# Patient Record
Sex: FEMALE | Race: WHITE | URBAN - METROPOLITAN AREA
[De-identification: names, ages, dates, MRNs, and addresses within clinical notes are randomized per-mention and may not be internally consistent; named-entity substitution may affect disease eponyms.]

---

## 2015-12-15 RX ORDER — CLOPIDOGREL BISULFATE 75 MG/1
1 TABLET, FILM COATED ORAL
Qty: 0 | Refills: 0 | COMMUNITY
Start: 2015-12-15

## 2017-02-07 ENCOUNTER — OUTPATIENT (OUTPATIENT)
Dept: OUTPATIENT SERVICES | Facility: HOSPITAL | Age: 62
LOS: 1 days | End: 2017-02-07
Payer: COMMERCIAL

## 2017-02-07 DIAGNOSIS — Z98.89 OTHER SPECIFIED POSTPROCEDURAL STATES: Chronic | ICD-10-CM

## 2017-02-07 DIAGNOSIS — Z92.89 PERSONAL HISTORY OF OTHER MEDICAL TREATMENT: Chronic | ICD-10-CM

## 2017-02-07 DIAGNOSIS — Z98.890 OTHER SPECIFIED POSTPROCEDURAL STATES: Chronic | ICD-10-CM

## 2017-02-07 DIAGNOSIS — M54.16 RADICULOPATHY, LUMBAR REGION: ICD-10-CM

## 2017-02-07 PROCEDURE — 62323 NJX INTERLAMINAR LMBR/SAC: CPT

## 2017-02-07 PROCEDURE — 77003 FLUOROGUIDE FOR SPINE INJECT: CPT

## 2017-03-21 ENCOUNTER — OUTPATIENT (OUTPATIENT)
Dept: OUTPATIENT SERVICES | Facility: HOSPITAL | Age: 62
LOS: 1 days | End: 2017-03-21
Payer: COMMERCIAL

## 2017-03-21 DIAGNOSIS — Z98.890 OTHER SPECIFIED POSTPROCEDURAL STATES: Chronic | ICD-10-CM

## 2017-03-21 DIAGNOSIS — Z98.89 OTHER SPECIFIED POSTPROCEDURAL STATES: Chronic | ICD-10-CM

## 2017-03-21 DIAGNOSIS — Z92.89 PERSONAL HISTORY OF OTHER MEDICAL TREATMENT: Chronic | ICD-10-CM

## 2017-03-21 DIAGNOSIS — M54.16 RADICULOPATHY, LUMBAR REGION: ICD-10-CM

## 2017-03-21 PROCEDURE — 62323 NJX INTERLAMINAR LMBR/SAC: CPT

## 2017-03-21 PROCEDURE — 77003 FLUOROGUIDE FOR SPINE INJECT: CPT

## 2017-05-01 ENCOUNTER — MEDICATION RENEWAL (OUTPATIENT)
Age: 62
End: 2017-05-01

## 2017-07-06 ENCOUNTER — OUTPATIENT (OUTPATIENT)
Dept: OUTPATIENT SERVICES | Facility: HOSPITAL | Age: 62
LOS: 1 days | End: 2017-07-06
Payer: COMMERCIAL

## 2017-07-06 DIAGNOSIS — Z92.89 PERSONAL HISTORY OF OTHER MEDICAL TREATMENT: Chronic | ICD-10-CM

## 2017-07-06 DIAGNOSIS — M54.16 RADICULOPATHY, LUMBAR REGION: ICD-10-CM

## 2017-07-06 DIAGNOSIS — Z98.890 OTHER SPECIFIED POSTPROCEDURAL STATES: Chronic | ICD-10-CM

## 2017-07-06 DIAGNOSIS — Z98.89 OTHER SPECIFIED POSTPROCEDURAL STATES: Chronic | ICD-10-CM

## 2017-07-06 PROCEDURE — 77003 FLUOROGUIDE FOR SPINE INJECT: CPT

## 2017-07-06 PROCEDURE — 62323 NJX INTERLAMINAR LMBR/SAC: CPT

## 2017-07-28 ENCOUNTER — RX RENEWAL (OUTPATIENT)
Age: 62
End: 2017-07-28

## 2017-08-10 ENCOUNTER — APPOINTMENT (OUTPATIENT)
Dept: CARDIOLOGY | Facility: CLINIC | Age: 62
End: 2017-08-10
Payer: COMMERCIAL

## 2017-08-10 ENCOUNTER — NON-APPOINTMENT (OUTPATIENT)
Age: 62
End: 2017-08-10

## 2017-08-10 VITALS
HEART RATE: 82 BPM | DIASTOLIC BLOOD PRESSURE: 79 MMHG | BODY MASS INDEX: 31.98 KG/M2 | HEIGHT: 66 IN | WEIGHT: 199 LBS | SYSTOLIC BLOOD PRESSURE: 146 MMHG | OXYGEN SATURATION: 100 %

## 2017-08-10 VITALS — SYSTOLIC BLOOD PRESSURE: 128 MMHG | DIASTOLIC BLOOD PRESSURE: 80 MMHG

## 2017-08-10 PROCEDURE — 93000 ELECTROCARDIOGRAM COMPLETE: CPT

## 2017-08-10 PROCEDURE — 99215 OFFICE O/P EST HI 40 MIN: CPT

## 2017-08-10 RX ORDER — METFORMIN ER 500 MG 500 MG/1
500 TABLET ORAL
Qty: 90 | Refills: 0 | Status: DISCONTINUED | COMMUNITY
Start: 2017-03-06

## 2017-08-10 RX ORDER — OMEPRAZOLE 20 MG/1
20 CAPSULE, DELAYED RELEASE ORAL
Qty: 90 | Refills: 0 | Status: DISCONTINUED | COMMUNITY
Start: 2017-01-10

## 2017-08-10 RX ORDER — GABAPENTIN 600 MG/1
600 TABLET, COATED ORAL
Qty: 84 | Refills: 0 | Status: DISCONTINUED | COMMUNITY
Start: 2017-04-17

## 2017-08-10 RX ORDER — MOMETASONE 50 UG/1
50 SPRAY, METERED NASAL
Qty: 17 | Refills: 0 | Status: DISCONTINUED | COMMUNITY
Start: 2017-05-18

## 2017-08-10 RX ORDER — TRAMADOL HYDROCHLORIDE AND ACETAMINOPHEN 37.5; 325 MG/1; MG/1
37.5-325 TABLET, FILM COATED ORAL
Qty: 30 | Refills: 0 | Status: DISCONTINUED | COMMUNITY
Start: 2017-02-15

## 2017-08-10 RX ORDER — AMOXICILLIN AND CLAVULANATE POTASSIUM 875; 125 MG/1; MG/1
875-125 TABLET, COATED ORAL
Qty: 20 | Refills: 0 | Status: DISCONTINUED | COMMUNITY
Start: 2017-05-18

## 2017-08-10 RX ORDER — ESTRADIOL 10 UG/1
10 TABLET VAGINAL
Qty: 36 | Refills: 0 | Status: ACTIVE | COMMUNITY
Start: 2017-04-11

## 2017-09-14 ENCOUNTER — RX RENEWAL (OUTPATIENT)
Age: 62
End: 2017-09-14

## 2017-09-28 ENCOUNTER — EMERGENCY (EMERGENCY)
Facility: HOSPITAL | Age: 62
LOS: 1 days | Discharge: HOME | End: 2017-09-28
Attending: EMERGENCY MEDICINE | Admitting: EMERGENCY MEDICINE
Payer: COMMERCIAL

## 2017-09-28 VITALS
HEIGHT: 65 IN | RESPIRATION RATE: 18 BRPM | WEIGHT: 195.11 LBS | SYSTOLIC BLOOD PRESSURE: 160 MMHG | TEMPERATURE: 98 F | HEART RATE: 100 BPM | DIASTOLIC BLOOD PRESSURE: 81 MMHG

## 2017-09-28 VITALS
RESPIRATION RATE: 19 BRPM | OXYGEN SATURATION: 96 % | TEMPERATURE: 98 F | HEART RATE: 84 BPM | SYSTOLIC BLOOD PRESSURE: 157 MMHG | DIASTOLIC BLOOD PRESSURE: 81 MMHG

## 2017-09-28 DIAGNOSIS — Z98.89 OTHER SPECIFIED POSTPROCEDURAL STATES: Chronic | ICD-10-CM

## 2017-09-28 DIAGNOSIS — Z92.89 PERSONAL HISTORY OF OTHER MEDICAL TREATMENT: Chronic | ICD-10-CM

## 2017-09-28 DIAGNOSIS — Z98.890 OTHER SPECIFIED POSTPROCEDURAL STATES: Chronic | ICD-10-CM

## 2017-09-28 PROCEDURE — 99284 EMERGENCY DEPT VISIT MOD MDM: CPT | Mod: 25

## 2017-09-28 PROCEDURE — 30901 CONTROL OF NOSEBLEED: CPT

## 2017-09-28 PROCEDURE — 30901 CONTROL OF NOSEBLEED: CPT | Mod: LT

## 2017-09-28 NOTE — ED PROVIDER NOTE - PSH
History of cardiac catheterization  01/2016  History of carpal tunnel surgery of left wrist    History of carpal tunnel surgery of right wrist    History of knee surgery    History of shoulder surgery

## 2017-09-28 NOTE — ED PROVIDER NOTE - OBJECTIVE STATEMENT
61 y/o F pt with history of CAD (2 stents), diabetes mellitus, HTN, hypercholesterolemia, presents to the ED c/o epistaxis for approximately 1.5 hours. Pt has experienced this before, last time 2 days ago. Pt takes Plavix and aspirin. Denies dizziness, pain, headache. No further complaints at this time.

## 2017-09-28 NOTE — ED ADULT NURSE NOTE - OBJECTIVE STATEMENT
Patient walked into ER c/o nose bleed from left nostril since 10am.  Visible bleeding from nostril noted.
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
## 2018-01-03 ENCOUNTER — NON-APPOINTMENT (OUTPATIENT)
Age: 63
End: 2018-01-03

## 2018-01-03 ENCOUNTER — APPOINTMENT (OUTPATIENT)
Dept: CARDIOLOGY | Facility: CLINIC | Age: 63
End: 2018-01-03
Payer: COMMERCIAL

## 2018-01-03 VITALS
DIASTOLIC BLOOD PRESSURE: 72 MMHG | HEIGHT: 66 IN | HEART RATE: 90 BPM | WEIGHT: 187 LBS | BODY MASS INDEX: 30.05 KG/M2 | OXYGEN SATURATION: 99 % | SYSTOLIC BLOOD PRESSURE: 124 MMHG

## 2018-01-03 DIAGNOSIS — E11.9 TYPE 2 DIABETES MELLITUS W/OUT COMPLICATIONS: ICD-10-CM

## 2018-01-03 PROCEDURE — 93000 ELECTROCARDIOGRAM COMPLETE: CPT

## 2018-01-03 PROCEDURE — 99215 OFFICE O/P EST HI 40 MIN: CPT

## 2018-01-24 ENCOUNTER — APPOINTMENT (OUTPATIENT)
Dept: CARDIOLOGY | Facility: CLINIC | Age: 63
End: 2018-01-24
Payer: COMMERCIAL

## 2018-01-24 PROCEDURE — A9500: CPT

## 2018-01-24 PROCEDURE — 78452 HT MUSCLE IMAGE SPECT MULT: CPT

## 2018-01-24 PROCEDURE — 93015 CV STRESS TEST SUPVJ I&R: CPT

## 2018-03-13 ENCOUNTER — CLINICAL ADVICE (OUTPATIENT)
Age: 63
End: 2018-03-13

## 2018-05-08 ENCOUNTER — APPOINTMENT (OUTPATIENT)
Dept: MRI IMAGING | Facility: CLINIC | Age: 63
End: 2018-05-08
Payer: COMMERCIAL

## 2018-05-08 ENCOUNTER — OUTPATIENT (OUTPATIENT)
Dept: OUTPATIENT SERVICES | Facility: HOSPITAL | Age: 63
LOS: 1 days | End: 2018-05-08
Payer: COMMERCIAL

## 2018-05-08 DIAGNOSIS — Z98.89 OTHER SPECIFIED POSTPROCEDURAL STATES: Chronic | ICD-10-CM

## 2018-05-08 DIAGNOSIS — Z92.89 PERSONAL HISTORY OF OTHER MEDICAL TREATMENT: Chronic | ICD-10-CM

## 2018-05-08 DIAGNOSIS — Z00.8 ENCOUNTER FOR OTHER GENERAL EXAMINATION: ICD-10-CM

## 2018-05-08 DIAGNOSIS — Z98.890 OTHER SPECIFIED POSTPROCEDURAL STATES: Chronic | ICD-10-CM

## 2018-05-08 PROCEDURE — 72148 MRI LUMBAR SPINE W/O DYE: CPT | Mod: 26

## 2018-05-08 PROCEDURE — 72148 MRI LUMBAR SPINE W/O DYE: CPT

## 2018-05-13 ENCOUNTER — TRANSCRIPTION ENCOUNTER (OUTPATIENT)
Age: 63
End: 2018-05-13

## 2018-05-14 ENCOUNTER — OUTPATIENT (OUTPATIENT)
Dept: OUTPATIENT SERVICES | Facility: HOSPITAL | Age: 63
LOS: 1 days | End: 2018-05-14
Payer: COMMERCIAL

## 2018-05-14 ENCOUNTER — RESULT REVIEW (OUTPATIENT)
Age: 63
End: 2018-05-14

## 2018-05-14 DIAGNOSIS — Z92.89 PERSONAL HISTORY OF OTHER MEDICAL TREATMENT: Chronic | ICD-10-CM

## 2018-05-14 DIAGNOSIS — Z98.89 OTHER SPECIFIED POSTPROCEDURAL STATES: Chronic | ICD-10-CM

## 2018-05-14 DIAGNOSIS — D48.1 NEOPLASM OF UNCERTAIN BEHAVIOR OF CONNECTIVE AND OTHER SOFT TISSUE: ICD-10-CM

## 2018-05-14 DIAGNOSIS — Z98.890 OTHER SPECIFIED POSTPROCEDURAL STATES: Chronic | ICD-10-CM

## 2018-05-14 PROCEDURE — 88305 TISSUE EXAM BY PATHOLOGIST: CPT | Mod: 26

## 2018-05-14 PROCEDURE — 88305 TISSUE EXAM BY PATHOLOGIST: CPT

## 2018-05-14 PROCEDURE — 21012 EXC FACE LES SBQ 2 CM/>: CPT

## 2018-05-15 LAB — SURGICAL PATHOLOGY FINAL REPORT - CH: SIGNIFICANT CHANGE UP

## 2018-06-14 ENCOUNTER — OUTPATIENT (OUTPATIENT)
Dept: OUTPATIENT SERVICES | Facility: HOSPITAL | Age: 63
LOS: 1 days | End: 2018-06-14
Payer: COMMERCIAL

## 2018-06-14 DIAGNOSIS — Z92.89 PERSONAL HISTORY OF OTHER MEDICAL TREATMENT: Chronic | ICD-10-CM

## 2018-06-14 DIAGNOSIS — Z98.89 OTHER SPECIFIED POSTPROCEDURAL STATES: Chronic | ICD-10-CM

## 2018-06-14 DIAGNOSIS — Z98.890 OTHER SPECIFIED POSTPROCEDURAL STATES: Chronic | ICD-10-CM

## 2018-06-14 DIAGNOSIS — M54.16 RADICULOPATHY, LUMBAR REGION: ICD-10-CM

## 2018-06-14 PROCEDURE — 62323 NJX INTERLAMINAR LMBR/SAC: CPT

## 2018-06-14 PROCEDURE — 77003 FLUOROGUIDE FOR SPINE INJECT: CPT

## 2018-06-29 ENCOUNTER — RX RENEWAL (OUTPATIENT)
Age: 63
End: 2018-06-29

## 2018-07-06 ENCOUNTER — MESSAGE (OUTPATIENT)
Age: 63
End: 2018-07-06

## 2018-09-10 ENCOUNTER — APPOINTMENT (OUTPATIENT)
Dept: ORTHOPEDIC SURGERY | Facility: CLINIC | Age: 63
End: 2018-09-10
Payer: COMMERCIAL

## 2018-09-10 VITALS
DIASTOLIC BLOOD PRESSURE: 83 MMHG | HEART RATE: 163 BPM | WEIGHT: 200 LBS | HEIGHT: 66 IN | SYSTOLIC BLOOD PRESSURE: 152 MMHG | BODY MASS INDEX: 32.14 KG/M2

## 2018-09-10 PROCEDURE — 72082 X-RAY EXAM ENTIRE SPI 2/3 VW: CPT

## 2018-09-10 PROCEDURE — 99203 OFFICE O/P NEW LOW 30 MIN: CPT

## 2018-10-10 ENCOUNTER — APPOINTMENT (OUTPATIENT)
Dept: ORTHOPEDIC SURGERY | Facility: CLINIC | Age: 63
End: 2018-10-10
Payer: COMMERCIAL

## 2018-10-10 VITALS
SYSTOLIC BLOOD PRESSURE: 121 MMHG | HEIGHT: 66 IN | HEART RATE: 96 BPM | BODY MASS INDEX: 32.14 KG/M2 | DIASTOLIC BLOOD PRESSURE: 74 MMHG | WEIGHT: 200 LBS

## 2018-10-10 DIAGNOSIS — M41.9 SCOLIOSIS, UNSPECIFIED: ICD-10-CM

## 2018-10-10 PROCEDURE — 99214 OFFICE O/P EST MOD 30 MIN: CPT

## 2018-10-24 ENCOUNTER — CHART COPY (OUTPATIENT)
Age: 63
End: 2018-10-24

## 2018-11-05 ENCOUNTER — CHART COPY (OUTPATIENT)
Age: 63
End: 2018-11-05

## 2018-11-09 ENCOUNTER — OUTPATIENT (OUTPATIENT)
Dept: OUTPATIENT SERVICES | Facility: HOSPITAL | Age: 63
LOS: 1 days | End: 2018-11-09
Payer: COMMERCIAL

## 2018-11-09 VITALS
WEIGHT: 201.94 LBS | OXYGEN SATURATION: 99 % | HEIGHT: 63 IN | SYSTOLIC BLOOD PRESSURE: 144 MMHG | DIASTOLIC BLOOD PRESSURE: 84 MMHG | HEART RATE: 90 BPM | RESPIRATION RATE: 16 BRPM | TEMPERATURE: 97 F

## 2018-11-09 DIAGNOSIS — Z98.89 OTHER SPECIFIED POSTPROCEDURAL STATES: Chronic | ICD-10-CM

## 2018-11-09 DIAGNOSIS — M48.07 SPINAL STENOSIS, LUMBOSACRAL REGION: ICD-10-CM

## 2018-11-09 DIAGNOSIS — Z92.89 PERSONAL HISTORY OF OTHER MEDICAL TREATMENT: Chronic | ICD-10-CM

## 2018-11-09 DIAGNOSIS — Z95.5 PRESENCE OF CORONARY ANGIOPLASTY IMPLANT AND GRAFT: ICD-10-CM

## 2018-11-09 DIAGNOSIS — I10 ESSENTIAL (PRIMARY) HYPERTENSION: ICD-10-CM

## 2018-11-09 DIAGNOSIS — M40.209 UNSPECIFIED KYPHOSIS, SITE UNSPECIFIED: ICD-10-CM

## 2018-11-09 DIAGNOSIS — G47.33 OBSTRUCTIVE SLEEP APNEA (ADULT) (PEDIATRIC): ICD-10-CM

## 2018-11-09 DIAGNOSIS — Z98.890 OTHER SPECIFIED POSTPROCEDURAL STATES: Chronic | ICD-10-CM

## 2018-11-09 DIAGNOSIS — E11.9 TYPE 2 DIABETES MELLITUS WITHOUT COMPLICATIONS: ICD-10-CM

## 2018-11-09 LAB
BLD GP AB SCN SERPL QL: NEGATIVE — SIGNIFICANT CHANGE UP
BUN SERPL-MCNC: 23 MG/DL — SIGNIFICANT CHANGE UP (ref 7–23)
CALCIUM SERPL-MCNC: 9.3 MG/DL — SIGNIFICANT CHANGE UP (ref 8.4–10.5)
CHLORIDE SERPL-SCNC: 102 MMOL/L — SIGNIFICANT CHANGE UP (ref 98–107)
CO2 SERPL-SCNC: 25 MMOL/L — SIGNIFICANT CHANGE UP (ref 22–31)
CREAT SERPL-MCNC: 1.12 MG/DL — SIGNIFICANT CHANGE UP (ref 0.5–1.3)
GLUCOSE SERPL-MCNC: 147 MG/DL — HIGH (ref 70–99)
HBA1C BLD-MCNC: 7.5 % — HIGH (ref 4–5.6)
HCT VFR BLD CALC: 37.7 % — SIGNIFICANT CHANGE UP (ref 34.5–45)
HGB BLD-MCNC: 11.9 G/DL — SIGNIFICANT CHANGE UP (ref 11.5–15.5)
MCHC RBC-ENTMCNC: 28.7 PG — SIGNIFICANT CHANGE UP (ref 27–34)
MCHC RBC-ENTMCNC: 31.6 % — LOW (ref 32–36)
MCV RBC AUTO: 90.8 FL — SIGNIFICANT CHANGE UP (ref 80–100)
NRBC # FLD: 0 — SIGNIFICANT CHANGE UP
PLATELET # BLD AUTO: 328 K/UL — SIGNIFICANT CHANGE UP (ref 150–400)
PMV BLD: 9.9 FL — SIGNIFICANT CHANGE UP (ref 7–13)
POTASSIUM SERPL-MCNC: 4.3 MMOL/L — SIGNIFICANT CHANGE UP (ref 3.5–5.3)
POTASSIUM SERPL-SCNC: 4.3 MMOL/L — SIGNIFICANT CHANGE UP (ref 3.5–5.3)
RBC # BLD: 4.15 M/UL — SIGNIFICANT CHANGE UP (ref 3.8–5.2)
RBC # FLD: 13.8 % — SIGNIFICANT CHANGE UP (ref 10.3–14.5)
RH IG SCN BLD-IMP: POSITIVE — SIGNIFICANT CHANGE UP
SODIUM SERPL-SCNC: 141 MMOL/L — SIGNIFICANT CHANGE UP (ref 135–145)
WBC # BLD: 7.7 K/UL — SIGNIFICANT CHANGE UP (ref 3.8–10.5)
WBC # FLD AUTO: 7.7 K/UL — SIGNIFICANT CHANGE UP (ref 3.8–10.5)

## 2018-11-09 PROCEDURE — 93010 ELECTROCARDIOGRAM REPORT: CPT

## 2018-11-09 RX ORDER — ALPRAZOLAM 0.25 MG
0 TABLET ORAL
Qty: 0 | Refills: 0 | COMMUNITY

## 2018-11-09 RX ORDER — OMEGA-3 ACID ETHYL ESTERS 1 G
1 CAPSULE ORAL
Qty: 0 | Refills: 0 | COMMUNITY

## 2018-11-09 RX ORDER — UBIDECARENONE 100 MG
2 CAPSULE ORAL
Qty: 0 | Refills: 0 | COMMUNITY

## 2018-11-09 NOTE — H&P PST ADULT - ASSESSMENT
unspecified kyphosis , site unspecified   spondylolisthesis ,lumbar region   spinal stenosis , lumbosacral region

## 2018-11-09 NOTE — H&P PST ADULT - HISTORY OF PRESENT ILLNESS
This is a 63 y.o. female with lumbar back pain , evaluated . Pt had x-ray of spine , MRI of spine , CT of spine . Pt has unspecified kyphosis, site unspecified . Spondylolisthesis ,lumbar region, spinal stenosis ,lumbosacral region . Pt complaining of lower back pain , radiates to left leg past knee - intermittently . Pt now for surgery .

## 2018-11-09 NOTE — H&P PST ADULT - TEACHING/LEARNING LEARNING PREFERENCES
individual instruction/pictorial/skill demonstration/video/written material/group instruction/verbal instruction/audio/computer/internet

## 2018-11-09 NOTE — H&P PST ADULT - MUSCULOSKELETAL
details… detailed exam no joint warmth/no joint swelling/normal strength/no joint erythema/no calf tenderness

## 2018-11-09 NOTE — H&P PST ADULT - PROBLEM SELECTOR PLAN 4
type 2 , pt to hold metformin 24 hours prior to OR , last dose 11/26. STAT FS upon admit , monitor FS during hospital stay .
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
## 2018-11-09 NOTE — H&P PST ADULT - PROBLEM SELECTOR PLAN 3
times 2 - 2015 . Pt is on plavix and aspirin , cardiac clearance requested . Discussed with Dr Mata who agrees with plan .

## 2018-11-09 NOTE — H&P PST ADULT - FAMILY HISTORY
Family history of breast cancer in female     Family history of pancreatic cancer     Grandparent  Still living? No  Family history of cancer of GI tract, Age at diagnosis: Age Unknown

## 2018-11-09 NOTE — H&P PST ADULT - PSH
History of cardiac catheterization  12/2015 with stent times  - [facility]  History of carpal tunnel surgery of left wrist    History of carpal tunnel surgery of right wrist    History of knee surgery  left times 2 ;  2001 , 2002   right knee : 2004  History of shoulder surgery

## 2018-11-09 NOTE — H&P PST ADULT - RS GEN PE MLT RESP DETAILS PC
no rales/no rhonchi/respirations non-labored/no wheezes/breath sounds equal/clear to auscultation bilaterally/good air movement

## 2018-11-11 LAB — SPECIMEN SOURCE: SIGNIFICANT CHANGE UP

## 2018-11-12 LAB — BACTERIA NPH CULT: SIGNIFICANT CHANGE UP

## 2018-11-14 ENCOUNTER — APPOINTMENT (OUTPATIENT)
Dept: CARDIOLOGY | Facility: CLINIC | Age: 63
End: 2018-11-14
Payer: COMMERCIAL

## 2018-11-14 ENCOUNTER — NON-APPOINTMENT (OUTPATIENT)
Age: 63
End: 2018-11-14

## 2018-11-14 VITALS
HEIGHT: 66 IN | DIASTOLIC BLOOD PRESSURE: 84 MMHG | WEIGHT: 203 LBS | BODY MASS INDEX: 32.62 KG/M2 | HEART RATE: 95 BPM | SYSTOLIC BLOOD PRESSURE: 166 MMHG | OXYGEN SATURATION: 99 %

## 2018-11-14 VITALS — DIASTOLIC BLOOD PRESSURE: 80 MMHG | SYSTOLIC BLOOD PRESSURE: 140 MMHG

## 2018-11-14 DIAGNOSIS — I10 ESSENTIAL (PRIMARY) HYPERTENSION: ICD-10-CM

## 2018-11-14 DIAGNOSIS — I25.10 ATHEROSCLEROTIC HEART DISEASE OF NATIVE CORONARY ARTERY W/OUT ANGINA PECTORIS: ICD-10-CM

## 2018-11-14 DIAGNOSIS — E78.5 HYPERLIPIDEMIA, UNSPECIFIED: ICD-10-CM

## 2018-11-14 PROCEDURE — 93000 ELECTROCARDIOGRAM COMPLETE: CPT

## 2018-11-14 PROCEDURE — 99215 OFFICE O/P EST HI 40 MIN: CPT

## 2018-11-14 RX ORDER — PSYLLIUM HUSK 0.4 G
CAPSULE ORAL
Refills: 0 | Status: ACTIVE | COMMUNITY

## 2018-11-14 RX ORDER — TIZANIDINE 2 MG/1
2 TABLET ORAL
Qty: 180 | Refills: 0 | Status: DISCONTINUED | COMMUNITY
Start: 2017-04-19 | End: 2018-11-14

## 2018-11-14 RX ORDER — UBIDECARENONE 200 MG
CAPSULE ORAL
Refills: 0 | Status: ACTIVE | COMMUNITY

## 2018-11-14 RX ORDER — ROSUVASTATIN CALCIUM 40 MG/1
40 TABLET, FILM COATED ORAL
Qty: 90 | Refills: 3 | Status: ACTIVE | COMMUNITY
Start: 2018-06-29 | End: 1900-01-01

## 2018-11-14 RX ORDER — HYDROCHLOROTHIAZIDE 12.5 MG/1
12.5 CAPSULE ORAL
Qty: 45 | Refills: 0 | Status: DISCONTINUED | COMMUNITY
Start: 2017-05-09 | End: 2018-11-14

## 2018-11-14 RX ORDER — GABAPENTIN 600 MG/1
600 TABLET, COATED ORAL 4 TIMES DAILY
Refills: 0 | Status: ACTIVE | COMMUNITY

## 2018-11-15 PROBLEM — N20.0 CALCULUS OF KIDNEY: Chronic | Status: ACTIVE | Noted: 2018-11-09

## 2018-11-15 PROBLEM — M41.9 SCOLIOSIS, UNSPECIFIED: Chronic | Status: ACTIVE | Noted: 2018-11-09

## 2018-11-16 ENCOUNTER — APPOINTMENT (OUTPATIENT)
Dept: CARDIOLOGY | Facility: CLINIC | Age: 63
End: 2018-11-16
Payer: COMMERCIAL

## 2018-11-16 PROCEDURE — 93306 TTE W/DOPPLER COMPLETE: CPT

## 2018-11-19 ENCOUNTER — APPOINTMENT (OUTPATIENT)
Dept: ORTHOPEDIC SURGERY | Facility: CLINIC | Age: 63
End: 2018-11-19
Payer: COMMERCIAL

## 2018-11-19 DIAGNOSIS — M43.16 SPONDYLOLISTHESIS, LUMBAR REGION: ICD-10-CM

## 2018-11-19 DIAGNOSIS — M48.07 SPINAL STENOSIS, LUMBOSACRAL REGION: ICD-10-CM

## 2018-11-19 DIAGNOSIS — M40.209 UNSPECIFIED KYPHOSIS, SITE UNSPECIFIED: ICD-10-CM

## 2018-11-19 PROCEDURE — 99214 OFFICE O/P EST MOD 30 MIN: CPT

## 2018-11-26 ENCOUNTER — TRANSCRIPTION ENCOUNTER (OUTPATIENT)
Age: 63
End: 2018-11-26

## 2018-11-26 NOTE — ASU PATIENT PROFILE, ADULT - PSH
History of cardiac catheterization  12/2015 with stent times  - [facility]  History of carpal tunnel surgery of left wrist    History of carpal tunnel surgery of right wrist    History of knee surgery  left times 2 ;  2001 , 2002   right knee : 2004  History of shoulder surgery

## 2018-11-26 NOTE — ASU PATIENT PROFILE, ADULT - TEACHING/LEARNING LEARNING PREFERENCES
video/written material/skill demonstration/verbal instruction/pictorial/audio/individual instruction/computer/internet/group instruction
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
## 2018-11-27 ENCOUNTER — INPATIENT (INPATIENT)
Facility: HOSPITAL | Age: 63
LOS: 14 days | Discharge: NURSING, REHAB OR LONG-TERM CARE | End: 2018-12-12
Attending: STUDENT IN AN ORGANIZED HEALTH CARE EDUCATION/TRAINING PROGRAM | Admitting: STUDENT IN AN ORGANIZED HEALTH CARE EDUCATION/TRAINING PROGRAM
Payer: COMMERCIAL

## 2018-11-27 ENCOUNTER — RESULT REVIEW (OUTPATIENT)
Age: 63
End: 2018-11-27

## 2018-11-27 ENCOUNTER — APPOINTMENT (OUTPATIENT)
Dept: ORTHOPEDIC SURGERY | Facility: HOSPITAL | Age: 63
End: 2018-11-27
Payer: COMMERCIAL

## 2018-11-27 VITALS
SYSTOLIC BLOOD PRESSURE: 132 MMHG | HEIGHT: 63 IN | DIASTOLIC BLOOD PRESSURE: 80 MMHG | HEART RATE: 77 BPM | RESPIRATION RATE: 16 BRPM | WEIGHT: 201.94 LBS | OXYGEN SATURATION: 97 % | TEMPERATURE: 98 F

## 2018-11-27 DIAGNOSIS — Z92.89 PERSONAL HISTORY OF OTHER MEDICAL TREATMENT: Chronic | ICD-10-CM

## 2018-11-27 DIAGNOSIS — M48.07 SPINAL STENOSIS, LUMBOSACRAL REGION: ICD-10-CM

## 2018-11-27 DIAGNOSIS — Z98.89 OTHER SPECIFIED POSTPROCEDURAL STATES: Chronic | ICD-10-CM

## 2018-11-27 DIAGNOSIS — Z98.890 OTHER SPECIFIED POSTPROCEDURAL STATES: Chronic | ICD-10-CM

## 2018-11-27 LAB
APTT BLD: 27.3 SEC — LOW (ref 27.5–36.3)
BASE EXCESS BLDA CALC-SCNC: -0.6 MMOL/L — SIGNIFICANT CHANGE UP
BASE EXCESS BLDA CALC-SCNC: -1.8 MMOL/L — SIGNIFICANT CHANGE UP
BASE EXCESS BLDA CALC-SCNC: -2.3 MMOL/L — SIGNIFICANT CHANGE UP
BASE EXCESS BLDA CALC-SCNC: -3 MMOL/L — SIGNIFICANT CHANGE UP
BUN SERPL-MCNC: 23 MG/DL — SIGNIFICANT CHANGE UP (ref 7–23)
CA-I BLDA-SCNC: 1.07 MMOL/L — LOW (ref 1.15–1.29)
CA-I BLDA-SCNC: 1.08 MMOL/L — LOW (ref 1.15–1.29)
CA-I BLDA-SCNC: 1.11 MMOL/L — LOW (ref 1.15–1.29)
CA-I BLDA-SCNC: 1.14 MMOL/L — LOW (ref 1.15–1.29)
CALCIUM SERPL-MCNC: 8.1 MG/DL — LOW (ref 8.4–10.5)
CHLORIDE SERPL-SCNC: 105 MMOL/L — SIGNIFICANT CHANGE UP (ref 98–107)
CO2 SERPL-SCNC: 22 MMOL/L — SIGNIFICANT CHANGE UP (ref 22–31)
CREAT SERPL-MCNC: 1.1 MG/DL — SIGNIFICANT CHANGE UP (ref 0.5–1.3)
GLUCOSE BLDA-MCNC: 140 MG/DL — HIGH (ref 70–99)
GLUCOSE BLDA-MCNC: 150 MG/DL — HIGH (ref 70–99)
GLUCOSE BLDA-MCNC: 157 MG/DL — HIGH (ref 70–99)
GLUCOSE BLDA-MCNC: 157 MG/DL — HIGH (ref 70–99)
GLUCOSE BLDC GLUCOMTR-MCNC: 159 MG/DL — HIGH (ref 70–99)
GLUCOSE BLDC GLUCOMTR-MCNC: 173 MG/DL — HIGH (ref 70–99)
GLUCOSE SERPL-MCNC: 190 MG/DL — HIGH (ref 70–99)
HCO3 BLDA-SCNC: 22 MMOL/L — SIGNIFICANT CHANGE UP (ref 22–26)
HCO3 BLDA-SCNC: 23 MMOL/L — SIGNIFICANT CHANGE UP (ref 22–26)
HCO3 BLDA-SCNC: 23 MMOL/L — SIGNIFICANT CHANGE UP (ref 22–26)
HCO3 BLDA-SCNC: 24 MMOL/L — SIGNIFICANT CHANGE UP (ref 22–26)
HCT VFR BLD CALC: 30.5 % — LOW (ref 34.5–45)
HCT VFR BLDA CALC: 28.1 % — LOW (ref 34.5–46.5)
HCT VFR BLDA CALC: 29.7 % — LOW (ref 34.5–46.5)
HCT VFR BLDA CALC: 31.2 % — LOW (ref 34.5–46.5)
HCT VFR BLDA CALC: 33 % — LOW (ref 34.5–46.5)
HGB BLD-MCNC: 10 G/DL — LOW (ref 11.5–15.5)
HGB BLDA-MCNC: 10.1 G/DL — LOW (ref 11.5–15.5)
HGB BLDA-MCNC: 10.7 G/DL — LOW (ref 11.5–15.5)
HGB BLDA-MCNC: 9.1 G/DL — LOW (ref 11.5–15.5)
HGB BLDA-MCNC: 9.6 G/DL — LOW (ref 11.5–15.5)
INR BLD: 1.07 — SIGNIFICANT CHANGE UP (ref 0.88–1.17)
MAGNESIUM SERPL-MCNC: 1.8 MG/DL — SIGNIFICANT CHANGE UP (ref 1.6–2.6)
MCHC RBC-ENTMCNC: 29.6 PG — SIGNIFICANT CHANGE UP (ref 27–34)
MCHC RBC-ENTMCNC: 32.8 % — SIGNIFICANT CHANGE UP (ref 32–36)
MCV RBC AUTO: 90.2 FL — SIGNIFICANT CHANGE UP (ref 80–100)
NRBC # FLD: 0 — SIGNIFICANT CHANGE UP
PCO2 BLDA: 31 MMHG — LOW (ref 32–48)
PCO2 BLDA: 35 MMHG — SIGNIFICANT CHANGE UP (ref 32–48)
PH BLDA: 7.41 PH — SIGNIFICANT CHANGE UP (ref 7.35–7.45)
PH BLDA: 7.44 PH — SIGNIFICANT CHANGE UP (ref 7.35–7.45)
PH BLDA: 7.45 PH — SIGNIFICANT CHANGE UP (ref 7.35–7.45)
PH BLDA: 7.48 PH — HIGH (ref 7.35–7.45)
PHOSPHATE SERPL-MCNC: 3.9 MG/DL — SIGNIFICANT CHANGE UP (ref 2.5–4.5)
PLATELET # BLD AUTO: 229 K/UL — SIGNIFICANT CHANGE UP (ref 150–400)
PMV BLD: 9.3 FL — SIGNIFICANT CHANGE UP (ref 7–13)
PO2 BLDA: 268 MMHG — HIGH (ref 83–108)
PO2 BLDA: 280 MMHG — HIGH (ref 83–108)
PO2 BLDA: 294 MMHG — HIGH (ref 83–108)
PO2 BLDA: 371 MMHG — HIGH (ref 83–108)
POTASSIUM BLDA-SCNC: 3.8 MMOL/L — SIGNIFICANT CHANGE UP (ref 3.4–4.5)
POTASSIUM BLDA-SCNC: 4.7 MMOL/L — HIGH (ref 3.4–4.5)
POTASSIUM SERPL-MCNC: 4.5 MMOL/L — SIGNIFICANT CHANGE UP (ref 3.5–5.3)
POTASSIUM SERPL-SCNC: 4.5 MMOL/L — SIGNIFICANT CHANGE UP (ref 3.5–5.3)
PROTHROM AB SERPL-ACNC: 12.2 SEC — SIGNIFICANT CHANGE UP (ref 9.8–13.1)
RBC # BLD: 3.38 M/UL — LOW (ref 3.8–5.2)
RBC # FLD: 13.9 % — SIGNIFICANT CHANGE UP (ref 10.3–14.5)
RH IG SCN BLD-IMP: POSITIVE — SIGNIFICANT CHANGE UP
SAO2 % BLDA: 100 % — HIGH (ref 95–99)
SAO2 % BLDA: 99.6 % — HIGH (ref 95–99)
SAO2 % BLDA: 99.7 % — HIGH (ref 95–99)
SAO2 % BLDA: 99.8 % — HIGH (ref 95–99)
SODIUM BLDA-SCNC: 136 MMOL/L — SIGNIFICANT CHANGE UP (ref 136–146)
SODIUM BLDA-SCNC: 137 MMOL/L — SIGNIFICANT CHANGE UP (ref 136–146)
SODIUM SERPL-SCNC: 138 MMOL/L — SIGNIFICANT CHANGE UP (ref 135–145)
WBC # BLD: 12.31 K/UL — HIGH (ref 3.8–10.5)
WBC # FLD AUTO: 12.31 K/UL — HIGH (ref 3.8–10.5)

## 2018-11-27 PROCEDURE — 63267 EXCISE INTRSPINL LESION LMBR: CPT | Mod: 82

## 2018-11-27 PROCEDURE — 22802 ARTHRD PST DFRM 7-12 VRT SGM: CPT

## 2018-11-27 PROCEDURE — 22212 INCIS 1 VERTEBRAL SEG THORAC: CPT | Mod: 59

## 2018-11-27 PROCEDURE — 22216 INCIS ADDL SPINE SEGMENT: CPT

## 2018-11-27 PROCEDURE — 88304 TISSUE EXAM BY PATHOLOGIST: CPT | Mod: 26

## 2018-11-27 PROCEDURE — 88311 DECALCIFY TISSUE: CPT | Mod: 26

## 2018-11-27 PROCEDURE — 63047 LAM FACETEC & FORAMOT LUMBAR: CPT | Mod: 82,59

## 2018-11-27 PROCEDURE — 22214 INCIS 1 VERTEBRAL SEG LUMBAR: CPT

## 2018-11-27 PROCEDURE — 22843 INSERT SPINE FIXATION DEVICE: CPT

## 2018-11-27 PROCEDURE — 63047 LAM FACETEC & FORAMOT LUMBAR: CPT | Mod: 59

## 2018-11-27 PROCEDURE — 22848 INSERT PELV FIXATION DEVICE: CPT

## 2018-11-27 PROCEDURE — 63267 EXCISE INTRSPINL LESION LMBR: CPT

## 2018-11-27 RX ORDER — SODIUM CHLORIDE 9 MG/ML
1000 INJECTION, SOLUTION INTRAVENOUS
Qty: 0 | Refills: 0 | Status: DISCONTINUED | OUTPATIENT
Start: 2018-11-27 | End: 2018-11-28

## 2018-11-27 RX ORDER — LABETALOL HCL 100 MG
10 TABLET ORAL ONCE
Qty: 0 | Refills: 0 | Status: COMPLETED | OUTPATIENT
Start: 2018-11-27 | End: 2018-11-27

## 2018-11-27 RX ORDER — DEXTROSE 50 % IN WATER 50 %
25 SYRINGE (ML) INTRAVENOUS ONCE
Qty: 0 | Refills: 0 | Status: DISCONTINUED | OUTPATIENT
Start: 2018-11-27 | End: 2018-12-09

## 2018-11-27 RX ORDER — INSULIN LISPRO 100/ML
VIAL (ML) SUBCUTANEOUS
Qty: 0 | Refills: 0 | Status: DISCONTINUED | OUTPATIENT
Start: 2018-11-27 | End: 2018-12-09

## 2018-11-27 RX ORDER — SODIUM CHLORIDE 9 MG/ML
1000 INJECTION, SOLUTION INTRAVENOUS
Qty: 0 | Refills: 0 | Status: DISCONTINUED | OUTPATIENT
Start: 2018-11-27 | End: 2018-11-27

## 2018-11-27 RX ORDER — SODIUM CHLORIDE 9 MG/ML
1000 INJECTION, SOLUTION INTRAVENOUS
Qty: 0 | Refills: 0 | Status: DISCONTINUED | OUTPATIENT
Start: 2018-11-27 | End: 2018-12-09

## 2018-11-27 RX ORDER — GLUCAGON INJECTION, SOLUTION 0.5 MG/.1ML
1 INJECTION, SOLUTION SUBCUTANEOUS ONCE
Qty: 0 | Refills: 0 | Status: DISCONTINUED | OUTPATIENT
Start: 2018-11-27 | End: 2018-12-09

## 2018-11-27 RX ORDER — ACETAMINOPHEN 500 MG
1000 TABLET ORAL ONCE
Qty: 0 | Refills: 0 | Status: COMPLETED | OUTPATIENT
Start: 2018-11-27 | End: 2018-11-27

## 2018-11-27 RX ORDER — CEFAZOLIN SODIUM 1 G
1000 VIAL (EA) INJECTION EVERY 8 HOURS
Qty: 0 | Refills: 0 | Status: DISCONTINUED | OUTPATIENT
Start: 2018-11-27 | End: 2018-12-02

## 2018-11-27 RX ORDER — INSULIN LISPRO 100/ML
VIAL (ML) SUBCUTANEOUS AT BEDTIME
Qty: 0 | Refills: 0 | Status: DISCONTINUED | OUTPATIENT
Start: 2018-11-27 | End: 2018-12-09

## 2018-11-27 RX ORDER — ALPRAZOLAM 0.25 MG
0.25 TABLET ORAL EVERY 6 HOURS
Qty: 0 | Refills: 0 | Status: DISCONTINUED | OUTPATIENT
Start: 2018-11-27 | End: 2018-12-04

## 2018-11-27 RX ORDER — HYDROMORPHONE HYDROCHLORIDE 2 MG/ML
0.5 INJECTION INTRAMUSCULAR; INTRAVENOUS; SUBCUTANEOUS EVERY 4 HOURS
Qty: 0 | Refills: 0 | Status: DISCONTINUED | OUTPATIENT
Start: 2018-11-27 | End: 2018-11-28

## 2018-11-27 RX ORDER — LOSARTAN POTASSIUM 100 MG/1
50 TABLET, FILM COATED ORAL DAILY
Qty: 0 | Refills: 0 | Status: DISCONTINUED | OUTPATIENT
Start: 2018-11-27 | End: 2018-12-05

## 2018-11-27 RX ORDER — MAGNESIUM SULFATE 500 MG/ML
2 VIAL (ML) INJECTION ONCE
Qty: 0 | Refills: 0 | Status: COMPLETED | OUTPATIENT
Start: 2018-11-27 | End: 2018-11-27

## 2018-11-27 RX ORDER — INFLUENZA VIRUS VACCINE 15; 15; 15; 15 UG/.5ML; UG/.5ML; UG/.5ML; UG/.5ML
0.5 SUSPENSION INTRAMUSCULAR ONCE
Qty: 0 | Refills: 0 | Status: COMPLETED | OUTPATIENT
Start: 2018-11-27 | End: 2018-11-27

## 2018-11-27 RX ORDER — ATORVASTATIN CALCIUM 80 MG/1
80 TABLET, FILM COATED ORAL AT BEDTIME
Qty: 0 | Refills: 0 | Status: DISCONTINUED | OUTPATIENT
Start: 2018-11-27 | End: 2018-12-03

## 2018-11-27 RX ORDER — DEXTROSE 50 % IN WATER 50 %
15 SYRINGE (ML) INTRAVENOUS ONCE
Qty: 0 | Refills: 0 | Status: DISCONTINUED | OUTPATIENT
Start: 2018-11-27 | End: 2018-12-09

## 2018-11-27 RX ORDER — HYDRALAZINE HCL 50 MG
10 TABLET ORAL ONCE
Qty: 0 | Refills: 0 | Status: COMPLETED | OUTPATIENT
Start: 2018-11-27 | End: 2018-11-27

## 2018-11-27 RX ORDER — CARVEDILOL PHOSPHATE 80 MG/1
25 CAPSULE, EXTENDED RELEASE ORAL EVERY 12 HOURS
Qty: 0 | Refills: 0 | Status: DISCONTINUED | OUTPATIENT
Start: 2018-11-27 | End: 2018-12-07

## 2018-11-27 RX ORDER — DEXTROSE 50 % IN WATER 50 %
12.5 SYRINGE (ML) INTRAVENOUS ONCE
Qty: 0 | Refills: 0 | Status: DISCONTINUED | OUTPATIENT
Start: 2018-11-27 | End: 2018-12-09

## 2018-11-27 RX ADMIN — Medication 1000 MILLIGRAM(S): at 19:20

## 2018-11-27 RX ADMIN — Medication 10 MILLIGRAM(S): at 18:30

## 2018-11-27 RX ADMIN — Medication 10 MILLIGRAM(S): at 18:22

## 2018-11-27 RX ADMIN — ATORVASTATIN CALCIUM 80 MILLIGRAM(S): 80 TABLET, FILM COATED ORAL at 22:26

## 2018-11-27 RX ADMIN — Medication 400 MILLIGRAM(S): at 18:50

## 2018-11-27 RX ADMIN — Medication 100 MILLIGRAM(S): at 22:27

## 2018-11-27 RX ADMIN — Medication 50 GRAM(S): at 21:28

## 2018-11-27 RX ADMIN — HYDROMORPHONE HYDROCHLORIDE 0.5 MILLIGRAM(S): 2 INJECTION INTRAMUSCULAR; INTRAVENOUS; SUBCUTANEOUS at 21:43

## 2018-11-27 RX ADMIN — HYDROMORPHONE HYDROCHLORIDE 0.5 MILLIGRAM(S): 2 INJECTION INTRAMUSCULAR; INTRAVENOUS; SUBCUTANEOUS at 21:28

## 2018-11-27 RX ADMIN — Medication 10 MILLIGRAM(S): at 19:34

## 2018-11-27 RX ADMIN — Medication 10 MILLIGRAM(S): at 18:37

## 2018-11-27 NOTE — ASU PREOP CHECKLIST - SELECT TESTS ORDERED
Type and Screen/nasal smear/EKG/Type and Cross EKG/Type and Cross/nasal smear/Type and Screen/POCT Blood Glucose

## 2018-11-27 NOTE — CONSULT NOTE ADULT - ASSESSMENT
ASSESSMENT:  63y female with PMH significant for DM, HTN, HLD, CAD (s/p drug-eluding stents x2 in 2015), arthritis, spinal stenosis, scoliosis, GERD, carpal tunnel syndrome, depression, hx kidney stones presented for scheduled surgery on 11/27. Patient underwent T10 to pelvis decompression and fusion complicated hypotension, patient given ephedrine and started on levo intraoperatively. EBL ***. Patient transfused 2u PRBCs/1u FFP/1u platelets and given 4L colloids. SICU consulted for Q1H neurochecks.    PLAN:    Neurologic:   - off sedation***  - pain control with***  - resume home cymbalta  - hold home PRN Xanax    Respiratory:   - intubated***  - satting ***    Cardiovascular: HTN, HLD, CAD (s/p drug-eluding stents x2 in 2015)  - BPs on admission ***  - wean pressors as tolerated  - holding home ASA, Plavix, Coreg, Crestor    Gastrointestinal/Nutrition: GERD  - Diet: NPO  - On PPI at home, will start inpatient    Renal/Genitourinary:   - Monitor BUN/Cr  - IVF: ***    Hematologic:   - f/u post-op H/h  - status post 2u PRBCs/1u FFP/1u platelets  - start SQH in AM 11/28    Infectious Disease:   - CTM Tmax and WBC  - Abx: ancef    Tubes/Lines/Drains: ***    Endocrine: DM  - holding home metformin  - ISS    Disposition: SICU    --------------------------------------------------------------------------------------    Critical Care Diagnoses: requiring Q1H neurochecks ASSESSMENT:  63y female with PMH significant for DM, HTN, HLD, CAD (s/p drug-eluding stents x2 in 2015), arthritis, spinal stenosis, scoliosis, GERD, carpal tunnel syndrome, depression, hx kidney stones presented for scheduled surgery on 11/27. Patient underwent T10 to pelvis decompression and fusion complicated hypotension, patient given ephedrine and started on levo intraoperatively. EBL ***. Patient transfused 2u PRBCs/1u FFP/1u platelets and given 4L colloids. SICU consulted for Q1H neurochecks.    PLAN:    Neurologic:   - off sedation***  - pain control with***  - resume home cymbalta  - hold home PRN Xanax    Respiratory:   - intubated***  - satting ***    Cardiovascular: HTN, HLD, CAD (s/p drug-eluding stents x2 in 2015)  - BPs on admission ***  - wean pressors as tolerated  - holding home ASA, Plavix, Coreg, Crestor    Gastrointestinal/Nutrition: GERD  - Diet: NPO  - On PPI at home, will start inpatient    Renal/Genitourinary:   - Monitor BUN/Cr  - IVF: ***  - Rodriguez in place  - Strict Is&Os    Hematologic:   - trend H/h  - status post 2u PRBCs/1u FFP/1u platelets  - start SQH in AM 11/28 if no concern for active bleeding    Infectious Disease:   - CTM Tmax and WBC  - Abx: ancef    Tubes/Lines/Drains: ***    Endocrine: DM  - holding home metformin  - ISS    Disposition: SICU    --------------------------------------------------------------------------------------    Critical Care Diagnoses: requiring Q1H neurochecks ASSESSMENT:  63y female with PMH significant for DM, HTN, HLD, CAD (s/p drug-eluding stents x2 in 2015), arthritis, spinal stenosis, scoliosis, GERD, carpal tunnel syndrome, depression, hx kidney stones presented for scheduled surgery on 11/27. Patient underwent T10 to pelvis decompression and fusion complicated hypotension, patient given ephedrine and started on levo intraoperatively. EBL 1L. Patient transfused 2u PRBCs/1u FFP/1u platelets and given 4L colloids. SICU consulted for Q1H neurochecks.    PLAN:    Neurologic:   - off sedation***  - pain control with***  - resume home cymbalta  - hold home PRN Xanax    Respiratory:   - intubated***  - satting ***    Cardiovascular: HTN, HLD, CAD (s/p drug-eluding stents x2 in 2015)  - BPs on admission ***  - wean pressors as tolerated  - holding home ASA, Plavix, Coreg, Crestor    Gastrointestinal/Nutrition: GERD  - Diet: NPO  - On PPI at home, will start inpatient    Renal/Genitourinary:   - Monitor BUN/Cr  - IVF: ***  - Rodriguez in place  - Strict Is&Os    Hematologic:   - trend H/h  - status post 2u PRBCs/1u FFP/1u platelets  - start SQH in AM 11/28 if no concern for active bleeding    Infectious Disease:   - CTM Tmax and WBC  - Abx: ancef    Tubes/Lines/Drains: ***    Endocrine: DM  - holding home metformin  - ISS    Disposition: SICU    --------------------------------------------------------------------------------------  Critical Care Diagnoses: requiring Q1H neurochecks ASSESSMENT:  63y female with PMH significant for DM, HTN, HLD, CAD (s/p drug-eluding stents x2 in 2015), arthritis, spinal stenosis, scoliosis, GERD, carpal tunnel syndrome, depression, hx kidney stones presented for scheduled surgery on 11/27. Patient underwent T10 to pelvis decompression and fusion complicated hypotension, patient given ephedrine and started on levo intraoperatively. EBL 1L. Patient transfused 2u PRBCs/1u FFP/1u platelets and given 4L colloids. SICU consulted for Q1H neurochecks.    PLAN:  Neurologic:   - Q1 neuro checks x 24 hrs - roughly 7pm 11/28  - remains tired off sedation, sleepy  - pain control with dilaudid prn; c/w Xanax prn only for severe anxiety/agitation    Respiratory:   - extubated to NC @2L  - Monitor respiratory status    Cardiovascular: HTN, HLD, CAD (s/p drug-eluding stents x2 in 2015)  - Well perfused, mildly hypervolemic  - Hypertensive upon admission to SBP 180s - likely caused by significant intraoperative fluid administration  - Continue monitoring vitals  - holding home ASA, Plavix, Coreg, Crestor until mental status improves    Gastrointestinal/Nutrition: GERD  - Diet: NPO, reassess as mental status improves  - On PPI at home, will start inpatient    Renal/Genitourinary:   - Monitor BUN/Cr  - Yanes in place  - Strict Is&Os    Hematologic:   - trend H/h  - status post 2u PRBCs/1u FFP/1u platelets  - start SQH in AM 11/28 if no concern for active bleeding    Infectious Disease:   - CTM Tmax and WBC  - Abx: ancef for prophylaxis    Tubes/Lines/Drains: yanes, drains x 2, PIVs    Endocrine: DM  - holding home metformin  - ISS    Disposition: SICU    --------------------------------------------------------------------------------------  Critical Care Diagnoses: requiring Q1H neurochecks

## 2018-11-27 NOTE — PROGRESS NOTE ADULT - SUBJECTIVE AND OBJECTIVE BOX
Orthopaedic Surgery POC    Subjective:   Patient seen and examined  No acute events postop  Denies numbness/tingling  Denies fever, chills, nausea vomiting and chest pain    Objective:  T(C): 36.9 (11-27-18 @ 20:00), Max: 36.9 (11-27-18 @ 20:00)  HR: 102 (11-27-18 @ 20:00) (77 - 104)  BP: 132/80 (11-27-18 @ 07:02) (132/80 - 132/80)  RR: 22 (11-27-18 @ 20:00) (16 - 30)  SpO2: 94% (11-27-18 @ 20:00) (94% - 99%)  Wt(kg): --    11-27 @ 07:01  -  11-27 @ 21:36  --------------------------------------------------------  IN: 175 mL / OUT: 155 mL / NET: 20 mL        PE    NAD  Back:   dressing C/D/I, mild appropriate cary-incisional ttp  HMV/TALHA in place w/ serosanguinous output  Neuro:  RLE IP 5/5 HS 5/5 Q 5/5 GS 5/5 TA 5/5 EHL 5/5   SILT L2-S1  LLE IP 5/5 HS 5/5 Q 5/5 GS 5/5 TA 5/5 EHL 5/5  SILT L2-S1  WWP BLE                          10.0   12.31 )-----------( 229      ( 27 Nov 2018 18:45 )             30.5     11-27    138  |  105  |  23  ----------------------------<  190<H>  4.5   |  22  |  1.10    Ca    8.1<L>      27 Nov 2018 18:45  Phos  3.9     11-27  Mg     1.8     11-27      PT/INR - ( 27 Nov 2018 18:45 )   PT: 12.2 SEC;   INR: 1.07          PTT - ( 27 Nov 2018 18:45 )  PTT:27.3 SEC      63y Female s/p T10 to pelvis PSF and L2-5 decompression  - Pain control  - FU AM labs  - WBAT  - PT/OT/OOB  - I/S  - Monitor HMV/TALHA output  - Drains/dressing per PRS  - SCDs, SQH in AM  - Care per SICU

## 2018-11-27 NOTE — CONSULT NOTE ADULT - ASSESSMENT
A/P:  63 y.o with severe spinal stenosis s/p posterior spine decompression/fusion with muscle flap reconstruction.  - Diet  - Pain control  - IV abx  - Drain monitoring  - Ambulation as per Ortho   - DVT PPx: SCD, chemoprophylaxis as per spine service  - Will Follow    Thank You  Rhett Mendieta MD  Plastic Surgery  [phone]
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
## 2018-11-28 LAB
BASOPHILS # BLD AUTO: 0.03 K/UL — SIGNIFICANT CHANGE UP (ref 0–0.2)
BASOPHILS NFR BLD AUTO: 0.3 % — SIGNIFICANT CHANGE UP (ref 0–2)
BUN SERPL-MCNC: 21 MG/DL — SIGNIFICANT CHANGE UP (ref 7–23)
CALCIUM SERPL-MCNC: 7.8 MG/DL — LOW (ref 8.4–10.5)
CHLORIDE SERPL-SCNC: 106 MMOL/L — SIGNIFICANT CHANGE UP (ref 98–107)
CO2 SERPL-SCNC: 22 MMOL/L — SIGNIFICANT CHANGE UP (ref 22–31)
CREAT SERPL-MCNC: 1.12 MG/DL — SIGNIFICANT CHANGE UP (ref 0.5–1.3)
EOSINOPHIL # BLD AUTO: 0.14 K/UL — SIGNIFICANT CHANGE UP (ref 0–0.5)
EOSINOPHIL NFR BLD AUTO: 1.6 % — SIGNIFICANT CHANGE UP (ref 0–6)
GLUCOSE BLDC GLUCOMTR-MCNC: 136 MG/DL — HIGH (ref 70–99)
GLUCOSE BLDC GLUCOMTR-MCNC: 154 MG/DL — HIGH (ref 70–99)
GLUCOSE BLDC GLUCOMTR-MCNC: 158 MG/DL — HIGH (ref 70–99)
GLUCOSE BLDC GLUCOMTR-MCNC: 180 MG/DL — HIGH (ref 70–99)
GLUCOSE SERPL-MCNC: 143 MG/DL — HIGH (ref 70–99)
HCT VFR BLD CALC: 28.6 % — LOW (ref 34.5–45)
HGB BLD-MCNC: 9.3 G/DL — LOW (ref 11.5–15.5)
IMM GRANULOCYTES # BLD AUTO: 0.05 # — SIGNIFICANT CHANGE UP
IMM GRANULOCYTES NFR BLD AUTO: 0.6 % — SIGNIFICANT CHANGE UP (ref 0–1.5)
LYMPHOCYTES # BLD AUTO: 1.04 K/UL — SIGNIFICANT CHANGE UP (ref 1–3.3)
LYMPHOCYTES # BLD AUTO: 11.8 % — LOW (ref 13–44)
MAGNESIUM SERPL-MCNC: 2.5 MG/DL — SIGNIFICANT CHANGE UP (ref 1.6–2.6)
MCHC RBC-ENTMCNC: 29 PG — SIGNIFICANT CHANGE UP (ref 27–34)
MCHC RBC-ENTMCNC: 32.5 % — SIGNIFICANT CHANGE UP (ref 32–36)
MCV RBC AUTO: 89.1 FL — SIGNIFICANT CHANGE UP (ref 80–100)
MONOCYTES # BLD AUTO: 0.57 K/UL — SIGNIFICANT CHANGE UP (ref 0–0.9)
MONOCYTES NFR BLD AUTO: 6.4 % — SIGNIFICANT CHANGE UP (ref 2–14)
NEUTROPHILS # BLD AUTO: 7.01 K/UL — SIGNIFICANT CHANGE UP (ref 1.8–7.4)
NEUTROPHILS NFR BLD AUTO: 79.3 % — HIGH (ref 43–77)
NRBC # FLD: 0 — SIGNIFICANT CHANGE UP
PHOSPHATE SERPL-MCNC: 4.4 MG/DL — SIGNIFICANT CHANGE UP (ref 2.5–4.5)
PLATELET # BLD AUTO: 224 K/UL — SIGNIFICANT CHANGE UP (ref 150–400)
PMV BLD: 9.5 FL — SIGNIFICANT CHANGE UP (ref 7–13)
POTASSIUM SERPL-MCNC: 4.4 MMOL/L — SIGNIFICANT CHANGE UP (ref 3.5–5.3)
POTASSIUM SERPL-SCNC: 4.4 MMOL/L — SIGNIFICANT CHANGE UP (ref 3.5–5.3)
RBC # BLD: 3.21 M/UL — LOW (ref 3.8–5.2)
RBC # FLD: 14.2 % — SIGNIFICANT CHANGE UP (ref 10.3–14.5)
SODIUM SERPL-SCNC: 139 MMOL/L — SIGNIFICANT CHANGE UP (ref 135–145)
WBC # BLD: 8.84 K/UL — SIGNIFICANT CHANGE UP (ref 3.8–10.5)
WBC # FLD AUTO: 8.84 K/UL — SIGNIFICANT CHANGE UP (ref 3.8–10.5)

## 2018-11-28 RX ORDER — ONDANSETRON 8 MG/1
4 TABLET, FILM COATED ORAL EVERY 6 HOURS
Qty: 0 | Refills: 0 | Status: DISCONTINUED | OUTPATIENT
Start: 2018-11-28 | End: 2018-11-30

## 2018-11-28 RX ORDER — ACETAMINOPHEN 500 MG
1000 TABLET ORAL ONCE
Qty: 0 | Refills: 0 | Status: COMPLETED | OUTPATIENT
Start: 2018-11-28 | End: 2018-11-28

## 2018-11-28 RX ORDER — HYDROMORPHONE HYDROCHLORIDE 2 MG/ML
30 INJECTION INTRAMUSCULAR; INTRAVENOUS; SUBCUTANEOUS
Qty: 0 | Refills: 0 | Status: DISCONTINUED | OUTPATIENT
Start: 2018-11-28 | End: 2018-11-30

## 2018-11-28 RX ORDER — GABAPENTIN 400 MG/1
600 CAPSULE ORAL
Qty: 0 | Refills: 0 | Status: DISCONTINUED | OUTPATIENT
Start: 2018-11-28 | End: 2018-12-12

## 2018-11-28 RX ORDER — HYDROMORPHONE HYDROCHLORIDE 2 MG/ML
0.5 INJECTION INTRAMUSCULAR; INTRAVENOUS; SUBCUTANEOUS
Qty: 0 | Refills: 0 | Status: DISCONTINUED | OUTPATIENT
Start: 2018-11-28 | End: 2018-11-30

## 2018-11-28 RX ORDER — HEPARIN SODIUM 5000 [USP'U]/ML
5000 INJECTION INTRAVENOUS; SUBCUTANEOUS EVERY 8 HOURS
Qty: 0 | Refills: 0 | Status: DISCONTINUED | OUTPATIENT
Start: 2018-11-28 | End: 2018-12-03

## 2018-11-28 RX ORDER — HYDROMORPHONE HYDROCHLORIDE 2 MG/ML
0.5 INJECTION INTRAMUSCULAR; INTRAVENOUS; SUBCUTANEOUS ONCE
Qty: 0 | Refills: 0 | Status: DISCONTINUED | OUTPATIENT
Start: 2018-11-28 | End: 2018-11-28

## 2018-11-28 RX ORDER — CHLORHEXIDINE GLUCONATE 213 G/1000ML
1 SOLUTION TOPICAL
Qty: 0 | Refills: 0 | Status: DISCONTINUED | OUTPATIENT
Start: 2018-11-28 | End: 2018-11-30

## 2018-11-28 RX ORDER — NALOXONE HYDROCHLORIDE 4 MG/.1ML
0.1 SPRAY NASAL
Qty: 0 | Refills: 0 | Status: DISCONTINUED | OUTPATIENT
Start: 2018-11-28 | End: 2018-11-30

## 2018-11-28 RX ORDER — SODIUM CHLORIDE 9 MG/ML
1000 INJECTION, SOLUTION INTRAVENOUS
Qty: 0 | Refills: 0 | Status: DISCONTINUED | OUTPATIENT
Start: 2018-11-28 | End: 2018-12-01

## 2018-11-28 RX ORDER — PANTOPRAZOLE SODIUM 20 MG/1
40 TABLET, DELAYED RELEASE ORAL
Qty: 0 | Refills: 0 | Status: DISCONTINUED | OUTPATIENT
Start: 2018-11-28 | End: 2018-12-12

## 2018-11-28 RX ORDER — SODIUM CHLORIDE 9 MG/ML
1000 INJECTION, SOLUTION INTRAVENOUS ONCE
Qty: 0 | Refills: 0 | Status: COMPLETED | OUTPATIENT
Start: 2018-11-28 | End: 2018-11-28

## 2018-11-28 RX ORDER — DULOXETINE HYDROCHLORIDE 30 MG/1
90 CAPSULE, DELAYED RELEASE ORAL DAILY
Qty: 0 | Refills: 0 | Status: DISCONTINUED | OUTPATIENT
Start: 2018-11-28 | End: 2018-12-12

## 2018-11-28 RX ADMIN — Medication 100 MILLIGRAM(S): at 05:23

## 2018-11-28 RX ADMIN — HYDROMORPHONE HYDROCHLORIDE 0.5 MILLIGRAM(S): 2 INJECTION INTRAMUSCULAR; INTRAVENOUS; SUBCUTANEOUS at 06:49

## 2018-11-28 RX ADMIN — Medication 100 MILLIGRAM(S): at 23:23

## 2018-11-28 RX ADMIN — ATORVASTATIN CALCIUM 80 MILLIGRAM(S): 80 TABLET, FILM COATED ORAL at 23:24

## 2018-11-28 RX ADMIN — HYDROMORPHONE HYDROCHLORIDE 0.5 MILLIGRAM(S): 2 INJECTION INTRAMUSCULAR; INTRAVENOUS; SUBCUTANEOUS at 05:34

## 2018-11-28 RX ADMIN — HYDROMORPHONE HYDROCHLORIDE 0.5 MILLIGRAM(S): 2 INJECTION INTRAMUSCULAR; INTRAVENOUS; SUBCUTANEOUS at 01:55

## 2018-11-28 RX ADMIN — CARVEDILOL PHOSPHATE 25 MILLIGRAM(S): 80 CAPSULE, EXTENDED RELEASE ORAL at 05:22

## 2018-11-28 RX ADMIN — GABAPENTIN 600 MILLIGRAM(S): 400 CAPSULE ORAL at 15:00

## 2018-11-28 RX ADMIN — HYDROMORPHONE HYDROCHLORIDE 0.5 MILLIGRAM(S): 2 INJECTION INTRAMUSCULAR; INTRAVENOUS; SUBCUTANEOUS at 06:34

## 2018-11-28 RX ADMIN — Medication 1: at 11:16

## 2018-11-28 RX ADMIN — Medication 100 MILLIGRAM(S): at 14:57

## 2018-11-28 RX ADMIN — Medication 400 MILLIGRAM(S): at 19:46

## 2018-11-28 RX ADMIN — Medication 1: at 16:13

## 2018-11-28 RX ADMIN — HYDROMORPHONE HYDROCHLORIDE 30 MILLILITER(S): 2 INJECTION INTRAMUSCULAR; INTRAVENOUS; SUBCUTANEOUS at 18:58

## 2018-11-28 RX ADMIN — LOSARTAN POTASSIUM 50 MILLIGRAM(S): 100 TABLET, FILM COATED ORAL at 05:22

## 2018-11-28 RX ADMIN — HYDROMORPHONE HYDROCHLORIDE 30 MILLILITER(S): 2 INJECTION INTRAMUSCULAR; INTRAVENOUS; SUBCUTANEOUS at 09:55

## 2018-11-28 RX ADMIN — HYDROMORPHONE HYDROCHLORIDE 0.5 MILLIGRAM(S): 2 INJECTION INTRAMUSCULAR; INTRAVENOUS; SUBCUTANEOUS at 18:57

## 2018-11-28 RX ADMIN — SODIUM CHLORIDE 75 MILLILITER(S): 9 INJECTION, SOLUTION INTRAVENOUS at 10:09

## 2018-11-28 RX ADMIN — Medication 1000 MILLIGRAM(S): at 08:53

## 2018-11-28 RX ADMIN — PANTOPRAZOLE SODIUM 40 MILLIGRAM(S): 20 TABLET, DELAYED RELEASE ORAL at 14:59

## 2018-11-28 RX ADMIN — HEPARIN SODIUM 5000 UNIT(S): 5000 INJECTION INTRAVENOUS; SUBCUTANEOUS at 23:24

## 2018-11-28 RX ADMIN — Medication 400 MILLIGRAM(S): at 08:07

## 2018-11-28 RX ADMIN — HYDROMORPHONE HYDROCHLORIDE 0.5 MILLIGRAM(S): 2 INJECTION INTRAMUSCULAR; INTRAVENOUS; SUBCUTANEOUS at 01:40

## 2018-11-28 RX ADMIN — HYDROMORPHONE HYDROCHLORIDE 0.5 MILLIGRAM(S): 2 INJECTION INTRAMUSCULAR; INTRAVENOUS; SUBCUTANEOUS at 05:19

## 2018-11-28 RX ADMIN — SODIUM CHLORIDE 1000 MILLILITER(S): 9 INJECTION, SOLUTION INTRAVENOUS at 19:31

## 2018-11-28 RX ADMIN — HEPARIN SODIUM 5000 UNIT(S): 5000 INJECTION INTRAVENOUS; SUBCUTANEOUS at 14:58

## 2018-11-28 RX ADMIN — Medication 1000 MILLIGRAM(S): at 01:30

## 2018-11-28 RX ADMIN — Medication 400 MILLIGRAM(S): at 01:00

## 2018-11-28 RX ADMIN — HYDROMORPHONE HYDROCHLORIDE 30 MILLILITER(S): 2 INJECTION INTRAMUSCULAR; INTRAVENOUS; SUBCUTANEOUS at 10:18

## 2018-11-28 RX ADMIN — Medication 1000 MILLIGRAM(S): at 20:16

## 2018-11-28 RX ADMIN — GABAPENTIN 600 MILLIGRAM(S): 400 CAPSULE ORAL at 23:24

## 2018-11-28 RX ADMIN — CARVEDILOL PHOSPHATE 25 MILLIGRAM(S): 80 CAPSULE, EXTENDED RELEASE ORAL at 17:38

## 2018-11-28 RX ADMIN — SODIUM CHLORIDE 75 MILLILITER(S): 9 INJECTION, SOLUTION INTRAVENOUS at 08:07

## 2018-11-28 RX ADMIN — DULOXETINE HYDROCHLORIDE 90 MILLIGRAM(S): 30 CAPSULE, DELAYED RELEASE ORAL at 17:07

## 2018-11-28 RX ADMIN — HYDROMORPHONE HYDROCHLORIDE 0.5 MILLIGRAM(S): 2 INJECTION INTRAMUSCULAR; INTRAVENOUS; SUBCUTANEOUS at 10:15

## 2018-11-28 NOTE — PROGRESS NOTE ADULT - SUBJECTIVE AND OBJECTIVE BOX
DEMI ARCHIBALD   [id number]    Patient stable, tolerating diet, pain controlled on regimen.      T(C): 36.2 (11-28-18 @ 16:00), Max: 37.2 (11-28-18 @ 04:00)  HR: 101 (11-28-18 @ 17:38) (88 - 109)  BP: --  RR: 18 (11-28-18 @ 17:38) (13 - 28)  SpO2: 98% (11-28-18 @ 17:38) (94% - 100%)  Wt(kg): --  NAD  Back: Dressing clean/dry/adherent.  Soft.  No collection.  Drains in situ.  BLE: No calf tenderness.      11-27 @ 07:01  -  11-28 @ 07:00  --------------------------------------------------------  IN: 1100 mL / OUT: 1170 mL / NET: -70 mL    11-28 @ 07:01  -  11-28 @ 18:51  --------------------------------------------------------  IN: 1350 mL / OUT: 375 mL / NET: 975 mL      Hemovac: 275cc  TALHA: 100cc  ALPRAZolam 0.25 milliGRAM(s) Oral every 6 hours PRN  atorvastatin 80 milliGRAM(s) Oral at bedtime  carvedilol 25 milliGRAM(s) Oral every 12 hours  ceFAZolin   IVPB 1000 milliGRAM(s) IV Intermittent every 8 hours  chlorhexidine 4% Liquid 1 Application(s) Topical <User Schedule>  dextrose 40% Gel 15 Gram(s) Oral once PRN  dextrose 5% + sodium chloride 0.45%. 1000 milliLiter(s) IV Continuous <Continuous>  dextrose 5%. 1000 milliLiter(s) IV Continuous <Continuous>  dextrose 50% Injectable 12.5 Gram(s) IV Push once  dextrose 50% Injectable 25 Gram(s) IV Push once  dextrose 50% Injectable 25 Gram(s) IV Push once  DULoxetine 90 milliGRAM(s) Oral daily  gabapentin 600 milliGRAM(s) Oral four times a day  glucagon  Injectable 1 milliGRAM(s) IntraMuscular once PRN  heparin  Injectable 5000 Unit(s) SubCutaneous every 8 hours  HYDROmorphone PCA (1 mG/mL) 30 milliLiter(s) PCA Continuous PCA Continuous  HYDROmorphone PCA (1 mG/mL) Rescue Clinician Bolus 0.5 milliGRAM(s) IV Push every 15 minutes PRN  influenza   Vaccine 0.5 milliLiter(s) IntraMuscular once  insulin lispro (HumaLOG) corrective regimen sliding scale   SubCutaneous three times a day before meals  insulin lispro (HumaLOG) corrective regimen sliding scale   SubCutaneous at bedtime  losartan 50 milliGRAM(s) Oral daily  naloxone Injectable 0.1 milliGRAM(s) IV Push every 3 minutes PRN  ondansetron Injectable 4 milliGRAM(s) IV Push every 6 hours PRN  pantoprazole    Tablet 40 milliGRAM(s) Oral before breakfast                            9.3    8.84  )-----------( 224      ( 28 Nov 2018 02:30 )             28.6     11-28    139  |  106  |  21  ----------------------------<  143<H>  4.4   |  22  |  1.12    Ca    7.8<L>      28 Nov 2018 02:30  Phos  4.4     11-28  Mg     2.5     11-28

## 2018-11-28 NOTE — PROGRESS NOTE ADULT - SUBJECTIVE AND OBJECTIVE BOX
HISTORY  63y female with PMH significant for DM, HTN, HLD, CAD (s/p drug-eluding stents x2 in 2015), arthritis, spinal stenosis, scoliosis, GERD, carpal tunnel syndrome, depression, hx kidney stones presented for scheduled surgery on 11/27. Patient underwent T10 to pelvis decompression and fusion.  SICU consulted for Q1H neurochecks x 24 hours. Patient transported to SICU tired appearing, on NC, however denying any pain, sob, or other complaints. Patient received significant fluid resuscitation in OR, and was briefly started on phenylephrine which was subsequently weaned. Patient was extubated at the end of case without difficulty onto NC. Arrives with two drains. Patient SBP 180s upon arrival, was in 130s when left OR.      24 HOUR EVENTS:  Patient was given labetalol 20 IV twice, then hydralazine 10 IV once for SBP > 180. Since then VS have normalized     SUBJECTIVE/ROS:  [ ] A ten-point review of systems was otherwise negative except as noted.  [ ] Due to altered mental status/intubation, subjective information were not able to be obtained from the patient. History was obtained, to the extent possible, from review of the chart and collateral sources of information.      NEURO  Meds: acetaminophen  IVPB .. 1000 milliGRAM(s) IV Intermittent once  ALPRAZolam 0.25 milliGRAM(s) Oral every 6 hours PRN severe agitation or anxiety  HYDROmorphone  Injectable 0.5 milliGRAM(s) IV Push every 4 hours PRN Severe Pain (7 - 10)      RESPIRATORY  RR: 17 (11-27-18 @ 23:00) (13 - 30)  SpO2: 97% (11-27-18 @ 23:00) (94% - 99%)  Mechanical Ventilation:   ABG - ( 27 Nov 2018 16:07 )  pH: 7.44  /  pCO2: 31    /  pO2: 280   / HCO3: 22    / Base Excess: -3.0  /  SaO2: 99.6    Lactate: x          CARDIOVASCULAR  HR: 108 (11-27-18 @ 23:00) (77 - 109)  BP: 132/80 (11-27-18 @ 07:02) (132/80 - 132/80)  BP(mean): --  ABP: 151/70 (11-27-18 @ 23:00) (134/67 - 199/92)  ABP(mean): 97 (11-27-18 @ 23:00) (87 - 132)          GI/NUTRITION      GENITOURINARY  I&O's Detail    11-27 @ 07:01  -  11-28 @ 01:15  --------------------------------------------------------  IN:    IV PiggyBack: 150 mL    lactated ringers.: 300 mL  Total IN: 450 mL    OUT:    Accordian: 50 mL    Bulb: 30 mL    Indwelling Catheter - Urethral: 300 mL  Total OUT: 380 mL    Total NET: 70 mL        Weight (kg): 91.6 (11-27 @ 07:02)  11-27    138  |  105  |  23  ----------------------------<  190<H>  4.5   |  22  |  1.10    Ca    8.1<L>      27 Nov 2018 18:45  Phos  3.9     11-27  Mg     1.8     11-27      [ ] Rodriguez catheter, indication: N/A  Meds: dextrose 5%. 1000 milliLiter(s) IV Continuous <Continuous>  lactated ringers. 1000 milliLiter(s) IV Continuous <Continuous>        HEMATOLOGIC  Meds:   [x] VTE Prophylaxis                        10.0   12.31 )-----------( 229      ( 27 Nov 2018 18:45 )             30.5     PT/INR - ( 27 Nov 2018 18:45 )   PT: 12.2 SEC;   INR: 1.07          PTT - ( 27 Nov 2018 18:45 )  PTT:27.3 SEC  Transfusion     [ ] PRBC   [ ] Platelets   [ ] FFP   [ ] Cryoprecipitate      INFECTIOUS DISEASES  WBC Count: 12.31 K/uL (11-27 @ 18:45)    RECENT CULTURES:    Meds: ceFAZolin   IVPB 1000 milliGRAM(s) IV Intermittent every 8 hours  influenza   Vaccine 0.5 milliLiter(s) IntraMuscular once        ENDOCRINE  CAPILLARY BLOOD GLUCOSE      POCT Blood Glucose.: 173 mg/dL (27 Nov 2018 22:23)  POCT Blood Glucose.: 159 mg/dL (27 Nov 2018 07:19)    Meds: atorvastatin 80 milliGRAM(s) Oral at bedtime  dextrose 40% Gel 15 Gram(s) Oral once PRN  dextrose 50% Injectable 12.5 Gram(s) IV Push once  dextrose 50% Injectable 25 Gram(s) IV Push once  dextrose 50% Injectable 25 Gram(s) IV Push once  glucagon  Injectable 1 milliGRAM(s) IntraMuscular once PRN  insulin lispro (HumaLOG) corrective regimen sliding scale   SubCutaneous three times a day before meals  insulin lispro (HumaLOG) corrective regimen sliding scale   SubCutaneous at bedtime        ACCESS DEVICES:  [ ] Peripheral IV  [ ] Central Venous Line	[ ] R	[ ] L	[ ] IJ	[ ] Fem	[ ] SC	Placed:   [ ] Arterial Line		[ ] R	[ ] L	[ ] Fem	[ ] Rad	[ ] Ax	Placed:   [ ] PICC:					[ ] Mediport  [ ] Urinary Catheter, Date Placed:   [x] Necessity of urinary, arterial, and venous catheters discussed    OTHER MEDICATIONS:      CODE STATUS:      IMAGING: HISTORY  63y female with PMH significant for DM, HTN, HLD, CAD (s/p drug-eluding stents x2 in 2015), arthritis, spinal stenosis, scoliosis, GERD, carpal tunnel syndrome, depression, hx kidney stones presented for scheduled surgery on 11/27. Patient underwent T10 to pelvis decompression and fusion.  SICU consulted for Q1H neurochecks x 24 hours. Patient transported to SICU tired appearing, on NC, however denying any pain, sob, or other complaints. Patient received significant fluid resuscitation in OR, and was briefly started on phenylephrine which was subsequently weaned. Patient was extubated at the end of case without difficulty onto NC. Arrives with two drains. Patient SBP 180s upon arrival, was in 130s when left OR.      24 HOUR EVENTS:  Patient was given labetalol 20 IV twice, then hydralazine 10 IV once for SBP > 180. Since then VS have normalized. Remains on q1 neuro checks for 24 hrs since surgery.    SUBJECTIVE/ROS:  [x] A ten-point review of systems was otherwise negative except as noted.  [ ] Due to altered mental status/intubation, subjective information were not able to be obtained from the patient. History was obtained, to the extent possible, from review of the chart and collateral sources of information.      NEURO  Meds: acetaminophen  IVPB .. 1000 milliGRAM(s) IV Intermittent once  ALPRAZolam 0.25 milliGRAM(s) Oral every 6 hours PRN severe agitation or anxiety  HYDROmorphone  Injectable 0.5 milliGRAM(s) IV Push every 4 hours PRN Severe Pain (7 - 10)      RESPIRATORY  RR: 17 (11-27-18 @ 23:00) (13 - 30)  SpO2: 97% (11-27-18 @ 23:00) (94% - 99%)  Mechanical Ventilation:   ABG - ( 27 Nov 2018 16:07 )  pH: 7.44  /  pCO2: 31    /  pO2: 280   / HCO3: 22    / Base Excess: -3.0  /  SaO2: 99.6    Lactate: x          CARDIOVASCULAR  HR: 108 (11-27-18 @ 23:00) (77 - 109)  BP: 132/80 (11-27-18 @ 07:02) (132/80 - 132/80)  BP(mean): --  ABP: 151/70 (11-27-18 @ 23:00) (134/67 - 199/92)  ABP(mean): 97 (11-27-18 @ 23:00) (87 - 132)          GI/NUTRITION      GENITOURINARY  I&O's Detail    11-27 @ 07:01  -  11-28 @ 01:15  --------------------------------------------------------  IN:    IV PiggyBack: 150 mL    lactated ringers.: 300 mL  Total IN: 450 mL    OUT:    Accordian: 50 mL    Bulb: 30 mL    Indwelling Catheter - Urethral: 300 mL  Total OUT: 380 mL    Total NET: 70 mL        Weight (kg): 91.6 (11-27 @ 07:02)  11-27    138  |  105  |  23  ----------------------------<  190<H>  4.5   |  22  |  1.10    Ca    8.1<L>      27 Nov 2018 18:45  Phos  3.9     11-27  Mg     1.8     11-27      [ ] Rodriguez catheter, indication: N/A  Meds: dextrose 5%. 1000 milliLiter(s) IV Continuous <Continuous>  lactated ringers. 1000 milliLiter(s) IV Continuous <Continuous>        HEMATOLOGIC  Meds:   [x] VTE Prophylaxis                        10.0   12.31 )-----------( 229      ( 27 Nov 2018 18:45 )             30.5     PT/INR - ( 27 Nov 2018 18:45 )   PT: 12.2 SEC;   INR: 1.07          PTT - ( 27 Nov 2018 18:45 )  PTT:27.3 SEC  Transfusion     [ ] PRBC   [ ] Platelets   [ ] FFP   [ ] Cryoprecipitate      INFECTIOUS DISEASES  WBC Count: 12.31 K/uL (11-27 @ 18:45)    RECENT CULTURES:    Meds: ceFAZolin   IVPB 1000 milliGRAM(s) IV Intermittent every 8 hours  influenza   Vaccine 0.5 milliLiter(s) IntraMuscular once        ENDOCRINE  CAPILLARY BLOOD GLUCOSE      POCT Blood Glucose.: 173 mg/dL (27 Nov 2018 22:23)  POCT Blood Glucose.: 159 mg/dL (27 Nov 2018 07:19)    Meds: atorvastatin 80 milliGRAM(s) Oral at bedtime  dextrose 40% Gel 15 Gram(s) Oral once PRN  dextrose 50% Injectable 12.5 Gram(s) IV Push once  dextrose 50% Injectable 25 Gram(s) IV Push once  dextrose 50% Injectable 25 Gram(s) IV Push once  glucagon  Injectable 1 milliGRAM(s) IntraMuscular once PRN  insulin lispro (HumaLOG) corrective regimen sliding scale   SubCutaneous three times a day before meals  insulin lispro (HumaLOG) corrective regimen sliding scale   SubCutaneous at bedtime        ACCESS DEVICES:  [ ] Peripheral IV  [ ] Central Venous Line	[ ] R	[ ] L	[ ] IJ	[ ] Fem	[ ] SC	Placed:   [ ] Arterial Line		[ ] R	[ ] L	[ ] Fem	[ ] Rad	[ ] Ax	Placed:   [ ] PICC:					[ ] Mediport  [ ] Urinary Catheter, Date Placed:   [x] Necessity of urinary, arterial, and venous catheters discussed    OTHER MEDICATIONS:      CODE STATUS:      IMAGING: HISTORY  63y female with PMH significant for DM, HTN, HLD, CAD (s/p drug-eluding stents x2 in 2015), arthritis, spinal stenosis, scoliosis, GERD, carpal tunnel syndrome, depression, hx kidney stones presented for scheduled surgery on 11/27. Patient underwent T10 to pelvis decompression and fusion.  SICU consulted for Q1H neurochecks x 24 hours. Patient transported to SICU tired appearing, on NC, however denying any pain, sob, or other complaints. Patient received significant fluid resuscitation in OR, and was briefly started on phenylephrine which was subsequently weaned. Patient was extubated at the end of case without difficulty onto NC. Arrives with two drains. Patient SBP 180s upon arrival, was in 130s when left OR.      24 HOUR EVENTS:  Patient was given labetalol 20 IV twice, then hydralazine 10 IV once for SBP > 180. Since then VS have normalized. Remains on q1 neuro checks for 24 hrs since surgery.    SUBJECTIVE/ROS:  [x] A ten-point review of systems was otherwise negative except as noted.  [ ] Due to altered mental status/intubation, subjective information were not able to be obtained from the patient. History was obtained, to the extent possible, from review of the chart and collateral sources of information.      NEURO  Meds: acetaminophen  IVPB .. 1000 milliGRAM(s) IV Intermittent once  ALPRAZolam 0.25 milliGRAM(s) Oral every 6 hours PRN severe agitation or anxiety  HYDROmorphone  Injectable 0.5 milliGRAM(s) IV Push every 4 hours PRN Severe Pain (7 - 10)      RESPIRATORY  RR: 17 (11-27-18 @ 23:00) (13 - 30)  SpO2: 97% (11-27-18 @ 23:00) (94% - 99%)  Mechanical Ventilation:   ABG - ( 27 Nov 2018 16:07 )  pH: 7.44  /  pCO2: 31    /  pO2: 280   / HCO3: 22    / Base Excess: -3.0  /  SaO2: 99.6    Lactate: x          CARDIOVASCULAR  ICU Vital Signs Last 24 Hrs  T(C): 37.2 (28 Nov 2018 04:00), Max: 37.2 (28 Nov 2018 04:00)  T(F): 98.9 (28 Nov 2018 04:00), Max: 98.9 (28 Nov 2018 04:00)  HR: 105 (28 Nov 2018 05:00) (77 - 109)  BP: 132/80 (27 Nov 2018 07:02) (132/80 - 132/80)  BP(mean): --  ABP: 143/71 (28 Nov 2018 05:00) (132/65 - 199/92)  ABP(mean): 95 (28 Nov 2018 05:00) (87 - 132)  RR: 15 (28 Nov 2018 05:00) (13 - 30)  SpO2: 100% (28 Nov 2018 05:00) (94% - 100%)            GI/NUTRITION      GENITOURINARY  I&O's Details    27 Nov 2018 07:01  -  28 Nov 2018 05:31  --------------------------------------------------------  IN:    IV PiggyBack: 150 mL    lactated ringers.: 675 mL  Total IN: 825 mL    OUT:    Accordian: 50 mL    Bulb: 30 mL    Indwelling Catheter - Urethral: 550 mL  Total OUT: 630 mL    Total NET: 195 mL        11-27    138  |  105  |  23  ----------------------------<  190<H>  4.5   |  22  |  1.10    Ca    8.1<L>      27 Nov 2018 18:45  Phos  3.9     11-27  Mg     1.8     11-27      [ ] Rodriguez catheter, indication: N/A  Meds: dextrose 5%. 1000 milliLiter(s) IV Continuous <Continuous>  lactated ringers. 1000 milliLiter(s) IV Continuous <Continuous>        HEMATOLOGIC  Meds:   [x] VTE Prophylaxis                        10.0   12.31 )-----------( 229      ( 27 Nov 2018 18:45 )             30.5     PT/INR - ( 27 Nov 2018 18:45 )   PT: 12.2 SEC;   INR: 1.07          PTT - ( 27 Nov 2018 18:45 )  PTT:27.3 SEC  Transfusion     [ ] PRBC   [ ] Platelets   [ ] FFP   [ ] Cryoprecipitate      INFECTIOUS DISEASES  WBC Count: 12.31 K/uL (11-27 @ 18:45)    RECENT CULTURES:    Meds: ceFAZolin   IVPB 1000 milliGRAM(s) IV Intermittent every 8 hours  influenza   Vaccine 0.5 milliLiter(s) IntraMuscular once        ENDOCRINE  CAPILLARY BLOOD GLUCOSE      POCT Blood Glucose.: 173 mg/dL (27 Nov 2018 22:23)  POCT Blood Glucose.: 159 mg/dL (27 Nov 2018 07:19)    Meds: atorvastatin 80 milliGRAM(s) Oral at bedtime  dextrose 40% Gel 15 Gram(s) Oral once PRN  dextrose 50% Injectable 12.5 Gram(s) IV Push once  dextrose 50% Injectable 25 Gram(s) IV Push once  dextrose 50% Injectable 25 Gram(s) IV Push once  glucagon  Injectable 1 milliGRAM(s) IntraMuscular once PRN  insulin lispro (HumaLOG) corrective regimen sliding scale   SubCutaneous three times a day before meals  insulin lispro (HumaLOG) corrective regimen sliding scale   SubCutaneous at bedtime        ACCESS DEVICES:  [ ] Peripheral IV  [ ] Central Venous Line	[ ] R	[ ] L	[ ] IJ	[ ] Fem	[ ] SC	Placed:   [ ] Arterial Line		[ ] R	[ ] L	[ ] Fem	[ ] Rad	[ ] Ax	Placed:   [ ] PICC:					[ ] Mediport  [ ] Urinary Catheter, Date Placed:   [x] Necessity of urinary, arterial, and venous catheters discussed    OTHER MEDICATIONS:      CODE STATUS:      IMAGING: HISTORY  63y female with PMH significant for DM, HTN, HLD, CAD (s/p drug-eluding stents x2 in 2015), arthritis, spinal stenosis, scoliosis, GERD, carpal tunnel syndrome, depression, hx kidney stones presented for scheduled surgery on 11/27. Patient underwent T10 to pelvis decompression and fusion.  SICU consulted for Q1H neurochecks x 24 hours. Patient transported to SICU tired appearing, on NC, however denying any pain, sob, or other complaints. Patient received significant fluid resuscitation in OR, and was briefly started on phenylephrine which was subsequently weaned. Patient was extubated at the end of case without difficulty onto NC. Arrives with two drains. Patient SBP 180s upon arrival, was in 130s when left OR.      24 HOUR EVENTS:  Patient was given labetalol 30mg total IVP and Hydralazine 30 total IVP for elevated pressures with resolution following. Poor pain control overnight.    SUBJECTIVE/ROS:  [x] A ten-point review of systems was otherwise negative except as noted.  [ ] Due to altered mental status/intubation, subjective information were not able to be obtained from the patient. History was obtained, to the extent possible, from review of the chart and collateral sources of information.      NEURO  Meds: acetaminophen  IVPB .. 1000 milliGRAM(s) IV Intermittent once  ALPRAZolam 0.25 milliGRAM(s) Oral every 6 hours PRN severe agitation or anxiety  HYDROmorphone  Injectable 0.5 milliGRAM(s) IV Push every 4 hours PRN Severe Pain (7 - 10)      RESPIRATORY  RR: 17 (11-27-18 @ 23:00) (13 - 30)  SpO2: 97% (11-27-18 @ 23:00) (94% - 99%)  Mechanical Ventilation:   ABG - ( 27 Nov 2018 16:07 )  pH: 7.44  /  pCO2: 31    /  pO2: 280   / HCO3: 22    / Base Excess: -3.0  /  SaO2: 99.6    Lactate: x          CARDIOVASCULAR  ICU Vital Signs Last 24 Hrs  T(C): 37.2 (28 Nov 2018 04:00), Max: 37.2 (28 Nov 2018 04:00)  T(F): 98.9 (28 Nov 2018 04:00), Max: 98.9 (28 Nov 2018 04:00)  HR: 105 (28 Nov 2018 05:00) (77 - 109)  BP: 132/80 (27 Nov 2018 07:02) (132/80 - 132/80)  BP(mean): --  ABP: 143/71 (28 Nov 2018 05:00) (132/65 - 199/92)  ABP(mean): 95 (28 Nov 2018 05:00) (87 - 132)  RR: 15 (28 Nov 2018 05:00) (13 - 30)  SpO2: 100% (28 Nov 2018 05:00) (94% - 100%)            GI/NUTRITION      GENITOURINARY  I&O's Details    27 Nov 2018 07:01  -  28 Nov 2018 05:31  --------------------------------------------------------  IN:    IV PiggyBack: 150 mL    lactated ringers.: 675 mL  Total IN: 825 mL    OUT:    Accordian: 50 mL    Bulb: 30 mL    Indwelling Catheter - Urethral: 550 mL  Total OUT: 630 mL    Total NET: 195 mL        11-27    138  |  105  |  23  ----------------------------<  190<H>  4.5   |  22  |  1.10    Ca    8.1<L>      27 Nov 2018 18:45  Phos  3.9     11-27  Mg     1.8     11-27      [ ] Rodriguez catheter, indication: N/A  Meds: dextrose 5%. 1000 milliLiter(s) IV Continuous <Continuous>  lactated ringers. 1000 milliLiter(s) IV Continuous <Continuous>        HEMATOLOGIC  Meds:   [x] VTE Prophylaxis                        10.0   12.31 )-----------( 229      ( 27 Nov 2018 18:45 )             30.5     PT/INR - ( 27 Nov 2018 18:45 )   PT: 12.2 SEC;   INR: 1.07          PTT - ( 27 Nov 2018 18:45 )  PTT:27.3 SEC  Transfusion     [ ] PRBC   [ ] Platelets   [ ] FFP   [ ] Cryoprecipitate      INFECTIOUS DISEASES  WBC Count: 12.31 K/uL (11-27 @ 18:45)    RECENT CULTURES:    Meds: ceFAZolin   IVPB 1000 milliGRAM(s) IV Intermittent every 8 hours  influenza   Vaccine 0.5 milliLiter(s) IntraMuscular once        ENDOCRINE  CAPILLARY BLOOD GLUCOSE      POCT Blood Glucose.: 173 mg/dL (27 Nov 2018 22:23)  POCT Blood Glucose.: 159 mg/dL (27 Nov 2018 07:19)    Meds: atorvastatin 80 milliGRAM(s) Oral at bedtime  dextrose 40% Gel 15 Gram(s) Oral once PRN  dextrose 50% Injectable 12.5 Gram(s) IV Push once  dextrose 50% Injectable 25 Gram(s) IV Push once  dextrose 50% Injectable 25 Gram(s) IV Push once  glucagon  Injectable 1 milliGRAM(s) IntraMuscular once PRN  insulin lispro (HumaLOG) corrective regimen sliding scale   SubCutaneous three times a day before meals  insulin lispro (HumaLOG) corrective regimen sliding scale   SubCutaneous at bedtime        ACCESS DEVICES:  [ ] Peripheral IV  [ ] Central Venous Line	[ ] R	[ ] L	[ ] IJ	[ ] Fem	[ ] SC	Placed:   [ ] Arterial Line		[ ] R	[ ] L	[ ] Fem	[ ] Rad	[ ] Ax	Placed:   [ ] PICC:					[ ] Mediport  [ ] Urinary Catheter, Date Placed:   [x] Necessity of urinary, arterial, and venous catheters discussed    OTHER MEDICATIONS:      CODE STATUS:      IMAGING:

## 2018-11-28 NOTE — PROGRESS NOTE ADULT - ASSESSMENT
A/P: S/P posterior spine decompression/ fusion with muscle flap reconstruction.  - Diet  - Pain control  - Drain Monitoring  - IV Abx  - DVT PPx: SCD, chemoprophylaxis as per spine service  - Will Follow    Thank You  Rhett Mendieta MD  Plastic Surgery  [phone]

## 2018-11-28 NOTE — PROGRESS NOTE ADULT - ASSESSMENT
A/P :  63y Female s/p T10-Pelvis PSF with decompression  -    SICU monitoring  -    Pain control, PCA  -    DC Rodriguez after PT  -    DVT ppx: SCDs, Restart Xarelto after POD #7       -    Physical Therapy  -    Weight bearing status: WBAT  -    Dispo:      To be determined [ ]      Modesto Griffin  PGY5 A/P :  63y Female s/p T10-Pelvis PSF with decompression  -    SICU monitoring  -    Plastics closure, monitor drain output  -    Pain control, PCA  -    DC Rodriguez after PT  -    DVT ppx: SCDs, Start SQ 11/28 due to history of cardiac stents  -    Physical Therapy  -    Weight bearing status: WBAT  -    Dispo:      To be determined [ ]      Modesto Griffin  PGY5

## 2018-11-28 NOTE — PROGRESS NOTE ADULT - ASSESSMENT
63y female with PMH significant for DM, HTN, HLD, CAD (s/p drug-eluding stents x2 in 2015), arthritis, spinal stenosis, scoliosis, GERD, carpal tunnel syndrome, depression, hx kidney stones presented for scheduled surgery on 11/27. Patient underwent T10 to pelvis decompression and fusion complicated hypotension, patient given ephedrine and started on levo intraoperatively. EBL 1L. Patient transfused 2u PRBCs/1u FFP/1u platelets and given 4L colloids. SICU consulted for Q1H neurochecks.    PLAN:  Neurologic:   - Q1 neuro checks x 24 hrs - roughly 7pm 11/28  - remains tired off sedation, sleepy  - pain control with dilaudid prn; c/w Xanax prn only for severe anxiety/agitation    Respiratory:   - extubated to NC @2L  - Monitor respiratory status    Cardiovascular: HTN, HLD, CAD (s/p drug-eluding stents x2 in 2015)  - Well perfused, mildly hypervolemic  - Hypertensive upon admission to SBP 180s - likely caused by significant intraoperative fluid administration  - Continue monitoring vitals  - c/w ASA, Plavix, Coreg, Crestor until mental status improves    Gastrointestinal/Nutrition: GERD  - Diet: NPO, reassess as mental status improves  - On PPI at home, will start inpatient    Renal/Genitourinary:   - Monitor BUN/Cr  - Yanes in place  - Strict Is&Os    Hematologic:   - trend H/H  - s/p 2u PRBCs/1u FFP/1u platelets  - start SQH in AM 11/28 if no concern for active bleeding    Infectious Disease:   - CTM Tmax and WBC  - Abx: ancef for prophylaxis    Tubes/Lines/Drains: yanes, drains x 2, PIVs    Endocrine: DM  - holding home metformin  - ISS    Disposition: SICU    --------------------------------------------------------------------------------------  Critical Care Diagnoses: requiring Q1H neurochecks 63y female with PMH significant for DM, HTN, HLD, CAD (s/p drug-eluding stents x2 in 2015), arthritis, spinal stenosis, scoliosis, GERD, carpal tunnel syndrome, depression, hx kidney stones presented for scheduled surgery on 11/27. Patient underwent T10 to pelvis decompression and fusion complicated hypotension, patient given ephedrine and started on levo intraoperatively. EBL 1L. Patient transfused 2u PRBCs/1u FFP/1u platelets and given 4L colloids. SICU consulted for Q1H neurochecks.    PLAN:  Neurologic:   - Q1 neuro checks x 24 hrs - roughly 7pm 11/28  - remains tired off sedation, sleepy  - inadequate pain control with dilaudid prn, start PCA  - c/w Xanax prn only for severe anxiety/agitation    Respiratory:   - Satting well on 2 NC @2L    Cardiovascular: HTN, HLD, CAD (s/p drug-eluding stents x2 in 2015)  - Hypertensive upon admission to SBP 180s - likely caused by significant intraoperative fluid administration, resolved with IVP Labetalol and Hydralazine  - Continue monitoring vitals  - c/w home dose Coreg  - holding ASA, Plavix, Crestor  - switch to D5 1/2 NS IVF, SLIV once tolerating PO    Gastrointestinal/Nutrition: GERD  - advance to [facility]  - On PPI at home, will start inpatient    Renal/Genitourinary:   - Monitor BUN/Cr  - Yanes in place - consider d/c'ing yanes after pain is better controlled  - Strict Is&Os    Hematologic:   - trend H/H  - s/p 2u PRBCs/1u FFP/1u platelets  - start SQH    Infectious Disease:   - CTM Tmax and WBC  - Abx: ancef for prophylaxis    Tubes/Lines/Drains: yanes, drains x 2, PIVs, L radial a-line    Endocrine: DM  - holding home metformin  - ISS    d/c a-line    Disposition: SICU    --------------------------------------------------------------------------------------  Critical Care Diagnoses: requiring Q1H neurochecks 63y female with PMH significant for DM, HTN, HLD, CAD (s/p drug-eluding stents x2 in 2015), arthritis, spinal stenosis, scoliosis, GERD, carpal tunnel syndrome, depression, hx kidney stones presented for scheduled surgery on 11/27. Patient underwent T10 to pelvis decompression and fusion complicated hypotension, patient given ephedrine and started on levo intraoperatively. EBL 1L. Patient transfused 2u PRBCs/1u FFP/1u platelets and given 4L colloids. SICU consulted for Q1H neurochecks.    PLAN:  Neurologic:   - Q1 neuro checks x 24 hrs - roughly 7pm 11/28  - remains tired off sedation, sleepy  - inadequate pain control with dilaudid prn, start PCA  - c/w Xanax prn only for severe anxiety/agitation    Respiratory:   - Satting well on 2 NC @2L    Cardiovascular: HTN, HLD, CAD (s/p drug-eluding stents x2 in 2015)  - Hypertensive upon admission to SBP 180s - likely caused by significant intraoperative fluid administration, resolved with IVP Labetalol and Hydralazine  - Continue monitoring vitals  - c/w home dose Coreg  - restart home ASA, Plavix, Crestor  - switch to D5 1/2 NS IVF, SLIV once tolerating PO    Gastrointestinal/Nutrition: GERD  - advance to [facility]  - On PPI at home, will start inpatient    Renal/Genitourinary:   - Monitor BUN/Cr  - Yanes in place - consider d/c'ing yanes after pain is better controlled  - Strict Is&Os    Hematologic:   - trend H/H  - s/p 2u PRBCs/1u FFP/1u platelets  - start SQH    Infectious Disease:   - CTM Tmax and WBC  - Abx: ancef for prophylaxis    Tubes/Lines/Drains: yanes, drains x 2, PIVs, L radial a-line    Endocrine: DM  - holding home metformin  - ISS    d/c a-line    Disposition: SICU    --------------------------------------------------------------------------------------  Critical Care Diagnoses: requiring Q1H neurochecks
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
## 2018-11-28 NOTE — PROGRESS NOTE ADULT - SUBJECTIVE AND OBJECTIVE BOX
POST OPERATIVE DAY #:  [ ]     Patient notes pain in lower back. She notes that the pain is mainly localized to the back and not down the legs.  She feels subjective improvement in her LLE which was bothering her prior to surgery.    Exam:   Alert/Oriented, No Acute Distress        BACK:         Dressing: [ ] clean/dry/intact  [ ] Other:           Drains: x2 drains-shift output          Sensation: [ ] intact to light touch  [ ] decreased:          Motor exam: [  ]          [ ] Upper extremity                Bi          Tri        Delt                                                    R         5/5        5/5        5/5                                               L          5/5        5/5        5/5                  [ ] Lower extremeity            PF          DF         EHL       FHL                                                                                            R        5/5        5/5        5/5       5/5                                                        L         5/5        5/5        5/5       5/5                                                                  Calves Soft/Non-tender bilaterally           [ ] warm well perfused; capillary refill <3 seconds              LABS:                        9.3<L>  8.84  )-----------( 224      ( 28 Nov 2018 02:30 )             28.6<L>    11-28    139  |  106  |  21  ----------------------------<  143<H>  4.4   |  22  |  1.12        RADIOLOGY & ADDITIONAL STUDIES: POST OPERATIVE DAY #:  [ ]     Patient notes pain in lower back. She notes that the pain is mainly localized to the back and not down the legs.  She feels subjective improvement in her LLE which was bothering her prior to surgery.    Exam:   Alert/Oriented, No Acute Distress        BACK:         Dressing: [ ] clean/dry/intact  [ ] Other:           Drains: x2 drains-shift output: NR/50, NR/30         Sensation: [ ] intact to light touch  [ ] decreased:          Motor exam: [  ]          [ ] Upper extremity                Bi          Tri        Delt                                                    R         5/5        5/5        5/5                                               L          5/5        5/5        5/5                  [ ] Lower extremeity            PF          DF         EHL       FHL                                                                                            R        5/5        5/5        5/5       5/5                                                        L         5/5        5/5        5/5       5/5                                                                  Calves Soft/Non-tender bilaterally           [ ] warm well perfused; capillary refill <3 seconds              LABS:                        9.3<L>  8.84  )-----------( 224      ( 28 Nov 2018 02:30 )             28.6<L>    11-28    139  |  106  |  21  ----------------------------<  143<H>  4.4   |  22  |  1.12        RADIOLOGY & ADDITIONAL STUDIES:

## 2018-11-28 NOTE — PROGRESS NOTE ADULT - SUBJECTIVE AND OBJECTIVE BOX
Anesthesia Pain Management Service- Attending Addendum    SUBJECTIVE: Pt doing well with IV PCA without problems reported.    Therapy:	  [ X] IV PCA	   [ ] Epidural           [ ] s/p Spinal Opoid              [ ] Postpartum infusion	  [ ] Patient controlled regional anesthesia (PCRA)    [ ] prn Analgesics    Allergies    No Known Allergies    Intolerances      MEDICATIONS  (STANDING):  atorvastatin 80 milliGRAM(s) Oral at bedtime  carvedilol 25 milliGRAM(s) Oral every 12 hours  ceFAZolin   IVPB 1000 milliGRAM(s) IV Intermittent every 8 hours  chlorhexidine 4% Liquid 1 Application(s) Topical <User Schedule>  dextrose 5% + sodium chloride 0.45%. 1000 milliLiter(s) (75 mL/Hr) IV Continuous <Continuous>  dextrose 5%. 1000 milliLiter(s) (50 mL/Hr) IV Continuous <Continuous>  dextrose 50% Injectable 12.5 Gram(s) IV Push once  dextrose 50% Injectable 25 Gram(s) IV Push once  dextrose 50% Injectable 25 Gram(s) IV Push once  DULoxetine 90 milliGRAM(s) Oral daily  gabapentin 600 milliGRAM(s) Oral four times a day  heparin  Injectable 5000 Unit(s) SubCutaneous every 8 hours  HYDROmorphone PCA (1 mG/mL) 30 milliLiter(s) PCA Continuous PCA Continuous  influenza   Vaccine 0.5 milliLiter(s) IntraMuscular once  insulin lispro (HumaLOG) corrective regimen sliding scale   SubCutaneous three times a day before meals  insulin lispro (HumaLOG) corrective regimen sliding scale   SubCutaneous at bedtime  losartan 50 milliGRAM(s) Oral daily  pantoprazole    Tablet 40 milliGRAM(s) Oral before breakfast    MEDICATIONS  (PRN):  ALPRAZolam 0.25 milliGRAM(s) Oral every 6 hours PRN severe agitation or anxiety  dextrose 40% Gel 15 Gram(s) Oral once PRN Blood Glucose LESS THAN 70 milliGRAM(s)/deciliter  glucagon  Injectable 1 milliGRAM(s) IntraMuscular once PRN Glucose LESS THAN 70 milligrams/deciliter  HYDROmorphone PCA (1 mG/mL) Rescue Clinician Bolus 0.5 milliGRAM(s) IV Push every 15 minutes PRN for Pain Scale GREATER THAN 6  naloxone Injectable 0.1 milliGRAM(s) IV Push every 3 minutes PRN For ANY of the following changes in patient status:  A. RR LESS THAN 10 breaths per minute, B. Oxygen saturation LESS THAN 90%, C. Sedation score of 6  ondansetron Injectable 4 milliGRAM(s) IV Push every 6 hours PRN Nausea      OBJECTIVE:   [X] No new signs     [ ] Other:    Side Effects:  [X ] None			[ ] Other:    Assessment of Catheter Site:		[ ] Intact		[ ] Other:    ASSESSMENT/PLAN  [ X] Continue current therapy    [ ] Therapy changed to:    [ ] IV PCA       [ ] Epidural     [ ] prn Analgesics     Comments:

## 2018-11-29 DIAGNOSIS — Z29.9 ENCOUNTER FOR PROPHYLACTIC MEASURES, UNSPECIFIED: ICD-10-CM

## 2018-11-29 DIAGNOSIS — E11.43 TYPE 2 DIABETES MELLITUS WITH DIABETIC AUTONOMIC (POLY)NEUROPATHY: ICD-10-CM

## 2018-11-29 DIAGNOSIS — F32.9 MAJOR DEPRESSIVE DISORDER, SINGLE EPISODE, UNSPECIFIED: ICD-10-CM

## 2018-11-29 DIAGNOSIS — K21.9 GASTRO-ESOPHAGEAL REFLUX DISEASE WITHOUT ESOPHAGITIS: ICD-10-CM

## 2018-11-29 DIAGNOSIS — E78.49 OTHER HYPERLIPIDEMIA: ICD-10-CM

## 2018-11-29 DIAGNOSIS — M40.205 UNSPECIFIED KYPHOSIS, THORACOLUMBAR REGION: ICD-10-CM

## 2018-11-29 DIAGNOSIS — I25.10 ATHEROSCLEROTIC HEART DISEASE OF NATIVE CORONARY ARTERY WITHOUT ANGINA PECTORIS: ICD-10-CM

## 2018-11-29 LAB
BUN SERPL-MCNC: 13 MG/DL — SIGNIFICANT CHANGE UP (ref 7–23)
CALCIUM SERPL-MCNC: 7.6 MG/DL — LOW (ref 8.4–10.5)
CHLORIDE SERPL-SCNC: 104 MMOL/L — SIGNIFICANT CHANGE UP (ref 98–107)
CO2 SERPL-SCNC: 24 MMOL/L — SIGNIFICANT CHANGE UP (ref 22–31)
CREAT SERPL-MCNC: 0.93 MG/DL — SIGNIFICANT CHANGE UP (ref 0.5–1.3)
GLUCOSE BLDC GLUCOMTR-MCNC: 134 MG/DL — HIGH (ref 70–99)
GLUCOSE BLDC GLUCOMTR-MCNC: 142 MG/DL — HIGH (ref 70–99)
GLUCOSE BLDC GLUCOMTR-MCNC: 160 MG/DL — HIGH (ref 70–99)
GLUCOSE BLDC GLUCOMTR-MCNC: 165 MG/DL — HIGH (ref 70–99)
GLUCOSE SERPL-MCNC: 177 MG/DL — HIGH (ref 70–99)
HCT VFR BLD CALC: 26.5 % — LOW (ref 34.5–45)
HGB BLD-MCNC: 8.6 G/DL — LOW (ref 11.5–15.5)
MAGNESIUM SERPL-MCNC: 2 MG/DL — SIGNIFICANT CHANGE UP (ref 1.6–2.6)
MCHC RBC-ENTMCNC: 29.5 PG — SIGNIFICANT CHANGE UP (ref 27–34)
MCHC RBC-ENTMCNC: 32.5 % — SIGNIFICANT CHANGE UP (ref 32–36)
MCV RBC AUTO: 90.8 FL — SIGNIFICANT CHANGE UP (ref 80–100)
NRBC # FLD: 0 — SIGNIFICANT CHANGE UP
PHOSPHATE SERPL-MCNC: 3.2 MG/DL — SIGNIFICANT CHANGE UP (ref 2.5–4.5)
PLATELET # BLD AUTO: 185 K/UL — SIGNIFICANT CHANGE UP (ref 150–400)
PMV BLD: 9.5 FL — SIGNIFICANT CHANGE UP (ref 7–13)
POTASSIUM SERPL-MCNC: 4.3 MMOL/L — SIGNIFICANT CHANGE UP (ref 3.5–5.3)
POTASSIUM SERPL-SCNC: 4.3 MMOL/L — SIGNIFICANT CHANGE UP (ref 3.5–5.3)
RBC # BLD: 2.92 M/UL — LOW (ref 3.8–5.2)
RBC # FLD: 14.5 % — SIGNIFICANT CHANGE UP (ref 10.3–14.5)
SODIUM SERPL-SCNC: 136 MMOL/L — SIGNIFICANT CHANGE UP (ref 135–145)
WBC # BLD: 8.02 K/UL — SIGNIFICANT CHANGE UP (ref 3.8–10.5)
WBC # FLD AUTO: 8.02 K/UL — SIGNIFICANT CHANGE UP (ref 3.8–10.5)

## 2018-11-29 PROCEDURE — 99223 1ST HOSP IP/OBS HIGH 75: CPT

## 2018-11-29 RX ORDER — SODIUM CHLORIDE 9 MG/ML
1000 INJECTION, SOLUTION INTRAVENOUS ONCE
Qty: 0 | Refills: 0 | Status: COMPLETED | OUTPATIENT
Start: 2018-11-29 | End: 2018-11-29

## 2018-11-29 RX ORDER — TIZANIDINE 4 MG/1
2 TABLET ORAL EVERY 6 HOURS
Qty: 0 | Refills: 0 | Status: DISCONTINUED | OUTPATIENT
Start: 2018-11-29 | End: 2018-12-12

## 2018-11-29 RX ORDER — ASPIRIN/CALCIUM CARB/MAGNESIUM 324 MG
81 TABLET ORAL DAILY
Qty: 0 | Refills: 0 | Status: DISCONTINUED | OUTPATIENT
Start: 2018-11-29 | End: 2018-12-03

## 2018-11-29 RX ORDER — ACETAMINOPHEN 500 MG
1000 TABLET ORAL ONCE
Qty: 0 | Refills: 0 | Status: COMPLETED | OUTPATIENT
Start: 2018-11-29 | End: 2018-11-30

## 2018-11-29 RX ORDER — ACETAMINOPHEN 500 MG
1000 TABLET ORAL ONCE
Qty: 0 | Refills: 0 | Status: COMPLETED | OUTPATIENT
Start: 2018-11-29 | End: 2018-11-29

## 2018-11-29 RX ADMIN — Medication 400 MILLIGRAM(S): at 02:06

## 2018-11-29 RX ADMIN — HYDROMORPHONE HYDROCHLORIDE 30 MILLILITER(S): 2 INJECTION INTRAMUSCULAR; INTRAVENOUS; SUBCUTANEOUS at 07:28

## 2018-11-29 RX ADMIN — ATORVASTATIN CALCIUM 80 MILLIGRAM(S): 80 TABLET, FILM COATED ORAL at 21:53

## 2018-11-29 RX ADMIN — GABAPENTIN 600 MILLIGRAM(S): 400 CAPSULE ORAL at 06:16

## 2018-11-29 RX ADMIN — SODIUM CHLORIDE 75 MILLILITER(S): 9 INJECTION, SOLUTION INTRAVENOUS at 16:41

## 2018-11-29 RX ADMIN — TIZANIDINE 2 MILLIGRAM(S): 4 TABLET ORAL at 14:44

## 2018-11-29 RX ADMIN — Medication 100 MILLIGRAM(S): at 06:16

## 2018-11-29 RX ADMIN — SODIUM CHLORIDE 2000 MILLILITER(S): 9 INJECTION, SOLUTION INTRAVENOUS at 01:01

## 2018-11-29 RX ADMIN — CARVEDILOL PHOSPHATE 25 MILLIGRAM(S): 80 CAPSULE, EXTENDED RELEASE ORAL at 06:16

## 2018-11-29 RX ADMIN — HYDROMORPHONE HYDROCHLORIDE 30 MILLILITER(S): 2 INJECTION INTRAMUSCULAR; INTRAVENOUS; SUBCUTANEOUS at 10:18

## 2018-11-29 RX ADMIN — HYDROMORPHONE HYDROCHLORIDE 30 MILLILITER(S): 2 INJECTION INTRAMUSCULAR; INTRAVENOUS; SUBCUTANEOUS at 20:05

## 2018-11-29 RX ADMIN — CARVEDILOL PHOSPHATE 25 MILLIGRAM(S): 80 CAPSULE, EXTENDED RELEASE ORAL at 17:12

## 2018-11-29 RX ADMIN — Medication 1: at 06:18

## 2018-11-29 RX ADMIN — LOSARTAN POTASSIUM 50 MILLIGRAM(S): 100 TABLET, FILM COATED ORAL at 06:16

## 2018-11-29 RX ADMIN — HEPARIN SODIUM 5000 UNIT(S): 5000 INJECTION INTRAVENOUS; SUBCUTANEOUS at 21:53

## 2018-11-29 RX ADMIN — CHLORHEXIDINE GLUCONATE 1 APPLICATION(S): 213 SOLUTION TOPICAL at 10:17

## 2018-11-29 RX ADMIN — HYDROMORPHONE HYDROCHLORIDE 30 MILLILITER(S): 2 INJECTION INTRAMUSCULAR; INTRAVENOUS; SUBCUTANEOUS at 15:01

## 2018-11-29 RX ADMIN — Medication 1000 MILLIGRAM(S): at 02:36

## 2018-11-29 RX ADMIN — DULOXETINE HYDROCHLORIDE 90 MILLIGRAM(S): 30 CAPSULE, DELAYED RELEASE ORAL at 14:46

## 2018-11-29 RX ADMIN — HYDROMORPHONE HYDROCHLORIDE 0.5 MILLIGRAM(S): 2 INJECTION INTRAMUSCULAR; INTRAVENOUS; SUBCUTANEOUS at 17:10

## 2018-11-29 RX ADMIN — Medication 100 MILLIGRAM(S): at 14:46

## 2018-11-29 RX ADMIN — SODIUM CHLORIDE 75 MILLILITER(S): 9 INJECTION, SOLUTION INTRAVENOUS at 15:40

## 2018-11-29 RX ADMIN — HEPARIN SODIUM 5000 UNIT(S): 5000 INJECTION INTRAVENOUS; SUBCUTANEOUS at 14:47

## 2018-11-29 RX ADMIN — PANTOPRAZOLE SODIUM 40 MILLIGRAM(S): 20 TABLET, DELAYED RELEASE ORAL at 06:16

## 2018-11-29 RX ADMIN — HYDROMORPHONE HYDROCHLORIDE 30 MILLILITER(S): 2 INJECTION INTRAMUSCULAR; INTRAVENOUS; SUBCUTANEOUS at 22:13

## 2018-11-29 RX ADMIN — Medication 100 MILLIGRAM(S): at 21:53

## 2018-11-29 RX ADMIN — GABAPENTIN 600 MILLIGRAM(S): 400 CAPSULE ORAL at 14:44

## 2018-11-29 RX ADMIN — HEPARIN SODIUM 5000 UNIT(S): 5000 INJECTION INTRAVENOUS; SUBCUTANEOUS at 06:16

## 2018-11-29 RX ADMIN — CHLORHEXIDINE GLUCONATE 1 APPLICATION(S): 213 SOLUTION TOPICAL at 02:00

## 2018-11-29 RX ADMIN — GABAPENTIN 600 MILLIGRAM(S): 400 CAPSULE ORAL at 17:13
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
## 2018-11-29 NOTE — PHYSICAL THERAPY INITIAL EVALUATION ADULT - CRITERIA FOR SKILLED THERAPEUTIC INTERVENTIONS
predicted duration of therapy intervention/rehab potential/anticipated equipment needs at discharge/therapy frequency/anticipated discharge recommendation/impairments found

## 2018-11-29 NOTE — PROGRESS NOTE ADULT - SUBJECTIVE AND OBJECTIVE BOX
Anesthesia Pain Management Service    SUBJECTIVE: Pt doing well with IV PCA without problems reported.    Therapy:	  [ X] IV PCA	   [ ] Epidural           [ ] s/p Spinal Opoid              [ ] Postpartum infusion	  [ ] Patient controlled regional anesthesia (PCRA)    [ ] prn Analgesics    Allergies    No Known Allergies    Intolerances      MEDICATIONS  (STANDING):  atorvastatin 80 milliGRAM(s) Oral at bedtime  carvedilol 25 milliGRAM(s) Oral every 12 hours  ceFAZolin   IVPB 1000 milliGRAM(s) IV Intermittent every 8 hours  chlorhexidine 4% Liquid 1 Application(s) Topical <User Schedule>  dextrose 5% + sodium chloride 0.45%. 1000 milliLiter(s) (75 mL/Hr) IV Continuous <Continuous>  dextrose 5%. 1000 milliLiter(s) (50 mL/Hr) IV Continuous <Continuous>  dextrose 50% Injectable 12.5 Gram(s) IV Push once  dextrose 50% Injectable 25 Gram(s) IV Push once  dextrose 50% Injectable 25 Gram(s) IV Push once  DULoxetine 90 milliGRAM(s) Oral daily  gabapentin 600 milliGRAM(s) Oral four times a day  heparin  Injectable 5000 Unit(s) SubCutaneous every 8 hours  HYDROmorphone PCA (1 mG/mL) 30 milliLiter(s) PCA Continuous PCA Continuous  influenza   Vaccine 0.5 milliLiter(s) IntraMuscular once  insulin lispro (HumaLOG) corrective regimen sliding scale   SubCutaneous three times a day before meals  insulin lispro (HumaLOG) corrective regimen sliding scale   SubCutaneous at bedtime  losartan 50 milliGRAM(s) Oral daily  pantoprazole    Tablet 40 milliGRAM(s) Oral before breakfast    MEDICATIONS  (PRN):  ALPRAZolam 0.25 milliGRAM(s) Oral every 6 hours PRN severe agitation or anxiety  dextrose 40% Gel 15 Gram(s) Oral once PRN Blood Glucose LESS THAN 70 milliGRAM(s)/deciliter  glucagon  Injectable 1 milliGRAM(s) IntraMuscular once PRN Glucose LESS THAN 70 milligrams/deciliter  HYDROmorphone PCA (1 mG/mL) Rescue Clinician Bolus 0.5 milliGRAM(s) IV Push every 15 minutes PRN for Pain Scale GREATER THAN 6  naloxone Injectable 0.1 milliGRAM(s) IV Push every 3 minutes PRN For ANY of the following changes in patient status:  A. RR LESS THAN 10 breaths per minute, B. Oxygen saturation LESS THAN 90%, C. Sedation score of 6  ondansetron Injectable 4 milliGRAM(s) IV Push every 6 hours PRN Nausea  tiZANidine 2 milliGRAM(s) Oral every 6 hours PRN Muscle Spasm      OBJECTIVE:   [X] No new signs     [ ] Other:    Side Effects:  [X ] None			[ ] Other:    Assessment of Catheter Site:		[ ] Intact		[ ] Other:    ASSESSMENT/PLAN  [ X] Continue current therapy    [ ] Therapy changed to:    [ ] IV PCA       [ ] Epidural     [ ] prn Analgesics     Comments:    Progress Note written now but Patient was seen earlier.

## 2018-11-29 NOTE — PHYSICAL THERAPY INITIAL EVALUATION ADULT - PERTINENT HX OF CURRENT PROBLEM, REHAB EVAL
Pt. presents with hx of spinal stenosis who is now status post T10-Pelvis PSF with decompression 11/27/2018

## 2018-11-29 NOTE — PHYSICAL THERAPY INITIAL EVALUATION ADULT - PLANNED THERAPY INTERVENTIONS, PT EVAL
transfer training/gait training/postural re-education/balance training/bed mobility training/ROM/strengthening

## 2018-11-29 NOTE — PROGRESS NOTE ADULT - SUBJECTIVE AND OBJECTIVE BOX
POST OPERATIVE DAY #:  [ ]     Pt pain is controlled. Continues to note improvement in RLE.  Has not been out of bed. Will try to work with PT today      Exam:   Alert/Oriented, No Acute Distress        BACK:         Dressing: [ ] clean/dry/intact  [ ] Other:           Drains: x2 drains-shift output: HV/280, TALHA/45         Sensation: [ ] intact to light touch  [ ] decreased:          Motor exam: [  ]          [ ] Upper extremity                Bi          Tri        Delt                                                    R         5/5        5/5        5/5                                               L          5/5        5/5        5/5                  [ ] Lower extremeity            PF          DF         EHL       FHL                                                                                            R        5/5        5/5        5/5       5/5                                                        L         5/5        5/5        5/5       5/5                                                                  Calves Soft/Non-tender bilaterally           [ ] warm well perfused; capillary refill <3 seconds              LABS:                        9.3<L>  8.84  )-----------( 224      ( 28 Nov 2018 02:30 )             28.6<L>    11-28    139  |  106  |  21  ----------------------------<  143<H>  4.4   |  22  |  1.12        RADIOLOGY & ADDITIONAL STUDIES:

## 2018-11-29 NOTE — PHYSICAL THERAPY INITIAL EVALUATION ADULT - GENERAL OBSERVATIONS, REHAB EVAL
Consult received, chart reviewed. Patient received supine in bed in the SICU, reports of back pain but willing to participate. Patient agreed to EVALUATION from Physical Therapist.

## 2018-11-29 NOTE — CHART NOTE - NSCHARTNOTEFT_GEN_A_CORE
as per dr de la cruz, patient may resume home dosage of aspirin 81 mg daily with SQH while admitted. Plavix may be resumed when discharge post op day # 7

## 2018-11-29 NOTE — OCCUPATIONAL THERAPY INITIAL EVALUATION ADULT - PLANNED THERAPY INTERVENTIONS, OT EVAL
ADL retraining/balance training/bed mobility training/transfer training/neuromuscular re-education/ROM/strengthening

## 2018-11-29 NOTE — PROGRESS NOTE ADULT - SUBJECTIVE AND OBJECTIVE BOX
Anesthesia Pain Management Service    SUBJECTIVE: Patient is doing well with IV PCA and no significant problems reported.    Pain Scale Score	At rest: _7__ 	With Activity: ___ 	[X ] Refer to charted pain scores    THERAPY:    [ ] IV PCA Morphine		[ ] 5 mg/mL	[ ] 1 mg/mL  [X ] IV PCA Hydromorphone	[ ] 5 mg/mL	[X ] 1 mg/mL  [ ] IV PCA Fentanyl		[ ] 50 micrograms/mL    Demand dose __0.2_ lockout __6_ (minutes) Continuous Rate _0__ Total: _4.9__  mg used (in past 24 hours)      MEDICATIONS  (STANDING):  atorvastatin 80 milliGRAM(s) Oral at bedtime  carvedilol 25 milliGRAM(s) Oral every 12 hours  ceFAZolin   IVPB 1000 milliGRAM(s) IV Intermittent every 8 hours  chlorhexidine 4% Liquid 1 Application(s) Topical <User Schedule>  dextrose 5% + sodium chloride 0.45%. 1000 milliLiter(s) (75 mL/Hr) IV Continuous <Continuous>  dextrose 5%. 1000 milliLiter(s) (50 mL/Hr) IV Continuous <Continuous>  dextrose 50% Injectable 12.5 Gram(s) IV Push once  dextrose 50% Injectable 25 Gram(s) IV Push once  dextrose 50% Injectable 25 Gram(s) IV Push once  DULoxetine 90 milliGRAM(s) Oral daily  gabapentin 600 milliGRAM(s) Oral four times a day  heparin  Injectable 5000 Unit(s) SubCutaneous every 8 hours  HYDROmorphone PCA (1 mG/mL) 30 milliLiter(s) PCA Continuous PCA Continuous  influenza   Vaccine 0.5 milliLiter(s) IntraMuscular once  insulin lispro (HumaLOG) corrective regimen sliding scale   SubCutaneous three times a day before meals  insulin lispro (HumaLOG) corrective regimen sliding scale   SubCutaneous at bedtime  losartan 50 milliGRAM(s) Oral daily  pantoprazole    Tablet 40 milliGRAM(s) Oral before breakfast    MEDICATIONS  (PRN):  ALPRAZolam 0.25 milliGRAM(s) Oral every 6 hours PRN severe agitation or anxiety  dextrose 40% Gel 15 Gram(s) Oral once PRN Blood Glucose LESS THAN 70 milliGRAM(s)/deciliter  glucagon  Injectable 1 milliGRAM(s) IntraMuscular once PRN Glucose LESS THAN 70 milligrams/deciliter  HYDROmorphone PCA (1 mG/mL) Rescue Clinician Bolus 0.5 milliGRAM(s) IV Push every 15 minutes PRN for Pain Scale GREATER THAN 6  naloxone Injectable 0.1 milliGRAM(s) IV Push every 3 minutes PRN For ANY of the following changes in patient status:  A. RR LESS THAN 10 breaths per minute, B. Oxygen saturation LESS THAN 90%, C. Sedation score of 6  ondansetron Injectable 4 milliGRAM(s) IV Push every 6 hours PRN Nausea      OBJECTIVE: laying in bed     Sedation Score:	[ X] Alert	[ ] Drowsy 	[ ] Arousable	[ ] Asleep	[ ] Unresponsive    Side Effects:	[X ] None	[ ] Nausea	[ ] Vomiting	[ ] Pruritus  		[ ] Other:    Vital Signs Last 24 Hrs  T(C): 36.9 (29 Nov 2018 08:00), Max: 37.1 (28 Nov 2018 20:00)  T(F): 98.5 (29 Nov 2018 08:00), Max: 98.8 (28 Nov 2018 20:00)  HR: 92 (29 Nov 2018 09:00) (88 - 110)  BP: 112/63 (29 Nov 2018 09:00) (112/63 - 135/65)  BP(mean): 73 (29 Nov 2018 09:00) (73 - 81)  RR: 18 (29 Nov 2018 09:00) (12 - 20)  SpO2: 98% (29 Nov 2018 09:00) (95% - 100%)    ASSESSMENT/ PLAN    Therapy to  be:	[ X] Continue   [ ] Discontinued   [ ] Change to prn Analgesics    Documentation and Verification of current medications:   [X] Done	[ ] Not done, not elligible    Comments: Adding Tizanidine 2mg Q6hr PRN, continue current therapy

## 2018-11-29 NOTE — PROGRESS NOTE ADULT - ASSESSMENT
ASSESSMENT:  63y female with PMH significant for DM, HTN, HLD, CAD (s/p AVE x 2 in 2015), arthritis, spinal stenosis, scoliosis, GERD, carpal tunnel syndrome, depression, hx kidney stones presented for scheduled surgery  T10 to pelvis decompression and fusion 11/27.  SICU consulted for Q1H neurochecks x 24 hours. Patient transported to SICU tired appearing, on NC, however denying any pain, sob, or other complaints. Patient received significant fluid resuscitation in OR, and was briefly started on phenylephrine which was subsequently weaned. Patient was extubated at the end of case without difficulty onto NC. Arrives with two drains. Patient SBP 180s upon arrival, was in 130s when left OR. 11/28: Patient was given labetalol 30mg total IVP and Hydralazine 30 total IVP for elevated pressures with resolution following. Poor pain control overnight.     PLAN:     Neurologic:   - Q4H neurochecks  - Mental status has improved  - pain control with dilaudid PCA, IV tylenol PRN  - c/w gabapentin, cymbalta  - c/w Xanax prn only for severe anxiety/agitation    Respiratory:   - satting well on room air    Cardiovascular: HTN, HLD, CAD (s/p drug-eluding stents x2 in 2015)  - Continue monitoring vitals  - c/w home meds, continue to hold ASA/Plavix   - IVF: D5 1/2 NS @75cc/hr    Gastrointestinal/Nutrition: GERD  - Diet: reg diet (no eating much, still on maintenance fluid)  - On PPI at home, will start inpatient    Renal/Genitourinary:   - Monitor BUN/Cr  - Michael in place - will d/c after OOB with PT  - Strict Is&Os    Hematologic:   - s/p 2u PRBCs/1u FFP/1u platelets  - H/h stable  - c/w SQH    Infectious Disease:   - Abx: ancef for prophylaxis    Tubes/Lines/Drains: yanes, TALHA X 1, hemovac X 1, PIVs, PCA    Endocrine: DM  - holding home metformin  - ISS    Disposition: Awaiting transfer to floor

## 2018-11-29 NOTE — CONSULT NOTE ADULT - ASSESSMENT
Patient is a 63yoF with h/o kyphosis, spondylolisthesis, spinal stenosis. CAD s/p AVE x 2 (2015), HTN, HLD, DM with diabetic peripheral neuropathy who is s/p T10 to pelvis decompression and fusion on 11/27/18.

## 2018-11-29 NOTE — CONSULT NOTE ADULT - PROBLEM SELECTOR RECOMMENDATION 9
Kyphosis, spondylolisthesis and spinal stenosis.  s/p T10 to pelvis decompression and fusion on 11/27/18.  Care per ortho and plastics  Dilaudid PCA per pain management service

## 2018-11-29 NOTE — PROGRESS NOTE ADULT - ASSESSMENT
A/P :  63y Female s/p T10-Pelvis PSF with decompression  -    SICU monitoring  -    Plastics closure, monitor drain output  -    Pain control, PCA  -    DC Rodriguez after PT  -    DVT ppx: SCDs, Start SQ 11/29 due to history of cardiac stents  -    Physical Therapy  -    Weight bearing status: WBAT  -    Dispo:      To be determined      Modesto Griffin  PGY5
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
## 2018-11-29 NOTE — CONSULT NOTE ADULT - PROBLEM SELECTOR RECOMMENDATION 3
continue to hold metformin  Monitor FSs and continue ISS.  A1C 7.5%  Diabetic peripheral neuropathy - c/w gabapentin and glycemic control.

## 2018-11-29 NOTE — OCCUPATIONAL THERAPY INITIAL EVALUATION ADULT - PERTINENT HX OF CURRENT PROBLEM, REHAB EVAL
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

## 2018-11-30 LAB
BUN SERPL-MCNC: 11 MG/DL — SIGNIFICANT CHANGE UP (ref 7–23)
CALCIUM SERPL-MCNC: 8.2 MG/DL — LOW (ref 8.4–10.5)
CHLORIDE SERPL-SCNC: 101 MMOL/L — SIGNIFICANT CHANGE UP (ref 98–107)
CO2 SERPL-SCNC: 25 MMOL/L — SIGNIFICANT CHANGE UP (ref 22–31)
CREAT SERPL-MCNC: 0.99 MG/DL — SIGNIFICANT CHANGE UP (ref 0.5–1.3)
GLUCOSE BLDC GLUCOMTR-MCNC: 133 MG/DL — HIGH (ref 70–99)
GLUCOSE BLDC GLUCOMTR-MCNC: 146 MG/DL — HIGH (ref 70–99)
GLUCOSE BLDC GLUCOMTR-MCNC: 148 MG/DL — HIGH (ref 70–99)
GLUCOSE BLDC GLUCOMTR-MCNC: 163 MG/DL — HIGH (ref 70–99)
GLUCOSE SERPL-MCNC: 160 MG/DL — HIGH (ref 70–99)
HCT VFR BLD CALC: 26.6 % — LOW (ref 34.5–45)
HGB BLD-MCNC: 8.4 G/DL — LOW (ref 11.5–15.5)
MCHC RBC-ENTMCNC: 29.5 PG — SIGNIFICANT CHANGE UP (ref 27–34)
MCHC RBC-ENTMCNC: 31.6 % — LOW (ref 32–36)
MCV RBC AUTO: 93.3 FL — SIGNIFICANT CHANGE UP (ref 80–100)
NRBC # FLD: 0 — SIGNIFICANT CHANGE UP
PLATELET # BLD AUTO: 193 K/UL — SIGNIFICANT CHANGE UP (ref 150–400)
PMV BLD: 9.6 FL — SIGNIFICANT CHANGE UP (ref 7–13)
POTASSIUM SERPL-MCNC: 4.5 MMOL/L — SIGNIFICANT CHANGE UP (ref 3.5–5.3)
POTASSIUM SERPL-SCNC: 4.5 MMOL/L — SIGNIFICANT CHANGE UP (ref 3.5–5.3)
RBC # BLD: 2.85 M/UL — LOW (ref 3.8–5.2)
RBC # FLD: 14.1 % — SIGNIFICANT CHANGE UP (ref 10.3–14.5)
SODIUM SERPL-SCNC: 134 MMOL/L — LOW (ref 135–145)
WBC # BLD: 7.35 K/UL — SIGNIFICANT CHANGE UP (ref 3.8–10.5)
WBC # FLD AUTO: 7.35 K/UL — SIGNIFICANT CHANGE UP (ref 3.8–10.5)

## 2018-11-30 PROCEDURE — 99233 SBSQ HOSP IP/OBS HIGH 50: CPT

## 2018-11-30 RX ORDER — POLYETHYLENE GLYCOL 3350 17 G/17G
17 POWDER, FOR SOLUTION ORAL AT BEDTIME
Qty: 0 | Refills: 0 | Status: DISCONTINUED | OUTPATIENT
Start: 2018-11-30 | End: 2018-12-12

## 2018-11-30 RX ORDER — OXYCODONE HYDROCHLORIDE 5 MG/1
10 TABLET ORAL
Qty: 0 | Refills: 0 | Status: DISCONTINUED | OUTPATIENT
Start: 2018-11-30 | End: 2018-12-03

## 2018-11-30 RX ORDER — ACETAMINOPHEN 500 MG
1000 TABLET ORAL ONCE
Qty: 0 | Refills: 0 | Status: COMPLETED | OUTPATIENT
Start: 2018-11-30 | End: 2018-11-30

## 2018-11-30 RX ORDER — ONDANSETRON 8 MG/1
4 TABLET, FILM COATED ORAL EVERY 6 HOURS
Qty: 0 | Refills: 0 | Status: DISCONTINUED | OUTPATIENT
Start: 2018-11-30 | End: 2018-12-12

## 2018-11-30 RX ORDER — DIPHENHYDRAMINE HCL 50 MG
25 CAPSULE ORAL EVERY 4 HOURS
Qty: 0 | Refills: 0 | Status: DISCONTINUED | OUTPATIENT
Start: 2018-11-30 | End: 2018-12-12

## 2018-11-30 RX ORDER — SODIUM CHLORIDE 9 MG/ML
1000 INJECTION, SOLUTION INTRAVENOUS ONCE
Qty: 0 | Refills: 0 | Status: COMPLETED | OUTPATIENT
Start: 2018-11-30 | End: 2018-11-30

## 2018-11-30 RX ORDER — OXYCODONE HYDROCHLORIDE 5 MG/1
5 TABLET ORAL
Qty: 0 | Refills: 0 | Status: DISCONTINUED | OUTPATIENT
Start: 2018-11-30 | End: 2018-12-03

## 2018-11-30 RX ORDER — DOCUSATE SODIUM 100 MG
100 CAPSULE ORAL THREE TIMES A DAY
Qty: 0 | Refills: 0 | Status: DISCONTINUED | OUTPATIENT
Start: 2018-11-30 | End: 2018-12-12

## 2018-11-30 RX ORDER — ACETAMINOPHEN 500 MG
650 TABLET ORAL EVERY 6 HOURS
Qty: 0 | Refills: 0 | Status: DISCONTINUED | OUTPATIENT
Start: 2018-11-30 | End: 2018-12-03

## 2018-11-30 RX ORDER — SENNA PLUS 8.6 MG/1
2 TABLET ORAL AT BEDTIME
Qty: 0 | Refills: 0 | Status: DISCONTINUED | OUTPATIENT
Start: 2018-11-30 | End: 2018-12-12

## 2018-11-30 RX ADMIN — Medication 100 MILLIGRAM(S): at 14:03

## 2018-11-30 RX ADMIN — GABAPENTIN 600 MILLIGRAM(S): 400 CAPSULE ORAL at 05:49

## 2018-11-30 RX ADMIN — Medication 400 MILLIGRAM(S): at 03:59

## 2018-11-30 RX ADMIN — OXYCODONE HYDROCHLORIDE 10 MILLIGRAM(S): 5 TABLET ORAL at 23:36

## 2018-11-30 RX ADMIN — Medication 100 MILLIGRAM(S): at 22:32

## 2018-11-30 RX ADMIN — HEPARIN SODIUM 5000 UNIT(S): 5000 INJECTION INTRAVENOUS; SUBCUTANEOUS at 22:31

## 2018-11-30 RX ADMIN — Medication 100 MILLIGRAM(S): at 05:49

## 2018-11-30 RX ADMIN — Medication 0.25 MILLIGRAM(S): at 23:51

## 2018-11-30 RX ADMIN — OXYCODONE HYDROCHLORIDE 10 MILLIGRAM(S): 5 TABLET ORAL at 16:30

## 2018-11-30 RX ADMIN — HYDROMORPHONE HYDROCHLORIDE 30 MILLILITER(S): 2 INJECTION INTRAMUSCULAR; INTRAVENOUS; SUBCUTANEOUS at 08:26

## 2018-11-30 RX ADMIN — OXYCODONE HYDROCHLORIDE 10 MILLIGRAM(S): 5 TABLET ORAL at 22:32

## 2018-11-30 RX ADMIN — Medication 25 MILLIGRAM(S): at 16:33

## 2018-11-30 RX ADMIN — Medication 81 MILLIGRAM(S): at 12:11

## 2018-11-30 RX ADMIN — GABAPENTIN 600 MILLIGRAM(S): 400 CAPSULE ORAL at 12:11

## 2018-11-30 RX ADMIN — DULOXETINE HYDROCHLORIDE 90 MILLIGRAM(S): 30 CAPSULE, DELAYED RELEASE ORAL at 12:11

## 2018-11-30 RX ADMIN — Medication 1: at 07:57

## 2018-11-30 RX ADMIN — ATORVASTATIN CALCIUM 80 MILLIGRAM(S): 80 TABLET, FILM COATED ORAL at 22:31

## 2018-11-30 RX ADMIN — Medication 1000 MILLIGRAM(S): at 04:15

## 2018-11-30 RX ADMIN — Medication 650 MILLIGRAM(S): at 23:30

## 2018-11-30 RX ADMIN — Medication 650 MILLIGRAM(S): at 17:16

## 2018-11-30 RX ADMIN — Medication 100 MILLIGRAM(S): at 22:31

## 2018-11-30 RX ADMIN — PANTOPRAZOLE SODIUM 40 MILLIGRAM(S): 20 TABLET, DELAYED RELEASE ORAL at 05:49

## 2018-11-30 RX ADMIN — TIZANIDINE 2 MILLIGRAM(S): 4 TABLET ORAL at 22:57

## 2018-11-30 RX ADMIN — SENNA PLUS 2 TABLET(S): 8.6 TABLET ORAL at 22:36

## 2018-11-30 RX ADMIN — HEPARIN SODIUM 5000 UNIT(S): 5000 INJECTION INTRAVENOUS; SUBCUTANEOUS at 05:49

## 2018-11-30 RX ADMIN — CARVEDILOL PHOSPHATE 25 MILLIGRAM(S): 80 CAPSULE, EXTENDED RELEASE ORAL at 22:31

## 2018-11-30 RX ADMIN — GABAPENTIN 600 MILLIGRAM(S): 400 CAPSULE ORAL at 00:09

## 2018-11-30 RX ADMIN — Medication 400 MILLIGRAM(S): at 10:53

## 2018-11-30 RX ADMIN — GABAPENTIN 600 MILLIGRAM(S): 400 CAPSULE ORAL at 17:15

## 2018-11-30 RX ADMIN — HEPARIN SODIUM 5000 UNIT(S): 5000 INJECTION INTRAVENOUS; SUBCUTANEOUS at 14:03

## 2018-11-30 RX ADMIN — SODIUM CHLORIDE 1000 MILLILITER(S): 9 INJECTION, SOLUTION INTRAVENOUS at 12:15

## 2018-11-30 RX ADMIN — OXYCODONE HYDROCHLORIDE 10 MILLIGRAM(S): 5 TABLET ORAL at 15:49

## 2018-11-30 RX ADMIN — GABAPENTIN 600 MILLIGRAM(S): 400 CAPSULE ORAL at 23:29

## 2018-11-30 NOTE — PROVIDER CONTACT NOTE (OTHER) - ASSESSMENT
pt alert and oriented, able to state time, situation, full name and birthday, pt states she knows she is confused

## 2018-11-30 NOTE — PROGRESS NOTE ADULT - ASSESSMENT
A/P :  63y Female s/p T10-Pelvis PSF with decompression  -    Plastics closure, monitor drain output  -    Pain control, wean off PCA  -    DC Rodriguez after PT  -    DVT ppx: SCDs, Start [facility] 11/29 due to history of cardiac stents  -    Physical Therapy  -    Weight bearing status: WBAT  -    Dispo:      To be determined      Modesto Griffin  PGY5

## 2018-11-30 NOTE — PROGRESS NOTE ADULT - PROBLEM SELECTOR PLAN 3
continue to hold metformin  Monitor FSs and continue ISS.  A1C 7.5%  Diabetic peripheral neuropathy - c/w gabapentin and glycemic control

## 2018-11-30 NOTE — PROGRESS NOTE ADULT - SUBJECTIVE AND OBJECTIVE BOX
Anesthesia Pain Management Service    SUBJECTIVE: Patient is doing well with IV PCA and no significant problems reported.    Pain Scale Score	At rest: __3_ 	With Activity: ___ 	[X ] Refer to charted pain scores    THERAPY:    [ ] IV PCA Morphine		[ ] 5 mg/mL	[ ] 1 mg/mL  [X ] IV PCA Hydromorphone	[ ] 5 mg/mL	[X ] 1 mg/mL  [ ] IV PCA Fentanyl		[ ] 50 micrograms/mL    Demand dose __0.2_ lockout __6_ (minutes) Continuous Rate _0__ Total: __16_   mg used (in past 24 hrs)      MEDICATIONS  (STANDING):  acetaminophen   Tablet .. 650 milliGRAM(s) Oral every 6 hours  acetaminophen  IVPB .. 1000 milliGRAM(s) IV Intermittent once  aspirin enteric coated 81 milliGRAM(s) Oral daily  atorvastatin 80 milliGRAM(s) Oral at bedtime  carvedilol 25 milliGRAM(s) Oral every 12 hours  ceFAZolin   IVPB 1000 milliGRAM(s) IV Intermittent every 8 hours  dextrose 5% + sodium chloride 0.45%. 1000 milliLiter(s) (75 mL/Hr) IV Continuous <Continuous>  dextrose 5%. 1000 milliLiter(s) (50 mL/Hr) IV Continuous <Continuous>  dextrose 50% Injectable 12.5 Gram(s) IV Push once  dextrose 50% Injectable 25 Gram(s) IV Push once  dextrose 50% Injectable 25 Gram(s) IV Push once  docusate sodium 100 milliGRAM(s) Oral three times a day  DULoxetine 90 milliGRAM(s) Oral daily  gabapentin 600 milliGRAM(s) Oral four times a day  heparin  Injectable 5000 Unit(s) SubCutaneous every 8 hours  influenza   Vaccine 0.5 milliLiter(s) IntraMuscular once  insulin lispro (HumaLOG) corrective regimen sliding scale   SubCutaneous three times a day before meals  insulin lispro (HumaLOG) corrective regimen sliding scale   SubCutaneous at bedtime  losartan 50 milliGRAM(s) Oral daily  pantoprazole    Tablet 40 milliGRAM(s) Oral before breakfast  polyethylene glycol 3350 17 Gram(s) Oral at bedtime  senna 2 Tablet(s) Oral at bedtime    MEDICATIONS  (PRN):  ALPRAZolam 0.25 milliGRAM(s) Oral every 6 hours PRN severe agitation or anxiety  dextrose 40% Gel 15 Gram(s) Oral once PRN Blood Glucose LESS THAN 70 milliGRAM(s)/deciliter  glucagon  Injectable 1 milliGRAM(s) IntraMuscular once PRN Glucose LESS THAN 70 milligrams/deciliter  ondansetron Injectable 4 milliGRAM(s) IV Push every 6 hours PRN Nausea and/or Vomiting  oxyCODONE    IR 5 milliGRAM(s) Oral every 3 hours PRN Moderate Pain (4 - 6)  oxyCODONE    IR 10 milliGRAM(s) Oral every 3 hours PRN Severe Pain (7 - 10)  tiZANidine 2 milliGRAM(s) Oral every 6 hours PRN Muscle Spasm      OBJECTIVE: laying in bed     Sedation Score:	[ X] Alert	[ ] Drowsy 	[ ] Arousable	[ ] Asleep	[ ] Unresponsive    Side Effects:	[X ] None	[ ] Nausea	[ ] Vomiting	[ ] Pruritus  		[ ] Other:    Vital Signs Last 24 Hrs  T(C): 36.5 (30 Nov 2018 10:20), Max: 37.2 (30 Nov 2018 00:45)  T(F): 97.7 (30 Nov 2018 10:20), Max: 98.9 (30 Nov 2018 00:45)  HR: 83 (30 Nov 2018 10:20) (81 - 106)  BP: 90/51 (30 Nov 2018 10:20) (90/51 - 147/57)  BP(mean): --  RR: 18 (30 Nov 2018 10:20) (17 - 18)  SpO2: 97% (30 Nov 2018 10:20) (96% - 100%)    ASSESSMENT/ PLAN    Therapy to  be:	[ ] Continue   [ X] Discontinued   [X ] Change to prn Analgesics    Documentation and Verification of current medications:   [X] Done	[ ] Not done, not elligible    Comments: PRN Oral/IV opioids and/or Adjuvant medication to be ordered at this point.

## 2018-11-30 NOTE — PROGRESS NOTE ADULT - PROBLEM SELECTOR PLAN 1
Kyphosis, spondylolisthesis and spinal stenosis. s/p T10 to pelvis decompression and fusion on 11/27/18. Popst-op care per Ortho and Plastics. PCA d/c'd. Recommend non-opioid analgesia in light of recent hallucinations.

## 2018-11-30 NOTE — PROGRESS NOTE ADULT - SUBJECTIVE AND OBJECTIVE BOX
POST OPERATIVE DAY #:  [ ]     Patient transferred to the floor today.  No bowel movement but denies stomach pain.  Continue to monitor drain output    Exam:   Alert/Oriented, No Acute Distress        BACK:         Dressing: [ ] clean/dry/intact  [ ] Other:           Drains: x2 drains-shift output: 7.5/15, 53/118         Sensation: [ ] intact to light touch  [ ] decreased:          Motor exam: [  ]          [ ] Upper extremity                Bi          Tri        Delt                                                    R         5/5        5/5        5/5                                               L          5/5        5/5        5/5                  [ ] Lower extremeity            PF          DF         EHL       FHL                                                                                            R        5/5        5/5        5/5       5/5                                                        L         5/5        5/5        5/5       5/5                                                                  Calves Soft/Non-tender bilaterally           [ ] warm well perfused; capillary refill <3 seconds              LABS:     Vital Signs Last 24 Hrs  T(C): 36.4 (30 Nov 2018 05:31), Max: 37.2 (30 Nov 2018 00:45)  T(F): 97.6 (30 Nov 2018 05:31), Max: 98.9 (30 Nov 2018 00:45)  HR: 81 (30 Nov 2018 05:31) (81 - 106)  BP: 102/51 (30 Nov 2018 05:31) (102/51 - 147/57)  BP(mean): 72 (29 Nov 2018 10:00) (72 - 74)  RR: 18 (30 Nov 2018 05:31) (14 - 18)  SpO2: 96% (30 Nov 2018 05:31) (91% - 100%)                          8.6    8.02  )-----------( 185      ( 29 Nov 2018 02:50 )             26.5

## 2018-11-30 NOTE — PROGRESS NOTE ADULT - SUBJECTIVE AND OBJECTIVE BOX
Patient is a 63y old  Female who presents with a chief complaint of T10-pelvis decompression and fusion (29 Nov 2018 15:22)        SUBJECTIVE / OVERNIGHT EVENTS: Pt with minimal pain/PCA use. Denies dyspnea. Does report she has had episodic hallucinations over the past 24 horus (seeing people/shapes on the ceilings); this has never happened before and is distressing.      MEDICATIONS  (STANDING):  acetaminophen   Tablet .. 650 milliGRAM(s) Oral every 6 hours  aspirin enteric coated 81 milliGRAM(s) Oral daily  atorvastatin 80 milliGRAM(s) Oral at bedtime  carvedilol 25 milliGRAM(s) Oral every 12 hours  ceFAZolin   IVPB 1000 milliGRAM(s) IV Intermittent every 8 hours  dextrose 5% + sodium chloride 0.45%. 1000 milliLiter(s) (75 mL/Hr) IV Continuous <Continuous>  dextrose 5%. 1000 milliLiter(s) (50 mL/Hr) IV Continuous <Continuous>  dextrose 50% Injectable 12.5 Gram(s) IV Push once  dextrose 50% Injectable 25 Gram(s) IV Push once  dextrose 50% Injectable 25 Gram(s) IV Push once  docusate sodium 100 milliGRAM(s) Oral three times a day  DULoxetine 90 milliGRAM(s) Oral daily  gabapentin 600 milliGRAM(s) Oral four times a day  heparin  Injectable 5000 Unit(s) SubCutaneous every 8 hours  influenza   Vaccine 0.5 milliLiter(s) IntraMuscular once  insulin lispro (HumaLOG) corrective regimen sliding scale   SubCutaneous three times a day before meals  insulin lispro (HumaLOG) corrective regimen sliding scale   SubCutaneous at bedtime  losartan 50 milliGRAM(s) Oral daily  pantoprazole    Tablet 40 milliGRAM(s) Oral before breakfast  polyethylene glycol 3350 17 Gram(s) Oral at bedtime  senna 2 Tablet(s) Oral at bedtime    MEDICATIONS  (PRN):  ALPRAZolam 0.25 milliGRAM(s) Oral every 6 hours PRN severe agitation or anxiety  dextrose 40% Gel 15 Gram(s) Oral once PRN Blood Glucose LESS THAN 70 milliGRAM(s)/deciliter  glucagon  Injectable 1 milliGRAM(s) IntraMuscular once PRN Glucose LESS THAN 70 milligrams/deciliter  ondansetron Injectable 4 milliGRAM(s) IV Push every 6 hours PRN Nausea and/or Vomiting  oxyCODONE    IR 5 milliGRAM(s) Oral every 3 hours PRN Moderate Pain (4 - 6)  oxyCODONE    IR 10 milliGRAM(s) Oral every 3 hours PRN Severe Pain (7 - 10)  tiZANidine 2 milliGRAM(s) Oral every 6 hours PRN Muscle Spasm      Vital Signs Last 24 Hrs  T(C): 36.5 (30 Nov 2018 10:20), Max: 37.2 (30 Nov 2018 00:45)  T(F): 97.7 (30 Nov 2018 10:20), Max: 98.9 (30 Nov 2018 00:45)  HR: 83 (30 Nov 2018 10:20) (81 - 106)  BP: 106/52 (30 Nov 2018 10:30) (90/51 - 147/57)  BP(mean): --  RR: 18 (30 Nov 2018 10:20) (17 - 18)  SpO2: 97% (30 Nov 2018 10:20) (96% - 99%)  CAPILLARY BLOOD GLUCOSE      POCT Blood Glucose.: 146 mg/dL (30 Nov 2018 11:38)  POCT Blood Glucose.: 163 mg/dL (30 Nov 2018 07:55)  POCT Blood Glucose.: 160 mg/dL (29 Nov 2018 22:05)  POCT Blood Glucose.: 134 mg/dL (29 Nov 2018 17:00)  POCT Blood Glucose.: 142 mg/dL (29 Nov 2018 12:15)    I&O's Summary    29 Nov 2018 07:01  -  30 Nov 2018 07:00  --------------------------------------------------------  IN: 225 mL / OUT: 1045.5 mL / NET: -820.5 mL    30 Nov 2018 07:01  -  30 Nov 2018 11:47  --------------------------------------------------------  IN: 0 mL / OUT: 10 mL / NET: -10 mL          PHYSICAL EXAM  GENERAL: NAD, well-developed  HEAD:  Atraumatic, Normocephalic  EYES: EOMI, PERRLA, conjunctiva and sclera clear  NECK: Supple, No JVD  CHEST/LUNG: Clear to auscultation bilaterally; No wheeze  HEART: Regular rate and rhythm; No murmurs, rubs, or gallops  ABDOMEN: Soft, Nontender, Nondistended; Bowel sounds present  EXTREMITIES:  2+ Peripheral Pulses, No clubbing, cyanosis, or edema      LABS:                        8.4    7.35  )-----------( 193      ( 30 Nov 2018 05:36 )             26.6     11-30    134<L>  |  101  |  11  ----------------------------<  160<H>  4.5   |  25  |  0.99    Ca    8.2<L>      30 Nov 2018 05:36  Phos  3.2     11-29  Mg     2.0     11-29

## 2018-11-30 NOTE — PROGRESS NOTE ADULT - SUBJECTIVE AND OBJECTIVE BOX
Anesthesia Pain Management Service- Attending Addendum    SUBJECTIVE: Patient's pain control adequate    Therapy:	  [ X] IV PCA	   [ ] Epidural           [ ] s/p Spinal Opoid              [ ] Postpartum infusion	  [ ] Patient controlled regional anesthesia (PCRA)    [ ] prn Analgesics    Allergies    No Known Allergies    Intolerances      MEDICATIONS  (STANDING):  acetaminophen   Tablet .. 650 milliGRAM(s) Oral every 6 hours  aspirin enteric coated 81 milliGRAM(s) Oral daily  atorvastatin 80 milliGRAM(s) Oral at bedtime  carvedilol 25 milliGRAM(s) Oral every 12 hours  ceFAZolin   IVPB 1000 milliGRAM(s) IV Intermittent every 8 hours  dextrose 5% + sodium chloride 0.45%. 1000 milliLiter(s) (75 mL/Hr) IV Continuous <Continuous>  dextrose 5%. 1000 milliLiter(s) (50 mL/Hr) IV Continuous <Continuous>  dextrose 50% Injectable 12.5 Gram(s) IV Push once  dextrose 50% Injectable 25 Gram(s) IV Push once  dextrose 50% Injectable 25 Gram(s) IV Push once  docusate sodium 100 milliGRAM(s) Oral three times a day  DULoxetine 90 milliGRAM(s) Oral daily  gabapentin 600 milliGRAM(s) Oral four times a day  heparin  Injectable 5000 Unit(s) SubCutaneous every 8 hours  influenza   Vaccine 0.5 milliLiter(s) IntraMuscular once  insulin lispro (HumaLOG) corrective regimen sliding scale   SubCutaneous three times a day before meals  insulin lispro (HumaLOG) corrective regimen sliding scale   SubCutaneous at bedtime  losartan 50 milliGRAM(s) Oral daily  pantoprazole    Tablet 40 milliGRAM(s) Oral before breakfast  polyethylene glycol 3350 17 Gram(s) Oral at bedtime  senna 2 Tablet(s) Oral at bedtime    MEDICATIONS  (PRN):  ALPRAZolam 0.25 milliGRAM(s) Oral every 6 hours PRN severe agitation or anxiety  dextrose 40% Gel 15 Gram(s) Oral once PRN Blood Glucose LESS THAN 70 milliGRAM(s)/deciliter  diphenhydrAMINE 25 milliGRAM(s) Oral every 4 hours PRN Rash and/or Itching  glucagon  Injectable 1 milliGRAM(s) IntraMuscular once PRN Glucose LESS THAN 70 milligrams/deciliter  ondansetron Injectable 4 milliGRAM(s) IV Push every 6 hours PRN Nausea and/or Vomiting  oxyCODONE    IR 5 milliGRAM(s) Oral every 3 hours PRN Moderate Pain (4 - 6)  oxyCODONE    IR 10 milliGRAM(s) Oral every 3 hours PRN Severe Pain (7 - 10)  tiZANidine 2 milliGRAM(s) Oral every 6 hours PRN Muscle Spasm      OBJECTIVE:   [X] No new signs     [ ] Other:    Side Effects:  [X ] None			[ ] Other:      ASSESSMENT/PLAN  -Discontinue current therapy    [ ] Therapy changed to:    [ ] IV PCA       [ ] Epidural     [ X] prn Analgesics     Comments: Pain management per primary team, APS to sign off

## 2018-12-01 DIAGNOSIS — R52 PAIN, UNSPECIFIED: ICD-10-CM

## 2018-12-01 DIAGNOSIS — D64.9 ANEMIA, UNSPECIFIED: ICD-10-CM

## 2018-12-01 DIAGNOSIS — K59.00 CONSTIPATION, UNSPECIFIED: ICD-10-CM

## 2018-12-01 LAB
BLD GP AB SCN SERPL QL: NEGATIVE — SIGNIFICANT CHANGE UP
BUN SERPL-MCNC: 13 MG/DL — SIGNIFICANT CHANGE UP (ref 7–23)
CALCIUM SERPL-MCNC: 8.3 MG/DL — LOW (ref 8.4–10.5)
CHLORIDE SERPL-SCNC: 102 MMOL/L — SIGNIFICANT CHANGE UP (ref 98–107)
CO2 SERPL-SCNC: 26 MMOL/L — SIGNIFICANT CHANGE UP (ref 22–31)
CREAT SERPL-MCNC: 0.93 MG/DL — SIGNIFICANT CHANGE UP (ref 0.5–1.3)
GLUCOSE BLDC GLUCOMTR-MCNC: 128 MG/DL — HIGH (ref 70–99)
GLUCOSE BLDC GLUCOMTR-MCNC: 139 MG/DL — HIGH (ref 70–99)
GLUCOSE BLDC GLUCOMTR-MCNC: 161 MG/DL — HIGH (ref 70–99)
GLUCOSE BLDC GLUCOMTR-MCNC: 191 MG/DL — HIGH (ref 70–99)
GLUCOSE SERPL-MCNC: 202 MG/DL — HIGH (ref 70–99)
HCT VFR BLD CALC: 25.4 % — LOW (ref 34.5–45)
HGB BLD-MCNC: 8.1 G/DL — LOW (ref 11.5–15.5)
MCHC RBC-ENTMCNC: 28.5 PG — SIGNIFICANT CHANGE UP (ref 27–34)
MCHC RBC-ENTMCNC: 31.9 % — LOW (ref 32–36)
MCV RBC AUTO: 89.4 FL — SIGNIFICANT CHANGE UP (ref 80–100)
NRBC # FLD: 0 — SIGNIFICANT CHANGE UP
PLATELET # BLD AUTO: 215 K/UL — SIGNIFICANT CHANGE UP (ref 150–400)
PMV BLD: 9.6 FL — SIGNIFICANT CHANGE UP (ref 7–13)
POTASSIUM SERPL-MCNC: 4 MMOL/L — SIGNIFICANT CHANGE UP (ref 3.5–5.3)
POTASSIUM SERPL-SCNC: 4 MMOL/L — SIGNIFICANT CHANGE UP (ref 3.5–5.3)
RBC # BLD: 2.84 M/UL — LOW (ref 3.8–5.2)
RBC # FLD: 14 % — SIGNIFICANT CHANGE UP (ref 10.3–14.5)
RH IG SCN BLD-IMP: POSITIVE — SIGNIFICANT CHANGE UP
SODIUM SERPL-SCNC: 137 MMOL/L — SIGNIFICANT CHANGE UP (ref 135–145)
WBC # BLD: 6.02 K/UL — SIGNIFICANT CHANGE UP (ref 3.8–10.5)
WBC # FLD AUTO: 6.02 K/UL — SIGNIFICANT CHANGE UP (ref 3.8–10.5)

## 2018-12-01 PROCEDURE — 99233 SBSQ HOSP IP/OBS HIGH 50: CPT

## 2018-12-01 RX ORDER — LACTULOSE 10 G/15ML
10 SOLUTION ORAL DAILY
Qty: 0 | Refills: 0 | Status: DISCONTINUED | OUTPATIENT
Start: 2018-12-01 | End: 2018-12-04

## 2018-12-01 RX ADMIN — PANTOPRAZOLE SODIUM 40 MILLIGRAM(S): 20 TABLET, DELAYED RELEASE ORAL at 05:49

## 2018-12-01 RX ADMIN — OXYCODONE HYDROCHLORIDE 10 MILLIGRAM(S): 5 TABLET ORAL at 19:30

## 2018-12-01 RX ADMIN — Medication 100 MILLIGRAM(S): at 23:01

## 2018-12-01 RX ADMIN — Medication 1: at 12:07

## 2018-12-01 RX ADMIN — Medication 1: at 07:53

## 2018-12-01 RX ADMIN — Medication 650 MILLIGRAM(S): at 18:11

## 2018-12-01 RX ADMIN — CARVEDILOL PHOSPHATE 25 MILLIGRAM(S): 80 CAPSULE, EXTENDED RELEASE ORAL at 09:57

## 2018-12-01 RX ADMIN — OXYCODONE HYDROCHLORIDE 10 MILLIGRAM(S): 5 TABLET ORAL at 16:35

## 2018-12-01 RX ADMIN — GABAPENTIN 600 MILLIGRAM(S): 400 CAPSULE ORAL at 12:08

## 2018-12-01 RX ADMIN — OXYCODONE HYDROCHLORIDE 10 MILLIGRAM(S): 5 TABLET ORAL at 11:00

## 2018-12-01 RX ADMIN — HEPARIN SODIUM 5000 UNIT(S): 5000 INJECTION INTRAVENOUS; SUBCUTANEOUS at 15:35

## 2018-12-01 RX ADMIN — Medication 0.25 MILLIGRAM(S): at 23:00

## 2018-12-01 RX ADMIN — LOSARTAN POTASSIUM 50 MILLIGRAM(S): 100 TABLET, FILM COATED ORAL at 05:47

## 2018-12-01 RX ADMIN — OXYCODONE HYDROCHLORIDE 10 MILLIGRAM(S): 5 TABLET ORAL at 18:36

## 2018-12-01 RX ADMIN — TIZANIDINE 2 MILLIGRAM(S): 4 TABLET ORAL at 20:02

## 2018-12-01 RX ADMIN — Medication 100 MILLIGRAM(S): at 05:46

## 2018-12-01 RX ADMIN — Medication 81 MILLIGRAM(S): at 12:06

## 2018-12-01 RX ADMIN — Medication 100 MILLIGRAM(S): at 05:47

## 2018-12-01 RX ADMIN — GABAPENTIN 600 MILLIGRAM(S): 400 CAPSULE ORAL at 18:11

## 2018-12-01 RX ADMIN — ATORVASTATIN CALCIUM 80 MILLIGRAM(S): 80 TABLET, FILM COATED ORAL at 23:00

## 2018-12-01 RX ADMIN — HEPARIN SODIUM 5000 UNIT(S): 5000 INJECTION INTRAVENOUS; SUBCUTANEOUS at 23:00

## 2018-12-01 RX ADMIN — OXYCODONE HYDROCHLORIDE 10 MILLIGRAM(S): 5 TABLET ORAL at 15:35

## 2018-12-01 RX ADMIN — Medication 650 MILLIGRAM(S): at 05:47

## 2018-12-01 RX ADMIN — OXYCODONE HYDROCHLORIDE 10 MILLIGRAM(S): 5 TABLET ORAL at 07:14

## 2018-12-01 RX ADMIN — DULOXETINE HYDROCHLORIDE 90 MILLIGRAM(S): 30 CAPSULE, DELAYED RELEASE ORAL at 12:06

## 2018-12-01 RX ADMIN — Medication 100 MILLIGRAM(S): at 23:00

## 2018-12-01 RX ADMIN — Medication 100 MILLIGRAM(S): at 15:35

## 2018-12-01 RX ADMIN — OXYCODONE HYDROCHLORIDE 10 MILLIGRAM(S): 5 TABLET ORAL at 23:00

## 2018-12-01 RX ADMIN — OXYCODONE HYDROCHLORIDE 10 MILLIGRAM(S): 5 TABLET ORAL at 08:10

## 2018-12-01 RX ADMIN — HEPARIN SODIUM 5000 UNIT(S): 5000 INJECTION INTRAVENOUS; SUBCUTANEOUS at 05:47

## 2018-12-01 RX ADMIN — GABAPENTIN 600 MILLIGRAM(S): 400 CAPSULE ORAL at 05:48

## 2018-12-01 RX ADMIN — OXYCODONE HYDROCHLORIDE 10 MILLIGRAM(S): 5 TABLET ORAL at 10:15

## 2018-12-01 RX ADMIN — Medication 650 MILLIGRAM(S): at 12:06

## 2018-12-01 RX ADMIN — CARVEDILOL PHOSPHATE 25 MILLIGRAM(S): 80 CAPSULE, EXTENDED RELEASE ORAL at 23:00

## 2018-12-01 RX ADMIN — Medication 100 MILLIGRAM(S): at 15:36

## 2018-12-01 NOTE — PROGRESS NOTE ADULT - ASSESSMENT
A/P: S/P posterior spine fusion with muscle flap reconstruction.  - Diet  - Pain control  - Drain Monitoring  - DVT PPx: SCD, chemoprophylaxis as per spine service  - Will Follow    Thank You  Rhett Mendieta MD  Plastic Surgery  [phone]

## 2018-12-01 NOTE — PROGRESS NOTE ADULT - SUBJECTIVE AND OBJECTIVE BOX
No acute events overnight. Patient was given a bolus for hypotension.  Pain well controlled.    Exam:   Alert/Oriented, No Acute Distress  Back  +HV ***  +TALHA ***         [ ] Upper extremity                Bi          Tri        Delt                                                    R         5/5        5/5        5/5                                               L          5/5        5/5        5/5                  [ ] Lower extremeity            PF          DF         EHL       FHL                                                                                            R        5/5        5/5        5/5       5/5                                                        L         5/5        5/5        5/5       5/5                                                                  Calves Soft/Non-tender bilaterally    warm well perfused; capillary refill <3 seconds              LABS:   Vitals 24hrs  Vital Signs Last 24 Hrs  T(C): 36.6 (30 Nov 2018 22:27), Max: 37.2 (30 Nov 2018 00:45)  T(F): 97.8 (30 Nov 2018 22:27), Max: 98.9 (30 Nov 2018 00:45)  HR: 89 (30 Nov 2018 22:27) (75 - 100)  BP: 137/59 (30 Nov 2018 22:27) (90/51 - 137/59)  BP(mean): --  RR: 18 (30 Nov 2018 22:27) (18 - 18)  SpO2: 94% (30 Nov 2018 22:27) (94% - 99%)      11-29-18 @ 07:01  -  11-30-18 @ 07:00  --------------------------------------------------------  IN: 225 mL / OUT: 1045.5 mL / NET: -820.5 mL    11-30-18 @ 07:01  -  12-01-18 @ 00:23  --------------------------------------------------------  IN: 0 mL / OUT: 615 mL / NET: -615 mL      Lab Results 24hrs:                        8.4    7.35  )-----------( 193      ( 30 Nov 2018 05:36 )             26.6     11-30    134<L>  |  101  |  11  ----------------------------<  160<H>  4.5   |  25  |  0.99    Ca    8.2<L>      30 Nov 2018 05:36  Phos  3.2     11-29  Mg     2.0     11-29 No acute events overnight. Patient was given a bolus for hypotension, BP now stable.  Patient complains of pain with hip flexion and when she attempts to bear weight.  Patient has not been able to ambulate out of bed due to pain.    Exam:   Alert/Oriented, No Acute Distress  Back  +HV 30/160 serosanguinous  +TALHA 15/70 serosanguinous         [ ] Upper extremity                Bi          Tri        Delt                                                    R         5/5        5/5        5/5                                               L          5/5        5/5        5/5                  [ ] Lower extremeity            PF          DF         EHL       FHL                                                                                            R        5/5        5/5        5/5       5/5                                                        L         5/5        5/5        5/5       5/5                                                                  Calves Soft/Non-tender bilaterally    warm well perfused; capillary refill <3 seconds              LABS:   Vitals 24hrs  Vital Signs Last 24 Hrs  T(C): 36.6 (30 Nov 2018 22:27), Max: 37.2 (30 Nov 2018 00:45)  T(F): 97.8 (30 Nov 2018 22:27), Max: 98.9 (30 Nov 2018 00:45)  HR: 89 (30 Nov 2018 22:27) (75 - 100)  BP: 137/59 (30 Nov 2018 22:27) (90/51 - 137/59)  BP(mean): --  RR: 18 (30 Nov 2018 22:27) (18 - 18)  SpO2: 94% (30 Nov 2018 22:27) (94% - 99%)      11-29-18 @ 07:01  -  11-30-18 @ 07:00  --------------------------------------------------------  IN: 225 mL / OUT: 1045.5 mL / NET: -820.5 mL    11-30-18 @ 07:01  -  12-01-18 @ 00:23  --------------------------------------------------------  IN: 0 mL / OUT: 615 mL / NET: -615 mL      Lab Results 24hrs:                        8.4    7.35  )-----------( 193      ( 30 Nov 2018 05:36 )             26.6     11-30    134<L>  |  101  |  11  ----------------------------<  160<H>  4.5   |  25  |  0.99    Ca    8.2<L>      30 Nov 2018 05:36  Phos  3.2     11-29  Mg     2.0     11-29 No acute events overnight.  Patient complains of pain with hip flexion and when she attempts to bear weight.  Patient has not been able to ambulate out of bed due to pain.    Exam:   Alert/Oriented, No Acute Distress  Back  +HV 30/160 serosanguinous  +TALHA 15/70 serosanguinous         [ ] Upper extremity                Bi          Tri        Delt                                                    R         5/5        5/5        5/5                                               L          5/5        5/5        5/5                  [ ] Lower extremeity            PF          DF         EHL       FHL                                                                                            R        5/5        5/5        5/5       5/5                                                        L         5/5        5/5        5/5       5/5                                                                  Calves Soft/Non-tender bilaterally    warm well perfused; capillary refill <3 seconds              LABS:   Vitals 24hrs  Vital Signs Last 24 Hrs  T(C): 36.6 (30 Nov 2018 22:27), Max: 37.2 (30 Nov 2018 00:45)  T(F): 97.8 (30 Nov 2018 22:27), Max: 98.9 (30 Nov 2018 00:45)  HR: 89 (30 Nov 2018 22:27) (75 - 100)  BP: 137/59 (30 Nov 2018 22:27) (90/51 - 137/59)  BP(mean): --  RR: 18 (30 Nov 2018 22:27) (18 - 18)  SpO2: 94% (30 Nov 2018 22:27) (94% - 99%)      11-29-18 @ 07:01  -  11-30-18 @ 07:00  --------------------------------------------------------  IN: 225 mL / OUT: 1045.5 mL / NET: -820.5 mL    11-30-18 @ 07:01  -  12-01-18 @ 00:23  --------------------------------------------------------  IN: 0 mL / OUT: 615 mL / NET: -615 mL      Lab Results 24hrs:                        8.4    7.35  )-----------( 193      ( 30 Nov 2018 05:36 )             26.6     11-30    134<L>  |  101  |  11  ----------------------------<  160<H>  4.5   |  25  |  0.99    Ca    8.2<L>      30 Nov 2018 05:36  Phos  3.2     11-29  Mg     2.0     11-29

## 2018-12-01 NOTE — PROGRESS NOTE ADULT - ASSESSMENT
63yoF with h/o kyphosis, spondylolisthesis, spinal stenosis. CAD s/p AVE x 2 (2015), HTN, HLD, DM with diabetic peripheral neuropathy who is s/p T10 to pelvis decompression and fusion on 11/27/18.

## 2018-12-01 NOTE — PROGRESS NOTE ADULT - PROBLEM SELECTOR PLAN 3
Kyphosis, spondylolisthesis and spinal stenosis. s/p T10 to pelvis decompression and fusion on 11/27/18. Popst-op care per Ortho and Plastics. PCA d/c'd. Recommend non-opioid analgesia in light of recent hallucinations. PCA d/c'd. Recommend non-opioid analgesia in light of recent hallucinations.  - c/w oxycodone PRN, titrate as needed  - also on gabapentin

## 2018-12-01 NOTE — PROGRESS NOTE ADULT - PROBLEM SELECTOR PLAN 5
continue to hold metformin  Monitor FSs and continue ISS.  A1C 7.5%  Diabetic peripheral neuropathy - c/w gabapentin and glycemic control HbA1c 7.5, metformin held;  FSBGs, ssI PRN  Diabetic peripheral neuropathy - c/w gabapentin and glycemic control

## 2018-12-01 NOTE — PROGRESS NOTE ADULT - SUBJECTIVE AND OBJECTIVE BOX
DEMI ARCHIBALD   [id number]    Patient stable, tolerating diet, pain controlled on regimen.      T(C): 36.3 (12-01-18 @ 12:52), Max: 36.7 (11-30-18 @ 17:10)  HR: 79 (12-01-18 @ 12:52) (75 - 89)  BP: 147/61 (12-01-18 @ 12:52) (111/50 - 147/61)  RR: 16 (12-01-18 @ 12:52) (16 - 18)  SpO2: 97% (12-01-18 @ 12:52) (94% - 99%)  Wt(kg): --  NAD  Back: Dressing clean/dry/adherent.  Soft.  No collection.  Drains in situ.  BLE: No calf tenderness.      11-30 @ 07:01  -  12-01 @ 07:00  --------------------------------------------------------  IN: 0 mL / OUT: 630 mL / NET: -630 mL    12-01 @ 07:01  -  12-01 @ 14:52  --------------------------------------------------------  IN: 0 mL / OUT: 35 mL / NET: -35 mL      Hemovac: 160cc  TALHA: 70cc  acetaminophen   Tablet .. 650 milliGRAM(s) Oral every 6 hours  ALPRAZolam 0.25 milliGRAM(s) Oral every 6 hours PRN  aspirin enteric coated 81 milliGRAM(s) Oral daily  atorvastatin 80 milliGRAM(s) Oral at bedtime  carvedilol 25 milliGRAM(s) Oral every 12 hours  ceFAZolin   IVPB 1000 milliGRAM(s) IV Intermittent every 8 hours  dextrose 40% Gel 15 Gram(s) Oral once PRN  dextrose 5% + sodium chloride 0.45%. 1000 milliLiter(s) IV Continuous <Continuous>  dextrose 5%. 1000 milliLiter(s) IV Continuous <Continuous>  dextrose 50% Injectable 12.5 Gram(s) IV Push once  dextrose 50% Injectable 25 Gram(s) IV Push once  dextrose 50% Injectable 25 Gram(s) IV Push once  diphenhydrAMINE 25 milliGRAM(s) Oral every 4 hours PRN  docusate sodium 100 milliGRAM(s) Oral three times a day  DULoxetine 90 milliGRAM(s) Oral daily  gabapentin 600 milliGRAM(s) Oral four times a day  glucagon  Injectable 1 milliGRAM(s) IntraMuscular once PRN  heparin  Injectable 5000 Unit(s) SubCutaneous every 8 hours  influenza   Vaccine 0.5 milliLiter(s) IntraMuscular once  insulin lispro (HumaLOG) corrective regimen sliding scale   SubCutaneous three times a day before meals  insulin lispro (HumaLOG) corrective regimen sliding scale   SubCutaneous at bedtime  losartan 50 milliGRAM(s) Oral daily  ondansetron Injectable 4 milliGRAM(s) IV Push every 6 hours PRN  oxyCODONE    IR 5 milliGRAM(s) Oral every 3 hours PRN  oxyCODONE    IR 10 milliGRAM(s) Oral every 3 hours PRN  pantoprazole    Tablet 40 milliGRAM(s) Oral before breakfast  polyethylene glycol 3350 17 Gram(s) Oral at bedtime  senna 2 Tablet(s) Oral at bedtime  tiZANidine 2 milliGRAM(s) Oral every 6 hours PRN                            8.1    6.02  )-----------( 215      ( 01 Dec 2018 10:05 )             25.4     12-01    137  |  102  |  13  ----------------------------<  202<H>  4.0   |  26  |  0.93    Ca    8.3<L>      01 Dec 2018 10:05

## 2018-12-01 NOTE — PROGRESS NOTE ADULT - PROBLEM SELECTOR PLAN 2
likely d/t blood loss s/p surgery, s/p 2u PRBCs, 1u FFP, 1u plts and 4L IVFs in OR  - d/w Dr. Concepcion plan to transfuse one unit today, f/u H&H/clinically

## 2018-12-01 NOTE — PROGRESS NOTE ADULT - SUBJECTIVE AND OBJECTIVE BOX
Patient is a 63y old  Female who presents with a chief complaint of Scoliosis (01 Dec 2018 14:52)    SUBJECTIVE / OVERNIGHT EVENTS:  Pt frustrated that not improving as quickly as she had hoped, daughter at bedside very supportive.  C/o significant pain back, neck, down L leg. C/o mild dizziness when tried to get up with PT.  No BM 4 days.    MEDICATIONS  (STANDING):  acetaminophen   Tablet .. 650 milliGRAM(s) Oral every 6 hours  aspirin enteric coated 81 milliGRAM(s) Oral daily  atorvastatin 80 milliGRAM(s) Oral at bedtime  carvedilol 25 milliGRAM(s) Oral every 12 hours  ceFAZolin   IVPB 1000 milliGRAM(s) IV Intermittent every 8 hours  dextrose 5% + sodium chloride 0.45%. 1000 milliLiter(s) (75 mL/Hr) IV Continuous <Continuous>  dextrose 5%. 1000 milliLiter(s) (50 mL/Hr) IV Continuous <Continuous>  dextrose 50% Injectable 12.5 Gram(s) IV Push once  dextrose 50% Injectable 25 Gram(s) IV Push once  dextrose 50% Injectable 25 Gram(s) IV Push once  docusate sodium 100 milliGRAM(s) Oral three times a day  DULoxetine 90 milliGRAM(s) Oral daily  gabapentin 600 milliGRAM(s) Oral four times a day  heparin  Injectable 5000 Unit(s) SubCutaneous every 8 hours  influenza   Vaccine 0.5 milliLiter(s) IntraMuscular once  insulin lispro (HumaLOG) corrective regimen sliding scale   SubCutaneous three times a day before meals  insulin lispro (HumaLOG) corrective regimen sliding scale   SubCutaneous at bedtime  losartan 50 milliGRAM(s) Oral daily  pantoprazole    Tablet 40 milliGRAM(s) Oral before breakfast  polyethylene glycol 3350 17 Gram(s) Oral at bedtime  senna 2 Tablet(s) Oral at bedtime    MEDICATIONS  (PRN):  ALPRAZolam 0.25 milliGRAM(s) Oral every 6 hours PRN severe agitation or anxiety  dextrose 40% Gel 15 Gram(s) Oral once PRN Blood Glucose LESS THAN 70 milliGRAM(s)/deciliter  diphenhydrAMINE 25 milliGRAM(s) Oral every 4 hours PRN Rash and/or Itching  glucagon  Injectable 1 milliGRAM(s) IntraMuscular once PRN Glucose LESS THAN 70 milligrams/deciliter  ondansetron Injectable 4 milliGRAM(s) IV Push every 6 hours PRN Nausea and/or Vomiting  oxyCODONE    IR 5 milliGRAM(s) Oral every 3 hours PRN Moderate Pain (4 - 6)  oxyCODONE    IR 10 milliGRAM(s) Oral every 3 hours PRN Severe Pain (7 - 10)  tiZANidine 2 milliGRAM(s) Oral every 6 hours PRN Muscle Spasm    CAPILLARY BLOOD GLUCOSE  POCT Blood Glucose.: 191 mg/dL (01 Dec 2018 11:35)  POCT Blood Glucose.: 161 mg/dL (01 Dec 2018 07:38)  POCT Blood Glucose.: 133 mg/dL (30 Nov 2018 22:07)  POCT Blood Glucose.: 148 mg/dL (30 Nov 2018 16:48)    I&O's Summary    30 Nov 2018 07:01  -  01 Dec 2018 07:00  --------------------------------------------------------  IN: 0 mL / OUT: 630 mL / NET: -630 mL    01 Dec 2018 07:01  -  01 Dec 2018 15:29  --------------------------------------------------------  IN: 0 mL / OUT: 35 mL / NET: -35 mL    Vital Signs Last 24 Hrs  T(C): 36.3 (01 Dec 2018 12:52), Max: 36.7 (30 Nov 2018 17:10)  T(F): 97.4 (01 Dec 2018 12:52), Max: 98 (30 Nov 2018 17:10)  HR: 79 (01 Dec 2018 12:52) (75 - 89)  BP: 147/61 (01 Dec 2018 12:52) (111/50 - 147/61)  RR: 16 (01 Dec 2018 12:52) (16 - 18)  SpO2: 97% (01 Dec 2018 12:52) (94% - 99%)    PHYSICAL EXAM:  GENERAL: NAD, well-developed  HEAD:  Atraumatic, Normocephalic  EYES: EOMI, PERRLA, conjunctiva and sclera clear  NECK: Supple, No JVD  CHEST/LUNG: Clear to auscultation bilaterally; No wheeze  HEART: Regular rate and rhythm; No murmurs, rubs, or gallops  ABDOMEN: Soft, Nontender, Nondistended; Bowel sounds present  EXTREMITIES:  venodynes, thigh edema bilaterally  PSYCH: AAOx3, anxious  NEUROLOGY: non-focal  SKIN: No rashes or lesions    LABS:                        8.1    6.02  )-----------( 215      ( 01 Dec 2018 10:05 )             25.4     137  |  102  |  13  ----------------------------<  202<H>  4.0   |  26  |  0.93    Ca    8.3<L>      01 Dec 2018 10:05    RADIOLOGY & ADDITIONAL TESTS:    Imaging Personally Reviewed:    Consultant(s) Notes Reviewed:      Care Discussed with Consultants/Other Providers: ortho

## 2018-12-01 NOTE — PROGRESS NOTE ADULT - PROBLEM SELECTOR PLAN 10
encourage incentive spirometry, VTE ppx with HSQ Kyphosis, spondylolisthesis and spinal stenosis. s/p T10 to pelvis decompression and fusion on 11/27/18. Popst-op care per Ortho and Plastics.

## 2018-12-01 NOTE — PROGRESS NOTE ADULT - ASSESSMENT
A/P :  63y Female s/p T10-Pelvis PSF with decompression POD 4    -    Resume plavix POD7  -    Plastics closure, monitor drain output  -    Pain control - PCA was discontinued  -    DC Rodriguez after PT  -    DVT ppx: SCDs  -    PT  -    Weight bearing status: WBAT  -    Dispo: To be determined

## 2018-12-02 LAB
BUN SERPL-MCNC: 11 MG/DL — SIGNIFICANT CHANGE UP (ref 7–23)
CALCIUM SERPL-MCNC: 8.6 MG/DL — SIGNIFICANT CHANGE UP (ref 8.4–10.5)
CHLORIDE SERPL-SCNC: 103 MMOL/L — SIGNIFICANT CHANGE UP (ref 98–107)
CO2 SERPL-SCNC: 24 MMOL/L — SIGNIFICANT CHANGE UP (ref 22–31)
CREAT SERPL-MCNC: 0.89 MG/DL — SIGNIFICANT CHANGE UP (ref 0.5–1.3)
GLUCOSE BLDC GLUCOMTR-MCNC: 123 MG/DL — HIGH (ref 70–99)
GLUCOSE BLDC GLUCOMTR-MCNC: 129 MG/DL — HIGH (ref 70–99)
GLUCOSE BLDC GLUCOMTR-MCNC: 152 MG/DL — HIGH (ref 70–99)
GLUCOSE BLDC GLUCOMTR-MCNC: 162 MG/DL — HIGH (ref 70–99)
GLUCOSE SERPL-MCNC: 160 MG/DL — HIGH (ref 70–99)
HCT VFR BLD CALC: 30.2 % — LOW (ref 34.5–45)
HGB BLD-MCNC: 9.8 G/DL — LOW (ref 11.5–15.5)
MCHC RBC-ENTMCNC: 28.8 PG — SIGNIFICANT CHANGE UP (ref 27–34)
MCHC RBC-ENTMCNC: 32.5 % — SIGNIFICANT CHANGE UP (ref 32–36)
MCV RBC AUTO: 88.8 FL — SIGNIFICANT CHANGE UP (ref 80–100)
NRBC # FLD: 0 — SIGNIFICANT CHANGE UP
PLATELET # BLD AUTO: 240 K/UL — SIGNIFICANT CHANGE UP (ref 150–400)
PMV BLD: 9 FL — SIGNIFICANT CHANGE UP (ref 7–13)
POTASSIUM SERPL-MCNC: 3.7 MMOL/L — SIGNIFICANT CHANGE UP (ref 3.5–5.3)
POTASSIUM SERPL-SCNC: 3.7 MMOL/L — SIGNIFICANT CHANGE UP (ref 3.5–5.3)
RBC # BLD: 3.4 M/UL — LOW (ref 3.8–5.2)
RBC # FLD: 15.1 % — HIGH (ref 10.3–14.5)
SODIUM SERPL-SCNC: 140 MMOL/L — SIGNIFICANT CHANGE UP (ref 135–145)
WBC # BLD: 6.12 K/UL — SIGNIFICANT CHANGE UP (ref 3.8–10.5)
WBC # FLD AUTO: 6.12 K/UL — SIGNIFICANT CHANGE UP (ref 3.8–10.5)

## 2018-12-02 PROCEDURE — 99233 SBSQ HOSP IP/OBS HIGH 50: CPT

## 2018-12-02 RX ORDER — ZOLPIDEM TARTRATE 10 MG/1
5 TABLET ORAL AT BEDTIME
Qty: 0 | Refills: 0 | Status: DISCONTINUED | OUTPATIENT
Start: 2018-12-02 | End: 2018-12-04

## 2018-12-02 RX ADMIN — Medication 100 MILLIGRAM(S): at 21:22

## 2018-12-02 RX ADMIN — OXYCODONE HYDROCHLORIDE 10 MILLIGRAM(S): 5 TABLET ORAL at 02:07

## 2018-12-02 RX ADMIN — OXYCODONE HYDROCHLORIDE 10 MILLIGRAM(S): 5 TABLET ORAL at 08:00

## 2018-12-02 RX ADMIN — Medication 650 MILLIGRAM(S): at 12:14

## 2018-12-02 RX ADMIN — Medication 1: at 12:14

## 2018-12-02 RX ADMIN — OXYCODONE HYDROCHLORIDE 10 MILLIGRAM(S): 5 TABLET ORAL at 07:02

## 2018-12-02 RX ADMIN — TIZANIDINE 2 MILLIGRAM(S): 4 TABLET ORAL at 19:46

## 2018-12-02 RX ADMIN — GABAPENTIN 600 MILLIGRAM(S): 400 CAPSULE ORAL at 21:23

## 2018-12-02 RX ADMIN — Medication 650 MILLIGRAM(S): at 00:23

## 2018-12-02 RX ADMIN — OXYCODONE HYDROCHLORIDE 10 MILLIGRAM(S): 5 TABLET ORAL at 00:00

## 2018-12-02 RX ADMIN — CARVEDILOL PHOSPHATE 25 MILLIGRAM(S): 80 CAPSULE, EXTENDED RELEASE ORAL at 21:22

## 2018-12-02 RX ADMIN — Medication 100 MILLIGRAM(S): at 14:15

## 2018-12-02 RX ADMIN — LOSARTAN POTASSIUM 50 MILLIGRAM(S): 100 TABLET, FILM COATED ORAL at 05:44

## 2018-12-02 RX ADMIN — HEPARIN SODIUM 5000 UNIT(S): 5000 INJECTION INTRAVENOUS; SUBCUTANEOUS at 21:22

## 2018-12-02 RX ADMIN — GABAPENTIN 600 MILLIGRAM(S): 400 CAPSULE ORAL at 00:23

## 2018-12-02 RX ADMIN — OXYCODONE HYDROCHLORIDE 10 MILLIGRAM(S): 5 TABLET ORAL at 13:00

## 2018-12-02 RX ADMIN — TIZANIDINE 2 MILLIGRAM(S): 4 TABLET ORAL at 02:07

## 2018-12-02 RX ADMIN — SENNA PLUS 2 TABLET(S): 8.6 TABLET ORAL at 21:22

## 2018-12-02 RX ADMIN — LACTULOSE 10 GRAM(S): 10 SOLUTION ORAL at 19:46

## 2018-12-02 RX ADMIN — Medication 100 MILLIGRAM(S): at 07:02

## 2018-12-02 RX ADMIN — GABAPENTIN 600 MILLIGRAM(S): 400 CAPSULE ORAL at 05:44

## 2018-12-02 RX ADMIN — HEPARIN SODIUM 5000 UNIT(S): 5000 INJECTION INTRAVENOUS; SUBCUTANEOUS at 05:44

## 2018-12-02 RX ADMIN — Medication 100 MILLIGRAM(S): at 05:44

## 2018-12-02 RX ADMIN — TIZANIDINE 2 MILLIGRAM(S): 4 TABLET ORAL at 09:42

## 2018-12-02 RX ADMIN — Medication 650 MILLIGRAM(S): at 05:44

## 2018-12-02 RX ADMIN — Medication 81 MILLIGRAM(S): at 12:13

## 2018-12-02 RX ADMIN — OXYCODONE HYDROCHLORIDE 10 MILLIGRAM(S): 5 TABLET ORAL at 03:00

## 2018-12-02 RX ADMIN — HEPARIN SODIUM 5000 UNIT(S): 5000 INJECTION INTRAVENOUS; SUBCUTANEOUS at 14:15

## 2018-12-02 RX ADMIN — CARVEDILOL PHOSPHATE 25 MILLIGRAM(S): 80 CAPSULE, EXTENDED RELEASE ORAL at 09:44

## 2018-12-02 RX ADMIN — GABAPENTIN 600 MILLIGRAM(S): 400 CAPSULE ORAL at 12:13

## 2018-12-02 RX ADMIN — DULOXETINE HYDROCHLORIDE 90 MILLIGRAM(S): 30 CAPSULE, DELAYED RELEASE ORAL at 12:14

## 2018-12-02 RX ADMIN — OXYCODONE HYDROCHLORIDE 10 MILLIGRAM(S): 5 TABLET ORAL at 12:14

## 2018-12-02 RX ADMIN — PANTOPRAZOLE SODIUM 40 MILLIGRAM(S): 20 TABLET, DELAYED RELEASE ORAL at 07:02

## 2018-12-02 RX ADMIN — ATORVASTATIN CALCIUM 80 MILLIGRAM(S): 80 TABLET, FILM COATED ORAL at 21:22

## 2018-12-02 RX ADMIN — Medication 0.25 MILLIGRAM(S): at 21:22

## 2018-12-02 RX ADMIN — OXYCODONE HYDROCHLORIDE 10 MILLIGRAM(S): 5 TABLET ORAL at 21:22

## 2018-12-02 RX ADMIN — OXYCODONE HYDROCHLORIDE 10 MILLIGRAM(S): 5 TABLET ORAL at 17:25

## 2018-12-02 RX ADMIN — GABAPENTIN 600 MILLIGRAM(S): 400 CAPSULE ORAL at 17:25

## 2018-12-02 RX ADMIN — OXYCODONE HYDROCHLORIDE 10 MILLIGRAM(S): 5 TABLET ORAL at 22:22

## 2018-12-02 RX ADMIN — Medication 650 MILLIGRAM(S): at 18:57

## 2018-12-02 NOTE — PROGRESS NOTE ADULT - PROBLEM SELECTOR PLAN 2
likely d/t blood loss s/p surgery, s/p 2u PRBCs, 1u FFP, 1u plts and 4L IVFs in OR  - s/p 1u PRBCs 12/1, responded well, f/u H&H

## 2018-12-02 NOTE — PROGRESS NOTE ADULT - PROBLEM SELECTOR PLAN 3
PCA d/c'd. Recommend non-opioid analgesia in light of recent hallucinations.  - c/w oxycodone PRN, titrate as needed  - also on gabapentin

## 2018-12-02 NOTE — PROGRESS NOTE ADULT - PROBLEM SELECTOR PLAN 1
on Colace/senna/miralax  prunes/prune juice; lactulose, suppos PRN  +nausea - monitor, antiemetic PRN

## 2018-12-02 NOTE — PROGRESS NOTE ADULT - SUBJECTIVE AND OBJECTIVE BOX
Patient is a 63y old  Female who presents with a chief complaint of back surgery (01 Dec 2018 15:29)    SUBJECTIVE / OVERNIGHT EVENTS:  S/p 1u PRBCs over night.  Pt seen earlier, c/o some nausea, but no vomiting.  No abdominal pain, +flatus but no BM since here.  No chest pain, SOB.    MEDICATIONS  (STANDING):  acetaminophen   Tablet .. 650 milliGRAM(s) Oral every 6 hours  aspirin enteric coated 81 milliGRAM(s) Oral daily  atorvastatin 80 milliGRAM(s) Oral at bedtime  carvedilol 25 milliGRAM(s) Oral every 12 hours  dextrose 5%. 1000 milliLiter(s) (50 mL/Hr) IV Continuous <Continuous>  dextrose 50% Injectable 12.5 Gram(s) IV Push once  dextrose 50% Injectable 25 Gram(s) IV Push once  dextrose 50% Injectable 25 Gram(s) IV Push once  docusate sodium 100 milliGRAM(s) Oral three times a day  DULoxetine 90 milliGRAM(s) Oral daily  gabapentin 600 milliGRAM(s) Oral four times a day  heparin  Injectable 5000 Unit(s) SubCutaneous every 8 hours  influenza   Vaccine 0.5 milliLiter(s) IntraMuscular once  insulin lispro (HumaLOG) corrective regimen sliding scale   SubCutaneous three times a day before meals  insulin lispro (HumaLOG) corrective regimen sliding scale   SubCutaneous at bedtime  losartan 50 milliGRAM(s) Oral daily  pantoprazole    Tablet 40 milliGRAM(s) Oral before breakfast  polyethylene glycol 3350 17 Gram(s) Oral at bedtime  senna 2 Tablet(s) Oral at bedtime    MEDICATIONS  (PRN):  ALPRAZolam 0.25 milliGRAM(s) Oral every 6 hours PRN severe agitation or anxiety  bisacodyl 5 milliGRAM(s) Oral every 12 hours PRN Constipation  dextrose 40% Gel 15 Gram(s) Oral once PRN Blood Glucose LESS THAN 70 milliGRAM(s)/deciliter  diphenhydrAMINE 25 milliGRAM(s) Oral every 4 hours PRN Rash and/or Itching  glucagon  Injectable 1 milliGRAM(s) IntraMuscular once PRN Glucose LESS THAN 70 milligrams/deciliter  lactulose Syrup 10 Gram(s) Oral daily PRN Constipation  ondansetron Injectable 4 milliGRAM(s) IV Push every 6 hours PRN Nausea and/or Vomiting  oxyCODONE    IR 5 milliGRAM(s) Oral every 3 hours PRN Moderate Pain (4 - 6)  oxyCODONE    IR 10 milliGRAM(s) Oral every 3 hours PRN Severe Pain (7 - 10)  tiZANidine 2 milliGRAM(s) Oral every 6 hours PRN Muscle Spasm    CAPILLARY BLOOD GLUCOSE  POCT Blood Glucose.: 152 mg/dL (02 Dec 2018 12:00)  POCT Blood Glucose.: 162 mg/dL (02 Dec 2018 07:52)  POCT Blood Glucose.: 139 mg/dL (01 Dec 2018 22:18)  POCT Blood Glucose.: 128 mg/dL (01 Dec 2018 16:55)    I&O's Summary    01 Dec 2018 07:01  -  02 Dec 2018 07:00  --------------------------------------------------------  IN: 0 mL / OUT: 115 mL / NET: -115 mL    02 Dec 2018 07:01  -  02 Dec 2018 14:28  --------------------------------------------------------  IN: 0 mL / OUT: 296 mL / NET: -296 mL    Vital Signs Last 24 Hrs  T(C): 36.8 (02 Dec 2018 14:20), Max: 36.8 (02 Dec 2018 14:20)  T(F): 98.3 (02 Dec 2018 14:20), Max: 98.3 (02 Dec 2018 14:20)  HR: 72 (02 Dec 2018 14:20) (69 - 91)  BP: 126/58 (02 Dec 2018 14:20) (126/58 - 155/76)  RR: 18 (02 Dec 2018 14:20) (16 - 18)  SpO2: 96% (02 Dec 2018 14:20) (95% - 99%)    PHYSICAL EXAM:  GENERAL: NAD, well-developed  HEAD:  Atraumatic, Normocephalic  EYES: EOMI, PERRLA, conjunctiva and sclera clear  NECK: Supple, No JVD  CHEST/LUNG: Clear to auscultation bilaterally; No wheeze  HEART: Regular rate and rhythm; No murmurs, rubs, or gallops  ABDOMEN: Soft, Nontender, Nondistended; Bowel sounds present  EXTREMITIES:  venodynes, thigh edema bilaterally  PSYCH: AAOx3, calm  NEUROLOGY: non-focal  SKIN: No rashes or lesions    LABS:              9.8    6.12  )-----------( 240      ( 02 Dec 2018 06:41 )             30.2     140  |  103  |  11  ----------------------------<  160<H>  3.7   |  24  |  0.89    Ca    8.6      02 Dec 2018 06:41    RADIOLOGY & ADDITIONAL TESTS:    Imaging Personally Reviewed:    Consultant(s) Notes Reviewed:      Care Discussed with Consultants/Other Providers: ortho

## 2018-12-02 NOTE — PROGRESS NOTE ADULT - PROBLEM SELECTOR PLAN 10
Kyphosis, spondylolisthesis and spinal stenosis. s/p T10 to pelvis decompression and fusion on 11/27/18. Popst-op care per Ortho and Plastics.

## 2018-12-02 NOTE — PROGRESS NOTE ADULT - PROBLEM SELECTOR PLAN 5
HbA1c 7.5, metformin held;  FSBGs, ssI PRN  Diabetic peripheral neuropathy - c/w gabapentin and glycemic control

## 2018-12-02 NOTE — PROGRESS NOTE ADULT - SUBJECTIVE AND OBJECTIVE BOX
No acute events overnight. Pain well controlled with pain medications.    Vital Signs Last 24 Hrs  T(C): 36.5 (01 Dec 2018 22:51), Max: 36.6 (01 Dec 2018 09:33)  T(F): 97.7 (01 Dec 2018 22:51), Max: 97.8 (01 Dec 2018 09:33)  HR: 82 (01 Dec 2018 22:51) (75 - 88)  BP: 145/73 (01 Dec 2018 22:51) (111/50 - 154/71)  BP(mean): --  RR: 16 (01 Dec 2018 22:51) (16 - 17)  SpO2: 99% (01 Dec 2018 22:51) (95% - 99%)    Exam:  Gen: NAD  Motor: 5/5 EHL/FHL/TA/Gastrocnemius  Sensory: SILT DP/SP/S/S/T nerve distributions  Dressing: Clean, Dry, Intact    A/P: 63 year old female s/p T10-Pelvis PSF  - Pain Control  - Regular Diet  - PT/OT, OOB  - Monitor HV  - Resume Plavix POD#7  - Discharge Planning

## 2018-12-03 ENCOUNTER — TRANSCRIPTION ENCOUNTER (OUTPATIENT)
Age: 63
End: 2018-12-03

## 2018-12-03 LAB
ALBUMIN SERPL ELPH-MCNC: 3.4 G/DL — SIGNIFICANT CHANGE UP (ref 3.3–5)
ALP SERPL-CCNC: 100 U/L — SIGNIFICANT CHANGE UP (ref 40–120)
ALT FLD-CCNC: 19 U/L — SIGNIFICANT CHANGE UP (ref 4–33)
AST SERPL-CCNC: 57 U/L — HIGH (ref 4–32)
BILIRUB SERPL-MCNC: 0.4 MG/DL — SIGNIFICANT CHANGE UP (ref 0.2–1.2)
BUN SERPL-MCNC: 11 MG/DL — SIGNIFICANT CHANGE UP (ref 7–23)
BUN SERPL-MCNC: 13 MG/DL — SIGNIFICANT CHANGE UP (ref 7–23)
CALCIUM SERPL-MCNC: 8.4 MG/DL — SIGNIFICANT CHANGE UP (ref 8.4–10.5)
CALCIUM SERPL-MCNC: 9 MG/DL — SIGNIFICANT CHANGE UP (ref 8.4–10.5)
CHLORIDE SERPL-SCNC: 101 MMOL/L — SIGNIFICANT CHANGE UP (ref 98–107)
CHLORIDE SERPL-SCNC: 97 MMOL/L — LOW (ref 98–107)
CO2 SERPL-SCNC: 22 MMOL/L — SIGNIFICANT CHANGE UP (ref 22–31)
CO2 SERPL-SCNC: 24 MMOL/L — SIGNIFICANT CHANGE UP (ref 22–31)
CREAT SERPL-MCNC: 0.84 MG/DL — SIGNIFICANT CHANGE UP (ref 0.5–1.3)
CREAT SERPL-MCNC: 0.89 MG/DL — SIGNIFICANT CHANGE UP (ref 0.5–1.3)
GLUCOSE BLDC GLUCOMTR-MCNC: 124 MG/DL — HIGH (ref 70–99)
GLUCOSE BLDC GLUCOMTR-MCNC: 142 MG/DL — HIGH (ref 70–99)
GLUCOSE BLDC GLUCOMTR-MCNC: 144 MG/DL — HIGH (ref 70–99)
GLUCOSE BLDC GLUCOMTR-MCNC: 185 MG/DL — HIGH (ref 70–99)
GLUCOSE SERPL-MCNC: 142 MG/DL — HIGH (ref 70–99)
GLUCOSE SERPL-MCNC: 180 MG/DL — HIGH (ref 70–99)
HCT VFR BLD CALC: 29.4 % — LOW (ref 34.5–45)
HCT VFR BLD CALC: 35 % — SIGNIFICANT CHANGE UP (ref 34.5–45)
HGB BLD-MCNC: 11.3 G/DL — LOW (ref 11.5–15.5)
HGB BLD-MCNC: 9.5 G/DL — LOW (ref 11.5–15.5)
MAGNESIUM SERPL-MCNC: 2 MG/DL — SIGNIFICANT CHANGE UP (ref 1.6–2.6)
MCHC RBC-ENTMCNC: 28.3 PG — SIGNIFICANT CHANGE UP (ref 27–34)
MCHC RBC-ENTMCNC: 28.4 PG — SIGNIFICANT CHANGE UP (ref 27–34)
MCHC RBC-ENTMCNC: 32.3 % — SIGNIFICANT CHANGE UP (ref 32–36)
MCHC RBC-ENTMCNC: 32.3 % — SIGNIFICANT CHANGE UP (ref 32–36)
MCV RBC AUTO: 87.5 FL — SIGNIFICANT CHANGE UP (ref 80–100)
MCV RBC AUTO: 87.8 FL — SIGNIFICANT CHANGE UP (ref 80–100)
NRBC # FLD: 0 — SIGNIFICANT CHANGE UP
NRBC # FLD: 0.02 — SIGNIFICANT CHANGE UP
PHOSPHATE SERPL-MCNC: 4.6 MG/DL — HIGH (ref 2.5–4.5)
PLATELET # BLD AUTO: 259 K/UL — SIGNIFICANT CHANGE UP (ref 150–400)
PLATELET # BLD AUTO: 287 K/UL — SIGNIFICANT CHANGE UP (ref 150–400)
PMV BLD: 8.5 FL — SIGNIFICANT CHANGE UP (ref 7–13)
PMV BLD: 9.1 FL — SIGNIFICANT CHANGE UP (ref 7–13)
POTASSIUM SERPL-MCNC: 3.8 MMOL/L — SIGNIFICANT CHANGE UP (ref 3.5–5.3)
POTASSIUM SERPL-MCNC: 3.9 MMOL/L — SIGNIFICANT CHANGE UP (ref 3.5–5.3)
POTASSIUM SERPL-SCNC: 3.8 MMOL/L — SIGNIFICANT CHANGE UP (ref 3.5–5.3)
POTASSIUM SERPL-SCNC: 3.9 MMOL/L — SIGNIFICANT CHANGE UP (ref 3.5–5.3)
PROT SERPL-MCNC: 6.9 G/DL — SIGNIFICANT CHANGE UP (ref 6–8.3)
RBC # BLD: 3.35 M/UL — LOW (ref 3.8–5.2)
RBC # BLD: 4 M/UL — SIGNIFICANT CHANGE UP (ref 3.8–5.2)
RBC # FLD: 14.6 % — HIGH (ref 10.3–14.5)
RBC # FLD: 15.2 % — HIGH (ref 10.3–14.5)
SODIUM SERPL-SCNC: 137 MMOL/L — SIGNIFICANT CHANGE UP (ref 135–145)
SODIUM SERPL-SCNC: 138 MMOL/L — SIGNIFICANT CHANGE UP (ref 135–145)
SURGICAL PATHOLOGY STUDY: SIGNIFICANT CHANGE UP
WBC # BLD: 7.04 K/UL — SIGNIFICANT CHANGE UP (ref 3.8–10.5)
WBC # BLD: 7.54 K/UL — SIGNIFICANT CHANGE UP (ref 3.8–10.5)
WBC # FLD AUTO: 7.04 K/UL — SIGNIFICANT CHANGE UP (ref 3.8–10.5)
WBC # FLD AUTO: 7.54 K/UL — SIGNIFICANT CHANGE UP (ref 3.8–10.5)

## 2018-12-03 PROCEDURE — 70498 CT ANGIOGRAPHY NECK: CPT | Mod: 26

## 2018-12-03 PROCEDURE — 74018 RADEX ABDOMEN 1 VIEW: CPT | Mod: 26

## 2018-12-03 PROCEDURE — 12345: CPT | Mod: NC

## 2018-12-03 PROCEDURE — 99233 SBSQ HOSP IP/OBS HIGH 50: CPT

## 2018-12-03 PROCEDURE — 70496 CT ANGIOGRAPHY HEAD: CPT | Mod: 26

## 2018-12-03 RX ORDER — HYDROMORPHONE HYDROCHLORIDE 2 MG/ML
1 INJECTION INTRAMUSCULAR; INTRAVENOUS; SUBCUTANEOUS EVERY 4 HOURS
Qty: 0 | Refills: 0 | Status: DISCONTINUED | OUTPATIENT
Start: 2018-12-03 | End: 2018-12-03

## 2018-12-03 RX ORDER — ACETAMINOPHEN 500 MG
650 TABLET ORAL EVERY 6 HOURS
Qty: 0 | Refills: 0 | Status: DISCONTINUED | OUTPATIENT
Start: 2018-12-03 | End: 2018-12-04

## 2018-12-03 RX ORDER — HYDRALAZINE HCL 50 MG
25 TABLET ORAL ONCE
Qty: 0 | Refills: 0 | Status: COMPLETED | OUTPATIENT
Start: 2018-12-03 | End: 2018-12-03

## 2018-12-03 RX ORDER — DEXAMETHASONE 0.5 MG/5ML
10 ELIXIR ORAL EVERY 6 HOURS
Qty: 0 | Refills: 0 | Status: COMPLETED | OUTPATIENT
Start: 2018-12-03 | End: 2018-12-09

## 2018-12-03 RX ADMIN — OXYCODONE HYDROCHLORIDE 10 MILLIGRAM(S): 5 TABLET ORAL at 05:45

## 2018-12-03 RX ADMIN — OXYCODONE HYDROCHLORIDE 10 MILLIGRAM(S): 5 TABLET ORAL at 00:41

## 2018-12-03 RX ADMIN — Medication 1 ENEMA: at 18:06

## 2018-12-03 RX ADMIN — POLYETHYLENE GLYCOL 3350 17 GRAM(S): 17 POWDER, FOR SOLUTION ORAL at 21:14

## 2018-12-03 RX ADMIN — Medication 5 MILLIGRAM(S): at 12:00

## 2018-12-03 RX ADMIN — Medication 0.25 MILLIGRAM(S): at 04:45

## 2018-12-03 RX ADMIN — Medication 102 MILLIGRAM(S): at 18:06

## 2018-12-03 RX ADMIN — DULOXETINE HYDROCHLORIDE 90 MILLIGRAM(S): 30 CAPSULE, DELAYED RELEASE ORAL at 12:00

## 2018-12-03 RX ADMIN — Medication 81 MILLIGRAM(S): at 12:00

## 2018-12-03 RX ADMIN — OXYCODONE HYDROCHLORIDE 10 MILLIGRAM(S): 5 TABLET ORAL at 01:07

## 2018-12-03 RX ADMIN — Medication 100 MILLIGRAM(S): at 04:46

## 2018-12-03 RX ADMIN — HEPARIN SODIUM 5000 UNIT(S): 5000 INJECTION INTRAVENOUS; SUBCUTANEOUS at 21:14

## 2018-12-03 RX ADMIN — Medication 102 MILLIGRAM(S): at 13:35

## 2018-12-03 RX ADMIN — Medication 650 MILLIGRAM(S): at 12:00

## 2018-12-03 RX ADMIN — OXYCODONE HYDROCHLORIDE 10 MILLIGRAM(S): 5 TABLET ORAL at 15:37

## 2018-12-03 RX ADMIN — OXYCODONE HYDROCHLORIDE 10 MILLIGRAM(S): 5 TABLET ORAL at 08:58

## 2018-12-03 RX ADMIN — OXYCODONE HYDROCHLORIDE 10 MILLIGRAM(S): 5 TABLET ORAL at 09:38

## 2018-12-03 RX ADMIN — GABAPENTIN 600 MILLIGRAM(S): 400 CAPSULE ORAL at 18:06

## 2018-12-03 RX ADMIN — CARVEDILOL PHOSPHATE 25 MILLIGRAM(S): 80 CAPSULE, EXTENDED RELEASE ORAL at 23:58

## 2018-12-03 RX ADMIN — LOSARTAN POTASSIUM 50 MILLIGRAM(S): 100 TABLET, FILM COATED ORAL at 04:46

## 2018-12-03 RX ADMIN — Medication 25 MILLIGRAM(S): at 19:13

## 2018-12-03 RX ADMIN — HEPARIN SODIUM 5000 UNIT(S): 5000 INJECTION INTRAVENOUS; SUBCUTANEOUS at 13:35

## 2018-12-03 RX ADMIN — CARVEDILOL PHOSPHATE 25 MILLIGRAM(S): 80 CAPSULE, EXTENDED RELEASE ORAL at 09:00

## 2018-12-03 RX ADMIN — TIZANIDINE 2 MILLIGRAM(S): 4 TABLET ORAL at 11:41

## 2018-12-03 RX ADMIN — HEPARIN SODIUM 5000 UNIT(S): 5000 INJECTION INTRAVENOUS; SUBCUTANEOUS at 04:45

## 2018-12-03 RX ADMIN — GABAPENTIN 600 MILLIGRAM(S): 400 CAPSULE ORAL at 04:46

## 2018-12-03 RX ADMIN — PANTOPRAZOLE SODIUM 40 MILLIGRAM(S): 20 TABLET, DELAYED RELEASE ORAL at 04:46

## 2018-12-03 RX ADMIN — Medication 100 MILLIGRAM(S): at 21:14

## 2018-12-03 RX ADMIN — OXYCODONE HYDROCHLORIDE 10 MILLIGRAM(S): 5 TABLET ORAL at 20:00

## 2018-12-03 RX ADMIN — GABAPENTIN 600 MILLIGRAM(S): 400 CAPSULE ORAL at 12:00

## 2018-12-03 RX ADMIN — Medication 650 MILLIGRAM(S): at 04:46

## 2018-12-03 RX ADMIN — TIZANIDINE 2 MILLIGRAM(S): 4 TABLET ORAL at 03:30

## 2018-12-03 RX ADMIN — Medication 650 MILLIGRAM(S): at 18:06

## 2018-12-03 RX ADMIN — GABAPENTIN 600 MILLIGRAM(S): 400 CAPSULE ORAL at 23:59

## 2018-12-03 RX ADMIN — OXYCODONE HYDROCHLORIDE 10 MILLIGRAM(S): 5 TABLET ORAL at 16:20

## 2018-12-03 RX ADMIN — Medication 100 MILLIGRAM(S): at 13:35

## 2018-12-03 RX ADMIN — Medication 650 MILLIGRAM(S): at 23:59

## 2018-12-03 RX ADMIN — OXYCODONE HYDROCHLORIDE 10 MILLIGRAM(S): 5 TABLET ORAL at 04:45

## 2018-12-03 RX ADMIN — OXYCODONE HYDROCHLORIDE 10 MILLIGRAM(S): 5 TABLET ORAL at 19:13

## 2018-12-03 RX ADMIN — SENNA PLUS 2 TABLET(S): 8.6 TABLET ORAL at 21:14

## 2018-12-03 NOTE — CONSULT NOTE ADULT - ASSESSMENT
Pt is a 62 yo F with a PMH of Spinal Stenosis, HTN, HLD, DM, CAD (s/p stents x2) and Diabetic Neuropathy admitted for T10 laminectomy and fusion (done on 11/27). Neurology consulted for new onset aphasia, LKN unclear. On exam pt has a global aphasia. Likely left hemispheric dysfunction 2/2 cerebral infarction of yet undetermined cause. NIHSS: 4. MRS: 0.    Recommendations:  - Tele monitoring  - Neuro Checks Q4hr  - CT Head w/o contrast  - CTA Head and Neck  - MRI Head w/o  - TTE w/ bubble study (can be done as inpatient or outpatient)  - c/w home Aspirin 81mg PO QD and Lipitor 80mg PO HS  - Lipid Panel  - HbA1c: 7.5  - Permissive HTN for 24 hours  - DVT ppx Pt is a 64 yo F with a PMH of Spinal Stenosis, HTN, HLD, DM, CAD (s/p stents x2) and Diabetic Neuropathy admitted for T10 laminectomy and fusion (done on 11/27). Neurology consulted for new onset aphasia, LKN unclear. On exam pt has a global aphasia. Likely left hemispheric dysfunction 2/2 cerebral infarction of yet undetermined cause. NIHSS: 4. MRS: 0.    Recommendations:  - Tele monitoring  - Neuro Checks Q4hr  - CT Head w/o contrast  - CTA Head and Neck  - MRI Head w/o  - TTE w/ bubble study (can be done as inpatient or outpatient)  - c/w home Aspirin 81mg PO QD and Lipitor 80mg PO HS  - Lipid Panel  - HbA1c: 7.5  - Permissive HTN for 24 hours  - Speech therapy  - DVT ppx Pt is a 64 yo F with a PMH of Spinal Stenosis, HTN, HLD, DM, CAD (s/p stents x2) and Diabetic Neuropathy admitted for T10 laminectomy and fusion (done on 11/27). Neurology consulted for new onset aphasia, LKN unclear. On exam pt has a global aphasia. Likely left hemispheric dysfunction 2/2 cerebral infarction of yet undetermined cause. NIHSS: 4. MRS: 0.    Recommendations:  - Tele monitoring  - Neuro Checks Q4hr  - CT Head w/o contrast: Left parieto-temporal intraparenchymal hemorrhage  - CTA Head and Neck read pending  - If neuro exam deteriorates get STAT CT head  - MRI Head w/o  - TTE w/ bubble study (can be done as inpatient or outpatient)  - Hold ASA, Plavix, chem DVT ppx and Lipitor for now  - Lipid Panel  - HbA1c: 7.5  - Maintain SBP less than 160  - Speech therapy  - DVT ppx w/ SCDs Pt is a 62 yo F with a PMH of Spinal Stenosis, HTN, HLD, DM, CAD (s/p stents x2) and Diabetic Neuropathy admitted for T10 laminectomy and fusion (done on 11/27). Neurology consulted for new onset aphasia, LKN unclear. On exam pt has a global aphasia. Likely left hemispheric dysfunction 2/2 cerebral infarction of yet undetermined cause. NIHSS: 4. MRS: 0.    Recommendations:  - Tele monitoring  - Neuro Checks Q4hr  - CT Head w/o contrast: Left parieto-temporal intraparenchymal hemorrhage  - CTA Head and Neck read pending  - If neuro exam deteriorates get STAT CT head  - MRI Head w/o  - TTE w/ bubble study (can be done as inpatient or outpatient)  - Hold ASA, Plavix, chem DVT ppx and Lipitor for now  - Lipid Panel  - HbA1c: 7.5  - Maintain SBP less than 140  - Speech therapy  - DVT ppx w/ SCDs Pt is a 64 yo F with a PMH of Spinal Stenosis, HTN, HLD, DM, CAD (s/p stents x2) and Diabetic Neuropathy admitted for T10 laminectomy and fusion (done on 11/27). Neurology consulted for new onset aphasia, LKN unclear. On exam pt has a global aphasia. Likely left hemispheric dysfunction 2/2 cerebral infarction of yet undetermined cause. NIHSS: 4. MRS: 0.    Recommendations:  - Tele monitoring  - Neuro Checks Q4hr  - CT Head w/o contrast: Left parieto-temporal intraparenchymal hemorrhage  - CTA Head and Neck read pending  - If neuro exam deteriorates get STAT CT head  - MRI Head w/ contrast  - TTE w/ bubble study (can be done as inpatient or outpatient)  - Hold ASA, Plavix, chem DVT ppx and Lipitor for now  - Lipid Panel  - HbA1c: 7.5  - Maintain SBP less than 140  - Speech therapy  - DVT ppx w/ SCDs Pt is a 64 yo F with a PMH of Spinal Stenosis, HTN, HLD, DM, CAD (s/p stents x2) and Diabetic Neuropathy admitted for T10 laminectomy and fusion (done on 11/27). Neurology consulted for new onset aphasia, LKN unclear. On exam pt has a global aphasia. Likely left hemispheric dysfunction 2/2 cerebral infarction of yet undetermined cause. NIHSS: 4. MRS: 0.    Recommendations:  - Tele monitoring  - Neuro Checks Q1hr in the SICU  - CT Head w/o contrast: Left parieto-temporal intraparenchymal hemorrhage  - Repeat CT Head (6hrs): Stable  - CTA Head and Neck read pending  - If neuro exam deteriorates get STAT CT head  - MRI Head w/ contrast  - TTE w/ bubble study (can be done as inpatient or outpatient)  - Hold ASA, Plavix, chem DVT ppx and Lipitor for now  - Lipid Panel  - HbA1c: 7.5  - Maintain SBP less than 140  - Speech therapy  - DVT ppx w/ SCDs Pt is a 62 yo F with a PMH of Spinal Stenosis, HTN, HLD, DM, CAD (s/p stents x2) and Diabetic Neuropathy admitted for T10 laminectomy and fusion (done on 11/27). Neurology consulted for new onset aphasia, LKN unclear. On exam pt has a global aphasia. Likely left hemispheric dysfunction 2/2 cerebral infarction of yet undetermined cause. NIHSS: 4. ICH Score: 0. MRS: 0.    Recommendations:  - Tele monitoring  - Neuro Checks Q1hr in the SICU  - CT Head w/o contrast: Left parieto-temporal intraparenchymal hemorrhage  - Repeat CT Head (6hrs): Stable  - CTA Head and Neck read pending  - If neuro exam deteriorates get STAT CT head  - MRI Head w/ contrast  - TTE w/ bubble study (can be done as inpatient or outpatient)  - Hold ASA, Plavix, chem DVT ppx and Lipitor for now  - Lipid Panel  - HbA1c: 7.5  - Maintain SBP less than 140  - Speech therapy  - DVT ppx w/ SCDs

## 2018-12-03 NOTE — PROGRESS NOTE ADULT - ASSESSMENT
63F with h/o kyphosis, spondylolisthesis, spinal stenosis. CAD s/p AVE x 2 (2015), HTN, HLD, DM with diabetic peripheral neuropathy who is s/p T10 to pelvis decompression and fusion on 11/27/18.

## 2018-12-03 NOTE — DISCHARGE NOTE ADULT - CONDITIONS AT DISCHARGE
Pt. is afebrile and offers no complaints. In no acute distress. Lower back dressing: clean, dry and intact. Pt is ambulating with a walker, tolerating diet well, and voiding in adequate amounts.

## 2018-12-03 NOTE — DISCHARGE NOTE ADULT - CARE PROVIDER_API CALL
Sagar Concepcion (MD), Orthopaedic Surgery  [address]  [address]  [place] [address]  Phone: [phone]  Fax: [phone]    Libman, Richard B (MD), Neurology; Vascular Neurology  [address]  [address]  [place] [address]  Phone: [phone]  Fax: [phone]    Lay Costello (DO), PhysicalRehab Medicine  [address]  [address] [place] [address]  Phone: [phone]  Fax: [phone]    Rhett Mendieta), Plastic Surgery Surgery  [address] [place] [address]  Phone: [phone]  Fax: [phone]

## 2018-12-03 NOTE — DISCHARGE NOTE ADULT - NS AS DC FOLLOWUP STROKE INST
lami, diabetes/Smoking Cessation/Influenza vaccination (VIS Pub Date: August 7, 2015) Influenza vaccination (VIS Pub Date: August 7, 2015)/lami, diabetes, force flex, side effects pamphlet, Influenza vaccination (VIS Pub Date: August 7, 2015)/Stroke (includes: TIA/SAH/ICH/Ischemic Stroke)/lami, diabetes, force flex, side effects pamphlet,

## 2018-12-03 NOTE — DISCHARGE NOTE ADULT - MEDICATION SUMMARY - MEDICATIONS TO TAKE
I will START or STAY ON the medications listed below when I get home from the hospital:    acetaminophen 325 mg oral tablet  -- 2 tab(s) by mouth every 6 hours, As needed, Mild Pain (1 - 3)  -- Indication: For Pain control    oxyCODONE 5 mg oral tablet  -- 1 tab(s) by mouth every 4 hours, As needed, Moderate Pain (4 - 6)  -- Indication: For Pain control    oxyCODONE 10 mg oral tablet  -- 1 tab(s) by mouth every 4 hours, As needed, Severe Pain (7 - 10)  -- Indication: For Pain control    losartan 50 mg oral tablet  -- 1 tab(s) by mouth once a day  -- Do not take this drug if you are pregnant.  It is very important that you take or use this exactly as directed.  Do not skip doses or discontinue unless directed by your doctor.  Some non-prescription drugs may aggravate your condition.  Read all labels carefully.  If a warning appears, check with your doctor before taking.    -- Indication: For blood pressure    Neurontin 600 mg oral tablet  -- 1 tab(s) by mouth 4 times a day  -- Indication: For Neuropathic pain    Cymbalta  -- 90 milligram(s) by mouth once a day AM  -- Indication: For Depression     metFORMIN 500 mg oral tablet  -- 1 tab(s) by mouth 2 times a day  -- Indication: For Type 2 diabetes mellitus    Crestor 20 mg oral tablet  -- 1 tab(s) by mouth once a day (at bedtime)  -- Indication: For blood pressure    Xanax 0.25 mg oral tablet  -- as needed  -- Indication: For Anxiety    Coreg 25 mg oral tablet  -- 1 tab(s) by mouth 2 times a day  -- Indication: For blood pressure    senna oral tablet  -- 2 tab(s) by mouth once a day (at bedtime)  -- Indication: For Stool softener    docusate sodium 100 mg oral capsule  -- 1 cap(s) by mouth 3 times a day  -- Indication: For Stool softener    tiZANidine 2 mg oral tablet  -- 1 tab(s) by mouth every 6 hours, As needed, Muscle Spasm  -- Indication: For muscle relaxant    CoQ10  -- 400 milligram(s) by mouth once a day AM last dose 11/20/2018  -- Indication: For Alternative medicine    Fish Oil 1200 mg oral capsule  -- 1 cap(s) by mouth once a day AM last dose 11/20/18  -- Indication: For Alternative medicine    PriLOSEC OTC 20 mg oral delayed release tablet  -- 1 tab(s) by mouth once a day  -- Indication: For Stomach protectant    Yuvafem 10 mcg vaginal tablet  -- vaginal 3 times a week  -- Indication: For Hormone medication    Calcium 600+D oral tablet  -- 1 tab(s) by mouth once a day AM  -- Indication: For vitamin    D3 1000  -- 5000 unit(s) by mouth once a day AM  -- Indication: For vitamin

## 2018-12-03 NOTE — DISCHARGE NOTE ADULT - HOSPITAL COURSE
Ms. Good is a 62 y/o F PMH HTN, HLD, CAD with drug eluded stents x 2, DM, GERD, spinal stenosis, depression,  status post T10-Pelvis Posterior Spinal Decompression Fusion on 11/27.  Patient underwent an uneventful procedure and was admitted to the SICU post-operatively for vigilant monitoring and neurological assessment.  Patient was found to have high systolic blood pressure in the SICU and was treated with IV Labetolol and Hydralizine.  Patient was transferred to [facility] when hemodynamically stable and was initiated on PT/OT regimen.  On 12/3 the patient presented with an acute mental status change and Wernicke aphasia.  CT Angio of the Neck and Brain revealed left parietotemporal intracerebral hematoma of unknown etiology. Subsequent brain imaging revealed as stable and without recurrence of hemorrhage as compared to previous diagnostic imaging.     Patient was under the care of the neurology stroke team which recommends follow-up as an out-patient for continued care and further diagnostic studies such as conventional angiogram to evaluate for possible underlying micro AVM.    The patient remained neurologically intact throughout the admission and aphasia resolved.     The patient will also follow-up with Speech Therapy and PM&R as an out-patient with whom we consulted to oversee the care of the patient.    The patient has been medically cleared for discharge and will benefit from continued care and PT regimen to optimize overall functional status with gait training, conditioning and stabilization exercises.    The patient is to discontinue Plavix and she may take daily Aspirin 81mg - as per cardiologist Dr. Esteban.  The patient will follow-up with Dr. Esteban in 2 weeks' time.

## 2018-12-03 NOTE — DISCHARGE NOTE ADULT - FINAL NIHS SCORE
"Pt arrived via EMS with c/o altered mental status. Wife states Pt "was looking at scotch tape and stated that he wanted to open the front door." Wife states pts "speech just didn't make sense." Wife states pt "got really sick on Sunday with upset stomach." Wife states pt has a stomach virus. Pt appears to have L sided facial droop, when asking pt questions pt has dysphasia. Pt states "Did you measure pony?" when in CT. Pt hooked up to cardiac monitor, continuous BP cuff and pulse ox. Pt wearing O2 when received by EMS. RA O2 90%, pt placed on 2L NC. Pt has a hx of Type 2 DM, hypertension. EMS reports . Call bell within reach.   " 0

## 2018-12-03 NOTE — PROGRESS NOTE ADULT - PROBLEM SELECTOR PLAN 2
-likely secondary to post-op changes; pt hemodynamically stable; will monitor  -s/p 2u PRBCs, 1u FFP, 1u plts and 4L IVFs in OR  -s/p 1u PRBCs 12/1, responded well, f/u H&H

## 2018-12-03 NOTE — PROGRESS NOTE ADULT - PROBLEM SELECTOR PLAN 10
Kyphosis, spondylolisthesis and spinal stenosis. s/p T10 to pelvis decompression and fusion on 11/27/18. Post-op care per Ortho and Plastics.

## 2018-12-03 NOTE — PROGRESS NOTE ADULT - SUBJECTIVE AND OBJECTIVE BOX
No acute events overnight. Pain well controlled with pain medications, however has not been out of bed frequently  Patient has complaints of pain in both thigh anteriorly.      Vital Signs Last 24 Hrs  T(C): 36.5 (03 Dec 2018 04:36), Max: 36.9 (02 Dec 2018 16:44)  T(F): 97.7 (03 Dec 2018 04:36), Max: 98.4 (02 Dec 2018 16:44)  HR: 68 (03 Dec 2018 04:36) (62 - 72)  BP: 132/93 (03 Dec 2018 04:36) (126/58 - 178/71)  BP(mean): --  RR: 17 (03 Dec 2018 04:36) (16 - 18)  SpO2: 100% (03 Dec 2018 04:36) (96% - 100%)                          9.8    6.12  )-----------( 240      ( 02 Dec 2018 06:41 )             30.2         Exam:  Gen: NAD  Motor: 5/5 EHL/FHL/TA/Gastrocnemius  Sensory: SILT DP/SP/S/S/T nerve distributions  Dressing: Clean, Dry, Intact    A/P: 63 year old female s/p T10-Pelvis PSF  - Pain Control  - Regular Diet  - PT/OT, OOB  - Monitor HV per Plastics for removal  - Resume Plavix POD#7  - Discharge Planning       Modesto Griffin  PGY5

## 2018-12-03 NOTE — PROGRESS NOTE ADULT - PROBLEM SELECTOR PLAN 1
-c/w colace, senna, miralax, prunes/prune juice and lactulose prn  -when patient agrees to suppository will order a ducolax suppository  -patient feels like she will be having a bowel movement soon

## 2018-12-03 NOTE — CHART NOTE - NSCHARTNOTEFT_GEN_A_CORE
Called to bedside because patient with new symptoms of agitation and confusion, patient was noted to be A&Ox4 as of 6 pm  Patient seen and examined  Patient able to talk, however the words and sentences that she was saying did not make sense and she appeared frustrated when I was unable to understand, possible expressive aphasia  Patient had issues with hallucinations earlier in her hospitalization, but those resolved after dilaudid was discontinued  She received oxycodone a few hours ago for pain and was started on decadron today    On exam:  Talking but with expressive aphasia vs. delerium  A&O to self only, unable to describe where she is or why  Able to ambulate, had just ambulated to bathroom with PCA  CN 2-12 grossly intact  Negative pronator drift  Strength and sensation grossly intact in upper and lower extremities  WWP distally  Drains in place with SS output, aquacel dressing clean    A/P: 63F POD6 s/p T10-pelvis PSF with delerium vs. expressive aphasia of unknown etiology    -Work up for causes of delerium: FU CBC, CMP, VBG with lytes, UA  -CT head to rule out intracranial process  -Neurology evaluation    Patient seen and discussed with Hospitalist Dr. Yarbrough and discussed with Dr. Concepcion who both agree on the plan of care    Stevenson Torres MD Called to bedside because patient with new symptoms of agitation and confusion, patient was noted to be A&Ox4 as of 6 pm  Patient seen and examined  Patient able to talk, however the words and sentences that she was saying did not make sense and she appeared frustrated when I was unable to understand, possible expressive aphasia  Patient had issues with hallucinations earlier in her hospitalization, but those resolved after dilaudid was discontinued  She received oxycodone a few hours ago for pain and was started on decadron today    On exam:  Talking but with expressive aphasia vs. delerium  A&O to self only, unable to describe where she is or why  Able to ambulate, had just ambulated to bathroom with PCA  CN 2-12 grossly intact  Negative pronator drift  Strength and sensation grossly intact in upper and lower extremities  WWP distally  Drains in place with SS output, aquacel dressing clean    A/P: 63F POD6 s/p T10-pelvis PSF with delerium vs. expressive aphasia of unknown etiology    -Work up for causes of delerium: FU CBC, CMP, VBG with YO zaidi  -CT head to rule out intracranial process  -Neurology evaluation: discussed with neurology resident who will evaluate patient, although patient is not a candidate for any intervention at this time due to her recent surgery, will also perform CTA head and neck with the head CT    Patient seen and discussed with Hospitalist Dr. Yarbrough and discussed with Dr. Concepcion who both agree on the plan of care    Stevenson Torres MD

## 2018-12-03 NOTE — PROGRESS NOTE ADULT - PROBLEM SELECTOR PLAN 3
-recommend non-opioid analgesia in light of recent hallucinations.  -patient has oxycodone and dilaudid ordered prn but hasn't gotten any. Recommend d/c'ing them as this can worsen her hallucinations  -c/w gabapentin and tylenol prn  -would add tramadol prn

## 2018-12-03 NOTE — DISCHARGE NOTE ADULT - CARE PLAN
Goal:	pain control, ambulation and physical therapy for gait training and stabilization exercises to optimize functional capacity  Assessment and plan of treatment:	Ms. Good is a 64 y/o F PMH HTN, HLD, CAD with drug eluded stents x 2, DM, GERD, spinal stenosis, depression,  status post T10-Pelvis Posterior Spinal Decompression Fusion on 11/27.  Patient underwent an uncomplicated procedure and was admitted to the SICU post-operatively for vigilant monitoring and neurological assessment.  Patient was found to have high systolic blood pressure in the SICU and was treated with IV Labetolol and Hydralizine.  Patient was transferred to [facility] when hemodynamically stable and was initiated on PT/OT regimen.  On 12/3 the patient presented with an acute mental status change and Wernicke aphasia.  CT Angio of the Neck and Brain revealed left parietotemporal intracerebral hematoma of unknown etiology. Subsequent brain imaging revealed stable and without recurrence of hemorrhage as compared to previous diagnostic imaging.     Patient was under the care of the neurology stroke team which recommends follow-up as an out-patient for conventional angiogram and possible further studies to evaluate for possible underlying micro AVM.    The patient remained neurologically intact throughout the admission and aphasia resolved.     The patient will also follow-up with Speech Therapy and PM&R as an out-patient with whom we consulted to oversee the care of the patient.    The patient has been medically cleared for discharge and will benefit from continued care and PT regimen to optimize overall functional status with gait training, conditioning and stabilization exercises.    The patient is to discontinue Plavix and she may take daily Aspirin 81mg - as per cardiologist Dr. Esteban. Principal Discharge DX:	Spinal stenosis, unspecified spinal region  Goal:	pain control, ambulation and physical therapy for gait training and stabilization exercises to optimize functional capacity  Assessment and plan of treatment:	Ms. Good is a 64 y/o F PMH HTN, HLD, CAD with drug eluded stents x 2, DM, GERD, spinal stenosis, depression,  status post T10-Pelvis Posterior Spinal Decompression Fusion on 11/27.  Patient underwent an uncomplicated procedure and was admitted to the SICU post-operatively for vigilant monitoring and neurological assessment.  Patient was found to have high systolic blood pressure in the SICU and was treated with IV Labetolol and Hydralizine.  Patient was transferred to [facility] when hemodynamically stable and was initiated on PT/OT regimen.  On 12/3 the patient presented with an acute mental status change and Wernicke aphasia.  CT Angio of the Neck and Brain revealed left parietotemporal intracerebral hematoma of unknown etiology. Subsequent brain imaging revealed stable and without recurrence of hemorrhage as compared to previous diagnostic imaging.     Patient was under the care of the neurology stroke team which recommends follow-up as an out-patient for conventional angiogram and possible further studies to evaluate for possible underlying micro AVM.    The patient remained neurologically intact throughout the admission and aphasia resolved.     The patient will also follow-up with Speech Therapy and PM&R as an out-patient with whom we consulted to oversee the care of the patient.    The patient has been medically cleared for discharge and will benefit from continued care and PT regimen to optimize overall functional status with gait training, conditioning and stabilization exercises.    The patient is to discontinue Plavix and she may take daily Aspirin 81mg - as per cardiologist Dr. Esteban.

## 2018-12-03 NOTE — DISCHARGE NOTE ADULT - NS AS DC STROKE ED MATERIALS
Prescribed Medications/Need for Followup After Discharge/Stroke Education Booklet/Call 911 for Stroke/Stroke Warning Signs and Symptoms/Risk Factors for Stroke

## 2018-12-03 NOTE — DISCHARGE NOTE ADULT - CARE PROVIDERS DIRECT ADDRESSES
,[email or web address],DirectAddress_Unknown

## 2018-12-03 NOTE — DISCHARGE NOTE ADULT - MEDICATION SUMMARY - MEDICATIONS TO STOP TAKING
I will STOP taking the medications listed below when I get home from the hospital:    clopidogrel 75 mg oral tablet  -- 1 tab(s) by mouth once a day AM

## 2018-12-03 NOTE — PROGRESS NOTE ADULT - SUBJECTIVE AND OBJECTIVE BOX
CHIEF COMPLAINT: f/u spine surgery    SUBJECTIVE / OVERNIGHT EVENTS: Patient seen and examined. No acute events overnight. Pain well controlled and patient without any complaints. However per orthopedic, pt has not been out of bed frequently and is c/o pain in both thigh anteriorly. Patient also with + flatus but no bowel movements yet.    MEDICATIONS  (STANDING):  acetaminophen   Tablet .. 650 milliGRAM(s) Oral every 6 hours  aspirin enteric coated 81 milliGRAM(s) Oral daily  atorvastatin 80 milliGRAM(s) Oral at bedtime  carvedilol 25 milliGRAM(s) Oral every 12 hours  dextrose 5%. 1000 milliLiter(s) (50 mL/Hr) IV Continuous <Continuous>  dextrose 50% Injectable 12.5 Gram(s) IV Push once  dextrose 50% Injectable 25 Gram(s) IV Push once  dextrose 50% Injectable 25 Gram(s) IV Push once  docusate sodium 100 milliGRAM(s) Oral three times a day  DULoxetine 90 milliGRAM(s) Oral daily  gabapentin 600 milliGRAM(s) Oral four times a day  heparin  Injectable 5000 Unit(s) SubCutaneous every 8 hours  influenza   Vaccine 0.5 milliLiter(s) IntraMuscular once  insulin lispro (HumaLOG) corrective regimen sliding scale   SubCutaneous three times a day before meals  insulin lispro (HumaLOG) corrective regimen sliding scale   SubCutaneous at bedtime  losartan 50 milliGRAM(s) Oral daily  pantoprazole    Tablet 40 milliGRAM(s) Oral before breakfast  polyethylene glycol 3350 17 Gram(s) Oral at bedtime  senna 2 Tablet(s) Oral at bedtime    MEDICATIONS  (PRN):  ALPRAZolam 0.25 milliGRAM(s) Oral every 6 hours PRN severe agitation or anxiety  bisacodyl 5 milliGRAM(s) Oral every 12 hours PRN Constipation  dextrose 40% Gel 15 Gram(s) Oral once PRN Blood Glucose LESS THAN 70 milliGRAM(s)/deciliter  diphenhydrAMINE 25 milliGRAM(s) Oral every 4 hours PRN Rash and/or Itching  glucagon  Injectable 1 milliGRAM(s) IntraMuscular once PRN Glucose LESS THAN 70 milligrams/deciliter  HYDROmorphone  Injectable 1 milliGRAM(s) IV Push every 4 hours PRN breakthrough pain  lactulose Syrup 10 Gram(s) Oral daily PRN Constipation  ondansetron Injectable 4 milliGRAM(s) IV Push every 6 hours PRN Nausea and/or Vomiting  oxyCODONE    IR 5 milliGRAM(s) Oral every 3 hours PRN Moderate Pain (4 - 6)  oxyCODONE    IR 10 milliGRAM(s) Oral every 3 hours PRN Severe Pain (7 - 10)  tiZANidine 2 milliGRAM(s) Oral every 6 hours PRN Muscle Spasm  zolpidem 5 milliGRAM(s) Oral at bedtime PRN Insomnia      VITALS:  T(F): 98.3 (12-03-18 @ 08:48), Max: 98.4 (12-02-18 @ 16:44)  HR: 64 (12-03-18 @ 08:48) (62 - 72)  BP: 172/67 (12-03-18 @ 08:48) (126/58 - 178/71)  RR: 17 (12-03-18 @ 08:48) (16 - 18)  SpO2: 98% (12-03-18 @ 08:48)    PHYSICAL EXAM:  GENERAL: NAD, well-developed  HEAD:  Atraumatic, Normocephalic  EYES: EOMI, PERRLA, conjunctiva and sclera clear  NECK: Supple, No JVD  CHEST/LUNG: Clear to auscultation bilaterally; No wheeze  HEART: Regular rate and rhythm; No murmurs, rubs, or gallops  ABDOMEN: Soft, Nontender, Nondistended; Bowel sounds present  EXTREMITIES:  2+ Peripheral Pulses, No clubbing, cyanosis, or edema  PSYCH: AAOx3  NEUROLOGY: non-focal  SKIN: HV in place in back; dressing C/D/I    LABS:              9.5                  138  | 24   | 11           7.04  >-----------< 259     ------------------------< 142                   29.4                 3.8  | 101  | 0.84                                         Ca 8.4   Mg x     Ph x        H/H trend  12-03 @ 07:17   HgB  9.5   HCT  29.4  12-02 @ 06:41   HgB  9.8   HCT  30.2  12-01 @ 10:05   HgB  8.1   HCT  25.4  11-30 @ 05:36   HgB  8.4   HCT  26.6  11-29 @ 02:50   HgB  8.6   HCT  26.5    [ ] Consultant(s) Notes Reviewed:  [x] Care Discussed with Consultants/Other Providers: Orthopedic PA - discussed management of constipation and pain

## 2018-12-03 NOTE — DISCHARGE NOTE ADULT - PLAN OF CARE
pain control, ambulation and physical therapy for gait training and stabilization exercises to optimize functional capacity Ms. Good is a 64 y/o F PMH HTN, HLD, CAD with drug eluded stents x 2, DM, GERD, spinal stenosis, depression,  status post T10-Pelvis Posterior Spinal Decompression Fusion on 11/27.  Patient underwent an uncomplicated procedure and was admitted to the SICU post-operatively for vigilant monitoring and neurological assessment.  Patient was found to have high systolic blood pressure in the SICU and was treated with IV Labetolol and Hydralizine.  Patient was transferred to [facility] when hemodynamically stable and was initiated on PT/OT regimen.  On 12/3 the patient presented with an acute mental status change and Wernicke aphasia.  CT Angio of the Neck and Brain revealed left parietotemporal intracerebral hematoma of unknown etiology. Subsequent brain imaging revealed stable and without recurrence of hemorrhage as compared to previous diagnostic imaging.     Patient was under the care of the neurology stroke team which recommends follow-up as an out-patient for conventional angiogram and possible further studies to evaluate for possible underlying micro AVM.    The patient remained neurologically intact throughout the admission and aphasia resolved.     The patient will also follow-up with Speech Therapy and PM&R as an out-patient with whom we consulted to oversee the care of the patient.    The patient has been medically cleared for discharge and will benefit from continued care and PT regimen to optimize overall functional status with gait training, conditioning and stabilization exercises.    The patient is to discontinue Plavix and she may take daily Aspirin 81mg - as per cardiologist Dr. Esteban.

## 2018-12-03 NOTE — DISCHARGE NOTE ADULT - NS AS ACTIVITY OBS
Walking-Indoors allowed/Walking-Outdoors allowed/No Heavy lifting/straining/Do not make important decisions/Stairs allowed/Do not drive or operate machinery/Showering allowed

## 2018-12-03 NOTE — DISCHARGE NOTE ADULT - PATIENT PORTAL LINK FT
You can access the [facility] Patient Portal, offered by [facility], by registering with the following website: [email or web address]

## 2018-12-03 NOTE — CONSULT NOTE ADULT - ATTENDING COMMENTS
63-year-old right-handed white lady first evaluated at [facility] on 12/4/18 with aphasia. She was admitted on 11/27/18 and underwent a T10 laminectomy and fusion. Postoperatively, she had some kind of change in her mental status, which improved after Dilaudid was discontinued. On 12/3/18, she had a recurrent change in mental status, speaking "nonsense". ROS otherwise negative. Exam. Alert and attentive; speech fluent, prosodic, with multiple semantic paraphasias, neologisms and perseveration; followed very few one-step commands; repetition impaired; naming impaired, with verbal paraphasias; moves arms well versus gravity; both legs with slight effort versus gravity (recent spine surgery); remainder of neurologic exam was nonfocal. CT head (12/3/18) to my eye showed a left temporal lobar hemorrhage, about 2.6 cm in greatest diameter. Impression. She was admitted on 11/27/18 and underwent a T10 laminectomy and fusion. Postoperatively, she had some kind of change in mental status which improved when Dilaudid was discontinued. On 12/3/18 she developed acute Wernicke's aphasia due to a left temporal lobar hemorrhage of uncertain cause. Differential diagnosis includes hemorrhagic transformation of infarction, a vascular malformation or less likely tumor and venous sinus thrombosis. Suggest. MRI brain with contrast/MRA neck and head/MRV head with contrast if feasible and if she's stable, versus repeat CT head with contrast/CTA neck and head/CT venogram of head; keep blood pressure less than 160/90; further management will be guided by results of her imaging; there may be a role for a conventional cerebral angiogram; PT/OT/speech therapy; PM&R consultation.

## 2018-12-03 NOTE — DISCHARGE NOTE ADULT - ADDITIONAL INSTRUCTIONS
No repetitive bending, lifting or twisting.  No lifting, pushing, pulling objects greater than 10 lbs.
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
## 2018-12-03 NOTE — DISCHARGE NOTE ADULT - INSTRUCTIONS
Make a follow up appointment with Dr. Concepcion. Call MD if you develop a fever, or if there is redness, swelling, drainage or pain not relieved by pain medication. No heavy lifting, bending, or straining to move your bowels. Take over the counter stool softeners as needed to prevent constipation which may be caused by pain medication. Your A1C=7.5. Follow up with your primary physician/ endocrinologist regarding diabetes management Patient may resume regular diet

## 2018-12-04 DIAGNOSIS — S06.350A TRAUMATIC HEMORRHAGE OF LEFT CEREBRUM WITHOUT LOSS OF CONSCIOUSNESS, INITIAL ENCOUNTER: ICD-10-CM

## 2018-12-04 LAB
BUN SERPL-MCNC: 15 MG/DL — SIGNIFICANT CHANGE UP (ref 7–23)
CALCIUM SERPL-MCNC: 8.8 MG/DL — SIGNIFICANT CHANGE UP (ref 8.4–10.5)
CHLORIDE SERPL-SCNC: 96 MMOL/L — LOW (ref 98–107)
CO2 SERPL-SCNC: 22 MMOL/L — SIGNIFICANT CHANGE UP (ref 22–31)
CREAT SERPL-MCNC: 0.86 MG/DL — SIGNIFICANT CHANGE UP (ref 0.5–1.3)
GLUCOSE BLDC GLUCOMTR-MCNC: 151 MG/DL — HIGH (ref 70–99)
GLUCOSE BLDC GLUCOMTR-MCNC: 158 MG/DL — HIGH (ref 70–99)
GLUCOSE BLDC GLUCOMTR-MCNC: 165 MG/DL — HIGH (ref 70–99)
GLUCOSE BLDC GLUCOMTR-MCNC: 186 MG/DL — HIGH (ref 70–99)
GLUCOSE SERPL-MCNC: 178 MG/DL — HIGH (ref 70–99)
HCT VFR BLD CALC: 33.4 % — LOW (ref 34.5–45)
HGB BLD-MCNC: 10.9 G/DL — LOW (ref 11.5–15.5)
MAGNESIUM SERPL-MCNC: 2 MG/DL — SIGNIFICANT CHANGE UP (ref 1.6–2.6)
MCHC RBC-ENTMCNC: 28.1 PG — SIGNIFICANT CHANGE UP (ref 27–34)
MCHC RBC-ENTMCNC: 32.6 % — SIGNIFICANT CHANGE UP (ref 32–36)
MCV RBC AUTO: 86.1 FL — SIGNIFICANT CHANGE UP (ref 80–100)
NRBC # FLD: 0 — SIGNIFICANT CHANGE UP
PHOSPHATE SERPL-MCNC: 5 MG/DL — HIGH (ref 2.5–4.5)
PLATELET # BLD AUTO: 297 K/UL — SIGNIFICANT CHANGE UP (ref 150–400)
PMV BLD: 8.7 FL — SIGNIFICANT CHANGE UP (ref 7–13)
POTASSIUM SERPL-MCNC: 4.1 MMOL/L — SIGNIFICANT CHANGE UP (ref 3.5–5.3)
POTASSIUM SERPL-SCNC: 4.1 MMOL/L — SIGNIFICANT CHANGE UP (ref 3.5–5.3)
RBC # BLD: 3.88 M/UL — SIGNIFICANT CHANGE UP (ref 3.8–5.2)
RBC # FLD: 14.7 % — HIGH (ref 10.3–14.5)
SODIUM SERPL-SCNC: 136 MMOL/L — SIGNIFICANT CHANGE UP (ref 135–145)
WBC # BLD: 8.07 K/UL — SIGNIFICANT CHANGE UP (ref 3.8–10.5)
WBC # FLD AUTO: 8.07 K/UL — SIGNIFICANT CHANGE UP (ref 3.8–10.5)

## 2018-12-04 PROCEDURE — 99223 1ST HOSP IP/OBS HIGH 75: CPT

## 2018-12-04 PROCEDURE — 99252 IP/OBS CONSLTJ NEW/EST SF 35: CPT

## 2018-12-04 PROCEDURE — 70496 CT ANGIOGRAPHY HEAD: CPT | Mod: 26

## 2018-12-04 PROCEDURE — 70450 CT HEAD/BRAIN W/O DYE: CPT | Mod: 26

## 2018-12-04 RX ORDER — ACETAMINOPHEN 500 MG
650 TABLET ORAL EVERY 6 HOURS
Qty: 0 | Refills: 0 | Status: DISCONTINUED | OUTPATIENT
Start: 2018-12-04 | End: 2018-12-12

## 2018-12-04 RX ORDER — HYDRALAZINE HCL 50 MG
10 TABLET ORAL
Qty: 0 | Refills: 0 | Status: DISCONTINUED | OUTPATIENT
Start: 2018-12-04 | End: 2018-12-07

## 2018-12-04 RX ORDER — LABETALOL HCL 100 MG
10 TABLET ORAL
Qty: 0 | Refills: 0 | Status: DISCONTINUED | OUTPATIENT
Start: 2018-12-04 | End: 2018-12-04

## 2018-12-04 RX ORDER — LABETALOL HCL 100 MG
10 TABLET ORAL
Qty: 0 | Refills: 0 | Status: DISCONTINUED | OUTPATIENT
Start: 2018-12-04 | End: 2018-12-07

## 2018-12-04 RX ORDER — SODIUM CHLORIDE 9 MG/ML
1000 INJECTION INTRAMUSCULAR; INTRAVENOUS; SUBCUTANEOUS
Qty: 0 | Refills: 0 | Status: DISCONTINUED | OUTPATIENT
Start: 2018-12-04 | End: 2018-12-07

## 2018-12-04 RX ORDER — LABETALOL HCL 100 MG
10 TABLET ORAL
Qty: 0 | Refills: 0 | Status: COMPLETED | OUTPATIENT
Start: 2018-12-04 | End: 2018-12-04

## 2018-12-04 RX ADMIN — GABAPENTIN 600 MILLIGRAM(S): 400 CAPSULE ORAL at 23:41

## 2018-12-04 RX ADMIN — Medication 102 MILLIGRAM(S): at 06:40

## 2018-12-04 RX ADMIN — Medication 10 MILLIGRAM(S): at 23:46

## 2018-12-04 RX ADMIN — DULOXETINE HYDROCHLORIDE 90 MILLIGRAM(S): 30 CAPSULE, DELAYED RELEASE ORAL at 11:46

## 2018-12-04 RX ADMIN — Medication 650 MILLIGRAM(S): at 00:29

## 2018-12-04 RX ADMIN — Medication 100 MILLIGRAM(S): at 05:07

## 2018-12-04 RX ADMIN — TIZANIDINE 2 MILLIGRAM(S): 4 TABLET ORAL at 00:00

## 2018-12-04 RX ADMIN — GABAPENTIN 600 MILLIGRAM(S): 400 CAPSULE ORAL at 17:09

## 2018-12-04 RX ADMIN — GABAPENTIN 600 MILLIGRAM(S): 400 CAPSULE ORAL at 11:46

## 2018-12-04 RX ADMIN — PANTOPRAZOLE SODIUM 40 MILLIGRAM(S): 20 TABLET, DELAYED RELEASE ORAL at 06:32

## 2018-12-04 RX ADMIN — Medication 10 MILLIGRAM(S): at 22:07

## 2018-12-04 RX ADMIN — Medication 102 MILLIGRAM(S): at 15:00

## 2018-12-04 RX ADMIN — Medication 1: at 06:45

## 2018-12-04 RX ADMIN — Medication 650 MILLIGRAM(S): at 22:09

## 2018-12-04 RX ADMIN — Medication 102 MILLIGRAM(S): at 17:10

## 2018-12-04 RX ADMIN — Medication 1: at 11:37

## 2018-12-04 RX ADMIN — SODIUM CHLORIDE 50 MILLILITER(S): 9 INJECTION INTRAMUSCULAR; INTRAVENOUS; SUBCUTANEOUS at 21:47

## 2018-12-04 RX ADMIN — Medication 10 MILLIGRAM(S): at 20:07

## 2018-12-04 RX ADMIN — Medication 100 MILLIGRAM(S): at 15:19

## 2018-12-04 RX ADMIN — SENNA PLUS 2 TABLET(S): 8.6 TABLET ORAL at 21:39

## 2018-12-04 RX ADMIN — GABAPENTIN 600 MILLIGRAM(S): 400 CAPSULE ORAL at 05:07

## 2018-12-04 RX ADMIN — Medication 650 MILLIGRAM(S): at 21:39

## 2018-12-04 RX ADMIN — LOSARTAN POTASSIUM 50 MILLIGRAM(S): 100 TABLET, FILM COATED ORAL at 05:07

## 2018-12-04 RX ADMIN — Medication 10 MILLIGRAM(S): at 06:39

## 2018-12-04 RX ADMIN — CARVEDILOL PHOSPHATE 25 MILLIGRAM(S): 80 CAPSULE, EXTENDED RELEASE ORAL at 21:39

## 2018-12-04 RX ADMIN — Medication 100 MILLIGRAM(S): at 21:39

## 2018-12-04 RX ADMIN — Medication 1: at 17:20

## 2018-12-04 RX ADMIN — Medication 10 MILLIGRAM(S): at 08:00

## 2018-12-04 RX ADMIN — CARVEDILOL PHOSPHATE 25 MILLIGRAM(S): 80 CAPSULE, EXTENDED RELEASE ORAL at 10:14

## 2018-12-04 RX ADMIN — Medication 102 MILLIGRAM(S): at 00:55
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
## 2018-12-04 NOTE — PROGRESS NOTE ADULT - SUBJECTIVE AND OBJECTIVE BOX
DEMI ARCHIBALD   [id number]    Patient with neurologic deficit overnight, in ICU for stroke management.     T(C): 36.2 (12-04-18 @ 08:00), Max: 36.9 (12-03-18 @ 17:07)  HR: 67 (12-04-18 @ 11:00) (60 - 77)  BP: 167/61 (12-04-18 @ 11:00) (128/92 - 201/77)  RR: 19 (12-04-18 @ 11:00) (16 - 21)  SpO2: 95% (12-04-18 @ 11:00) (94% - 100%)  Wt(kg): --  NAD  Back: Dressing clean/dry/adherent.  Soft.  No collection.  Drains in situ.  BLE: No calf tenderness.      12-03 @ 07:01  -  12-04 @ 07:00  --------------------------------------------------------  IN: 150 mL / OUT: 407.5 mL / NET: -257.5 mL    12-04 @ 07:01  -  12-04 @ 12:29  --------------------------------------------------------  IN: 0 mL / OUT: 400 mL / NET: -400 mL      Hemovac:  TALHA:   acetaminophen   Tablet .. 650 milliGRAM(s) Oral every 6 hours PRN  bisacodyl 5 milliGRAM(s) Oral every 12 hours PRN  carvedilol 25 milliGRAM(s) Oral every 12 hours  dexamethasone  IVPB 10 milliGRAM(s) IV Intermittent every 6 hours  dextrose 40% Gel 15 Gram(s) Oral once PRN  dextrose 5%. 1000 milliLiter(s) IV Continuous <Continuous>  dextrose 50% Injectable 12.5 Gram(s) IV Push once  dextrose 50% Injectable 25 Gram(s) IV Push once  dextrose 50% Injectable 25 Gram(s) IV Push once  diphenhydrAMINE 25 milliGRAM(s) Oral every 4 hours PRN  docusate sodium 100 milliGRAM(s) Oral three times a day  DULoxetine 90 milliGRAM(s) Oral daily  gabapentin 600 milliGRAM(s) Oral four times a day  glucagon  Injectable 1 milliGRAM(s) IntraMuscular once PRN  influenza   Vaccine 0.5 milliLiter(s) IntraMuscular once  insulin lispro (HumaLOG) corrective regimen sliding scale   SubCutaneous three times a day before meals  insulin lispro (HumaLOG) corrective regimen sliding scale   SubCutaneous at bedtime  labetalol Injectable 10 milliGRAM(s) IV Push every 15 minutes PRN  lactulose Syrup 10 Gram(s) Oral daily PRN  losartan 50 milliGRAM(s) Oral daily  ondansetron Injectable 4 milliGRAM(s) IV Push every 6 hours PRN  pantoprazole    Tablet 40 milliGRAM(s) Oral before breakfast  polyethylene glycol 3350 17 Gram(s) Oral at bedtime  senna 2 Tablet(s) Oral at bedtime  tiZANidine 2 milliGRAM(s) Oral every 6 hours PRN                            10.9   8.07  )-----------( 297      ( 04 Dec 2018 04:50 )             33.4     12-04    136  |  96<L>  |  15  ----------------------------<  178<H>  4.1   |  22  |  0.86    Ca    8.8      04 Dec 2018 04:50  Phos  5.0     12-04  Mg     2.0     12-04    TPro  6.9  /  Alb  3.4  /  TBili  0.4  /  DBili  x   /  AST  57<H>  /  ALT  19  /  AlkPhos  100  12-03

## 2018-12-04 NOTE — PROGRESS NOTE ADULT - ASSESSMENT
63y Female s/p L2-5 laminectomy, T10-pelvis posterior fusion on 11/27 now with acute mental status change 12/3. CTH revealed left parietotemporal intracerebral hematoma.    PLAN:   Neurologic:   - c/w decadron, ambien PRN, cymbalta, xanax PRN  Neurosurgery recommendations  -Q1 neuro checks, vital signs  - Maintain SBP < 140  - Keep serum Na > 140  - Interval CT 6 hours from initial CT (ordered for AM)  - Brain MRI with and without contrast, MRA (Ordered for AM)  - Follow up with stroke neurology  - No need for surgical intervention    Respiratory:   - incentive spirometry    Cardiovascular:   - c/w coreg    Gastrointestinal/Nutrition:   - c/w dulcolax, colace, senna, miralax, protonix    Hematologic:   -f/u stat labs, DVT venodynes , stopped heparin subQ    Endocrine:   - c/w gabapentin    Disposition: Transfer care to SICU    --------------------------------------------------------------------------------------    Critical Care Diagnoses: 63y Female s/p L2-5 laminectomy, T10-pelvis posterior fusion on 11/27 now with acute mental status change 12/3. CTH revealed left parietotemporal intracerebral hematoma.    PLAN:   Neurologic:   - c/w decadron  - ambien PRN, cymbalta, xanax PRN  - Q1hr neuro checks - expressive aphasia but o/w exam WNL  - Maintain SBP < 140  - Keep serum Na > 140  - Interval CT 6 hours from initial CT - stable  - CT venogram, Brain MRI with and without contrast, MRA all ordered  - No surgical intervention per neurosurgery  - cont to appreciate neurosurg, stroke neuro, ortho & plastics rec's    Respiratory:   - incentive spirometry  - satting well on RA    Cardiovascular:   - c/w coreg, losartan  - Keep SBP < 140    Gastrointestinal/Nutrition:   - c/w dulcolax, colace, senna, miralax, protonix    Renal  - Monitor BUN/Cr, Electrolytes  - Goal Na > 140    Hematologic:   - trend H/H  - hold chemical DVT ppx in setting of ICH, SCDs for now    Endocrine: DM2  - monitor FG  - SSI    Disposition: Transfer care to SICU    --------------------------------------------------------------------------------------    Critical Care Diagnoses:

## 2018-12-04 NOTE — PROGRESS NOTE ADULT - SUBJECTIVE AND OBJECTIVE BOX
ICH overnight. Patient was transferred to SICU secondary to expressive aphasia noticed by the floor nurse at 8 PM.  Patient was seen by Neurology due to IPH and recommended MRI and follow up with serial CT scan  Currently has no neurologic deficits but unable to express or speak.      Vital Signs Last 24 Hrs  T(C): 36.2 (04 Dec 2018 04:30), Max: 36.9 (03 Dec 2018 17:07)  T(F): 97.2 (04 Dec 2018 04:30), Max: 98.4 (03 Dec 2018 17:07)  HR: 60 (04 Dec 2018 05:00) (60 - 77)  BP: 149/64 (04 Dec 2018 05:00) (128/92 - 201/77)  BP(mean): 87 (04 Dec 2018 05:00) (87 - 99)  RR: 16 (04 Dec 2018 05:00) (16 - 20)  SpO2: 96% (04 Dec 2018 05:00) (96% - 100%)                                     10.9   8.07  )-----------( 297      ( 04 Dec 2018 04:50 )             33.4           Exam:  Gen: NAD, unable to verbally express   Motor: 5/5 EHL/FHL/TA/Gastrocnemius  Sensory: SILT DP/SP/S/S/T nerve distributions  Dressing: Clean, Dry, Intact    A/P: 63 year old female s/p T10-Pelvis PSF complicated by Frontoparietal IPH  - Neurology monitoring  - Regular Diet  - PT/OT, OOB  - Monitor HV per Plastics for removal  - Continue ICU monitoring      Modesto Griffin  PGY5

## 2018-12-04 NOTE — CHART NOTE - NSCHARTNOTEFT_GEN_A_CORE
CT/CTA head shows 2.5x1.6cm ICH in L parietotemporal region.  Re-evaluated pt.  No gross neurologic changes, but pt becomes angry with examiner with questions or attempt at neuro examination shouting "I said good night."    Recommend ICU for more frequent neuro checks, and neurosurgery eval.  Agree with d/c of heparin ppx and ASA.  Continue carvedilol.  Frequent BP monitoring with goal SBP around 140.

## 2018-12-04 NOTE — PROGRESS NOTE ADULT - ASSESSMENT
A/P: S/P posterior spine fusion with muscle flap reconstruction.  - ICU care  - Pain control  - Drain Monitoring  - IV Abx  - DVT PPx: SCD, chemoprophylaxis as per spine service  - Will Follow    Thank You  Rehtt Mendieta MD  Plastic Surgery  [phone]

## 2018-12-04 NOTE — DIETITIAN INITIAL EVALUATION ADULT. - OTHER INFO
Pt seen for critical care nutrition LOS.  Pt admitted for spinal stenosis of lumbosacral region.  S/P surgery for laminectomy, pelvis posterior spinal fusion.  Pt now with ICH, confusion, agitation.  Nutrition hx obtained from daughter.  She states mom did not follow a therapeutic diet PTA, pt w/o food allergies,  difficulties chewing/swallowing or recent wt change.  She was eating well prior to current event on regular diet.

## 2018-12-04 NOTE — CONSULT NOTE ADULT - SUBJECTIVE AND OBJECTIVE BOX
65 YO female with Hx significant for HTN, DM, CAD s/p stents, s/p L2-5 laminectomy, T10-pelvis posterior fusion developed acute onset of agitation and word finding difficulty. Patient taken for head CT which showed a left temporoparietal IPH    WDWN female, c/o mild headache  Vital Signs Last 24 Hrs  T(C): 36.4 (03 Dec 2018 23:44), Max: 36.9 (03 Dec 2018 17:07)  T(F): 97.6 (03 Dec 2018 23:44), Max: 98.4 (03 Dec 2018 17:07)  HR: 73 (03 Dec 2018 23:44) (62 - 77)  BP: 179/72 (03 Dec 2018 23:44) (132/93 - 201/77)  BP(mean): --  RR: 17 (03 Dec 2018 23:44) (16 - 17)  SpO2: 100% (03 Dec 2018 23:44) (97% - 100%)    Awake, alert  Mixed aphasia with word finding difficulty and unable to follow complex commands  Oriented to self  PERRLA, EOMI  CN 2-12 grossly intact  LEMUS strength 5/5    < from: CT Angio Head w/ IV Cont (12.03.18 @ 23:25) >  INTERPRETATION:  2.5 x 1.6 cm acute intracerebral hematoma within the   left parietotemporal region with surrounding edema.    < end of copied text >

## 2018-12-04 NOTE — CONSULT NOTE ADULT - PROBLEM SELECTOR RECOMMENDATION 9
Transfer patient to monitored setting for neurologic checks and vital signs Q1H  Maintain SBP < 140  Keep serum Na > 140  Interval CT 6 hours from initial CT  Brain MRI with and without contrast, MRA  Follow up with stroke neurology  No need for surgical intervention, will follow with you

## 2018-12-05 LAB
BUN SERPL-MCNC: 20 MG/DL — SIGNIFICANT CHANGE UP (ref 7–23)
CALCIUM SERPL-MCNC: 8.5 MG/DL — SIGNIFICANT CHANGE UP (ref 8.4–10.5)
CHLORIDE SERPL-SCNC: 101 MMOL/L — SIGNIFICANT CHANGE UP (ref 98–107)
CO2 SERPL-SCNC: 20 MMOL/L — LOW (ref 22–31)
CREAT SERPL-MCNC: 0.88 MG/DL — SIGNIFICANT CHANGE UP (ref 0.5–1.3)
GLUCOSE BLDC GLUCOMTR-MCNC: 171 MG/DL — HIGH (ref 70–99)
GLUCOSE BLDC GLUCOMTR-MCNC: 186 MG/DL — HIGH (ref 70–99)
GLUCOSE BLDC GLUCOMTR-MCNC: 186 MG/DL — HIGH (ref 70–99)
GLUCOSE BLDC GLUCOMTR-MCNC: 196 MG/DL — HIGH (ref 70–99)
GLUCOSE BLDC GLUCOMTR-MCNC: 204 MG/DL — HIGH (ref 70–99)
GLUCOSE SERPL-MCNC: 169 MG/DL — HIGH (ref 70–99)
HCT VFR BLD CALC: 30.6 % — LOW (ref 34.5–45)
HGB BLD-MCNC: 10 G/DL — LOW (ref 11.5–15.5)
MAGNESIUM SERPL-MCNC: 2.1 MG/DL — SIGNIFICANT CHANGE UP (ref 1.6–2.6)
MCHC RBC-ENTMCNC: 27.9 PG — SIGNIFICANT CHANGE UP (ref 27–34)
MCHC RBC-ENTMCNC: 32.7 % — SIGNIFICANT CHANGE UP (ref 32–36)
MCV RBC AUTO: 85.5 FL — SIGNIFICANT CHANGE UP (ref 80–100)
NRBC # FLD: 0 — SIGNIFICANT CHANGE UP
PHOSPHATE SERPL-MCNC: 4 MG/DL — SIGNIFICANT CHANGE UP (ref 2.5–4.5)
PLATELET # BLD AUTO: 327 K/UL — SIGNIFICANT CHANGE UP (ref 150–400)
PMV BLD: 8.7 FL — SIGNIFICANT CHANGE UP (ref 7–13)
POTASSIUM SERPL-MCNC: 4 MMOL/L — SIGNIFICANT CHANGE UP (ref 3.5–5.3)
POTASSIUM SERPL-SCNC: 4 MMOL/L — SIGNIFICANT CHANGE UP (ref 3.5–5.3)
RBC # BLD: 3.58 M/UL — LOW (ref 3.8–5.2)
RBC # FLD: 14.9 % — HIGH (ref 10.3–14.5)
SODIUM SERPL-SCNC: 138 MMOL/L — SIGNIFICANT CHANGE UP (ref 135–145)
WBC # BLD: 11.62 K/UL — HIGH (ref 3.8–10.5)
WBC # FLD AUTO: 11.62 K/UL — HIGH (ref 3.8–10.5)

## 2018-12-05 PROCEDURE — 99233 SBSQ HOSP IP/OBS HIGH 50: CPT

## 2018-12-05 PROCEDURE — 70450 CT HEAD/BRAIN W/O DYE: CPT | Mod: 26

## 2018-12-05 RX ORDER — HYDRALAZINE HCL 50 MG
10 TABLET ORAL ONCE
Qty: 0 | Refills: 0 | Status: COMPLETED | OUTPATIENT
Start: 2018-12-05 | End: 2018-12-05

## 2018-12-05 RX ORDER — LOSARTAN POTASSIUM 100 MG/1
100 TABLET, FILM COATED ORAL DAILY
Qty: 0 | Refills: 0 | Status: DISCONTINUED | OUTPATIENT
Start: 2018-12-05 | End: 2018-12-12

## 2018-12-05 RX ADMIN — Medication 102 MILLIGRAM(S): at 01:44

## 2018-12-05 RX ADMIN — DULOXETINE HYDROCHLORIDE 90 MILLIGRAM(S): 30 CAPSULE, DELAYED RELEASE ORAL at 12:51

## 2018-12-05 RX ADMIN — Medication 1: at 09:24

## 2018-12-05 RX ADMIN — GABAPENTIN 600 MILLIGRAM(S): 400 CAPSULE ORAL at 18:59

## 2018-12-05 RX ADMIN — CARVEDILOL PHOSPHATE 25 MILLIGRAM(S): 80 CAPSULE, EXTENDED RELEASE ORAL at 09:34

## 2018-12-05 RX ADMIN — Medication 10 MILLIGRAM(S): at 20:17

## 2018-12-05 RX ADMIN — Medication 2: at 17:03

## 2018-12-05 RX ADMIN — CARVEDILOL PHOSPHATE 25 MILLIGRAM(S): 80 CAPSULE, EXTENDED RELEASE ORAL at 22:40

## 2018-12-05 RX ADMIN — GABAPENTIN 600 MILLIGRAM(S): 400 CAPSULE ORAL at 12:50

## 2018-12-05 RX ADMIN — LOSARTAN POTASSIUM 100 MILLIGRAM(S): 100 TABLET, FILM COATED ORAL at 05:37

## 2018-12-05 RX ADMIN — GABAPENTIN 600 MILLIGRAM(S): 400 CAPSULE ORAL at 05:39

## 2018-12-05 RX ADMIN — Medication 100 MILLIGRAM(S): at 05:37

## 2018-12-05 RX ADMIN — Medication 10 MILLIGRAM(S): at 04:14

## 2018-12-05 RX ADMIN — Medication 1: at 12:59

## 2018-12-05 RX ADMIN — PANTOPRAZOLE SODIUM 40 MILLIGRAM(S): 20 TABLET, DELAYED RELEASE ORAL at 06:18

## 2018-12-05 RX ADMIN — Medication 100 MILLIGRAM(S): at 14:28

## 2018-12-05 RX ADMIN — Medication 102 MILLIGRAM(S): at 14:27

## 2018-12-05 RX ADMIN — Medication 102 MILLIGRAM(S): at 06:15

## 2018-12-05 RX ADMIN — Medication 102 MILLIGRAM(S): at 20:17

## 2018-12-05 NOTE — PROGRESS NOTE ADULT - ASSESSMENT
Impression: She was admitted on 11/27/18 and underwent a T10 laminectomy and fusion. Postoperatively, she had some kind of change in mental status which improved when Dilaudid was discontinued. On 12/3/18 she developed acute Wernicke's aphasia due to a left temporal lobar hemorrhage of uncertain cause. Differential diagnosis includes hemorrhagic transformation of infarction, a vascular malformation or less likely tumor. CTV negative for venous thrombosis.    Plan: Impression: She was admitted on 11/27/18 and underwent a T10 laminectomy and fusion. Postoperatively, she had some kind of change in mental status which improved when Dilaudid was discontinued. On 12/3/18 she developed acute Wernicke's aphasia due to a left temporal lobar hemorrhage of uncertain cause. Differential diagnosis includes hemorrhagic transformation of infarction, a vascular malformation or less likely tumor. CTV negative for venous thrombosis. Though CTA head was negative for an AVM, this does not exclude the possibility of a micro AVM. May require conventional cerebral angiogram in the near future.    Plan:  -MRI brain with contrast  -keep blood pressure less than 160/90  -PT/OT/speech therapy  -PM&R consultation    -will need to follow up after discharge with stroke neurology as well as neurointerventionalist. may need conventional angiogram in 2 months to assess for micro-AVM as cause for bleed  -repeat CTH 12/5 in the evening. if CTH is stable, patient should be started on DVT ppx Impression: She was admitted on 11/27/18 and underwent a T10 laminectomy and fusion. Postoperatively, she had some kind of change in mental status which improved when Dilaudid was discontinued. On 12/3/18 she developed acute Wernicke's aphasia due to a left temporal lobar hemorrhage of uncertain cause. Differential diagnosis includes hemorrhagic transformation of infarction, a vascular malformation or less likely tumor. CTV negative for venous thrombosis. Though CTA head was negative for an AVM, this does not exclude the possibility of a micro AVM. May require conventional cerebral angiogram in the near future.    Plan:  -MRI brain with contrast  -keep blood pressure less than 160/90  -PT/OT/speech therapy  -PM&R consultation    -will need to follow up after discharge with stroke neurology as well as neurointerventionalist. may need conventional angiogram in 2 months to assess for micro-AVM as cause for bleed  -repeat CTH 12/5 in the evening. if CTH is stable, patient can started on chemical DVT ppx

## 2018-12-05 NOTE — PROGRESS NOTE ADULT - SUBJECTIVE AND OBJECTIVE BOX
Neurology Follow up note    Patient is a 63y old  Female who presents with a chief complaint of spine surgery (04 Dec 2018 12:29)      Subjective: Interval History - speech much improved from yesterday. Transferred out of the SICU.    Objective:   Vital Signs Last 24 Hrs  T(C): 36.3 (05 Dec 2018 11:48), Max: 36.7 (04 Dec 2018 20:00)  T(F): 97.4 (05 Dec 2018 11:48), Max: 98 (04 Dec 2018 20:00)  HR: 74 (05 Dec 2018 11:48) (64 - 95)  BP: 150/65 (05 Dec 2018 11:48) (115/99 - 175/154)  BP(mean): 83 (05 Dec 2018 06:00) (73 - 159)  RR: 19 (05 Dec 2018 11:48) (17 - 27)  SpO2: 99% (05 Dec 2018 11:48) (94% - 100%)    Neurological Exam:  Mental Status: Orientated to self, date and place. able to identify thumb, and pinky. follows commands to cross midline. repetition intact. still making multiple sematic errors, much improved.     Cranial Nerves: EOMI, no blink to threat b/l. no nystagmus. No facial asymmetry.  Hearing intact to finger rub bilaterally.      strength: LE some effort against gravity, limited by back pain. no drift UE b/l.  Pronator drift: none (  Dysmeria: None to finger-nose-finger or heel-shin-heel  No truncal ataxia.    Tremor: No resting, postural or action tremor.  No myoclonus.    Sensation: intact to light touch and temperature    Deep Tendon Reflexes: 1+ bilateral biceps, triceps, brachioradialis, knee and ankle  Toes flexor bilaterally    Gait: normal and stable.      Other:    12-05    138  |  101  |  20  ----------------------------<  169<H>  4.0   |  20<L>  |  0.88    Ca    8.5      05 Dec 2018 04:20  Phos  4.0     12-05  Mg     2.1     12-05    TPro  6.9  /  Alb  3.4  /  TBili  0.4  /  DBili  x   /  AST  57<H>  /  ALT  19  /  AlkPhos  100  12-03 12-05    138  |  101  |  20  ----------------------------<  169<H>  4.0   |  20<L>  |  0.88    Ca    8.5      05 Dec 2018 04:20  Phos  4.0     12-05  Mg     2.1     12-05    TPro  6.9  /  Alb  3.4  /  TBili  0.4  /  DBili  x   /  AST  57<H>  /  ALT  19  /  AlkPhos  100  12-03    LIVER FUNCTIONS - ( 03 Dec 2018 22:15 )  Alb: 3.4 g/dL / Pro: 6.9 g/dL / ALK PHOS: 100 u/L / ALT: 19 u/L / AST: 57 u/L / GGT: x                                 10.0   11.62 )-----------( 327      ( 05 Dec 2018 04:20 )             30.6     Radiology    EKG:  tele:  TTE:  EEG:      MEDICATIONS  (STANDING):  carvedilol 25 milliGRAM(s) Oral every 12 hours  dexamethasone  IVPB 10 milliGRAM(s) IV Intermittent every 6 hours  dextrose 5%. 1000 milliLiter(s) (50 mL/Hr) IV Continuous <Continuous>  dextrose 50% Injectable 12.5 Gram(s) IV Push once  dextrose 50% Injectable 25 Gram(s) IV Push once  dextrose 50% Injectable 25 Gram(s) IV Push once  docusate sodium 100 milliGRAM(s) Oral three times a day  DULoxetine 90 milliGRAM(s) Oral daily  gabapentin 600 milliGRAM(s) Oral four times a day  influenza   Vaccine 0.5 milliLiter(s) IntraMuscular once  insulin lispro (HumaLOG) corrective regimen sliding scale   SubCutaneous three times a day before meals  insulin lispro (HumaLOG) corrective regimen sliding scale   SubCutaneous at bedtime  losartan 100 milliGRAM(s) Oral daily  pantoprazole    Tablet 40 milliGRAM(s) Oral before breakfast  polyethylene glycol 3350 17 Gram(s) Oral at bedtime  senna 2 Tablet(s) Oral at bedtime  sodium chloride 0.9%. 1000 milliLiter(s) (50 mL/Hr) IV Continuous <Continuous>    MEDICATIONS  (PRN):  acetaminophen   Tablet .. 650 milliGRAM(s) Oral every 6 hours PRN Mild Pain (1 - 3)  dextrose 40% Gel 15 Gram(s) Oral once PRN Blood Glucose LESS THAN 70 milliGRAM(s)/deciliter  diphenhydrAMINE 25 milliGRAM(s) Oral every 4 hours PRN Rash and/or Itching  glucagon  Injectable 1 milliGRAM(s) IntraMuscular once PRN Glucose LESS THAN 70 milligrams/deciliter  hydrALAZINE Injectable 10 milliGRAM(s) IV Push every 1 hour PRN SBP > 140  labetalol Injectable 10 milliGRAM(s) IV Push every 1 hour PRN Systolic blood pressure > 140  ondansetron Injectable 4 milliGRAM(s) IV Push every 6 hours PRN Nausea and/or Vomiting  tiZANidine 2 milliGRAM(s) Oral every 6 hours PRN Muscle Spasm Neurology Follow up note    Patient is a 63y old  Female who presents with a chief complaint of spine surgery (04 Dec 2018 12:29)      Subjective: Interval History - speech much improved from yesterday. Transferred out of the SICU.    Objective:   Vital Signs Last 24 Hrs  T(C): 36.3 (05 Dec 2018 11:48), Max: 36.7 (04 Dec 2018 20:00)  T(F): 97.4 (05 Dec 2018 11:48), Max: 98 (04 Dec 2018 20:00)  HR: 74 (05 Dec 2018 11:48) (64 - 95)  BP: 150/65 (05 Dec 2018 11:48) (115/99 - 175/154)  BP(mean): 83 (05 Dec 2018 06:00) (73 - 159)  RR: 19 (05 Dec 2018 11:48) (17 - 27)  SpO2: 99% (05 Dec 2018 11:48) (94% - 100%)    Neurological Exam:  Mental Status: Orientated to self, date and place. able to identify thumb, and pinky. follows commands to cross midline. repetition intact. still making multiple sematic errors, much improved.     Cranial Nerves: EOMI, no blink to threat b/l. no nystagmus. No facial asymmetry.  strength: LE some effort against gravity, limited by back pain. no drift UE b/l.  Sensation: intact to light touch and temperature    Other:    12-05    138  |  101  |  20  ----------------------------<  169<H>  4.0   |  20<L>  |  0.88    Ca    8.5      05 Dec 2018 04:20  Phos  4.0     12-05  Mg     2.1     12-05    TPro  6.9  /  Alb  3.4  /  TBili  0.4  /  DBili  x   /  AST  57<H>  /  ALT  19  /  AlkPhos  100  12-03 12-05    138  |  101  |  20  ----------------------------<  169<H>  4.0   |  20<L>  |  0.88    Ca    8.5      05 Dec 2018 04:20  Phos  4.0     12-05  Mg     2.1     12-05    TPro  6.9  /  Alb  3.4  /  TBili  0.4  /  DBili  x   /  AST  57<H>  /  ALT  19  /  AlkPhos  100  12-03    LIVER FUNCTIONS - ( 03 Dec 2018 22:15 )  Alb: 3.4 g/dL / Pro: 6.9 g/dL / ALK PHOS: 100 u/L / ALT: 19 u/L / AST: 57 u/L / GGT: x                                 10.0   11.62 )-----------( 327      ( 05 Dec 2018 04:20 )             30.6     Radiology    EKG:  tele:  TTE:  EEG:      MEDICATIONS  (STANDING):  carvedilol 25 milliGRAM(s) Oral every 12 hours  dexamethasone  IVPB 10 milliGRAM(s) IV Intermittent every 6 hours  dextrose 5%. 1000 milliLiter(s) (50 mL/Hr) IV Continuous <Continuous>  dextrose 50% Injectable 12.5 Gram(s) IV Push once  dextrose 50% Injectable 25 Gram(s) IV Push once  dextrose 50% Injectable 25 Gram(s) IV Push once  docusate sodium 100 milliGRAM(s) Oral three times a day  DULoxetine 90 milliGRAM(s) Oral daily  gabapentin 600 milliGRAM(s) Oral four times a day  influenza   Vaccine 0.5 milliLiter(s) IntraMuscular once  insulin lispro (HumaLOG) corrective regimen sliding scale   SubCutaneous three times a day before meals  insulin lispro (HumaLOG) corrective regimen sliding scale   SubCutaneous at bedtime  losartan 100 milliGRAM(s) Oral daily  pantoprazole    Tablet 40 milliGRAM(s) Oral before breakfast  polyethylene glycol 3350 17 Gram(s) Oral at bedtime  senna 2 Tablet(s) Oral at bedtime  sodium chloride 0.9%. 1000 milliLiter(s) (50 mL/Hr) IV Continuous <Continuous>    MEDICATIONS  (PRN):  acetaminophen   Tablet .. 650 milliGRAM(s) Oral every 6 hours PRN Mild Pain (1 - 3)  dextrose 40% Gel 15 Gram(s) Oral once PRN Blood Glucose LESS THAN 70 milliGRAM(s)/deciliter  diphenhydrAMINE 25 milliGRAM(s) Oral every 4 hours PRN Rash and/or Itching  glucagon  Injectable 1 milliGRAM(s) IntraMuscular once PRN Glucose LESS THAN 70 milligrams/deciliter  hydrALAZINE Injectable 10 milliGRAM(s) IV Push every 1 hour PRN SBP > 140  labetalol Injectable 10 milliGRAM(s) IV Push every 1 hour PRN Systolic blood pressure > 140  ondansetron Injectable 4 milliGRAM(s) IV Push every 6 hours PRN Nausea and/or Vomiting  tiZANidine 2 milliGRAM(s) Oral every 6 hours PRN Muscle Spasm Neurology Follow up note    Patient is a 63y old  Female who presents with a chief complaint of spine surgery (04 Dec 2018 12:29)    Subjective: Interval History - speech much improved from yesterday. Transferred out of the SICU.    Objective:   Vital Signs Last 24 Hrs  T(C): 36.3 (05 Dec 2018 11:48), Max: 36.7 (04 Dec 2018 20:00)  T(F): 97.4 (05 Dec 2018 11:48), Max: 98 (04 Dec 2018 20:00)  HR: 74 (05 Dec 2018 11:48) (64 - 95)  BP: 150/65 (05 Dec 2018 11:48) (115/99 - 175/154)  BP(mean): 83 (05 Dec 2018 06:00) (73 - 159)  RR: 19 (05 Dec 2018 11:48) (17 - 27)  SpO2: 99% (05 Dec 2018 11:48) (94% - 100%)    Neurological Exam:  Mental Status: Orientated to self, date and place. able to identify thumb, and pinky. follows commands to cross midline. repetition intact. still making multiple sematic errors, much improved.     Cranial Nerves: EOMI, no blink to threat b/l. no nystagmus. No facial asymmetry.  strength: LE some effort against gravity, limited by back pain. no drift UE b/l.  Sensation: intact to light touch and temperature    Other:    12-05    138  |  101  |  20  ----------------------------<  169<H>  4.0   |  20<L>  |  0.88    Ca    8.5      05 Dec 2018 04:20  Phos  4.0     12-05  Mg     2.1     12-05    TPro  6.9  /  Alb  3.4  /  TBili  0.4  /  DBili  x   /  AST  57<H>  /  ALT  19  /  AlkPhos  100  12-03 12-05    138  |  101  |  20  ----------------------------<  169<H>  4.0   |  20<L>  |  0.88    Ca    8.5      05 Dec 2018 04:20  Phos  4.0     12-05  Mg     2.1     12-05    TPro  6.9  /  Alb  3.4  /  TBili  0.4  /  DBili  x   /  AST  57<H>  /  ALT  19  /  AlkPhos  100  12-03    LIVER FUNCTIONS - ( 03 Dec 2018 22:15 )  Alb: 3.4 g/dL / Pro: 6.9 g/dL / ALK PHOS: 100 u/L / ALT: 19 u/L / AST: 57 u/L / GGT: x                                 10.0   11.62 )-----------( 327      ( 05 Dec 2018 04:20 )             30.6     Radiology    EKG:  tele:  TTE:  EEG:      MEDICATIONS  (STANDING):  carvedilol 25 milliGRAM(s) Oral every 12 hours  dexamethasone  IVPB 10 milliGRAM(s) IV Intermittent every 6 hours  dextrose 5%. 1000 milliLiter(s) (50 mL/Hr) IV Continuous <Continuous>  dextrose 50% Injectable 12.5 Gram(s) IV Push once  dextrose 50% Injectable 25 Gram(s) IV Push once  dextrose 50% Injectable 25 Gram(s) IV Push once  docusate sodium 100 milliGRAM(s) Oral three times a day  DULoxetine 90 milliGRAM(s) Oral daily  gabapentin 600 milliGRAM(s) Oral four times a day  influenza   Vaccine 0.5 milliLiter(s) IntraMuscular once  insulin lispro (HumaLOG) corrective regimen sliding scale   SubCutaneous three times a day before meals  insulin lispro (HumaLOG) corrective regimen sliding scale   SubCutaneous at bedtime  losartan 100 milliGRAM(s) Oral daily  pantoprazole    Tablet 40 milliGRAM(s) Oral before breakfast  polyethylene glycol 3350 17 Gram(s) Oral at bedtime  senna 2 Tablet(s) Oral at bedtime  sodium chloride 0.9%. 1000 milliLiter(s) (50 mL/Hr) IV Continuous <Continuous>    MEDICATIONS  (PRN):  acetaminophen   Tablet .. 650 milliGRAM(s) Oral every 6 hours PRN Mild Pain (1 - 3)  dextrose 40% Gel 15 Gram(s) Oral once PRN Blood Glucose LESS THAN 70 milliGRAM(s)/deciliter  diphenhydrAMINE 25 milliGRAM(s) Oral every 4 hours PRN Rash and/or Itching  glucagon  Injectable 1 milliGRAM(s) IntraMuscular once PRN Glucose LESS THAN 70 milligrams/deciliter  hydrALAZINE Injectable 10 milliGRAM(s) IV Push every 1 hour PRN SBP > 140  labetalol Injectable 10 milliGRAM(s) IV Push every 1 hour PRN Systolic blood pressure > 140  ondansetron Injectable 4 milliGRAM(s) IV Push every 6 hours PRN Nausea and/or Vomiting  tiZANidine 2 milliGRAM(s) Oral every 6 hours PRN Muscle Spasm    CT: < from: CT Venogram Brain w/ IV Cont (12.04.18 @ 21:05) >  Impression: Slight increase in size of the previously noted left temporal   hemorrhage with surrounding edema.    Stable left frontal hemorrhage with surrounding subarachnoid hemorrhage.    Unremarkable CT venogram.    < end of copied text >    < from: CT Head No Cont (12.04.18 @ 06:17) >  FINDINGS:  A left temporal parenchymal hematoma measuring 5.1 x 2.6 x 1.7 cm is seen   with surrounding parenchymal vasogenic edema, unchanged from 12/3/2018.   There is mild mass effect upon the left lateral ventricle temporal horn.    Trace hyperdensity within left hemispheric sulci is consistent with trace   amounts of subarachnoid hemorrhage.    Ventricles and sulci are otherwise within normal limits for the patient's   stated age. There is no evidence of acute transcortical territorial   infarction.    The calvarium is intact. The visualized intraorbital compartments,   paranasal sinuses and tympanomastoid cavities appear free of acute   disease.    < end of copied text > Neurology Follow up note    Patient is a 63y old  Female who presents with a chief complaint of spine surgery (04 Dec 2018 12:29)    Subjective: Interval History - speech much improved from yesterday. Transferred out of the SICU.    Objective:   Vital Signs Last 24 Hrs  T(C): 36.3 (05 Dec 2018 11:48), Max: 36.7 (04 Dec 2018 20:00)  T(F): 97.4 (05 Dec 2018 11:48), Max: 98 (04 Dec 2018 20:00)  HR: 74 (05 Dec 2018 11:48) (64 - 95)  BP: 150/65 (05 Dec 2018 11:48) (115/99 - 175/154)  BP(mean): 83 (05 Dec 2018 06:00) (73 - 159)  RR: 19 (05 Dec 2018 11:48) (17 - 27)  SpO2: 99% (05 Dec 2018 11:48) (94% - 100%)    Neurological Exam:  Mental Status: Orientated to self, date and place. able to identify thumb, and pinky. follows commands to cross midline. repetition intact. still making multiple semantic paraphasic errors, much improved.     Cranial Nerves: EOMI, no blink to threat b/l. no nystagmus. No facial asymmetry.  strength: LE some effort against gravity, limited by back pain. no drift UE b/l.  Sensation: intact to light touch and temperature    Other:    12-05    138  |  101  |  20  ----------------------------<  169<H>  4.0   |  20<L>  |  0.88    Ca    8.5      05 Dec 2018 04:20  Phos  4.0     12-05  Mg     2.1     12-05    TPro  6.9  /  Alb  3.4  /  TBili  0.4  /  DBili  x   /  AST  57<H>  /  ALT  19  /  AlkPhos  100  12-03 12-05    138  |  101  |  20  ----------------------------<  169<H>  4.0   |  20<L>  |  0.88    Ca    8.5      05 Dec 2018 04:20  Phos  4.0     12-05  Mg     2.1     12-05    TPro  6.9  /  Alb  3.4  /  TBili  0.4  /  DBili  x   /  AST  57<H>  /  ALT  19  /  AlkPhos  100  12-03    LIVER FUNCTIONS - ( 03 Dec 2018 22:15 )  Alb: 3.4 g/dL / Pro: 6.9 g/dL / ALK PHOS: 100 u/L / ALT: 19 u/L / AST: 57 u/L / GGT: x                                 10.0   11.62 )-----------( 327      ( 05 Dec 2018 04:20 )             30.6     Radiology    EKG:  tele:  TTE:  EEG:      MEDICATIONS  (STANDING):  carvedilol 25 milliGRAM(s) Oral every 12 hours  dexamethasone  IVPB 10 milliGRAM(s) IV Intermittent every 6 hours  dextrose 5%. 1000 milliLiter(s) (50 mL/Hr) IV Continuous <Continuous>  dextrose 50% Injectable 12.5 Gram(s) IV Push once  dextrose 50% Injectable 25 Gram(s) IV Push once  dextrose 50% Injectable 25 Gram(s) IV Push once  docusate sodium 100 milliGRAM(s) Oral three times a day  DULoxetine 90 milliGRAM(s) Oral daily  gabapentin 600 milliGRAM(s) Oral four times a day  influenza   Vaccine 0.5 milliLiter(s) IntraMuscular once  insulin lispro (HumaLOG) corrective regimen sliding scale   SubCutaneous three times a day before meals  insulin lispro (HumaLOG) corrective regimen sliding scale   SubCutaneous at bedtime  losartan 100 milliGRAM(s) Oral daily  pantoprazole    Tablet 40 milliGRAM(s) Oral before breakfast  polyethylene glycol 3350 17 Gram(s) Oral at bedtime  senna 2 Tablet(s) Oral at bedtime  sodium chloride 0.9%. 1000 milliLiter(s) (50 mL/Hr) IV Continuous <Continuous>    MEDICATIONS  (PRN):  acetaminophen   Tablet .. 650 milliGRAM(s) Oral every 6 hours PRN Mild Pain (1 - 3)  dextrose 40% Gel 15 Gram(s) Oral once PRN Blood Glucose LESS THAN 70 milliGRAM(s)/deciliter  diphenhydrAMINE 25 milliGRAM(s) Oral every 4 hours PRN Rash and/or Itching  glucagon  Injectable 1 milliGRAM(s) IntraMuscular once PRN Glucose LESS THAN 70 milligrams/deciliter  hydrALAZINE Injectable 10 milliGRAM(s) IV Push every 1 hour PRN SBP > 140  labetalol Injectable 10 milliGRAM(s) IV Push every 1 hour PRN Systolic blood pressure > 140  ondansetron Injectable 4 milliGRAM(s) IV Push every 6 hours PRN Nausea and/or Vomiting  tiZANidine 2 milliGRAM(s) Oral every 6 hours PRN Muscle Spasm    CT: < from: CT Venogram Brain w/ IV Cont (12.04.18 @ 21:05) >  Impression: Slight increase in size of the previously noted left temporal   hemorrhage with surrounding edema.    Stable left frontal hemorrhage with surrounding subarachnoid hemorrhage.    Unremarkable CT venogram.    < end of copied text >    < from: CT Head No Cont (12.04.18 @ 06:17) >  FINDINGS:  A left temporal parenchymal hematoma measuring 5.1 x 2.6 x 1.7 cm is seen   with surrounding parenchymal vasogenic edema, unchanged from 12/3/2018.   There is mild mass effect upon the left lateral ventricle temporal horn.    Trace hyperdensity within left hemispheric sulci is consistent with trace   amounts of subarachnoid hemorrhage.    Ventricles and sulci are otherwise within normal limits for the patient's   stated age. There is no evidence of acute transcortical territorial   infarction.    The calvarium is intact. The visualized intraorbital compartments,   paranasal sinuses and tympanomastoid cavities appear free of acute   disease.    < end of copied text >
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
## 2018-12-05 NOTE — PROGRESS NOTE ADULT - SUBJECTIVE AND OBJECTIVE BOX
Patient seen overnight. Improvement in speech today.   Alert and awake, she denies any significant pain.  Will continue to monitor with Neurology and in ICU    Vital Signs Last 24 Hrs  T(C): 36.2 (05 Dec 2018 04:00), Max: 36.7 (04 Dec 2018 20:00)  T(F): 97.2 (05 Dec 2018 04:00), Max: 98 (04 Dec 2018 20:00)  HR: 74 (05 Dec 2018 06:00) (64 - 95)  BP: 141/64 (05 Dec 2018 06:00) (115/99 - 189/83)  BP(mean): 83 (05 Dec 2018 06:00) (73 - 159)  RR: 21 (05 Dec 2018 06:00) (16 - 27)  SpO2: 96% (05 Dec 2018 06:00) (94% - 100%))                                     10.9   8.07  )-----------( 297      ( 04 Dec 2018 04:50 )             33.4           Exam:  Gen: NAD, AOx2  Motor: 5/5 EHL/FHL/TA/Gastrocnemius  Sensory: SILT DP/SP/S/S/T nerve distributions  Dressing: Clean, Dry, Intact    A/P: 63 year old female s/p T10-Pelvis PSF complicated by Frontoparietal IPH  - Neurology monitoring, continue neurology recommendations  - Regular Diet  - PT/OT, OOB  - Monitor HV per Plastics for removal  - Continue ICU monitoring      Modesto Griffin  [id number]

## 2018-12-06 DIAGNOSIS — Z29.9 ENCOUNTER FOR PROPHYLACTIC MEASURES, UNSPECIFIED: ICD-10-CM

## 2018-12-06 LAB
BUN SERPL-MCNC: 26 MG/DL — HIGH (ref 7–23)
CALCIUM SERPL-MCNC: 8.7 MG/DL — SIGNIFICANT CHANGE UP (ref 8.4–10.5)
CHLORIDE SERPL-SCNC: 99 MMOL/L — SIGNIFICANT CHANGE UP (ref 98–107)
CO2 SERPL-SCNC: 24 MMOL/L — SIGNIFICANT CHANGE UP (ref 22–31)
CREAT SERPL-MCNC: 0.94 MG/DL — SIGNIFICANT CHANGE UP (ref 0.5–1.3)
GLUCOSE BLDC GLUCOMTR-MCNC: 206 MG/DL — HIGH (ref 70–99)
GLUCOSE BLDC GLUCOMTR-MCNC: 210 MG/DL — HIGH (ref 70–99)
GLUCOSE BLDC GLUCOMTR-MCNC: 227 MG/DL — HIGH (ref 70–99)
GLUCOSE BLDC GLUCOMTR-MCNC: 231 MG/DL — HIGH (ref 70–99)
GLUCOSE SERPL-MCNC: 222 MG/DL — HIGH (ref 70–99)
HCT VFR BLD CALC: 32 % — LOW (ref 34.5–45)
HGB BLD-MCNC: 10.4 G/DL — LOW (ref 11.5–15.5)
MCHC RBC-ENTMCNC: 29 PG — SIGNIFICANT CHANGE UP (ref 27–34)
MCHC RBC-ENTMCNC: 32.5 % — SIGNIFICANT CHANGE UP (ref 32–36)
MCV RBC AUTO: 89.1 FL — SIGNIFICANT CHANGE UP (ref 80–100)
NRBC # FLD: 0 — SIGNIFICANT CHANGE UP
PLATELET # BLD AUTO: 301 K/UL — SIGNIFICANT CHANGE UP (ref 150–400)
PMV BLD: 8.6 FL — SIGNIFICANT CHANGE UP (ref 7–13)
POTASSIUM SERPL-MCNC: 4.3 MMOL/L — SIGNIFICANT CHANGE UP (ref 3.5–5.3)
POTASSIUM SERPL-SCNC: 4.3 MMOL/L — SIGNIFICANT CHANGE UP (ref 3.5–5.3)
RBC # BLD: 3.59 M/UL — LOW (ref 3.8–5.2)
RBC # FLD: 14.9 % — HIGH (ref 10.3–14.5)
SODIUM SERPL-SCNC: 137 MMOL/L — SIGNIFICANT CHANGE UP (ref 135–145)
WBC # BLD: 8.25 K/UL — SIGNIFICANT CHANGE UP (ref 3.8–10.5)
WBC # FLD AUTO: 8.25 K/UL — SIGNIFICANT CHANGE UP (ref 3.8–10.5)

## 2018-12-06 PROCEDURE — 99233 SBSQ HOSP IP/OBS HIGH 50: CPT

## 2018-12-06 RX ORDER — HYDRALAZINE HCL 50 MG
25 TABLET ORAL ONCE
Qty: 0 | Refills: 0 | Status: COMPLETED | OUTPATIENT
Start: 2018-12-06 | End: 2018-12-06

## 2018-12-06 RX ADMIN — Medication 102 MILLIGRAM(S): at 00:17

## 2018-12-06 RX ADMIN — GABAPENTIN 600 MILLIGRAM(S): 400 CAPSULE ORAL at 17:28

## 2018-12-06 RX ADMIN — Medication 102 MILLIGRAM(S): at 19:23

## 2018-12-06 RX ADMIN — SENNA PLUS 2 TABLET(S): 8.6 TABLET ORAL at 21:11

## 2018-12-06 RX ADMIN — LOSARTAN POTASSIUM 100 MILLIGRAM(S): 100 TABLET, FILM COATED ORAL at 06:30

## 2018-12-06 RX ADMIN — GABAPENTIN 600 MILLIGRAM(S): 400 CAPSULE ORAL at 12:45

## 2018-12-06 RX ADMIN — Medication 100 MILLIGRAM(S): at 06:30

## 2018-12-06 RX ADMIN — Medication 102 MILLIGRAM(S): at 13:35

## 2018-12-06 RX ADMIN — Medication 100 MILLIGRAM(S): at 13:35

## 2018-12-06 RX ADMIN — Medication 100 MILLIGRAM(S): at 21:11

## 2018-12-06 RX ADMIN — Medication 2: at 12:40

## 2018-12-06 RX ADMIN — Medication 2: at 08:02

## 2018-12-06 RX ADMIN — Medication 102 MILLIGRAM(S): at 06:31

## 2018-12-06 RX ADMIN — GABAPENTIN 600 MILLIGRAM(S): 400 CAPSULE ORAL at 00:17

## 2018-12-06 RX ADMIN — DULOXETINE HYDROCHLORIDE 90 MILLIGRAM(S): 30 CAPSULE, DELAYED RELEASE ORAL at 12:45

## 2018-12-06 RX ADMIN — PANTOPRAZOLE SODIUM 40 MILLIGRAM(S): 20 TABLET, DELAYED RELEASE ORAL at 06:31

## 2018-12-06 RX ADMIN — GABAPENTIN 600 MILLIGRAM(S): 400 CAPSULE ORAL at 06:29

## 2018-12-06 RX ADMIN — Medication 2: at 17:28

## 2018-12-06 RX ADMIN — Medication 25 MILLIGRAM(S): at 19:23

## 2018-12-06 NOTE — PROGRESS NOTE ADULT - ASSESSMENT
63F h/o kyphosis, spondylolisthesis, spinal stenosis. CAD s/p AVE x 2 (2015), HTN, HLD, DM with diabetic peripheral neuropathy who is s/p T10 to pelvis decompression and fusion on 11/27/18 course complicated by acute Wernicke's aphasia the night of 12/3 2/2 left parietotemporal intracerebral hematoma of unknown etiology s/p SICU stay -- now back on the floors with improved speech.

## 2018-12-06 NOTE — PROGRESS NOTE ADULT - SUBJECTIVE AND OBJECTIVE BOX
CHIEF COMPLAINT: f/u     SUBJECTIVE / OVERNIGHT EVENTS: Patient seen and examined. No acute events overnight. Pain well controlled and patient without any complaints.    MEDICATIONS  (STANDING):  carvedilol 25 milliGRAM(s) Oral every 12 hours  dexamethasone  IVPB 10 milliGRAM(s) IV Intermittent every 6 hours  dextrose 5%. 1000 milliLiter(s) (50 mL/Hr) IV Continuous <Continuous>  dextrose 50% Injectable 12.5 Gram(s) IV Push once  dextrose 50% Injectable 25 Gram(s) IV Push once  dextrose 50% Injectable 25 Gram(s) IV Push once  docusate sodium 100 milliGRAM(s) Oral three times a day  DULoxetine 90 milliGRAM(s) Oral daily  gabapentin 600 milliGRAM(s) Oral four times a day  influenza   Vaccine 0.5 milliLiter(s) IntraMuscular once  insulin lispro (HumaLOG) corrective regimen sliding scale   SubCutaneous three times a day before meals  insulin lispro (HumaLOG) corrective regimen sliding scale   SubCutaneous at bedtime  losartan 100 milliGRAM(s) Oral daily  pantoprazole    Tablet 40 milliGRAM(s) Oral before breakfast  polyethylene glycol 3350 17 Gram(s) Oral at bedtime  senna 2 Tablet(s) Oral at bedtime  sodium chloride 0.9%. 1000 milliLiter(s) (50 mL/Hr) IV Continuous <Continuous>    MEDICATIONS  (PRN):  acetaminophen   Tablet .. 650 milliGRAM(s) Oral every 6 hours PRN Mild Pain (1 - 3)  dextrose 40% Gel 15 Gram(s) Oral once PRN Blood Glucose LESS THAN 70 milliGRAM(s)/deciliter  diphenhydrAMINE 25 milliGRAM(s) Oral every 4 hours PRN Rash and/or Itching  glucagon  Injectable 1 milliGRAM(s) IntraMuscular once PRN Glucose LESS THAN 70 milligrams/deciliter  hydrALAZINE Injectable 10 milliGRAM(s) IV Push every 1 hour PRN SBP > 140  labetalol Injectable 10 milliGRAM(s) IV Push every 1 hour PRN Systolic blood pressure > 140  ondansetron Injectable 4 milliGRAM(s) IV Push every 6 hours PRN Nausea and/or Vomiting  tiZANidine 2 milliGRAM(s) Oral every 6 hours PRN Muscle Spasm      VITALS:  T(F): 98.5 (12-06-18 @ 10:37), Max: 98.5 (12-06-18 @ 06:25)  HR: 68 (12-06-18 @ 10:37) (54 - 74)  BP: 140/50 (12-06-18 @ 10:37) (140/50 - 164/68)  RR: 17 (12-06-18 @ 10:37) (16 - 19)  SpO2: 99% (12-06-18 @ 10:37)    PHYSICAL EXAM:  GENERAL: NAD, well-developed  HEAD:  Atraumatic, Normocephalic  EYES: EOMI, PERRLA, conjunctiva and sclera clear  NECK: Supple, No JVD  CHEST/LUNG: Clear to auscultation bilaterally; No wheeze  HEART: Regular rate and rhythm; No murmurs, rubs, or gallops  ABDOMEN: Soft, Nontender, Nondistended; Bowel sounds present  EXTREMITIES:  2+ Peripheral Pulses, No clubbing, cyanosis, or edema  PSYCH: AAOx3  NEUROLOGY: non-focal  SKIN: No rashes or lesions    LABS:              10.0                 138  | 20   | 20           11.62 >-----------< 327     ------------------------< 169                   30.6                 4.0  | 101  | 0.88                                         Ca 8.5   Mg 2.1   Ph 4.0        [ ] Consultant(s) Notes Reviewed:  [x] Care Discussed with Consultants/Other Providers: Orthopedic PA - discussed

## 2018-12-06 NOTE — SWALLOW BEDSIDE ASSESSMENT ADULT - SWALLOW EVAL: RECOMMENDED FEEDING/EATING TECHNIQUES
allow for swallow between intakes/position upright (90 degrees)/maintain upright posture during/after eating for 30 mins/no straws/small sips/bites

## 2018-12-06 NOTE — PROGRESS NOTE ADULT - SUBJECTIVE AND OBJECTIVE BOX
Patient seen overnight. Continues to respond accordingly.  Transferred from ICU to floor.  Dressing changed, incision C/D/I.          Vital Signs Last 24 Hrs  T(C): 36.7 (06 Dec 2018 01:45), Max: 36.8 (05 Dec 2018 14:00)  T(F): 98.1 (06 Dec 2018 01:45), Max: 98.2 (05 Dec 2018 14:00)  HR: 58 (06 Dec 2018 01:45) (58 - 74)  BP: 153/58 (06 Dec 2018 01:45) (140/50 - 164/68)  BP(mean): --  RR: 16 (06 Dec 2018 01:45) (16 - 20)  SpO2: 98% (06 Dec 2018 01:45) (94% - 99%)                                                  10.0   11.62 )-----------( 327      ( 05 Dec 2018 04:20 )             30.6             Exam:  Gen: NAD, AOx2  Motor: 5/5 EHL/FHL/TA/Gastrocnemius  Sensory: SILT DP/SP/S/S/T nerve distributions  Dressing: Clean, Dry, Intact        A/P: 63 year old female s/p T10-Pelvis PSF complicated by Frontoparietal IPH  - Neurology monitoring, continue neurology recommendations  - Regular Diet  - PT/OT, OOB  - Monitor HV per Plastics for removal  - Continue ICU monitoring      Modesto Griffin  PGY5

## 2018-12-06 NOTE — SPEECH LANGUAGE PATHOLOGY EVALUATION - SLP DIAGNOSIS
1) Mild to Moderate Expressive Language Impairment marked by adequate automatic sequence production, adequate repetition of short phrases/sentences, decreased confrontational naming and decreased responsive naming; errors were noted to consist of semantic/neologistic paraphasias (i.e. ""scissor" for hammer; "tooket" for cookie). Grossly fluent speech output was noted during picture description tasks and spontaneous conversational discourse, however often noted to be non-sensical with paraphasic errors; 2) Moderate to Severe Receptive Language Impairment marked by ability to follow simple one-step directives and identify pictures, and decreased auditory comprehension of 2-step directives, moderately complex yes/no questions, wh- questions and simple conversational discourse; 3) Moderate Cognitive-Linguistic impairment marked by decreased short-term recall, verbal reasoning and problem solving skills; 4) Motor speech skills and vocal quality judged to be WFL

## 2018-12-06 NOTE — SWALLOW BEDSIDE ASSESSMENT ADULT - COMMENTS
Patient 63 year-old female with PMHx of kyphosis, spondylolisthesis, spinal stenosis. CAD s/p AVE x 2 (2015), HTN, HLD, DM with diabetic peripheral neuropathy who is s/p T10 to pelvis decompression and fusion on 11/27/18 course complicated by acute Wernicke's aphasia the night of 12/3 2/2 left parietotemporal intracerebral hematoma of unknown etiology s/p SICU stay -- now back on the floors with improved speech.    Patient was received awake and alert for an initial bedside swallow evaluation this AM. Patient's daughter present at bedside. As per discussion with orthopedic team, patient cleared for both liquid and solid trials at this time. Speech-language evaluation was also conducted this AM; please see chart for full report.

## 2018-12-06 NOTE — PROGRESS NOTE ADULT - PROBLEM SELECTOR PLAN 5
-BkZ9l=9.5% . FS well controlled. Continue with ISS for now and continue to monitor FS closely.   -continue to hold metformin   -Diabetic peripheral neuropathy - c/w gabapentin and glycemic control

## 2018-12-06 NOTE — SWALLOW BEDSIDE ASSESSMENT ADULT - SWALLOW EVAL: DIAGNOSIS
Patient demonstrated functional oral and pharyngeal stages of swallowing characterized by functional bolus collection, manipulation and transfer, timely pharyngeal trigger and adequate hyolaryngeal elevation upon palpation. No overt, clinical s/s of laryngeal penetration/aspiration noted across trials.

## 2018-12-06 NOTE — PROGRESS NOTE ADULT - PROBLEM SELECTOR PLAN 1
-Differential diagnosis includes hemorrhagic transformation of infarction, a vascular malformation or less likely tumor. CTV negative for venous thrombosis. Though CTA head was negative for an AVM, this does not exclude the possibility of a micro AVM. May require conventional cerebral angiogram in the near future.  -neuro on board and following with the following recs:    --continue to hold ASA/Plavix per neuro    --MRI/MRA brain with contrast    --keep blood pressure < 160/90    --speech and swallow    --PT/OT/speech therapy    --PM&R consultation      --will need to follow up after discharge with stroke neurology as well as     neurointerventionalist. may need conventional angiogram in 2 months to assess     for micro-AVM as cause for bleed    --repeat CTH 12/5 stable - ok to resume heparin subq for DVT prophylaxis  -per orthopedic, prefer to hold off subq heparin and c/w venodymes

## 2018-12-06 NOTE — SPEECH LANGUAGE PATHOLOGY EVALUATION - COMMENTS
Patient 63 year-old female with PMHx of kyphosis, spondylolisthesis, spinal stenosis. CAD s/p AVE x 2 (2015), HTN, HLD, DM with diabetic peripheral neuropathy who is s/p T10 to pelvis decompression and fusion on 11/27/18 course complicated by acute Wernicke's aphasia the night of 12/3 2/2 left parietotemporal intracerebral hematoma of unknown etiology s/p SICU stay -- now back on the floors with improved speech.    Patient was received awake and alert for an initial speech-language evaluation this AM. Patient was also seen for a clinical dysphagia evaluation (please see chart for full report). Recommendations were discussed with patient, daughter, RN and orthopedic team. Patient would benefit from continued speech therapy services following discharge to appropriate setting  (i.e. rehab vs home care vs outpatient services). [facility] can be reached at [phone].

## 2018-12-06 NOTE — SWALLOW BEDSIDE ASSESSMENT ADULT - ASR SWALLOW ASPIRATION MONITOR
gurgly voice/upper respiratory infection/change of breathing pattern/position upright (90Y)/throat clearing/oral hygiene/fever/pneumonia/cough

## 2018-12-06 NOTE — PROGRESS NOTE ADULT - PROBLEM SELECTOR PLAN 3
-likely secondary to post-op changes; pt hemodynamically stable; will monitor  -s/p 2u PRBCs, 1u FFP, 1u plts and 4L IVFs in OR  -s/p 1u PRBCs 12/1 on the floors  -patient responded well, and H&H stable since then

## 2018-12-07 ENCOUNTER — CHART COPY (OUTPATIENT)
Age: 63
End: 2018-12-07

## 2018-12-07 DIAGNOSIS — I16.0 HYPERTENSIVE URGENCY: ICD-10-CM

## 2018-12-07 LAB
GLUCOSE BLDC GLUCOMTR-MCNC: 183 MG/DL — HIGH (ref 70–99)
GLUCOSE BLDC GLUCOMTR-MCNC: 185 MG/DL — HIGH (ref 70–99)
GLUCOSE BLDC GLUCOMTR-MCNC: 212 MG/DL — HIGH (ref 70–99)
GLUCOSE BLDC GLUCOMTR-MCNC: 225 MG/DL — HIGH (ref 70–99)

## 2018-12-07 PROCEDURE — 99222 1ST HOSP IP/OBS MODERATE 55: CPT | Mod: GC

## 2018-12-07 PROCEDURE — 70553 MRI BRAIN STEM W/O & W/DYE: CPT | Mod: 26

## 2018-12-07 PROCEDURE — 99233 SBSQ HOSP IP/OBS HIGH 50: CPT

## 2018-12-07 RX ORDER — AMLODIPINE BESYLATE 2.5 MG/1
5 TABLET ORAL ONCE
Qty: 0 | Refills: 0 | Status: COMPLETED | OUTPATIENT
Start: 2018-12-07 | End: 2018-12-07

## 2018-12-07 RX ORDER — HYDRALAZINE HCL 50 MG
25 TABLET ORAL EVERY 8 HOURS
Qty: 0 | Refills: 0 | Status: DISCONTINUED | OUTPATIENT
Start: 2018-12-07 | End: 2018-12-07

## 2018-12-07 RX ORDER — HYDRALAZINE HCL 50 MG
10 TABLET ORAL EVERY 8 HOURS
Qty: 0 | Refills: 0 | Status: DISCONTINUED | OUTPATIENT
Start: 2018-12-07 | End: 2018-12-10

## 2018-12-07 RX ORDER — HYDRALAZINE HCL 50 MG
10 TABLET ORAL EVERY 8 HOURS
Qty: 0 | Refills: 0 | Status: DISCONTINUED | OUTPATIENT
Start: 2018-12-07 | End: 2018-12-07

## 2018-12-07 RX ORDER — HYDRALAZINE HCL 50 MG
10 TABLET ORAL ONCE
Qty: 0 | Refills: 0 | Status: COMPLETED | OUTPATIENT
Start: 2018-12-07 | End: 2018-12-07

## 2018-12-07 RX ORDER — AMLODIPINE BESYLATE 2.5 MG/1
10 TABLET ORAL DAILY
Qty: 0 | Refills: 0 | Status: DISCONTINUED | OUTPATIENT
Start: 2018-12-08 | End: 2018-12-12

## 2018-12-07 RX ORDER — AMLODIPINE BESYLATE 2.5 MG/1
5 TABLET ORAL DAILY
Qty: 0 | Refills: 0 | Status: DISCONTINUED | OUTPATIENT
Start: 2018-12-07 | End: 2018-12-07

## 2018-12-07 RX ORDER — HYDRALAZINE HCL 50 MG
10 TABLET ORAL ONCE
Qty: 0 | Refills: 0 | Status: DISCONTINUED | OUTPATIENT
Start: 2018-12-07 | End: 2018-12-07

## 2018-12-07 RX ADMIN — Medication 650 MILLIGRAM(S): at 10:15

## 2018-12-07 RX ADMIN — GABAPENTIN 600 MILLIGRAM(S): 400 CAPSULE ORAL at 12:28

## 2018-12-07 RX ADMIN — DULOXETINE HYDROCHLORIDE 90 MILLIGRAM(S): 30 CAPSULE, DELAYED RELEASE ORAL at 14:01

## 2018-12-07 RX ADMIN — Medication 102 MILLIGRAM(S): at 00:08

## 2018-12-07 RX ADMIN — Medication 100 MILLIGRAM(S): at 06:07

## 2018-12-07 RX ADMIN — Medication 1: at 17:15

## 2018-12-07 RX ADMIN — Medication 2: at 09:25

## 2018-12-07 RX ADMIN — GABAPENTIN 600 MILLIGRAM(S): 400 CAPSULE ORAL at 06:08

## 2018-12-07 RX ADMIN — AMLODIPINE BESYLATE 5 MILLIGRAM(S): 2.5 TABLET ORAL at 18:09

## 2018-12-07 RX ADMIN — SENNA PLUS 2 TABLET(S): 8.6 TABLET ORAL at 21:39

## 2018-12-07 RX ADMIN — Medication 1: at 12:28

## 2018-12-07 RX ADMIN — GABAPENTIN 600 MILLIGRAM(S): 400 CAPSULE ORAL at 00:08

## 2018-12-07 RX ADMIN — Medication 102 MILLIGRAM(S): at 06:08

## 2018-12-07 RX ADMIN — GABAPENTIN 600 MILLIGRAM(S): 400 CAPSULE ORAL at 17:15

## 2018-12-07 RX ADMIN — POLYETHYLENE GLYCOL 3350 17 GRAM(S): 17 POWDER, FOR SOLUTION ORAL at 21:39

## 2018-12-07 RX ADMIN — TIZANIDINE 2 MILLIGRAM(S): 4 TABLET ORAL at 20:08

## 2018-12-07 RX ADMIN — AMLODIPINE BESYLATE 5 MILLIGRAM(S): 2.5 TABLET ORAL at 16:15

## 2018-12-07 RX ADMIN — Medication 100 MILLIGRAM(S): at 21:39

## 2018-12-07 RX ADMIN — Medication 10 MILLIGRAM(S): at 14:02

## 2018-12-07 RX ADMIN — Medication 10 MILLIGRAM(S): at 10:09

## 2018-12-07 RX ADMIN — Medication 102 MILLIGRAM(S): at 14:02

## 2018-12-07 RX ADMIN — Medication 100 MILLIGRAM(S): at 14:01

## 2018-12-07 RX ADMIN — LOSARTAN POTASSIUM 100 MILLIGRAM(S): 100 TABLET, FILM COATED ORAL at 06:07

## 2018-12-07 RX ADMIN — Medication 650 MILLIGRAM(S): at 09:27

## 2018-12-07 RX ADMIN — Medication 102 MILLIGRAM(S): at 21:38
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
## 2018-12-07 NOTE — CONSULT NOTE ADULT - CONSULT REQUESTED DATE/TIME
Fever 101 t max x 8pm. Noted vomiting and diarrhea and sneezing. Watery eyes per family. Tylenol given approx 730pm
04-Dec-2018 01:25
07-Dec-2018 15:41
27-Nov-2018 17:49
29-Nov-2018
03-Dec-2018 21:56
27-Nov-2018 16:30

## 2018-12-07 NOTE — PROGRESS NOTE ADULT - SUBJECTIVE AND OBJECTIVE BOX
Patient seen overnight. Continues to respond accordingly.  No complaints. Able to express herself.  Has been working with physical therapy.          Vital Signs Last 24 Hrs  T(C): 36.4 (07 Dec 2018 06:05), Max: 36.9 (06 Dec 2018 10:37)  T(F): 97.6 (07 Dec 2018 06:05), Max: 98.5 (06 Dec 2018 10:37)  HR: 61 (07 Dec 2018 06:05) (54 - 68)  BP: 175/55 (07 Dec 2018 06:05) (140/50 - 177/61)  BP(mean): --  RR: 18 (07 Dec 2018 06:05) (16 - 18)  SpO2: 99% (07 Dec 2018 06:05) (96% - 99%)                                               Exam:  Gen: NAD, AOx2  Motor: 5/5 EHL/FHL/TA/Gastrocnemius  Sensory: SILT DP/SP/S/S/T nerve distributions  Dressing: Clean, Dry, Intact        A/P: 63 year old female s/p T10-Pelvis PSF complicated by Frontoparietal IPH  - Neurology monitoring, continue neurology recommendations  - Regular Diet  - PT/OT, OOB  - Monitor HV per Plastics for removal  - Continue ICU monitoring      Modesto Griffin  PGY5

## 2018-12-07 NOTE — CONSULT NOTE ADULT - CONSULT REASON
Co-management
Left temporoparietal IPH
Reconstruction of Back With Muscle Flaps
rehab goals
Aphasia
Q1H neurochecks

## 2018-12-07 NOTE — PROGRESS NOTE ADULT - ASSESSMENT
A/P: S/P posterior spine fusion with muscle flap reconstruction.  - Diet  - Pain control  - IV Abx  - Drain Monitoring - may remove HV  - DVT PPx: SCD, chemoprophylaxis as per spine service  - Will Follow    Thank You  Rhett Mendieta MD  Plastic Surgery  [phone]

## 2018-12-07 NOTE — CHART NOTE - NSCHARTNOTEFT_GEN_A_CORE
Patient with persistently elevated BP throughout the last 24 hours with concomitant bradycardia in the 50s. Coreg has been held for patient previously. BP control paramount given subacute hemorrhagic stroke on MRI.     I discontinued Coreg, will add Amlodipine as a new standing anti-hypertensive medication. Patient will start at 5 mg PO qDaily and add Hydralazine 10 mg PO q8h PRN SBP > 150. Re-check BP in 2-4 hours, if still elevated would add another 5 mg Amlodipine. C/w Hydralazine 10 mg PO q8h PRN.     d/w Orthopedics PA Patient with persistently elevated BP throughout the last 24 hours with concomitant bradycardia in the 50s. Coreg has been held for patient previously. BP control paramount given subacute hemorrhagic stroke on MRI.     I discontinued Coreg because of the beta-blocker effects on patient's heart rate. Will add Amlodipine as a new standing anti-hypertensive medication. Patient will start at 5 mg PO qDaily and add Hydralazine 10 mg PO q8h PRN SBP > 150. Re-check BP in 2-4 hours, if still elevated would add another 5 mg Amlodipine. C/w Hydralazine 10 mg PO q8h PRN.     d/w Orthopedics PA

## 2018-12-07 NOTE — CONSULT NOTE ADULT - ASSESSMENT
1. PT- bed mobility,transfers, gait and balance training  2. OT- ADL'S  3. CVA-bp control   4. Patient would benefit from subacutre rehab vs home with services depending on re-eval

## 2018-12-07 NOTE — PROGRESS NOTE ADULT - SUBJECTIVE AND OBJECTIVE BOX
DEMI ARCHIBALD   [id number]    Patient stable, tolerating diet, pain controlled on regimen.      T(C): 36.9 (12-07-18 @ 12:35), Max: 36.9 (12-06-18 @ 17:24)  HR: 58 (12-07-18 @ 12:35) (51 - 64)  BP: 166/70 (12-07-18 @ 12:36) (142/59 - 192/72)  RR: 17 (12-07-18 @ 12:35) (16 - 18)  SpO2: 97% (12-07-18 @ 12:35) (96% - 100%)  Wt(kg): --  NAD  Back: Dressing clean/dry/adherent.  Soft.  No collection.  Drains in situ.  BLE: No calf tenderness.      12-06 @ 07:01  -  12-07 @ 07:00  --------------------------------------------------------  IN: 600 mL / OUT: 920 mL / NET: -320 mL    12-07 @ 07:01  -  12-07 @ 15:11  --------------------------------------------------------  IN: 0 mL / OUT: 17.5 mL / NET: -17.5 mL      Hemovac: Minimal	  TALHA: 15cc  acetaminophen   Tablet .. 650 milliGRAM(s) Oral every 6 hours PRN  carvedilol 25 milliGRAM(s) Oral every 12 hours  dexamethasone  IVPB 10 milliGRAM(s) IV Intermittent every 6 hours  dextrose 40% Gel 15 Gram(s) Oral once PRN  dextrose 5%. 1000 milliLiter(s) IV Continuous <Continuous>  dextrose 50% Injectable 12.5 Gram(s) IV Push once  dextrose 50% Injectable 25 Gram(s) IV Push once  dextrose 50% Injectable 25 Gram(s) IV Push once  diphenhydrAMINE 25 milliGRAM(s) Oral every 4 hours PRN  docusate sodium 100 milliGRAM(s) Oral three times a day  DULoxetine 90 milliGRAM(s) Oral daily  gabapentin 600 milliGRAM(s) Oral four times a day  glucagon  Injectable 1 milliGRAM(s) IntraMuscular once PRN  hydrALAZINE 10 milliGRAM(s) Oral every 8 hours  influenza   Vaccine 0.5 milliLiter(s) IntraMuscular once  insulin lispro (HumaLOG) corrective regimen sliding scale   SubCutaneous three times a day before meals  insulin lispro (HumaLOG) corrective regimen sliding scale   SubCutaneous at bedtime  labetalol Injectable 10 milliGRAM(s) IV Push every 1 hour PRN  losartan 100 milliGRAM(s) Oral daily  ondansetron Injectable 4 milliGRAM(s) IV Push every 6 hours PRN  pantoprazole    Tablet 40 milliGRAM(s) Oral before breakfast  polyethylene glycol 3350 17 Gram(s) Oral at bedtime  senna 2 Tablet(s) Oral at bedtime  sodium chloride 0.9%. 1000 milliLiter(s) IV Continuous <Continuous>  tiZANidine 2 milliGRAM(s) Oral every 6 hours PRN                            10.4   8.25  )-----------( 301      ( 06 Dec 2018 11:05 )             32.0     12-06    137  |  99  |  26<H>  ----------------------------<  222<H>  4.3   |  24  |  0.94    Ca    8.7      06 Dec 2018 11:05

## 2018-12-07 NOTE — PROGRESS NOTE ADULT - PROBLEM SELECTOR PLAN 1
MRI/MRA confirms a left temporal lobe early subacute intraparenchymal hemorrhage. Slight medialization of the uncus without herniation. No midline shift. Patient not demonstrating neurologic deficits at this time.   - Appreciate Neurology recommendations, in lieu of hemorrhagic stroke would hold ASA/Plavix and pharmacologic DVT ppx for now   - BP goals < 160/90   - S&S, PT/OT/Speech Therapy       Scattered areas of subarachnoid hemorrhage along the left cerebral   convexity. MRI/MRA confirms a left temporal lobe early subacute intraparenchymal hemorrhage. Slight medialization of the uncus without herniation. No midline shift. Patient not demonstrating neurologic deficits at this time.   - Appreciate Neurology recommendations, in lieu of hemorrhagic stroke would hold ASA/Plavix and pharmacologic DVT ppx for now   - BP goals < 160/90   - S&S, PT/OT/Speech Therapy

## 2018-12-07 NOTE — PROGRESS NOTE ADULT - SUBJECTIVE AND OBJECTIVE BOX
CC: Laminectomy and Spinal Fusion, hospital course c/b stroke     SUBJECTIVE / OVERNIGHT EVENTS: No new complaints. Denies headache, weakness, malaise, fevers, chills, visual changes, chest pain, dyspnea, cough, abdominal pain, nausea, vomiting, diarrhea, constipation, dysuria, hematuria, polyuria, pruritis, joint pain    MEDICATIONS  (STANDING):  carvedilol 25 milliGRAM(s) Oral every 12 hours  dexamethasone  IVPB 10 milliGRAM(s) IV Intermittent every 6 hours  dextrose 5%. 1000 milliLiter(s) (50 mL/Hr) IV Continuous <Continuous>  dextrose 50% Injectable 12.5 Gram(s) IV Push once  dextrose 50% Injectable 25 Gram(s) IV Push once  dextrose 50% Injectable 25 Gram(s) IV Push once  docusate sodium 100 milliGRAM(s) Oral three times a day  DULoxetine 90 milliGRAM(s) Oral daily  gabapentin 600 milliGRAM(s) Oral four times a day  hydrALAZINE 10 milliGRAM(s) Oral every 8 hours  influenza   Vaccine 0.5 milliLiter(s) IntraMuscular once  insulin lispro (HumaLOG) corrective regimen sliding scale   SubCutaneous three times a day before meals  insulin lispro (HumaLOG) corrective regimen sliding scale   SubCutaneous at bedtime  losartan 100 milliGRAM(s) Oral daily  pantoprazole    Tablet 40 milliGRAM(s) Oral before breakfast  polyethylene glycol 3350 17 Gram(s) Oral at bedtime  senna 2 Tablet(s) Oral at bedtime  sodium chloride 0.9%. 1000 milliLiter(s) (50 mL/Hr) IV Continuous <Continuous>    MEDICATIONS  (PRN):  acetaminophen   Tablet .. 650 milliGRAM(s) Oral every 6 hours PRN Mild Pain (1 - 3)  dextrose 40% Gel 15 Gram(s) Oral once PRN Blood Glucose LESS THAN 70 milliGRAM(s)/deciliter  diphenhydrAMINE 25 milliGRAM(s) Oral every 4 hours PRN Rash and/or Itching  glucagon  Injectable 1 milliGRAM(s) IntraMuscular once PRN Glucose LESS THAN 70 milligrams/deciliter  labetalol Injectable 10 milliGRAM(s) IV Push every 1 hour PRN Systolic blood pressure > 140  ondansetron Injectable 4 milliGRAM(s) IV Push every 6 hours PRN Nausea and/or Vomiting  tiZANidine 2 milliGRAM(s) Oral every 6 hours PRN Muscle Spasm      Vital Signs Last 24 Hrs  T(C): 36.9 (07 Dec 2018 12:35), Max: 36.9 (06 Dec 2018 17:24)  T(F): 98.4 (07 Dec 2018 12:35), Max: 98.5 (06 Dec 2018 21:09)  HR: 58 (07 Dec 2018 12:35) (51 - 64)  BP: 166/70 (07 Dec 2018 12:36) (142/59 - 192/72)  BP(mean): --  RR: 17 (07 Dec 2018 12:35) (16 - 18)  SpO2: 97% (07 Dec 2018 12:35) (96% - 100%)  CAPILLARY BLOOD GLUCOSE      POCT Blood Glucose.: 183 mg/dL (07 Dec 2018 11:55)  POCT Blood Glucose.: 212 mg/dL (07 Dec 2018 09:15)  POCT Blood Glucose.: 210 mg/dL (06 Dec 2018 22:26)  POCT Blood Glucose.: 206 mg/dL (06 Dec 2018 17:11)    PHYSICAL EXAM:  GENERAL: NAD, well-developed  HEAD:  Atraumatic, Normocephalic  EYES: EOMI, conjunctiva and sclera clear  NECK: Supple, No JVD  CHEST/LUNG: Clear to auscultation bilaterally; No wheeze  HEART: Regular rate and rhythm; No murmurs, rubs, or gallops  ABDOMEN: Soft, Nontender, Nondistended; Bowel sounds present  EXTREMITIES:  2+ Peripheral Pulses, No clubbing, cyanosis, or edema  MSK: 2 drains in back, incision C/D/I  PSYCH: AAOx3  NEUROLOGY: non-focal  SKIN: No rashes or lesions    LABS:                        10.4   8.25  )-----------( 301      ( 06 Dec 2018 11:05 )             32.0     12-06    137  |  99  |  26<H>  ----------------------------<  222<H>  4.3   |  24  |  0.94    Ca    8.7      06 Dec 2018 11:05        RADIOLOGY & ADDITIONAL TESTS:    Imaging Personally Reviewed: Yes    Consultant(s) Notes Reviewed: Neurology     Care Discussed with Consultants/Other Providers: Orthopedic PA

## 2018-12-07 NOTE — PROGRESS NOTE ADULT - PROBLEM SELECTOR PLAN 2
Patient with persistently elevated BPs in the setting of persistent HR < 60. Patient to continue with Losartan. Given relative bradycardia, add Hydralazine for now, 10 mg PO q8h and hold Coreg 25 mg q12h while HR < 60.

## 2018-12-07 NOTE — PROGRESS NOTE ADULT - PROBLEM SELECTOR PROBLEM 1
Intraparenchymal hematoma of brain, left, without loss of consciousness, initial encounter Intraparenchymal hemorrhage of brain

## 2018-12-08 DIAGNOSIS — I61.9 NONTRAUMATIC INTRACEREBRAL HEMORRHAGE, UNSPECIFIED: ICD-10-CM

## 2018-12-08 LAB
GLUCOSE BLDC GLUCOMTR-MCNC: 180 MG/DL — HIGH (ref 70–99)
GLUCOSE BLDC GLUCOMTR-MCNC: 199 MG/DL — HIGH (ref 70–99)
GLUCOSE BLDC GLUCOMTR-MCNC: 213 MG/DL — HIGH (ref 70–99)
GLUCOSE BLDC GLUCOMTR-MCNC: 219 MG/DL — HIGH (ref 70–99)

## 2018-12-08 PROCEDURE — 99233 SBSQ HOSP IP/OBS HIGH 50: CPT

## 2018-12-08 RX ORDER — MORPHINE SULFATE 50 MG/1
2 CAPSULE, EXTENDED RELEASE ORAL EVERY 4 HOURS
Qty: 0 | Refills: 0 | Status: DISCONTINUED | OUTPATIENT
Start: 2018-12-08 | End: 2018-12-12

## 2018-12-08 RX ORDER — OXYCODONE HYDROCHLORIDE 5 MG/1
5 TABLET ORAL EVERY 4 HOURS
Qty: 0 | Refills: 0 | Status: DISCONTINUED | OUTPATIENT
Start: 2018-12-08 | End: 2018-12-12

## 2018-12-08 RX ORDER — OXYCODONE HYDROCHLORIDE 5 MG/1
10 TABLET ORAL EVERY 4 HOURS
Qty: 0 | Refills: 0 | Status: DISCONTINUED | OUTPATIENT
Start: 2018-12-08 | End: 2018-12-12

## 2018-12-08 RX ADMIN — PANTOPRAZOLE SODIUM 40 MILLIGRAM(S): 20 TABLET, DELAYED RELEASE ORAL at 06:37

## 2018-12-08 RX ADMIN — Medication 1: at 07:50

## 2018-12-08 RX ADMIN — Medication 100 MILLIGRAM(S): at 13:42

## 2018-12-08 RX ADMIN — Medication 1: at 12:06

## 2018-12-08 RX ADMIN — GABAPENTIN 600 MILLIGRAM(S): 400 CAPSULE ORAL at 17:06

## 2018-12-08 RX ADMIN — GABAPENTIN 600 MILLIGRAM(S): 400 CAPSULE ORAL at 00:54

## 2018-12-08 RX ADMIN — Medication 102 MILLIGRAM(S): at 00:54

## 2018-12-08 RX ADMIN — Medication 102 MILLIGRAM(S): at 06:37

## 2018-12-08 RX ADMIN — DULOXETINE HYDROCHLORIDE 90 MILLIGRAM(S): 30 CAPSULE, DELAYED RELEASE ORAL at 13:42

## 2018-12-08 RX ADMIN — Medication 100 MILLIGRAM(S): at 06:36

## 2018-12-08 RX ADMIN — Medication 10 MILLIGRAM(S): at 17:09

## 2018-12-08 RX ADMIN — Medication 102 MILLIGRAM(S): at 13:44

## 2018-12-08 RX ADMIN — GABAPENTIN 600 MILLIGRAM(S): 400 CAPSULE ORAL at 06:37

## 2018-12-08 RX ADMIN — Medication 102 MILLIGRAM(S): at 18:24

## 2018-12-08 RX ADMIN — OXYCODONE HYDROCHLORIDE 5 MILLIGRAM(S): 5 TABLET ORAL at 19:00

## 2018-12-08 RX ADMIN — Medication 2: at 17:05

## 2018-12-08 RX ADMIN — LOSARTAN POTASSIUM 100 MILLIGRAM(S): 100 TABLET, FILM COATED ORAL at 06:37

## 2018-12-08 RX ADMIN — OXYCODONE HYDROCHLORIDE 5 MILLIGRAM(S): 5 TABLET ORAL at 18:23

## 2018-12-08 RX ADMIN — GABAPENTIN 600 MILLIGRAM(S): 400 CAPSULE ORAL at 13:39

## 2018-12-08 RX ADMIN — AMLODIPINE BESYLATE 10 MILLIGRAM(S): 2.5 TABLET ORAL at 06:41

## 2018-12-08 NOTE — PROGRESS NOTE ADULT - SUBJECTIVE AND OBJECTIVE BOX
CC: Patient is a 63y old  Female who presents with a chief complaint of Spine surgery (07 Dec 2018 15:10)    SUBJECTIVE / OVERNIGHT EVENTS: No new complaints. Pain is controlled. Denies headache, weakness, malaise, fevers, chills, visual changes, chest pain, dyspnea, cough, abdominal pain, nausea, vomiting, diarrhea, constipation, dysuria, hematuria, polyuria, pruritis, joint pain    MEDICATIONS  (STANDING):  amLODIPine   Tablet 10 milliGRAM(s) Oral daily  dexamethasone  IVPB 10 milliGRAM(s) IV Intermittent every 6 hours  dextrose 5%. 1000 milliLiter(s) (50 mL/Hr) IV Continuous <Continuous>  dextrose 50% Injectable 12.5 Gram(s) IV Push once  dextrose 50% Injectable 25 Gram(s) IV Push once  dextrose 50% Injectable 25 Gram(s) IV Push once  docusate sodium 100 milliGRAM(s) Oral three times a day  DULoxetine 90 milliGRAM(s) Oral daily  gabapentin 600 milliGRAM(s) Oral four times a day  influenza   Vaccine 0.5 milliLiter(s) IntraMuscular once  insulin lispro (HumaLOG) corrective regimen sliding scale   SubCutaneous three times a day before meals  insulin lispro (HumaLOG) corrective regimen sliding scale   SubCutaneous at bedtime  losartan 100 milliGRAM(s) Oral daily  pantoprazole    Tablet 40 milliGRAM(s) Oral before breakfast  polyethylene glycol 3350 17 Gram(s) Oral at bedtime  senna 2 Tablet(s) Oral at bedtime    MEDICATIONS  (PRN):  acetaminophen   Tablet .. 650 milliGRAM(s) Oral every 6 hours PRN Mild Pain (1 - 3)  dextrose 40% Gel 15 Gram(s) Oral once PRN Blood Glucose LESS THAN 70 milliGRAM(s)/deciliter  diphenhydrAMINE 25 milliGRAM(s) Oral every 4 hours PRN Rash and/or Itching  glucagon  Injectable 1 milliGRAM(s) IntraMuscular once PRN Glucose LESS THAN 70 milligrams/deciliter  hydrALAZINE 10 milliGRAM(s) Oral every 8 hours PRN Systolic blood pressure > 150  ondansetron Injectable 4 milliGRAM(s) IV Push every 6 hours PRN Nausea and/or Vomiting  tiZANidine 2 milliGRAM(s) Oral every 6 hours PRN Muscle Spasm      Vital Signs Last 24 Hrs  T(C): 36.9 (08 Dec 2018 09:15), Max: 37.1 (08 Dec 2018 06:33)  T(F): 98.5 (08 Dec 2018 09:15), Max: 98.7 (08 Dec 2018 06:33)  HR: 74 (08 Dec 2018 09:15) (53 - 74)  BP: 125/73 (08 Dec 2018 09:15) (125/73 - 178/73)  BP(mean): --  RR: 16 (08 Dec 2018 09:15) (16 - 17)  SpO2: 97% (08 Dec 2018 09:15) (95% - 99%)  CAPILLARY BLOOD GLUCOSE      POCT Blood Glucose.: 199 mg/dL (08 Dec 2018 11:47)  POCT Blood Glucose.: 180 mg/dL (08 Dec 2018 07:40)  POCT Blood Glucose.: 225 mg/dL (07 Dec 2018 22:12)  POCT Blood Glucose.: 185 mg/dL (07 Dec 2018 17:00)        PHYSICAL EXAM:  GENERAL: NAD, well-developed  HEAD:  Atraumatic, Normocephalic  EYES: EOMI, conjunctiva and sclera clear  NECK: Supple, No JVD  CHEST/LUNG: Clear to auscultation bilaterally; No wheeze  HEART: Regular rate and rhythm; No murmurs, rubs, or gallops  ABDOMEN: Soft, Nontender, Nondistended; Bowel sounds present  EXTREMITIES:  2+ Peripheral Pulses, No clubbing, cyanosis, or edema  MSK: +HemoVac   PSYCH: AAOx3  NEUROLOGY: non-focal  SKIN: No rashes or lesions    LABS:                    RADIOLOGY & ADDITIONAL TESTS:    Imaging Personally Reviewed: Y    Consultant(s) Notes Reviewed:  Ortho     Care Discussed with Consultants/Other Providers: Ortho

## 2018-12-08 NOTE — PROGRESS NOTE ADULT - ASSESSMENT
63F h/o kyphosis, spondylolisthesis, spinal stenosis. CAD s/p AVE x 2 (2015), HTN, HLD, DM with diabetic peripheral neuropathy who is s/p T10 to pelvis decompression and fusion on 11/27/18, POD#11, hospital course complicated by intraparenchymal hemorrhagic stroke. Currently HD stable

## 2018-12-08 NOTE — PROGRESS NOTE ADULT - ASSESSMENT
A/P: 63 year old female s/p T10-Pelvis PSF complicated by Frontoparietal IPH  - Neurology monitoring, Followneurology recommendations  - Regular Diet  - PT/OT, OOB  - Monitor HV per Plastics for removal  -strict BP control    Ortho [id number] A/P: 63 year old female s/p T10-Pelvis PSF complicated by Frontoparietal IPH  - Neurology monitoring, Follow up neurology recommendations  - Regular Diet  - PT/OT, OOB  - Monitor HV per Plastics for removal  -strict BP control    Ortho [id number]

## 2018-12-08 NOTE — PROGRESS NOTE ADULT - PROBLEM SELECTOR PLAN 1
MRI/MRA confirms a left temporal lobe early subacute intraparenchymal hemorrhage. Slight medialization of the uncus without herniation. No midline shift. Patient not demonstrating neurologic deficits at this time.   - Appreciate Neurology recommendations, in lieu of hemorrhagic stroke would hold ASA/Plavix and pharmacologic DVT ppx for now   - BP goals < 160/90   - S&S, PT/OT/Speech Therapy

## 2018-12-08 NOTE — PROGRESS NOTE ADULT - SUBJECTIVE AND OBJECTIVE BOX
Ortho Progress Note    S: Patient seen and examined. No acute events overnight. Pain well controlled with current regimen. Denies lightheadedness/dizziness, CP/SOB. Tolerating diet.       O:  Physical Exam:  Gen: Laying in bed, NAD, alert and oriented.   Resp: Unlabored breathing    Motor:                 L2             L3             L4               L5            S1  R         5/5           5/5          5/5             3/5           5/5  L          5/5          5/5           5/5             5/5           5/5    Sensory:             L2          L3         L4      L5       S1         (0=absent, 1=impaired, 2=normal, NT=not testable)  R         2            2            2        2        2  L          2            2           2        2         2      Vital Signs Last 24 Hrs  T(C): 37.1 (08 Dec 2018 06:33), Max: 37.1 (08 Dec 2018 06:33)  T(F): 98.7 (08 Dec 2018 06:33), Max: 98.7 (08 Dec 2018 06:33)  HR: 58 (08 Dec 2018 06:33) (51 - 66)  BP: 160/65 (08 Dec 2018 06:33) (148/67 - 192/72)  BP(mean): --  RR: 16 (08 Dec 2018 06:33) (16 - 18)  SpO2: 98% (08 Dec 2018 06:33) (95% - 100%)                          10.4   8.25  )-----------( 301      ( 06 Dec 2018 11:05 )             32.0       12-06    137  |  99  |  26<H>  ----------------------------<  222<H>  4.3   |  24  |  0.94

## 2018-12-09 LAB
GLUCOSE BLDC GLUCOMTR-MCNC: 157 MG/DL — HIGH (ref 70–99)
GLUCOSE BLDC GLUCOMTR-MCNC: 189 MG/DL — HIGH (ref 70–99)
GLUCOSE BLDC GLUCOMTR-MCNC: 230 MG/DL — HIGH (ref 70–99)
GLUCOSE BLDC GLUCOMTR-MCNC: 271 MG/DL — HIGH (ref 70–99)

## 2018-12-09 PROCEDURE — 99233 SBSQ HOSP IP/OBS HIGH 50: CPT

## 2018-12-09 RX ORDER — INSULIN LISPRO 100/ML
VIAL (ML) SUBCUTANEOUS
Qty: 0 | Refills: 0 | Status: DISCONTINUED | OUTPATIENT
Start: 2018-12-09 | End: 2018-12-12

## 2018-12-09 RX ORDER — DEXTROSE 50 % IN WATER 50 %
15 SYRINGE (ML) INTRAVENOUS ONCE
Qty: 0 | Refills: 0 | Status: DISCONTINUED | OUTPATIENT
Start: 2018-12-09 | End: 2018-12-12

## 2018-12-09 RX ORDER — DEXTROSE 50 % IN WATER 50 %
12.5 SYRINGE (ML) INTRAVENOUS ONCE
Qty: 0 | Refills: 0 | Status: DISCONTINUED | OUTPATIENT
Start: 2018-12-09 | End: 2018-12-12

## 2018-12-09 RX ORDER — DEXTROSE 50 % IN WATER 50 %
25 SYRINGE (ML) INTRAVENOUS ONCE
Qty: 0 | Refills: 0 | Status: DISCONTINUED | OUTPATIENT
Start: 2018-12-09 | End: 2018-12-12

## 2018-12-09 RX ORDER — SODIUM CHLORIDE 9 MG/ML
1000 INJECTION, SOLUTION INTRAVENOUS
Qty: 0 | Refills: 0 | Status: DISCONTINUED | OUTPATIENT
Start: 2018-12-09 | End: 2018-12-12

## 2018-12-09 RX ORDER — INSULIN GLARGINE 100 [IU]/ML
6 INJECTION, SOLUTION SUBCUTANEOUS AT BEDTIME
Qty: 0 | Refills: 0 | Status: DISCONTINUED | OUTPATIENT
Start: 2018-12-09 | End: 2018-12-12

## 2018-12-09 RX ORDER — GLUCAGON INJECTION, SOLUTION 0.5 MG/.1ML
1 INJECTION, SOLUTION SUBCUTANEOUS ONCE
Qty: 0 | Refills: 0 | Status: DISCONTINUED | OUTPATIENT
Start: 2018-12-09 | End: 2018-12-12

## 2018-12-09 RX ADMIN — Medication 2: at 07:53

## 2018-12-09 RX ADMIN — Medication 102 MILLIGRAM(S): at 06:16

## 2018-12-09 RX ADMIN — Medication 102 MILLIGRAM(S): at 00:42

## 2018-12-09 RX ADMIN — Medication 6: at 11:51

## 2018-12-09 RX ADMIN — GABAPENTIN 600 MILLIGRAM(S): 400 CAPSULE ORAL at 00:42

## 2018-12-09 RX ADMIN — GABAPENTIN 600 MILLIGRAM(S): 400 CAPSULE ORAL at 06:16

## 2018-12-09 RX ADMIN — DULOXETINE HYDROCHLORIDE 90 MILLIGRAM(S): 30 CAPSULE, DELAYED RELEASE ORAL at 13:54

## 2018-12-09 RX ADMIN — OXYCODONE HYDROCHLORIDE 5 MILLIGRAM(S): 5 TABLET ORAL at 11:00

## 2018-12-09 RX ADMIN — Medication 2: at 16:50

## 2018-12-09 RX ADMIN — LOSARTAN POTASSIUM 100 MILLIGRAM(S): 100 TABLET, FILM COATED ORAL at 06:18

## 2018-12-09 RX ADMIN — PANTOPRAZOLE SODIUM 40 MILLIGRAM(S): 20 TABLET, DELAYED RELEASE ORAL at 06:18

## 2018-12-09 RX ADMIN — OXYCODONE HYDROCHLORIDE 5 MILLIGRAM(S): 5 TABLET ORAL at 10:18

## 2018-12-09 RX ADMIN — GABAPENTIN 600 MILLIGRAM(S): 400 CAPSULE ORAL at 13:54

## 2018-12-09 RX ADMIN — AMLODIPINE BESYLATE 10 MILLIGRAM(S): 2.5 TABLET ORAL at 06:15

## 2018-12-09 RX ADMIN — INSULIN GLARGINE 6 UNIT(S): 100 INJECTION, SOLUTION SUBCUTANEOUS at 22:32

## 2018-12-09 NOTE — PROGRESS NOTE ADULT - ASSESSMENT
63F h/o kyphosis, spondylolisthesis, spinal stenosis. CAD s/p AVE x 2 (2015), HTN, HLD, DM with diabetic peripheral neuropathy who is s/p T10 to pelvis decompression and fusion on 11/27/18, POD#12, hospital course complicated by intraparenchymal hemorrhagic stroke. Currently HD stable

## 2018-12-09 NOTE — PROGRESS NOTE ADULT - SUBJECTIVE AND OBJECTIVE BOX
CC: Patient is a 63y old  Female who presents with a chief complaint of Spine surgery (07 Dec 2018 15:10)    SUBJECTIVE / OVERNIGHT EVENTS: No new complaints. 1 drained removed last night. Denies headache, weakness, malaise, fevers, chills, visual changes, chest pain, dyspnea, cough, abdominal pain, nausea, vomiting, diarrhea, constipation, dysuria, hematuria, polyuria, pruritis, joint pain    MEDICATIONS  (STANDING):  amLODIPine   Tablet 10 milliGRAM(s) Oral daily  dextrose 5%. 1000 milliLiter(s) (50 mL/Hr) IV Continuous <Continuous>  dextrose 50% Injectable 12.5 Gram(s) IV Push once  dextrose 50% Injectable 25 Gram(s) IV Push once  dextrose 50% Injectable 25 Gram(s) IV Push once  docusate sodium 100 milliGRAM(s) Oral three times a day  DULoxetine 90 milliGRAM(s) Oral daily  gabapentin 600 milliGRAM(s) Oral four times a day  influenza   Vaccine 0.5 milliLiter(s) IntraMuscular once  insulin lispro (HumaLOG) corrective regimen sliding scale   SubCutaneous three times a day before meals  insulin lispro (HumaLOG) corrective regimen sliding scale   SubCutaneous at bedtime  losartan 100 milliGRAM(s) Oral daily  pantoprazole    Tablet 40 milliGRAM(s) Oral before breakfast  polyethylene glycol 3350 17 Gram(s) Oral at bedtime  senna 2 Tablet(s) Oral at bedtime    MEDICATIONS  (PRN):  acetaminophen   Tablet .. 650 milliGRAM(s) Oral every 6 hours PRN Mild Pain (1 - 3)  dextrose 40% Gel 15 Gram(s) Oral once PRN Blood Glucose LESS THAN 70 milliGRAM(s)/deciliter  diphenhydrAMINE 25 milliGRAM(s) Oral every 4 hours PRN Rash and/or Itching  glucagon  Injectable 1 milliGRAM(s) IntraMuscular once PRN Glucose LESS THAN 70 milligrams/deciliter  hydrALAZINE 10 milliGRAM(s) Oral every 8 hours PRN Systolic blood pressure > 150  morphine  - Injectable 2 milliGRAM(s) IV Push every 4 hours PRN breakthrough  ondansetron Injectable 4 milliGRAM(s) IV Push every 6 hours PRN Nausea and/or Vomiting  oxyCODONE    IR 5 milliGRAM(s) Oral every 4 hours PRN Moderate Pain (4 - 6)  oxyCODONE    IR 10 milliGRAM(s) Oral every 4 hours PRN Severe Pain (7 - 10)  tiZANidine 2 milliGRAM(s) Oral every 6 hours PRN Muscle Spasm      Vital Signs Last 24 Hrs  T(C): 36.8 (09 Dec 2018 10:13), Max: 37.1 (09 Dec 2018 06:12)  T(F): 98.3 (09 Dec 2018 10:13), Max: 98.7 (09 Dec 2018 06:12)  HR: 113 (09 Dec 2018 10:13) (60 - 113)  BP: 129/76 (09 Dec 2018 10:13) (119/76 - 173/76)  BP(mean): --  RR: 17 (09 Dec 2018 10:13) (16 - 18)  SpO2: 98% (09 Dec 2018 10:13) (95% - 100%)  CAPILLARY BLOOD GLUCOSE      POCT Blood Glucose.: 230 mg/dL (09 Dec 2018 07:33)  POCT Blood Glucose.: 219 mg/dL (08 Dec 2018 22:12)  POCT Blood Glucose.: 213 mg/dL (08 Dec 2018 16:54)  POCT Blood Glucose.: 199 mg/dL (08 Dec 2018 11:47)        PHYSICAL EXAM:  GENERAL: NAD, well-developed, sitting in chair   HEAD:  Atraumatic, Normocephalic  EYES: EOMI, conjunctiva and sclera clear  NECK: Supple, No JVD  CHEST/LUNG: Clear to auscultation bilaterally; No wheeze  HEART: Regular rate and rhythm; No murmurs, rubs, or gallops  ABDOMEN: Soft, Nontender, Nondistended; Bowel sounds present  EXTREMITIES:  2+ Peripheral Pulses, No clubbing, cyanosis, or edema  MSK: 1 drain in back   PSYCH: AAOx3  NEUROLOGY: non-focal  SKIN: No rashes or lesions    LABS:                    RADIOLOGY & ADDITIONAL TESTS:    Imaging Personally Reviewed: Y    Consultant(s) Notes Reviewed: Y     Care Discussed with Consultants/Other Providers: MIRANDA

## 2018-12-10 ENCOUNTER — CHART COPY (OUTPATIENT)
Age: 63
End: 2018-12-10

## 2018-12-10 DIAGNOSIS — D72.829 ELEVATED WHITE BLOOD CELL COUNT, UNSPECIFIED: ICD-10-CM

## 2018-12-10 LAB
ALBUMIN SERPL ELPH-MCNC: 3.3 G/DL — SIGNIFICANT CHANGE UP (ref 3.3–5)
ALP SERPL-CCNC: 96 U/L — SIGNIFICANT CHANGE UP (ref 40–120)
ALT FLD-CCNC: 19 U/L — SIGNIFICANT CHANGE UP (ref 4–33)
AST SERPL-CCNC: 15 U/L — SIGNIFICANT CHANGE UP (ref 4–32)
BASOPHILS # BLD AUTO: 0.02 K/UL — SIGNIFICANT CHANGE UP (ref 0–0.2)
BASOPHILS NFR BLD AUTO: 0.1 % — SIGNIFICANT CHANGE UP (ref 0–2)
BILIRUB SERPL-MCNC: 0.4 MG/DL — SIGNIFICANT CHANGE UP (ref 0.2–1.2)
BUN SERPL-MCNC: 22 MG/DL — SIGNIFICANT CHANGE UP (ref 7–23)
CALCIUM SERPL-MCNC: 8.4 MG/DL — SIGNIFICANT CHANGE UP (ref 8.4–10.5)
CHLORIDE SERPL-SCNC: 97 MMOL/L — LOW (ref 98–107)
CO2 SERPL-SCNC: 27 MMOL/L — SIGNIFICANT CHANGE UP (ref 22–31)
CREAT SERPL-MCNC: 0.99 MG/DL — SIGNIFICANT CHANGE UP (ref 0.5–1.3)
EOSINOPHIL # BLD AUTO: 0.11 K/UL — SIGNIFICANT CHANGE UP (ref 0–0.5)
EOSINOPHIL NFR BLD AUTO: 0.8 % — SIGNIFICANT CHANGE UP (ref 0–6)
GLUCOSE BLDC GLUCOMTR-MCNC: 126 MG/DL — HIGH (ref 70–99)
GLUCOSE BLDC GLUCOMTR-MCNC: 132 MG/DL — HIGH (ref 70–99)
GLUCOSE BLDC GLUCOMTR-MCNC: 141 MG/DL — HIGH (ref 70–99)
GLUCOSE BLDC GLUCOMTR-MCNC: 154 MG/DL — HIGH (ref 70–99)
GLUCOSE BLDC GLUCOMTR-MCNC: 178 MG/DL — HIGH (ref 70–99)
GLUCOSE SERPL-MCNC: 138 MG/DL — HIGH (ref 70–99)
HCT VFR BLD CALC: 36.9 % — SIGNIFICANT CHANGE UP (ref 34.5–45)
HCT VFR BLD CALC: 40.4 % — SIGNIFICANT CHANGE UP (ref 34.5–45)
HGB BLD-MCNC: 12.1 G/DL — SIGNIFICANT CHANGE UP (ref 11.5–15.5)
HGB BLD-MCNC: 13 G/DL — SIGNIFICANT CHANGE UP (ref 11.5–15.5)
IMM GRANULOCYTES # BLD AUTO: 0.1 # — SIGNIFICANT CHANGE UP
IMM GRANULOCYTES NFR BLD AUTO: 0.7 % — SIGNIFICANT CHANGE UP (ref 0–1.5)
LYMPHOCYTES # BLD AUTO: 19.9 % — SIGNIFICANT CHANGE UP (ref 13–44)
LYMPHOCYTES # BLD AUTO: 2.84 K/UL — SIGNIFICANT CHANGE UP (ref 1–3.3)
MAGNESIUM SERPL-MCNC: 2 MG/DL — SIGNIFICANT CHANGE UP (ref 1.6–2.6)
MCHC RBC-ENTMCNC: 28.3 PG — SIGNIFICANT CHANGE UP (ref 27–34)
MCHC RBC-ENTMCNC: 28.8 PG — SIGNIFICANT CHANGE UP (ref 27–34)
MCHC RBC-ENTMCNC: 32.2 % — SIGNIFICANT CHANGE UP (ref 32–36)
MCHC RBC-ENTMCNC: 32.8 % — SIGNIFICANT CHANGE UP (ref 32–36)
MCV RBC AUTO: 86.4 FL — SIGNIFICANT CHANGE UP (ref 80–100)
MCV RBC AUTO: 89.4 FL — SIGNIFICANT CHANGE UP (ref 80–100)
MONOCYTES # BLD AUTO: 1.02 K/UL — HIGH (ref 0–0.9)
MONOCYTES NFR BLD AUTO: 7.1 % — SIGNIFICANT CHANGE UP (ref 2–14)
NEUTROPHILS # BLD AUTO: 10.18 K/UL — HIGH (ref 1.8–7.4)
NEUTROPHILS NFR BLD AUTO: 71.4 % — SIGNIFICANT CHANGE UP (ref 43–77)
NRBC # FLD: 0 — SIGNIFICANT CHANGE UP
NRBC # FLD: 0 — SIGNIFICANT CHANGE UP
PLATELET # BLD AUTO: 342 K/UL — SIGNIFICANT CHANGE UP (ref 150–400)
PLATELET # BLD AUTO: 358 K/UL — SIGNIFICANT CHANGE UP (ref 150–400)
PMV BLD: 8.8 FL — SIGNIFICANT CHANGE UP (ref 7–13)
PMV BLD: 8.8 FL — SIGNIFICANT CHANGE UP (ref 7–13)
POTASSIUM SERPL-MCNC: 3.6 MMOL/L — SIGNIFICANT CHANGE UP (ref 3.5–5.3)
POTASSIUM SERPL-SCNC: 3.6 MMOL/L — SIGNIFICANT CHANGE UP (ref 3.5–5.3)
PROT SERPL-MCNC: 6.3 G/DL — SIGNIFICANT CHANGE UP (ref 6–8.3)
RBC # BLD: 4.27 M/UL — SIGNIFICANT CHANGE UP (ref 3.8–5.2)
RBC # BLD: 4.52 M/UL — SIGNIFICANT CHANGE UP (ref 3.8–5.2)
RBC # FLD: 14.8 % — HIGH (ref 10.3–14.5)
RBC # FLD: 14.9 % — HIGH (ref 10.3–14.5)
SODIUM SERPL-SCNC: 135 MMOL/L — SIGNIFICANT CHANGE UP (ref 135–145)
WBC # BLD: 14.27 K/UL — HIGH (ref 3.8–10.5)
WBC # BLD: 15.6 K/UL — HIGH (ref 3.8–10.5)
WBC # FLD AUTO: 14.27 K/UL — HIGH (ref 3.8–10.5)
WBC # FLD AUTO: 15.6 K/UL — HIGH (ref 3.8–10.5)

## 2018-12-10 PROCEDURE — 99232 SBSQ HOSP IP/OBS MODERATE 35: CPT | Mod: GC

## 2018-12-10 PROCEDURE — 70450 CT HEAD/BRAIN W/O DYE: CPT | Mod: 26

## 2018-12-10 PROCEDURE — 99233 SBSQ HOSP IP/OBS HIGH 50: CPT

## 2018-12-10 PROCEDURE — 72082 X-RAY EXAM ENTIRE SPI 2/3 VW: CPT | Mod: 26

## 2018-12-10 RX ORDER — BACITRACIN ZINC 500 UNIT/G
1 OINTMENT IN PACKET (EA) TOPICAL ONCE
Qty: 0 | Refills: 0 | Status: COMPLETED | OUTPATIENT
Start: 2018-12-10 | End: 2018-12-10

## 2018-12-10 RX ADMIN — GABAPENTIN 600 MILLIGRAM(S): 400 CAPSULE ORAL at 12:14

## 2018-12-10 RX ADMIN — Medication 1 APPLICATION(S): at 15:59

## 2018-12-10 RX ADMIN — OXYCODONE HYDROCHLORIDE 5 MILLIGRAM(S): 5 TABLET ORAL at 11:50

## 2018-12-10 RX ADMIN — OXYCODONE HYDROCHLORIDE 5 MILLIGRAM(S): 5 TABLET ORAL at 10:44

## 2018-12-10 RX ADMIN — LOSARTAN POTASSIUM 100 MILLIGRAM(S): 100 TABLET, FILM COATED ORAL at 05:56

## 2018-12-10 RX ADMIN — GABAPENTIN 600 MILLIGRAM(S): 400 CAPSULE ORAL at 17:31

## 2018-12-10 RX ADMIN — Medication 2: at 12:10

## 2018-12-10 RX ADMIN — DULOXETINE HYDROCHLORIDE 90 MILLIGRAM(S): 30 CAPSULE, DELAYED RELEASE ORAL at 12:23

## 2018-12-10 RX ADMIN — PANTOPRAZOLE SODIUM 40 MILLIGRAM(S): 20 TABLET, DELAYED RELEASE ORAL at 05:52

## 2018-12-10 RX ADMIN — AMLODIPINE BESYLATE 10 MILLIGRAM(S): 2.5 TABLET ORAL at 05:53

## 2018-12-10 RX ADMIN — INSULIN GLARGINE 6 UNIT(S): 100 INJECTION, SOLUTION SUBCUTANEOUS at 21:49

## 2018-12-10 RX ADMIN — GABAPENTIN 600 MILLIGRAM(S): 400 CAPSULE ORAL at 01:29

## 2018-12-10 RX ADMIN — GABAPENTIN 600 MILLIGRAM(S): 400 CAPSULE ORAL at 05:54

## 2018-12-10 NOTE — PROGRESS NOTE ADULT - ASSESSMENT
1. PT- bed mobility,transfers, gait and balance training  2. OT- ADL'S  3. CVA-bp control   4. leukocytosis- continue to monitor   4. Patient would benefit from rehab-> will reassess tomorrow as todays function as reported by PT was worse than 12/7, perhaps due to syncopal episode.

## 2018-12-10 NOTE — PROGRESS NOTE ADULT - PROBLEM SELECTOR PLAN 2
MRI/MRA confirms a left temporal lobe early subacute intraparenchymal hemorrhage. Slight medialization of the uncus without herniation. No midline shift. Patient not demonstrating neurologic deficits at this time.   - Appreciate Neurology recommendations, in lieu of hemorrhagic stroke would hold ASA/Plavix and pharmacologic DVT ppx for now   - BP goals < 160/90   -CTH repeated this AM after pre-syncopal event, shows stable ICH  -Neuro f/u requested

## 2018-12-10 NOTE — PROGRESS NOTE ADULT - ASSESSMENT
62 y/o female s/p Posterior Spinal Decompression Fusion T10-Pelvis POD #13,  s/p subarachnoid hemorrhage with episode of dizzyness this morning.  Patient is neurologically and hemodynamically stable and no rapid response intervention indicated.    -Head CT without contrast as per neurology recommendation  -Follow-up Head CT results   -Follow-up DVT prophylaxis status with neurology once Head CT results are available  -OHOS following  -F/U AM CBC with differential 64 y/o female s/p Posterior Spinal Decompression Fusion T10-Pelvis POD #13,  s/p stable intracranial hemorrhage with episode of dizzyness this morning.  Patient is neurologically and hemodynamically stable.  No rapid response intervention indicated.    -Head CT without contrast as per neurology recommendation  -Follow-up Head CT results   -Follow-up DVT prophylaxis status with neurology once Head CT results are available  -OHOS following  -F/U AM CBC with differential  -Check orthostatics  -Monitor BP:  SBP <160

## 2018-12-10 NOTE — PROGRESS NOTE ADULT - SUBJECTIVE AND OBJECTIVE BOX
Patient was assisted by two nurses in bathroom with walker.  Patient states she felt dizzy and put aside the walker to rest on the sink ledge.  Nurses safely assisted patient to floor from standing position.  Patient denies palpitations, shortness of breath, confusion. A rapid response was called by the nurse. Patient was safely transferred back to bed.      Vital Signs Last 24 Hrs  T(C): 36.7 (10 Dec 2018 09:07), Max: 36.9 (09 Dec 2018 17:07)  T(F): 98.1 (10 Dec 2018 09:07), Max: 98.5 (09 Dec 2018 17:07)  HR: 106 (10 Dec 2018 10:18) (77 - 106)  BP: 140/77 (10 Dec 2018 10:18) (140/77 - 150/81)  BP(mean): --  ABP: --  ABP(mean): --  RR: 17 (10 Dec 2018 10:18) (16 - 18)  SpO2: 99% (10 Dec 2018 10:18) (96% - 100%)                            12.1   14.27 )-----------( 358      ( 10 Dec 2018 06:12 )             36.9        Physical Exam:    Patient alert and oriented to person, place and time.  Responding appropriately.    Motor strength 5/5:  supraspinatus, infraspinatus, deltoid, biceps, triceps, brachioradialis, wrist extensors/wrist flexors, finger abductor/finger .    Motor strength 5/5:  quadriceps, hamstrings,  tibialis anterior, gastroc-soleus,  EHL     Sensation is grossly intact throughout. Patient was assisted by two nurses in bathroom with walker.  Patient states she felt dizzy and put aside the walker to rest on the sink ledge.  Nurses safely assisted patient to floor from standing position.  Patient denies palpitations, shortness of breath, confusion, bladder or bowel incontinence.  Rapid Response Team was called by the nurse.  Patient was safely transferred back to bed.      Vital Signs Last 24 Hrs  T(C): 36.7 (10 Dec 2018 09:07), Max: 36.9 (09 Dec 2018 17:07)  T(F): 98.1 (10 Dec 2018 09:07), Max: 98.5 (09 Dec 2018 17:07)  HR: 106 (10 Dec 2018 10:18) (77 - 106)  BP: 140/77 (10 Dec 2018 10:18) (140/77 - 150/81)  BP(mean): --  ABP: --  ABP(mean): --  RR: 17 (10 Dec 2018 10:18) (16 - 18)  SpO2: 99% (10 Dec 2018 10:18) (96% - 100%)                            12.1   14.27 )-----------( 358      ( 10 Dec 2018 06:12 )             36.9        Physical Exam:    Patient alert and oriented to person, place and time.  Responding appropriately.    Motor strength 5/5:  supraspinatus, infraspinatus, deltoid, biceps, triceps, brachioradialis, wrist extensors/wrist flexors, finger abductor/finger .    Motor strength 5/5:  quadriceps, hamstrings,  tibialis anterior, gastroc-soleus,  EHL     Sensation is grossly intact throughout.

## 2018-12-10 NOTE — PROGRESS NOTE ADULT - ASSESSMENT
63F h/o kyphosis, spondylolisthesis, spinal stenosis. CAD s/p AVE x 2 (2015), HTN, HLD, DM with diabetic peripheral neuropathy who is s/p T10 to pelvis decompression and fusion on 11/27/18, POD#13, hospital course complicated by intraparenchymal hemorrhagic stroke. Currently HD stable

## 2018-12-10 NOTE — PROGRESS NOTE ADULT - PROBLEM SELECTOR PLAN 3
Resolved  BP well controlled  Continue with Losartan and Amlodipine  d/c hydralazine PRN as has not required in 2 days

## 2018-12-10 NOTE — PROGRESS NOTE ADULT - SUBJECTIVE AND OBJECTIVE BOX
63F h/o kyphosis, spondylolisthesis, spinal stenosis. CAD s/p AVE x 2 (2015), HTN, HLD, DM with diabetic peripheral neuropathy who is s/p T10 to pelvis decompression and fusion on 11/27/18 course complicated by acute Wernicke's aphasia the night of 12/3 2/2 left parietotemporal intracerebral hematoma of unknown etiology s/p SICU stay -- now back on the floors with improved speech.  MRI/MRA confirms a left temporal lobe early subacute intraparenchymal hemorrhage. Slight medialization of the uncus without herniation. No midline shift.  Asa.Plavix/DVt px held/   RRT today for syncope in bathroom. CT head with stable ICH  + leukocytosis->? reactive     REVIEW OF SYSTEMS: No chest pain, shortness of breath, nausea, vomiting or diarhea.        CURRENT FUNCTIONAL STATUS: Cmin a ( this is a change from 12/7)    ICU Vital Signs Last 24 Hrs  T(C): 36.8 (10 Dec 2018 16:00), Max: 36.9 (09 Dec 2018 17:07)  T(F): 98.3 (10 Dec 2018 16:00), Max: 98.5 (09 Dec 2018 17:07)  HR: 104 (10 Dec 2018 16:00) (77 - 106)  BP: 136/77 (10 Dec 2018 16:00) (136/77 - 150/81)  BP(mean): --  ABP: --  ABP(mean): --  RR: 16 (10 Dec 2018 16:00) (16 - 18)  SpO2: 99% (10 Dec 2018 16:00) (96% - 100%)      ----------------------------------------------------------------------------------------  PHYSICAL EXAM  Constitutional - NAD, Comfortable  HEENT - NCAT, EOMI  Neck - Supple, No limited ROM  Chest - CTA bilaterally, No wheeze, No rhonchi, No crackles  Cardiovascular - RRR, S1S2, No murmurs  Abdomen - BS+, Soft, NTND  Extremities - No C/C/E, No calf tenderness   Neurologic Exam -                    Cognitive - Awake, Alert, AAO to self, place, date, year, situation     Communication - Fluent, No dysarthria, no aphasia     Cranial Nerves - CN 2-12 intact     Motor - 4/5 b/l LE ( due to pain)5/5 UE                        Sensory - Intact to LT     Reflexes - DTR Intact, No primitive reflexive     Balance - wide based gait   Psychiatric - Mood stable, Affect WNL

## 2018-12-10 NOTE — PROGRESS NOTE ADULT - SUBJECTIVE AND OBJECTIVE BOX
DEMI ARCHIBALD   [id number]    Patient stable, tolerating diet, pain controlled on regimen.      T(C): 36.8 (12-10-18 @ 17:01), Max: 36.8 (12-10-18 @ 16:00)  HR: 90 (12-10-18 @ 17:01) (78 - 106)  BP: 135/71 (12-10-18 @ 17:01) (135/71 - 150/81)  RR: 18 (12-10-18 @ 17:01) (16 - 18)  SpO2: 99% (12-10-18 @ 17:01) (96% - 99%)  Wt(kg): --  NAD  Back: Dressing clean/dry/adherent.  Soft.  No collection.    BLE: No calf tenderness.      12-09 @ 07:01  -  12-10 @ 07:00  --------------------------------------------------------  IN: 0 mL / OUT: 22.5 mL / NET: -22.5 mL    12-10 @ 07:01  -  12-10 @ 21:26  --------------------------------------------------------  IN: 0 mL / OUT: 320 mL / NET: -320 mL      Hemovac:  TALHA:   acetaminophen   Tablet .. 650 milliGRAM(s) Oral every 6 hours PRN  amLODIPine   Tablet 10 milliGRAM(s) Oral daily  dextrose 40% Gel 15 Gram(s) Oral once PRN  dextrose 5%. 1000 milliLiter(s) IV Continuous <Continuous>  dextrose 50% Injectable 12.5 Gram(s) IV Push once  dextrose 50% Injectable 25 Gram(s) IV Push once  dextrose 50% Injectable 25 Gram(s) IV Push once  diphenhydrAMINE 25 milliGRAM(s) Oral every 4 hours PRN  docusate sodium 100 milliGRAM(s) Oral three times a day  DULoxetine 90 milliGRAM(s) Oral daily  gabapentin 600 milliGRAM(s) Oral four times a day  glucagon  Injectable 1 milliGRAM(s) IntraMuscular once PRN  influenza   Vaccine 0.5 milliLiter(s) IntraMuscular once  insulin glargine Injectable (LANTUS) 6 Unit(s) SubCutaneous at bedtime  insulin lispro (HumaLOG) corrective regimen sliding scale   SubCutaneous three times a day before meals  losartan 100 milliGRAM(s) Oral daily  morphine  - Injectable 2 milliGRAM(s) IV Push every 4 hours PRN  ondansetron Injectable 4 milliGRAM(s) IV Push every 6 hours PRN  oxyCODONE    IR 5 milliGRAM(s) Oral every 4 hours PRN  oxyCODONE    IR 10 milliGRAM(s) Oral every 4 hours PRN  pantoprazole    Tablet 40 milliGRAM(s) Oral before breakfast  polyethylene glycol 3350 17 Gram(s) Oral at bedtime  senna 2 Tablet(s) Oral at bedtime  tiZANidine 2 milliGRAM(s) Oral every 6 hours PRN                            13.0   15.60 )-----------( 342      ( 10 Dec 2018 18:00 )             40.4     12-10    135  |  97<L>  |  22  ----------------------------<  138<H>  3.6   |  27  |  0.99    Ca    8.4      10 Dec 2018 06:12  Mg     2.0     12-10    TPro  6.3  /  Alb  3.3  /  TBili  0.4  /  DBili  x   /  AST  15  /  ALT  19  /  AlkPhos  96  12-10

## 2018-12-10 NOTE — PROGRESS NOTE ADULT - SUBJECTIVE AND OBJECTIVE BOX
Patient is a 63y old  Female who presents with a chief complaint of s/p posterior spinal fusion (10 Dec 2018 14:31)        SUBJECTIVE / OVERNIGHT EVENTS:    Around 10am pt had RRT- pt was in the bathroom when she felt weak and that her knees were buckling and lowered herself to the ground - witnessed by RN/PT.   Pt examined in bed around 10:30 after the event. Pt very distressed that this will delay her discharge.   She denies any cp, sob, palpitations, cough, nausea, vomiting, abdominal pain, diarrhea, or dysuria. Denies LE pain or edema.  She c/o back pain at site of surgery.         MEDICATIONS  (STANDING):  amLODIPine   Tablet 10 milliGRAM(s) Oral daily  dextrose 5%. 1000 milliLiter(s) (50 mL/Hr) IV Continuous <Continuous>  dextrose 50% Injectable 12.5 Gram(s) IV Push once  dextrose 50% Injectable 25 Gram(s) IV Push once  dextrose 50% Injectable 25 Gram(s) IV Push once  docusate sodium 100 milliGRAM(s) Oral three times a day  DULoxetine 90 milliGRAM(s) Oral daily  gabapentin 600 milliGRAM(s) Oral four times a day  influenza   Vaccine 0.5 milliLiter(s) IntraMuscular once  insulin glargine Injectable (LANTUS) 6 Unit(s) SubCutaneous at bedtime  insulin lispro (HumaLOG) corrective regimen sliding scale   SubCutaneous three times a day before meals  losartan 100 milliGRAM(s) Oral daily  pantoprazole    Tablet 40 milliGRAM(s) Oral before breakfast  polyethylene glycol 3350 17 Gram(s) Oral at bedtime  senna 2 Tablet(s) Oral at bedtime    MEDICATIONS  (PRN):  acetaminophen   Tablet .. 650 milliGRAM(s) Oral every 6 hours PRN Mild Pain (1 - 3)  dextrose 40% Gel 15 Gram(s) Oral once PRN Blood Glucose LESS THAN 70 milliGRAM(s)/deciliter  diphenhydrAMINE 25 milliGRAM(s) Oral every 4 hours PRN Rash and/or Itching  glucagon  Injectable 1 milliGRAM(s) IntraMuscular once PRN Glucose LESS THAN 70 milligrams/deciliter  morphine  - Injectable 2 milliGRAM(s) IV Push every 4 hours PRN breakthrough  ondansetron Injectable 4 milliGRAM(s) IV Push every 6 hours PRN Nausea and/or Vomiting  oxyCODONE    IR 5 milliGRAM(s) Oral every 4 hours PRN Moderate Pain (4 - 6)  oxyCODONE    IR 10 milliGRAM(s) Oral every 4 hours PRN Severe Pain (7 - 10)  tiZANidine 2 milliGRAM(s) Oral every 6 hours PRN Muscle Spasm      Vital Signs Last 24 Hrs  T(C): 36.7 (10 Dec 2018 09:07), Max: 36.9 (09 Dec 2018 17:07)  T(F): 98.1 (10 Dec 2018 09:07), Max: 98.5 (09 Dec 2018 17:07)  HR: 106 (10 Dec 2018 10:18) (77 - 106)  BP: 140/77 (10 Dec 2018 10:18) (140/77 - 150/81)  BP(mean): --  RR: 17 (10 Dec 2018 10:18) (16 - 18)  SpO2: 99% (10 Dec 2018 10:18) (96% - 100%)  CAPILLARY BLOOD GLUCOSE      POCT Blood Glucose.: 154 mg/dL (10 Dec 2018 11:42)  POCT Blood Glucose.: 178 mg/dL (10 Dec 2018 10:18)  POCT Blood Glucose.: 132 mg/dL (10 Dec 2018 07:40)  POCT Blood Glucose.: 157 mg/dL (09 Dec 2018 21:59)  POCT Blood Glucose.: 189 mg/dL (09 Dec 2018 16:46)    I&O's Summary    09 Dec 2018 07:01  -  10 Dec 2018 07:00  --------------------------------------------------------  IN: 0 mL / OUT: 22.5 mL / NET: -22.5 mL    10 Dec 2018 07:01  -  10 Dec 2018 15:15  --------------------------------------------------------  IN: 0 mL / OUT: 20 mL / NET: -20 mL          PHYSICAL EXAM    GENERAL: anxious  HEAD:  Atraumatic, Normocephalic  EYES: EOMI, conjunctiva and sclera clear  NECK: Supple, No JVD  CHEST/LUNG: Clear to auscultation bilaterally; No wheeze  HEART: Regular rate and rhythm; No murmurs, rubs, or gallops  ABDOMEN: Soft, Nontender, Nondistended; Bowel sounds present  EXTREMITIES:  2+ Peripheral Pulses, No clubbing, cyanosis, or edema  NEUROLOGY: non-focal  SKIN: No rashes or lesions    LABS:                        12.1   14.27 )-----------( 358      ( 10 Dec 2018 06:12 )             36.9     12-10    135  |  97<L>  |  22  ----------------------------<  138<H>  3.6   |  27  |  0.99    Ca    8.4      10 Dec 2018 06:12  Mg     2.0     12-10    TPro  6.3  /  Alb  3.3  /  TBili  0.4  /  DBili  x   /  AST  15  /  ALT  19  /  AlkPhos  96  12-10              RADIOLOGY & ADDITIONAL TESTS:    Imaging Personally Reviewed:  Consultant(s) Notes Reviewed:    Care Discussed with Consultants/Other Providers: Patient is a 63y old  Female who presents with a chief complaint of s/p posterior spinal fusion (10 Dec 2018 14:31)        SUBJECTIVE / OVERNIGHT EVENTS:    Around 10am pt had RRT- pt was in the bathroom when she felt weak and that her knees were buckling and lowered herself to the ground - witnessed by RN/PT.   Pt examined in bed around 10:30 after the event. Pt very distressed that this will delay her discharge.   She denies any cp, sob, palpitations, cough, nausea, vomiting, abdominal pain, diarrhea, or dysuria. Denies LE pain or edema.  She c/o back pain at site of surgery.         MEDICATIONS  (STANDING):  amLODIPine   Tablet 10 milliGRAM(s) Oral daily  dextrose 5%. 1000 milliLiter(s) (50 mL/Hr) IV Continuous <Continuous>  dextrose 50% Injectable 12.5 Gram(s) IV Push once  dextrose 50% Injectable 25 Gram(s) IV Push once  dextrose 50% Injectable 25 Gram(s) IV Push once  docusate sodium 100 milliGRAM(s) Oral three times a day  DULoxetine 90 milliGRAM(s) Oral daily  gabapentin 600 milliGRAM(s) Oral four times a day  influenza   Vaccine 0.5 milliLiter(s) IntraMuscular once  insulin glargine Injectable (LANTUS) 6 Unit(s) SubCutaneous at bedtime  insulin lispro (HumaLOG) corrective regimen sliding scale   SubCutaneous three times a day before meals  losartan 100 milliGRAM(s) Oral daily  pantoprazole    Tablet 40 milliGRAM(s) Oral before breakfast  polyethylene glycol 3350 17 Gram(s) Oral at bedtime  senna 2 Tablet(s) Oral at bedtime    MEDICATIONS  (PRN):  acetaminophen   Tablet .. 650 milliGRAM(s) Oral every 6 hours PRN Mild Pain (1 - 3)  dextrose 40% Gel 15 Gram(s) Oral once PRN Blood Glucose LESS THAN 70 milliGRAM(s)/deciliter  diphenhydrAMINE 25 milliGRAM(s) Oral every 4 hours PRN Rash and/or Itching  glucagon  Injectable 1 milliGRAM(s) IntraMuscular once PRN Glucose LESS THAN 70 milligrams/deciliter  morphine  - Injectable 2 milliGRAM(s) IV Push every 4 hours PRN breakthrough  ondansetron Injectable 4 milliGRAM(s) IV Push every 6 hours PRN Nausea and/or Vomiting  oxyCODONE    IR 5 milliGRAM(s) Oral every 4 hours PRN Moderate Pain (4 - 6)  oxyCODONE    IR 10 milliGRAM(s) Oral every 4 hours PRN Severe Pain (7 - 10)  tiZANidine 2 milliGRAM(s) Oral every 6 hours PRN Muscle Spasm      Vital Signs Last 24 Hrs  T(C): 36.7 (10 Dec 2018 09:07), Max: 36.9 (09 Dec 2018 17:07)  T(F): 98.1 (10 Dec 2018 09:07), Max: 98.5 (09 Dec 2018 17:07)  HR: 106 (10 Dec 2018 10:18) (77 - 106)  BP: 140/77 (10 Dec 2018 10:18) (140/77 - 150/81)  BP(mean): --  RR: 17 (10 Dec 2018 10:18) (16 - 18)  SpO2: 99% (10 Dec 2018 10:18) (96% - 100%)  CAPILLARY BLOOD GLUCOSE      POCT Blood Glucose.: 154 mg/dL (10 Dec 2018 11:42)  POCT Blood Glucose.: 178 mg/dL (10 Dec 2018 10:18)  POCT Blood Glucose.: 132 mg/dL (10 Dec 2018 07:40)  POCT Blood Glucose.: 157 mg/dL (09 Dec 2018 21:59)  POCT Blood Glucose.: 189 mg/dL (09 Dec 2018 16:46)    I&O's Summary    09 Dec 2018 07:01  -  10 Dec 2018 07:00  --------------------------------------------------------  IN: 0 mL / OUT: 22.5 mL / NET: -22.5 mL    10 Dec 2018 07:01  -  10 Dec 2018 15:15  --------------------------------------------------------  IN: 0 mL / OUT: 20 mL / NET: -20 mL          PHYSICAL EXAM    GENERAL: anxious  HEAD:  Atraumatic, Normocephalic  EYES: EOMI, conjunctiva and sclera clear  NECK: Supple, No JVD  CHEST/LUNG: Clear to auscultation bilaterally; No wheeze  HEART: Regular rate and rhythm; No murmurs, rubs, or gallops  ABDOMEN: Soft, Nontender, Nondistended; Bowel sounds present  EXTREMITIES:  2+ Peripheral Pulses, No clubbing, cyanosis, or edema  NEUROLOGY: non-focal  SKIN: No rashes or lesions    LABS:                        12.1   14.27 )-----------( 358      ( 10 Dec 2018 06:12 )             36.9     12-10    135  |  97<L>  |  22  ----------------------------<  138<H>  3.6   |  27  |  0.99    Ca    8.4      10 Dec 2018 06:12  Mg     2.0     12-10    TPro  6.3  /  Alb  3.3  /  TBili  0.4  /  DBili  x   /  AST  15  /  ALT  19  /  AlkPhos  96  12-10              RADIOLOGY & ADDITIONAL TESTS:    Imaging Personally Reviewed:  Consultant(s) Notes Reviewed:    Care Discussed with Consultants/Other Providers: Ortho PA

## 2018-12-10 NOTE — PROGRESS NOTE ADULT - SUBJECTIVE AND OBJECTIVE BOX
Ortho Progress Note    S: Patient seen and examined. No acute events overnight. Pain well controlled with current regimen. Denies lightheadedness/dizziness, CP/SOB. Tolerating diet.  Notes she would like to be discharged soon.	      O:  Physical Exam:  Gen: Laying in bed, NAD, alert and oriented.   Resp: Unlabored breathing    Motor:                 L2             L3             L4               L5            S1  R         5/5           5/5          5/5             3/5           5/5  L          5/5          5/5           5/5             5/5           5/5    Sensory:             L2          L3         L4      L5       S1         (0=absent, 1=impaired, 2=normal, NT=not testable)  R         2            2            2        2        2  L          2            2           2        2         2    Vital Signs Last 24 Hrs  T(C): 36.5 (10 Dec 2018 05:45), Max: 37.1 (09 Dec 2018 13:54)  T(F): 97.7 (10 Dec 2018 05:45), Max: 98.8 (09 Dec 2018 13:54)  HR: 81 (10 Dec 2018 05:45) (77 - 113)  BP: 150/81 (10 Dec 2018 05:45) (119/76 - 150/81)  BP(mean): --  RR: 16 (10 Dec 2018 05:45) (16 - 18)  SpO2: 96% (10 Dec 2018 05:45) (96% - 100%)      A/P: 63 year old female s/p T10-Pelvis PSF complicated by Frontoparietal IPH  - Neurology monitoring, Follow up neurology recommendations  - Regular Diet  - PT/OT, OOB  - Monitor HV per Plastics for removal  - strict BP control

## 2018-12-10 NOTE — PROGRESS NOTE ADULT - ASSESSMENT
A/P: S/P posterior spine fusion with muscle flap reconstruction.  - Diet  - Pain control  - Aquacel dressing changes Q48 hours  - DVT PPx: SCD, chemoprophylaxis as per spine service  - Will Follow    Thank You  Rhett Mendieta MD  Plastic Surgery  [phone]

## 2018-12-11 LAB
BASOPHILS # BLD AUTO: 0.02 K/UL — SIGNIFICANT CHANGE UP (ref 0–0.2)
BASOPHILS NFR BLD AUTO: 0.2 % — SIGNIFICANT CHANGE UP (ref 0–2)
EOSINOPHIL # BLD AUTO: 0.28 K/UL — SIGNIFICANT CHANGE UP (ref 0–0.5)
EOSINOPHIL NFR BLD AUTO: 2.2 % — SIGNIFICANT CHANGE UP (ref 0–6)
GLUCOSE BLDC GLUCOMTR-MCNC: 131 MG/DL — HIGH (ref 70–99)
GLUCOSE BLDC GLUCOMTR-MCNC: 132 MG/DL — HIGH (ref 70–99)
GLUCOSE BLDC GLUCOMTR-MCNC: 141 MG/DL — HIGH (ref 70–99)
GLUCOSE BLDC GLUCOMTR-MCNC: 151 MG/DL — HIGH (ref 70–99)
HCT VFR BLD CALC: 36.3 % — SIGNIFICANT CHANGE UP (ref 34.5–45)
HGB BLD-MCNC: 11.9 G/DL — SIGNIFICANT CHANGE UP (ref 11.5–15.5)
IMM GRANULOCYTES # BLD AUTO: 0.1 # — SIGNIFICANT CHANGE UP
IMM GRANULOCYTES NFR BLD AUTO: 0.8 % — SIGNIFICANT CHANGE UP (ref 0–1.5)
LYMPHOCYTES # BLD AUTO: 15.7 % — SIGNIFICANT CHANGE UP (ref 13–44)
LYMPHOCYTES # BLD AUTO: 2 K/UL — SIGNIFICANT CHANGE UP (ref 1–3.3)
MCHC RBC-ENTMCNC: 28.3 PG — SIGNIFICANT CHANGE UP (ref 27–34)
MCHC RBC-ENTMCNC: 32.8 % — SIGNIFICANT CHANGE UP (ref 32–36)
MCV RBC AUTO: 86.4 FL — SIGNIFICANT CHANGE UP (ref 80–100)
MONOCYTES # BLD AUTO: 0.85 K/UL — SIGNIFICANT CHANGE UP (ref 0–0.9)
MONOCYTES NFR BLD AUTO: 6.7 % — SIGNIFICANT CHANGE UP (ref 2–14)
NEUTROPHILS # BLD AUTO: 9.48 K/UL — HIGH (ref 1.8–7.4)
NEUTROPHILS NFR BLD AUTO: 74.4 % — SIGNIFICANT CHANGE UP (ref 43–77)
NRBC # FLD: 0 — SIGNIFICANT CHANGE UP
PLATELET # BLD AUTO: 288 K/UL — SIGNIFICANT CHANGE UP (ref 150–400)
PMV BLD: 9 FL — SIGNIFICANT CHANGE UP (ref 7–13)
RBC # BLD: 4.2 M/UL — SIGNIFICANT CHANGE UP (ref 3.8–5.2)
RBC # FLD: 15.1 % — HIGH (ref 10.3–14.5)
WBC # BLD: 12.73 K/UL — HIGH (ref 3.8–10.5)
WBC # FLD AUTO: 12.73 K/UL — HIGH (ref 3.8–10.5)

## 2018-12-11 PROCEDURE — 93010 ELECTROCARDIOGRAM REPORT: CPT

## 2018-12-11 PROCEDURE — 99233 SBSQ HOSP IP/OBS HIGH 50: CPT

## 2018-12-11 PROCEDURE — 99232 SBSQ HOSP IP/OBS MODERATE 35: CPT | Mod: GC

## 2018-12-11 RX ORDER — OXYCODONE HYDROCHLORIDE 5 MG/1
1 TABLET ORAL
Qty: 0 | Refills: 0 | COMMUNITY
Start: 2018-12-11

## 2018-12-11 RX ORDER — SENNA PLUS 8.6 MG/1
2 TABLET ORAL
Qty: 0 | Refills: 0 | COMMUNITY
Start: 2018-12-11

## 2018-12-11 RX ORDER — SODIUM CHLORIDE 9 MG/ML
500 INJECTION INTRAMUSCULAR; INTRAVENOUS; SUBCUTANEOUS ONCE
Qty: 0 | Refills: 0 | Status: COMPLETED | OUTPATIENT
Start: 2018-12-11 | End: 2018-12-11

## 2018-12-11 RX ORDER — FOLIC ACID 0.8 MG
1 TABLET ORAL
Qty: 0 | Refills: 0 | COMMUNITY

## 2018-12-11 RX ORDER — SODIUM CHLORIDE 9 MG/ML
1000 INJECTION INTRAMUSCULAR; INTRAVENOUS; SUBCUTANEOUS
Qty: 0 | Refills: 0 | Status: DISCONTINUED | OUTPATIENT
Start: 2018-12-11 | End: 2018-12-12

## 2018-12-11 RX ORDER — DOCUSATE SODIUM 100 MG
1 CAPSULE ORAL
Qty: 0 | Refills: 0 | COMMUNITY
Start: 2018-12-11

## 2018-12-11 RX ORDER — ASPIRIN/CALCIUM CARB/MAGNESIUM 324 MG
81 TABLET ORAL DAILY
Qty: 0 | Refills: 0 | Status: DISCONTINUED | OUTPATIENT
Start: 2018-12-11 | End: 2018-12-12

## 2018-12-11 RX ORDER — ACETAMINOPHEN 500 MG
2 TABLET ORAL
Qty: 0 | Refills: 0 | COMMUNITY
Start: 2018-12-11

## 2018-12-11 RX ORDER — ASPIRIN/CALCIUM CARB/MAGNESIUM 324 MG
1 TABLET ORAL
Qty: 0 | Refills: 0 | COMMUNITY

## 2018-12-11 RX ORDER — TIZANIDINE 4 MG/1
1 TABLET ORAL
Qty: 0 | Refills: 0 | COMMUNITY
Start: 2018-12-11

## 2018-12-11 RX ADMIN — GABAPENTIN 600 MILLIGRAM(S): 400 CAPSULE ORAL at 06:00

## 2018-12-11 RX ADMIN — AMLODIPINE BESYLATE 10 MILLIGRAM(S): 2.5 TABLET ORAL at 05:59

## 2018-12-11 RX ADMIN — DULOXETINE HYDROCHLORIDE 90 MILLIGRAM(S): 30 CAPSULE, DELAYED RELEASE ORAL at 12:02

## 2018-12-11 RX ADMIN — SODIUM CHLORIDE 75 MILLILITER(S): 9 INJECTION INTRAMUSCULAR; INTRAVENOUS; SUBCUTANEOUS at 17:09

## 2018-12-11 RX ADMIN — PANTOPRAZOLE SODIUM 40 MILLIGRAM(S): 20 TABLET, DELAYED RELEASE ORAL at 06:00

## 2018-12-11 RX ADMIN — LOSARTAN POTASSIUM 100 MILLIGRAM(S): 100 TABLET, FILM COATED ORAL at 06:00

## 2018-12-11 RX ADMIN — SODIUM CHLORIDE 500 MILLILITER(S): 9 INJECTION INTRAMUSCULAR; INTRAVENOUS; SUBCUTANEOUS at 14:08

## 2018-12-11 RX ADMIN — GABAPENTIN 600 MILLIGRAM(S): 400 CAPSULE ORAL at 17:09

## 2018-12-11 RX ADMIN — GABAPENTIN 600 MILLIGRAM(S): 400 CAPSULE ORAL at 00:45

## 2018-12-11 RX ADMIN — GABAPENTIN 600 MILLIGRAM(S): 400 CAPSULE ORAL at 12:02

## 2018-12-11 RX ADMIN — Medication 100 MILLIGRAM(S): at 13:26

## 2018-12-11 RX ADMIN — INSULIN GLARGINE 6 UNIT(S): 100 INJECTION, SOLUTION SUBCUTANEOUS at 22:28

## 2018-12-11 RX ADMIN — Medication 81 MILLIGRAM(S): at 14:11

## 2018-12-11 RX ADMIN — Medication 100 MILLIGRAM(S): at 05:59

## 2018-12-11 NOTE — PROGRESS NOTE ADULT - ASSESSMENT
63F h/o kyphosis, spondylolisthesis, spinal stenosis. CAD s/p AVE x 2 (2015), HTN, HLD, DM with diabetic peripheral neuropathy who is s/p T10 to pelvis decompression and fusion on 11/27/18, POD#13, hospital course complicated by intraparenchymal hemorrhagic stroke. Currently HD stable 63F h/o kyphosis, spondylolisthesis, spinal stenosis. CAD s/p AVE x 2 (2015), HTN, HLD, DM with diabetic peripheral neuropathy who is s/p T10 to pelvis decompression and fusion on 11/27/18, POD#14, hospital course complicated by intraparenchymal hemorrhagic stroke. Currently HD stable

## 2018-12-11 NOTE — PROVIDER CONTACT NOTE (OTHER) - ACTION/TREATMENT ORDERED:
PO hydralizine, recheck BP after giving
EKG, IVF
SUE Medrano at bedside, pt requesting to speak with someone from psychiatry after PA left, PA contacted, as per SUE Medrano pt does not need psych

## 2018-12-11 NOTE — PROGRESS NOTE ADULT - SUBJECTIVE AND OBJECTIVE BOX
Ortho Progress Note    S: Patient seen and examined. No acute events overnight. Pain well controlled with current regimen.   Notes a episode of lightheadedness/dizziness while attempting to walk, CP/SOB. Tolerating diet.    Patient denies lightheadedness currently. She believes she had this episode secondary due to being decompensated.    O:  Physical Exam:  Gen: Laying in bed, NAD, alert and oriented.   Resp: Unlabored breathing    Motor:                 L2             L3             L4               L5            S1  R         5/5           5/5          5/5             5/5           5/5  L          5/5          5/5           5/5             5/5           5/5    Sensory:             L2          L3         L4      L5       S1         (0=absent, 1=impaired, 2=normal, NT=not testable)  R         2            2            2        2        2  L          2            2           2        2         2  Vital Signs Last 24 Hrs    T(C): 36.6 (11 Dec 2018 05:55), Max: 37 (10 Dec 2018 21:47)  T(F): 97.8 (11 Dec 2018 05:55), Max: 98.6 (10 Dec 2018 21:47)  HR: 90 (11 Dec 2018 05:55) (84 - 106)  BP: 133/74 (11 Dec 2018 05:55) (118/60 - 148/83)  BP(mean): --  RR: 18 (11 Dec 2018 05:55) (16 - 18)  SpO2: 99% (11 Dec 2018 05:55) (97% - 100%)                          13.0   15.60 )-----------( 342      ( 10 Dec 2018 18:00 )             40.4         A/P: 63 year old female s/p T10-Pelvis PSF complicated by Frontoparietal IPH  - Neurology monitoring,  - Regular Diet  - PT/OT, OOB, BP control  - Aquacell dressing change q 48 hr  - Strict BP control  - Likely rehab per PMR  - Scoliosis XR films completed      Modesto Griffin  PGY5

## 2018-12-11 NOTE — PROGRESS NOTE ADULT - PROBLEM SELECTOR PLAN 2
MRI/MRA confirms a left temporal lobe early subacute intraparenchymal hemorrhage. Slight medialization of the uncus without herniation. No midline shift. Patient not demonstrating neurologic deficits at this time.   - Appreciate Neurology recommendations, in lieu of hemorrhagic stroke would hold ASA/Plavix and pharmacologic DVT ppx for now   - BP goals < 160/90   -CTH 12/10 after pre-syncopal event, shows stable ICH  - Neuro f/u requested

## 2018-12-11 NOTE — PROGRESS NOTE ADULT - SUBJECTIVE AND OBJECTIVE BOX
Patient is a 63y old  Female who presents with a chief complaint of Spine Surgery (10 Dec 2018 21:26)        SUBJECTIVE / OVERNIGHT EVENTS:    no acute events o/n  pt anxiety slightly improved today, but still anxious  denies cp/sob/abdominal pain/diarrhea/dysuria/back pain  c/o lightheadedness/dizziness when getting up that impairs her ability to work with PT and makes her very worried/distressed      MEDICATIONS  (STANDING):  amLODIPine   Tablet 10 milliGRAM(s) Oral daily  dextrose 5%. 1000 milliLiter(s) (50 mL/Hr) IV Continuous <Continuous>  dextrose 50% Injectable 12.5 Gram(s) IV Push once  dextrose 50% Injectable 25 Gram(s) IV Push once  dextrose 50% Injectable 25 Gram(s) IV Push once  docusate sodium 100 milliGRAM(s) Oral three times a day  DULoxetine 90 milliGRAM(s) Oral daily  gabapentin 600 milliGRAM(s) Oral four times a day  insulin glargine Injectable (LANTUS) 6 Unit(s) SubCutaneous at bedtime  insulin lispro (HumaLOG) corrective regimen sliding scale   SubCutaneous three times a day before meals  losartan 100 milliGRAM(s) Oral daily  pantoprazole    Tablet 40 milliGRAM(s) Oral before breakfast  polyethylene glycol 3350 17 Gram(s) Oral at bedtime  senna 2 Tablet(s) Oral at bedtime    MEDICATIONS  (PRN):  acetaminophen   Tablet .. 650 milliGRAM(s) Oral every 6 hours PRN Mild Pain (1 - 3)  dextrose 40% Gel 15 Gram(s) Oral once PRN Blood Glucose LESS THAN 70 milliGRAM(s)/deciliter  diphenhydrAMINE 25 milliGRAM(s) Oral every 4 hours PRN Rash and/or Itching  glucagon  Injectable 1 milliGRAM(s) IntraMuscular once PRN Glucose LESS THAN 70 milligrams/deciliter  morphine  - Injectable 2 milliGRAM(s) IV Push every 4 hours PRN breakthrough  ondansetron Injectable 4 milliGRAM(s) IV Push every 6 hours PRN Nausea and/or Vomiting  oxyCODONE    IR 5 milliGRAM(s) Oral every 4 hours PRN Moderate Pain (4 - 6)  oxyCODONE    IR 10 milliGRAM(s) Oral every 4 hours PRN Severe Pain (7 - 10)  tiZANidine 2 milliGRAM(s) Oral every 6 hours PRN Muscle Spasm      Vital Signs Last 24 Hrs  T(C): 36.6 (11 Dec 2018 09:19), Max: 37 (10 Dec 2018 21:47)  T(F): 97.9 (11 Dec 2018 09:19), Max: 98.6 (10 Dec 2018 21:47)  HR: 91 (11 Dec 2018 09:19) (86 - 104)  BP: 127/70 (11 Dec 2018 09:19) (118/60 - 136/77)  BP(mean): --  RR: 18 (11 Dec 2018 09:19) (16 - 18)  SpO2: 99% (11 Dec 2018 09:19) (98% - 100%)  CAPILLARY BLOOD GLUCOSE      POCT Blood Glucose.: 132 mg/dL (11 Dec 2018 11:45)  POCT Blood Glucose.: 131 mg/dL (11 Dec 2018 07:45)  POCT Blood Glucose.: 126 mg/dL (10 Dec 2018 21:58)  POCT Blood Glucose.: 141 mg/dL (10 Dec 2018 17:00)    I&O's Summary    10 Dec 2018 07:01  -  11 Dec 2018 07:00  --------------------------------------------------------  IN: 0 mL / OUT: 320 mL / NET: -320 mL          PHYSICAL EXAM    GENERAL: anxious  HEAD:  Atraumatic, Normocephalic  EYES: EOMI, conjunctiva and sclera clear  NECK: Supple, No JVD  CHEST/LUNG: Clear to auscultation bilaterally; No wheeze  HEART: Regular rate and rhythm; No murmurs, rubs, or gallops  ABDOMEN: Soft, Nontender, Nondistended; Bowel sounds present  EXTREMITIES:  2+ Peripheral Pulses, No clubbing, cyanosis, or edema  NEUROLOGY: non-focal  SKIN: No rashes or lesions        LABS:                        11.9   12.73 )-----------( 288      ( 11 Dec 2018 05:10 )             36.3     12-10    135  |  97<L>  |  22  ----------------------------<  138<H>  3.6   |  27  |  0.99    Ca    8.4      10 Dec 2018 06:12  Mg     2.0     12-10    TPro  6.3  /  Alb  3.3  /  TBili  0.4  /  DBili  x   /  AST  15  /  ALT  19  /  AlkPhos  96  12-10              RADIOLOGY & ADDITIONAL TESTS:    Imaging Personally Reviewed:  Consultant(s) Notes Reviewed:    Care Discussed with Consultants/Other Providers: Ortho PA

## 2018-12-12 VITALS — HEART RATE: 78 BPM

## 2018-12-12 LAB
BUN SERPL-MCNC: 18 MG/DL — SIGNIFICANT CHANGE UP (ref 7–23)
CALCIUM SERPL-MCNC: 8 MG/DL — LOW (ref 8.4–10.5)
CHLORIDE SERPL-SCNC: 105 MMOL/L — SIGNIFICANT CHANGE UP (ref 98–107)
CO2 SERPL-SCNC: 23 MMOL/L — SIGNIFICANT CHANGE UP (ref 22–31)
CREAT SERPL-MCNC: 0.98 MG/DL — SIGNIFICANT CHANGE UP (ref 0.5–1.3)
GLUCOSE BLDC GLUCOMTR-MCNC: 125 MG/DL — HIGH (ref 70–99)
GLUCOSE BLDC GLUCOMTR-MCNC: 132 MG/DL — HIGH (ref 70–99)
GLUCOSE BLDC GLUCOMTR-MCNC: 149 MG/DL — HIGH (ref 70–99)
GLUCOSE SERPL-MCNC: 128 MG/DL — HIGH (ref 70–99)
HCT VFR BLD CALC: 34.1 % — LOW (ref 34.5–45)
HGB BLD-MCNC: 10.9 G/DL — LOW (ref 11.5–15.5)
MCHC RBC-ENTMCNC: 28.1 PG — SIGNIFICANT CHANGE UP (ref 27–34)
MCHC RBC-ENTMCNC: 32 % — SIGNIFICANT CHANGE UP (ref 32–36)
MCV RBC AUTO: 87.9 FL — SIGNIFICANT CHANGE UP (ref 80–100)
NRBC # FLD: 0 — SIGNIFICANT CHANGE UP
PLATELET # BLD AUTO: 314 K/UL — SIGNIFICANT CHANGE UP (ref 150–400)
PMV BLD: 9.3 FL — SIGNIFICANT CHANGE UP (ref 7–13)
POTASSIUM SERPL-MCNC: 3.8 MMOL/L — SIGNIFICANT CHANGE UP (ref 3.5–5.3)
POTASSIUM SERPL-SCNC: 3.8 MMOL/L — SIGNIFICANT CHANGE UP (ref 3.5–5.3)
RBC # BLD: 3.88 M/UL — SIGNIFICANT CHANGE UP (ref 3.8–5.2)
RBC # FLD: 15.3 % — HIGH (ref 10.3–14.5)
SODIUM SERPL-SCNC: 141 MMOL/L — SIGNIFICANT CHANGE UP (ref 135–145)
WBC # BLD: 10.05 K/UL — SIGNIFICANT CHANGE UP (ref 3.8–10.5)
WBC # FLD AUTO: 10.05 K/UL — SIGNIFICANT CHANGE UP (ref 3.8–10.5)

## 2018-12-12 PROCEDURE — 99233 SBSQ HOSP IP/OBS HIGH 50: CPT

## 2018-12-12 RX ORDER — ASPIRIN/CALCIUM CARB/MAGNESIUM 324 MG
1 TABLET ORAL
Qty: 0 | Refills: 0 | COMMUNITY
Start: 2018-12-12

## 2018-12-12 RX ORDER — SODIUM CHLORIDE 9 MG/ML
500 INJECTION INTRAMUSCULAR; INTRAVENOUS; SUBCUTANEOUS ONCE
Qty: 0 | Refills: 0 | Status: DISCONTINUED | OUTPATIENT
Start: 2018-12-12 | End: 2018-12-12

## 2018-12-12 RX ADMIN — OXYCODONE HYDROCHLORIDE 5 MILLIGRAM(S): 5 TABLET ORAL at 02:12

## 2018-12-12 RX ADMIN — Medication 100 MILLIGRAM(S): at 13:08

## 2018-12-12 RX ADMIN — GABAPENTIN 600 MILLIGRAM(S): 400 CAPSULE ORAL at 00:00

## 2018-12-12 RX ADMIN — GABAPENTIN 600 MILLIGRAM(S): 400 CAPSULE ORAL at 05:21

## 2018-12-12 RX ADMIN — Medication 81 MILLIGRAM(S): at 13:07

## 2018-12-12 RX ADMIN — AMLODIPINE BESYLATE 10 MILLIGRAM(S): 2.5 TABLET ORAL at 05:21

## 2018-12-12 RX ADMIN — DULOXETINE HYDROCHLORIDE 90 MILLIGRAM(S): 30 CAPSULE, DELAYED RELEASE ORAL at 13:07

## 2018-12-12 RX ADMIN — GABAPENTIN 600 MILLIGRAM(S): 400 CAPSULE ORAL at 13:07

## 2018-12-12 RX ADMIN — GABAPENTIN 600 MILLIGRAM(S): 400 CAPSULE ORAL at 17:02

## 2018-12-12 RX ADMIN — SODIUM CHLORIDE 75 MILLILITER(S): 9 INJECTION INTRAMUSCULAR; INTRAVENOUS; SUBCUTANEOUS at 00:00

## 2018-12-12 RX ADMIN — PANTOPRAZOLE SODIUM 40 MILLIGRAM(S): 20 TABLET, DELAYED RELEASE ORAL at 08:00

## 2018-12-12 RX ADMIN — Medication 100 MILLIGRAM(S): at 05:21

## 2018-12-12 RX ADMIN — OXYCODONE HYDROCHLORIDE 5 MILLIGRAM(S): 5 TABLET ORAL at 03:05

## 2018-12-12 NOTE — PROGRESS NOTE ADULT - SUBJECTIVE AND OBJECTIVE BOX
63F h/o kyphosis, spondylolisthesis, spinal stenosis. CAD s/p VAE x 2 (2015), HTN, HLD, DM with diabetic peripheral neuropathy who is s/p T10 to pelvis decompression and fusion on 11/27/18 course complicated by acute Wernicke's aphasia the night of 12/3 2/2 left parietotemporal intracerebral hematoma of unknown etiology s/p SICU stay -- now back on the floors with improved speech.  MRI/MRA confirms a left temporal lobe early subacute intraparenchymal hemorrhage. Slight medialization of the uncus without herniation. No midline shift.  Asa.Plavix/DVt px held/   still feels dizzy when working with PT     REVIEW OF SYSTEMS: No chest pain, shortness of breath, nausea, vomiting or diarhea.        CURRENT FUNCTIONAL STATUS: CGA     Vital Signs Last 24 Hrs  T(C): 36.6 (12-12-18 @ 09:36), Max: 36.7 (12-11-18 @ 17:51)  T(F): 97.9 (12-12-18 @ 09:36), Max: 98.1 (12-12-18 @ 02:04)  HR: 78 (12-12-18 @ 09:48) (78 - 104)  BP: 90/47 (12-12-18 @ 09:47) (90/47 - 128/60)  BP(mean): --  RR: 18 (12-12-18 @ 09:36) (16 - 18)  SpO2: 100% (12-12-18 @ 09:40) (95% - 100%)      ----------------------------------------------------------------------------------------  PHYSICAL EXAM  Constitutional - NAD, Comfortable  HEENT - NCAT, EOMI  Neck - Supple, No limited ROM  Chest - CTA bilaterally, No wheeze, No rhonchi, No crackles  Cardiovascular - RRR, S1S2, No murmurs  Abdomen - BS+, Soft, NTND  Extremities - No C/C/E, No calf tenderness   Neurologic Exam -                    Cognitive - Awake, Alert, AAO to self, place, date, year, situation     Communication - Fluent, No dysarthria, no aphasia     Cranial Nerves - CN 2-12 intact     Motor - 4/5 b/l LE ( due to pain)5/5 UE                        Sensory - Intact to LT     Reflexes - DTR Intact, No primitive reflexive     Balance - wide based gait   Psychiatric - Mood stable, Affect WNL

## 2018-12-12 NOTE — PROGRESS NOTE ADULT - SUBJECTIVE AND OBJECTIVE BOX
Ortho Progress Note    S: Patient seen and examined. No acute events overnight. Pain well controlled with current regimen.  Tolerating diet. Pain well controlled light headed ness improved.     O:  Physical Exam:  Gen: Laying in bed, NAD, alert and oriented.   Resp: Unlabored breathing    Motor:                 L2             L3             L4               L5            S1  R         5/5           5/5          5/5             5/5           5/5  L          5/5          5/5           5/5             5/5           5/5    Sensory:             L2          L3         L4      L5       S1         (0=absent, 1=impaired, 2=normal, NT=not testable)  R         2            2            2        2        2  L          2            2           2        2         2    Vital Signs Last 24 Hrs  T(C): 36.7 (12 Dec 2018 05:16), Max: 36.8 (11 Dec 2018 13:25)  T(F): 98.1 (12 Dec 2018 05:16), Max: 98.2 (11 Dec 2018 13:25)  HR: 94 (12 Dec 2018 05:16) (80 - 105)  BP: 118/64 (12 Dec 2018 05:16) (114/65 - 128/60)  BP(mean): --  RR: 17 (12 Dec 2018 05:16) (16 - 18)  SpO2: 98% (12 Dec 2018 05:16) (95% - 100%)                          11.9   12.73 )-----------( 288      ( 11 Dec 2018 05:10 )             36.3         A/P: 63 year old female s/p T10-Pelvis PSF complicated by Frontoparietal IPH  - Neurology monitoring,  - Regular Diet  - PT/OT, OOB, BP control  - Aquacell dressing change q 48 hr  - Strict BP control  - Likely rehab per PMR  - Scoliosis XR films completed      Modesto Griffin  PGY5

## 2018-12-12 NOTE — PROGRESS NOTE ADULT - PROBLEM SELECTOR PLAN 4
-likely secondary to post-op changes; pt hemodynamically stable; will monitor  -s/p 2u PRBCs, 1u FFP, 1u plts and 4L IVFs in OR  -s/p 1u PRBCs 12/1 on the floors  -patient responded well, and H&H stable since then
Check AM CBC   -likely secondary to post-op changes; pt hemodynamically stable; will monitor  -s/p 2u PRBCs, 1u FFP, 1u plts and 4L IVFs in OR  -s/p 1u PRBCs 12/1 on the floors  -patient responded well, and H&H stable since then
c/w ASA, coreg, losartan  c/w statin.  -plavix to be resumed on POD#7 -- tomorrow 12/4/18 per ortho
c/w ASA, coreg, losartan  c/w statin.  Plavix on hold, resume when ok with ortho
c/w ASA, coreg, losartan  c/w statin.  Plavix on hold, resume when ok with ortho
c/w coreg and losartan
c/w coreg, losartan and hydralazine prn for SBP>140  c/w statin  per neuro continue to hold asa and plavix
-likely secondary to post-op changes; pt hemodynamically stable; will monitor  -s/p 2u PRBCs, 1u FFP, 1u plts and 4L IVFs in OR  -s/p 1u PRBCs 12/1 on the floors  -patient responded well, and H&H stable since then
-likely secondary to post-op changes; pt hemodynamically stable; will monitor  -s/p 2u PRBCs, 1u FFP, 1u plts and 4L IVFs in OR  -s/p 1u PRBCs 12/1 on the floors  -patient responded well, and H&H stable since then

## 2018-12-12 NOTE — PROGRESS NOTE ADULT - SUBJECTIVE AND OBJECTIVE BOX
Neurology Follow up note    Name: DEMI ARCHIBALD    Subjective:    MEDICATIONS  (STANDING):  amLODIPine   Tablet 10 milliGRAM(s) Oral daily  aspirin enteric coated 81 milliGRAM(s) Oral daily  dextrose 5%. 1000 milliLiter(s) (50 mL/Hr) IV Continuous <Continuous>  dextrose 50% Injectable 12.5 Gram(s) IV Push once  dextrose 50% Injectable 25 Gram(s) IV Push once  dextrose 50% Injectable 25 Gram(s) IV Push once  docusate sodium 100 milliGRAM(s) Oral three times a day  DULoxetine 90 milliGRAM(s) Oral daily  gabapentin 600 milliGRAM(s) Oral four times a day  insulin glargine Injectable (LANTUS) 6 Unit(s) SubCutaneous at bedtime  insulin lispro (HumaLOG) corrective regimen sliding scale   SubCutaneous three times a day before meals  losartan 100 milliGRAM(s) Oral daily  pantoprazole    Tablet 40 milliGRAM(s) Oral before breakfast  polyethylene glycol 3350 17 Gram(s) Oral at bedtime  senna 2 Tablet(s) Oral at bedtime  sodium chloride 0.9%. 1000 milliLiter(s) (75 mL/Hr) IV Continuous <Continuous>    MEDICATIONS  (PRN):  acetaminophen   Tablet .. 650 milliGRAM(s) Oral every 6 hours PRN Mild Pain (1 - 3)  dextrose 40% Gel 15 Gram(s) Oral once PRN Blood Glucose LESS THAN 70 milliGRAM(s)/deciliter  diphenhydrAMINE 25 milliGRAM(s) Oral every 4 hours PRN Rash and/or Itching  glucagon  Injectable 1 milliGRAM(s) IntraMuscular once PRN Glucose LESS THAN 70 milligrams/deciliter  morphine  - Injectable 2 milliGRAM(s) IV Push every 4 hours PRN breakthrough  ondansetron Injectable 4 milliGRAM(s) IV Push every 6 hours PRN Nausea and/or Vomiting  oxyCODONE    IR 5 milliGRAM(s) Oral every 4 hours PRN Moderate Pain (4 - 6)  oxyCODONE    IR 10 milliGRAM(s) Oral every 4 hours PRN Severe Pain (7 - 10)  tiZANidine 2 milliGRAM(s) Oral every 6 hours PRN Muscle Spasm    Allergies  No Known Allergies    Objective:   Vital Signs Last 24 Hrs  T(C): 36.6 (12 Dec 2018 09:36), Max: 36.8 (11 Dec 2018 13:25)  T(F): 97.9 (12 Dec 2018 09:36), Max: 98.2 (11 Dec 2018 13:25)  HR: 78 (12 Dec 2018 09:48) (78 - 105)  BP: 90/47 (12 Dec 2018 09:47) (90/47 - 128/60)  RR: 18 (12 Dec 2018 09:36) (16 - 18)  SpO2: 100% (12 Dec 2018 09:40) (95% - 100%)    Neurological Exam:  Mental Status: Orientated to self, date and place. able to identify thumb, and pinky. follows commands to cross midline. repetition intact. still making multiple semantic paraphasic errors, much improved.     Cranial Nerves: EOMI, no blink to threat b/l. no nystagmus. No facial asymmetry.  strength: LE some effort against gravity, limited by back pain. no drift UE b/l.  Sensation: intact to light touch and temperature    12-12    141  |  105  |  18  ----------------------------<  128<H>  3.8   |  23  |  0.98    Ca    8.0<L>      12 Dec 2018 05:20    Radiology  < from: CT Head No Cont (12.10.18 @ 14:23) >  Comparison is made to the MRI from 12/07/2018, CT from 12/05/2018.    An evolving left temporal lobe parenchymal hematoma with surrounding   edema and mass effect is again noted, grossly stable in size compared to   the prior CT and MRI studies. Scattered small areas of subarachnoid   hemorrhage are again noted, stable.    No new hemorrhages are noted over the time interval. There is no evidence   for new superimposed acute infarct.    Mild chronic white matter changes and cerebral volume loss are again   noted.    IMPRESSION:    Stable intracranial hemorrhages.    < end of copied text >      < from: CT Angio Neck w/ IV Cont (12.03.18 @ 23:27) >    IMPRESSION:  Left temporal convexity intraparenchymal hematoma. Basilar cisterns are   patent. Small amounts of left hemispheric subarachnoid hemorrhage.  CT angiography demonstrates no flow-limiting stenosis by NASCET criteria   along extracranial vasculature.  Intracranial CT angiography demonstrates no evidence for aneurysm,   significant stenosis, or vascular malformation.    < end of copied text >    CT: < from: CT Venogram Brain w/ IV Cont (12.04.18 @ 21:05) >  Impression: Slight increase in size of the previously noted left temporal   hemorrhage with surrounding edema.    Stable left frontal hemorrhage with surrounding subarachnoid hemorrhage.    Unremarkable CT venogram.    < end of copied text >    < from: CT Head No Cont (12.04.18 @ 06:17) >  FINDINGS:  A left temporal parenchymal hematoma measuring 5.1 x 2.6 x 1.7 cm is seen   with surrounding parenchymal vasogenic edema, unchanged from 12/3/2018.   There is mild mass effect upon the left lateral ventricle temporal horn.    Trace hyperdensity within left hemispheric sulci is consistent with trace   amounts of subarachnoid hemorrhage.    Ventricles and sulci are otherwise within normal limits for the patient's   stated age. There is no evidence of acute transcortical territorial   infarction.    The calvarium is intact. The visualized intraorbital compartments,   paranasal sinuses and tympanomastoid cavities appear free of acute   disease.    < end of copied text > Neurology Follow up note    Name: DEMI ARCHIBALD    Subjective: patient examined at beside.     MEDICATIONS  (STANDING):  amLODIPine   Tablet 10 milliGRAM(s) Oral daily  aspirin enteric coated 81 milliGRAM(s) Oral daily  dextrose 5%. 1000 milliLiter(s) (50 mL/Hr) IV Continuous <Continuous>  dextrose 50% Injectable 12.5 Gram(s) IV Push once  dextrose 50% Injectable 25 Gram(s) IV Push once  dextrose 50% Injectable 25 Gram(s) IV Push once  docusate sodium 100 milliGRAM(s) Oral three times a day  DULoxetine 90 milliGRAM(s) Oral daily  gabapentin 600 milliGRAM(s) Oral four times a day  insulin glargine Injectable (LANTUS) 6 Unit(s) SubCutaneous at bedtime  insulin lispro (HumaLOG) corrective regimen sliding scale   SubCutaneous three times a day before meals  losartan 100 milliGRAM(s) Oral daily  pantoprazole    Tablet 40 milliGRAM(s) Oral before breakfast  polyethylene glycol 3350 17 Gram(s) Oral at bedtime  senna 2 Tablet(s) Oral at bedtime  sodium chloride 0.9%. 1000 milliLiter(s) (75 mL/Hr) IV Continuous <Continuous>    MEDICATIONS  (PRN):  acetaminophen   Tablet .. 650 milliGRAM(s) Oral every 6 hours PRN Mild Pain (1 - 3)  dextrose 40% Gel 15 Gram(s) Oral once PRN Blood Glucose LESS THAN 70 milliGRAM(s)/deciliter  diphenhydrAMINE 25 milliGRAM(s) Oral every 4 hours PRN Rash and/or Itching  glucagon  Injectable 1 milliGRAM(s) IntraMuscular once PRN Glucose LESS THAN 70 milligrams/deciliter  morphine  - Injectable 2 milliGRAM(s) IV Push every 4 hours PRN breakthrough  ondansetron Injectable 4 milliGRAM(s) IV Push every 6 hours PRN Nausea and/or Vomiting  oxyCODONE    IR 5 milliGRAM(s) Oral every 4 hours PRN Moderate Pain (4 - 6)  oxyCODONE    IR 10 milliGRAM(s) Oral every 4 hours PRN Severe Pain (7 - 10)  tiZANidine 2 milliGRAM(s) Oral every 6 hours PRN Muscle Spasm    Allergies  No Known Allergies    Objective:   Vital Signs Last 24 Hrs  T(C): 36.6 (12 Dec 2018 09:36), Max: 36.8 (11 Dec 2018 13:25)  T(F): 97.9 (12 Dec 2018 09:36), Max: 98.2 (11 Dec 2018 13:25)  HR: 78 (12 Dec 2018 09:48) (78 - 105)  BP: 90/47 (12 Dec 2018 09:47) (90/47 - 128/60)  RR: 18 (12 Dec 2018 09:36) (16 - 18)  SpO2: 100% (12 Dec 2018 09:40) (95% - 100%)    Neurological Exam:  Mental Status: Orientated to self, date and place. names objects. follows commands to cross midline.  Cranial Nerves: EOMI no nystagmus no diplopia no blink to threat b/l. no nystagmus. No facial asymmetry.  strength: LE some effort against gravity, limited by back pain. no drift UE b/l.  Sensation: intact to light touch and temperature    12-12    141  |  105  |  18  ----------------------------<  128<H>  3.8   |  23  |  0.98    Ca    8.0<L>      12 Dec 2018 05:20    Radiology  < from: CT Head No Cont (12.10.18 @ 14:23) >  Comparison is made to the MRI from 12/07/2018, CT from 12/05/2018.    An evolving left temporal lobe parenchymal hematoma with surrounding   edema and mass effect is again noted, grossly stable in size compared to   the prior CT and MRI studies. Scattered small areas of subarachnoid   hemorrhage are again noted, stable.    No new hemorrhages are noted over the time interval. There is no evidence   for new superimposed acute infarct.    Mild chronic white matter changes and cerebral volume loss are again   noted.    IMPRESSION:    Stable intracranial hemorrhages.    < end of copied text >      < from: CT Angio Neck w/ IV Cont (12.03.18 @ 23:27) >    IMPRESSION:  Left temporal convexity intraparenchymal hematoma. Basilar cisterns are   patent. Small amounts of left hemispheric subarachnoid hemorrhage.  CT angiography demonstrates no flow-limiting stenosis by NASCET criteria   along extracranial vasculature.  Intracranial CT angiography demonstrates no evidence for aneurysm,   significant stenosis, or vascular malformation.    < end of copied text >    CT: < from: CT Venogram Brain w/ IV Cont (12.04.18 @ 21:05) >  Impression: Slight increase in size of the previously noted left temporal   hemorrhage with surrounding edema.    Stable left frontal hemorrhage with surrounding subarachnoid hemorrhage.    Unremarkable CT venogram.    < end of copied text >    < from: CT Head No Cont (12.04.18 @ 06:17) >  FINDINGS:  A left temporal parenchymal hematoma measuring 5.1 x 2.6 x 1.7 cm is seen   with surrounding parenchymal vasogenic edema, unchanged from 12/3/2018.   There is mild mass effect upon the left lateral ventricle temporal horn.    Trace hyperdensity within left hemispheric sulci is consistent with trace   amounts of subarachnoid hemorrhage.    Ventricles and sulci are otherwise within normal limits for the patient's   stated age. There is no evidence of acute transcortical territorial   infarction.    The calvarium is intact. The visualized intraorbital compartments,   paranasal sinuses and tympanomastoid cavities appear free of acute   disease.    < end of copied text >

## 2018-12-12 NOTE — PROGRESS NOTE ADULT - ATTENDING COMMENTS
Patient seen and examined.  Agree with resident note and exam.  Pain controlled, cognitive fxn improved.  LE neuro exam stable.  PT/OT, pain control, venodynes, BP mgmt
Patient seen and examined.  Condition improving.  Dsg c/d/i, neuro exam stable  PT, pain control, venodynes, nutrition, bowel regimen
Patient seen and examined.  Events noted.  Conversive but illogical speech at times.  CT with IPH.  NV exam of extremities stable.  D/w .  Appreciate neuro/neurosx consults  Neuro/ICU care, venodynes, pain control, PT, monitor exam
Patient seen and examined.  Pain controlled.   Feels better after transfusion.  neuro exam stable.  DVT ppx, will try decadron taper, pain control, nutrition, PRS drain care
63y Female s/p L2-5 laminectomy, T10-pelvis posterior fusion on 11/27 now with acute mental status change 12/3. CTH revealed left parietotemporal intracerebral hematoma.    PLAN:   Neurologic:   - c/w decadron  - ambien PRN, cymbalta, xanax PRN  - Q1hr neuro checks - expressive aphasia but o/w exam WNL  - Maintain SBP < 140  - Keep serum Na > 140  - Interval CT 6 hours from initial CT - stable  - f/u CT venogram read,   - Brain MRI with and without contrast, MRA all ordered  - No surgical intervention per neurosurgery  - cont to appreciate neurosurg, stroke neuro, ortho & plastics rec's    Respiratory:   - incentive spirometry  - satting well on RA    Cardiovascular:   - c/w coreg, increased dose losartan, IV labetalol prn  - Keep SBP < 140    Gastrointestinal/Nutrition:   - c/w dulcolax, colace, senna, miralax, protonix    Renal  - Monitor BUN/Cr, Electrolytes  - Goal Na > 140    Hematologic:   - trend H/H  - hold chemical DVT ppx in setting of ICH, SCDs for now    Endocrine: DM2  - monitor FG  - SSI    Disposition: SICU    Critical Care Diagnoses:  sp laminectomy, sp stroke.  The patient is not a critical care patient with no life threatening hemodynamic and metabolic instability in SICU.  I have personally interviewed when possible and examined the patient, reviewed data and laboratory tests/x-rays and all pertinent electronic images.  I was physically present for the key portions of the evaluation and management (E/M) service provided.   The SICU team has a constant risk benefit analyzes discussion with the primary team, all consultants, House Staff and PA's on all decisions.  These diagnoses are unrelated to the surgical procedure noted above.  I meet with family if needed to get further history, discuss the case and make care decisions for this patient who might not be able to participate.  Time involved in performance of separately billable procedures was not counted toward my critical care time. There is no overlap.  I spent 55-75 minutes of critical care time for the diagnoses, assessment, plan and interventions.
Patient seen and examined.  Pain controlled.   AVSS, dsg c/d/i  neuro exam stable.  PT, pain control, 1 u prbc, dvt ppx
Seen and examined chart and note reviewed, case discussed with SICU team    CAD  a.  Continue carvedilol  b.  Hold ASA/plavix per primary team    YAZ, resolved  A.  Improved UO overnight  b,  Discontinue Rodriguez  c,  Decrease IVF    Acute posthemorrhage anemia  a.  Stable  b.  On HSQ  c. transfer to floor
Seen and examined, chart and note reviewed, case discussed SICU team    CAD  a.  Resume ASA, plavix  b.  Resume carvedilol, statin    At risk for malnutrition  a,  Diet as tolerated  b.  Heplock IVF    Anemia  a.  Stable
agree with above; ROS otherwise negative
11. Prophylaxis:   -encourage incentive spirometry, VTE ppx with HSQ
Dispo - Rehab
Dispo - Rehab
encourage incentive spirometry, VTE ppx with HSQ
encourage incentive spirometry, VTE ppx with HSQ

## 2018-12-12 NOTE — PROGRESS NOTE ADULT - PROBLEM SELECTOR PLAN 1
Plan:  -MRI brain with contrast  -keep blood pressure less than 160/90  -PT/OT/speech therapy  -PM&R consultation    -will need to follow up after discharge with stroke neurology as well as neurointerventionalist. may need conventional angiogram in 2 months to assess for micro-AVM as cause for bleed  -repeat CTH 12/5 in the evening. if CTH is stable, patient can started on chemical DVT ppx

## 2018-12-12 NOTE — PROGRESS NOTE ADULT - PROBLEM SELECTOR PROBLEM 4
Anemia, unspecified type
Coronary artery disease involving native coronary artery of native heart without angina pectoris
Hypertension, unspecified type
Anemia, unspecified type
Anemia, unspecified type

## 2018-12-12 NOTE — PROGRESS NOTE ADULT - REASON FOR ADMISSION
Q1 neuro checks
Q1 neuro checks
s/p T10-Pelvis Posterior Spinal Decompression Fusion
Scoliosis
Spinal stenosis
Spine Surgery
Spine surgery
s/p posterior spinal fusion
spine surgery
Patient is a 63y old  Female who presents with a chief complaint of Spine surgery (07 Dec 2018 15:10)
pelvic fusion, lumbar decompression
Admitted for Laminectomy, Spinal Fusion
Patient is a 63y old  Female who presents with a chief complaint of Spine surgery (07 Dec 2018 15:10)
back surgery
surgery
f/u stroke

## 2018-12-12 NOTE — PROGRESS NOTE ADULT - ASSESSMENT
63F h/o kyphosis, spondylolisthesis, spinal stenosis. CAD s/p AVE x 2 (2015), HTN, HLD, DM with diabetic peripheral neuropathy who is s/p T10 to pelvis decompression and fusion on 11/27/18, POD#15, hospital course complicated by intraparenchymal hemorrhagic stroke

## 2018-12-12 NOTE — PROGRESS NOTE ADULT - PROBLEM SELECTOR PLAN 6
-HtM6p=5.5%   FS well controlled  on Lantus 6 u qHS, ISS
-MoP0r=2.5%   FS well controlled  on Lantus 6 u qHS, ISS
-NtR6i=1.5% . FS well controlled. Continue with ISS for now and continue to monitor FS closely.   -continue to hold metformin   -Diabetic peripheral neuropathy - c/w gabapentin and glycemic control
-c/w coreg, losartan and hydralazine prn for SBP>140 as bp tolerates
-c/w coreg, losartan as bp tolerates
FS persistently in 200s, will add 6U lantus this evening for basal coverage  -UhW9f=7.5% . FS well controlled. Continue with ISS for now and continue to monitor FS closely.   -continue to hold metformin   -Diabetic peripheral neuropathy - c/w gabapentin and glycemic control
c/w coreg, losartan as bp tolerates
c/w coreg, losartan as bp tolerates
c/w cymbalta
-TcG0h=7.5%   FS well controlled  on Lantus 6 u qHS, ISS
-QfX2b=5.5% . FS well controlled. Continue with ISS for now and continue to monitor FS closely.   -continue to hold metformin   -Diabetic peripheral neuropathy - c/w gabapentin and glycemic control

## 2018-12-12 NOTE — PROGRESS NOTE ADULT - ASSESSMENT
Impression: She was admitted on 11/27/18 and underwent a T10 laminectomy and fusion. Postoperatively, she had some kind of change in mental status which improved when Dilaudid was discontinued. On 12/3/18 she developed acute Wernicke's aphasia due to a left temporal lobar hemorrhage of uncertain cause. Differential diagnosis includes hemorrhagic transformation of infarction, a vascular malformation or less likely tumor. CTV negative for venous thrombosis. Though CTA head was negative for an AVM, this does not exclude the possibility of a micro AVM. May require conventional cerebral angiogram in the near future.

## 2018-12-12 NOTE — PROGRESS NOTE ADULT - PROVIDER SPECIALTY LIST ADULT
Hospitalist
Neurology
Neurology
Orthopedics
Pain Medicine
Plastic Surgery
Rehab Medicine
Rehab Medicine
SICU
Hospitalist

## 2018-12-12 NOTE — PROGRESS NOTE ADULT - PROBLEM SELECTOR PROBLEM 9
Kyphosis of thoracolumbar region, unspecified kyphosis type
Gastroesophageal reflux disease without esophagitis
Kyphosis of thoracolumbar region, unspecified kyphosis type

## 2018-12-12 NOTE — PROGRESS NOTE ADULT - SUBJECTIVE AND OBJECTIVE BOX
Patient is a 63y old  Female who presents with a chief complaint of s/p T10-Pelvis Posterior Spinal Decompression Fusion (12 Dec 2018 12:21)        SUBJECTIVE / OVERNIGHT EVENTS:    no acute events o/n  pt states dizziness/lightheadedness slightly improved  denies cp/sob/abdominal pain/diarrhea/dysuria/back pain  looking forward to going to rehab  states she is drinking lots of fluids, eating well      MEDICATIONS  (STANDING):  aspirin enteric coated 81 milliGRAM(s) Oral daily  dextrose 5%. 1000 milliLiter(s) (50 mL/Hr) IV Continuous <Continuous>  dextrose 50% Injectable 12.5 Gram(s) IV Push once  dextrose 50% Injectable 25 Gram(s) IV Push once  dextrose 50% Injectable 25 Gram(s) IV Push once  docusate sodium 100 milliGRAM(s) Oral three times a day  DULoxetine 90 milliGRAM(s) Oral daily  gabapentin 600 milliGRAM(s) Oral four times a day  insulin glargine Injectable (LANTUS) 6 Unit(s) SubCutaneous at bedtime  insulin lispro (HumaLOG) corrective regimen sliding scale   SubCutaneous three times a day before meals  losartan 100 milliGRAM(s) Oral daily  pantoprazole    Tablet 40 milliGRAM(s) Oral before breakfast  polyethylene glycol 3350 17 Gram(s) Oral at bedtime  senna 2 Tablet(s) Oral at bedtime  sodium chloride 0.9%. 1000 milliLiter(s) (75 mL/Hr) IV Continuous <Continuous>    MEDICATIONS  (PRN):  acetaminophen   Tablet .. 650 milliGRAM(s) Oral every 6 hours PRN Mild Pain (1 - 3)  dextrose 40% Gel 15 Gram(s) Oral once PRN Blood Glucose LESS THAN 70 milliGRAM(s)/deciliter  diphenhydrAMINE 25 milliGRAM(s) Oral every 4 hours PRN Rash and/or Itching  glucagon  Injectable 1 milliGRAM(s) IntraMuscular once PRN Glucose LESS THAN 70 milligrams/deciliter  morphine  - Injectable 2 milliGRAM(s) IV Push every 4 hours PRN breakthrough  ondansetron Injectable 4 milliGRAM(s) IV Push every 6 hours PRN Nausea and/or Vomiting  oxyCODONE    IR 5 milliGRAM(s) Oral every 4 hours PRN Moderate Pain (4 - 6)  oxyCODONE    IR 10 milliGRAM(s) Oral every 4 hours PRN Severe Pain (7 - 10)  tiZANidine 2 milliGRAM(s) Oral every 6 hours PRN Muscle Spasm      Vital Signs Last 24 Hrs  T(C): 36.6 (12 Dec 2018 09:36), Max: 36.7 (11 Dec 2018 17:51)  T(F): 97.9 (12 Dec 2018 09:36), Max: 98.1 (12 Dec 2018 02:04)  HR: 78 (12 Dec 2018 09:48) (78 - 105)  BP: 90/47 (12 Dec 2018 09:47) (90/47 - 128/60)  BP(mean): --  RR: 18 (12 Dec 2018 09:36) (16 - 18)  SpO2: 100% (12 Dec 2018 09:40) (95% - 100%)  CAPILLARY BLOOD GLUCOSE      POCT Blood Glucose.: 132 mg/dL (12 Dec 2018 11:44)  POCT Blood Glucose.: 125 mg/dL (12 Dec 2018 07:44)  POCT Blood Glucose.: 151 mg/dL (11 Dec 2018 21:50)  POCT Blood Glucose.: 141 mg/dL (11 Dec 2018 16:48)    I&O's Summary    11 Dec 2018 07:01  -  12 Dec 2018 07:00  --------------------------------------------------------  IN: 0 mL / OUT: 650 mL / NET: -650 mL          PHYSICAL EXAM    GENERAL: well appearing, NAD   HEAD:  Atraumatic, Normocephalic  EYES: EOMI, conjunctiva and sclera clear  NECK: Supple, No JVD  CHEST/LUNG: Clear to auscultation bilaterally; No wheeze  HEART: Regular rate and rhythm; No murmurs, rubs, or gallops  ABDOMEN: Soft, Nontender, Nondistended; Bowel sounds present  EXTREMITIES:  2+ Peripheral Pulses, No clubbing, cyanosis, or edema  NEUROLOGY: non-focal  SKIN: No rashes or lesions          LABS:                        10.9   10.05 )-----------( 314      ( 12 Dec 2018 05:20 )             34.1     12-12    141  |  105  |  18  ----------------------------<  128<H>  3.8   |  23  |  0.98    Ca    8.0<L>      12 Dec 2018 05:20                RADIOLOGY & ADDITIONAL TESTS:    Imaging Personally Reviewed:  Consultant(s) Notes Reviewed:    Care Discussed with Consultants/Other Providers: Ortho PA

## 2018-12-12 NOTE — PROGRESS NOTE ADULT - SUBJECTIVE AND OBJECTIVE BOX
Patient seen and examined at bedside. No overnight events. She is resting comfortably.  Patient denies chest pain ,shortness of breath, palpitations, dizzyness, confusion.     On physical exam, the patient is responding appropriately, in no acute distress.    Motor strength of the bilateral upper extremities;  5/5 supraspinatus, deltoid, bicep, tricep , brachioradialis, wrist extensor/flexors, finger abductors, finger      5/5 of the bilateral lower extremities; quadriceps, hamstrings, iliopsoas, tibialis anterior, gastrocsoleus, EHL    Sensation is equal and grossly intact throughout    Incision site is well-approximated,  clean/dry/intact.  No drainage, erythema or swelling. New dressing placed.        Assessment/Plan:     Ms. Renteria is a 62 y/o female s/p T10-Pelvis Posterior Spinal Decompression Fusion POD # 15.   Patient is neurologically stable and medically stable for discharge to a subacute rehab today.    The patient is to follow-up with Dr. Concepcion in 2 weeks' time for routine post-operative care.

## 2018-12-12 NOTE — PROGRESS NOTE ADULT - PROBLEM SELECTOR PROBLEM 5
Coronary artery disease involving native coronary artery of native heart without angina pectoris
Other hyperlipidemia
Type 2 diabetes mellitus with diabetic autonomic neuropathy, without long-term current use of insulin
Coronary artery disease involving native coronary artery of native heart without angina pectoris
Coronary artery disease involving native coronary artery of native heart without angina pectoris

## 2018-12-12 NOTE — PROGRESS NOTE ADULT - PROBLEM SELECTOR PROBLEM 7
Gastroesophageal reflux disease without esophagitis
Other hyperlipidemia

## 2018-12-12 NOTE — PROGRESS NOTE ADULT - PROBLEM SELECTOR PLAN 1
Leukocytosis resolved  Remains afebrile, clinically non-toxic and well appearing, no evidence of infection or DVT at this time  suspect leukocytosis is inflammatory   control pain  hold off abx    Presyncope - on 12/10, suspect 2/2 deconditioning, with component of anxiety. BP stable, clinically stable.    POTS - postural orthostatic tachycardia syndrome: exacerbated by dehydration, bedrest, inactivity, s/p IVF, much improved  encourage increased PO hydration, activity as tolerated, compression stockings

## 2018-12-12 NOTE — PROGRESS NOTE ADULT - PROBLEM SELECTOR PLAN 7
-c/w statin
c/w PPI
c/w statin
c/w statin
-c/w statin
-c/w statin

## 2018-12-12 NOTE — PROGRESS NOTE ADULT - PROBLEM SELECTOR PLAN 9
-Kyphosis, spondylolisthesis and spinal stenosis. s/p T10 to pelvis decompression and fusion on 11/27/18. Post-op care per [facility].  -f/u plastics regarding removal of drain
-Kyphosis, spondylolisthesis and spinal stenosis. s/p T10 to pelvis decompression and fusion on 11/27/18. Post-op care per [facility].
-Kyphosis, spondylolisthesis and spinal stenosis. s/p T10 to pelvis decompression and fusion on 11/27/18. Post-op care per [facility].  -f/u plastics regarding removal of drain
-c/w PPI
c/w PPI
c/w PPI

## 2018-12-12 NOTE — PROGRESS NOTE ADULT - PROBLEM SELECTOR PROBLEM 8
Depression, unspecified depression type
Preventive measure
Depression, unspecified depression type
Depression, unspecified depression type

## 2018-12-12 NOTE — PROGRESS NOTE ADULT - PROBLEM SELECTOR PLAN 8
-c/w cymbalta
c/w bowel regimen  encourage incentive spirometry  VTE ppx with HSQ   d/w ortho  plan d/w patient.
c/w cymbalta
c/w cymbalta
-c/w cymbalta
-c/w cymbalta

## 2018-12-12 NOTE — PROGRESS NOTE ADULT - PROBLEM SELECTOR PROBLEM 6
Depression, unspecified depression type
Hypertension, unspecified type
Type 2 diabetes mellitus with diabetic autonomic neuropathy, without long-term current use of insulin

## 2018-12-12 NOTE — PROGRESS NOTE ADULT - ASSESSMENT
1. PT- bed mobility,transfers, gait and balance training  2. OT- ADL'S  3. CVA-bp control   4. dispo- ERIKA

## 2018-12-12 NOTE — PROGRESS NOTE ADULT - PROBLEM SELECTOR PLAN 2
Plan:  -repeat CTH on 12/10 shows stable ICH   -vessel imaging clear of any stenosis or occlusion  -complete cardiac workup include orthostatic  -rEEG to r/o seizure   -c/w PT/OT if needed non-neurologic   Plan:  -repeat CTH on 12/10 shows stable ICH   -vessel imaging clear of any stenosis or occlusion  -complete cardiac workup include orthostatic   -c/w PT/OT if needed

## 2018-12-13 ENCOUNTER — INBOUND DOCUMENT (OUTPATIENT)
Age: 63
End: 2018-12-13

## 2018-12-25 ENCOUNTER — EMERGENCY (EMERGENCY)
Facility: HOSPITAL | Age: 63
LOS: 1 days | Discharge: ANOTHER HOSPITAL | End: 2018-12-25
Attending: EMERGENCY MEDICINE | Admitting: EMERGENCY MEDICINE
Payer: COMMERCIAL

## 2018-12-25 ENCOUNTER — INPATIENT (INPATIENT)
Facility: HOSPITAL | Age: 63
LOS: 27 days | Discharge: NURSING, REHAB OR LONG-TERM CARE | DRG: 40 | End: 2019-01-22
Attending: PSYCHIATRY & NEUROLOGY | Admitting: PSYCHIATRY & NEUROLOGY
Payer: COMMERCIAL

## 2018-12-25 VITALS
RESPIRATION RATE: 18 BRPM | DIASTOLIC BLOOD PRESSURE: 82 MMHG | HEART RATE: 103 BPM | OXYGEN SATURATION: 97 % | SYSTOLIC BLOOD PRESSURE: 124 MMHG

## 2018-12-25 VITALS
SYSTOLIC BLOOD PRESSURE: 101 MMHG | DIASTOLIC BLOOD PRESSURE: 70 MMHG | HEART RATE: 102 BPM | TEMPERATURE: 98 F | OXYGEN SATURATION: 98 % | RESPIRATION RATE: 18 BRPM

## 2018-12-25 VITALS
SYSTOLIC BLOOD PRESSURE: 137 MMHG | HEART RATE: 126 BPM | TEMPERATURE: 97 F | WEIGHT: 179.9 LBS | RESPIRATION RATE: 16 BRPM | OXYGEN SATURATION: 98 % | DIASTOLIC BLOOD PRESSURE: 87 MMHG | HEIGHT: 66 IN

## 2018-12-25 DIAGNOSIS — Z98.89 OTHER SPECIFIED POSTPROCEDURAL STATES: Chronic | ICD-10-CM

## 2018-12-25 DIAGNOSIS — I61.9 NONTRAUMATIC INTRACEREBRAL HEMORRHAGE, UNSPECIFIED: ICD-10-CM

## 2018-12-25 DIAGNOSIS — Z98.890 OTHER SPECIFIED POSTPROCEDURAL STATES: Chronic | ICD-10-CM

## 2018-12-25 DIAGNOSIS — Z92.89 PERSONAL HISTORY OF OTHER MEDICAL TREATMENT: Chronic | ICD-10-CM

## 2018-12-25 LAB
ALBUMIN SERPL ELPH-MCNC: 3.1 G/DL — LOW (ref 3.3–5)
ALP SERPL-CCNC: 191 U/L — HIGH (ref 40–120)
ALT FLD-CCNC: 20 U/L — SIGNIFICANT CHANGE UP (ref 12–78)
ANION GAP SERPL CALC-SCNC: 16 MMOL/L — SIGNIFICANT CHANGE UP (ref 5–17)
ANION GAP SERPL CALC-SCNC: 9 MMOL/L — SIGNIFICANT CHANGE UP (ref 5–17)
APTT BLD: 32.7 SEC — SIGNIFICANT CHANGE UP (ref 27.5–36.3)
AST SERPL-CCNC: 23 U/L — SIGNIFICANT CHANGE UP (ref 15–37)
BASOPHILS # BLD AUTO: 0.03 K/UL — SIGNIFICANT CHANGE UP (ref 0–0.2)
BASOPHILS NFR BLD AUTO: 0.4 % — SIGNIFICANT CHANGE UP (ref 0–2)
BILIRUB SERPL-MCNC: 0.4 MG/DL — SIGNIFICANT CHANGE UP (ref 0.2–1.2)
BUN SERPL-MCNC: 13 MG/DL — SIGNIFICANT CHANGE UP (ref 7–23)
BUN SERPL-MCNC: 15 MG/DL — SIGNIFICANT CHANGE UP (ref 7–23)
CALCIUM SERPL-MCNC: 9.1 MG/DL — SIGNIFICANT CHANGE UP (ref 8.5–10.1)
CALCIUM SERPL-MCNC: 9.6 MG/DL — SIGNIFICANT CHANGE UP (ref 8.4–10.5)
CHLORIDE SERPL-SCNC: 103 MMOL/L — SIGNIFICANT CHANGE UP (ref 96–108)
CHLORIDE SERPL-SCNC: 104 MMOL/L — SIGNIFICANT CHANGE UP (ref 96–108)
CO2 SERPL-SCNC: 22 MMOL/L — SIGNIFICANT CHANGE UP (ref 22–31)
CO2 SERPL-SCNC: 29 MMOL/L — SIGNIFICANT CHANGE UP (ref 22–31)
CREAT SERPL-MCNC: 0.84 MG/DL — SIGNIFICANT CHANGE UP (ref 0.5–1.3)
CREAT SERPL-MCNC: 0.9 MG/DL — SIGNIFICANT CHANGE UP (ref 0.5–1.3)
EOSINOPHIL # BLD AUTO: 0.24 K/UL — SIGNIFICANT CHANGE UP (ref 0–0.5)
EOSINOPHIL NFR BLD AUTO: 2.9 % — SIGNIFICANT CHANGE UP (ref 0–6)
GLUCOSE SERPL-MCNC: 107 MG/DL — HIGH (ref 70–99)
GLUCOSE SERPL-MCNC: 130 MG/DL — HIGH (ref 70–99)
HCT VFR BLD CALC: 35.5 % — SIGNIFICANT CHANGE UP (ref 34.5–45)
HGB BLD-MCNC: 11.5 G/DL — SIGNIFICANT CHANGE UP (ref 11.5–15.5)
IMM GRANULOCYTES NFR BLD AUTO: 0.4 % — SIGNIFICANT CHANGE UP (ref 0–1.5)
INR BLD: 1.2 RATIO — HIGH (ref 0.88–1.16)
LACTATE SERPL-SCNC: 1.5 MMOL/L — SIGNIFICANT CHANGE UP (ref 0.7–2)
LYMPHOCYTES # BLD AUTO: 1.1 K/UL — SIGNIFICANT CHANGE UP (ref 1–3.3)
LYMPHOCYTES # BLD AUTO: 13.2 % — SIGNIFICANT CHANGE UP (ref 13–44)
MCHC RBC-ENTMCNC: 28.3 PG — SIGNIFICANT CHANGE UP (ref 27–34)
MCHC RBC-ENTMCNC: 32.4 GM/DL — SIGNIFICANT CHANGE UP (ref 32–36)
MCV RBC AUTO: 87.2 FL — SIGNIFICANT CHANGE UP (ref 80–100)
MONOCYTES # BLD AUTO: 0.38 K/UL — SIGNIFICANT CHANGE UP (ref 0–0.9)
MONOCYTES NFR BLD AUTO: 4.6 % — SIGNIFICANT CHANGE UP (ref 2–14)
NEUTROPHILS # BLD AUTO: 6.57 K/UL — SIGNIFICANT CHANGE UP (ref 1.8–7.4)
NEUTROPHILS NFR BLD AUTO: 78.5 % — HIGH (ref 43–77)
PLATELET # BLD AUTO: 250 K/UL — SIGNIFICANT CHANGE UP (ref 150–400)
POTASSIUM SERPL-MCNC: 3.4 MMOL/L — LOW (ref 3.5–5.3)
POTASSIUM SERPL-MCNC: 3.5 MMOL/L — SIGNIFICANT CHANGE UP (ref 3.5–5.3)
POTASSIUM SERPL-SCNC: 3.4 MMOL/L — LOW (ref 3.5–5.3)
POTASSIUM SERPL-SCNC: 3.5 MMOL/L — SIGNIFICANT CHANGE UP (ref 3.5–5.3)
PROT SERPL-MCNC: 6.9 G/DL — SIGNIFICANT CHANGE UP (ref 6–8.3)
PROTHROM AB SERPL-ACNC: 13.6 SEC — HIGH (ref 10–12.9)
RBC # BLD: 4.07 M/UL — SIGNIFICANT CHANGE UP (ref 3.8–5.2)
RBC # FLD: 15.2 % — HIGH (ref 10.3–14.5)
SODIUM SERPL-SCNC: 141 MMOL/L — SIGNIFICANT CHANGE UP (ref 135–145)
SODIUM SERPL-SCNC: 142 MMOL/L — SIGNIFICANT CHANGE UP (ref 135–145)
WBC # BLD: 8.35 K/UL — SIGNIFICANT CHANGE UP (ref 3.8–10.5)
WBC # FLD AUTO: 8.35 K/UL — SIGNIFICANT CHANGE UP (ref 3.8–10.5)

## 2018-12-25 PROCEDURE — 70450 CT HEAD/BRAIN W/O DYE: CPT | Mod: 26

## 2018-12-25 PROCEDURE — 85610 PROTHROMBIN TIME: CPT

## 2018-12-25 PROCEDURE — 93005 ELECTROCARDIOGRAM TRACING: CPT

## 2018-12-25 PROCEDURE — 70498 CT ANGIOGRAPHY NECK: CPT | Mod: 26

## 2018-12-25 PROCEDURE — 99285 EMERGENCY DEPT VISIT HI MDM: CPT

## 2018-12-25 PROCEDURE — 99285 EMERGENCY DEPT VISIT HI MDM: CPT | Mod: 25

## 2018-12-25 PROCEDURE — 96360 HYDRATION IV INFUSION INIT: CPT

## 2018-12-25 PROCEDURE — 85730 THROMBOPLASTIN TIME PARTIAL: CPT

## 2018-12-25 PROCEDURE — 85027 COMPLETE CBC AUTOMATED: CPT

## 2018-12-25 PROCEDURE — 96361 HYDRATE IV INFUSION ADD-ON: CPT

## 2018-12-25 PROCEDURE — 87040 BLOOD CULTURE FOR BACTERIA: CPT

## 2018-12-25 PROCEDURE — 36415 COLL VENOUS BLD VENIPUNCTURE: CPT

## 2018-12-25 PROCEDURE — 80053 COMPREHEN METABOLIC PANEL: CPT

## 2018-12-25 PROCEDURE — 70450 CT HEAD/BRAIN W/O DYE: CPT

## 2018-12-25 PROCEDURE — 70496 CT ANGIOGRAPHY HEAD: CPT | Mod: 26

## 2018-12-25 PROCEDURE — 99291 CRITICAL CARE FIRST HOUR: CPT

## 2018-12-25 PROCEDURE — 83605 ASSAY OF LACTIC ACID: CPT

## 2018-12-25 RX ORDER — ACETAMINOPHEN 500 MG
650 TABLET ORAL EVERY 6 HOURS
Qty: 0 | Refills: 0 | Status: DISCONTINUED | OUTPATIENT
Start: 2018-12-25 | End: 2019-01-19

## 2018-12-25 RX ORDER — SODIUM CHLORIDE 9 MG/ML
1000 INJECTION INTRAMUSCULAR; INTRAVENOUS; SUBCUTANEOUS ONCE
Qty: 0 | Refills: 0 | Status: COMPLETED | OUTPATIENT
Start: 2018-12-25 | End: 2018-12-25

## 2018-12-25 RX ORDER — DEXTROSE 50 % IN WATER 50 %
25 SYRINGE (ML) INTRAVENOUS ONCE
Qty: 0 | Refills: 0 | Status: DISCONTINUED | OUTPATIENT
Start: 2018-12-25 | End: 2019-01-22

## 2018-12-25 RX ORDER — ATORVASTATIN CALCIUM 80 MG/1
80 TABLET, FILM COATED ORAL AT BEDTIME
Qty: 0 | Refills: 0 | Status: DISCONTINUED | OUTPATIENT
Start: 2018-12-25 | End: 2019-01-22

## 2018-12-25 RX ORDER — SODIUM CHLORIDE 9 MG/ML
1000 INJECTION, SOLUTION INTRAVENOUS
Qty: 0 | Refills: 0 | Status: DISCONTINUED | OUTPATIENT
Start: 2018-12-25 | End: 2019-01-03

## 2018-12-25 RX ORDER — OXYCODONE HYDROCHLORIDE 5 MG/1
5 TABLET ORAL ONCE
Qty: 0 | Refills: 0 | Status: DISCONTINUED | OUTPATIENT
Start: 2018-12-25 | End: 2018-12-31

## 2018-12-25 RX ORDER — DEXTROSE 50 % IN WATER 50 %
15 SYRINGE (ML) INTRAVENOUS ONCE
Qty: 0 | Refills: 0 | Status: DISCONTINUED | OUTPATIENT
Start: 2018-12-25 | End: 2019-01-22

## 2018-12-25 RX ORDER — SODIUM CHLORIDE 9 MG/ML
1000 INJECTION INTRAMUSCULAR; INTRAVENOUS; SUBCUTANEOUS
Qty: 0 | Refills: 0 | Status: DISCONTINUED | OUTPATIENT
Start: 2018-12-25 | End: 2018-12-28

## 2018-12-25 RX ORDER — DEXTROSE 50 % IN WATER 50 %
12.5 SYRINGE (ML) INTRAVENOUS ONCE
Qty: 0 | Refills: 0 | Status: DISCONTINUED | OUTPATIENT
Start: 2018-12-25 | End: 2019-01-22

## 2018-12-25 RX ORDER — DULOXETINE HYDROCHLORIDE 30 MG/1
90 CAPSULE, DELAYED RELEASE ORAL DAILY
Qty: 0 | Refills: 0 | Status: DISCONTINUED | OUTPATIENT
Start: 2018-12-25 | End: 2019-01-02

## 2018-12-25 RX ORDER — OXYCODONE HYDROCHLORIDE 5 MG/1
10 TABLET ORAL ONCE
Qty: 0 | Refills: 0 | Status: DISCONTINUED | OUTPATIENT
Start: 2018-12-25 | End: 2018-12-31

## 2018-12-25 RX ORDER — GABAPENTIN 400 MG/1
600 CAPSULE ORAL
Qty: 0 | Refills: 0 | Status: DISCONTINUED | OUTPATIENT
Start: 2018-12-25 | End: 2019-01-02

## 2018-12-25 RX ORDER — GLUCAGON INJECTION, SOLUTION 0.5 MG/.1ML
1 INJECTION, SOLUTION SUBCUTANEOUS ONCE
Qty: 0 | Refills: 0 | Status: DISCONTINUED | OUTPATIENT
Start: 2018-12-25 | End: 2019-01-22

## 2018-12-25 RX ORDER — INSULIN LISPRO 100/ML
VIAL (ML) SUBCUTANEOUS
Qty: 0 | Refills: 0 | Status: DISCONTINUED | OUTPATIENT
Start: 2018-12-25 | End: 2019-01-03

## 2018-12-25 RX ORDER — LOSARTAN POTASSIUM 100 MG/1
50 TABLET, FILM COATED ORAL DAILY
Qty: 0 | Refills: 0 | Status: DISCONTINUED | OUTPATIENT
Start: 2018-12-25 | End: 2018-12-28

## 2018-12-25 RX ADMIN — SODIUM CHLORIDE 1000 MILLILITER(S): 9 INJECTION INTRAMUSCULAR; INTRAVENOUS; SUBCUTANEOUS at 12:16

## 2018-12-25 RX ADMIN — GABAPENTIN 600 MILLIGRAM(S): 400 CAPSULE ORAL at 20:19

## 2018-12-25 RX ADMIN — SODIUM CHLORIDE 1000 MILLILITER(S): 9 INJECTION INTRAMUSCULAR; INTRAVENOUS; SUBCUTANEOUS at 15:52

## 2018-12-25 RX ADMIN — Medication 650 MILLIGRAM(S): at 20:19

## 2018-12-25 NOTE — ED PROVIDER NOTE - PSH
History of cardiac catheterization  12/2015 with stent times  - [facility]  History of carpal tunnel surgery of left wrist    History of carpal tunnel surgery of right wrist    History of knee surgery  left times 2 ;  2001 , 2002   right knee : 2004  History of shoulder surgery

## 2018-12-25 NOTE — H&P ADULT - ATTENDING COMMENTS
I have seen and examined this patient with the stroke neurology team.     History was reviewed with the patient and/or available family members.   ROS: All negative except documented in the HPI.   Neurological exam was performed and agree with exam as documented above.   Laboratory results and imaging studies were reviewed by me.   I agree with the neurology resident note as documented above.    63 years old woman with vascular risk factors of age, HTN, DM II, HPLD and CAD is evaluated at [facility] for language disturbance. On 11/27 she underwent T10 laminectomy and fusion. Postoperatively, she was noted to have Wernicke's aphasia due to left temporal lobar ICH of undetermined etiology. She reports to have had significant improvement in her language deficit in the rehabilitation. In the evening of 12/24, she was reported to have worsening of her aphasia, prompting her presentation to OSH and subsequent transfer to [facility]. Neurological examination today shows moderate to severe mixed aphasia, expressive>> receptive. CT brain or admission showed expected evolution/resolution of left temporal ICH leading to development of encephalomalacia. MRI brain showed acute infarct in the left MCA distribution adjacent to the prior hemorrhage in the late subacute to chronic stage and expected evolution of prior left frontotemporal convexal subarachnoid hemorrhage as well as was concerning for a dilated cortical vein anterior to the hematoma.     Impression:  Left MCA distribution stroke of cryptogenic but of presumed embolic etiology from undetermined source  Recent left temporal lobar ICH with associated left frontotemporal convexal subarachnoid hemorrhage - likely etiology could be intracranial hemorrhages in the setting of PRES (post-operative) but possibility of underlying vascular malformation like micro-AVM or large vessel vasculopathy like vasculitis needs to be ruled out    Plan:   - Considering CT/MRI findings suggestive of late subacute to chronic ICH and new ischemic infarct, benefits of starting aspirin at this time would outweigh potential risks   - Agree to continue with pharmacological DVT prophylaxis   - Atorvastatin 80 mg at bedtime for now, further dose titration as per LDL results and exact etiology of her infarct  - HbA1C and LDL, continue with aggressive vascular risk factors modifications   - BP goal - gradual normotension   - Would discuss the possibility of undergoing conventional angiogram to rule out underlying vascular malformation with neuro-nterventionist/ Dr. Buchanan   - TTE with bubble study and continue with telemetry to screen for possible cardiac source of embolism  - Prolonged cardiac monitoring with ICM to screen for occult cardiac arrhythmia like atrial fibrillation being the cause of possible cardiac source of embolism to be considered based on results of above mentioned cardiac tests    - Autoimmune workup  - PT/OT/Speech and swallow/safety eval  - Stroke education  - Consider VEEG monitoring to evaluate for epileptiform discharges or nonconvulsive seizures  - AEDs to be considered based on EEG results  - Continue with safety precautions    Above mentioned plan was discussed with patient and available family member in detail. All the questions were answered and concerns were addressed. More than 82 minutes were spent in evaluation and management of this critically ill and unstable patient with acute ischemic stroke and recent intracranial hemorrhage. I have seen and examined this patient with the stroke neurology team.     History was reviewed with the patient and/or available family members.   ROS: All negative except documented in the HPI.   Neurological exam was performed and agree with exam as documented above.   Laboratory results and imaging studies were reviewed by me.   I agree with the neurology resident note as documented above.    63 years old woman with vascular risk factors of age, HTN, DM II, HPLD and CAD is evaluated at [facility] for language disturbance. On 11/27 she underwent T10 laminectomy and fusion. Postoperatively, she was noted to have Wernicke's aphasia due to left temporal lobar ICH of undetermined etiology. She reports to have had significant improvement in her language deficit in the rehabilitation. In the evening of 12/24, she was reported to have worsening of her aphasia, prompting her presentation to OSH and subsequent transfer to [facility]. Neurological examination today shows moderate to severe mixed aphasia, expressive>> receptive. CT brain or admission showed expected evolution/resolution of left temporal ICH leading to development of encephalomalacia. MRI brain showed acute infarct in the left MCA distribution adjacent to the prior hemorrhage in the late subacute to chronic stage and expected evolution of prior left frontotemporal convexal subarachnoid hemorrhage as well as was concerning for a dilated cortical vein anterior to the hematoma.     Impression:  Left MCA distribution stroke of cryptogenic but of presumed embolic etiology from undetermined source  Recent left temporal lobar ICH with associated left frontotemporal convexal subarachnoid hemorrhage - likely etiology could be intracranial hemorrhages in the setting of PRES (post-operative) but possibility of underlying vascular malformation like micro-AVM or large vessel vasculopathy like vasculitis needs to be ruled out    Plan:   - Considering CT/MRI findings suggestive of late subacute to chronic ICH and new ischemic infarct, benefits of starting aspirin at this time would outweigh potential risks   - Agree to continue with pharmacological DVT prophylaxis   - Atorvastatin 80 mg at bedtime for now, further dose titration as per LDL results and exact etiology of her infarct  - HbA1C and LDL, continue with aggressive vascular risk factors modifications   - BP goal - gradual normotension   - Would discuss the possibility of undergoing conventional angiogram to rule out underlying vascular malformation with neuro-nterventionist/ Dr. Buchanan   - IMTIAZ with bubble study and continue with telemetry to screen for possible cardiac source of embolism  - Prolonged cardiac monitoring with ICM to screen for occult cardiac arrhythmia like atrial fibrillation being the cause of possible cardiac source of embolism to be considered based on results of above mentioned cardiac tests    - Autoimmune workup  - PT/OT/Speech and swallow/safety eval  - Stroke education  - Consider VEEG monitoring to evaluate for epileptiform discharges or nonconvulsive seizures  - AEDs to be considered based on EEG results  - Continue with safety precautions    Above mentioned plan was discussed with patient and available family member in detail. All the questions were answered and concerns were addressed. More than 82 minutes were spent in evaluation and management of this critically ill and unstable patient with acute ischemic stroke and recent intracranial hemorrhage.

## 2018-12-25 NOTE — ED ADULT NURSE NOTE - NSIMPLEMENTINTERV_GEN_ALL_ED
Implemented All Fall with Harm Risk Interventions:  [place] to call system. Call bell, personal items and telephone within reach. Instruct patient to call for assistance. Room bathroom lighting operational. Non-slip footwear when patient is off stretcher. Physically safe environment: no spills, clutter or unnecessary equipment. Stretcher in lowest position, wheels locked, appropriate side rails in place. Provide visual cue, wrist band, yellow gown, etc. Monitor gait and stability. Monitor for mental status changes and reorient to person, place, and time. Review medications for side effects contributing to fall risk. Reinforce activity limits and safety measures with patient and family. Provide visual clues: red socks.

## 2018-12-25 NOTE — ED ADULT TRIAGE NOTE - CHIEF COMPLAINT QUOTE
[place] resident sent for sudden onset of AMS, non verbal  pt had spinal laminectomy on 12/3 w/ ICH

## 2018-12-25 NOTE — H&P ADULT - PSH
History of cardiac catheterization  12/2015 with stent times  - [facility]  History of carpal tunnel surgery of left wrist    History of carpal tunnel surgery of right wrist    History of knee surgery  left times 2 ;  2001 , 2002   right knee : 2004  History of shoulder surgery

## 2018-12-25 NOTE — ED PROVIDER NOTE - ATTENDING CONTRIBUTION TO CARE
62 yo white female recovering from small ICB in rehab, sent here today for evaluation of 2-days of altered speech. Exam today revealed white female with moderate expressive aphasia. I agree with plan and management outlined by PA.

## 2018-12-25 NOTE — H&P ADULT - NSHPLABSRESULTS_GEN_ALL_CORE
11.5   8.35  )-----------( 250      ( 25 Dec 2018 13:40 )             35.5     12-25    142  |  104  |  15  ----------------------------<  130<H>  3.4<L>   |  29  |  0.90    Ca    9.1      25 Dec 2018 13:40    TPro  6.9  /  Alb  3.1<L>  /  TBili  0.4  /  DBili  x   /  AST  23  /  ALT  20  /  AlkPhos  191<H>  12-25    < from: CT Head No Cont (12.25.18 @ 13:44) >      IMPRESSION: Evolving hemorrhagic infarct left temporal region. No new   hemorrhage.    < end of copied text >

## 2018-12-25 NOTE — ED ADULT NURSE NOTE - OBJECTIVE STATEMENT
Pt received in bed alert oriented and refusing to speak. Pt speaks occasionally and then goes back to using head gestures. As per pt's daughter, yesterday was the anniversary of the day pt's father  several years ago. As per md's orders Malcolm valdovinos placed blood specimen obtained and sent to the lab. Nursing care ongoing and safety maintained.

## 2018-12-25 NOTE — H&P ADULT - ASSESSMENT
63F with history of laminectomy in 11/2018 and left temporal lobar hemorrhage on 12/3/2018 who presents as transfer from [facility] for aphasia. Patient has been at a rehab facility and over the weekend was noted to be very confused and to have difficulty speaking. She also sent some nonsensical text messages to her . On exam, patient has severe expressive aphasia as well as some receptive aphasia. CTH at [place] showed an evolving hemorrhagic infarct in the left temporal region. Patient was transferred to [facility] and neurosurgery reviewed scans and no acute intervention was recommended. Patient was admitted to neurology stroke service for further work-up and care.     Plan:  CTA head/neck with IV contrast now  CT venogram  continuous video EEG  MRI brain with and without contrast

## 2018-12-25 NOTE — H&P ADULT - NSHPPHYSICALEXAM_GEN_ALL_CORE
General Exam:   General appearance: No acute distress      Neurological Exam:  Mental Status: awake, alert. Severe expressive aphasia. Follows some simple commands. Cannot follow commands crossing the midline. Slow to follow.   Cranial Nerves:   PERRL, EOMI, VFF, no nystagmus.    CN V1-3 intact to light touch .  No facial asymmetry.  Hearing intact to finger rub bilaterally.  Tongue, uvula and palate midline.  Sternocleidomastoid and Trapezius intact bilaterally.    Motor:   Tone: normal.                  Strength:  upper extremity strength 5/5 bilaterally; lower extremity motor exam limited secondary to back pain   Pronator drift: none                 Dysmetria: None to finger-nose-finger    Sensation: intact to light touch

## 2018-12-25 NOTE — ED PROVIDER NOTE - OBJECTIVE STATEMENT
62 y/o F biba to [place] this afternoon from [facility] rehab for eval of aphasia and ams since yesterday, trans. As per pts  pt was acting and talking normally on sunday night and then was aphasic the next time her  saw her Monday afternoon. Pt had laminectomy of L2-L5 on 11/27 and then developed an ICH on 12/3. Pt was doing better until Monday afternoon as per pts . No other complaints. Denies n/v, f/c, chest pain, sob, numbness, tingling, headache, lightheadedness, dizziness, visual changes. 64 y/o F biba to [facility] this afternoon from [facility] rehab for eval of aphasia and ams since yesterday, transferred from [place] for concern for evolving hemorrhagic stroke. Patient was admitted to [facility] on 11/27 for laminectomy; 12/3 she suffered hemorrhagic stroke and was managed at [facility] until discharged . As per pts  pt was acting and talking normally on Sunday night and then was aphasic the next time her  saw her Monday afternoon. Pt had laminectomy of L2-L5 on 11/27 and then developed an ICH on 12/3. Pt was doing better until Monday afternoon as per pts . No other complaints. Denies n/v, f/c, chest pain, sob, numbness, tingling, headache, lightheadedness, dizziness, visual changes.

## 2018-12-25 NOTE — ED PROVIDER NOTE - PHYSICAL EXAMINATION
GEN: Well Appearing, Nontoxic, NAD  HEENT: NC/AT, Symm Facies. PERRL, EOMI, MMM, posterior pharynx clear  CV: No JVD/Bruits or stridor;  +S1S2, RRR w/o m/g/r  RESP: CTAB w/o w/r/r  ABD: Soft, nt/nd, +BS. No guarding/rebound. No RUQ tender, no CVAT  EXT/MSK: No lower extremity edema or calf tenderness. WWP, palpable pulses. FROMx4  SKIN: No erythema, lesions or rash  Neuro: Unable to reliably follow commands. A&O only to self; unable to recognize daughter. Sensation intact throughout.  strength intact. Difficult to assess strength secondary to unable to follow commands appropriately.

## 2018-12-25 NOTE — ED PROVIDER NOTE - OBJECTIVE STATEMENT
64 y/o female biba from [facility] rehab for eval of aphasia and ams x 2 days. As per pts  pt was acting and talking normally on sunday night and then was aphasic the next time her  saw her Monday afternoon. Pt had laminectomy of L2-L5 on 11/27 and then developed an ICH on 12/3. Pt was doing better until Monday afternoon as per pts . No other complaints. Denies n/v, f/c, chest pain, sob, numbness, tingling, headache, lightheadedness, dizziness, visual changes.

## 2018-12-25 NOTE — ED PROVIDER NOTE - MEDICAL DECISION MAKING DETAILS
62 y/o F hemorrhagic stroke transfer from [place] for evolution and onset of aphasia since yesterday. PE with aphasia and different neuro exam secondary to unable to follow commands. Will obtain CTs admit to neurology

## 2018-12-25 NOTE — ED ADULT NURSE NOTE - CHPI ED NUR SYMPTOMS NEG
no fever/no chills/no nausea/no pain/no decreased eating/drinking/no tingling/no vomiting/no dizziness/no weakness

## 2018-12-25 NOTE — ED PROVIDER NOTE - PROGRESS NOTE DETAILS
spoke with neuro dr camacho who will come see pt Neuro wants patient transferred. Case D/W transfer center and patient accepted to [facility]. Case also D/W ED physician at [place].

## 2018-12-25 NOTE — ED PROVIDER NOTE - PSH
History of cardiac catheterization  12/2015 with stent times  - [facility]  History of carpal tunnel surgery of left wrist    History of carpal tunnel surgery of right wrist    History of knee surgery  left times 2 ;  2001 , 2002   right knee : 2004  History of shoulder surgery

## 2018-12-25 NOTE — ED PROVIDER NOTE - CHPI ED SYMPTOMS NEG
no vomiting/no fever/no blurred vision/no loss of consciousness/no nausea/no numbness/no weakness/no dizziness

## 2018-12-25 NOTE — ED PROVIDER NOTE - ATTENDING CONTRIBUTION TO CARE
I have seen and evaluated this patient with the advance practice clinician.   I agree with the findings  unless other wise stated. I have amended notes where needed.  After my face to face bedside evaluation, I am notin64 y/o F hemorrhagic stroke transfer from [place] for evolution and onset of aphasia since yesterday. PE with aphasia and different neuro exam secondary to unable to follow commands. Will obtain CTs admit to neurology

## 2018-12-25 NOTE — ED ADULT NURSE NOTE - OBJECTIVE STATEMENT
62 yo presents to the ED from [place] ED. Pt had laminectomy of L2-L5 on 11/27 and then developed an ICH on 12/3. pt was brought to [place] this afternoon from [facility] rehab for eval of aphasia and ams since yesterday. according to  pt was acting and talking normally on Sunday night and then was aphasic the next time her  saw her Monday afternoon. family brought it to the attention of staff this morning. no numbness tingling. follows commands. aphasic. VSS. PERRL WNL. on aspirin.

## 2018-12-25 NOTE — ED ADULT NURSE NOTE - NSIMPLEMENTINTERV_GEN_ALL_ED
Implemented All Fall Risk Interventions:  [place] to call system. Call bell, personal items and telephone within reach. Instruct patient to call for assistance. Room bathroom lighting operational. Non-slip footwear when patient is off stretcher. Physically safe environment: no spills, clutter or unnecessary equipment. Stretcher in lowest position, wheels locked, appropriate side rails in place. Provide visual cue, wrist band, yellow gown, etc. Monitor gait and stability. Monitor for mental status changes and reorient to person, place, and time. Review medications for side effects contributing to fall risk. Reinforce activity limits and safety measures with patient and family.

## 2018-12-25 NOTE — ED ADULT NURSE NOTE - PSH
History of cardiac catheterization  12/2015 with stent times  - [facility]  History of carpal tunnel surgery of left wrist    History of carpal tunnel surgery of right wrist    History of knee surgery  left times 2 ;  2001 , 2002   right knee : 2004  History of shoulder surgery

## 2018-12-26 LAB
BASOPHILS # BLD AUTO: 0.03 K/UL — SIGNIFICANT CHANGE UP (ref 0–0.2)
BASOPHILS NFR BLD AUTO: 0.4 % — SIGNIFICANT CHANGE UP (ref 0–2)
EOSINOPHIL # BLD AUTO: 0.27 K/UL — SIGNIFICANT CHANGE UP (ref 0–0.5)
EOSINOPHIL NFR BLD AUTO: 4 % — SIGNIFICANT CHANGE UP (ref 0–6)
GLUCOSE BLDC GLUCOMTR-MCNC: 114 MG/DL — HIGH (ref 70–99)
GLUCOSE BLDC GLUCOMTR-MCNC: 128 MG/DL — HIGH (ref 70–99)
HCT VFR BLD CALC: 33.4 % — LOW (ref 34.5–45)
HGB BLD-MCNC: 10.2 G/DL — LOW (ref 11.5–15.5)
IMM GRANULOCYTES NFR BLD AUTO: 0.1 % — SIGNIFICANT CHANGE UP (ref 0–1.5)
LYMPHOCYTES # BLD AUTO: 1.56 K/UL — SIGNIFICANT CHANGE UP (ref 1–3.3)
LYMPHOCYTES # BLD AUTO: 23.4 % — SIGNIFICANT CHANGE UP (ref 13–44)
MCHC RBC-ENTMCNC: 27.8 PG — SIGNIFICANT CHANGE UP (ref 27–34)
MCHC RBC-ENTMCNC: 30.5 GM/DL — LOW (ref 32–36)
MCV RBC AUTO: 91 FL — SIGNIFICANT CHANGE UP (ref 80–100)
MONOCYTES # BLD AUTO: 0.56 K/UL — SIGNIFICANT CHANGE UP (ref 0–0.9)
MONOCYTES NFR BLD AUTO: 8.4 % — SIGNIFICANT CHANGE UP (ref 2–14)
NEUTROPHILS # BLD AUTO: 4.24 K/UL — SIGNIFICANT CHANGE UP (ref 1.8–7.4)
NEUTROPHILS NFR BLD AUTO: 63.7 % — SIGNIFICANT CHANGE UP (ref 43–77)
PLATELET # BLD AUTO: 245 K/UL — SIGNIFICANT CHANGE UP (ref 150–400)
RBC # BLD: 3.67 M/UL — LOW (ref 3.8–5.2)
RBC # FLD: 15.9 % — HIGH (ref 10.3–14.5)
WBC # BLD: 6.67 K/UL — SIGNIFICANT CHANGE UP (ref 3.8–10.5)
WBC # FLD AUTO: 6.67 K/UL — SIGNIFICANT CHANGE UP (ref 3.8–10.5)

## 2018-12-26 PROCEDURE — 99223 1ST HOSP IP/OBS HIGH 75: CPT

## 2018-12-26 PROCEDURE — 70553 MRI BRAIN STEM W/O & W/DYE: CPT | Mod: 26

## 2018-12-26 PROCEDURE — 99233 SBSQ HOSP IP/OBS HIGH 50: CPT

## 2018-12-26 RX ORDER — ENOXAPARIN SODIUM 100 MG/ML
40 INJECTION SUBCUTANEOUS DAILY
Qty: 0 | Refills: 0 | Status: DISCONTINUED | OUTPATIENT
Start: 2018-12-26 | End: 2019-01-11

## 2018-12-26 RX ORDER — ASPIRIN/CALCIUM CARB/MAGNESIUM 324 MG
81 TABLET ORAL DAILY
Qty: 0 | Refills: 0 | Status: DISCONTINUED | OUTPATIENT
Start: 2018-12-26 | End: 2018-12-31

## 2018-12-26 RX ADMIN — Medication 650 MILLIGRAM(S): at 13:35

## 2018-12-26 RX ADMIN — Medication 650 MILLIGRAM(S): at 13:02

## 2018-12-26 RX ADMIN — Medication 650 MILLIGRAM(S): at 05:56

## 2018-12-26 RX ADMIN — GABAPENTIN 600 MILLIGRAM(S): 400 CAPSULE ORAL at 01:10

## 2018-12-26 RX ADMIN — Medication 650 MILLIGRAM(S): at 19:15

## 2018-12-26 RX ADMIN — GABAPENTIN 600 MILLIGRAM(S): 400 CAPSULE ORAL at 05:56

## 2018-12-26 RX ADMIN — Medication 650 MILLIGRAM(S): at 01:39

## 2018-12-26 RX ADMIN — Medication 1 MILLIGRAM(S): at 12:42

## 2018-12-26 RX ADMIN — Medication 650 MILLIGRAM(S): at 17:49

## 2018-12-26 RX ADMIN — Medication 81 MILLIGRAM(S): at 13:02

## 2018-12-26 RX ADMIN — ENOXAPARIN SODIUM 40 MILLIGRAM(S): 100 INJECTION SUBCUTANEOUS at 17:49

## 2018-12-26 RX ADMIN — SODIUM CHLORIDE 50 MILLILITER(S): 9 INJECTION INTRAMUSCULAR; INTRAVENOUS; SUBCUTANEOUS at 01:10

## 2018-12-26 RX ADMIN — GABAPENTIN 600 MILLIGRAM(S): 400 CAPSULE ORAL at 13:01

## 2018-12-26 RX ADMIN — Medication 650 MILLIGRAM(S): at 01:09

## 2018-12-26 RX ADMIN — GABAPENTIN 600 MILLIGRAM(S): 400 CAPSULE ORAL at 17:49

## 2018-12-26 RX ADMIN — Medication 1 MILLIGRAM(S): at 12:43

## 2018-12-26 RX ADMIN — ATORVASTATIN CALCIUM 80 MILLIGRAM(S): 80 TABLET, FILM COATED ORAL at 21:29

## 2018-12-26 RX ADMIN — LOSARTAN POTASSIUM 50 MILLIGRAM(S): 100 TABLET, FILM COATED ORAL at 05:56

## 2018-12-26 RX ADMIN — ATORVASTATIN CALCIUM 80 MILLIGRAM(S): 80 TABLET, FILM COATED ORAL at 01:09

## 2018-12-26 RX ADMIN — Medication 650 MILLIGRAM(S): at 06:26

## 2018-12-26 RX ADMIN — DULOXETINE HYDROCHLORIDE 90 MILLIGRAM(S): 30 CAPSULE, DELAYED RELEASE ORAL at 13:02
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
## 2018-12-26 NOTE — CONSULT NOTE ADULT - ASSESSMENT
63F with a history of CAD s/p PCI to 2 with a AVE in December of 2015, residual 40% LAD disease, neg stress test in 1/2018 osteoarthritis, DM, HTN, HLD who presents as a transfer from [facility] for aphasia now with temporal ICH.     - Now suffering aphasia from temporal ICH.   - She has a hx of a remote PCI.   - No anginal symptoms recently reported  - Please check 12 lead EKG today   - At this point will hold antiplatelet therapy. Her chances of stent thrombosis are very small given ~3 years since placement.   - Return to ASA 81mg Qday as soon as deemed safe per neuro  - check echo    - BP control.   - Cont losartan  - Would resume Coreg. Can start at 12.5mg q12 and then titrate back up to home dose.     - Appears compensated from HF POV.   - Monitor and replete electrolytes. Keep K>4.0 and Mg>2.0.   - Further cardiac workup will depend on clinical course.   - All other workup per primary team. Will followup.

## 2018-12-27 LAB
ANION GAP SERPL CALC-SCNC: 12 MMOL/L — SIGNIFICANT CHANGE UP (ref 5–17)
BUN SERPL-MCNC: 13 MG/DL — SIGNIFICANT CHANGE UP (ref 7–23)
CALCIUM SERPL-MCNC: 8.2 MG/DL — LOW (ref 8.4–10.5)
CHLORIDE SERPL-SCNC: 106 MMOL/L — SIGNIFICANT CHANGE UP (ref 96–108)
CO2 SERPL-SCNC: 22 MMOL/L — SIGNIFICANT CHANGE UP (ref 22–31)
CREAT SERPL-MCNC: 0.8 MG/DL — SIGNIFICANT CHANGE UP (ref 0.5–1.3)
GLUCOSE BLDC GLUCOMTR-MCNC: 112 MG/DL — HIGH (ref 70–99)
GLUCOSE BLDC GLUCOMTR-MCNC: 67 MG/DL — LOW (ref 70–99)
GLUCOSE BLDC GLUCOMTR-MCNC: 86 MG/DL — SIGNIFICANT CHANGE UP (ref 70–99)
GLUCOSE BLDC GLUCOMTR-MCNC: 88 MG/DL — SIGNIFICANT CHANGE UP (ref 70–99)
GLUCOSE SERPL-MCNC: 120 MG/DL — HIGH (ref 70–99)
HCT VFR BLD CALC: 33 % — LOW (ref 34.5–45)
HGB BLD-MCNC: 10.3 G/DL — LOW (ref 11.5–15.5)
MCHC RBC-ENTMCNC: 28.1 PG — SIGNIFICANT CHANGE UP (ref 27–34)
MCHC RBC-ENTMCNC: 31.2 GM/DL — LOW (ref 32–36)
MCV RBC AUTO: 90.2 FL — SIGNIFICANT CHANGE UP (ref 80–100)
PLATELET # BLD AUTO: 220 K/UL — SIGNIFICANT CHANGE UP (ref 150–400)
POTASSIUM SERPL-MCNC: 3.3 MMOL/L — LOW (ref 3.5–5.3)
POTASSIUM SERPL-SCNC: 3.3 MMOL/L — LOW (ref 3.5–5.3)
RBC # BLD: 3.66 M/UL — LOW (ref 3.8–5.2)
RBC # FLD: 16 % — HIGH (ref 10.3–14.5)
SODIUM SERPL-SCNC: 140 MMOL/L — SIGNIFICANT CHANGE UP (ref 135–145)
WBC # BLD: 4.93 K/UL — SIGNIFICANT CHANGE UP (ref 3.8–10.5)
WBC # FLD AUTO: 4.93 K/UL — SIGNIFICANT CHANGE UP (ref 3.8–10.5)

## 2018-12-27 PROCEDURE — 93306 TTE W/DOPPLER COMPLETE: CPT | Mod: 26

## 2018-12-27 PROCEDURE — 76376 3D RENDER W/INTRP POSTPROCES: CPT | Mod: 26

## 2018-12-27 PROCEDURE — 99233 SBSQ HOSP IP/OBS HIGH 50: CPT

## 2018-12-27 PROCEDURE — 93312 ECHO TRANSESOPHAGEAL: CPT | Mod: 26

## 2018-12-27 RX ORDER — POTASSIUM CHLORIDE 20 MEQ
20 PACKET (EA) ORAL ONCE
Qty: 0 | Refills: 0 | Status: COMPLETED | OUTPATIENT
Start: 2018-12-27 | End: 2018-12-27

## 2018-12-27 RX ORDER — POTASSIUM CHLORIDE 20 MEQ
10 PACKET (EA) ORAL
Qty: 0 | Refills: 0 | Status: DISCONTINUED | OUTPATIENT
Start: 2018-12-27 | End: 2018-12-27

## 2018-12-27 RX ADMIN — Medication 650 MILLIGRAM(S): at 00:15

## 2018-12-27 RX ADMIN — Medication 81 MILLIGRAM(S): at 17:39

## 2018-12-27 RX ADMIN — Medication 20 MILLIEQUIVALENT(S): at 11:08

## 2018-12-27 RX ADMIN — Medication 650 MILLIGRAM(S): at 06:16

## 2018-12-27 RX ADMIN — GABAPENTIN 600 MILLIGRAM(S): 400 CAPSULE ORAL at 05:33

## 2018-12-27 RX ADMIN — LOSARTAN POTASSIUM 50 MILLIGRAM(S): 100 TABLET, FILM COATED ORAL at 05:33

## 2018-12-27 RX ADMIN — DULOXETINE HYDROCHLORIDE 90 MILLIGRAM(S): 30 CAPSULE, DELAYED RELEASE ORAL at 17:39

## 2018-12-27 RX ADMIN — ENOXAPARIN SODIUM 40 MILLIGRAM(S): 100 INJECTION SUBCUTANEOUS at 17:39

## 2018-12-27 RX ADMIN — Medication 650 MILLIGRAM(S): at 05:33

## 2018-12-27 RX ADMIN — ATORVASTATIN CALCIUM 80 MILLIGRAM(S): 80 TABLET, FILM COATED ORAL at 21:44

## 2018-12-27 RX ADMIN — Medication 650 MILLIGRAM(S): at 01:00

## 2018-12-27 RX ADMIN — GABAPENTIN 600 MILLIGRAM(S): 400 CAPSULE ORAL at 00:15

## 2018-12-27 RX ADMIN — GABAPENTIN 600 MILLIGRAM(S): 400 CAPSULE ORAL at 17:39

## 2018-12-27 NOTE — PHYSICAL THERAPY INITIAL EVALUATION ADULT - DIAGNOSIS, PT EVAL
decreased functional mobility secondary to decreased strength, impaired cognition, impaired balance, decreased endurance

## 2018-12-27 NOTE — CHART NOTE - NSCHARTNOTEFT_GEN_A_CORE
Nutrition Note: Calorie Count Evaluation  Diet Rxd- NPO after MN.  Dxd with S/P laminectomy to L2-L5 with post op stroke, now presents with worsening Aphagia.  Patient referred for nutrition assessment and for calorie counts.  Patient off the unit for IMTIAZ at time of RDN visit.  Discussed needs with RN and as per RN, patient's  reported that patient had been eating poorly at rehab and now in hospital.  RN reports that twitching was noted yesterday and patient was given ativan.  After Ativan, patient's appetite and intake picked up and patient had diet advanced from puree to regular and ate 100% of trays.  Patient reporting to be hungry.  Calorie Counts not indicated at this time. Noted to be on Ensure Pudding (170Kcal, 4gm pro) which is not best choice in view of Diabetes.  As per old RDN note from earlier in month, patient was not following a therapeutic diet at home.  Presents from rehab.  Noted A1c 7.5.  Patient would benefit from diabetes education when appropriate. Full Nutrition Assessment to follow accordingly once patient available. Need actual Weight to assess changes.       Labs-Na 140, K+ 3.3, BUN 13, Cr 0.80, , Phos --, Alk Phos --, AST --, ALT --, Mg --, Ca 8.2, HbA1c --  Meds- MEDICATIONS  (STANDING):  aspirin enteric coated 81 milliGRAM(s) Oral daily  atorvastatin 80 milliGRAM(s) Oral at bedtime  insulin lispro (HumaLOG) corrective regimen sliding scale   SubCutaneous three times a day before meals  losartan 50 milliGRAM(s) Oral daily        Ht and Wt not available.  Last Ht noted at 63 inches and Wt 12/04- 209 pounds.  Discussed need for Ht and WT with RN.    Plan:  Resume Consistent CHO diet as medically appropriate  Consider Glucerna Shake 8 ounces x2 ( Glucerna (220Kcal, 9.9gm protein)  No Calorie Counts at this time.  Monitor diet tolerance, po intake, GI tolerance, weight trends, labs, and skin integrity  Nutrition assessment and education to follow.     Carolyn Lang, MA,RD,CHES,CDN,CSG  Beeper [phone]

## 2018-12-27 NOTE — PHYSICAL THERAPY INITIAL EVALUATION ADULT - CRITERIA FOR SKILLED THERAPEUTIC INTERVENTIONS
impairments found/functional limitations in following categories/risk reduction/prevention/anticipated discharge recommendation/rehab potential/predicted duration of therapy intervention/anticipated equipment needs at discharge/therapy frequency

## 2018-12-27 NOTE — PHYSICAL THERAPY INITIAL EVALUATION ADULT - PRECAUTIONS/LIMITATIONS, REHAB EVAL
(cont from above) On 12/24, pt reported to have worsening of her aphasia, prompting her presentation to OSH and subsequent transfer to [facility]. CT brain or admission showed expected evolution/resolution of left temporal ICH leading to development of encephalomalacia. MRI brain showed acute infarct in the left MCA distribution adjacent to the prior hemorrhage in the late subacute to chronic stage and expected evolution of prior left frontotemporal convexal subarachnoid hemorrhage as well as was concerning for a dilated cortical vein anterior to the hematoma./spinal precautions/fall precautions

## 2018-12-27 NOTE — PHYSICAL THERAPY INITIAL EVALUATION ADULT - LIVES WITH, PROFILE
Pt admitted from rehab facility s/p lami, required assist for functional mobility.  Prior to rehab pt living at home with spouse, and independent with ADL's & ambulation./spouse

## 2018-12-27 NOTE — PHYSICAL THERAPY INITIAL EVALUATION ADULT - ADDITIONAL COMMENTS
cond from above: Patient was discharged to rehab and plan was to obtain a conventional cerebral angiogram in the near future. While at rehab, patient has been improving and has been speaking normally. She has some difficulty walking due to her back problems but has been working with PT there. Over the weekend, staff noted her to be somewhat confused. She sent her  some text messages yesterday that were nonsensical He went to see her and found her unable to speak and staff at her rehab facility believe that this began sometime over the weekend but they are not sure when. MRI head: L temp. hematoma, small amt of subarachnoid blood; new acute L MCA infarct, CT brain: resolving L temp lobe parenchymal hem 12/25/18 CT brain: Resolving left temporal lobe parenchymal hemorrhage is re-demonstrated, similar to the prior brain CT.  CT angiography neck: No evidence for arterial dissection. No flow-limiting stenosis. Carotid bifurcations unremarkable.  CT angiography brain: No evidence for AVM. No vascular aneurysm or flow-limiting stenosis.  CT venography brain: No evidence for venous sinus or cortical vein thrombosis.

## 2018-12-27 NOTE — PHYSICAL THERAPY INITIAL EVALUATION ADULT - TRANSFER TRAINING, PT EVAL
GOAL: Pt will perform ALL transfers with min assist, w/use of appropriate assistive device as needed, in 2 weeks.

## 2018-12-27 NOTE — OCCUPATIONAL THERAPY INITIAL EVALUATION ADULT - ASR WT BEARING STATUS EVAL
On initial exam, patient has severe expressive aphasia and also has trouble following some simple commands but seems to understand what is going on and what she is being told to do. Her daughter is available at bedside and assists with history.

## 2018-12-27 NOTE — OCCUPATIONAL THERAPY INITIAL EVALUATION ADULT - ADDITIONAL COMMENTS
CT brain: Resolving left temporal lobe parenchymal hemorrhage is redemonstrated, similar to the prior brain CT. CTA neck: No evidence for arterial dissection. No flow-limiting stenosis. Carotid bifurcations unremarkable. CTA brain: No evidence for AVM. No vascular aneurysm or flow-limiting stenosis.  CTV brain: No evidence for venous sinus or cortical vein thrombosis.  MRI Head: Left temporal lobe hematoma without significant interval change in size. Small amount of subarachnoid blood. New acute left MCA distribution infarct. Prominent left temporal region cortical vein. Clinical correlation will determine the need for conventional angiography to exclude the   possibility of a vascular malformation.

## 2018-12-27 NOTE — PHYSICAL THERAPY INITIAL EVALUATION ADULT - PERTINENT HX OF CURRENT PROBLEM, REHAB EVAL
63F with a history of osteoarthritis, DM, HTN, HLD who presents as a transfer from [facility] for aphasia. Patient had a laminectomy of L2-L5 on 11/27 and then post-operatively developed acute onset aphasia on 12/3 and was diagnosed with a left temporal lobar hemorrhage. contd below: 63F with hx of osteoarthritis, DM, HTN, HLD who presents as a transfer from [facility] for aphasia. Pt s/p laminectomy of L2-L5 on 11/27 and then post-operatively developed acute onset aphasia on 12/3 and dx with a left temporal lobar hemorrhage. Pt discharged to rehab and plan was to obtain a conventional cerebral angiogram in the near future. Pt reportedly to have had significant improvement in her language deficit in rehab. (cont below)

## 2018-12-27 NOTE — OCCUPATIONAL THERAPY INITIAL EVALUATION ADULT - PRECAUTIONS/LIMITATIONS, REHAB EVAL
Over the weekend, staff noted her to be somewhat confused. She sent her  some text messages yesterday that were nonsensical He went to see her and found her unable to speak and staff at her rehab facility believe that this began sometime over the weekend but they are not sure when. She was taken to [facility] where CTH was concerning for possibly evolving hemorrhage, so she was transferred to [facility]. Over the weekend, staff noted her to be somewhat confused. She sent her  some text messages yesterday that were nonsensical He went to see her and found her unable to speak and staff at her rehab facility believe that this began sometime over the weekend but they are not sure when. She was taken to [facility] where CTH was concerning for possibly evolving hemorrhage, so she was transferred to [facility]./fall precautions/spinal precautions

## 2018-12-27 NOTE — PHYSICAL THERAPY INITIAL EVALUATION ADULT - IMPAIRED TRANSFERS: SIT/STAND, REHAB EVAL
cognition/impaired coordination/abnormal muscle tone/impaired balance/decreased strength/impaired motor control

## 2018-12-27 NOTE — PHYSICAL THERAPY INITIAL EVALUATION ADULT - ACTIVE RANGE OF MOTION EXAMINATION, REHAB EVAL
except R hand/wrist- AAROM WFL/bilateral upper extremity Active ROM was WFL (within functional limits)/bilateral  lower extremity Active ROM was WFL (within functional limits)

## 2018-12-27 NOTE — OCCUPATIONAL THERAPY INITIAL EVALUATION ADULT - LIVES WITH, PROFILE
Pt with expressive aphasia, as per chart review pt received from rehab s/p laminectomy and previous CVA, prior was living at home with , I in ADLs and ambulation

## 2018-12-27 NOTE — OCCUPATIONAL THERAPY INITIAL EVALUATION ADULT - PERTINENT HX OF CURRENT PROBLEM, REHAB EVAL
64 yo F presents as a transfer from [facility] for aphasia. Pt had a laminectomy of L2-L5 on 11/27 and then post-operatively developed acute onset aphasia on 12/3 and was diagnosed with a L temporal lobar hemorrhage. Pt was discharged to rehab and plan was to obtain a conventional cerebral angiogram in the near future. While at rehab, pt has been improving and has been speaking normally. She has some difficulty walking due to her back problems but has been working with PT there. See emelyn

## 2018-12-27 NOTE — PROGRESS NOTE ADULT - SUBJECTIVE AND OBJECTIVE BOX
THE PATIENT WAS SEEN AND EXAMINED BY ME WITH THE HOUSESTAFF AND STROKE TEAM DURING MORNING ROUNDS.   HPI:  63 year old woman with vascular risk factors of age, HTN, DM II, HPLD and CAD was evaluated at [facility] for language disturbance. On 11/27 she underwent T10 laminectomy and fusion. Postoperatively, she was noted to have Wernicke's aphasia due to left temporal lobar ICH of undetermined etiology. She reported to have had significant improvement in her language deficit in the rehabilitation. In the evening of 12/24, she was reported to have worsening of her aphasia, prompting her presentation to OSH and subsequent transfer to [facility].     ROS: All negative except documented in the HPI.     SUBJECTIVE: No events overnight.  No new neurologic complaints.      acetaminophen   Tablet .. 650 milliGRAM(s) Oral every 6 hours  aspirin enteric coated 81 milliGRAM(s) Oral daily  atorvastatin 80 milliGRAM(s) Oral at bedtime  dextrose 40% Gel 15 Gram(s) Oral once PRN  dextrose 5%. 1000 milliLiter(s) IV Continuous <Continuous>  dextrose 50% Injectable 12.5 Gram(s) IV Push once  dextrose 50% Injectable 25 Gram(s) IV Push once  dextrose 50% Injectable 25 Gram(s) IV Push once  DULoxetine 90 milliGRAM(s) Oral daily  enoxaparin Injectable 40 milliGRAM(s) SubCutaneous daily  gabapentin 600 milliGRAM(s) Oral four times a day  glucagon  Injectable 1 milliGRAM(s) IntraMuscular once PRN  insulin lispro (HumaLOG) corrective regimen sliding scale   SubCutaneous three times a day before meals  losartan 50 milliGRAM(s) Oral daily  oxyCODONE    IR 10 milliGRAM(s) Oral Once PRN  oxyCODONE    IR 5 milliGRAM(s) Oral Once PRN  sodium chloride 0.9%. 1000 milliLiter(s) IV Continuous <Continuous>    PHYSICAL EXAM:   Vital Signs Last 24 Hrs  T(C): 36.5 (27 Dec 2018 04:49), Max: 36.7 (26 Dec 2018 09:59)  T(F): 97.7 (27 Dec 2018 04:49), Max: 98.1 (26 Dec 2018 09:59)  HR: 96 (27 Dec 2018 04:49) (77 - 100)  BP: 111/69 (27 Dec 2018 04:49) (104/66 - 139/73)  RR: 18 (27 Dec 2018 04:49) (18 - 18)  SpO2: 95% (27 Dec 2018 04:49) (95% - 98%)    General: No acute distress  HEENT: EOM intact, visual fields full  Abdomen: Soft, nontender, nondistended   Extremities: No edema    NEUROLOGICAL EXAM:  Mental status: Awake, alert, oriented x3, no neglect, normal memory   Cranial Nerves: No facial asymmetry, no nystagmus, no dysarthria,  tongue midline  Motor exam: Normal tone, no drift, 5/5 RUE, 5/5 RLE, 5/5 LUE, 5/5 LLE, normal fine finger movements.  Sensation: Intact to light touch   Coordination/ Gait: No dysmetria,     LABS:                        10.2   6.67  )-----------( 245      ( 26 Dec 2018 00:32 )             33.4    12-25    141  |  103  |  13  ----------------------------<  107<H>  3.5   |  22  |  0.84    Ca    9.6      25 Dec 2018 19:43    TPro  6.9  /  Alb  3.1<L>  /  TBili  0.4  /  DBili  x   /  AST  23  /  ALT  20  /  AlkPhos  191<H>  12-25  PT/INR - ( 25 Dec 2018 13:40 )   PT: 13.6 sec;   INR: 1.20 ratio       PTT - ( 25 Dec 2018 13:40 )  PTT:32.7 sec  Hemoglobin A1C, Whole Blood: 7.5 % (11-09 @ 16:00)    IMAGING: Reviewed by me.     MRI Head w/wo IV Cont (12.26.18)  Left temporal lobe hematoma without significant interval change in size.  Small amount of subarachnoid blood.  New acute left MCA distribution infarct.  Prominent left temporal region cortical vein. Clinical correlation will   determine the need for conventional angiography to exclude the   possibility of a vascular malformation.    (12.25.18)  CT brain:  Resolving left temporal lobe parenchymal hemorrhage is redemonstrated,   similar to the prior brain CT.    CT angiography neck:  No evidence for arterial dissection. No flow-limiting stenosis. Carotid   bifurcations unremarkable.    CT angiography brain:  No evidence for AVM. No vascular aneurysm or flow-limiting stenosis.    CT venography brain:  No evidence for venous sinus or cortical vein thrombosis. THE PATIENT WAS SEEN AND EXAMINED BY ME WITH THE HOUSESTAFF AND STROKE TEAM DURING MORNING ROUNDS.     HPI:  63 year old woman with vascular risk factors of age, HTN, DM II, HPLD and CAD was evaluated at [facility] for language disturbance. On 11/27 she underwent T10 laminectomy and fusion. Postoperatively, she was noted to have Wernicke's aphasia due to left temporal lobar ICH of undetermined etiology. She reported to have had significant improvement in her language deficit in the rehabilitation. In the evening of 12/24, she was reported to have worsening of her aphasia, prompting her presentation to OSH and subsequent transfer to [facility].     ROS: All negative except documented in the HPI.     SUBJECTIVE: No events overnight.  No new neurologic complaints.      acetaminophen   Tablet .. 650 milliGRAM(s) Oral every 6 hours  aspirin enteric coated 81 milliGRAM(s) Oral daily  atorvastatin 80 milliGRAM(s) Oral at bedtime  dextrose 40% Gel 15 Gram(s) Oral once PRN  dextrose 5%. 1000 milliLiter(s) IV Continuous <Continuous>  dextrose 50% Injectable 12.5 Gram(s) IV Push once  dextrose 50% Injectable 25 Gram(s) IV Push once  dextrose 50% Injectable 25 Gram(s) IV Push once  DULoxetine 90 milliGRAM(s) Oral daily  enoxaparin Injectable 40 milliGRAM(s) SubCutaneous daily  gabapentin 600 milliGRAM(s) Oral four times a day  glucagon  Injectable 1 milliGRAM(s) IntraMuscular once PRN  insulin lispro (HumaLOG) corrective regimen sliding scale   SubCutaneous three times a day before meals  losartan 50 milliGRAM(s) Oral daily  oxyCODONE    IR 10 milliGRAM(s) Oral Once PRN  oxyCODONE    IR 5 milliGRAM(s) Oral Once PRN  sodium chloride 0.9%. 1000 milliLiter(s) IV Continuous <Continuous>    PHYSICAL EXAM:   Vital Signs Last 24 Hrs  T(C): 36.5 (27 Dec 2018 04:49), Max: 36.7 (26 Dec 2018 09:59)  T(F): 97.7 (27 Dec 2018 04:49), Max: 98.1 (26 Dec 2018 09:59)  HR: 96 (27 Dec 2018 04:49) (77 - 100)  BP: 111/69 (27 Dec 2018 04:49) (104/66 - 139/73)  RR: 18 (27 Dec 2018 04:49) (18 - 18)  SpO2: 95% (27 Dec 2018 04:49) (95% - 98%)    General: No acute distress  HEENT: EOM intact, right homonymous hemianopsia by blink to threat   Abdomen: Soft, nontender, nondistended   Extremities: No edema    NEUROLOGICAL EXAM:  Mental status: Awake, alert, was able to make eye contact, severe mixed aphasia; expressive>> receptive, mild to moderate dysarthria, not possible to check for orientation due to significant aphasia  Cranial Nerves: No facial asymmetry, right homonymous hemianopsia by blink to threat, no nystagmus, no dysarthria, tongue midline  Motor exam: mild right hemiparesis (RUE and RLE 5-/5), LUE and LLE - 5/5   Sensation: Intact to light touch   Coordination/ Gait: No dysmetria, gait deferred     LABS:                        10.2   6.67  )-----------( 245      ( 26 Dec 2018 00:32 )             33.4    12-25    141  |  103  |  13  ----------------------------<  107<H>  3.5   |  22  |  0.84    Ca    9.6      25 Dec 2018 19:43    TPro  6.9  /  Alb  3.1<L>  /  TBili  0.4  /  DBili  x   /  AST  23  /  ALT  20  /  AlkPhos  191<H>  12-25  PT/INR - ( 25 Dec 2018 13:40 )   PT: 13.6 sec;   INR: 1.20 ratio       PTT - ( 25 Dec 2018 13:40 )  PTT:32.7 sec  Hemoglobin A1C, Whole Blood: 7.5 % (11-09 @ 16:00)    IMAGING: Reviewed by me.     MRI Head w/wo IV Cont (12.26.18)  Left temporal lobe hematoma without significant interval change in size.  Small amount of subarachnoid blood.  New acute left MCA distribution infarct.  Prominent left temporal region cortical vein. Clinical correlation will   determine the need for conventional angiography to exclude the   possibility of a vascular malformation.    (12.25.18)  CT brain:  Resolving left temporal lobe parenchymal hemorrhage is redemonstrated,   similar to the prior brain CT.    CT angiography neck:  No evidence for arterial dissection. No flow-limiting stenosis. Carotid   bifurcations unremarkable.    CT angiography brain:  No evidence for AVM. No vascular aneurysm or flow-limiting stenosis.    CT venography brain:  No evidence for venous sinus or cortical vein thrombosis.

## 2018-12-28 ENCOUNTER — TRANSCRIPTION ENCOUNTER (OUTPATIENT)
Age: 63
End: 2018-12-28

## 2018-12-28 LAB
ANION GAP SERPL CALC-SCNC: 13 MMOL/L — SIGNIFICANT CHANGE UP (ref 5–17)
BUN SERPL-MCNC: 10 MG/DL — SIGNIFICANT CHANGE UP (ref 7–23)
CALCIUM SERPL-MCNC: 8.2 MG/DL — LOW (ref 8.4–10.5)
CHLORIDE SERPL-SCNC: 107 MMOL/L — SIGNIFICANT CHANGE UP (ref 96–108)
CHOLEST SERPL-MCNC: 128 MG/DL — SIGNIFICANT CHANGE UP (ref 10–199)
CO2 SERPL-SCNC: 23 MMOL/L — SIGNIFICANT CHANGE UP (ref 22–31)
CREAT SERPL-MCNC: 0.81 MG/DL — SIGNIFICANT CHANGE UP (ref 0.5–1.3)
GLUCOSE BLDC GLUCOMTR-MCNC: 101 MG/DL — HIGH (ref 70–99)
GLUCOSE BLDC GLUCOMTR-MCNC: 103 MG/DL — HIGH (ref 70–99)
GLUCOSE BLDC GLUCOMTR-MCNC: 123 MG/DL — HIGH (ref 70–99)
GLUCOSE BLDC GLUCOMTR-MCNC: 90 MG/DL — SIGNIFICANT CHANGE UP (ref 70–99)
GLUCOSE SERPL-MCNC: 100 MG/DL — HIGH (ref 70–99)
HBA1C BLD-MCNC: 6.7 % — HIGH (ref 4–5.6)
HCT VFR BLD CALC: 31.9 % — LOW (ref 34.5–45)
HDLC SERPL-MCNC: 35 MG/DL — LOW
HGB BLD-MCNC: 10.1 G/DL — LOW (ref 11.5–15.5)
LIPID PNL WITH DIRECT LDL SERPL: 44 MG/DL — SIGNIFICANT CHANGE UP
MCHC RBC-ENTMCNC: 28.6 PG — SIGNIFICANT CHANGE UP (ref 27–34)
MCHC RBC-ENTMCNC: 31.7 GM/DL — LOW (ref 32–36)
MCV RBC AUTO: 90.4 FL — SIGNIFICANT CHANGE UP (ref 80–100)
PLATELET # BLD AUTO: 251 K/UL — SIGNIFICANT CHANGE UP (ref 150–400)
POTASSIUM SERPL-MCNC: 3.5 MMOL/L — SIGNIFICANT CHANGE UP (ref 3.5–5.3)
POTASSIUM SERPL-SCNC: 3.5 MMOL/L — SIGNIFICANT CHANGE UP (ref 3.5–5.3)
RBC # BLD: 3.53 M/UL — LOW (ref 3.8–5.2)
RBC # FLD: 16.1 % — HIGH (ref 10.3–14.5)
SODIUM SERPL-SCNC: 143 MMOL/L — SIGNIFICANT CHANGE UP (ref 135–145)
TOTAL CHOLESTEROL/HDL RATIO MEASUREMENT: 3.7 RATIO — SIGNIFICANT CHANGE UP (ref 3.3–7.1)
TRIGL SERPL-MCNC: 243 MG/DL — HIGH (ref 10–149)
WBC # BLD: 5.66 K/UL — SIGNIFICANT CHANGE UP (ref 3.8–10.5)
WBC # FLD AUTO: 5.66 K/UL — SIGNIFICANT CHANGE UP (ref 3.8–10.5)

## 2018-12-28 PROCEDURE — 95819 EEG AWAKE AND ASLEEP: CPT | Mod: 26

## 2018-12-28 PROCEDURE — 99254 IP/OBS CNSLTJ NEW/EST MOD 60: CPT

## 2018-12-28 PROCEDURE — 95951: CPT | Mod: 26

## 2018-12-28 PROCEDURE — 33282: CPT

## 2018-12-28 PROCEDURE — 99233 SBSQ HOSP IP/OBS HIGH 50: CPT | Mod: 57

## 2018-12-28 PROCEDURE — 99233 SBSQ HOSP IP/OBS HIGH 50: CPT | Mod: 25

## 2018-12-28 RX ORDER — LOSARTAN POTASSIUM 100 MG/1
1 TABLET, FILM COATED ORAL
Qty: 0 | Refills: 0 | COMMUNITY
Start: 2018-12-28

## 2018-12-28 RX ORDER — DULOXETINE HYDROCHLORIDE 30 MG/1
3 CAPSULE, DELAYED RELEASE ORAL
Qty: 0 | Refills: 0 | COMMUNITY
Start: 2018-12-28

## 2018-12-28 RX ORDER — LOSARTAN POTASSIUM 100 MG/1
100 TABLET, FILM COATED ORAL DAILY
Qty: 0 | Refills: 0 | Status: DISCONTINUED | OUTPATIENT
Start: 2018-12-29 | End: 2019-01-22

## 2018-12-28 RX ORDER — INSULIN LISPRO 100/ML
1 VIAL (ML) SUBCUTANEOUS
Qty: 1 | Refills: 0
Start: 2018-12-28

## 2018-12-28 RX ORDER — METFORMIN HYDROCHLORIDE 850 MG/1
1 TABLET ORAL
Qty: 0 | Refills: 0 | COMMUNITY

## 2018-12-28 RX ORDER — OXYCODONE HYDROCHLORIDE 5 MG/1
1 TABLET ORAL
Qty: 0 | Refills: 0 | COMMUNITY
Start: 2018-12-28

## 2018-12-28 RX ORDER — ACETAMINOPHEN 500 MG
2 TABLET ORAL
Qty: 0 | Refills: 0 | DISCHARGE
Start: 2018-12-28

## 2018-12-28 RX ORDER — CEFAZOLIN SODIUM 1 G
1000 VIAL (EA) INJECTION ONCE
Qty: 0 | Refills: 0 | Status: COMPLETED | OUTPATIENT
Start: 2018-12-28 | End: 2018-12-28

## 2018-12-28 RX ORDER — UBIDECARENONE 100 MG
400 CAPSULE ORAL
Qty: 0 | Refills: 0 | COMMUNITY

## 2018-12-28 RX ORDER — ASPIRIN/CALCIUM CARB/MAGNESIUM 324 MG
1 TABLET ORAL
Qty: 0 | Refills: 0 | DISCHARGE
Start: 2018-12-28

## 2018-12-28 RX ORDER — ESTRADIOL 4 UG/1
0 INSERT VAGINAL
Qty: 0 | Refills: 0 | COMMUNITY

## 2018-12-28 RX ORDER — LOSARTAN POTASSIUM 100 MG/1
50 TABLET, FILM COATED ORAL ONCE
Qty: 0 | Refills: 0 | Status: COMPLETED | OUTPATIENT
Start: 2018-12-28 | End: 2018-12-28

## 2018-12-28 RX ORDER — CHOLECALCIFEROL (VITAMIN D3) 125 MCG
5000 CAPSULE ORAL
Qty: 0 | Refills: 0 | COMMUNITY

## 2018-12-28 RX ORDER — OMEGA-3 ACID ETHYL ESTERS 1 G
1 CAPSULE ORAL
Qty: 0 | Refills: 0 | COMMUNITY

## 2018-12-28 RX ORDER — LEVETIRACETAM 250 MG/1
750 TABLET, FILM COATED ORAL
Qty: 0 | Refills: 0 | Status: DISCONTINUED | OUTPATIENT
Start: 2018-12-28 | End: 2018-12-29

## 2018-12-28 RX ORDER — ALPRAZOLAM 0.25 MG
0 TABLET ORAL
Qty: 0 | Refills: 0 | COMMUNITY

## 2018-12-28 RX ORDER — ATORVASTATIN CALCIUM 80 MG/1
1 TABLET, FILM COATED ORAL
Qty: 0 | Refills: 0 | DISCHARGE
Start: 2018-12-28

## 2018-12-28 RX ORDER — LEVETIRACETAM 250 MG/1
1 TABLET, FILM COATED ORAL
Qty: 0 | Refills: 0 | COMMUNITY
Start: 2018-12-28

## 2018-12-28 RX ORDER — GABAPENTIN 400 MG/1
1 CAPSULE ORAL
Qty: 0 | Refills: 0 | COMMUNITY
Start: 2018-12-28

## 2018-12-28 RX ORDER — DULOXETINE HYDROCHLORIDE 30 MG/1
90 CAPSULE, DELAYED RELEASE ORAL
Qty: 0 | Refills: 0 | COMMUNITY

## 2018-12-28 RX ORDER — LEVETIRACETAM 250 MG/1
1000 TABLET, FILM COATED ORAL ONCE
Qty: 0 | Refills: 0 | Status: COMPLETED | OUTPATIENT
Start: 2018-12-28 | End: 2018-12-28

## 2018-12-28 RX ADMIN — GABAPENTIN 600 MILLIGRAM(S): 400 CAPSULE ORAL at 23:57

## 2018-12-28 RX ADMIN — LOSARTAN POTASSIUM 50 MILLIGRAM(S): 100 TABLET, FILM COATED ORAL at 08:36

## 2018-12-28 RX ADMIN — DULOXETINE HYDROCHLORIDE 90 MILLIGRAM(S): 30 CAPSULE, DELAYED RELEASE ORAL at 11:40

## 2018-12-28 RX ADMIN — LEVETIRACETAM 750 MILLIGRAM(S): 250 TABLET, FILM COATED ORAL at 17:12

## 2018-12-28 RX ADMIN — Medication 650 MILLIGRAM(S): at 11:40

## 2018-12-28 RX ADMIN — GABAPENTIN 600 MILLIGRAM(S): 400 CAPSULE ORAL at 11:49

## 2018-12-28 RX ADMIN — ATORVASTATIN CALCIUM 80 MILLIGRAM(S): 80 TABLET, FILM COATED ORAL at 20:47

## 2018-12-28 RX ADMIN — Medication 100 MILLIGRAM(S): at 11:35

## 2018-12-28 RX ADMIN — GABAPENTIN 600 MILLIGRAM(S): 400 CAPSULE ORAL at 00:21

## 2018-12-28 RX ADMIN — LEVETIRACETAM 400 MILLIGRAM(S): 250 TABLET, FILM COATED ORAL at 14:13

## 2018-12-28 RX ADMIN — ENOXAPARIN SODIUM 40 MILLIGRAM(S): 100 INJECTION SUBCUTANEOUS at 11:41

## 2018-12-28 RX ADMIN — Medication 650 MILLIGRAM(S): at 05:28

## 2018-12-28 RX ADMIN — GABAPENTIN 600 MILLIGRAM(S): 400 CAPSULE ORAL at 05:28

## 2018-12-28 RX ADMIN — Medication 650 MILLIGRAM(S): at 00:21

## 2018-12-28 RX ADMIN — LOSARTAN POTASSIUM 50 MILLIGRAM(S): 100 TABLET, FILM COATED ORAL at 05:28

## 2018-12-28 RX ADMIN — Medication 81 MILLIGRAM(S): at 11:40

## 2018-12-28 RX ADMIN — Medication 650 MILLIGRAM(S): at 12:59

## 2018-12-28 RX ADMIN — SODIUM CHLORIDE 50 MILLILITER(S): 9 INJECTION INTRAMUSCULAR; INTRAVENOUS; SUBCUTANEOUS at 17:15

## 2018-12-28 RX ADMIN — Medication 650 MILLIGRAM(S): at 01:00

## 2018-12-28 RX ADMIN — Medication 650 MILLIGRAM(S): at 21:30

## 2018-12-28 RX ADMIN — GABAPENTIN 600 MILLIGRAM(S): 400 CAPSULE ORAL at 17:12

## 2018-12-28 RX ADMIN — Medication 650 MILLIGRAM(S): at 23:57

## 2018-12-28 RX ADMIN — Medication 650 MILLIGRAM(S): at 20:30

## 2018-12-28 NOTE — DISCHARGE NOTE ADULT - ADDITIONAL INSTRUCTIONS
Please follow up with the electrophysiology clinic on 1/11/19 at 1:10 pm as follow up for your loop recorder placement.
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
## 2018-12-28 NOTE — SPEECH LANGUAGE PATHOLOGY EVALUATION - SLP PERTINENT HISTORY OF CURRENT PROBLEM
Patient is a 63F with a history of osteoarthritis, DM, HTN, HLD who presents as a transfer from [facility] for aphasia. Patient had a laminectomy of L2-L5 on 11/27 and then post-operatively developed acute onset aphasia on 12/3 and was diagnosed with a left temporal lobar hemorrhage. Patient was discharged to rehab and plan was to obtain a conventional cerebral angiogram in the near future. While at rehab, patient has been improving and has been speaking normally. She has some difficulty walking due to her back problems but has been working with PT there. Over the weekend, staff noted her to be somewhat confused. She sent her  some text messages yesterday that were nonsensical He went to see her and found her unable to speak and staff at her rehab facility believe that this began sometime over the weekend but they are not sure when.

## 2018-12-28 NOTE — DISCHARGE NOTE ADULT - HOSPITAL COURSE
63 years old woman with vascular risk factors of age, HTN, DM II, HPLD and CAD was evaluated at [facility] for language disturbance. On 11/27 she underwent T10 laminectomy and fusion. Postoperatively, she was noted to have Wernicke's aphasia due to left temporal lobar ICH of undetermined etiology. She reported to have had significant improvement in her language deficit in the rehabilitation. In the evening of 12/24, she was reported to have worsening of her aphasia, prompting her presentation to OSH and subsequent transfer to [facility]. CT brain or admission showed expected evolution/resolution of left temporal ICH leading to development of encephalomalacia. MRI brain showed acute infarct in the left MCA distribution adjacent to the prior hemorrhage in the late subacute to chronic stage and expected evolution of prior left frontotemporal convexal subarachnoid hemorrhage as well as was concerning for a dilated cortical vein anterior to the hematoma. Of note, MRI brain (12/7) showed left temporal lobar ICH, left frontotemporal convexal SAH and juxtacortical FLAIR hyperintensities.    Exam with fluctuations and emotional lability. She did have right facial twitching concerning for seizures. Had EEG. Was started on keppra 750mg BID.   She tolerated regular diet. Was evaluated by PT/OT and was recommended for rehab.     Impression:   Left MCA distribution stroke - likely etiology being cryptogenic stroke, probably related to embolism from a proximal source, like cardiac/paradoxical source of embolism  Recent left temporal lobar ICH with associated left frontotemporal convexal subarachnoid hemorrhage - likely etiology could be intracranial hemorrhages in the setting of PRES but possibility of underlying vascular malformation like micro-AVM or large vessel vasculopathy like vasculitis needs to be ruled out 63 years old woman with vascular risk factors of age, HTN, DM II, HPLD and CAD was evaluated at [facility] for language disturbance. On 11/27 she underwent T10 laminectomy and fusion. Postoperatively, she was noted to have Wernicke's aphasia due to left temporal lobar ICH of undetermined etiology. She reported to have had significant improvement in her language deficit in the rehabilitation. In the evening of 12/24, she was reported to have worsening of her aphasia, prompting her presentation to OSH and subsequent transfer to [facility]. CT brain or admission showed expected evolution/resolution of left temporal ICH leading to development of encephalomalacia. MRI brain showed what appeared to be an acute infarct in the left MCA distribution adjacent to the prior hemorrhage in the late subacute to chronic stage, although it is possible that this was not an actual infarct but rather MRI hyperintensity related to previous seizures; and expected evolution of prior left frontotemporal convexal subarachnoid hemorrhage as well as was concerning for a dilated cortical vein anterior to the hematoma. Of note, MRI brain (12/7) showed left temporal lobar ICH, left frontotemporal convexal SAH and juxtacortical FLAIR hyperintensities concerning for PRES to my eye. CT brain on 12/30, showed expected evolution of left MCA distribution "infarct" (versus abnormal signal related to previous seizures)  and prior left temporal ICH leading to encephalomalacia.    Impression:  Possible Left MCA distribution "stroke" -initially thought likely etiology being cryptogenic stroke, probably related to embolism from a proximal source, like cardiac/paradoxical source of embolism. Note it is possible or probable that this imaging abnormality does not represent an actual infarct but rather abnormal signal related to previous seizures vs. PRES.  Recent left temporal lobar ICH with associated left frontotemporal convexal subarachnoid hemorrhage - likely etiology could be intracranial hemorrhage in the setting of PRES but possibility of underlying vascular malformation like micro-AVM or large vessel vasculopathy like vasculitis needs to  be considered.  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    Neurologically noted with global aphasia and inattention- unchanged from previous, though likely to be PRES, planned to perform LP  to rule out any infectious or autoimmune etiology for altered mental status. Keep SBP between 120-150, LDL 44-continue with home statin as LDL is currently at goal, MRI Brain w/wo contrast noted above. Normal CTA of the head and neck. Physical therapy/OT recommended acute TBI rehab. Repeat MRI brain with and without contrast/MR spectroscopy end of February/begining of March to rule out an underlying neoplasm preferably upon resolution/complete resorption of prior left temporal lobar ICH; Conventional angiogram to rule out underlying vascular malformation could be considered based on results of a repeat brain imaging/vessel imaging.
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
## 2018-12-28 NOTE — DISCHARGE NOTE ADULT - MEDICATION SUMMARY - MEDICATIONS TO TAKE
Statement Selected I will START or STAY ON the medications listed below when I get home from the hospital:    acetaminophen 325 mg oral tablet  -- 2 tab(s) by mouth every 6 hours  -- Indication: For prn pain    oxyCODONE 10 mg oral tablet  -- 1 tab(s) by mouth once, As needed, Severe Pain (7 - 10)  -- Indication: For prn pain    oxyCODONE 5 mg oral tablet  -- 1 tab(s) by mouth once, As needed, Moderate Pain (4 - 6)  -- Indication: For prn pain    aspirin 81 mg oral delayed release tablet  -- 1 tab(s) by mouth once a day  -- Indication: For cva    losartan 100 mg oral tablet  -- 1 tab(s) by mouth once a day  -- Indication: For Htn    gabapentin 600 mg oral tablet  -- 1 tab(s) by mouth 4 times a day  -- Indication: For pain    levETIRAcetam 750 mg oral tablet  -- 1 tab(s) by mouth 2 times a day  -- Indication: For seizure    DULoxetine 30 mg oral delayed release capsule  -- 3 cap(s) by mouth once a day  -- Indication: For mood    insulin lispro (concentrated) 200 units/mL subcutaneous solution  -- 1 Unit(s) if Glucose 151 - 200  2 Unit(s) if Glucose 201 - 250  3 Unit(s) if Glucose 251 - 300  4 Unit(s) if Glucose 301 - 350  5 Unit(s) if Glucose 351 - 400  6 Unit(s) if Glucose Greater Than 400  -- Indication: For dm    atorvastatin 80 mg oral tablet  -- 1 tab(s) by mouth once a day (at bedtime)  -- Indication: For Hld I will START or STAY ON the medications listed below when I get home from the hospital:    acetaminophen 325 mg oral tablet  -- 2 tab(s) by mouth every 6 hours  -- Indication: For prn pain    oxyCODONE 10 mg oral tablet  -- 1 tab(s) by mouth once, As needed, Severe Pain (7 - 10)  -- Indication: For prn pain    oxyCODONE 5 mg oral tablet  -- 1 tab(s) by mouth once, As needed, Moderate Pain (4 - 6)  -- Indication: For prn pain    aspirin 81 mg oral delayed release tablet  -- 1 tab(s) by mouth once a day  -- Indication: For cva    losartan 100 mg oral tablet  -- 1 tab(s) by mouth once a day  -- Indication: For Htn    gabapentin 600 mg oral tablet  -- 1 tab(s) by mouth 4 times a day  -- Indication: For pain    lacosamide 200 mg oral tablet  -- 1 tab(s) by mouth 2 times a day  -- Indication: For Seizure    valproic acid 250 mg/5 mL oral syrup  -- 5 milliliter(s) by mouth 2 times a day  -- Indication: For Seizure    DULoxetine 30 mg oral delayed release capsule  -- 3 cap(s) by mouth once a day  -- Indication: For mood    insulin lispro (concentrated) 200 units/mL subcutaneous solution  -- 1 Unit(s) if Glucose 151 - 200  2 Unit(s) if Glucose 201 - 250  3 Unit(s) if Glucose 251 - 300  4 Unit(s) if Glucose 301 - 350  5 Unit(s) if Glucose 351 - 400  6 Unit(s) if Glucose Greater Than 400  -- Indication: For dm    atorvastatin 80 mg oral tablet  -- 1 tab(s) by mouth once a day (at bedtime)  -- Indication: For Hld    OLANZapine 10 mg intramuscular injection  -- 5 milligram(s) intramuscular every 24 hours, As needed, agitation  -- Indication: For Agitation    OLANZapine 5 mg oral tablet  -- 1 tab(s) by mouth 2 times a day  -- Indication: For Agitation    carvedilol 25 mg oral tablet  -- 1 tab(s) by mouth every 12 hours  -- Indication: For HTN (hypertension)    amLODIPine 10 mg oral tablet  -- 1 tab(s) by mouth once a day  -- Indication: For HTN (hypertension)    bacitracin 500 units/g topical ointment  -- 1 application on skin once a day  -- Indication: For Skin    nystatin 100,000 units/g topical ointment  -- 1 application on skin every 12 hours  -- Indication: For Skin I will START or STAY ON the medications listed below when I get home from the hospital:    acetaminophen 325 mg oral tablet  -- 2 tab(s) by mouth every 6 hours  -- Indication: For prn pain    oxyCODONE 10 mg oral tablet  -- 1 tab(s) by mouth once, As needed, Severe Pain (7 - 10)  -- Indication: For prn pain    oxyCODONE 5 mg oral tablet  -- 1 tab(s) by mouth once, As needed, Moderate Pain (4 - 6)  -- Indication: For prn pain    aspirin 81 mg oral delayed release tablet  -- 1 tab(s) by mouth once a day  -- Indication: For cva    losartan 100 mg oral tablet  -- 1 tab(s) by mouth once a day  -- Indication: For Htn    gabapentin 600 mg oral tablet  -- 1 tab(s) by mouth 4 times a day  -- Indication: For pain    lacosamide 200 mg oral tablet  -- 1 tab(s) by mouth 2 times a day  -- Indication: For Seizure    valproic acid 250 mg/5 mL oral syrup  -- 5 milliliter(s) by mouth 2 times a day  -- Indication: For Seizure    DULoxetine 30 mg oral delayed release capsule  -- 3 cap(s) by mouth once a day  -- Indication: For mood    insulin lispro (concentrated) 200 units/mL subcutaneous solution  -- 1 Unit(s) if Glucose 151 - 200  2 Unit(s) if Glucose 201 - 250  3 Unit(s) if Glucose 251 - 300  4 Unit(s) if Glucose 301 - 350  5 Unit(s) if Glucose 351 - 400  6 Unit(s) if Glucose Greater Than 400  -- Indication: For dm    atorvastatin 80 mg oral tablet  -- 1 tab(s) by mouth once a day (at bedtime)  -- Indication: For Hld    OLANZapine 10 mg intramuscular injection  -- 5 milligram(s) intramuscular every 24 hours, As needed, agitation  -- Indication: For Agitation    OLANZapine 5 mg oral tablet  -- 1 tab(s) by mouth 2 times a day  -- Indication: For Agitation    carvedilol 25 mg oral tablet  -- 1 tab(s) by mouth every 12 hours  -- Indication: For HTN (hypertension)    bacitracin 500 units/g topical ointment  -- 1 application on skin once a day  -- Indication: For Skin    nystatin 100,000 units/g topical ointment  -- 1 application on skin every 12 hours  -- Indication: For Skin

## 2018-12-28 NOTE — DISCHARGE NOTE ADULT - NS AS DC STROKE ED MATERIALS
Stroke Education Booklet/Prescribed Medications/Stroke Warning Signs and Symptoms/Call 911 for Stroke/Risk Factors for Stroke/Need for Followup After Discharge

## 2018-12-28 NOTE — DISCHARGE NOTE ADULT - CARE PROVIDERS DIRECT ADDRESSES
,DirectAddress_Unknown,[email or web address] ,DirectAddress_Unknown,[email or web address]

## 2018-12-28 NOTE — DISCHARGE NOTE ADULT - OTHER SIGNIFICANT FINDINGS
< from: IMTIAZ w/TTE (w/3D Echo) (12.27.18 @ 11:39) >  Conclusions:  1. Normal mitral valve. Moderate mitral regurgitation. BP  178/80  2. Moderate focal  atheroma noted in aortic arch/descending  aorta.  3. Normal left atrium.  LA volume index = 21 cc/m2. No left  atrial or left atrial appendage thrombus.  4. Increased relative wall thickness with normal left  ventricular mass index, consistent with concentric left  ventricular remodeling.  5. Normal right ventricular size and function.  6. Agitated saline injection and color flow Doppler  demonstrates no evidence of a patent foramen ovale.  *** No previous Echo exam.    < end of copied text >

## 2018-12-28 NOTE — DISCHARGE NOTE ADULT - MEDICATION SUMMARY - MEDICATIONS TO CHANGE
I will SWITCH the dose or number of times a day I take the medications listed below when I get home from the hospital:    losartan 50 mg oral tablet  -- 1 tab(s) by mouth once a day  -- Do not take this drug if you are pregnant.  It is very important that you take or use this exactly as directed.  Do not skip doses or discontinue unless directed by your doctor.  Some non-prescription drugs may aggravate your condition.  Read all labels carefully.  If a warning appears, check with your doctor before taking.
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
## 2018-12-28 NOTE — DISCHARGE NOTE ADULT - CARE PLAN
Principal Discharge DX:	CVA (cerebral vascular accident)  Goal:	Reduce risk for recurrence  Assessment and plan of treatment:	Take medications as prescribed above. In 4-6 weeks, repeat MRI, consider MR spect to assess area underlying hemorrhage. Maintain normal blood pressure. Follow up with neurology after discharge from rehab.  Secondary Diagnosis:	Hemorrhagic stroke  Secondary Diagnosis:	HTN (hypertension)  Secondary Diagnosis:	Seizure  Assessment and plan of treatment:	Keppra 750mg BID with plans to increase if focal motor seizures recur. Principal Discharge DX:	CVA (cerebral vascular accident)  Goal:	Reduce risk for recurrence  Assessment and plan of treatment:	Please follow up with your primary medical doctor within one month of discharge from the hospital.  Please follow up with your neurologist within one month of discharge from the hospital.  Please continue to take your medications as prescribed by your doctor.  Please continue to participate in your rehab.  Secondary Diagnosis:	Hemorrhagic stroke  Secondary Diagnosis:	HTN (hypertension)  Secondary Diagnosis:	Seizure  Assessment and plan of treatment:	Keppra 750mg BID with plans to increase if focal motor seizures recur.

## 2018-12-28 NOTE — DISCHARGE NOTE ADULT - MEDICATION SUMMARY - MEDICATIONS TO STOP TAKING
I will STOP taking the medications listed below when I get home from the hospital:    PriLOSEC OTC 20 mg oral delayed release tablet  -- 1 tab(s) by mouth once a day    Coreg 25 mg oral tablet  -- 1 tab(s) by mouth 2 times a day    Crestor 20 mg oral tablet  -- 1 tab(s) by mouth once a day (at bedtime)    metFORMIN 500 mg oral tablet  -- 1 tab(s) by mouth 2 times a day    Calcium 600+D oral tablet  -- 1 tab(s) by mouth once a day AM    CoQ10  -- 400 milligram(s) by mouth once a day AM last dose 11/20/2018    D3 1000  -- 5000 unit(s) by mouth once a day AM    Fish Oil 1200 mg oral capsule  -- 1 cap(s) by mouth once a day AM last dose 11/20/18    Xanax 0.25 mg oral tablet  -- as needed    Yuvafem 10 mcg vaginal tablet  -- vaginal 3 times a week    senna oral tablet  -- 2 tab(s) by mouth once a day (at bedtime)    docusate sodium 100 mg oral capsule  -- 1 cap(s) by mouth 3 times a day    tiZANidine 2 mg oral tablet  -- 1 tab(s) by mouth every 6 hours, As needed, Muscle Spasm

## 2018-12-28 NOTE — DISCHARGE NOTE ADULT - PATIENT PORTAL LINK FT
You can access the [facility] Patient Portal, offered by [facility], by registering with the following website: [email or web address]

## 2018-12-28 NOTE — PROGRESS NOTE ADULT - ASSESSMENT
63F with a history of CAD s/p PCI to OM2 with a AVE in December of 2015, residual 40% LAD disease, neg stress test in 1/2018 osteoarthritis, DM, HTN, HLD who presents as a transfer from [facility] for aphasia now with temporal ICH and new left MCA infarct     - She has a hx of a remote PCI.   - No anginal symptoms recently reported  - Cont ASA     - Has a moderate focal atheroma in Aortic Arch  - Cont high intensity statin.   - Will need repeat FLP as outpt. If LDL not <70 consider switching to Crestor 40mgHS    - Given nature of CVA would implant with ILR. EP notified.     - BP control.   - Cont losartan  - Would resume Coreg 12.5mg q12 and then titrate back up to home dose.     - Appears compensated from HF POV.   - Monitor and replete electrolytes. Keep K>4.0 and Mg>2.0.       - Further cardiac workup will depend on clinical course.   - All other workup per primary team. Will followup.

## 2018-12-28 NOTE — PROGRESS NOTE ADULT - SUBJECTIVE AND OBJECTIVE BOX
THE PATIENT WAS SEEN AND EXAMINED BY ME WITH THE HOUSESTAFF AND STROKE TEAM DURING MORNING ROUNDS.     HPI:  63 year old woman with vascular risk factors of age, HTN, DM II, HPLD and CAD was evaluated at [facility] for language disturbance. On 11/27 she underwent T10 laminectomy and fusion. Postoperatively, she was noted to have Wernicke's aphasia due to left temporal lobar ICH of undetermined etiology. She reported to have had significant improvement in her language deficit in the rehabilitation. In the evening of 12/24, she was reported to have worsening of her aphasia, prompting her presentation to OSH and subsequent transfer to [facility].     ROS: All negative except documented in the HPI.     SUBJECTIVE: No events overnight.  No new neurologic complaints.      acetaminophen   Tablet .. 650 milliGRAM(s) Oral every 6 hours  aspirin enteric coated 81 milliGRAM(s) Oral daily  atorvastatin 80 milliGRAM(s) Oral at bedtime  dextrose 40% Gel 15 Gram(s) Oral once PRN  dextrose 5%. 1000 milliLiter(s) IV Continuous <Continuous>  dextrose 50% Injectable 12.5 Gram(s) IV Push once  dextrose 50% Injectable 25 Gram(s) IV Push once  dextrose 50% Injectable 25 Gram(s) IV Push once  DULoxetine 90 milliGRAM(s) Oral daily  enoxaparin Injectable 40 milliGRAM(s) SubCutaneous daily  gabapentin 600 milliGRAM(s) Oral four times a day  glucagon  Injectable 1 milliGRAM(s) IntraMuscular once PRN  insulin lispro (HumaLOG) corrective regimen sliding scale   SubCutaneous three times a day before meals  losartan 50 milliGRAM(s) Oral daily  oxyCODONE    IR 10 milliGRAM(s) Oral Once PRN  oxyCODONE    IR 5 milliGRAM(s) Oral Once PRN  sodium chloride 0.9%. 1000 milliLiter(s) IV Continuous <Continuous>    PHYSICAL EXAM:   Vital Signs Last 24 Hrs  T(C): 36.9 (28 Dec 2018 05:00), Max: 36.9 (28 Dec 2018 05:00)  T(F): 98.4 (28 Dec 2018 05:00), Max: 98.4 (28 Dec 2018 05:00)  HR: 90 (28 Dec 2018 05:00) (81 - 100)  BP: 164/80 (28 Dec 2018 05:00) (126/64 - 164/80)  RR: 18 (28 Dec 2018 05:00) (18 - 18)  SpO2: 95% (28 Dec 2018 05:00) (95% - 97%)    General: No acute distress  HEENT: EOM intact, right homonymous hemianopsia by blink to threat   Abdomen: Soft, nontender, nondistended   Extremities: No edema    NEUROLOGICAL EXAM:  Mental status: Awake, alert, was able to make eye contact, severe mixed aphasia; expressive>> receptive, mild to moderate dysarthria, not possible to check for orientation due to significant aphasia  Cranial Nerves: No facial asymmetry, right homonymous hemianopsia by blink to threat, no nystagmus, no dysarthria, tongue midline  Motor exam: mild right hemiparesis (RUE and RLE 5-/5), LUE and LLE - 5/5   Sensation: Intact to light touch   Coordination/ Gait: No dysmetria, gait deferred     LABS:                        10.3   4.93  )-----------( 220      ( 27 Dec 2018 08:07 )             33.0    12-28    143  |  107  |  10  ----------------------------<  100<H>  3.5   |  23  |  0.81    Ca    8.2<L>      28 Dec 2018 05:22    Hemoglobin A1C, Whole Blood: 7.5 % (11-09 @ 16:00)    IMAGING: Reviewed by me.     MRI Head w/wo IV Cont (12.26.18)  Left temporal lobe hematoma without significant interval change in size.  Small amount of subarachnoid blood.  New acute left MCA distribution infarct.  Prominent left temporal region cortical vein. Clinical correlation will   determine the need for conventional angiography to exclude the   possibility of a vascular malformation.    (12.25.18)  CT brain:  Resolving left temporal lobe parenchymal hemorrhage is redemonstrated,   similar to the prior brain CT.    CT angiography neck:  No evidence for arterial dissection. No flow-limiting stenosis. Carotid   bifurcations unremarkable.    CT angiography brain:  No evidence for AVM. No vascular aneurysm or flow-limiting stenosis.    CT venography brain:  No evidence for venous sinus or cortical vein thrombosis. THE PATIENT WAS SEEN AND EXAMINED BY ME WITH THE HOUSESTAFF AND STROKE TEAM DURING MORNING ROUNDS.     HPI:  63 year old woman with vascular risk factors of age, HTN, DM II, HPLD and CAD was evaluated at [facility] for language disturbance. On 11/27 she underwent T10 laminectomy and fusion. Postoperatively, she was noted to have Wernicke's aphasia due to left temporal lobar ICH of undetermined etiology. She reported to have had significant improvement in her language deficit in the rehabilitation. In the evening of 12/24, she was reported to have worsening of her aphasia, prompting her presentation to OSH and subsequent transfer to [facility].     ROS: All negative except documented in the HPI.     SUBJECTIVE: Noted with 15 sec of lip twitching on 12/28 no further episodes. No new neurologic complaints.      acetaminophen   Tablet .. 650 milliGRAM(s) Oral every 6 hours  aspirin enteric coated 81 milliGRAM(s) Oral daily  atorvastatin 80 milliGRAM(s) Oral at bedtime  dextrose 40% Gel 15 Gram(s) Oral once PRN  dextrose 5%. 1000 milliLiter(s) IV Continuous <Continuous>  dextrose 50% Injectable 12.5 Gram(s) IV Push once  dextrose 50% Injectable 25 Gram(s) IV Push once  dextrose 50% Injectable 25 Gram(s) IV Push once  DULoxetine 90 milliGRAM(s) Oral daily  enoxaparin Injectable 40 milliGRAM(s) SubCutaneous daily  gabapentin 600 milliGRAM(s) Oral four times a day  glucagon  Injectable 1 milliGRAM(s) IntraMuscular once PRN  insulin lispro (HumaLOG) corrective regimen sliding scale   SubCutaneous three times a day before meals  losartan 50 milliGRAM(s) Oral daily  oxyCODONE    IR 10 milliGRAM(s) Oral Once PRN  oxyCODONE    IR 5 milliGRAM(s) Oral Once PRN  sodium chloride 0.9%. 1000 milliLiter(s) IV Continuous <Continuous>    PHYSICAL EXAM:   Vital Signs Last 24 Hrs  T(C): 36.9 (28 Dec 2018 05:00), Max: 36.9 (28 Dec 2018 05:00)  T(F): 98.4 (28 Dec 2018 05:00), Max: 98.4 (28 Dec 2018 05:00)  HR: 90 (28 Dec 2018 05:00) (81 - 100)  BP: 164/80 (28 Dec 2018 05:00) (126/64 - 164/80)  RR: 18 (28 Dec 2018 05:00) (18 - 18)  SpO2: 95% (28 Dec 2018 05:00) (95% - 97%)    General: No acute distress  HEENT: EOM intact, right homonymous hemianopsia by blink to threat   Abdomen: Soft, nontender, nondistended   Extremities: No edema    NEUROLOGICAL EXAM:  Mental status: Awake, alert, was able to make eye contact, 1-2 words, severe mixed aphasia; expressive>> receptive, mild to moderate dysarthria, not possible to check for orientation due to significant aphasia  Cranial Nerves: No facial asymmetry, right homonymous hemianopsia by blink to threat, no nystagmus, no dysarthria, tongue midline  Motor exam: mild right hemiparesis (RUE and RLE 5-/5), LUE and LLE - 5/5   Sensation: Intact to light touch   Coordination/ Gait: No dysmetria, gait deferred     LABS:                        10.3   4.93  )-----------( 220      ( 27 Dec 2018 08:07 )             33.0    12-28    143  |  107  |  10  ----------------------------<  100<H>  3.5   |  23  |  0.81    Ca    8.2<L>      28 Dec 2018 05:22    Hemoglobin A1C, Whole Blood: 7.5 % (11-09 @ 16:00)    IMAGING: Reviewed by me.     MRI Head w/wo IV Cont (12.26.18)  Left temporal lobe hematoma without significant interval change in size.  Small amount of subarachnoid blood.  New acute left MCA distribution infarct.  Prominent left temporal region cortical vein. Clinical correlation will   determine the need for conventional angiography to exclude the   possibility of a vascular malformation.    (12.25.18)  CT brain:  Resolving left temporal lobe parenchymal hemorrhage is redemonstrated,   similar to the prior brain CT.    CT angiography neck:  No evidence for arterial dissection. No flow-limiting stenosis. Carotid   bifurcations unremarkable.    CT angiography brain:  No evidence for AVM. No vascular aneurysm or flow-limiting stenosis.    CT venography brain:  No evidence for venous sinus or cortical vein thrombosis.

## 2018-12-28 NOTE — DISCHARGE NOTE ADULT - CARE PROVIDER_API CALL
Lily Flores (NP; RN), NP in Family Health  [address]  [place] [address]  Phone: [phone]  Fax: [phone]    Adalberto Lock), Internal Medicine  [address] [place] [address]  Phone: [phone]  Fax: [phone] Lily Flores (NP; RN), NP in Family Health  [address]  [address]  [place] [address]  Phone: [phone]  Fax: [phone]    Adalberto Lock), Internal Medicine  [address] [place] [address]  Phone: [phone]  Fax: [phone]    Carla Biggs), Neurology  [address]  [address]  [place] [address]  Phone: [phone]  Fax: [phone]

## 2018-12-28 NOTE — SPEECH LANGUAGE PATHOLOGY EVALUATION - SLP DIAGNOSIS
Pt presents with mixed aphasia; severe expressive language deficits, signs suggestive of verbal apraxia, moderate receptive aphasia. Unable to repeat, absent naming, reduced comprehension of auditory and written stimuli. Patient has some retained automatic verbal output (e.g., "thank you", "no", "wait") and demonstrates appropriate spontaneous gestures (yes, no, stop). Patient appears to be aware of deficits and is very appropriately emotional during evaluation. Pt presents with mixed aphasia; severe expressive language deficits, signs suggestive of verbal apraxia, moderate-severe receptive aphasia. Unable to repeat, absent naming, reduced comprehension of auditory and written stimuli. Patient has some retained automatic verbal output (e.g., "thank you", "no", "wait") and demonstrates appropriate facial expression and some spontaneous hand gestures (yes, no, stop). Patient appears to be aware of deficits and is very appropriately emotional during evaluation.

## 2018-12-28 NOTE — SPEECH LANGUAGE PATHOLOGY EVALUATION - YES/NO QUESTIONS: PERSONAL
was able to answer accurately to yes/no re: patient's name; no response elicited for additional questions/no

## 2018-12-28 NOTE — DISCHARGE NOTE ADULT - PLAN OF CARE
Reduce risk for recurrence Take medications as prescribed above. In 4-6 weeks, repeat MRI, consider MR spect to assess area underlying hemorrhage. Maintain normal blood pressure. Follow up with neurology after discharge from rehab. Keppra 750mg BID with plans to increase if focal motor seizures recur. Please follow up with your primary medical doctor within one month of discharge from the hospital.  Please follow up with your neurologist within one month of discharge from the hospital.  Please continue to take your medications as prescribed by your doctor.  Please continue to participate in your rehab.

## 2018-12-28 NOTE — EEG REPORT - NS EEG TEXT BOX
[facility]   COMPREHENSIVE EPILEPSY CENTER   REPORT OF ROUTINE VIDEO EEG     [facility]: [address] [place] [address] , [address], [place] [address], Ph#: [phone]  [facility]: [id number], [place] [address], Ph#: [phone]  Office: [address] [place] [address], [facility] [address], [place] [address] Ph#: [phone]    Patient Name: DEMI ARCHIBALD  Age and : 63y (51)  MRN #: [id number]  Location: [address]   Referring Physician: Adrian Lock    Study Date: 18    _____________________________________________________________  TECHNICAL INFORMATION    Placement and Labeling of Electrodes:  The EEG was performed utilizing 20 channels referential EEG connections (coronal over temporal over parasagittal montage) using all standard 10-20 electrode placements with EKG.  Recording was at a sampling rate of 256 samples per second per channel.  Time synchronized digital video recording was done simultaneously with EEG recording.  A low light infrared camera was used for low light recording.  Neel and seizure detection algorithms were utilized.    _____________________________________________________________  HISTORY    Patient is a 63y old  Female who presents with a chief complaint of cva (28 Dec 2018 07:19)      PERTINENT MEDICATION:  MEDICATIONS  (STANDING):  acetaminophen   Tablet .. 650 milliGRAM(s) Oral every 6 hours  aspirin enteric coated 81 milliGRAM(s) Oral daily  atorvastatin 80 milliGRAM(s) Oral at bedtime  dextrose 5%. 1000 milliLiter(s) (50 mL/Hr) IV Continuous <Continuous>  dextrose 50% Injectable 12.5 Gram(s) IV Push once  dextrose 50% Injectable 25 Gram(s) IV Push once  dextrose 50% Injectable 25 Gram(s) IV Push once  DULoxetine 90 milliGRAM(s) Oral daily  enoxaparin Injectable 40 milliGRAM(s) SubCutaneous daily  gabapentin 600 milliGRAM(s) Oral four times a day  insulin lispro (HumaLOG) corrective regimen sliding scale   SubCutaneous three times a day before meals  levETIRAcetam 750 milliGRAM(s) Oral two times a day  levETIRAcetam  IVPB 1000 milliGRAM(s) IV Intermittent once  sodium chloride 0.9%. 1000 milliLiter(s) (50 mL/Hr) IV Continuous <Continuous>    _____________________________________________________________  STUDY INTERPRETATION    Findings: The background was continuous, spontaneously variable and reactive. During wakefulness, the posterior dominant rhythm consisted of asymmetric (seen in right posterior quadrant), well-modulated 8-9 Hz activity, with amplitude to 30 uV, that attenuated to eye opening.  Low amplitude frontal beta was noted in wakefulness.    Generalized Slowing:  -Intermittent Rhythmic Slowing, Generalized (delta)    Focal Slowing:   -Continuous Slowing, Lateralized, Left hemisphere (delta and theta)  -Intermittent Rhythmic Slowing, Lateralized, Left hemisphere (delta)    Sleep Background:  Stage II sleep transients were not recorded.  Drowsiness and stage II sleep transients were not recorded.    Other Non-Epileptiform Findings:  None were present.    Interictal Epileptiform Activity:   -Sharp Waves, Regional, Left Parieto-occipital, maximum O1/P7 (occasional)    Events:  Clinical events: None recorded.  Seizures: None recorded.    Activation Procedures:   Hyperventilation was not performed.    Photic stimulation was not performed.     Artifacts:  Intermittent myogenic and movement artifacts were noted.    ECG:  The heart rate on single channel ECG was predominantly between 60-80 BPM.    _____________________________________________________________  EEG SUMMARY/CLASSIFICATION  Abnormal EEG in the awake states.  -Sharp Waves, Regional, Left Parieto-occipital, maximum O1/P7 (occasional)  -Continuous Slowing, Lateralized, Left hemisphere (delta and theta)  -Intermittent Rhythmic Slowing, Lateralized, Left hemisphere (delta)  -Intermittent Rhythmic Slowing, Generalized (delta)  _____________________________________________________________  EEG IMPRESSION/CLINICAL CORRELATE  There is evidence of a potential seizure focus in the left parieto-occipital region, and a structural lesion in the left hemisphere.  Intermittent rhythmic delta activity was seen frequently over the left hemisphere, however, a definitive electrographic seizure was not recorded.  There is also evidence of a mild diffuse encephalopathy.      Smooth Montesinos MD  EEG / Epilepsy Attending Physician

## 2018-12-28 NOTE — PROGRESS NOTE ADULT - ASSESSMENT
63 years old woman with vascular risk factors of age, HTN, DM II, HPLD and CAD was evaluated at [facility] for language disturbance. On 11/27 she underwent T10 laminectomy and fusion. Postoperatively, she was noted to have Wernicke's aphasia due to left temporal lobar ICH of undetermined etiology. She reported to have had significant improvement in her language deficit in the rehabilitation. In the evening of 12/24, she was reported to have worsening of her aphasia, prompting her presentation to OSH and subsequent transfer to [facility]. CT brain or admission showed expected evolution/resolution of left temporal ICH leading to development of encephalomalacia. MRI brain showed acute infarct in the left MCA distribution adjacent to the prior hemorrhage in the late subacute to chronic stage and expected evolution of prior left frontotemporal convexal subarachnoid hemorrhage as well as was concerning for a dilated cortical vein anterior to the hematoma. Of note, MRI brain (12/7) showed left temporal lobar ICH, left frontotemporal convexal SAH and juxtacortical FLAIR hyperintensities concerning for PRES to my eye.     Impression:   Left MCA distribution stroke - likely etiology being cryptogenic stroke, probably related to embolism from a proximal source, like cardiac/paradoxical source of embolism  Recent left temporal lobar ICH with associated left frontotemporal convexal subarachnoid hemorrhage - likely etiology could be intracranial hemorrhages in the setting of PRES but possibility of underlying vascular malformation like micro-AVM or large vessel vasculopathy like vasculitis needs to be ruled out     NEURO: Neurologically continue monitoring for neurologic deterioration in setting of cerebral edema and mass effect, keep SBP <160/90, Lipid profile pending- titrate statin to LDL goal less than 70, MRI Brain w/wo contrast noted above. Physical therapy/OT evaluations pending.    ANTITHROMBOTIC THERAPY: Considering CT/MRI findings suggestive of late subacute to chronic ICH and new/acute ischemic infarct, benefits of continuing aspirin at this time would outweigh potential risks     PULMONARY: CXR clear, protecting airway, saturating well     CARDIOVASCULAR: IMTIAZ showed EF 74% moderate mitral regurgitation. Moderate focal atheroma noted in aortic arch/descending aorta, continue cardiac monitoring, prolonged cardiac monitoring with ICM to screen for occult cardiac arrhythmia like atrial fibrillation being the cause of possible cardiac source of embolism to be considered based on results of above mentioned cardiac tests                               SBP goal: Gradual normotension    GASTROINTESTINAL: dysphagia screen passed- tolerating diet       Diet: Regular    RENAL: BUN/Cr without acute change, good urine output      Na Goal: Greater than 135     Rodriguez: no    HEMATOLOGY: H/H and Platelets without acute change      DVT ppx: pharmacological DVT prophylaxis    ID: afebrile, no leukocytosis     OTHER: Plan endorsed to patient at bedside.     DISPOSITION: Rehab or home depending on PT eval once stable and workup is complete    CORE MEASURES:        Admission NIHSS: 0     TPA: [] YES [x] NO      LDL/HDL: p     Depression Screen: p     Statin Therapy: y     Dysphagia Screen: [x] PASS [] FAIL     Smoking [] YES [x] NO      Afib [] YES [x] NO     Stroke Education [x] YES [] NO 63 years old woman with vascular risk factors of age, HTN, DM II, HPLD and CAD was evaluated at [facility] for language disturbance. On 11/27 she underwent T10 laminectomy and fusion. Postoperatively, she was noted to have Wernicke's aphasia due to left temporal lobar ICH of undetermined etiology. She reported to have had significant improvement in her language deficit in the rehabilitation. In the evening of 12/24, she was reported to have worsening of her aphasia, prompting her presentation to OSH and subsequent transfer to [facility]. CT brain or admission showed expected evolution/resolution of left temporal ICH leading to development of encephalomalacia. MRI brain showed acute infarct in the left MCA distribution adjacent to the prior hemorrhage in the late subacute to chronic stage and expected evolution of prior left frontotemporal convexal subarachnoid hemorrhage as well as was concerning for a dilated cortical vein anterior to the hematoma. Of note, MRI brain (12/7) showed left temporal lobar ICH, left frontotemporal convexal SAH and juxtacortical FLAIR hyperintensities concerning for PRES to my eye.     Impression:   Left MCA distribution stroke - likely etiology being cryptogenic stroke, probably related to embolism from a proximal source, like cardiac/paradoxical source of embolism  Recent left temporal lobar ICH with associated left frontotemporal convexal subarachnoid hemorrhage - likely etiology could be intracranial hemorrhages in the setting of PRES but possibility of underlying vascular malformation like micro-AVM or large vessel vasculopathy like vasculitis needs to be ruled out     NEURO: Neurologically continue monitoring for neurologic deterioration in setting of cerebral edema and mass effect, keep SBP <160/90, LDL 44- titrate statin to LDL goal less than 70, MRI Brain w/wo contrast noted above. Physical therapy/OT recommended acute rehab. PMR consulted. EEG ordered to r/o seizure.     ANTITHROMBOTIC THERAPY: Considering CT/MRI findings suggestive of late subacute to chronic ICH and new/acute ischemic infarct, benefits of continuing aspirin at this time would outweigh potential risks     PULMONARY: CXR clear, protecting airway, saturating well     CARDIOVASCULAR: IMTIAZ showed EF 74% moderate mitral regurgitation. Moderate focal atheroma noted in aortic arch/descending aorta, continue cardiac monitoring, prolonged cardiac monitoring with ICM to screen for occult cardiac arrhythmia like atrial fibrillation being the cause of possible cardiac source of embolism to be considered based on results of above mentioned cardiac tests                               SBP goal: Gradual normotension    GASTROINTESTINAL: dysphagia screen passed- tolerating diet       Diet: Regular    RENAL: BUN/Cr without acute change, good urine output      Na Goal: Greater than 135     Rodriguez: no    HEMATOLOGY: H/H and Platelets without acute change      DVT ppx: pharmacological DVT prophylaxis    ID: afebrile, no leukocytosis     OTHER: Plan endorsed to patient and daughter at bedside.     DISPOSITION: Acute rehab once stable and workup is complete    CORE MEASURES:        Admission NIHSS: 0     TPA: [] YES [x] NO      LDL/HDL: p     Depression Screen: p     Statin Therapy: y     Dysphagia Screen: [x] PASS [] FAIL     Smoking [] YES [x] NO      Afib [] YES [x] NO     Stroke Education [x] YES [] NO 63 years old woman with vascular risk factors of age, HTN, DM II, HPLD and CAD was evaluated at [facility] for language disturbance. On 11/27 she underwent T10 laminectomy and fusion. Postoperatively, she was noted to have Wernicke's aphasia due to left temporal lobar ICH of undetermined etiology. She reported to have had significant improvement in her language deficit in the rehabilitation. In the evening of 12/24, she was reported to have worsening of her aphasia, prompting her presentation to OSH and subsequent transfer to [facility]. CT brain or admission showed expected evolution/resolution of left temporal ICH leading to development of encephalomalacia. MRI brain showed acute infarct in the left MCA distribution adjacent to the prior hemorrhage in the late subacute to chronic stage and expected evolution of prior left frontotemporal convexal subarachnoid hemorrhage as well as was concerning for a dilated cortical vein anterior to the hematoma. Of note, MRI brain (12/7) showed left temporal lobar ICH, left frontotemporal convexal SAH and juxtacortical FLAIR hyperintensities concerning for PRES to my eye.     Impression:   Left MCA distribution stroke - likely etiology being cryptogenic stroke, probably related to embolism from a proximal source, like cardiac/paradoxical source of embolism  Recent left temporal lobar ICH with associated left frontotemporal convexal subarachnoid hemorrhage - likely etiology could be intracranial hemorrhages in the setting of PRES but possibility of underlying vascular malformation like micro-AVM or large vessel vasculopathy like vasculitis needs to  acute ischemic be ruled out   Seizures - Simple partial seizure without secondary generalization - likely etiology being symptomatic seizure in the setting of new/acute ischemic infarct versus 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 Physical therapy/OT recommended acute rehab. PMR consulted. VEEG showed concerns for seizure focus in the left parieto-occipital region. Continue with AEDs - levetiracetam 750 mg twice a day for seizure prophylaxis; continue seizure precautions.     ANTITHROMBOTIC THERAPY: Considering CT/MRI findings suggestive of late subacute to chronic ICH and new/acute ischemic infarct, benefits of continuing aspirin at this time would outweigh potential risks     PULMONARY: CXR clear, protecting airway, saturating well     CARDIOVASCULAR: IMTIAZ showed EF 74% moderate mitral regurgitation. Moderate focal atheroma noted in aortic arch/descending aorta, continue cardiac monitoring, prolonged cardiac monitoring with ICM to screen for occult cardiac arrhythmia like atrial fibrillation being the cause of possible cardiac source of embolism to be considered based on results of above mentioned cardiac tests                               SBP goal: Gradual normotension    GASTROINTESTINAL: dysphagia screen passed- tolerating diet       Diet: Regular    RENAL: BUN/Cr without acute change, good urine output      Na Goal: Greater than 135     Rodriguez: no    HEMATOLOGY: H/H and Platelets without acute change      DVT ppx: pharmacological DVT prophylaxis    ID: afebrile, no leukocytosis     OTHER: Plan endorsed to patient and daughter at bedside.     DISPOSITION: Acute rehab once stable and workup is complete    CORE MEASURES:        Admission NIHSS: 0     TPA: [] YES [x] NO      LDL/HDL: p     Depression Screen: p     Statin Therapy: y     Dysphagia Screen: [x] PASS [] FAIL     Smoking [] YES [x] NO      Afib [] YES [x] NO     Stroke Education [x] YES [] NO 63 years old woman with vascular risk factors of age, HTN, DM II, HPLD and CAD was evaluated at [facility] for language disturbance. On 11/27 she underwent T10 laminectomy and fusion. Postoperatively, she was noted to have Wernicke's aphasia due to left temporal lobar ICH of undetermined etiology. She reported to have had significant improvement in her language deficit in the rehabilitation. In the evening of 12/24, she was reported to have worsening of her aphasia, prompting her presentation to OSH and subsequent transfer to [facility]. CT brain or admission showed expected evolution/resolution of left temporal ICH leading to development of encephalomalacia. MRI brain showed acute infarct in the left MCA distribution adjacent to the prior hemorrhage in the late subacute to chronic stage and expected evolution of prior left frontotemporal convexal subarachnoid hemorrhage as well as was concerning for a dilated cortical vein anterior to the hematoma. Of note, MRI brain (12/7) showed left temporal lobar ICH, left frontotemporal convexal SAH and juxtacortical FLAIR hyperintensities concerning for PRES to my eye.     Impression:   Left MCA distribution stroke - likely etiology being cryptogenic stroke, probably related to embolism from a proximal source, like cardiac/paradoxical source of embolism  Recent left temporal lobar ICH with associated left frontotemporal convexal subarachnoid hemorrhage - likely etiology could be intracranial hemorrhages in the setting of PRES but possibility of underlying vascular malformation like micro-AVM or large vessel vasculopathy like vasculitis needs to  acute ischemic be ruled out   Seizures - Simple partial seizure without secondary generalization - likely etiology being symptomatic seizure in the setting of new/acute ischemic infarct versus 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 Physical therapy/OT recommended acute rehab. PMR consulted. VEEG showed concerns for seizure focus in the left parieto-occipital region. Continue with AEDs - levetiracetam 750 mg twice a day for seizure prophylaxis; continue seizure precautions.     ANTITHROMBOTIC THERAPY: Considering CT/MRI findings suggestive of late subacute to chronic ICH and new/acute ischemic infarct, benefits of continuing aspirin at this time would outweigh potential risks     PULMONARY: CXR clear, protecting airway, saturating well     CARDIOVASCULAR: IMTIAZ showed EF 74% moderate mitral regurgitation. Moderate focal atheroma noted in aortic arch/descending aorta, continue cardiac monitoring, s/p ICM to screen for occult cardiac arrhythmia like atrial fibrillation being the cause of possible cardiac source of embolism     SBP goal: Gradual normotension    GASTROINTESTINAL: dysphagia screen passed- tolerating diet       Diet: Regular    RENAL: BUN/Cr previously without acute change, good urine output      Na Goal: Greater than 135     Rodriguez: no    HEMATOLOGY: no overt sign of bleeding noted     DVT ppx: pharmacological DVT prophylaxis    ID: afebrile, no sign of infection    OTHER: Plan endorsed to patient and daughter at bedside.     DISPOSITION: Acute rehab    CORE MEASURES:        Admission NIHSS: 0     TPA: [] YES [x] NO      LDL/HDL: p     Depression Screen: p     Statin Therapy: y     Dysphagia Screen: [x] PASS [] FAIL     Smoking [] YES [x] NO      Afib [] YES [x] NO     Stroke Education [x] YES [] NO

## 2018-12-28 NOTE — CONSULT NOTE ADULT - ASSESSMENT
Patient is a 63F with a history of osteoarthritis, DM, HTN, CAD s/p stents 2015'  HLD who presents as a transfer from [facility] for aphasia. Patient had a laminectomy of L2-L5 on 11/27 and then post-operatively developed acute onset aphasia on 12/3 and was diagnosed with a left temporal lobar hemorrhage. Patient was discharged to rehab and was taken to [facility] where CTH was concerning for possibly evolving hemorrhage, so she was transferred to [facility], now with new left MCA infarct. EP consulted for loop recorder placement.      # Left MCA CVA  - Tele: SR 80- 100 up to 120's  - ECHO: No left atrial appendage thrombus, no evidence of a PFO  - Risks and benefits explained to pt and , both agreeable to procedure.   - Ancef 1GM IV x 1 on call to EP lab        [phone] Patient is a 63F with a history of osteoarthritis, DM, HTN, CAD s/p stents 2015'  HLD who presents as a transfer from [facility] for aphasia. Patient had a laminectomy of L2-L5 on 11/27 and then post-operatively developed acute onset aphasia on 12/3 and was diagnosed with a left temporal lobar hemorrhage. Patient was discharged to rehab and was taken to [facility] where CTH was concerning for possibly evolving hemorrhage, so she was transferred to [facility], now with new left MCA infarct. EP consulted for loop recorder placement.      # Left MCA CVA  - Tele: SR 80- 100 up to 120's  - ECHO: No left atrial appendage thrombus, no evidence of a PFO  - Risks and benefits explained to pt and , both agreeable to procedure.   - Ancef 1GM IV x 1 on call to EP lab      1200 Addendnum:  S/P ILR placement. Pt tolerated procedure well. Daughter at bedside and educated by [employer] rep on remote monitoring system. EP signing off,  Follow up on 1/11/19 @ 1:10pm.     [phone]

## 2018-12-28 NOTE — SPEECH LANGUAGE PATHOLOGY EVALUATION - COMMENTS
She was taken to [facility] where CTH was concerning for possibly evolving hemorrhage, so she was transferred to [facility]. On initial exam, patient has severe expressive aphasia and also has trouble following some simple commands but seems to understand what is going on and what she is being told to do. Her daughter is available at bedside and assists with history.  CT Head No Cont (12.25.18 @ 13:44) >IMPRESSION: Evolving hemorrhagic infarct left temporal region. No new hemorrhage.  MRI 12/26: Left temporal lobe hematoma without significant interval change in size. Small amount of subarachnoid blood. New acute left MCA distribution infarct. Prominent left temporal region cortical vein. Clinical correlation will determine the need for conventional angiography to exclude the possibility of a vascular malformation.  Per neuro: right homonymous hemianopsia by blink to threat Pt with appropriate facial expression/gestures to indicate confusion and yes/no spontaneously in context of situation, however unable to elicit response consistently. Patient essentially non-verbal except for min automatic speech (thank you, no, wait produced in context of situation); unable to respond to phonemic, semantic or phrase completion cueing. Pt able to recognize/point to her name from field of 3. No other response elicited. unable to produce written forms with use of dominant or non-dominant hand suspect verbal apraxia based on limited sample, voice presents within functional limits for age/gender

## 2018-12-28 NOTE — DISCHARGE NOTE ADULT - PROVIDER TOKENS
TOKZACH:'[id number]',TOKEN:'[id number]' TOKEN:'[id number]:'[id number]:'[id number]'

## 2018-12-29 LAB
GLUCOSE BLDC GLUCOMTR-MCNC: 85 MG/DL — SIGNIFICANT CHANGE UP (ref 70–99)
GLUCOSE BLDC GLUCOMTR-MCNC: 88 MG/DL — SIGNIFICANT CHANGE UP (ref 70–99)
GLUCOSE BLDC GLUCOMTR-MCNC: 89 MG/DL — SIGNIFICANT CHANGE UP (ref 70–99)
GLUCOSE BLDC GLUCOMTR-MCNC: 91 MG/DL — SIGNIFICANT CHANGE UP (ref 70–99)

## 2018-12-29 PROCEDURE — 99233 SBSQ HOSP IP/OBS HIGH 50: CPT

## 2018-12-29 PROCEDURE — 99232 SBSQ HOSP IP/OBS MODERATE 35: CPT

## 2018-12-29 PROCEDURE — 95951: CPT | Mod: 26

## 2018-12-29 RX ORDER — CARVEDILOL PHOSPHATE 80 MG/1
12.5 CAPSULE, EXTENDED RELEASE ORAL EVERY 12 HOURS
Qty: 0 | Refills: 0 | Status: DISCONTINUED | OUTPATIENT
Start: 2018-12-29 | End: 2019-01-01

## 2018-12-29 RX ORDER — ALPRAZOLAM 0.25 MG
0.25 TABLET ORAL ONCE
Qty: 0 | Refills: 0 | Status: DISCONTINUED | OUTPATIENT
Start: 2018-12-29 | End: 2018-12-29

## 2018-12-29 RX ORDER — LEVETIRACETAM 250 MG/1
1000 TABLET, FILM COATED ORAL
Qty: 0 | Refills: 0 | Status: DISCONTINUED | OUTPATIENT
Start: 2018-12-29 | End: 2018-12-29

## 2018-12-29 RX ORDER — LEVETIRACETAM 250 MG/1
1500 TABLET, FILM COATED ORAL
Qty: 0 | Refills: 0 | Status: DISCONTINUED | OUTPATIENT
Start: 2018-12-30 | End: 2018-12-30

## 2018-12-29 RX ORDER — LEVETIRACETAM 250 MG/1
500 TABLET, FILM COATED ORAL ONCE
Qty: 0 | Refills: 0 | Status: DISCONTINUED | OUTPATIENT
Start: 2018-12-29 | End: 2018-12-29

## 2018-12-29 RX ORDER — LEVETIRACETAM 250 MG/1
500 TABLET, FILM COATED ORAL ONCE
Qty: 0 | Refills: 0 | Status: COMPLETED | OUTPATIENT
Start: 2018-12-29 | End: 2018-12-29

## 2018-12-29 RX ADMIN — LEVETIRACETAM 1000 MILLIGRAM(S): 250 TABLET, FILM COATED ORAL at 17:18

## 2018-12-29 RX ADMIN — LOSARTAN POTASSIUM 100 MILLIGRAM(S): 100 TABLET, FILM COATED ORAL at 06:34

## 2018-12-29 RX ADMIN — CARVEDILOL PHOSPHATE 12.5 MILLIGRAM(S): 80 CAPSULE, EXTENDED RELEASE ORAL at 14:30

## 2018-12-29 RX ADMIN — GABAPENTIN 600 MILLIGRAM(S): 400 CAPSULE ORAL at 22:53

## 2018-12-29 RX ADMIN — Medication 650 MILLIGRAM(S): at 07:15

## 2018-12-29 RX ADMIN — DULOXETINE HYDROCHLORIDE 90 MILLIGRAM(S): 30 CAPSULE, DELAYED RELEASE ORAL at 10:51

## 2018-12-29 RX ADMIN — GABAPENTIN 600 MILLIGRAM(S): 400 CAPSULE ORAL at 10:51

## 2018-12-29 RX ADMIN — LEVETIRACETAM 750 MILLIGRAM(S): 250 TABLET, FILM COATED ORAL at 06:33

## 2018-12-29 RX ADMIN — LEVETIRACETAM 400 MILLIGRAM(S): 250 TABLET, FILM COATED ORAL at 10:13

## 2018-12-29 RX ADMIN — LEVETIRACETAM 500 MILLIGRAM(S): 250 TABLET, FILM COATED ORAL at 22:52

## 2018-12-29 RX ADMIN — Medication 1 MILLIGRAM(S): at 10:48

## 2018-12-29 RX ADMIN — Medication 81 MILLIGRAM(S): at 10:51

## 2018-12-29 RX ADMIN — Medication 650 MILLIGRAM(S): at 00:57

## 2018-12-29 RX ADMIN — GABAPENTIN 600 MILLIGRAM(S): 400 CAPSULE ORAL at 17:18

## 2018-12-29 RX ADMIN — Medication 0.25 MILLIGRAM(S): at 22:52

## 2018-12-29 RX ADMIN — Medication 650 MILLIGRAM(S): at 22:53

## 2018-12-29 RX ADMIN — GABAPENTIN 600 MILLIGRAM(S): 400 CAPSULE ORAL at 06:33

## 2018-12-29 RX ADMIN — Medication 650 MILLIGRAM(S): at 06:33

## 2018-12-29 RX ADMIN — ENOXAPARIN SODIUM 40 MILLIGRAM(S): 100 INJECTION SUBCUTANEOUS at 10:51

## 2018-12-29 RX ADMIN — ATORVASTATIN CALCIUM 80 MILLIGRAM(S): 80 TABLET, FILM COATED ORAL at 22:52

## 2018-12-29 RX ADMIN — Medication 1 MILLIGRAM(S): at 22:49

## 2018-12-29 NOTE — PROGRESS NOTE ADULT - SUBJECTIVE AND OBJECTIVE BOX
THE PATIENT WAS SEEN AND EXAMINED BY ME WITH THE HOUSESTAFF AND STROKE TEAM DURING MORNING ROUNDS.     HPI:  63 year old woman with vascular risk factors of age, HTN, DM II, HPLD and CAD was evaluated at [facility] for language disturbance. On 11/27 she underwent T10 laminectomy and fusion. Postoperatively, she was noted to have Wernicke's aphasia due to left temporal lobar ICH of undetermined etiology. She reported to have had significant improvement in her language deficit in the rehabilitation. In the evening of 12/24, she was reported to have worsening of her aphasia, prompting her presentation to OSH and subsequent transfer to [facility].     ROS: All negative except documented in the HPI.     SUBJECTIVE: No new neurologic complaints.       acetaminophen   Tablet .. 650 milliGRAM(s) Oral every 6 hours  aspirin enteric coated 81 milliGRAM(s) Oral daily  atorvastatin 80 milliGRAM(s) Oral at bedtime  dextrose 40% Gel 15 Gram(s) Oral once PRN  dextrose 5%. 1000 milliLiter(s) IV Continuous <Continuous>  dextrose 50% Injectable 12.5 Gram(s) IV Push once  dextrose 50% Injectable 25 Gram(s) IV Push once  dextrose 50% Injectable 25 Gram(s) IV Push once  DULoxetine 90 milliGRAM(s) Oral daily  enoxaparin Injectable 40 milliGRAM(s) SubCutaneous daily  gabapentin 600 milliGRAM(s) Oral four times a day  glucagon  Injectable 1 milliGRAM(s) IntraMuscular once PRN  insulin lispro (HumaLOG) corrective regimen sliding scale   SubCutaneous three times a day before meals  levETIRAcetam 750 milliGRAM(s) Oral two times a day  losartan 100 milliGRAM(s) Oral daily  oxyCODONE    IR 10 milliGRAM(s) Oral Once PRN  oxyCODONE    IR 5 milliGRAM(s) Oral Once PRN    PHYSICAL EXAM:   Vital Signs Last 24 Hrs  T(C): 36.7 (29 Dec 2018 05:00), Max: 36.9 (28 Dec 2018 15:55)  T(F): 98.1 (29 Dec 2018 05:00), Max: 98.4 (28 Dec 2018 15:55)  HR: 82 (29 Dec 2018 05:00) (76 - 102)  BP: 164/80 (29 Dec 2018 05:00) (139/81 - 177/103)  BP(mean): --  RR: 18 (29 Dec 2018 05:00) (18 - 18)  SpO2: 100% (29 Dec 2018 05:00) (94% - 100%)    General: No acute distress  HEENT: EOM intact, right homonymous hemianopsia by blink to threat   Abdomen: Soft, nontender, nondistended   Extremities: No edema    NEUROLOGICAL EXAM:  Mental status: Awake, alert, was able to make eye contact, 1-2 words, severe mixed aphasia; expressive>> receptive, mild to moderate dysarthria, not possible to check for orientation due to significant aphasia  Cranial Nerves: No facial asymmetry, right homonymous hemianopsia by blink to threat, no nystagmus, no dysarthria, tongue midline  Motor exam: mild right hemiparesis (RUE and RLE 5-/5), LUE and LLE - 5/5   Sensation: Intact to light touch   Coordination/ Gait: No dysmetria, gait deferred     LABS:                        10.1   5.66  )-----------( 251      ( 28 Dec 2018 07:02 )             31.9    12-28    143  |  107  |  10  ----------------------------<  100<H>  3.5   |  23  |  0.81    Ca    8.2<L>      28 Dec 2018 05:22    Hemoglobin A1C, Whole Blood: 6.7 % (12-28 @ 07:02)  Hemoglobin A1C, Whole Blood: 7.5 % (11-09 @ 16:00)    IMAGING: Reviewed by me.     MRI Head w/wo IV Cont (12.26.18)  Left temporal lobe hematoma without significant interval change in size.  Small amount of subarachnoid blood.  New acute left MCA distribution infarct.  Prominent left temporal region cortical vein. Clinical correlation will   determine the need for conventional angiography to exclude the   possibility of a vascular malformation.    (12.25.18)  CT brain:  Resolving left temporal lobe parenchymal hemorrhage is redemonstrated,   similar to the prior brain CT.    CT angiography neck:  No evidence for arterial dissection. No flow-limiting stenosis. Carotid   bifurcations unremarkable.    CT angiography brain:  No evidence for AVM. No vascular aneurysm or flow-limiting stenosis.    CT venography brain:  No evidence for venous sinus or cortical vein thrombosis. THE PATIENT WAS SEEN AND EXAMINED BY ME WITH THE HOUSESTAFF AND STROKE TEAM DURING MORNING ROUNDS.     HPI:  63 year old woman with vascular risk factors of age, HTN, DM II, HPLD and CAD was evaluated at [facility] for language disturbance. On 11/27 she underwent T10 laminectomy and fusion. Postoperatively, she was noted to have Wernicke's aphasia due to left temporal lobar ICH of undetermined etiology. She reported to have had significant improvement in her language deficit in the rehabilitation. In the evening of 12/24, she was reported to have worsening of her aphasia, prompting her presentation to OSH and subsequent transfer to [facility].     ROS: All negative except documented in the HPI.     SUBJECTIVE: Noted with lip twitching terminated by ativan.     acetaminophen   Tablet .. 650 milliGRAM(s) Oral every 6 hours  aspirin enteric coated 81 milliGRAM(s) Oral daily  atorvastatin 80 milliGRAM(s) Oral at bedtime  dextrose 40% Gel 15 Gram(s) Oral once PRN  dextrose 5%. 1000 milliLiter(s) IV Continuous <Continuous>  dextrose 50% Injectable 12.5 Gram(s) IV Push once  dextrose 50% Injectable 25 Gram(s) IV Push once  dextrose 50% Injectable 25 Gram(s) IV Push once  DULoxetine 90 milliGRAM(s) Oral daily  enoxaparin Injectable 40 milliGRAM(s) SubCutaneous daily  gabapentin 600 milliGRAM(s) Oral four times a day  glucagon  Injectable 1 milliGRAM(s) IntraMuscular once PRN  insulin lispro (HumaLOG) corrective regimen sliding scale   SubCutaneous three times a day before meals  levETIRAcetam 750 milliGRAM(s) Oral two times a day  losartan 100 milliGRAM(s) Oral daily  oxyCODONE    IR 10 milliGRAM(s) Oral Once PRN  oxyCODONE    IR 5 milliGRAM(s) Oral Once PRN    PHYSICAL EXAM:   Vital Signs Last 24 Hrs  T(C): 36.7 (29 Dec 2018 05:00), Max: 36.9 (28 Dec 2018 15:55)  T(F): 98.1 (29 Dec 2018 05:00), Max: 98.4 (28 Dec 2018 15:55)  HR: 82 (29 Dec 2018 05:00) (76 - 102)  BP: 164/80 (29 Dec 2018 05:00) (139/81 - 177/103)  BP(mean): --  RR: 18 (29 Dec 2018 05:00) (18 - 18)  SpO2: 100% (29 Dec 2018 05:00) (94% - 100%)    General: No acute distress  HEENT: EOM intact, right homonymous hemianopsia by blink to threat   Abdomen: Soft, nontender, nondistended   Extremities: No edema    NEUROLOGICAL EXAM:  Mental status: Awake, alert, was able to make eye contact, severe mixed aphasia; expressive>> receptive, mild to moderate dysarthria, not possible to check for orientation due to significant aphasia, not following commands.  Cranial Nerves: No facial asymmetry, right homonymous hemianopsia by blink to threat, no nystagmus, no dysarthria, tongue midline  Motor exam: mild right hemiparesis (RUE and RLE 5-/5), LUE and LLE - 5/5   Sensation: Intact to light touch   Coordination/ Gait: No dysmetria, gait deferred     LABS:                        10.1   5.66  )-----------( 251      ( 28 Dec 2018 07:02 )             31.9    12-28    143  |  107  |  10  ----------------------------<  100<H>  3.5   |  23  |  0.81    Ca    8.2<L>      28 Dec 2018 05:22    Hemoglobin A1C, Whole Blood: 6.7 % (12-28 @ 07:02)  Hemoglobin A1C, Whole Blood: 7.5 % (11-09 @ 16:00)    IMAGING: Reviewed by me.     MRI Head w/wo IV Cont (12.26.18)  Left temporal lobe hematoma without significant interval change in size.  Small amount of subarachnoid blood.  New acute left MCA distribution infarct.  Prominent left temporal region cortical vein. Clinical correlation will   determine the need for conventional angiography to exclude the   possibility of a vascular malformation.    (12.25.18)  CT brain:  Resolving left temporal lobe parenchymal hemorrhage is redemonstrated,   similar to the prior brain CT.    CT angiography neck:  No evidence for arterial dissection. No flow-limiting stenosis. Carotid   bifurcations unremarkable.    CT angiography brain:  No evidence for AVM. No vascular aneurysm or flow-limiting stenosis.    CT venography brain:  No evidence for venous sinus or cortical vein thrombosis.

## 2018-12-29 NOTE — EEG REPORT - NS EEG TEXT BOX
[facility]   COMPREHENSIVE EPILEPSY CENTER   REPORT OF CONTINUOUS VIDEO EEG     [facility]: [address] [place] [address] , [address], [place] [address], Ph#: [phone]  [facility]: [id number], [place] [address], Ph#: [phone]  Office: [address] [place] [address], [facility] [address], [place] [address] Ph#: [phone]    Patient Name: DEMI ARCHIBALD  Age and : 63y (51)  MRN #: [id number]  Location: [address]   Referring Physician: Adrian Lock    Study Date: 18 - 2018    _____________________________________________________________  TECHNICAL INFORMATION    Placement and Labeling of Electrodes:  The EEG was performed utilizing 20 channels referential EEG connections (coronal over temporal over parasagittal montage) using all standard 10-20 electrode placements with EKG.  Recording was at a sampling rate of 256 samples per second per channel.  Time synchronized digital video recording was done simultaneously with EEG recording.  A low light infrared camera was used for low light recording.  Neel and seizure detection algorithms were utilized.    _____________________________________________________________  HISTORY    Patient is a 63y old  Female who presents with a chief complaint of cva (28 Dec 2018 07:19)      PERTINENT MEDICATION:  MEDICATIONS  (STANDING):  acetaminophen   Tablet .. 650 milliGRAM(s) Oral every 6 hours  aspirin enteric coated 81 milliGRAM(s) Oral daily  atorvastatin 80 milliGRAM(s) Oral at bedtime  dextrose 5%. 1000 milliLiter(s) (50 mL/Hr) IV Continuous <Continuous>  dextrose 50% Injectable 12.5 Gram(s) IV Push once  dextrose 50% Injectable 25 Gram(s) IV Push once  dextrose 50% Injectable 25 Gram(s) IV Push once  DULoxetine 90 milliGRAM(s) Oral daily  enoxaparin Injectable 40 milliGRAM(s) SubCutaneous daily  gabapentin 600 milliGRAM(s) Oral four times a day  insulin lispro (HumaLOG) corrective regimen sliding scale   SubCutaneous three times a day before meals  levETIRAcetam 750 milliGRAM(s) Oral two times a day  levETIRAcetam  IVPB 1000 milliGRAM(s) IV Intermittent once  sodium chloride 0.9%. 1000 milliLiter(s) (50 mL/Hr) IV Continuous <Continuous>    _____________________________________________________________  STUDY INTERPRETATION    Findings: The background was continuous, spontaneously variable and reactive. During wakefulness, the posterior dominant rhythm consisted of asymmetric (seen in right posterior quadrant), well-modulated 8-9 Hz activity, with amplitude to 30 uV, that attenuated to eye opening.  Low amplitude frontal beta was noted in wakefulness.    Generalized Slowing:  -Intermittent Rhythmic Slowing, Generalized (delta)    Focal Slowing:   -Continuous Slowing, Lateralized, Left hemisphere (delta and theta)  -Intermittent Rhythmic Slowing, Lateralized, Left hemisphere (delta)    Sleep Background:  Stage II sleep transients were not recorded.  Drowsiness and stage II sleep transients were not recorded.    Other Non-Epileptiform Findings:  None were present.    Interictal Epileptiform Activity:   -Lateralized Periodic Discharges (LPDs), Regional, Left Parieto-occipital, maximum O1/P7    Ictal:  -Clinical Seizure: Right Facial Clonic seizure (at 14:01)  -EEG Seizure: No EEG changes (no EEG correlate)    Activation Procedures:   Hyperventilation was not performed.    Photic stimulation was not performed.     Artifacts:  Intermittent myogenic and movement artifacts were noted.    ECG:  The heart rate on single channel ECG was predominantly between 60-80 BPM.    _____________________________________________________________  EEG SUMMARY/CLASSIFICATION  Abnormal EEG in the awake states.    -Clinical Seizure: Right Facial Clonic seizure (at 14:01)  -EEG Seizure: No EEG changes (no EEG correlate)    -Lateralized Periodic Discharges (LPDs), Regional, Left Parieto-occipital, maximum O1/P7  -Continuous Slowing, Lateralized, Left hemisphere (delta and theta)  -Intermittent Rhythmic Slowing, Lateralized, Left hemisphere (delta)  -Intermittent Rhythmic Slowing, Generalized (delta)  _____________________________________________________________  EEG IMPRESSION/CLINICAL CORRELATE  One right facial clonic seizure was recorded at 14:01, without an EEG correlate (discrete focal motor seizures may not have an EEG correlate).  There is evidence of a potential seizure focus in the left parieto-occipital region, and a structural lesion in the left hemisphere.  There is also evidence of a mild diffuse encephalopathy.      Smooth Montesinos MD  EEG / Epilepsy Attending Physician
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
## 2018-12-29 NOTE — PROGRESS NOTE ADULT - SUBJECTIVE AND OBJECTIVE BOX
[facility] - Bora Mcfarland, Mica, Cari, Barry, Noah Lock  Office Number:  [phone]    Patient resting comfortably in bed in NAD.  Very upset about situation. Aphasic    ROS: negative unless otherwise mentioned.    Telemetry:  SR    MEDICATIONS  (STANDING):  acetaminophen   Tablet .. 650 milliGRAM(s) Oral every 6 hours  aspirin enteric coated 81 milliGRAM(s) Oral daily  atorvastatin 80 milliGRAM(s) Oral at bedtime  dextrose 5%. 1000 milliLiter(s) (50 mL/Hr) IV Continuous <Continuous>  dextrose 50% Injectable 12.5 Gram(s) IV Push once  dextrose 50% Injectable 25 Gram(s) IV Push once  dextrose 50% Injectable 25 Gram(s) IV Push once  DULoxetine 90 milliGRAM(s) Oral daily  enoxaparin Injectable 40 milliGRAM(s) SubCutaneous daily  gabapentin 600 milliGRAM(s) Oral four times a day  insulin lispro (HumaLOG) corrective regimen sliding scale   SubCutaneous three times a day before meals  levETIRAcetam 750 milliGRAM(s) Oral two times a day  losartan 100 milliGRAM(s) Oral daily    MEDICATIONS  (PRN):  dextrose 40% Gel 15 Gram(s) Oral once PRN Blood Glucose LESS THAN 70 milliGRAM(s)/deciliter  glucagon  Injectable 1 milliGRAM(s) IntraMuscular once PRN Glucose LESS THAN 70 milligrams/deciliter  oxyCODONE    IR 10 milliGRAM(s) Oral Once PRN Severe Pain (7 - 10)  oxyCODONE    IR 5 milliGRAM(s) Oral Once PRN Moderate Pain (4 - 6)      Allergies    No Known Allergies    Intolerances        Vital Signs Last 24 Hrs  T(C): 36.7 (29 Dec 2018 05:00), Max: 36.9 (28 Dec 2018 15:55)  T(F): 98.1 (29 Dec 2018 05:00), Max: 98.4 (28 Dec 2018 15:55)  HR: 82 (29 Dec 2018 05:00) (76 - 102)  BP: 164/80 (29 Dec 2018 05:00) (139/81 - 177/103)  BP(mean): --  RR: 18 (29 Dec 2018 05:00) (18 - 18)  SpO2: 100% (29 Dec 2018 05:00) (94% - 100%)    I&O's Summary      ON EXAM:    Constitutional: NAD, awake and alert, aphasic  HEENT: Moist Mucous Membranes, Anicteric  Pulmonary: Non-labored, breath sounds are clear bilaterally, No wheezing, rales or rhonchi  Cardiovascular: Regular, S1 and S2, No murmurs, rubs, gallops or clicks  Gastrointestinal: Bowel Sounds present, soft, nontender.   Lymph: No peripheral edema. No lymphadenopathy.  Skin: No visible rashes or ulcers.  Psych:  seems appropriate for situation.    LABS: All Labs Reviewed:                        10.1   5.66  )-----------( 251      ( 28 Dec 2018 07:02 )             31.9                         10.3   4.93  )-----------( 220      ( 27 Dec 2018 08:07 )             33.0     28 Dec 2018 05:22    143    |  107    |  10     ----------------------------<  100    3.5     |  23     |  0.81   27 Dec 2018 06:39    140    |  106    |  13     ----------------------------<  120    3.3     |  22     |  0.80     Ca    8.2        28 Dec 2018 05:22  Ca    8.2        27 Dec 2018 06:39            Blood Culture: Organism --  Gram Stain Blood -- Gram Stain --  Specimen Source .Blood Blood  Culture-Blood --

## 2018-12-29 NOTE — PROGRESS NOTE ADULT - ASSESSMENT
63 years old woman with vascular risk factors of age, HTN, DM II, HPLD and CAD was evaluated at [facility] for language disturbance. On 11/27 she underwent T10 laminectomy and fusion. Postoperatively, she was noted to have Wernicke's aphasia due to left temporal lobar ICH of undetermined etiology. She reported to have had significant improvement in her language deficit in the rehabilitation. In the evening of 12/24, she was reported to have worsening of her aphasia, prompting her presentation to OSH and subsequent transfer to [facility]. CT brain or admission showed expected evolution/resolution of left temporal ICH leading to development of encephalomalacia. MRI brain showed acute infarct in the left MCA distribution adjacent to the prior hemorrhage in the late subacute to chronic stage and expected evolution of prior left frontotemporal convexal subarachnoid hemorrhage as well as was concerning for a dilated cortical vein anterior to the hematoma. Of note, MRI brain (12/7) showed left temporal lobar ICH, left frontotemporal convexal SAH and juxtacortical FLAIR hyperintensities concerning for PRES to my eye.     Impression:   Left MCA distribution stroke - likely etiology being cryptogenic stroke, probably related to embolism from a proximal source, like cardiac/paradoxical source of embolism  Recent left temporal lobar ICH with associated left frontotemporal convexal subarachnoid hemorrhage - likely etiology could be intracranial hemorrhages in the setting of PRES but possibility of underlying vascular malformation like micro-AVM or large vessel vasculopathy like vasculitis needs to  acute ischemic be ruled out   Seizures - Simple partial seizure without secondary generalization - likely etiology being symptomatic seizure in the setting of new/acute ischemic infarct versus 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 Physical therapy/OT recommended acute rehab. VEEG showed concerns for seizure focus in the left parieto-occipital region. Continue with AEDs - levetiracetam 750 mg twice a day for seizure prophylaxis; continue seizure precautions.     ANTITHROMBOTIC THERAPY: Considering CT/MRI findings suggestive of late subacute to chronic ICH and new/acute ischemic infarct, benefits of continuing aspirin at this time would outweigh potential risks     PULMONARY: CXR clear, protecting airway, saturating well     CARDIOVASCULAR: IMTIAZ showed EF 74% moderate mitral regurgitation. Moderate focal atheroma noted in aortic arch/descending aorta, continue cardiac monitoring, s/p ICM to screen for occult cardiac arrhythmia like atrial fibrillation being the cause of possible cardiac source of embolism     SBP goal: Gradual normotension    GASTROINTESTINAL: dysphagia screen passed- tolerating diet       Diet: Regular    RENAL: BUN/Cr previously without acute change, good urine output      Na Goal: Greater than 135     Rodriguez: no    HEMATOLOGY: no overt sign of bleeding noted     DVT ppx: pharmacological DVT prophylaxis    ID: afebrile, no sign of infection    OTHER: Plan endorsed to patient and daughter at bedside.     DISPOSITION: Acute rehab    CORE MEASURES:        Admission NIHSS: 0     TPA: [] YES [x] NO      LDL/HDL: p     Depression Screen: p     Statin Therapy: y     Dysphagia Screen: [x] PASS [] FAIL     Smoking [] YES [x] NO      Afib [] YES [x] NO     Stroke Education [x] YES [] NO 63 years old woman with vascular risk factors of age, HTN, DM II, HPLD and CAD was evaluated at [facility] for language disturbance. On 11/27 she underwent T10 laminectomy and fusion. Postoperatively, she was noted to have Wernicke's aphasia due to left temporal lobar ICH of undetermined etiology. She reported to have had significant improvement in her language deficit in the rehabilitation. In the evening of 12/24, she was reported to have worsening of her aphasia, prompting her presentation to OSH and subsequent transfer to [facility]. CT brain or admission showed expected evolution/resolution of left temporal ICH leading to development of encephalomalacia. MRI brain showed acute infarct in the left MCA distribution adjacent to the prior hemorrhage in the late subacute to chronic stage and expected evolution of prior left frontotemporal convexal subarachnoid hemorrhage as well as was concerning for a dilated cortical vein anterior to the hematoma. Of note, MRI brain (12/7) showed left temporal lobar ICH, left frontotemporal convexal SAH and juxtacortical FLAIR hyperintensities concerning for PRES to my eye.     Impression:   Left MCA distribution stroke - likely etiology being cryptogenic stroke, probably related to embolism from a proximal source, like cardiac/paradoxical source of embolism  Recent left temporal lobar ICH with associated left frontotemporal convexal subarachnoid hemorrhage - likely etiology could be intracranial hemorrhages in the setting of PRES but possibility of underlying vascular malformation like micro-AVM or large vessel vasculopathy like vasculitis needs to  acute ischemic be ruled out   Seizures - Simple partial seizure without secondary generalization - likely etiology being symptomatic seizure in the setting of new/acute ischemic infarct versus 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 Increased keppra to 1000mg BID. Epilepsy consult appreciated, continue seizure precautions. Neurological exam unchanged, continue monitoring for neurologic deterioration in setting of cerebral edema and mass effect, keep BP <160/90 mmHg, LDL 44-continue with home statin as his LDL is currently at goal, MRI Brain w/wo contrast noted above. Physical therapy/OT recommended acute rehab. VEEG showed concerns for seizure focus in the left parieto-occipital region.     ANTITHROMBOTIC THERAPY: Considering CT/MRI findings suggestive of late subacute to chronic ICH and new/acute ischemic infarct, benefits of continuing aspirin at this time would outweigh potential risks     PULMONARY: CXR clear, protecting airway, saturating well     CARDIOVASCULAR: IMTIAZ showed EF 74% moderate mitral regurgitation. Moderate focal atheroma noted in aortic arch/descending aorta, continue cardiac monitoring, s/p ICM to screen for occult cardiac arrhythmia like atrial fibrillation being the cause of possible cardiac source of embolism     SBP goal: Gradual normotension    GASTROINTESTINAL: dysphagia screen passed- tolerating diet       Diet: Regular    RENAL: BUN/Cr previously without acute change, good urine output      Na Goal: Greater than 135     Rodriguez: no    HEMATOLOGY: no overt sign of bleeding noted     DVT ppx: pharmacological DVT prophylaxis    ID: afebrile, no sign of infection    OTHER: Plan endorsed to patient and  at bedside.     DISPOSITION: Acute rehab    CORE MEASURES:        Admission NIHSS: 0     TPA: [] YES [x] NO      LDL/HDL: 44/35     Depression Screen: unable to obtain due to aphasia     Statin Therapy: y     Dysphagia Screen: [x] PASS [] FAIL     Smoking [] YES [x] NO      Afib [] YES [x] NO     Stroke Education [x] YES [] NO 63 years old woman with vascular risk factors of age, HTN, DM II, HPLD and CAD was evaluated at [facility] for language disturbance. On 11/27 she underwent T10 laminectomy and fusion. Postoperatively, she was noted to have Wernicke's aphasia due to left temporal lobar ICH of undetermined etiology. She reported to have had significant improvement in her language deficit in the rehabilitation. In the evening of 12/24, she was reported to have worsening of her aphasia, prompting her presentation to OSH and subsequent transfer to [facility]. CT brain or admission showed expected evolution/resolution of left temporal ICH leading to development of encephalomalacia. MRI brain showed acute infarct in the left MCA distribution adjacent to the prior hemorrhage in the late subacute to chronic stage and expected evolution of prior left frontotemporal convexal subarachnoid hemorrhage as well as was concerning for a dilated cortical vein anterior to the hematoma. Of note, MRI brain (12/7) showed left temporal lobar ICH, left frontotemporal convexal SAH and juxtacortical FLAIR hyperintensities concerning for PRES to my eye.     Impression:   Left MCA distribution stroke - likely etiology being cryptogenic stroke, probably related to embolism from a proximal source, like cardiac/paradoxical source of embolism  Recent left temporal lobar ICH with associated left frontotemporal convexal subarachnoid hemorrhage - likely etiology could be intracranial hemorrhages in the setting of PRES but possibility of underlying vascular malformation like micro-AVM or large vessel vasculopathy like vasculitis needs to  acute ischemic be ruled out   Seizures - Simple partial seizure without secondary generalization - likely etiology being symptomatic seizure in the setting of new/acute ischemic infarct versus 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 Increased keppra to 1000mg BID. Epilepsy consult appreciated, continue seizure precautions. Neurological exam unchanged, continue monitoring for neurologic deterioration in setting of cerebral edema and mass effect, keep BP <160/90 mmHg, LDL 44-continue with home statin as his LDL is currently at goal, MRI Brain w/wo contrast noted above. Physical therapy/OT recommended acute rehab. VEEG showed concerns for seizure focus in the left parieto-occipital region.     ANTITHROMBOTIC THERAPY: Considering CT/MRI findings suggestive of late subacute to chronic ICH and new/acute ischemic infarct, benefits of continuing aspirin at this time would outweigh potential risks     PULMONARY: CXR clear, protecting airway, saturating well     CARDIOVASCULAR: IMTIAZ showed EF 74% moderate mitral regurgitation. Moderate focal atheroma noted in aortic arch/descending aorta, continue cardiac monitoring, s/p ICM to screen for occult cardiac arrhythmia like atrial fibrillation being the cause of possible cardiac source of embolism     SBP goal: Gradual normotension    GASTROINTESTINAL: dysphagia screen passed- tolerating diet       Diet: Regular    RENAL: BUN/Cr previously without acute change, good urine output      Na Goal: Greater than 135     Rodriguez: no    HEMATOLOGY: no overt sign of bleeding noted     DVT ppx: pharmacological DVT prophylaxis    ID: afebrile, no sign of infection    OTHER: Plan endorsed to patient and  at bedside.     DISPOSITION: Acute rehab    CORE MEASURES:        Admission NIHSS: 0     TPA: [] YES [x] NO      LDL/HDL: 44/35     Depression Screen: unable to obtain due to aphasia     Statin Therapy: y     Dysphagia Screen: [x] PASS [] FAIL     Smoking [] YES [x] NO      Afib [] YES [x] NO     Stroke Education [x] YES [] NO

## 2018-12-29 NOTE — PROGRESS NOTE ADULT - ASSESSMENT
63F with a history of CAD s/p PCI to OM2 with a AVE in December of 2015, residual 40% LAD disease, neg stress test in 1/2018 osteoarthritis, DM, HTN, HLD who presents as a transfer from [facility] for aphasia now with temporal ICH and new left MCA infarct     - She has a hx of a remote PCI.   - No anginal symptoms recently reported  - Cont ASA     - Has a moderate focal atheroma in Aortic Arch  - Cont high intensity statin.   - Will need repeat FLP as outpt. If LDL not <70 consider switching to Crestor 40mgHS  - s/p ILR placement yesterday    - BP control.   - Losartan now at 100 mg po daily  - Would resume Coreg 12.5mg q12 and then titrate back up to home dose.     - Appears compensated from HF POV.   - Monitor and replete electrolytes. Keep K>4.0 and Mg>2.0.    - Further cardiac workup will depend on clinical course.   - All other workup per primary team. Will followup.

## 2018-12-29 NOTE — CONSULT NOTE ADULT - ATTENDING COMMENTS
Patient presenting with right facial clonic seizures in setting of left hemispheric ICH and stroke.  Focal motor seizures in this setting can be difficult to suppress until edema resolves, however, if seizures persist, can increase dose of Keppra up to 1500mg BID as needed and as tolerated.  If Keppra fails, consider adding Vimpat 200mg IV x1, followed by 100mg BID.    Smooth Montesinos MD  EEG / Epilepsy Attending Physician

## 2018-12-29 NOTE — CONSULT NOTE ADULT - ASSESSMENT
Impression: R focal motor seizures in the setting of acute ischemic stroke in L MCA infarct vs. prior L temporal lobe hemorrhage.    Plan:  -increase keppra from 750 BID to 1000 BID  -c/w EEG monitoring    case discussed with Dr. Smooth Montesinos

## 2018-12-30 LAB
ANION GAP SERPL CALC-SCNC: 15 MMOL/L — SIGNIFICANT CHANGE UP (ref 5–17)
BUN SERPL-MCNC: 9 MG/DL — SIGNIFICANT CHANGE UP (ref 7–23)
CALCIUM SERPL-MCNC: 8.2 MG/DL — LOW (ref 8.4–10.5)
CHLORIDE SERPL-SCNC: 107 MMOL/L — SIGNIFICANT CHANGE UP (ref 96–108)
CO2 SERPL-SCNC: 24 MMOL/L — SIGNIFICANT CHANGE UP (ref 22–31)
CREAT SERPL-MCNC: 0.76 MG/DL — SIGNIFICANT CHANGE UP (ref 0.5–1.3)
CULTURE RESULTS: SIGNIFICANT CHANGE UP
CULTURE RESULTS: SIGNIFICANT CHANGE UP
GLUCOSE BLDC GLUCOMTR-MCNC: 106 MG/DL — HIGH (ref 70–99)
GLUCOSE BLDC GLUCOMTR-MCNC: 85 MG/DL — SIGNIFICANT CHANGE UP (ref 70–99)
GLUCOSE BLDC GLUCOMTR-MCNC: 94 MG/DL — SIGNIFICANT CHANGE UP (ref 70–99)
GLUCOSE BLDC GLUCOMTR-MCNC: 97 MG/DL — SIGNIFICANT CHANGE UP (ref 70–99)
GLUCOSE SERPL-MCNC: 102 MG/DL — HIGH (ref 70–99)
HCT VFR BLD CALC: 32.1 % — LOW (ref 34.5–45)
HGB BLD-MCNC: 10.8 G/DL — LOW (ref 11.5–15.5)
MAGNESIUM SERPL-MCNC: 1.3 MG/DL — LOW (ref 1.6–2.6)
MCHC RBC-ENTMCNC: 29.4 PG — SIGNIFICANT CHANGE UP (ref 27–34)
MCHC RBC-ENTMCNC: 33.5 GM/DL — SIGNIFICANT CHANGE UP (ref 32–36)
MCV RBC AUTO: 87.9 FL — SIGNIFICANT CHANGE UP (ref 80–100)
PLATELET # BLD AUTO: 330 K/UL — SIGNIFICANT CHANGE UP (ref 150–400)
POTASSIUM SERPL-MCNC: 3.6 MMOL/L — SIGNIFICANT CHANGE UP (ref 3.5–5.3)
POTASSIUM SERPL-SCNC: 3.6 MMOL/L — SIGNIFICANT CHANGE UP (ref 3.5–5.3)
RBC # BLD: 3.65 M/UL — LOW (ref 3.8–5.2)
RBC # FLD: 15.1 % — HIGH (ref 10.3–14.5)
SODIUM SERPL-SCNC: 146 MMOL/L — HIGH (ref 135–145)
SPECIMEN SOURCE: SIGNIFICANT CHANGE UP
SPECIMEN SOURCE: SIGNIFICANT CHANGE UP
WBC # BLD: 5.6 K/UL — SIGNIFICANT CHANGE UP (ref 3.8–10.5)
WBC # FLD AUTO: 5.6 K/UL — SIGNIFICANT CHANGE UP (ref 3.8–10.5)

## 2018-12-30 PROCEDURE — 99233 SBSQ HOSP IP/OBS HIGH 50: CPT

## 2018-12-30 PROCEDURE — 99232 SBSQ HOSP IP/OBS MODERATE 35: CPT

## 2018-12-30 PROCEDURE — 70450 CT HEAD/BRAIN W/O DYE: CPT | Mod: 26

## 2018-12-30 RX ORDER — SODIUM CHLORIDE 9 MG/ML
1000 INJECTION INTRAMUSCULAR; INTRAVENOUS; SUBCUTANEOUS
Qty: 0 | Refills: 0 | Status: DISCONTINUED | OUTPATIENT
Start: 2018-12-30 | End: 2019-01-03

## 2018-12-30 RX ORDER — LACOSAMIDE 50 MG/1
100 TABLET ORAL
Qty: 0 | Refills: 0 | Status: DISCONTINUED | OUTPATIENT
Start: 2018-12-30 | End: 2018-12-30

## 2018-12-30 RX ORDER — LEVETIRACETAM 250 MG/1
1500 TABLET, FILM COATED ORAL EVERY 12 HOURS
Qty: 0 | Refills: 0 | Status: DISCONTINUED | OUTPATIENT
Start: 2018-12-30 | End: 2019-01-02

## 2018-12-30 RX ORDER — LACOSAMIDE 50 MG/1
100 TABLET ORAL ONCE
Qty: 0 | Refills: 0 | Status: DISCONTINUED | OUTPATIENT
Start: 2018-12-30 | End: 2018-12-30

## 2018-12-30 RX ORDER — CLONAZEPAM 1 MG
0.5 TABLET ORAL
Qty: 0 | Refills: 0 | Status: DISCONTINUED | OUTPATIENT
Start: 2018-12-30 | End: 2018-12-31

## 2018-12-30 RX ORDER — LACOSAMIDE 50 MG/1
200 TABLET ORAL ONCE
Qty: 0 | Refills: 0 | Status: DISCONTINUED | OUTPATIENT
Start: 2018-12-30 | End: 2018-12-30

## 2018-12-30 RX ORDER — LACOSAMIDE 50 MG/1
150 TABLET ORAL
Qty: 0 | Refills: 0 | Status: DISCONTINUED | OUTPATIENT
Start: 2018-12-30 | End: 2018-12-31

## 2018-12-30 RX ADMIN — Medication 650 MILLIGRAM(S): at 05:00

## 2018-12-30 RX ADMIN — SODIUM CHLORIDE 50 MILLILITER(S): 9 INJECTION INTRAMUSCULAR; INTRAVENOUS; SUBCUTANEOUS at 16:34

## 2018-12-30 RX ADMIN — Medication 1 MILLIGRAM(S): at 17:50

## 2018-12-30 RX ADMIN — ENOXAPARIN SODIUM 40 MILLIGRAM(S): 100 INJECTION SUBCUTANEOUS at 10:45

## 2018-12-30 RX ADMIN — LOSARTAN POTASSIUM 100 MILLIGRAM(S): 100 TABLET, FILM COATED ORAL at 05:35

## 2018-12-30 RX ADMIN — LACOSAMIDE 120 MILLIGRAM(S): 50 TABLET ORAL at 18:20

## 2018-12-30 RX ADMIN — Medication 650 MILLIGRAM(S): at 00:53

## 2018-12-30 RX ADMIN — DULOXETINE HYDROCHLORIDE 90 MILLIGRAM(S): 30 CAPSULE, DELAYED RELEASE ORAL at 11:02

## 2018-12-30 RX ADMIN — GABAPENTIN 600 MILLIGRAM(S): 400 CAPSULE ORAL at 05:35

## 2018-12-30 RX ADMIN — LEVETIRACETAM 1500 MILLIGRAM(S): 250 TABLET, FILM COATED ORAL at 05:35

## 2018-12-30 RX ADMIN — Medication 1 MILLIGRAM(S): at 08:56

## 2018-12-30 RX ADMIN — CARVEDILOL PHOSPHATE 12.5 MILLIGRAM(S): 80 CAPSULE, EXTENDED RELEASE ORAL at 17:32

## 2018-12-30 RX ADMIN — LEVETIRACETAM 400 MILLIGRAM(S): 250 TABLET, FILM COATED ORAL at 17:31

## 2018-12-30 RX ADMIN — Medication 1 MILLIGRAM(S): at 18:16

## 2018-12-30 RX ADMIN — Medication 1 MILLIGRAM(S): at 10:57

## 2018-12-30 RX ADMIN — GABAPENTIN 600 MILLIGRAM(S): 400 CAPSULE ORAL at 17:32

## 2018-12-30 RX ADMIN — Medication 81 MILLIGRAM(S): at 11:02

## 2018-12-30 RX ADMIN — GABAPENTIN 600 MILLIGRAM(S): 400 CAPSULE ORAL at 10:46

## 2018-12-30 RX ADMIN — Medication 650 MILLIGRAM(S): at 18:17

## 2018-12-30 RX ADMIN — Medication 650 MILLIGRAM(S): at 17:35

## 2018-12-30 RX ADMIN — CARVEDILOL PHOSPHATE 12.5 MILLIGRAM(S): 80 CAPSULE, EXTENDED RELEASE ORAL at 05:35

## 2018-12-30 RX ADMIN — Medication 650 MILLIGRAM(S): at 10:45

## 2018-12-30 RX ADMIN — LACOSAMIDE 100 MILLIGRAM(S): 50 TABLET ORAL at 17:32

## 2018-12-30 RX ADMIN — Medication 650 MILLIGRAM(S): at 11:45

## 2018-12-30 RX ADMIN — LACOSAMIDE 100 MILLIGRAM(S): 50 TABLET ORAL at 10:56

## 2018-12-30 NOTE — PROGRESS NOTE ADULT - ASSESSMENT
63F with a history of CAD s/p PCI to OM2 with a AVE in December of 2015, residual 40% LAD disease, neg stress test in 1/2018 osteoarthritis, DM, HTN, HLD who presents as a transfer from [facility] for aphasia now with temporal ICH and new left MCA infarct     - She has a hx of a remote PCI.   - No anginal symptoms recently reported  - Cont ASA     - Has a moderate focal atheroma in Aortic Arch  - Cont high intensity statin.   - Will need repeat FLP as outpt. If LDL not <70 consider switching to Crestor 40mgHS  - s/p ILR placement yesterday. No af noted on telemetry to date.    - BP slightly better controlled  - Losartan now at 100 mg po daily  - agree with restarting on coreg 12.5 mg po bid.    - Appears compensated from HF POV.   - Monitor and replete electrolytes. Keep K>4.0 and Mg>2.0.    - Further cardiac workup will depend on clinical course.   - All other workup per primary team. Will followup.

## 2018-12-30 NOTE — PROGRESS NOTE ADULT - SUBJECTIVE AND OBJECTIVE BOX
[facility] - Bora Mcfarland, Mica, Cari, Barry, Noah Lock  Office Number:  [phone]    Patient resting comfortably in bed in NAD.  Frustrated and upset. Nods appropriately to questions  ativan for facial twitching yesterday    ROS: negative unless otherwise mentioned.    Telemetry:  SR, 4 beats WCT yesterday    MEDICATIONS  (STANDING):  acetaminophen   Tablet .. 650 milliGRAM(s) Oral every 6 hours  aspirin enteric coated 81 milliGRAM(s) Oral daily  atorvastatin 80 milliGRAM(s) Oral at bedtime  carvedilol 12.5 milliGRAM(s) Oral every 12 hours  dextrose 5%. 1000 milliLiter(s) (50 mL/Hr) IV Continuous <Continuous>  dextrose 50% Injectable 12.5 Gram(s) IV Push once  dextrose 50% Injectable 25 Gram(s) IV Push once  dextrose 50% Injectable 25 Gram(s) IV Push once  DULoxetine 90 milliGRAM(s) Oral daily  enoxaparin Injectable 40 milliGRAM(s) SubCutaneous daily  gabapentin 600 milliGRAM(s) Oral four times a day  insulin lispro (HumaLOG) corrective regimen sliding scale   SubCutaneous three times a day before meals  levETIRAcetam 1500 milliGRAM(s) Oral two times a day  LORazepam   Injectable 1 milliGRAM(s) IV Push once  losartan 100 milliGRAM(s) Oral daily    MEDICATIONS  (PRN):  dextrose 40% Gel 15 Gram(s) Oral once PRN Blood Glucose LESS THAN 70 milliGRAM(s)/deciliter  glucagon  Injectable 1 milliGRAM(s) IntraMuscular once PRN Glucose LESS THAN 70 milligrams/deciliter  oxyCODONE    IR 10 milliGRAM(s) Oral Once PRN Severe Pain (7 - 10)  oxyCODONE    IR 5 milliGRAM(s) Oral Once PRN Moderate Pain (4 - 6)      Allergies    No Known Allergies    Intolerances        Vital Signs Last 24 Hrs  T(C): 36.9 (30 Dec 2018 08:14), Max: 37 (29 Dec 2018 14:30)  T(F): 98.4 (30 Dec 2018 08:14), Max: 98.6 (29 Dec 2018 14:30)  HR: 86 (30 Dec 2018 08:14) (86 - 105)  BP: 141/81 (30 Dec 2018 08:14) (131/74 - 161/91)  BP(mean): --  RR: 18 (30 Dec 2018 08:14) (18 - 18)  SpO2: 96% (30 Dec 2018 08:14) (94% - 100%)    I&O's Summary      ON EXAM:    Constitutional: NAD, awake and alert, aphasic  HEENT: Moist Mucous Membranes, Anicteric  Pulmonary: Non-labored, breath sounds are clear bilaterally, No wheezing, rales or rhonchi  Cardiovascular: Regular, S1 and S2, No murmurs, rubs, gallops or clicks  Gastrointestinal: Bowel Sounds present, soft, nontender.   Lymph: No peripheral edema. No lymphadenopathy.  Skin: No visible rashes or ulcers.  Psych:  seems appropriate for situation.      LABS: All Labs Reviewed:                        10.8   5.6   )-----------( 330      ( 30 Dec 2018 08:36 )             32.1                         10.1   5.66  )-----------( 251      ( 28 Dec 2018 07:02 )             31.9     30 Dec 2018 08:36    146    |  107    |  9      ----------------------------<  102    3.6     |  24     |  0.76   28 Dec 2018 05:22    143    |  107    |  10     ----------------------------<  100    3.5     |  23     |  0.81     Ca    8.2        30 Dec 2018 08:36  Ca    8.2        28 Dec 2018 05:22  Mg     1.3       30 Dec 2018 08:36            Blood Culture: Organism --  Gram Stain Blood -- Gram Stain --  Specimen Source .Blood Blood  Culture-Blood --

## 2018-12-30 NOTE — EEG REPORT - NS EEG TEXT BOX
[facility]   COMPREHENSIVE EPILEPSY CENTER   REPORT OF CONTINUOUS VIDEO EEG     [facility]: [address] [place] [address] , [address], [place] [address], Ph#: [phone]  [facility]: [id number], [place] [address], Ph#: [phone]  Office: [address] [place] [address], [facility] [address], [place] [address] Ph#: [phone]    Patient Name: DEMI ARCHIBALD  Age and : 63y ([birth year])  MRN #: [id number]  Location: [address]   Referring Physician: Adrian Lock    Study Date: 18 - 2018    _____________________________________________________________  TECHNICAL INFORMATION    Placement and Labeling of Electrodes:  The EEG was performed utilizing 20 channels referential EEG connections (coronal over temporal over parasagittal montage) using all standard 10-20 electrode placements with EKG.  Recording was at a sampling rate of 256 samples per second per channel.  Time synchronized digital video recording was done simultaneously with EEG recording.  A low light infrared camera was used for low light recording.  Neel and seizure detection algorithms were utilized.    _____________________________________________________________  HISTORY    Patient is a 63y old  Female who presents with a chief complaint of cva (28 Dec 2018 07:19)      PERTINENT MEDICATION:  MEDICATIONS  (STANDING):  acetaminophen   Tablet .. 650 milliGRAM(s) Oral every 6 hours  aspirin enteric coated 81 milliGRAM(s) Oral daily  atorvastatin 80 milliGRAM(s) Oral at bedtime  dextrose 5%. 1000 milliLiter(s) (50 mL/Hr) IV Continuous <Continuous>  dextrose 50% Injectable 12.5 Gram(s) IV Push once  dextrose 50% Injectable 25 Gram(s) IV Push once  dextrose 50% Injectable 25 Gram(s) IV Push once  DULoxetine 90 milliGRAM(s) Oral daily  enoxaparin Injectable 40 milliGRAM(s) SubCutaneous daily  gabapentin 600 milliGRAM(s) Oral four times a day  insulin lispro (HumaLOG) corrective regimen sliding scale   SubCutaneous three times a day before meals  levETIRAcetam 750 milliGRAM(s) Oral two times a day  levETIRAcetam  IVPB 1000 milliGRAM(s) IV Intermittent once  sodium chloride 0.9%. 1000 milliLiter(s) (50 mL/Hr) IV Continuous <Continuous>    _____________________________________________________________  STUDY INTERPRETATION    Findings: The background was continuous, spontaneously variable and reactive. During wakefulness, the posterior dominant rhythm consisted of asymmetric (seen in right posterior quadrant), well-modulated 8-9 Hz activity, with amplitude to 30 uV, that attenuated to eye opening.  Low amplitude frontal beta was noted in wakefulness.    Generalized Slowing:  -Intermittent Rhythmic Slowing, Generalized (delta)    Focal Slowing:   -Continuous Slowing, Lateralized, Left hemisphere (delta and theta)  -Intermittent Rhythmic Slowing, Lateralized, Left hemisphere (delta)    Sleep Background:  Stage II sleep transients were not recorded.  Drowsiness and stage II sleep transients were not recorded.    Other Non-Epileptiform Findings:  None were present.    Interictal Epileptiform Activity:   -Lateralized Periodic Discharges (LPDs), Regional, Left Parieto-occipital, maximum O1/P7    Ictal:  None    Activation Procedures:   Hyperventilation was not performed.    Photic stimulation was not performed.     Artifacts:  Intermittent myogenic and movement artifacts were noted.    ECG:  The heart rate on single channel ECG was predominantly between 60-80 BPM.    _____________________________________________________________  EEG SUMMARY/CLASSIFICATION  Abnormal EEG in the awake states.  -Lateralized Periodic Discharges (LPDs), Regional, Left Parieto-occipital, maximum O1/P7  -Continuous Slowing, Lateralized, Left hemisphere (delta and theta)  -Intermittent Rhythmic Slowing, Lateralized, Left hemisphere (delta)  -Intermittent Rhythmic Slowing, Generalized (delta)  _____________________________________________________________  EEG IMPRESSION/CLINICAL CORRELATE  There is evidence of a potential seizure focus in the left parieto-occipital region, and a structural lesion in the left hemisphere.  There is also evidence of a mild diffuse encephalopathy.  No seizures were recorded.    Smooth Monteisnos MD  EEG / Epilepsy Attending Physician

## 2018-12-30 NOTE — PROGRESS NOTE ADULT - SUBJECTIVE AND OBJECTIVE BOX
THE PATIENT WAS SEEN AND EXAMINED BY ME WITH THE HOUSESTAFF AND STROKE TEAM DURING MORNING ROUNDS.     HPI:  63 year old woman with vascular risk factors of age, HTN, DM II, HPLD and CAD was evaluated at [facility] for language disturbance. On 11/27 she underwent T10 laminectomy and fusion. Postoperatively, she was noted to have Wernicke's aphasia due to left temporal lobar ICH of undetermined etiology. She reported to have had significant improvement in her language deficit in the rehabilitation. In the evening of 12/24, she was reported to have worsening of her aphasia, prompting her presentation to OSH and subsequent transfer to [facility].     ROS: All negative except documented in the HPI.     SUBJECTIVE: Noted with another lip twitching overnight terminated by ativan.     acetaminophen   Tablet .. 650 milliGRAM(s) Oral every 6 hours  aspirin enteric coated 81 milliGRAM(s) Oral daily  atorvastatin 80 milliGRAM(s) Oral at bedtime  carvedilol 12.5 milliGRAM(s) Oral every 12 hours  dextrose 40% Gel 15 Gram(s) Oral once PRN  dextrose 5%. 1000 milliLiter(s) IV Continuous <Continuous>  dextrose 50% Injectable 12.5 Gram(s) IV Push once  dextrose 50% Injectable 25 Gram(s) IV Push once  dextrose 50% Injectable 25 Gram(s) IV Push once  DULoxetine 90 milliGRAM(s) Oral daily  enoxaparin Injectable 40 milliGRAM(s) SubCutaneous daily  gabapentin 600 milliGRAM(s) Oral four times a day  glucagon  Injectable 1 milliGRAM(s) IntraMuscular once PRN  insulin lispro (HumaLOG) corrective regimen sliding scale   SubCutaneous three times a day before meals  levETIRAcetam 1500 milliGRAM(s) Oral two times a day  LORazepam   Injectable 1 milliGRAM(s) IV Push once PRN  losartan 100 milliGRAM(s) Oral daily  oxyCODONE    IR 10 milliGRAM(s) Oral Once PRN  oxyCODONE    IR 5 milliGRAM(s) Oral Once PRN    PHYSICAL EXAM:   Vital Signs Last 24 Hrs  T(C): 36.3 (30 Dec 2018 04:35), Max: 37.1 (29 Dec 2018 08:46)  T(F): 97.4 (30 Dec 2018 04:35), Max: 98.8 (29 Dec 2018 08:46)  HR: 93 (30 Dec 2018 04:35) (74 - 105)  BP: 160/93 (30 Dec 2018 04:35) (131/74 - 161/91)  RR: 18 (30 Dec 2018 04:35) (18 - 18)  SpO2: 94% (30 Dec 2018 04:35) (94% - 100%)    General: No acute distress  HEENT: EOM intact, right homonymous hemianopsia by blink to threat   Abdomen: Soft, nontender, nondistended   Extremities: No edema    NEUROLOGICAL EXAM:  Mental status: Awake, alert, was able to make eye contact, severe mixed aphasia; expressive>> receptive, mild to moderate dysarthria, not possible to check for orientation due to significant aphasia, not following commands.  Cranial Nerves: No facial asymmetry, right homonymous hemianopsia by blink to threat, no nystagmus, no dysarthria, tongue midline  Motor exam: mild right hemiparesis (RUE and RLE 5-/5), LUE and LLE - 5/5   Sensation: Intact to light touch   Coordination/ Gait: No dysmetria, gait deferred     LABS:    Hemoglobin A1C, Whole Blood: 6.7 % (12-28 @ 07:02)  Hemoglobin A1C, Whole Blood: 7.5 % (11-09 @ 16:00)    IMAGING: Reviewed by me.     MRI Head w/wo IV Cont (12.26.18)  Left temporal lobe hematoma without significant interval change in size.  Small amount of subarachnoid blood.  New acute left MCA distribution infarct.  Prominent left temporal region cortical vein. Clinical correlation will   determine the need for conventional angiography to exclude the   possibility of a vascular malformation.    (12.25.18)  CT brain:  Resolving left temporal lobe parenchymal hemorrhage is redemonstrated,   similar to the prior brain CT.    CT angiography neck:  No evidence for arterial dissection. No flow-limiting stenosis. Carotid   bifurcations unremarkable.    CT angiography brain:  No evidence for AVM. No vascular aneurysm or flow-limiting stenosis.    CT venography brain:  No evidence for venous sinus or cortical vein thrombosis. THE PATIENT WAS SEEN AND EXAMINED BY ME WITH THE HOUSESTAFF AND STROKE TEAM DURING MORNING ROUNDS.     HPI:  63 year old woman with vascular risk factors of age, HTN, DM II, HPLD and CAD was evaluated at [facility] for language disturbance. On 11/27 she underwent T10 laminectomy and fusion. Postoperatively, she was noted to have Wernicke's aphasia due to left temporal lobar ICH of undetermined etiology. She reported to have had significant improvement in her language deficit in the rehabilitation. In the evening of 12/24, she was reported to have worsening of her aphasia, prompting her presentation to OSH and subsequent transfer to [facility].     ROS: All negative except documented in the HPI.     SUBJECTIVE: Noted with another lip twitching overnight and this AM -terminated by ativan.     acetaminophen   Tablet .. 650 milliGRAM(s) Oral every 6 hours  aspirin enteric coated 81 milliGRAM(s) Oral daily  atorvastatin 80 milliGRAM(s) Oral at bedtime  carvedilol 12.5 milliGRAM(s) Oral every 12 hours  dextrose 40% Gel 15 Gram(s) Oral once PRN  dextrose 5%. 1000 milliLiter(s) IV Continuous <Continuous>  dextrose 50% Injectable 12.5 Gram(s) IV Push once  dextrose 50% Injectable 25 Gram(s) IV Push once  dextrose 50% Injectable 25 Gram(s) IV Push once  DULoxetine 90 milliGRAM(s) Oral daily  enoxaparin Injectable 40 milliGRAM(s) SubCutaneous daily  gabapentin 600 milliGRAM(s) Oral four times a day  glucagon  Injectable 1 milliGRAM(s) IntraMuscular once PRN  insulin lispro (HumaLOG) corrective regimen sliding scale   SubCutaneous three times a day before meals  levETIRAcetam 1500 milliGRAM(s) Oral two times a day  LORazepam   Injectable 1 milliGRAM(s) IV Push once PRN  losartan 100 milliGRAM(s) Oral daily  oxyCODONE    IR 10 milliGRAM(s) Oral Once PRN  oxyCODONE    IR 5 milliGRAM(s) Oral Once PRN    PHYSICAL EXAM:   Vital Signs Last 24 Hrs  T(C): 36.3 (30 Dec 2018 04:35), Max: 37.1 (29 Dec 2018 08:46)  T(F): 97.4 (30 Dec 2018 04:35), Max: 98.8 (29 Dec 2018 08:46)  HR: 93 (30 Dec 2018 04:35) (74 - 105)  BP: 160/93 (30 Dec 2018 04:35) (131/74 - 161/91)  RR: 18 (30 Dec 2018 04:35) (18 - 18)  SpO2: 94% (30 Dec 2018 04:35) (94% - 100%)    General: No acute distress, drowsy  HEENT: EOM intact, right homonymous hemianopsia by blink to threat   Abdomen: Soft, nontender, nondistended   Extremities: No edema    NEUROLOGICAL EXAM:  Mental status: drowsy, was able to make eye contact, severe mixed aphasia; expressive>> receptive, mild to moderate dysarthria, not possible to check for orientation due to significant aphasia, not following commands.  Cranial Nerves: No facial asymmetry, right homonymous hemianopsia by blink to threat, no nystagmus, no dysarthria, tongue midline  Motor exam: mild right hemiparesis (RUE and RLE 5-/5), LUE and LLE - 5/5   Sensation: Intact to light touch   Coordination/ Gait: No dysmetria, gait deferred     LABS:    Hemoglobin A1C, Whole Blood: 6.7 % (12-28 @ 07:02)  Hemoglobin A1C, Whole Blood: 7.5 % (11-09 @ 16:00)    IMAGING: Reviewed by me.     CT Head No Cont (12.30.18)  No gross evidence for new infarct or new hemorrhage compared to the   12/26/2018 MRI study.    MRI Head w/wo IV Cont (12.26.18)  Left temporal lobe hematoma without significant interval change in size.  Small amount of subarachnoid blood.  New acute left MCA distribution infarct.  Prominent left temporal region cortical vein. Clinical correlation will   determine the need for conventional angiography to exclude the   possibility of a vascular malformation.    (12.25.18)  CT brain:  Resolving left temporal lobe parenchymal hemorrhage is redemonstrated,   similar to the prior brain CT.    CT angiography neck:  No evidence for arterial dissection. No flow-limiting stenosis. Carotid   bifurcations unremarkable.    CT angiography brain:  No evidence for AVM. No vascular aneurysm or flow-limiting stenosis.    CT venography brain:  No evidence for venous sinus or cortical vein thrombosis. THE PATIENT WAS SEEN AND EXAMINED BY ME WITH THE HOUSESTAFF AND STROKE TEAM DURING MORNING ROUNDS.     HPI:  63 year old woman with vascular risk factors of age, HTN, DM II, HPLD and CAD was evaluated at [facility] for language disturbance. On 11/27 she underwent T10 laminectomy and fusion. Postoperatively, she was noted to have Wernicke's aphasia due to left temporal lobar ICH of undetermined etiology. She reported to have had significant improvement in her language deficit in the rehabilitation. In the evening of 12/24, she was reported to have worsening of her aphasia, prompting her presentation to OSH and subsequent transfer to [facility].     ROS: All negative except documented in the HPI.     SUBJECTIVE: Noted with another lip twitching overnight and this AM -terminated by ativan.     acetaminophen   Tablet .. 650 milliGRAM(s) Oral every 6 hours  aspirin enteric coated 81 milliGRAM(s) Oral daily  atorvastatin 80 milliGRAM(s) Oral at bedtime  carvedilol 12.5 milliGRAM(s) Oral every 12 hours  dextrose 40% Gel 15 Gram(s) Oral once PRN  dextrose 5%. 1000 milliLiter(s) IV Continuous <Continuous>  dextrose 50% Injectable 12.5 Gram(s) IV Push once  dextrose 50% Injectable 25 Gram(s) IV Push once  dextrose 50% Injectable 25 Gram(s) IV Push once  DULoxetine 90 milliGRAM(s) Oral daily  enoxaparin Injectable 40 milliGRAM(s) SubCutaneous daily  gabapentin 600 milliGRAM(s) Oral four times a day  glucagon  Injectable 1 milliGRAM(s) IntraMuscular once PRN  insulin lispro (HumaLOG) corrective regimen sliding scale   SubCutaneous three times a day before meals  levETIRAcetam 1500 milliGRAM(s) Oral two times a day  LORazepam   Injectable 1 milliGRAM(s) IV Push once PRN  losartan 100 milliGRAM(s) Oral daily  oxyCODONE    IR 10 milliGRAM(s) Oral Once PRN  oxyCODONE    IR 5 milliGRAM(s) Oral Once PRN    PHYSICAL EXAM:   Vital Signs Last 24 Hrs  T(C): 36.3 (30 Dec 2018 04:35), Max: 37.1 (29 Dec 2018 08:46)  T(F): 97.4 (30 Dec 2018 04:35), Max: 98.8 (29 Dec 2018 08:46)  HR: 93 (30 Dec 2018 04:35) (74 - 105)  BP: 160/93 (30 Dec 2018 04:35) (131/74 - 161/91)  RR: 18 (30 Dec 2018 04:35) (18 - 18)  SpO2: 94% (30 Dec 2018 04:35) (94% - 100%)    General: No acute distress, drowsy  HEENT: EOM intact, right homonymous hemianopsia by blink to threat   Abdomen: Soft, nontender, nondistended   Extremities: No edema    NEUROLOGICAL EXAM:  Mental status: drowsy, was able to make eye contact, severe mixed aphasia; expressive>> receptive, mild to moderate dysarthria, not possible to check for orientation due to significant aphasia, not following commands.  Cranial Nerves: No facial asymmetry, right homonymous hemianopsia by blink to threat, no nystagmus, no dysarthria, tongue midline  Motor exam: mild right hemiparesis (RUE and RLE 5-/5), LUE and LLE - 5/5   Sensation: Intact to light touch   Coordination/ Gait: No dysmetria, gait deferred   Almost rhythmic/semi-arrhythmic twitching of the right face and arm concerning for simple partial seizures/status without secondary generalization    LABS:    Hemoglobin A1C, Whole Blood: 6.7 % (12-28 @ 07:02)  Hemoglobin A1C, Whole Blood: 7.5 % (11-09 @ 16:00)    IMAGING: Reviewed by me.     CT Head No Cont (12.30.18)  No gross evidence for new infarct or new hemorrhage compared to the   12/26/2018 MRI study.    MRI Head w/wo IV Cont (12.26.18)  Left temporal lobe hematoma without significant interval change in size.  Small amount of subarachnoid blood.  New acute left MCA distribution infarct.  Prominent left temporal region cortical vein. Clinical correlation will   determine the need for conventional angiography to exclude the   possibility of a vascular malformation.    (12.25.18)  CT brain:  Resolving left temporal lobe parenchymal hemorrhage is redemonstrated,   similar to the prior brain CT.    CT angiography neck:  No evidence for arterial dissection. No flow-limiting stenosis. Carotid   bifurcations unremarkable.    CT angiography brain:  No evidence for AVM. No vascular aneurysm or flow-limiting stenosis.    CT venography brain:  No evidence for venous sinus or cortical vein thrombosis.

## 2018-12-30 NOTE — CHART NOTE - NSCHARTNOTEFT_GEN_A_CORE
Pt still having twitching of the left lip and arm w/ preserved awareness after Vimpat load. Currently on Keppra 1500mg BID and Vimpat 150mg BID. EEG showing evidence of a potential seizure focus in the left parieto-occipital region, as well as a mild diffuse encephalopathy. 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 Benzodiazepine (used round the clock) will stop the seizures, however will also have the side effect of sedation. For this reason will hold off on changing AEDs or starting round the clock Benzodiazepine, if pt converts to a full GTC (unlikely) will use Ativan 2mg (ordered) to break the episode, with further management based on the situation at that point.    - Case discussed with Epilepsy Fellow Pt still having twitching of the left lip and arm w/ preserved awareness after Vimpat load. Currently on Keppra 1500mg BID and Vimpat 150mg BID. EEG showing evidence of a potential seizure focus in the left parieto-occipital region, as well as a mild diffuse encephalopathy. 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 Will start Klonopin 0.5mg BID for seizure control (though might sedate pt slightly) if pt converts to a full GTC (unlikely) will use Ativan 2mg (ordered) to break the episode, with further management based on the situation at that point.    - Case discussed with Dr. Montesinos

## 2018-12-30 NOTE — PROGRESS NOTE ADULT - ASSESSMENT
63 years old woman with vascular risk factors of age, HTN, DM II, HPLD and CAD was evaluated at [facility] for language disturbance. On 11/27 she underwent T10 laminectomy and fusion. Postoperatively, she was noted to have Wernicke's aphasia due to left temporal lobar ICH of undetermined etiology. She reported to have had significant improvement in her language deficit in the rehabilitation. In the evening of 12/24, she was reported to have worsening of her aphasia, prompting her presentation to OSH and subsequent transfer to [facility]. CT brain or admission showed expected evolution/resolution of left temporal ICH leading to development of encephalomalacia. MRI brain showed acute infarct in the left MCA distribution adjacent to the prior hemorrhage in the late subacute to chronic stage and expected evolution of prior left frontotemporal convexal subarachnoid hemorrhage as well as was concerning for a dilated cortical vein anterior to the hematoma. Of note, MRI brain (12/7) showed left temporal lobar ICH, left frontotemporal convexal SAH and juxtacortical FLAIR hyperintensities concerning for PRES to my eye.     Impression:   Left MCA distribution stroke - likely etiology being cryptogenic stroke, probably related to embolism from a proximal source, like cardiac/paradoxical source of embolism  Recent left temporal lobar ICH with associated left frontotemporal convexal subarachnoid hemorrhage - likely etiology could be intracranial hemorrhages in the setting of PRES but possibility of underlying vascular malformation like micro-AVM or large vessel vasculopathy like vasculitis needs to  acute ischemic be ruled out   Seizures - Simple partial seizure without secondary generalization - likely etiology being symptomatic seizure in the setting of new/acute ischemic infarct versus 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 Increased keppra to 1500mg BID. Epilepsy consult appreciated, continue seizure precautions. Neurological exam unchanged, continue monitoring for neurologic deterioration in setting of cerebral edema and mass effect, keep BP <160/90 mmHg, LDL 44-continue with home statin as his LDL is currently at goal, MRI Brain w/wo contrast noted above. Physical therapy/OT recommended acute rehab. VEEG showed concerns for seizure focus in the left parieto-occipital region.     ANTITHROMBOTIC THERAPY: Considering CT/MRI findings suggestive of late subacute to chronic ICH and new/acute ischemic infarct, benefits of continuing aspirin at this time would outweigh potential risks     PULMONARY: CXR clear, protecting airway, saturating well     CARDIOVASCULAR: IMTIAZ showed EF 74% moderate mitral regurgitation. Moderate focal atheroma noted in aortic arch/descending aorta, continue cardiac monitoring, s/p ICM to screen for occult cardiac arrhythmia like atrial fibrillation being the cause of possible cardiac source of embolism     SBP goal: Gradual normotension    GASTROINTESTINAL: dysphagia screen passed- tolerating diet       Diet: Regular    RENAL: BUN/Cr previously without acute change, good urine output      Na Goal: Greater than 135     Rodriguez: no    HEMATOLOGY: no overt sign of bleeding noted?     DVT ppx: pharmacological DVT prophylaxis    ID: afebrile, no sign of infection    OTHER: Plan endorsed to patient and  at bedside.     DISPOSITION: Acute rehab    CORE MEASURES:        Admission NIHSS: 0     TPA: [] YES [x] NO      LDL/HDL: 44/35     Depression Screen: unable to obtain due to aphasia     Statin Therapy: y     Dysphagia Screen: [x] PASS [] FAIL     Smoking [] YES [x] NO      Afib [] YES [x] NO     Stroke Education [x] YES [] NO 63 years old woman with vascular risk factors of age, HTN, DM II, HPLD and CAD was evaluated at [facility] for language disturbance. On 11/27 she underwent T10 laminectomy and fusion. Postoperatively, she was noted to have Wernicke's aphasia due to left temporal lobar ICH of undetermined etiology. She reported to have had significant improvement in her language deficit in the rehabilitation. In the evening of 12/24, she was reported to have worsening of her aphasia, prompting her presentation to OSH and subsequent transfer to [facility]. CT brain or admission showed expected evolution/resolution of left temporal ICH leading to development of encephalomalacia. MRI brain showed acute infarct in the left MCA distribution adjacent to the prior hemorrhage in the late subacute to chronic stage and expected evolution of prior left frontotemporal convexal subarachnoid hemorrhage as well as was concerning for a dilated cortical vein anterior to the hematoma. Of note, MRI brain (12/7) showed left temporal lobar ICH, left frontotemporal convexal SAH and juxtacortical FLAIR hyperintensities concerning for PRES to my eye.     Impression:   Left MCA distribution stroke - likely etiology being cryptogenic stroke, probably related to embolism from a proximal source, like cardiac/paradoxical source of embolism  Recent left temporal lobar ICH with associated left frontotemporal convexal subarachnoid hemorrhage - likely etiology could be intracranial hemorrhages in the setting of PRES but possibility of underlying vascular malformation like micro-AVM or large vessel vasculopathy like vasculitis needs to  acute ischemic be ruled out   Seizures - Simple partial seizure without secondary generalization - likely etiology being symptomatic seizure in the setting of new/acute ischemic infarct versus 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 Increased keppra to 1500mg BID. Epilepsy consult appreciated, continue seizure precautions. VEEG showed concerns for seizure focus in the left parieto-occipital region- continue Keppra and Vimpat added onto regimen. Neurological noted less responsive and drowsy- likely due to receiving Ativan, STAT CTH performed without any interval change,  continue monitoring for neurologic deterioration in setting of cerebral edema and mass effect, keep BP <160/90 mmHg, LDL 44-continue with home statin as his LDL is currently at goal, MRI Brain w/wo contrast noted above. Physical therapy/OT recommended acute rehab.     ANTITHROMBOTIC THERAPY: Considering CT/MRI findings suggestive of late subacute to chronic ICH and new/acute ischemic infarct, benefits of continuing aspirin at this time would outweigh potential risks     PULMONARY: CXR clear, protecting airway, saturating well     CARDIOVASCULAR: IMTIAZ showed EF 74% moderate mitral regurgitation. Moderate focal atheroma noted in aortic arch/descending aorta, continue cardiac monitoring, s/p ICM to screen for occult cardiac arrhythmia like atrial fibrillation being the cause of possible cardiac source of embolism     SBP goal: Gradual normotension    GASTROINTESTINAL: dysphagia screen passed- tolerating diet       Diet: Regular    RENAL: BUN/Cr without acute change, good urine output      Na Goal: Greater than 135     Rodriguez: no    HEMATOLOGY: H/H without acute change, Platelets 330     DVT ppx: pharmacological DVT prophylaxis    ID: afebrile, no sign of infection    OTHER: Plan endorsed to patient and  at bedside.     DISPOSITION: Acute rehab    CORE MEASURES:        Admission NIHSS: 0     TPA: [] YES [x] NO      LDL/HDL: 44/35     Depression Screen: unable to obtain due to aphasia     Statin Therapy: y     Dysphagia Screen: [x] PASS [] FAIL     Smoking [] YES [x] NO      Afib [] YES [x] NO     Stroke Education [x] YES [] NO 63 years old woman with vascular risk factors of age, HTN, DM II, HPLD and CAD was evaluated at [facility] for language disturbance. On 11/27 she underwent T10 laminectomy and fusion. Postoperatively, she was noted to have Wernicke's aphasia due to left temporal lobar ICH of undetermined etiology. She reported to have had significant improvement in her language deficit in the rehabilitation. In the evening of 12/24, she was reported to have worsening of her aphasia, prompting her presentation to OSH and subsequent transfer to [facility]. CT brain or admission showed expected evolution/resolution of left temporal ICH leading to development of encephalomalacia. MRI brain showed acute infarct in the left MCA distribution adjacent to the prior hemorrhage in the late subacute to chronic stage and expected evolution of prior left frontotemporal convexal subarachnoid hemorrhage as well as was concerning for a dilated cortical vein anterior to the hematoma. Of note, MRI brain (12/7) showed left temporal lobar ICH, left frontotemporal convexal SAH and juxtacortical FLAIR hyperintensities concerning for PRES to my eye. CT brain on 12/30, showed expected evolution of left MCA distribution infarct and prior left temporal ICH leading to encephalomalacia.    Impression:   Left MCA distribution stroke - likely etiology being cryptogenic stroke, probably related to embolism from a proximal source, like cardiac/paradoxical source of embolism  Recent left temporal lobar ICH with associated left frontotemporal convexal subarachnoid hemorrhage - likely etiology could be intracranial hemorrhages in the setting of PRES but possibility of underlying vascular malformation like micro-AVM or large vessel vasculopathy like vasculitis needs to  acute ischemic be ruled out   Seizures - Simple partial seizures/status without secondary generalization - likely etiology being symptomatic seizure in the setting of new/acute ischemic infarct versus 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 Increased keppra to 1500mg BID. Epilepsy consult appreciated, continue seizure precautions. VEEG showed concerns for seizure focus in the left parieto-occipital region- continue Keppra and Vimpat added onto regimen. Neurological noted less responsive and drowsy - likely due to receiving Ativan, STAT CTH performed without any interval change,  continue monitoring for neurologic deterioration in setting of cerebral edema and mass effect, keep BP <160/90 mmHg, LDL 44-continue with home statin as his LDL is currently at goal, MRI Brain w/wo contrast noted above. Physical therapy/OT recommended acute rehab. Consider repeat MRI brain with and without contrast/MR spectroscopy in about 4-6 weeks to rule out any underlying tissue pathology, preferably upon resolution/complete resorption of prior left temporal lobar ICH; Conventional angiogram to rule out underlying vascular malformation could be considered based on results of a repeat brain imaging/vessel imaging    ANTITHROMBOTIC THERAPY: Considering CT/MRI findings suggestive of late subacute to chronic ICH and new/acute ischemic infarct, benefits of continuing aspirin at this time would outweigh potential risks     PULMONARY: CXR clear, protecting airway, saturating well     CARDIOVASCULAR: IMTIAZ showed EF 74% moderate mitral regurgitation. Moderate focal atheroma noted in aortic arch/descending aorta, continue cardiac monitoring, s/p ICM to screen for occult cardiac arrhythmia like atrial fibrillation being the cause of possible cardiac source of embolism     SBP goal: Gradual normotension    GASTROINTESTINAL: dysphagia screen passed- tolerating diet       Diet: Regular    RENAL: BUN/Cr without acute change, good urine output      Na Goal: Greater than 135     Rodriguez: no    HEMATOLOGY: H/H without acute change, Platelets 330     DVT ppx: pharmacological DVT prophylaxis    ID: afebrile, no sign of infection    OTHER: Plan endorsed to patient and  at bedside.     DISPOSITION: Acute rehab    CORE MEASURES:        Admission NIHSS: 0     TPA: [] YES [x] NO      LDL/HDL: 44/35     Depression Screen: unable to obtain due to aphasia     Statin Therapy: y     Dysphagia Screen: [x] PASS [] FAIL     Smoking [] YES [x] NO      Afib [] YES [x] NO     Stroke Education [x] YES [] NO

## 2018-12-31 LAB
ANION GAP SERPL CALC-SCNC: 16 MMOL/L — SIGNIFICANT CHANGE UP (ref 5–17)
BUN SERPL-MCNC: 8 MG/DL — SIGNIFICANT CHANGE UP (ref 7–23)
CALCIUM SERPL-MCNC: 8.1 MG/DL — LOW (ref 8.4–10.5)
CHLORIDE SERPL-SCNC: 106 MMOL/L — SIGNIFICANT CHANGE UP (ref 96–108)
CO2 SERPL-SCNC: 22 MMOL/L — SIGNIFICANT CHANGE UP (ref 22–31)
CREAT SERPL-MCNC: 0.7 MG/DL — SIGNIFICANT CHANGE UP (ref 0.5–1.3)
GLUCOSE BLDC GLUCOMTR-MCNC: 83 MG/DL — SIGNIFICANT CHANGE UP (ref 70–99)
GLUCOSE BLDC GLUCOMTR-MCNC: 86 MG/DL — SIGNIFICANT CHANGE UP (ref 70–99)
GLUCOSE BLDC GLUCOMTR-MCNC: 93 MG/DL — SIGNIFICANT CHANGE UP (ref 70–99)
GLUCOSE SERPL-MCNC: 101 MG/DL — HIGH (ref 70–99)
HCT VFR BLD CALC: 34.7 % — SIGNIFICANT CHANGE UP (ref 34.5–45)
HGB BLD-MCNC: 10.9 G/DL — LOW (ref 11.5–15.5)
MCHC RBC-ENTMCNC: 27.9 PG — SIGNIFICANT CHANGE UP (ref 27–34)
MCHC RBC-ENTMCNC: 31.4 GM/DL — LOW (ref 32–36)
MCV RBC AUTO: 88.7 FL — SIGNIFICANT CHANGE UP (ref 80–100)
PLATELET # BLD AUTO: 297 K/UL — SIGNIFICANT CHANGE UP (ref 150–400)
POTASSIUM SERPL-MCNC: 3.5 MMOL/L — SIGNIFICANT CHANGE UP (ref 3.5–5.3)
POTASSIUM SERPL-SCNC: 3.5 MMOL/L — SIGNIFICANT CHANGE UP (ref 3.5–5.3)
RBC # BLD: 3.91 M/UL — SIGNIFICANT CHANGE UP (ref 3.8–5.2)
RBC # FLD: 16.4 % — HIGH (ref 10.3–14.5)
SODIUM SERPL-SCNC: 144 MMOL/L — SIGNIFICANT CHANGE UP (ref 135–145)
WBC # BLD: 5 K/UL — SIGNIFICANT CHANGE UP (ref 3.8–10.5)
WBC # FLD AUTO: 5 K/UL — SIGNIFICANT CHANGE UP (ref 3.8–10.5)

## 2018-12-31 PROCEDURE — 99233 SBSQ HOSP IP/OBS HIGH 50: CPT

## 2018-12-31 PROCEDURE — 99232 SBSQ HOSP IP/OBS MODERATE 35: CPT

## 2018-12-31 PROCEDURE — 95951: CPT | Mod: 26

## 2018-12-31 RX ORDER — LABETALOL HCL 100 MG
5 TABLET ORAL EVERY 6 HOURS
Qty: 0 | Refills: 0 | Status: DISCONTINUED | OUTPATIENT
Start: 2018-12-31 | End: 2019-01-01

## 2018-12-31 RX ORDER — LACOSAMIDE 50 MG/1
200 TABLET ORAL
Qty: 0 | Refills: 0 | Status: DISCONTINUED | OUTPATIENT
Start: 2018-12-31 | End: 2018-12-31

## 2018-12-31 RX ORDER — CLONAZEPAM 1 MG
0.5 TABLET ORAL THREE TIMES A DAY
Qty: 0 | Refills: 0 | Status: DISCONTINUED | OUTPATIENT
Start: 2018-12-31 | End: 2019-01-01

## 2018-12-31 RX ORDER — LACOSAMIDE 50 MG/1
200 TABLET ORAL EVERY 12 HOURS
Qty: 0 | Refills: 0 | Status: DISCONTINUED | OUTPATIENT
Start: 2018-12-31 | End: 2019-01-07

## 2018-12-31 RX ORDER — MAGNESIUM SULFATE 500 MG/ML
2 VIAL (ML) INJECTION ONCE
Qty: 0 | Refills: 0 | Status: COMPLETED | OUTPATIENT
Start: 2018-12-31 | End: 2018-12-31

## 2018-12-31 RX ORDER — LABETALOL HCL 100 MG
10 TABLET ORAL EVERY 6 HOURS
Qty: 0 | Refills: 0 | Status: DISCONTINUED | OUTPATIENT
Start: 2018-12-31 | End: 2019-01-22

## 2018-12-31 RX ORDER — ASPIRIN/CALCIUM CARB/MAGNESIUM 324 MG
300 TABLET ORAL DAILY
Qty: 0 | Refills: 0 | Status: DISCONTINUED | OUTPATIENT
Start: 2018-12-31 | End: 2019-01-08

## 2018-12-31 RX ORDER — HYDRALAZINE HCL 50 MG
10 TABLET ORAL EVERY 6 HOURS
Qty: 0 | Refills: 0 | Status: DISCONTINUED | OUTPATIENT
Start: 2018-12-31 | End: 2019-01-22

## 2018-12-31 RX ADMIN — LEVETIRACETAM 400 MILLIGRAM(S): 250 TABLET, FILM COATED ORAL at 17:49

## 2018-12-31 RX ADMIN — LACOSAMIDE 150 MILLIGRAM(S): 50 TABLET ORAL at 04:49

## 2018-12-31 RX ADMIN — Medication 2 MILLIGRAM(S): at 22:22

## 2018-12-31 RX ADMIN — LACOSAMIDE 140 MILLIGRAM(S): 50 TABLET ORAL at 21:17

## 2018-12-31 RX ADMIN — Medication 81 MILLIGRAM(S): at 12:52

## 2018-12-31 RX ADMIN — Medication 10 MILLIGRAM(S): at 22:35

## 2018-12-31 RX ADMIN — DULOXETINE HYDROCHLORIDE 90 MILLIGRAM(S): 30 CAPSULE, DELAYED RELEASE ORAL at 12:52

## 2018-12-31 RX ADMIN — Medication 650 MILLIGRAM(S): at 12:52

## 2018-12-31 RX ADMIN — Medication 50 GRAM(S): at 12:51

## 2018-12-31 RX ADMIN — LEVETIRACETAM 400 MILLIGRAM(S): 250 TABLET, FILM COATED ORAL at 04:50

## 2018-12-31 RX ADMIN — Medication 0.5 MILLIGRAM(S): at 12:54

## 2018-12-31 RX ADMIN — SODIUM CHLORIDE 50 MILLILITER(S): 9 INJECTION INTRAMUSCULAR; INTRAVENOUS; SUBCUTANEOUS at 12:53

## 2018-12-31 RX ADMIN — GABAPENTIN 600 MILLIGRAM(S): 400 CAPSULE ORAL at 12:53

## 2018-12-31 RX ADMIN — LOSARTAN POTASSIUM 100 MILLIGRAM(S): 100 TABLET, FILM COATED ORAL at 04:49

## 2018-12-31 RX ADMIN — CARVEDILOL PHOSPHATE 12.5 MILLIGRAM(S): 80 CAPSULE, EXTENDED RELEASE ORAL at 04:48

## 2018-12-31 RX ADMIN — ENOXAPARIN SODIUM 40 MILLIGRAM(S): 100 INJECTION SUBCUTANEOUS at 12:52

## 2018-12-31 RX ADMIN — Medication 0.5 MILLIGRAM(S): at 04:49

## 2018-12-31 RX ADMIN — Medication 650 MILLIGRAM(S): at 04:49

## 2018-12-31 RX ADMIN — Medication 650 MILLIGRAM(S): at 12:54

## 2018-12-31 RX ADMIN — GABAPENTIN 600 MILLIGRAM(S): 400 CAPSULE ORAL at 04:49

## 2018-12-31 NOTE — PROGRESS NOTE ADULT - SUBJECTIVE AND OBJECTIVE BOX
[facility] - Broa Mcfarland Grossman, [place], [place], Noah Lock  Office Number:  [phone]    Patient resting comfortably in bed in NAD.  Facial twitching noted.  Nods to questions, but unable to obtain interval history because of expressive aphasia.    ROS: negative unless otherwise mentioned.    Telemetry:  SR    MEDICATIONS  (STANDING):  acetaminophen   Tablet .. 650 milliGRAM(s) Oral every 6 hours  aspirin enteric coated 81 milliGRAM(s) Oral daily  atorvastatin 80 milliGRAM(s) Oral at bedtime  carvedilol 12.5 milliGRAM(s) Oral every 12 hours  clonazePAM Tablet 0.5 milliGRAM(s) Oral two times a day  dextrose 5%. 1000 milliLiter(s) (50 mL/Hr) IV Continuous <Continuous>  dextrose 50% Injectable 12.5 Gram(s) IV Push once  dextrose 50% Injectable 25 Gram(s) IV Push once  dextrose 50% Injectable 25 Gram(s) IV Push once  DULoxetine 90 milliGRAM(s) Oral daily  enoxaparin Injectable 40 milliGRAM(s) SubCutaneous daily  gabapentin 600 milliGRAM(s) Oral four times a day  insulin lispro (HumaLOG) corrective regimen sliding scale   SubCutaneous three times a day before meals  lacosamide 150 milliGRAM(s) Oral two times a day  levETIRAcetam  IVPB 1500 milliGRAM(s) IV Intermittent every 12 hours  losartan 100 milliGRAM(s) Oral daily  sodium chloride 0.9%. 1000 milliLiter(s) (50 mL/Hr) IV Continuous <Continuous>    MEDICATIONS  (PRN):  dextrose 40% Gel 15 Gram(s) Oral once PRN Blood Glucose LESS THAN 70 milliGRAM(s)/deciliter  glucagon  Injectable 1 milliGRAM(s) IntraMuscular once PRN Glucose LESS THAN 70 milligrams/deciliter  LORazepam   Injectable 2 milliGRAM(s) IV Push every 5 minutes PRN Sustained GTC Seizure  oxyCODONE    IR 10 milliGRAM(s) Oral Once PRN Severe Pain (7 - 10)  oxyCODONE    IR 5 milliGRAM(s) Oral Once PRN Moderate Pain (4 - 6)      Allergies    No Known Allergies    Intolerances        Vital Signs Last 24 Hrs  T(C): 36.7 (31 Dec 2018 04:58), Max: 36.8 (30 Dec 2018 12:05)  T(F): 98.1 (31 Dec 2018 04:58), Max: 98.2 (30 Dec 2018 12:05)  HR: 86 (31 Dec 2018 04:58) (74 - 90)  BP: 161/82 (31 Dec 2018 04:58) (145/75 - 172/85)  BP(mean): --  RR: 18 (31 Dec 2018 04:58) (18 - 18)  SpO2: 98% (31 Dec 2018 04:58) (96% - 98%)    I&O's Summary      ON EXAM:    Constitutional: NAD, aphasic  HEENT: Moist Mucous Membranes, Anicteric  Pulmonary: Non-labored, breath sounds are clear bilaterally, No wheezing, rales or rhonchi  Cardiovascular: Regular, S1 and S2, No murmurs, rubs, gallops or clicks  Gastrointestinal: Bowel Sounds present, soft, nontender.   Lymph: No peripheral edema. No lymphadenopathy.  Skin: No visible rashes or ulcers.  Psych:  unable to assess accurately    LABS: All Labs Reviewed:                        10.9   5.00  )-----------( 297      ( 31 Dec 2018 08:10 )             34.7                         10.8   5.6   )-----------( 330      ( 30 Dec 2018 08:36 )             32.1     31 Dec 2018 06:49    144    |  106    |  8      ----------------------------<  101    3.5     |  22     |  0.70   30 Dec 2018 08:36    146    |  107    |  9      ----------------------------<  102    3.6     |  24     |  0.76     Ca    8.1        31 Dec 2018 06:49  Ca    8.2        30 Dec 2018 08:36  Mg     1.3       30 Dec 2018 08:36            Blood Culture:

## 2018-12-31 NOTE — DIETITIAN INITIAL EVALUATION ADULT. - NS AS NUTRI INTERV MEALS SNACK
Patient needs to be evaluated for tolerance of diet texture.  Was on regular..  Concerned for no liquids on tray and risk for dehydration.   Discussed with MD./General/healthful diet

## 2018-12-31 NOTE — SWALLOW BEDSIDE ASSESSMENT ADULT - ORAL PHASE
Delayed oral transit time/Stasis in anterior sulcus/Decreased anterior-posterior movement of the bolus

## 2018-12-31 NOTE — DIETITIAN INITIAL EVALUATION ADULT. - PERTINENT MEDS FT
MEDICATIONS  (STANDING):  acetaminophen   Tablet .. 650 milliGRAM(s) Oral every 6 hours  aspirin enteric coated 81 milliGRAM(s) Oral daily  atorvastatin 80 milliGRAM(s) Oral at bedtime  carvedilol 12.5 milliGRAM(s) Oral every 12 hours  clonazePAM Tablet 0.5 milliGRAM(s) Oral three times a day  dextrose 5%. 1000 milliLiter(s) (50 mL/Hr) IV Continuous <Continuous>  dextrose 50% Injectable 12.5 Gram(s) IV Push once  dextrose 50% Injectable 25 Gram(s) IV Push once  dextrose 50% Injectable 25 Gram(s) IV Push once  DULoxetine 90 milliGRAM(s) Oral daily  enoxaparin Injectable 40 milliGRAM(s) SubCutaneous daily  gabapentin 600 milliGRAM(s) Oral four times a day  insulin lispro (HumaLOG) corrective regimen sliding scale   SubCutaneous three times a day before meals  lacosamide 200 milliGRAM(s) Oral two times a day  levETIRAcetam  IVPB 1500 milliGRAM(s) IV Intermittent every 12 hours  losartan 100 milliGRAM(s) Oral daily  magnesium sulfate  IVPB 2 Gram(s) IV Intermittent once  sodium chloride 0.9%. 1000 milliLiter(s) (50 mL/Hr) IV Continuous <Continuous>    MEDICATIONS  (PRN):  dextrose 40% Gel 15 Gram(s) Oral once PRN Blood Glucose LESS THAN 70 milliGRAM(s)/deciliter  glucagon  Injectable 1 milliGRAM(s) IntraMuscular once PRN Glucose LESS THAN 70 milligrams/deciliter  LORazepam   Injectable 2 milliGRAM(s) IV Push every 5 minutes PRN Sustained GTC Seizure  oxyCODONE    IR 10 milliGRAM(s) Oral Once PRN Severe Pain (7 - 10)  oxyCODONE    IR 5 milliGRAM(s) Oral Once PRN Moderate Pain (4 - 6)

## 2018-12-31 NOTE — DIETITIAN INITIAL EVALUATION ADULT. - NS AS NUTRI INTERV MEDICAL AND FOOD SUPPLEMENTS
Commercial beverage/Ensure pudding not appropriate for Diabetes.  Suggested Glucerna Shake if able to have liquids.

## 2018-12-31 NOTE — PROGRESS NOTE ADULT - ASSESSMENT
63F with a history of CAD s/p PCI to OM2 with a AVE in December of 2015, residual 40% LAD disease, neg stress test in 1/2018, osteoarthritis, DM, HTN, HLD who presents as a transfer from [facility] for aphasia now with temporal ICH and new left MCA infarct. She is now with expected evolution of left MCA distribution infarct and prior left temporal ICH leading to encephalomalacia. She is having focal seizures and facial twitching.     - She has a hx of a remote PCI.   - No anginal symptoms recently reported  - Cont ASA     - Has a moderate focal atheroma in Aortic Arch  - Cont high intensity statin for now.  - s/p ILR placement prior to weekend given suspicion for embolic nature of CVA. No af noted on telemetry to date.    - BP slightly better controlled  - Losartan now at 100 mg po daily  - continue coreg 12.5 mg po bid.  - gradual normotension per neurology    - Appears compensated from HF POV.   - Monitor and replete electrolytes. Keep K>4.0 and Mg>2.0.    - Further cardiac workup will depend on clinical course.   - All other workup per primary team. Will followup.

## 2018-12-31 NOTE — DIETITIAN INITIAL EVALUATION ADULT. - OTHER INFO
Patient seen for routine length of stay assessment.  Unfortunately, patient unable to speak for self and no family at bedside.  As per RN, if staff goes into room, patient begins to cry because she cant communicate.  Patient refused breakfast this morning and motioned to RN to take tray away.  RN not sure how patient has been doing with eating. When RDN reviewed chart on 12/27, patient was on a regular diet and doing well. Now noted to be on dysphagia 1 and NO LIQUIDS since 12/30.  RN is not sure why this was changed.  Spoke with MD on stroke team and he did not know either.  Asked MD to have patient evaluated to return diet to regular texture if safe. No Ht and wt noted since Admit and requested by this RDN.  Patient preparing for d/c to rehab pending insurance.  Patient has diabetes but unable to assess knowledge due to inability to speak.

## 2018-12-31 NOTE — PROGRESS NOTE ADULT - SUBJECTIVE AND OBJECTIVE BOX
SUBJECTIVE: Patient was discussed yesterday with Dr. Montesinos (epilepsy attending), transferred to epilepsy service, and discussed with me this morning at 9AM by Dr. Lock.  Patient has continuous twitching of her right lower face and no twitching of the right hand this morning.  Continues to be aphasic.     MEDICATIONS  (STANDING):  acetaminophen   Tablet .. 650 milliGRAM(s) Oral every 6 hours  aspirin enteric coated 81 milliGRAM(s) Oral daily  atorvastatin 80 milliGRAM(s) Oral at bedtime  carvedilol 12.5 milliGRAM(s) Oral every 12 hours  clonazePAM Tablet 0.5 milliGRAM(s) Oral two times a day  dextrose 5%. 1000 milliLiter(s) (50 mL/Hr) IV Continuous <Continuous>  dextrose 50% Injectable 12.5 Gram(s) IV Push once  dextrose 50% Injectable 25 Gram(s) IV Push once  dextrose 50% Injectable 25 Gram(s) IV Push once  DULoxetine 90 milliGRAM(s) Oral daily  enoxaparin Injectable 40 milliGRAM(s) SubCutaneous daily  gabapentin 600 milliGRAM(s) Oral four times a day  insulin lispro (HumaLOG) corrective regimen sliding scale   SubCutaneous three times a day before meals  lacosamide 150 milliGRAM(s) Oral two times a day  levETIRAcetam  IVPB 1500 milliGRAM(s) IV Intermittent every 12 hours  losartan 100 milliGRAM(s) Oral daily  sodium chloride 0.9%. 1000 milliLiter(s) (50 mL/Hr) IV Continuous <Continuous>    MEDICATIONS  (PRN):  dextrose 40% Gel 15 Gram(s) Oral once PRN Blood Glucose LESS THAN 70 milliGRAM(s)/deciliter  glucagon  Injectable 1 milliGRAM(s) IntraMuscular once PRN Glucose LESS THAN 70 milligrams/deciliter  LORazepam   Injectable 2 milliGRAM(s) IV Push every 5 minutes PRN Sustained GTC Seizure  oxyCODONE    IR 10 milliGRAM(s) Oral Once PRN Severe Pain (7 - 10)  oxyCODONE    IR 5 milliGRAM(s) Oral Once PRN Moderate Pain (4 - 6)      PHYSICAL EXAM:   Vital Signs Last 24 Hrs  T(C): 36.6 (31 Dec 2018 09:07), Max: 36.8 (30 Dec 2018 12:05)  T(F): 97.9 (31 Dec 2018 09:07), Max: 98.2 (30 Dec 2018 12:05)  HR: 88 (31 Dec 2018 09:07) (74 - 90)  BP: 165/91 (31 Dec 2018 09:07) (145/75 - 172/85)  BP(mean): --  RR: 18 (31 Dec 2018 09:07) (18 - 18)  SpO2: 99% (31 Dec 2018 09:07) (96% - 99%)  General: No acute distress, drowsy  HEENT: EOM intact, right homonymous hemianopsia by blink to threat   Abdomen: Soft, nontender, nondistended   Extremities: No edema    NEUROLOGICAL EXAM:  Mental status: drowsy, was able to make eye contact, severe mixed aphasia; expressive>> receptive, mild to moderate dysarthria, not possible to check for orientation due to significant aphasia, not following commands.  Cranial Nerves: No facial asymmetry, right homonymous hemianopsia by blink to threat, no nystagmus, no dysarthria, tongue midline  Motor exam: right hemiparesis (RUE and RLE 3/5), LUE and LLE - 5/5   Sensation: Intact to light touch with withdrawal to noxious stimuli (purposeful)   Coordination/ Gait: Unable to examine due to patient's condition  Almost rhythmic/semi-arrhythmic twitching of the right face   LABS:    No EEG overnight.

## 2018-12-31 NOTE — SWALLOW BEDSIDE ASSESSMENT ADULT - PHARYNGEAL PHASE
Delayed pharyngeal swallow/Delayed cough post oral intake/wet breath sounds Delayed cough post oral intake/Delayed pharyngeal swallow/Delayed throat clear post oral intake/wet breath sounds

## 2018-12-31 NOTE — SWALLOW BEDSIDE ASSESSMENT ADULT - NS SPL SWALLOW CLINIC TRIAL FT
wet breath sounds and cough noted prior to pharyngeal trigger; suspect penetration/aspiration prior to swallow

## 2018-12-31 NOTE — SWALLOW BEDSIDE ASSESSMENT ADULT - COMMENTS
She was taken to [facility] where CTH was concerning for possibly evolving hemorrhage, so she was transferred to [facility]. On initial exam, patient has severe expressive aphasia and also has trouble following some simple commands but seems to understand what is going on and what she is being told to do. Her daughter is available at bedside and assists with history.  CT Head No Cont (12.25.18 @ 13:44) >IMPRESSION: Evolving hemorrhagic infarct left temporal region. No new hemorrhage.  MRI 12/26: Left temporal lobe hematoma without significant interval change in size. Small amount of subarachnoid blood. New acute left MCA distribution infarct. Prominent left temporal region cortical vein. Clinical correlation will determine the need for conventional angiography to exclude the possibility of a vascular malformation.  Per neuro: right homonymous hemianopsia by blink to threat.   12/29 Right facial twitching noted. Evaluated by Neuro: R focal motor seizures in the setting of acute ischemic stroke in L MCA infarct vs. prior L temporal lobe hemorrhage. Plan:-increase keppra from 750 BID to 1000 BID; -c/w EEG monitoring. Pt still having twitching of the left lip and arm w/ preserved awareness after Vimpat load. Currently on Keppra 1500mg BID and Vimpat 150mg BID. EEG showing evidence of a potential seizure focus in the left parieto-occipital region, as well as a mild diffuse encephalopathy. 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 Will start Klonopin 0.5mg BID for seizure control (though might sedate pt slightly) if pt converts to a full GTC (unlikely) will use Ativan 2mg (ordered) to break the episode, with further management based on the situation at that point.   Swallow hx: was tolerating regular diet prior to seizure activity; diet changed to Puree/no liquids and Bedside swallow evaluation ordered.

## 2018-12-31 NOTE — PROGRESS NOTE ADULT - ASSESSMENT
63 years old woman with vascular risk factors of age, HTN, DM II, HPLD and CAD was evaluated at [facility] for language disturbance. On 11/27 she underwent T10 laminectomy and fusion. Postoperatively, she was noted to have Wernicke's aphasia due to left temporal lobar ICH of undetermined etiology. She reported to have had significant improvement in her language deficit in the rehabilitation. In the evening of 12/24, she was reported to have worsening of her aphasia, prompting her presentation to OSH and subsequent transfer to [facility]. CT brain or admission showed expected evolution/resolution of left temporal ICH leading to development of encephalomalacia. MRI brain showed acute infarct in the left MCA distribution adjacent to the prior hemorrhage in the late subacute to chronic stage and expected evolution of prior left frontotemporal convexal subarachnoid hemorrhage as well as was concerning for a dilated cortical vein anterior to the hematoma. Of note, MRI brain (12/7) showed left temporal lobar ICH, left frontotemporal convexal SAH and juxtacortical FLAIR hyperintensities concerning for PRES to my eye. CT brain on 12/30, showed expected evolution of left MCA distribution infarct and prior left temporal ICH leading to encephalomalacia.    Patient was discussed with epilepsy attending (Dr. Montesinos) on 12/30 and Klonopin 0.5mg bid started in addition to Keppra 1500mg bid and Vimpat 150mg bid.  Patient transferred to EMU.  Patient was discussed this morning with me by Dr. Lock.     Impression:   Left MCA distribution stroke - likely etiology being cryptogenic stroke, being worked up my the vascular neurology team.  Discussed case this morning with Dr. Lock and stroke attending Dr. Domingo Raza who recommend close follow-up as outpatient with possible need for outpatient angiogram.  However, they stated that no need at this point for inpatient angiogram.    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  At this point, would continue Keppra 1500mg bid, increase Vimpat to 200mg bid and increase Klonopin to 0.5mg tid holding for sedation.  Will connect CEEG for monitoring at this point.  Will continue to follow her clinically and if continues with EPC tomorrow would consider increasing Klonopin.  However, would want to watch throughout the next day prior to further increases as Klonopin can be sedating and worsen her condition.    As per the vascular neurology team who was following this patient extensively, consider repeat MRI brain with and without contrast/MR spectroscopy in about 4-6 weeks to rule out any underlying tissue pathology, preferably upon resolution/complete resorption of prior left temporal lobar ICH; Conventional angiogram to rule out underlying vascular malformation could be considered based on results of a repeat brain imaging/vessel imaging (see above).    ANTITHROMBOTIC THERAPY: Per vascular neurology, Considering CT/MRI findings suggestive of late subacute to chronic ICH and new/acute ischemic infarct, benefits of continuing aspirin at this time would outweigh potential risks     Mg slightly low on results and will give Mg today.

## 2019-01-01 DIAGNOSIS — R13.10 DYSPHAGIA, UNSPECIFIED: ICD-10-CM

## 2019-01-01 DIAGNOSIS — R00.0 TACHYCARDIA, UNSPECIFIED: ICD-10-CM

## 2019-01-01 DIAGNOSIS — M54.9 DORSALGIA, UNSPECIFIED: ICD-10-CM

## 2019-01-01 DIAGNOSIS — I63.9 CEREBRAL INFARCTION, UNSPECIFIED: ICD-10-CM

## 2019-01-01 DIAGNOSIS — R56.9 UNSPECIFIED CONVULSIONS: ICD-10-CM

## 2019-01-01 LAB
GLUCOSE BLDC GLUCOMTR-MCNC: 100 MG/DL — HIGH (ref 70–99)
GLUCOSE BLDC GLUCOMTR-MCNC: 108 MG/DL — HIGH (ref 70–99)
GLUCOSE BLDC GLUCOMTR-MCNC: 91 MG/DL — SIGNIFICANT CHANGE UP (ref 70–99)

## 2019-01-01 PROCEDURE — 99233 SBSQ HOSP IP/OBS HIGH 50: CPT | Mod: GC

## 2019-01-01 PROCEDURE — 95951: CPT | Mod: 26

## 2019-01-01 PROCEDURE — 70450 CT HEAD/BRAIN W/O DYE: CPT | Mod: 26

## 2019-01-01 PROCEDURE — 99232 SBSQ HOSP IP/OBS MODERATE 35: CPT

## 2019-01-01 PROCEDURE — 74018 RADEX ABDOMEN 1 VIEW: CPT | Mod: 26

## 2019-01-01 RX ORDER — ACETAMINOPHEN 500 MG
1000 TABLET ORAL ONCE
Qty: 0 | Refills: 0 | Status: COMPLETED | OUTPATIENT
Start: 2019-01-01 | End: 2019-01-02

## 2019-01-01 RX ORDER — METOPROLOL TARTRATE 50 MG
5 TABLET ORAL EVERY 6 HOURS
Qty: 0 | Refills: 0 | Status: DISCONTINUED | OUTPATIENT
Start: 2019-01-01 | End: 2019-01-06

## 2019-01-01 RX ORDER — HYDROMORPHONE HYDROCHLORIDE 2 MG/ML
0.5 INJECTION INTRAMUSCULAR; INTRAVENOUS; SUBCUTANEOUS ONCE
Qty: 0 | Refills: 0 | Status: DISCONTINUED | OUTPATIENT
Start: 2019-01-01 | End: 2019-01-01

## 2019-01-01 RX ORDER — ACETAMINOPHEN 500 MG
1000 TABLET ORAL ONCE
Qty: 0 | Refills: 0 | Status: COMPLETED | OUTPATIENT
Start: 2019-01-01 | End: 2019-01-01

## 2019-01-01 RX ORDER — CARVEDILOL PHOSPHATE 80 MG/1
25 CAPSULE, EXTENDED RELEASE ORAL EVERY 12 HOURS
Qty: 0 | Refills: 0 | Status: DISCONTINUED | OUTPATIENT
Start: 2019-01-01 | End: 2019-01-22

## 2019-01-01 RX ADMIN — Medication 10 MILLIGRAM(S): at 21:45

## 2019-01-01 RX ADMIN — HYDROMORPHONE HYDROCHLORIDE 0.5 MILLIGRAM(S): 2 INJECTION INTRAMUSCULAR; INTRAVENOUS; SUBCUTANEOUS at 18:54

## 2019-01-01 RX ADMIN — Medication 2 MILLIGRAM(S): at 08:45

## 2019-01-01 RX ADMIN — Medication 300 MILLIGRAM(S): at 12:29

## 2019-01-01 RX ADMIN — HYDROMORPHONE HYDROCHLORIDE 0.5 MILLIGRAM(S): 2 INJECTION INTRAMUSCULAR; INTRAVENOUS; SUBCUTANEOUS at 06:01

## 2019-01-01 RX ADMIN — Medication 400 MILLIGRAM(S): at 01:21

## 2019-01-01 RX ADMIN — Medication 10 MILLIGRAM(S): at 08:20

## 2019-01-01 RX ADMIN — LACOSAMIDE 140 MILLIGRAM(S): 50 TABLET ORAL at 17:37

## 2019-01-01 RX ADMIN — HYDROMORPHONE HYDROCHLORIDE 0.5 MILLIGRAM(S): 2 INJECTION INTRAMUSCULAR; INTRAVENOUS; SUBCUTANEOUS at 05:31

## 2019-01-01 RX ADMIN — LACOSAMIDE 140 MILLIGRAM(S): 50 TABLET ORAL at 06:25

## 2019-01-01 RX ADMIN — Medication 1000 MILLIGRAM(S): at 15:35

## 2019-01-01 RX ADMIN — Medication 400 MILLIGRAM(S): at 15:05

## 2019-01-01 RX ADMIN — Medication 1000 MILLIGRAM(S): at 01:51

## 2019-01-01 RX ADMIN — Medication 5 MILLIGRAM(S): at 17:00

## 2019-01-01 RX ADMIN — Medication 5 MILLIGRAM(S): at 12:25

## 2019-01-01 RX ADMIN — ENOXAPARIN SODIUM 40 MILLIGRAM(S): 100 INJECTION SUBCUTANEOUS at 12:26

## 2019-01-01 RX ADMIN — LEVETIRACETAM 400 MILLIGRAM(S): 250 TABLET, FILM COATED ORAL at 06:04

## 2019-01-01 RX ADMIN — LEVETIRACETAM 400 MILLIGRAM(S): 250 TABLET, FILM COATED ORAL at 17:18

## 2019-01-01 NOTE — PROGRESS NOTE ADULT - PROBLEM SELECTOR PLAN 4
functional vs. other (i.e. complication of recent stroke)  Plan:   -convert all PO meds to IV (including cardiac meds for BP/HR control)  -Speech/Swallow re-evaluation: need to be consulted 1/2/19  -c/w IVFs meanwhile

## 2019-01-01 NOTE — CHART NOTE - NSCHARTNOTEFT_GEN_A_CORE
On 12/31/18 @ around 10:20PM, pt noted to have a seizures lasted over 4 minutes. Patient was awake and responsive, but look confused, lip twisting, tachycardic and crying. Patient was also frustrated about mittens placed to prevent her from pulling out the leads. Metoprolol IVP ordered and given for tachycardia and BP. Tylenol given for possible pain. VEEG is in progress. Will continue to monitor pt.

## 2019-01-01 NOTE — PROGRESS NOTE ADULT - ASSESSMENT
63 years old woman with vascular risk factors of age, HTN, DM II, HPLD and CAD was evaluated at [facility] for language disturbance. On 11/27 she underwent T10 laminectomy and fusion. Postoperatively, she was noted to have Wernicke's aphasia due to left temporal lobar ICH of undetermined etiology. She reported to have had significant improvement in her language deficit in the rehabilitation. In the evening of 12/24, she was reported to have worsening of her aphasia, prompting her presentation to OSH and subsequent transfer to [facility]. CT brain or admission showed expected evolution/resolution of left temporal ICH leading to development of encephalomalacia. MRI brain showed acute infarct in the left MCA distribution adjacent to the prior hemorrhage in the late subacute to chronic stage and expected evolution of prior left frontotemporal convexal subarachnoid hemorrhage as well as was concerning for a dilated cortical vein anterior to the hematoma. Of note, MRI brain (12/7) showed left temporal lobar ICH, left frontotemporal convexal SAH and juxtacortical FLAIR hyperintensities concerning for PRES to my eye. CT brain on 12/30, showed expected evolution of left MCA distribution infarct and prior left temporal ICH leading to encephalomalacia.

## 2019-01-01 NOTE — PROGRESS NOTE ADULT - SUBJECTIVE AND OBJECTIVE BOX
Neurology Follow up note    Name: DEMI ARCHIBALD    Subjective: 2 episodes overnight of continued facial twitching, tachycardia/hypertensive, crying, patient alert during events, improved w/ ativan and BP control. Further evaluation at bedside by medical staff suggestive of pain s/p laminectomy as well as anxiety contributory to patient's symptoms. Assessment complicated by patient's aphasia s/p stroke.     MEDICATIONS  (STANDING):  acetaminophen   Tablet .. 650 milliGRAM(s) Oral every 6 hours  aspirin Suppository 300 milliGRAM(s) Rectal daily  atorvastatin 80 milliGRAM(s) Oral at bedtime  carvedilol 25 milliGRAM(s) Oral every 12 hours  dextrose 5%. 1000 milliLiter(s) (50 mL/Hr) IV Continuous <Continuous>  dextrose 50% Injectable 12.5 Gram(s) IV Push once  dextrose 50% Injectable 25 Gram(s) IV Push once  dextrose 50% Injectable 25 Gram(s) IV Push once  DULoxetine 90 milliGRAM(s) Oral daily  enoxaparin Injectable 40 milliGRAM(s) SubCutaneous daily  gabapentin 600 milliGRAM(s) Oral four times a day  insulin lispro (HumaLOG) corrective regimen sliding scale   SubCutaneous three times a day before meals  lacosamide IVPB 200 milliGRAM(s) IV Intermittent every 12 hours  levETIRAcetam  IVPB 1500 milliGRAM(s) IV Intermittent every 12 hours  losartan 100 milliGRAM(s) Oral daily  metoprolol tartrate Injectable 5 milliGRAM(s) IV Push every 6 hours  sodium chloride 0.9%. 1000 milliLiter(s) (50 mL/Hr) IV Continuous <Continuous>    MEDICATIONS  (PRN):  dextrose 40% Gel 15 Gram(s) Oral once PRN Blood Glucose LESS THAN 70 milliGRAM(s)/deciliter  glucagon  Injectable 1 milliGRAM(s) IntraMuscular once PRN Glucose LESS THAN 70 milligrams/deciliter  hydrALAZINE Injectable 10 milliGRAM(s) IV Push every 6 hours PRN SBP>160  labetalol Injectable 10 milliGRAM(s) IV Push every 6 hours PRN Systolic blood pressure >160    Allergies  No Known Allergies    Objective:   Vital Signs Last 24 Hrs  T(C): 37 (01 Jan 2019 16:48), Max: 37.6 (31 Dec 2018 20:18)  T(F): 98.6 (01 Jan 2019 16:48), Max: 99.7 (31 Dec 2018 20:18)  HR: 88 (01 Jan 2019 17:57) (83 - 125)  BP: 167/93 (01 Jan 2019 17:57) (145/78 - 200/96)  RR: 22 (01 Jan 2019 16:48) (18 - 22)  SpO2: 93% (01 Jan 2019 16:48) (93% - 98%)    General: distress, moaning,   HEENT: EOM intact, right homonymous hemianopsia by blink to threat   Abdomen: Soft, nontender, nondistended   Extremities: No edema  Back: patient grimaces w/ palpation. Also reported discharge around incision site s/p recent laminectomy.     NEUROLOGICAL EXAM:  Mental status: eyes open, severe mixed aphasia; expressive>> receptive, mild to moderate dysarthria, not possible to check for orientation due to significant aphasia, not following commands.  Cranial Nerves: No facial asymmetry, right homonymous hemianopsia by blink to threat, no nystagmus, no dysarthria, tongue midline  Motor exam: right hemiparesis (RUE and RLE 3/5), LUE and LLE - 5/5   Sensation: Intact to light touch with withdrawal to noxious stimuli (purposeful)   Almost rhythmic/semi-arrhythmic twitching of the right face     12-31    144  |  106  |  8   ----------------------------<  101<H>  3.5   |  22  |  0.70    Ca    8.1<L>      31 Dec 2018 06:49    Radiology  < from: CT Head No Cont (01.01.19 @ 15:09) >  INTERPRETATION:  History: Left temporal lobe hemorrhage. Change in mental   status.    Description: A noncontrast head CT was performed.    Comparison is made to a head CT study from 12/30/2018, brain MRI study   from 12/26/2018.    An evolving left temporal lobe hematoma with surrounding edema and mass   effect is again noted, grossly stable compared to the most recent prior   CT and MRI exams. The hematoma is much better appreciated on the prior   MRI study, as the hematoma on the CT   demonstrates density lower than the brain parenchyma. The surrounding   edema is grossly stable.     A left temporal and posterior frontal-parietal region of   diffusion-weighted   signal abnormality was noted on the 12/26/2018 MRI study consistent with   infarction. DWI signal changes secondary to seizure activity can also be   considered in the differential diagnosis given the clinical history of   seizure activity. Mildsulcal effacement is noted in this region, without   evidence for new infarct or new edema occurring since the MRI study.   These findings appear stable compared to 12/30/2018 head CT.     There is no evidence for hydrocephalus or midline shift. Paranasal   sinuses   and mastoid air cells are clear. Calvarium is intact.     IMPRESSION:     No gross evidence for new infarct or new hemorrhage compared to the   12/30/2018 head CT and 12/26/2018 brain MRI.     < end of copied text >

## 2019-01-01 NOTE — PROGRESS NOTE ADULT - PROBLEM SELECTOR PLAN 5
likely 2/2 agitation as per cardiology   Plan:   -ECHO: + moderate focal atheroma in Aortic Arch  -c/w high intensity statin  -s/p ILR: No af noted on telemetry to date.  -BP control: Losartan 100mg po qd, Coreg 25mg po BID  -Monitor and replete electrolytes. Keep K>4.0 and Mg>2.0.  -K: 3.5, M.3

## 2019-01-01 NOTE — PROGRESS NOTE ADULT - PROBLEM SELECTOR PLAN 3
s/p recent laminectomy, hx of herniated disc, spondylosis, scoliosis   Plan:   -treat w/ Tylenol (IV or PO)   -re-consult Ortho (Dr. Concepcion) [phone] (closed for New Years) for post-op evaluation of back and incision site   -PT/OT if needed

## 2019-01-01 NOTE — EEG REPORT - NS EEG TEXT BOX
[facility]   COMPREHENSIVE EPILEPSY CENTER   REPORT OF CONTINUOUS VIDEO EEG     [facility]: [address] [place] [address] , [address], [place] [address], Ph#: [phone]  [facility]: [id number], [place] [address], Ph#: [phone]  Office: [address] [place] [address], [facility] [address], [place] [address] Ph#: [phone]    Patient Name: DEMI ARCHIBALD  Age and : 63y (51)  MRN #: [id number]  Location: [address]   Referring Physician: Adrian Lock    Study Date: 18    _____________________________________________________________  TECHNICAL INFORMATION    Placement and Labeling of Electrodes:  The EEG was performed utilizing 20 channels referential EEG connections (coronal over temporal over parasagittal montage) using all standard 10-20 electrode placements with EKG.  Recording was at a sampling rate of 256 samples per second per channel.  Time synchronized digital video recording was done simultaneously with EEG recording.  A low light infrared camera was used for low light recording.  Neel and seizure detection algorithms were utilized.    _____________________________________________________________  HISTORY    Patient is a 63y old  Female who presents with a chief complaint of cva (28 Dec 2018 07:19)      PERTINENT MEDICATION:  MEDICATIONS  (STANDING):  acetaminophen   Tablet .. 650 milliGRAM(s) Oral every 6 hours  aspirin enteric coated 81 milliGRAM(s) Oral daily  atorvastatin 80 milliGRAM(s) Oral at bedtime  dextrose 5%. 1000 milliLiter(s) (50 mL/Hr) IV Continuous <Continuous>  dextrose 50% Injectable 12.5 Gram(s) IV Push once  dextrose 50% Injectable 25 Gram(s) IV Push once  dextrose 50% Injectable 25 Gram(s) IV Push once  DULoxetine 90 milliGRAM(s) Oral daily  enoxaparin Injectable 40 milliGRAM(s) SubCutaneous daily  gabapentin 600 milliGRAM(s) Oral four times a day  insulin lispro (HumaLOG) corrective regimen sliding scale   SubCutaneous three times a day before meals  levETIRAcetam 750 milliGRAM(s) Oral two times a day  levETIRAcetam  IVPB 1000 milliGRAM(s) IV Intermittent once  sodium chloride 0.9%. 1000 milliLiter(s) (50 mL/Hr) IV Continuous <Continuous>    _____________________________________________________________  STUDY INTERPRETATION    Findings: The background was continuous, spontaneously variable and reactive. During wakefulness, the posterior dominant rhythm consisted of asymmetric (seen in right posterior quadrant), well-modulated 8-9 Hz activity, with amplitude to 30 uV, that attenuated to eye opening.  Low amplitude frontal beta was noted in wakefulness.    Generalized Slowing:  -Intermittent Rhythmic Slowing, Generalized (delta)    Focal Slowing:   -Continuous Slowing, Lateralized, Left hemisphere (delta and theta)  -Intermittent Rhythmic Slowing, Lateralized, Left hemisphere (delta)    Sleep Background:  Stage II sleep transients were not recorded.  Drowsiness and stage II sleep transients were not recorded.    Other Non-Epileptiform Findings:  None were present.    Interictal Epileptiform Activity:   -Lateralized Periodic Discharges (LPDs), Regional, Left Parieto-occipital, maximum O1/P7    Ictal:  None    Activation Procedures:   Hyperventilation was not performed.    Photic stimulation was not performed.     Artifacts:  Intermittent myogenic and movement artifacts were noted.    ECG:  The heart rate on single channel ECG was predominantly between 60-80 BPM.    _____________________________________________________________  EEG SUMMARY/CLASSIFICATION  Abnormal EEG in the awake states.  -Lateralized Periodic Discharges (LPDs), Regional, Left Parieto-occipital, maximum O1/P7  -Continuous Slowing, Lateralized, Left hemisphere (delta and theta)  -Intermittent Rhythmic Slowing, Lateralized, Left hemisphere (delta)  -Intermittent Rhythmic Slowing, Generalized (delta)  _____________________________________________________________  EEG IMPRESSION/CLINICAL CORRELATE  There is evidence of a potential seizure focus in the left parieto-occipital region, and a structural lesion in the left hemisphere.  There is also evidence of a mild diffuse encephalopathy.  No overt clinical seizures were seen on video.    Smooth Montesinos MD  EEG / Epilepsy Attending Physician
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
## 2019-01-01 NOTE — PROGRESS NOTE ADULT - ASSESSMENT
63F with a history of CAD s/p PCI to OM2 with a AVE in December of 2015, residual 40% LAD disease, neg stress test in 1/2018, osteoarthritis, DM, HTN, HLD who presents as a transfer from [facility] for aphasia now with temporal ICH and new left MCA infarct. She is now with expected evolution of left MCA distribution infarct and prior left temporal ICH leading to encephalomalacia. She is having focal seizures and facial twitching.     - She has a hx of a remote PCI.   - No anginal symptoms recently reported  - Cont ASA     - Has a moderate focal atheroma in Aortic Arch  - Cont high intensity statin for now.  - s/p ILR placement prior to weekend given suspicion for embolic nature of CVA. No af noted on telemetry to date.  - Now with likely ST in setting of agitation.     - BP slightly better controlled, still eelvated.   - Losartan  at 100 mg po daily  - Would increase coreg to 25 mg po bid.  - gradual normotension per neurology    - Appears compensated from HF POV.   - Monitor and replete electrolytes. Keep K>4.0 and Mg>2.0.    - Further cardiac workup will depend on clinical course.   - All other workup per primary team. Will followup.

## 2019-01-02 ENCOUNTER — CHART COPY (OUTPATIENT)
Age: 64
End: 2019-01-02

## 2019-01-02 LAB
ANION GAP SERPL CALC-SCNC: 19 MMOL/L — HIGH (ref 5–17)
BASOPHILS # BLD AUTO: 0.02 K/UL — SIGNIFICANT CHANGE UP (ref 0–0.2)
BASOPHILS NFR BLD AUTO: 0.2 % — SIGNIFICANT CHANGE UP (ref 0–2)
BUN SERPL-MCNC: <4 MG/DL — LOW (ref 7–23)
CALCIUM SERPL-MCNC: 8.7 MG/DL — SIGNIFICANT CHANGE UP (ref 8.4–10.5)
CHLORIDE SERPL-SCNC: 105 MMOL/L — SIGNIFICANT CHANGE UP (ref 96–108)
CO2 SERPL-SCNC: 21 MMOL/L — LOW (ref 22–31)
CREAT SERPL-MCNC: 0.65 MG/DL — SIGNIFICANT CHANGE UP (ref 0.5–1.3)
EOSINOPHIL # BLD AUTO: 0.2 K/UL — SIGNIFICANT CHANGE UP (ref 0–0.5)
EOSINOPHIL NFR BLD AUTO: 2.4 % — SIGNIFICANT CHANGE UP (ref 0–6)
GLUCOSE BLDC GLUCOMTR-MCNC: 112 MG/DL — HIGH (ref 70–99)
GLUCOSE BLDC GLUCOMTR-MCNC: 116 MG/DL — HIGH (ref 70–99)
GLUCOSE BLDC GLUCOMTR-MCNC: 117 MG/DL — HIGH (ref 70–99)
GLUCOSE BLDC GLUCOMTR-MCNC: 98 MG/DL — SIGNIFICANT CHANGE UP (ref 70–99)
GLUCOSE BLDC GLUCOMTR-MCNC: 99 MG/DL — SIGNIFICANT CHANGE UP (ref 70–99)
GLUCOSE SERPL-MCNC: 122 MG/DL — HIGH (ref 70–99)
HCT VFR BLD CALC: 34.1 % — LOW (ref 34.5–45)
HGB BLD-MCNC: 10.8 G/DL — LOW (ref 11.5–15.5)
IMM GRANULOCYTES NFR BLD AUTO: 0.1 % — SIGNIFICANT CHANGE UP (ref 0–1.5)
LYMPHOCYTES # BLD AUTO: 1.09 K/UL — SIGNIFICANT CHANGE UP (ref 1–3.3)
LYMPHOCYTES # BLD AUTO: 13.2 % — SIGNIFICANT CHANGE UP (ref 13–44)
MAGNESIUM SERPL-MCNC: 1.6 MG/DL — SIGNIFICANT CHANGE UP (ref 1.6–2.6)
MCHC RBC-ENTMCNC: 28 PG — SIGNIFICANT CHANGE UP (ref 27–34)
MCHC RBC-ENTMCNC: 31.7 GM/DL — LOW (ref 32–36)
MCV RBC AUTO: 88.3 FL — SIGNIFICANT CHANGE UP (ref 80–100)
MONOCYTES # BLD AUTO: 0.55 K/UL — SIGNIFICANT CHANGE UP (ref 0–0.9)
MONOCYTES NFR BLD AUTO: 6.7 % — SIGNIFICANT CHANGE UP (ref 2–14)
NEUTROPHILS # BLD AUTO: 6.39 K/UL — SIGNIFICANT CHANGE UP (ref 1.8–7.4)
NEUTROPHILS NFR BLD AUTO: 77.4 % — HIGH (ref 43–77)
PLATELET # BLD AUTO: 357 K/UL — SIGNIFICANT CHANGE UP (ref 150–400)
POTASSIUM SERPL-MCNC: 3.2 MMOL/L — LOW (ref 3.5–5.3)
POTASSIUM SERPL-SCNC: 3.2 MMOL/L — LOW (ref 3.5–5.3)
RBC # BLD: 3.86 M/UL — SIGNIFICANT CHANGE UP (ref 3.8–5.2)
RBC # FLD: 16 % — HIGH (ref 10.3–14.5)
SODIUM SERPL-SCNC: 145 MMOL/L — SIGNIFICANT CHANGE UP (ref 135–145)
WBC # BLD: 8.26 K/UL — SIGNIFICANT CHANGE UP (ref 3.8–10.5)
WBC # FLD AUTO: 8.26 K/UL — SIGNIFICANT CHANGE UP (ref 3.8–10.5)

## 2019-01-02 PROCEDURE — 72100 X-RAY EXAM L-S SPINE 2/3 VWS: CPT | Mod: 26

## 2019-01-02 PROCEDURE — 99232 SBSQ HOSP IP/OBS MODERATE 35: CPT

## 2019-01-02 PROCEDURE — 72070 X-RAY EXAM THORAC SPINE 2VWS: CPT | Mod: 26

## 2019-01-02 RX ORDER — HYDROMORPHONE HYDROCHLORIDE 2 MG/ML
0.5 INJECTION INTRAMUSCULAR; INTRAVENOUS; SUBCUTANEOUS ONCE
Qty: 0 | Refills: 0 | Status: DISCONTINUED | OUTPATIENT
Start: 2019-01-02 | End: 2019-01-02

## 2019-01-02 RX ORDER — VALPROIC ACID (AS SODIUM SALT) 250 MG/5ML
750 SOLUTION, ORAL ORAL
Qty: 0 | Refills: 0 | Status: DISCONTINUED | OUTPATIENT
Start: 2019-01-02 | End: 2019-01-03

## 2019-01-02 RX ORDER — OLANZAPINE 15 MG/1
5 TABLET, FILM COATED ORAL EVERY 12 HOURS
Qty: 0 | Refills: 0 | Status: DISCONTINUED | OUTPATIENT
Start: 2019-01-02 | End: 2019-01-15

## 2019-01-02 RX ORDER — OLANZAPINE 15 MG/1
5 TABLET, FILM COATED ORAL ONCE
Qty: 0 | Refills: 0 | Status: DISCONTINUED | OUTPATIENT
Start: 2019-01-02 | End: 2019-01-02

## 2019-01-02 RX ORDER — POTASSIUM CHLORIDE 20 MEQ
10 PACKET (EA) ORAL
Qty: 0 | Refills: 0 | Status: COMPLETED | OUTPATIENT
Start: 2019-01-02 | End: 2019-01-02

## 2019-01-02 RX ORDER — ACETAMINOPHEN 500 MG
1000 TABLET ORAL ONCE
Qty: 0 | Refills: 0 | Status: COMPLETED | OUTPATIENT
Start: 2019-01-02 | End: 2019-01-02

## 2019-01-02 RX ADMIN — Medication 400 MILLIGRAM(S): at 00:26

## 2019-01-02 RX ADMIN — Medication 10 MILLIGRAM(S): at 08:03

## 2019-01-02 RX ADMIN — HYDROMORPHONE HYDROCHLORIDE 0.5 MILLIGRAM(S): 2 INJECTION INTRAMUSCULAR; INTRAVENOUS; SUBCUTANEOUS at 05:09

## 2019-01-02 RX ADMIN — Medication 100 MILLIEQUIVALENT(S): at 14:21

## 2019-01-02 RX ADMIN — Medication 400 MILLIGRAM(S): at 11:21

## 2019-01-02 RX ADMIN — LACOSAMIDE 140 MILLIGRAM(S): 50 TABLET ORAL at 06:31

## 2019-01-02 RX ADMIN — Medication 1000 MILLIGRAM(S): at 00:56

## 2019-01-02 RX ADMIN — Medication 1000 MILLIGRAM(S): at 12:54

## 2019-01-02 RX ADMIN — HYDROMORPHONE HYDROCHLORIDE 0.5 MILLIGRAM(S): 2 INJECTION INTRAMUSCULAR; INTRAVENOUS; SUBCUTANEOUS at 04:39

## 2019-01-02 RX ADMIN — Medication 5 MILLIGRAM(S): at 17:32

## 2019-01-02 RX ADMIN — Medication 5 MILLIGRAM(S): at 10:51

## 2019-01-02 RX ADMIN — Medication 100 MILLIEQUIVALENT(S): at 20:31

## 2019-01-02 RX ADMIN — ENOXAPARIN SODIUM 40 MILLIGRAM(S): 100 INJECTION SUBCUTANEOUS at 11:30

## 2019-01-02 RX ADMIN — LACOSAMIDE 140 MILLIGRAM(S): 50 TABLET ORAL at 17:32

## 2019-01-02 RX ADMIN — LEVETIRACETAM 400 MILLIGRAM(S): 250 TABLET, FILM COATED ORAL at 17:32

## 2019-01-02 RX ADMIN — Medication 5 MILLIGRAM(S): at 06:13

## 2019-01-02 RX ADMIN — HYDROMORPHONE HYDROCHLORIDE 0.5 MILLIGRAM(S): 2 INJECTION INTRAMUSCULAR; INTRAVENOUS; SUBCUTANEOUS at 17:43

## 2019-01-02 RX ADMIN — Medication 10 MILLIGRAM(S): at 19:06

## 2019-01-02 RX ADMIN — Medication 5 MILLIGRAM(S): at 01:24

## 2019-01-02 RX ADMIN — OLANZAPINE 5 MILLIGRAM(S): 15 TABLET, FILM COATED ORAL at 22:13

## 2019-01-02 RX ADMIN — Medication 100 MILLIEQUIVALENT(S): at 15:07

## 2019-01-02 RX ADMIN — HYDROMORPHONE HYDROCHLORIDE 0.5 MILLIGRAM(S): 2 INJECTION INTRAMUSCULAR; INTRAVENOUS; SUBCUTANEOUS at 16:48

## 2019-01-02 RX ADMIN — Medication 300 MILLIGRAM(S): at 11:26

## 2019-01-02 RX ADMIN — LEVETIRACETAM 400 MILLIGRAM(S): 250 TABLET, FILM COATED ORAL at 06:13

## 2019-01-02 NOTE — PROGRESS NOTE ADULT - PROBLEM SELECTOR PLAN 3
s/p recent laminectomy, hx of herniated disc, spondylosis, scoliosis   Plan:   -treat w/ Tylenol (IV or PO)   -re-consult Ortho (Dr. Concepcion) [phone] (closed for New Years) for post-op evaluation of back and incision site   -PT/OT if needed

## 2019-01-02 NOTE — PROGRESS NOTE ADULT - SUBJECTIVE AND OBJECTIVE BOX
Neurology Follow up note    Name: DEMI ARCHIBALD    Subjective: no event recorded overnight.     MEDICATIONS  (STANDING):  acetaminophen   Tablet .. 650 milliGRAM(s) Oral every 6 hours  aspirin Suppository 300 milliGRAM(s) Rectal daily  atorvastatin 80 milliGRAM(s) Oral at bedtime  carvedilol 25 milliGRAM(s) Oral every 12 hours  dextrose 5%. 1000 milliLiter(s) (50 mL/Hr) IV Continuous <Continuous>  dextrose 50% Injectable 12.5 Gram(s) IV Push once  dextrose 50% Injectable 25 Gram(s) IV Push once  dextrose 50% Injectable 25 Gram(s) IV Push once  DULoxetine 90 milliGRAM(s) Oral daily  enoxaparin Injectable 40 milliGRAM(s) SubCutaneous daily  gabapentin 600 milliGRAM(s) Oral four times a day  insulin lispro (HumaLOG) corrective regimen sliding scale   SubCutaneous three times a day before meals  lacosamide IVPB 200 milliGRAM(s) IV Intermittent every 12 hours  levETIRAcetam  IVPB 1500 milliGRAM(s) IV Intermittent every 12 hours  losartan 100 milliGRAM(s) Oral daily  metoprolol tartrate Injectable 5 milliGRAM(s) IV Push every 6 hours  sodium chloride 0.9%. 1000 milliLiter(s) (50 mL/Hr) IV Continuous <Continuous>    MEDICATIONS  (PRN):  dextrose 40% Gel 15 Gram(s) Oral once PRN Blood Glucose LESS THAN 70 milliGRAM(s)/deciliter  glucagon  Injectable 1 milliGRAM(s) IntraMuscular once PRN Glucose LESS THAN 70 milligrams/deciliter  hydrALAZINE Injectable 10 milliGRAM(s) IV Push every 6 hours PRN SBP>160  labetalol Injectable 10 milliGRAM(s) IV Push every 6 hours PRN Systolic blood pressure >160  LORazepam   Injectable 2 milliGRAM(s) IV Push every 5 minutes PRN Sustained GTC Seizure      Allergies  No Known Allergies    Objective:   Vital Signs Last 24 Hrs  T(C): 37 (01 Jan 2019 16:48), Max: 37.6 (31 Dec 2018 20:18)  T(F): 98.6 (01 Jan 2019 16:48), Max: 99.7 (31 Dec 2018 20:18)  HR: 88 (01 Jan 2019 17:57) (83 - 125)  BP: 167/93 (01 Jan 2019 17:57) (145/78 - 200/96)  RR: 22 (01 Jan 2019 16:48) (18 - 22)  SpO2: 93% (01 Jan 2019 16:48) (93% - 98%)    General: distress, moaning,   HEENT: EOM intact, right homonymous hemianopsia by blink to threat   Abdomen: Soft, nontender, nondistended   Extremities: No edema  Back: patient grimaces w/ palpation. Also reported discharge around incision site s/p recent laminectomy.     NEUROLOGICAL EXAM:  Mental status: eyes open, severe mixed aphasia; expressive>> receptive, mild to moderate dysarthria, not possible to check for orientation due to significant aphasia, not following commands.  Cranial Nerves: No facial asymmetry, right homonymous hemianopsia by blink to threat, no nystagmus, no dysarthria, tongue midline  Motor exam: right hemiparesis (RUE and RLE 3/5), LUE and LLE - 5/5   Sensation: Intact to light touch with withdrawal to noxious stimuli (purposeful)   Almost rhythmic/semi-arrhythmic twitching of the right face     12-31    144  |  106  |  8   ----------------------------<  101<H>  3.5   |  22  |  0.70    Ca    8.1<L>      31 Dec 2018 06:49    Radiology  < from: CT Head No Cont (01.01.19 @ 15:09) >  INTERPRETATION:  History: Left temporal lobe hemorrhage. Change in mental   status.    Description: A noncontrast head CT was performed.    Comparison is made to a head CT study from 12/30/2018, brain MRI study   from 12/26/2018.    An evolving left temporal lobe hematoma with surrounding edema and mass   effect is again noted, grossly stable compared to the most recent prior   CT and MRI exams. The hematoma is much better appreciated on the prior   MRI study, as the hematoma on the CT   demonstrates density lower than the brain parenchyma. The surrounding   edema is grossly stable.     A left temporal and posterior frontal-parietal region of   diffusion-weighted   signal abnormality was noted on the 12/26/2018 MRI study consistent with   infarction. DWI signal changes secondary to seizure activity can also be   considered in the differential diagnosis given the clinical history of   seizure activity. Mildsulcal effacement is noted in this region, without   evidence for new infarct or new edema occurring since the MRI study.   These findings appear stable compared to 12/30/2018 head CT.     There is no evidence for hydrocephalus or midline shift. Paranasal   sinuses   and mastoid air cells are clear. Calvarium is intact.     IMPRESSION:     No gross evidence for new infarct or new hemorrhage compared to the   12/30/2018 head CT and 12/26/2018 brain MRI.     < end of copied text >

## 2019-01-02 NOTE — SWALLOW BEDSIDE ASSESSMENT ADULT - COMMENTS
Pt taken to [facility]; CTH concerning for evolving hemorrhage; transferred to [facility]. Severe expressive aphasia; trouble following simple commands but seems to understand what is going on and what she is being told to do. Daughter available at bedside; assists with history.  CT Head No Cont (12.25.18 @ 13:44) >IMPRESSION: Evolving hemorrhagic infarct left temporal region. No new hemorrhage. MRI 12/26: Left temporal lobe hematoma without significant interval change in size. Small amount of subarachnoid blood. New acute left MCA distribution infarct. Prominent left temporal region cortical vein. Clinical correlation will determine the need for conventional angiography to exclude the possibility of a vascular malformation. Per neuro: right homonymous hemianopsia by blink to threat. 12/29 Right facial twitching noted. Evaluated by Neuro: R focal motor seizures in the setting of acute ischemic stroke in L MCA infarct vs. prior L temporal lobe hemorrhage. Plan:-increase keppra from 750 BID to 1000 BID; -c/w EEG monitoring. Pt still having twitching of the left lip and arm w/ preserved awareness after Vimpat load. Currently on Keppra 1500mg BID and Vimpat 150mg BID. EEG showing evidence of a potential seizure focus in the left parieto-occipital region, as well as a mild diffuse encephalopathy. 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 Will start Klonopin 0.5mg BID for seizure control (though might sedate pt slightly) if pt converts to a full GTC (unlikely) will use Ativan 2mg (ordered) to break the episode, with further management based on the situation at that point.   Swallow hx: was tolerating regular diet prior to seizure activity; diet changed to Puree/no liquids and Bedside swallow evaluation ordered.  Bedside swallow evaluation completed 12/31 with recommendation for NPO, with non-oral nutrition/hydration/medications due to overt signs of laryngeal pen/asp.

## 2019-01-02 NOTE — SWALLOW BEDSIDE ASSESSMENT ADULT - CONSISTENCIES ADMINISTERED
ice chip attempted and deferred due to absent oral response to stimuli; closed mouth posture with absent orientation to food/utensil; tonic bite reflex

## 2019-01-02 NOTE — CONSULT NOTE ADULT - ASSESSMENT
A/P: 63y Female with distal wound dehiscence sp T10-pelvis PSF    med mgt for stroke  Pain control  as for partial dehiscence little erythema and likely superficial  PRS contacted will defer further mgmt to PRS who is aware of case (Dr. champagne)  no acute orthopedic surgical intervention  FU Labs/imaging t/lspx xrays  SCDs  discussed with attending who agrees with above

## 2019-01-02 NOTE — PROGRESS NOTE ADULT - ASSESSMENT
63 years old woman with vascular risk factors of age, HTN, DM II, HPLD and CAD was evaluated at [facility] for language disturbance. On 11/27 she underwent T10 laminectomy and fusion. Postoperatively, she was noted to have Wernicke's aphasia due to left temporal lobar ICH of undetermined etiology. She reported to have had significant improvement in her language deficit in the rehabilitation. In the evening of 12/24, she was reported to have worsening of her aphasia, prompting her presentation to OSH and subsequent transfer to [facility]. CT brain or admission showed expected evolution/resolution of left temporal ICH leading to development of encephalomalacia. MRI brain showed acute infarct in the left MCA distribution adjacent to the prior hemorrhage in the late subacute to chronic stage and expected evolution of prior left frontotemporal convexal subarachnoid hemorrhage as well as was concerning for a dilated cortical vein anterior to the hematoma. Of note, MRI brain (12/7) showed left temporal lobar ICH, left frontotemporal convexal SAH and juxtacortical FLAIR hyperintensities concerning for PRES to my eye. CT brain on 12/30, showed expected evolution of left MCA distribution infarct and prior left temporal ICH leading to encephalomalacia.
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
## 2019-01-02 NOTE — PROGRESS NOTE ADULT - ASSESSMENT
63F with a history of CAD s/p PCI to OM2 with a AVE in December of 2015, residual 40% LAD disease, neg stress test in 1/2018, osteoarthritis, DM, HTN, HLD who presents as a transfer from [facility] for aphasia now with temporal ICH and new left MCA infarct. She is now with expected evolution of left MCA distribution infarct and prior left temporal ICH leading to encephalomalacia. She is having focal seizures and facial twitching.       - Cont neuro rx    - She has a hx of a remote PCI.   - No anginal symptoms recently reported  - Cont ASA     - Has a moderate focal atheroma in Aortic Arch  - Cont high intensity statin for now.  - s/p ILR placement prior to weekend given suspicion for embolic nature of CVA. No af noted on telemetry to date.  - Now with likely ST in setting of agitation.     - BP slightly better controlled, still eelvated.   - Losartan  at 100 mg po daily  - Cont coreg   25 mg po bid.  - gradual normotension per neurology    - Appears compensated from HF POV.   - Monitor and replete electrolytes. Keep K>4.0 and Mg>2.0.    - Further cardiac workup will depend on clinical course.   - All other workup per primary team. Will followup.

## 2019-01-02 NOTE — PROGRESS NOTE ADULT - ATTENDING COMMENTS
Note above reviewed, and largely agree, but additional considerations as follows.     (I) DWI changes on MRI are not typical for embolic stroke. These changes largely follows cortex, and may be related to recent seizure activity or alternatively, cortical spreading depression (CSD).   -repeat MRI/A head - will need sedation to get study. If MRI does not show expected stroke evolution, would favor diagnosis of CSD.     (ii) EEG shows repetitive spiking - PLEDs - from left temporal region, no evolving seizures. However, patient now too delirious to keep EEG leads on.   -keppra dc'd (may be contributing to agitation) and Depakote started (may also help if CSD mechanism at work)    (iii) delirium - patient writing and agitated, non-verbal, not following instructions.  Etiology not clear, but inactivation of language areas very likely contributing. No clear source of pain - surgery was ~1 mo ago - so I would prefer to avoid narcotics that can worsen delirium.  -start Zyprexa 5mg IM q12    (iv) LS spine surgery wound reported possible dehiscence. Ortho input read and appreciated - dehiscence likely superficial. Will discuss topical care with resident team in AM.

## 2019-01-03 LAB
APPEARANCE UR: ABNORMAL
BILIRUB UR-MCNC: NEGATIVE — SIGNIFICANT CHANGE UP
COLOR SPEC: ABNORMAL
DIFF PNL FLD: ABNORMAL
GLUCOSE BLDC GLUCOMTR-MCNC: 106 MG/DL — HIGH (ref 70–99)
GLUCOSE BLDC GLUCOMTR-MCNC: 110 MG/DL — HIGH (ref 70–99)
GLUCOSE BLDC GLUCOMTR-MCNC: 118 MG/DL — HIGH (ref 70–99)
GLUCOSE BLDC GLUCOMTR-MCNC: 120 MG/DL — HIGH (ref 70–99)
GLUCOSE BLDC GLUCOMTR-MCNC: 152 MG/DL — HIGH (ref 70–99)
GLUCOSE UR QL: NEGATIVE MG/DL — SIGNIFICANT CHANGE UP
KETONES UR-MCNC: ABNORMAL
LEUKOCYTE ESTERASE UR-ACNC: ABNORMAL
NITRITE UR-MCNC: POSITIVE
PH UR: 5.5 — SIGNIFICANT CHANGE UP (ref 5–8)
PROT UR-MCNC: 300 MG/DL
SP GR SPEC: 1.03 — HIGH (ref 1.01–1.02)
UROBILINOGEN FLD QL: 1 MG/DL — SIGNIFICANT CHANGE UP

## 2019-01-03 PROCEDURE — 99232 SBSQ HOSP IP/OBS MODERATE 35: CPT

## 2019-01-03 RX ORDER — ACETAMINOPHEN 500 MG
1000 TABLET ORAL ONCE
Qty: 0 | Refills: 0 | Status: COMPLETED | OUTPATIENT
Start: 2019-01-03 | End: 2019-01-03

## 2019-01-03 RX ORDER — VALPROIC ACID (AS SODIUM SALT) 250 MG/5ML
750 SOLUTION, ORAL ORAL EVERY 8 HOURS
Qty: 0 | Refills: 0 | Status: DISCONTINUED | OUTPATIENT
Start: 2019-01-03 | End: 2019-01-11

## 2019-01-03 RX ORDER — SODIUM CHLORIDE 9 MG/ML
1000 INJECTION, SOLUTION INTRAVENOUS
Qty: 0 | Refills: 0 | Status: DISCONTINUED | OUTPATIENT
Start: 2019-01-03 | End: 2019-01-07

## 2019-01-03 RX ORDER — INSULIN LISPRO 100/ML
VIAL (ML) SUBCUTANEOUS EVERY 6 HOURS
Qty: 0 | Refills: 0 | Status: DISCONTINUED | OUTPATIENT
Start: 2019-01-03 | End: 2019-01-22

## 2019-01-03 RX ORDER — LABETALOL HCL 100 MG
10 TABLET ORAL ONCE
Qty: 0 | Refills: 0 | Status: DISCONTINUED | OUTPATIENT
Start: 2019-01-03 | End: 2019-01-05

## 2019-01-03 RX ORDER — SODIUM CHLORIDE 9 MG/ML
250 INJECTION, SOLUTION INTRAVENOUS
Qty: 0 | Refills: 0 | Status: DISCONTINUED | OUTPATIENT
Start: 2019-01-03 | End: 2019-01-03

## 2019-01-03 RX ADMIN — OLANZAPINE 5 MILLIGRAM(S): 15 TABLET, FILM COATED ORAL at 06:44

## 2019-01-03 RX ADMIN — LACOSAMIDE 140 MILLIGRAM(S): 50 TABLET ORAL at 17:38

## 2019-01-03 RX ADMIN — Medication 5 MILLIGRAM(S): at 00:47

## 2019-01-03 RX ADMIN — Medication 5 MILLIGRAM(S): at 12:57

## 2019-01-03 RX ADMIN — Medication 5 MILLIGRAM(S): at 06:07

## 2019-01-03 RX ADMIN — OLANZAPINE 5 MILLIGRAM(S): 15 TABLET, FILM COATED ORAL at 17:38

## 2019-01-03 RX ADMIN — Medication 10 MILLIGRAM(S): at 09:16

## 2019-01-03 RX ADMIN — LACOSAMIDE 140 MILLIGRAM(S): 50 TABLET ORAL at 05:33

## 2019-01-03 RX ADMIN — Medication 300 MILLIGRAM(S): at 12:58

## 2019-01-03 RX ADMIN — Medication 5 MILLIGRAM(S): at 17:38

## 2019-01-03 RX ADMIN — SODIUM CHLORIDE 250 MILLILITER(S): 9 INJECTION, SOLUTION INTRAVENOUS at 16:46

## 2019-01-03 RX ADMIN — ENOXAPARIN SODIUM 40 MILLIGRAM(S): 100 INJECTION SUBCUTANEOUS at 12:57

## 2019-01-03 RX ADMIN — Medication 400 MILLIGRAM(S): at 00:47

## 2019-01-03 RX ADMIN — Medication 10 MILLIGRAM(S): at 20:09

## 2019-01-03 RX ADMIN — Medication 25 MILLIGRAM(S): at 21:38

## 2019-01-03 RX ADMIN — Medication 25 MILLIGRAM(S): at 06:08

## 2019-01-03 RX ADMIN — Medication 1: at 17:51

## 2019-01-03 RX ADMIN — Medication 1000 MILLIGRAM(S): at 01:17

## 2019-01-03 NOTE — SWALLOW BEDSIDE ASSESSMENT ADULT - COMMENTS
Pt taken to [facility]; CTH concerning for evolving hemorrhage; transferred to [facility]. Severe expressive aphasia; trouble following simple commands but seems to understand what is going on. Daughter available at bedside; assists with history.CT Head No Cont (12.25.18) Evolving hemorrhagic infarct left temporal region. No new hemorrhage. MRI 12/26: Left temporal lobe hematoma without significant interval change in size. Small amount of subarachnoid blood. New acute left MCA distribution infarct. Prominent left temporal region cortical vein. Clinical correlation will determine the need for conventional angiography to exclude the possibility of a vascular malformation. Per neuro: right homonymous hemianopsia by blink to threat. 12/29 Right facial twitching noted. Evaluated by Neuro: R focal motor seizures in the setting of acute ischemic stroke in L MCA infarct vs. prior L temporal lobe hemorrhage. Plan:-increase keppra from 750 BID to 1000 BID; -c/w EEG monitoring. Pt still having twitching of the left lip and arm w/ preserved awareness after Vimpat load. Keppra and Vimpat. EEG showing evidence of a potential seizure focus in the left parieto-occipital region, +mild diffuse encephalopathy. 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 Will start Klonopin 0.5mg BID for seizure control (though might sedate pt slightly) if pt converts to a full GTC (unlikely) will use Ativan 2mg (ordered) to break the episode, with further management based on the situation at that point.   Swallow hx: was tolerating regular diet prior to seizure activity; diet changed to Puree/no liquids and Bedside swallow evaluation ordered.  Bedside swallow evaluation completed 12/31 with recommendation for NPO, with non-oral nutrition/hydration/medications due to overt signs of laryngeal pen/asp. Bedside swallow re-evaluated 1/2 with rx for NPO, with non-oral nutrition/hydration/medications.

## 2019-01-03 NOTE — SWALLOW BEDSIDE ASSESSMENT ADULT - ADDITIONAL RECOMMENDATIONS
oral care as tolerated; care should be taken putting anything into mouth due to tonic bite reflex
oral care as tolerated; caution should be taken due to tonic bite reflex
diligent oral care

## 2019-01-03 NOTE — PROGRESS NOTE ADULT - SUBJECTIVE AND OBJECTIVE BOX
Neurology Follow up note    Patient is a 63y old  Female who presents with a chief complaint of seizure management s/p stroke (02 Jan 2019 07:20)    Subjective:Interval History - No events overnight    Objective:   Vital Signs Last 24 Hrs  T(C): 37.4 (03 Jan 2019 05:09), Max: 37.9 (03 Jan 2019 00:29)  T(F): 99.4 (03 Jan 2019 05:09), Max: 100.3 (03 Jan 2019 00:29)  HR: 99 (03 Jan 2019 06:37) (94 - 132)  BP: 156/95 (03 Jan 2019 06:37) (156/95 - 204/93)  BP(mean): --  RR: 18 (03 Jan 2019 05:09) (18 - 18)  SpO2: 93% (03 Jan 2019 05:09) (93% - 100%)    General: distress, moaning,   HEENT: EOM intact, right homonymous hemianopsia by blink to threat   Abdomen: Soft, nontender, nondistended   Extremities: No edema  Back: patient grimaces w/ palpation. Also reported discharge around incision site s/p recent laminectomy.     NEUROLOGICAL EXAM:  Mental status: eyes open, severe mixed aphasia; expressive>> receptive, mild to moderate dysarthria, not possible to check for orientation due to significant aphasia, not following commands.  Cranial Nerves: No facial asymmetry, right homonymous hemianopsia by blink to threat, no nystagmus, no dysarthria, tongue midline  Motor exam: right hemiparesis (RUE and RLE 3/5), LUE and LLE - 5/5   Sensation: Intact to light touch with withdrawal to noxious stimuli (purposeful)   Almost rhythmic/semi-arrhythmic twitching of the right face   Other:  01-02    145  |  105  |  <4<L>  ----------------------------<  122<H>  3.2<L>   |  21<L>  |  0.65    Ca    8.7      02 Jan 2019 05:48  Mg     1.6     01-02               10.8   8.26  )-----------( 357      ( 02 Jan 2019 07:00 )             34.1       MEDICATIONS  (STANDING):  acetaminophen   Tablet .. 650 milliGRAM(s) Oral every 6 hours  aspirin Suppository 300 milliGRAM(s) Rectal daily  atorvastatin 80 milliGRAM(s) Oral at bedtime  carvedilol 25 milliGRAM(s) Oral every 12 hours  dextrose 5%. 1000 milliLiter(s) (50 mL/Hr) IV Continuous <Continuous>  dextrose 50% Injectable 12.5 Gram(s) IV Push once  dextrose 50% Injectable 25 Gram(s) IV Push once  dextrose 50% Injectable 25 Gram(s) IV Push once  enoxaparin Injectable 40 milliGRAM(s) SubCutaneous daily  insulin lispro (HumaLOG) corrective regimen sliding scale   SubCutaneous three times a day before meals  lacosamide IVPB 200 milliGRAM(s) IV Intermittent every 12 hours  losartan 100 milliGRAM(s) Oral daily  metoprolol tartrate Injectable 5 milliGRAM(s) IV Push every 6 hours  OLANZapine Injectable 5 milliGRAM(s) IntraMuscular every 12 hours  sodium chloride 0.9%. 1000 milliLiter(s) (50 mL/Hr) IV Continuous <Continuous>  valproate sodium IVPB 750 milliGRAM(s) IV Intermittent two times a day    MEDICATIONS  (PRN):  dextrose 40% Gel 15 Gram(s) Oral once PRN Blood Glucose LESS THAN 70 milliGRAM(s)/deciliter  glucagon  Injectable 1 milliGRAM(s) IntraMuscular once PRN Glucose LESS THAN 70 milligrams/deciliter  hydrALAZINE Injectable 10 milliGRAM(s) IV Push every 6 hours PRN SBP>160  labetalol Injectable 10 milliGRAM(s) IV Push every 6 hours PRN Systolic blood pressure >160  LORazepam   Injectable 2 milliGRAM(s) IV Push every 5 minutes PRN Sustained GTC Seizure Neurology Follow up note    Patient is a 63y old  Female who presents with a chief complaint of seizure management s/p stroke vs seizure.    Subjective:Interval History -Pt agitated and confused overnight.    General: distress, moaning,   HEENT: EOM intact, right homonymous hemianopsia by blink to threat   Abdomen: Soft, nontender, nondistended   Extremities: No edema  Back: patient grimaces w/ palpation. Also reported discharge around incision site s/p recent laminectomy.     NEUROLOGICAL EXAM:  Mental status: eyes open, not following commands, delirious.  Cranial Nerves: No facial asymmetry, right homonymous hemianopsia by blink to threat, no nystagmus, no dysarthria, tongue midline  Motor exam: right hemiparesis (RUE and RLE 3/5), LUE and LLE - 5/5   Sensation: Intact to light touch with withdrawal to noxious stimuli (purposeful)   Almost rhythmic/semi-arrhythmic twitching of the right face   Other:    MEDICATIONS  (STANDING):  acetaminophen   Tablet .. 650 milliGRAM(s) Oral every 6 hours  aspirin Suppository 300 milliGRAM(s) Rectal daily  atorvastatin 80 milliGRAM(s) Oral at bedtime  carvedilol 25 milliGRAM(s) Oral every 12 hours  dextrose 5%. 1000 milliLiter(s) (50 mL/Hr) IV Continuous <Continuous>  dextrose 50% Injectable 12.5 Gram(s) IV Push once  dextrose 50% Injectable 25 Gram(s) IV Push once  dextrose 50% Injectable 25 Gram(s) IV Push once  enoxaparin Injectable 40 milliGRAM(s) SubCutaneous daily  insulin lispro (HumaLOG) corrective regimen sliding scale   SubCutaneous three times a day before meals  lacosamide IVPB 200 milliGRAM(s) IV Intermittent every 12 hours  losartan 100 milliGRAM(s) Oral daily  metoprolol tartrate Injectable 5 milliGRAM(s) IV Push every 6 hours  OLANZapine Injectable 5 milliGRAM(s) IntraMuscular every 12 hours  sodium chloride 0.9%. 1000 milliLiter(s) (50 mL/Hr) IV Continuous <Continuous>  valproate sodium IVPB 750 milliGRAM(s) IV Intermittent two times a day    MEDICATIONS  (PRN):  dextrose 40% Gel 15 Gram(s) Oral once PRN Blood Glucose LESS THAN 70 milliGRAM(s)/deciliter  glucagon  Injectable 1 milliGRAM(s) IntraMuscular once PRN Glucose LESS THAN 70 milligrams/deciliter  hydrALAZINE Injectable 10 milliGRAM(s) IV Push every 6 hours PRN SBP>160  labetalol Injectable 10 milliGRAM(s) IV Push every 6 hours PRN Systolic blood pressure >160  LORazepam   Injectable 2 milliGRAM(s) IV Push every 5 minutes PRN Sustained GTC Seizure

## 2019-01-03 NOTE — CHART NOTE - NSCHARTNOTEFT_GEN_A_CORE
Source: Patient [ ]    Family [ ]     other [ RN.  Patient found sleeping in bed and no family at bedside.  Patient also with aphagia and unable to speak for self.  Patient seen for nutrition follow up s/p SLP evaluation for dysphagia and safety of po diet.  As per SLP, patient recommended for NPO and has been on NPO x 4 days.  Prior to that, patient had an inadequate oral intake and diet alternating between regular, dysphagia 1 with no liquids and NPO status.  As per RN, MD spoke with  regarding potential need for enteral feeds and family to discuss and come to a decision.  In view of impaired swallow and decline in function, implementation of tube feeds would likely be beneficial.   Diet: NPO  Dxd with Stroke and now with aphagia and dysphagia     Source for PO intake [ ] Patient [ ] family [x ] chart [x ] staff [ ] other     Enteral /Parenteral Nutrition: pending family decision.  If feeds initiated, suggest Glucerna 1.2 60cc/hr x 24 hrs provides 1728 kcals, 86 gm protein, 1159cc free water  meets  22 Kcal/Kg, .9 Gm/kg  Would start at 30cc/hr and advance 10cc/hr every 8 hours to goal rate of 60cc/hr x 24 hours.         Current Weight: Weight (kg): 78 (01-01 @ 16:48)  % Weight Change    Pertinent Medications: MEDICATIONS  (STANDING):  acetaminophen   Tablet .. 650 milliGRAM(s) Oral every 6 hours  aspirin Suppository 300 milliGRAM(s) Rectal daily  atorvastatin 80 milliGRAM(s) Oral at bedtime  carvedilol 25 milliGRAM(s) Oral every 12 hours  dextrose 5%. 1000 milliLiter(s) (50 mL/Hr) IV Continuous <Continuous>  dextrose 50% Injectable 12.5 Gram(s) IV Push once  dextrose 50% Injectable 25 Gram(s) IV Push once  dextrose 50% Injectable 25 Gram(s) IV Push once  enoxaparin Injectable 40 milliGRAM(s) SubCutaneous daily  insulin lispro (HumaLOG) corrective regimen sliding scale   SubCutaneous three times a day before meals  lacosamide IVPB 200 milliGRAM(s) IV Intermittent every 12 hours  losartan 100 milliGRAM(s) Oral daily  metoprolol tartrate Injectable 5 milliGRAM(s) IV Push every 6 hours  OLANZapine Injectable 5 milliGRAM(s) IntraMuscular every 12 hours  sodium chloride 0.9%. 1000 milliLiter(s) (50 mL/Hr) IV Continuous <Continuous>  valproate sodium IVPB 750 milliGRAM(s) IV Intermittent two times a day    MEDICATIONS  (PRN):  dextrose 40% Gel 15 Gram(s) Oral once PRN Blood Glucose LESS THAN 70 milliGRAM(s)/deciliter  glucagon  Injectable 1 milliGRAM(s) IntraMuscular once PRN Glucose LESS THAN 70 milligrams/deciliter  hydrALAZINE Injectable 10 milliGRAM(s) IV Push every 6 hours PRN SBP>160  labetalol Injectable 10 milliGRAM(s) IV Push every 6 hours PRN Systolic blood pressure >160  LORazepam   Injectable 2 milliGRAM(s) IV Push every 5 minutes PRN Sustained GTC Seizure    Pertinent Labs:  01-02 Na145 mmol/L Glu 122 mg/dL<H> K+ 3.2 mmol/L<L> Cr  0.65 mg/dL BUN <4 mg/dL<L> 12-28 [id number] 6.7 %<H> 12-28 Chol 128 mg/dL LDL 44 mg/dL HDL 35 mg/dL<L> Trig 243 mg/dL<H>      Skin: intact except for surgical incision     Estimated Needs:   [ ] no change since previous assessment  [x ] calculated: 4099-2426 kcals ( 20-25 kcals/kg BW)  62-78 gms/ kg BW (.8-1.0 gms/kg BW)       Previous Nutrition Diagnosis:     [ ] Inadequate Energy Intake [ ]Inadequate Oral Intake [ ] Excessive Energy Intake     [ ] Underweight [ ] Increased Nutrient Needs [ ] Overweight/Obesity     [ ] Altered GI Function [ ] Unintended Weight Loss [ ] Food & Nutrition Related Knowledge Deficit [ ] Malnutrition          Nutrition Diagnosis is [ ] ongoing  [ ] resolved [ ] not applicable          New Nutrition Diagnosis: [ ] not applicable    [ ] Inadequate Protein Energy Intake [ ]Inadequate Oral Intake [ ] Excessive Energy Intake     [ ] Underweight [ ] Increased Nutrient Needs [ ] Overweight/Obesity      [x ] Malnutrition    Mild malnutrition        Related to: swallowing difficulty and inadequate protein calorie intake       As evidenced by: decline in functional swallowing, NPO status x4 days, prolonged inadequate oral intake x >7 days.     Interventions: NPO status, Suggest initiating NGT feeds with potential PEG feeds long term.     Recommend    [ ] Change Diet To:    [ ] Nutrition Supplement    [ x] Nutrition Support: Please see enteral suggestions above.    [ ] Other:        Monitoring and Evaluation:     [ ] PO intake [ x] Tolerance to diet prescription [ ] weights [x ] follow up per protocol    [ x] other: Tolerance to po diet vs. Enteral feeds.    Carolyn Lang MA,RD,CHES,CDN,CSG  Beeper [phone]

## 2019-01-03 NOTE — CHART NOTE - NSCHARTNOTEFT_GEN_A_CORE
Upon Nutritional Assessment by the Registered Dietitian your patient was determined to meet criteria / has evidence of the following diagnosis/diagnoses:          [x ]  Mild Protein Calorie Malnutrition        [ ]  Moderate Protein Calorie Malnutrition        [ ] Severe Protein Calorie Malnutrition        [ ] Unspecified Protein Calorie Malnutrition        [ ] Underweight / BMI <19        [ ] Morbid Obesity / BMI > 40      Findings as based on:  [ x] Comprehensive nutrition assessment   [ ] Nutrition Focused Physical Exam  [ x] Other: Prolonged NPO status x 4 days and inadequate nutrition intake       Nutrition Plan/Recommendations:  NPO as per SLP recommendations.  Suggest intiate NGT feeds vs. PEG feeds.  Glucerna 1.2 60cc/hr x 24 hrs provides 1728 kcals, 86 gm protein.  Start at 30cc/hr and increase by 10cc/hr every 8 hours to goal of 60cc/hr x 24 hours.         PROVIDER Section:     By signing this assessment you are acknowledging and agree with the diagnosis/diagnoses assigned by the Registered Dietitian    Comments:
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
## 2019-01-03 NOTE — PROGRESS NOTE ADULT - ASSESSMENT
63 years old woman with vascular risk factors of age, HTN, DM II, HPLD and CAD was evaluated at [facility] for language disturbance. On 11/27 she underwent T10 laminectomy and fusion. Postoperatively, she was noted to have Wernicke's aphasia due to left temporal lobar ICH of undetermined etiology. She reported to have had significant improvement in her language deficit in the rehabilitation. In the evening of 12/24, she was reported to have worsening of her aphasia, prompting her presentation to OSH and subsequent transfer to [facility]. CT brain or admission showed expected evolution/resolution of left temporal ICH leading to development of encephalomalacia. MRI brain showed acute infarct in the left MCA distribution adjacent to the prior hemorrhage in the late subacute to chronic stage and expected evolution of prior left frontotemporal convexal subarachnoid hemorrhage as well as was concerning for a dilated cortical vein anterior to the hematoma. Of note, MRI brain (12/7) showed left temporal lobar ICH, left frontotemporal convexal SAH and juxtacortical FLAIR hyperintensities concerning for PRES to my eye. CT brain on 12/30, showed expected evolution of left MCA distribution infarct and prior left temporal ICH leading to encephalomalacia.    CVA (cerebral vascular accident).  Plan: Aphasia 2/2 Left MCA infarct of unknown etiology,   Stroke Team: inpatient workup complete, outpatient follow-up. Consider repeat MRI brain with and without contrast/MR spectroscopy in about 4-6 weeks to rule out any underlying tissue pathology, preferably upon resolution/complete resorption of prior left temporal lobar ICH; Conventional angiogram to rule out underlying vascular malformation could be considered based on results of a repeat brain imaging/vessel imaging.   [x] c/w secondary stroke prevention w/ risk factor control.     Seizure.  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   [x] repeat CTH negative  [x] c/w Keppra 1500mg BID, Vimpat 200mg BID (patient now NPO, convert to IV equivalent)   [x] d/c klonopin (not available in IV, IV valium out of stock), start IV ativan prn agitation   [x] seizure precautions.     Back pain.  Plan: s/p recent laminectomy, hx of herniated disc, spondylosis, scoliosis   [x] treat w/ Tylenol (IV or PO)   [x]re-consulted ortho, XR C/t/L spine, appreciated, no acute intervention  [] PT/OT if needed.     Dysphagia.  Plan: functional vs. other (i.e. complication of recent stroke)  [x] convert all PO meds to IV (including cardiac meds for BP/HR control)  [x] Speech/Swallow re-evaluation: unable to participate adequatly  [x] c/w IVFs meanwhile.     Tachycardia, unspecified.  Plan: likely 2/2 agitation as per cardiology   [x] ECHO: + moderate focal atheroma in Aortic Arch  [x] c/w high intensity statin  [x] s/p ILR: No af noted on telemetry to date.  [x] BP control: Coreg 25mg po BID  [x] Monitor and replete electrolytes. Keep K>4.0 and Mg>2.0. Please see attending note. Please see attending attestation.

## 2019-01-03 NOTE — PROGRESS NOTE ADULT - SUBJECTIVE AND OBJECTIVE BOX
[facility]    Bora Mcfarland, Mica, Cari, Barry, Ashvin, Noah      [phone]    CHIEF COMPLAINT: Patient is a 63y old  Female who presents with a chief complaint of seizure management s/p stroke (02 Jan 2019 07:20)      Follow Up: cad, cv, htn    Interim history: confused, unable to provide a hx, events noted    MEDICATIONS  (STANDING):  acetaminophen   Tablet .. 650 milliGRAM(s) Oral every 6 hours  aspirin Suppository 300 milliGRAM(s) Rectal daily  atorvastatin 80 milliGRAM(s) Oral at bedtime  carvedilol 25 milliGRAM(s) Oral every 12 hours  dextrose 5%. 1000 milliLiter(s) (50 mL/Hr) IV Continuous <Continuous>  dextrose 50% Injectable 12.5 Gram(s) IV Push once  dextrose 50% Injectable 25 Gram(s) IV Push once  dextrose 50% Injectable 25 Gram(s) IV Push once  enoxaparin Injectable 40 milliGRAM(s) SubCutaneous daily  insulin lispro (HumaLOG) corrective regimen sliding scale   SubCutaneous three times a day before meals  lacosamide IVPB 200 milliGRAM(s) IV Intermittent every 12 hours  losartan 100 milliGRAM(s) Oral daily  metoprolol tartrate Injectable 5 milliGRAM(s) IV Push every 6 hours  OLANZapine Injectable 5 milliGRAM(s) IntraMuscular every 12 hours  sodium chloride 0.9%. 1000 milliLiter(s) (50 mL/Hr) IV Continuous <Continuous>  valproate sodium IVPB 750 milliGRAM(s) IV Intermittent two times a day    MEDICATIONS  (PRN):  dextrose 40% Gel 15 Gram(s) Oral once PRN Blood Glucose LESS THAN 70 milliGRAM(s)/deciliter  glucagon  Injectable 1 milliGRAM(s) IntraMuscular once PRN Glucose LESS THAN 70 milligrams/deciliter  hydrALAZINE Injectable 10 milliGRAM(s) IV Push every 6 hours PRN SBP>160  labetalol Injectable 10 milliGRAM(s) IV Push every 6 hours PRN Systolic blood pressure >160  LORazepam   Injectable 2 milliGRAM(s) IV Push every 5 minutes PRN Sustained GTC Seizure      REVIEW OF SYSTEMS: unable (ms)    Vital Signs Last 24 Hrs  T(C): 37.1 (03 Jan 2019 09:15), Max: 37.9 (03 Jan 2019 00:29)  T(F): 98.8 (03 Jan 2019 09:15), Max: 100.3 (03 Jan 2019 00:29)  HR: 114 (03 Jan 2019 09:15) (94 - 132)  BP: 186/117 (03 Jan 2019 09:15) (156/95 - 198/98)  BP(mean): --  RR: 18 (03 Jan 2019 09:15) (18 - 18)  SpO2: 95% (03 Jan 2019 09:15) (93% - 100%)    I&O's Summary      Telemetry past 24h:    PHYSICAL EXAM:    Constitutional: well-nourished, well-developed, NAD   HEENT:  MMM, sclerae anicteric, conjunctivae clear, no oral cyanosis.  Pulmonary: Non-labored, breath sounds are clear bilaterally, No wheezing, rales or rhonchi  Cardiovascular: Regular, S1 and S2.  No murmur.  No rubs, gallops or clicks  Gastrointestinal: Bowel Sounds present, soft, nontender.   Lymph: No peripheral edema.   Neurological: confused  Skin: No rashes.  Psych:  confused    LABS: All Labs Reviewed:                        10.8   8.26  )-----------( 357      ( 02 Jan 2019 07:00 )             34.1     02 Jan 2019 05:48    145    |  105    |  <4     ----------------------------<  122    3.2     |  21     |  0.65     Ca    8.7        02 Jan 2019 05:48  Mg     1.6       02 Jan 2019 05:48            Blood Culture:         RADIOLOGY:    EKG:    Echo:

## 2019-01-03 NOTE — PROGRESS NOTE ADULT - ATTENDING COMMENTS
(I) DWI changes on MRI are not typical for embolic stroke. These changes largely follows cortex, and may be related to recent seizure activity or alternatively, cortical spreading depression (CSD).  MRI reviewed with neuroradiology who agreed that changes may be seizure related vs stroke.  -repeat MRI/A head - will need sedation to get study. If MRI does not show expected stroke evolution, would favor diagnosis of CSD.     (ii) EEG prior to Jan 2 showed repetitive spiking - PLEDs - from left temporal region, no evolving seizures. However, patient now too delirious to keep EEG leads on.   -keppra dc'd Jan 2, Depakote 750 TID today in addition to Vimpat 200 q12.     (iii) delirium - patient writing and agitated, non-verbal, not following instructions.  Etiology not clear, but inactivation of language areas very likely contributing. No clear source of pain - surgery was ~1 mo ago - so I would prefer to avoid narcotics that can worsen delirium.  -Zyprexa 5mg IM q12    (iv) LS spine surgery wound reported possible dehiscence. Ortho input read and appreciated - dehiscence likely superficial. Topical wound care. (I) DWI changes on MRI are not typical for embolic stroke. These changes largely follows cortex, and may be related to recent seizure activity or alternatively, cortical spreading depression (CSD).  MRI reviewed with neuroradiology who agreed that changes may be seizure related vs stroke.  -repeat MRI/A head - will need sedation to get study. If MRI does not show expected stroke evolution, would favor diagnosis of CSD.     (ii) EEG prior to Jan 2 showed repetitive spiking - PLEDs - from left temporal region, no evolving seizures. However, patient now too delirious to keep EEG leads on.   -keppra dc'd Jan 2, Depakote 750 TID today in addition to Vimpat 200 q12.     (iii) delirium - patient writing and agitated, non-verbal, not following instructions.  Etiology not clear, but inactivation of language areas very likely contributing. No clear source of pain - surgery was ~1 mo ago - so I would prefer to avoid narcotics that can worsen delirium.  -Zyprexa 5mg IM q12    (iv) LS spine surgery wound reported possible dehiscence. Ortho input read and appreciated - dehiscence likely superficial. Topical wound care.    (v) UA shows large WBC - will send cbc, lft, ch7, UA, Ur C&S (straight cath) in am.    (vi) maintenance IV fluids started - will need NGT/nutrition if not more awake and able to swallow tomorrow. (I) DWI changes on MRI are not typical for embolic stroke. These changes largely follows cortex, and may be related to recent seizure activity or alternatively, cortical spreading depression (CSD).  MRI reviewed with neuroradiology who agreed that changes may be seizure related vs stroke.  -repeat MRI/A head - will need sedation to get study. If MRI does not show expected stroke evolution, would favor diagnosis of CSD.     (ii) EEG prior to Jan 2 showed repetitive spiking - PLEDs - from left temporal region, no evolving seizures. However, patient now too delirious to keep EEG leads on.   -keppra dc'd Jan 2, Depakote 750 TID today in addition to Vimpat 200 q12.     (iii) delirium - patient writing and agitated, non-verbal, not following instructions.  Etiology not clear, but inactivation of language areas very likely contributing. No clear source of pain - surgery was ~1 mo ago - so I would prefer to avoid narcotics that can worsen delirium.  -Zyprexa 5mg IM q12    (iv) LS spine surgery wound reported possible dehiscence. Ortho input read and appreciated - dehiscence likely superficial. Topical wound care.    (v) UA shows large WBC - will send cbc, lft, ch7, UA, Ur C&S (straight cath) in am.    (vi) maintenance IV fluids started - will need NGT/nutrition if not more awake and able to swallow tomorrow.    (vii) stroke regimen as recommended by vascular neurology.

## 2019-01-03 NOTE — PROGRESS NOTE ADULT - ASSESSMENT
63F with a history of CAD s/p PCI to OM2 with a AVE in December of 2015, residual 40% LAD disease, neg stress test in 1/2018, osteoarthritis, DM, HTN, HLD who presents as a transfer from [facility] for aphasia now with temporal ICH and new left MCA infarct. She is now with expected evolution of left MCA distribution infarct and prior left temporal ICH leading to encephalomalacia. She is having focal seizures and facial twitching.       - Cont neuro rx    - She has a hx of a remote PCI.   - No anginal symptoms recently reported  - Cont ASA     - Has a moderate focal atheroma in Aortic Arch  - Cont high intensity statin for now.  - s/p ILR placement prior to weekend given suspicion for embolic nature of CVA. No af noted on telemetry to date.  - intermittent ST in setting of agitation.     - BP is poorly controlled, and would be more liberal with the use of iv meds given npo, with goal of avoiding sustained hypertension >160   - gradual normotension per neurology    - Appears compensated from HF POV.   - Monitor and replete electrolytes. Keep K>4.0 and Mg>2.0.    - Further cardiac workup will depend on clinical course.   - All other workup per primary team. Will followup.

## 2019-01-03 NOTE — CONSULT NOTE ADULT - ASSESSMENT
Plan:  - Bacitracin to lower mal with well padded dressings daily  - Q2hr weight shifting  - Optimize nutrition  - Follow up as outpatient

## 2019-01-04 LAB
ALBUMIN SERPL ELPH-MCNC: 3.2 G/DL — LOW (ref 3.3–5)
ALP SERPL-CCNC: 134 U/L — HIGH (ref 40–120)
ALT FLD-CCNC: 14 U/L — SIGNIFICANT CHANGE UP (ref 10–45)
ANION GAP SERPL CALC-SCNC: 15 MMOL/L — SIGNIFICANT CHANGE UP (ref 5–17)
APPEARANCE UR: ABNORMAL
AST SERPL-CCNC: 17 U/L — SIGNIFICANT CHANGE UP (ref 10–40)
BILIRUB DIRECT SERPL-MCNC: <0.1 MG/DL — SIGNIFICANT CHANGE UP (ref 0–0.2)
BILIRUB INDIRECT FLD-MCNC: >0.2 MG/DL — SIGNIFICANT CHANGE UP (ref 0.2–1)
BILIRUB SERPL-MCNC: 0.3 MG/DL — SIGNIFICANT CHANGE UP (ref 0.2–1.2)
BILIRUB UR-MCNC: NEGATIVE — SIGNIFICANT CHANGE UP
BUN SERPL-MCNC: <4 MG/DL — LOW (ref 7–23)
CALCIUM SERPL-MCNC: 8.5 MG/DL — SIGNIFICANT CHANGE UP (ref 8.4–10.5)
CHLORIDE SERPL-SCNC: 105 MMOL/L — SIGNIFICANT CHANGE UP (ref 96–108)
CO2 SERPL-SCNC: 23 MMOL/L — SIGNIFICANT CHANGE UP (ref 22–31)
COLOR SPEC: YELLOW — SIGNIFICANT CHANGE UP
CREAT SERPL-MCNC: 0.76 MG/DL — SIGNIFICANT CHANGE UP (ref 0.5–1.3)
DIFF PNL FLD: ABNORMAL
GLUCOSE BLDC GLUCOMTR-MCNC: 109 MG/DL — HIGH (ref 70–99)
GLUCOSE BLDC GLUCOMTR-MCNC: 111 MG/DL — HIGH (ref 70–99)
GLUCOSE BLDC GLUCOMTR-MCNC: 123 MG/DL — HIGH (ref 70–99)
GLUCOSE BLDC GLUCOMTR-MCNC: 99 MG/DL — SIGNIFICANT CHANGE UP (ref 70–99)
GLUCOSE SERPL-MCNC: 133 MG/DL — HIGH (ref 70–99)
GLUCOSE UR QL: NEGATIVE MG/DL — SIGNIFICANT CHANGE UP
HCT VFR BLD CALC: 34.2 % — LOW (ref 34.5–45)
HGB BLD-MCNC: 11 G/DL — LOW (ref 11.5–15.5)
KETONES UR-MCNC: NEGATIVE — SIGNIFICANT CHANGE UP
LEUKOCYTE ESTERASE UR-ACNC: ABNORMAL
MCHC RBC-ENTMCNC: 28.1 PG — SIGNIFICANT CHANGE UP (ref 27–34)
MCHC RBC-ENTMCNC: 32.2 GM/DL — SIGNIFICANT CHANGE UP (ref 32–36)
MCV RBC AUTO: 87.5 FL — SIGNIFICANT CHANGE UP (ref 80–100)
NITRITE UR-MCNC: NEGATIVE — SIGNIFICANT CHANGE UP
PH UR: 6 — SIGNIFICANT CHANGE UP (ref 5–8)
PLATELET # BLD AUTO: 307 K/UL — SIGNIFICANT CHANGE UP (ref 150–400)
POTASSIUM SERPL-MCNC: 3.4 MMOL/L — LOW (ref 3.5–5.3)
POTASSIUM SERPL-SCNC: 3.4 MMOL/L — LOW (ref 3.5–5.3)
PROT SERPL-MCNC: 6.1 G/DL — SIGNIFICANT CHANGE UP (ref 6–8.3)
PROT UR-MCNC: 100 MG/DL
RBC # BLD: 3.91 M/UL — SIGNIFICANT CHANGE UP (ref 3.8–5.2)
RBC # FLD: 17 % — HIGH (ref 10.3–14.5)
SODIUM SERPL-SCNC: 143 MMOL/L — SIGNIFICANT CHANGE UP (ref 135–145)
SP GR SPEC: 1.01 — SIGNIFICANT CHANGE UP (ref 1.01–1.02)
UROBILINOGEN FLD QL: NEGATIVE MG/DL — SIGNIFICANT CHANGE UP
VALPROATE SERPL-MCNC: 42 UG/ML — LOW (ref 50–100)
WBC # BLD: 9.21 K/UL — SIGNIFICANT CHANGE UP (ref 3.8–10.5)
WBC # FLD AUTO: 9.21 K/UL — SIGNIFICANT CHANGE UP (ref 3.8–10.5)

## 2019-01-04 PROCEDURE — 70547 MR ANGIOGRAPHY NECK W/O DYE: CPT | Mod: 26

## 2019-01-04 PROCEDURE — 99232 SBSQ HOSP IP/OBS MODERATE 35: CPT

## 2019-01-04 PROCEDURE — 70551 MRI BRAIN STEM W/O DYE: CPT | Mod: 26

## 2019-01-04 RX ORDER — ACETAMINOPHEN 500 MG
1000 TABLET ORAL ONCE
Qty: 0 | Refills: 0 | Status: COMPLETED | OUTPATIENT
Start: 2019-01-04 | End: 2019-01-04

## 2019-01-04 RX ORDER — CEFTRIAXONE 500 MG/1
INJECTION, POWDER, FOR SOLUTION INTRAMUSCULAR; INTRAVENOUS
Qty: 0 | Refills: 0 | Status: DISCONTINUED | OUTPATIENT
Start: 2019-01-04 | End: 2019-01-11

## 2019-01-04 RX ORDER — OLANZAPINE 15 MG/1
5 TABLET, FILM COATED ORAL EVERY 24 HOURS
Qty: 0 | Refills: 0 | Status: DISCONTINUED | OUTPATIENT
Start: 2019-01-04 | End: 2019-01-22

## 2019-01-04 RX ORDER — ONDANSETRON 8 MG/1
4 TABLET, FILM COATED ORAL ONCE
Qty: 0 | Refills: 0 | Status: DISCONTINUED | OUTPATIENT
Start: 2019-01-04 | End: 2019-01-04

## 2019-01-04 RX ORDER — METOPROLOL TARTRATE 50 MG
5 TABLET ORAL EVERY 6 HOURS
Qty: 0 | Refills: 0 | Status: DISCONTINUED | OUTPATIENT
Start: 2019-01-04 | End: 2019-01-04

## 2019-01-04 RX ORDER — NYSTATIN CREAM 100000 [USP'U]/G
1 CREAM TOPICAL EVERY 12 HOURS
Qty: 0 | Refills: 0 | Status: DISCONTINUED | OUTPATIENT
Start: 2019-01-04 | End: 2019-01-22

## 2019-01-04 RX ORDER — CEFTRIAXONE 500 MG/1
1 INJECTION, POWDER, FOR SOLUTION INTRAMUSCULAR; INTRAVENOUS ONCE
Qty: 0 | Refills: 0 | Status: COMPLETED | OUTPATIENT
Start: 2019-01-04 | End: 2019-01-04

## 2019-01-04 RX ORDER — CEFTRIAXONE 500 MG/1
1 INJECTION, POWDER, FOR SOLUTION INTRAMUSCULAR; INTRAVENOUS EVERY 24 HOURS
Qty: 0 | Refills: 0 | Status: DISCONTINUED | OUTPATIENT
Start: 2019-01-05 | End: 2019-01-11

## 2019-01-04 RX ADMIN — LACOSAMIDE 140 MILLIGRAM(S): 50 TABLET ORAL at 08:26

## 2019-01-04 RX ADMIN — ENOXAPARIN SODIUM 40 MILLIGRAM(S): 100 INJECTION SUBCUTANEOUS at 11:33

## 2019-01-04 RX ADMIN — Medication 300 MILLIGRAM(S): at 11:32

## 2019-01-04 RX ADMIN — Medication 10 MILLIGRAM(S): at 21:36

## 2019-01-04 RX ADMIN — NYSTATIN CREAM 1 APPLICATION(S): 100000 CREAM TOPICAL at 21:43

## 2019-01-04 RX ADMIN — NYSTATIN CREAM 1 APPLICATION(S): 100000 CREAM TOPICAL at 06:25

## 2019-01-04 RX ADMIN — CEFTRIAXONE 100 GRAM(S): 500 INJECTION, POWDER, FOR SOLUTION INTRAMUSCULAR; INTRAVENOUS at 05:30

## 2019-01-04 RX ADMIN — OLANZAPINE 5 MILLIGRAM(S): 15 TABLET, FILM COATED ORAL at 05:30

## 2019-01-04 RX ADMIN — Medication 10 MILLIGRAM(S): at 16:43

## 2019-01-04 RX ADMIN — Medication 25 MILLIGRAM(S): at 08:28

## 2019-01-04 RX ADMIN — Medication 5 MILLIGRAM(S): at 23:32

## 2019-01-04 RX ADMIN — Medication 400 MILLIGRAM(S): at 03:36

## 2019-01-04 RX ADMIN — Medication 1000 MILLIGRAM(S): at 04:20

## 2019-01-04 RX ADMIN — Medication 5 MILLIGRAM(S): at 17:18

## 2019-01-04 RX ADMIN — Medication 5 MILLIGRAM(S): at 11:33

## 2019-01-04 RX ADMIN — Medication 25 MILLIGRAM(S): at 14:42

## 2019-01-04 RX ADMIN — OLANZAPINE 5 MILLIGRAM(S): 15 TABLET, FILM COATED ORAL at 15:38

## 2019-01-04 RX ADMIN — LACOSAMIDE 140 MILLIGRAM(S): 50 TABLET ORAL at 22:23

## 2019-01-04 RX ADMIN — Medication 400 MILLIGRAM(S): at 16:20

## 2019-01-04 RX ADMIN — SODIUM CHLORIDE 125 MILLILITER(S): 9 INJECTION, SOLUTION INTRAVENOUS at 13:23

## 2019-01-04 RX ADMIN — OLANZAPINE 5 MILLIGRAM(S): 15 TABLET, FILM COATED ORAL at 21:40

## 2019-01-04 RX ADMIN — Medication 5 MILLIGRAM(S): at 00:36

## 2019-01-04 RX ADMIN — Medication 5 MILLIGRAM(S): at 05:29

## 2019-01-04 RX ADMIN — Medication 1000 MILLIGRAM(S): at 16:50

## 2019-01-04 RX ADMIN — Medication 25 MILLIGRAM(S): at 22:55

## 2019-01-04 NOTE — CHART NOTE - NSCHARTNOTEFT_GEN_A_CORE
MRI reviewed by me and discussed with Dr. North, resident on call for neurology. Official MRI reading pending.   MRI shows that DWI change in region of L temp hematoma is somewhat decreased compared to initial study a few days ago suggestion ictal/post-ictal rather than ischemic etiology.  On the other hand there are diffuse changes in T2 signal diffusely in WM that may be indicative of PRES.  SBP has been in range of 160-180 - initially for permissive hypertension out of concern for possible ischemia. However, with current MRI findings suggesting that mechanism may not be ischemic, plan is to bring SBP < 160 as suggested by cardiology.     Plan   1. SBP in range 140-160 initially.  2. Follow up official reading of MRI.  3. patient may need contrast study - with sedation, possibly spinal fluid examination if MS not improving at lower BP.  4. cont AEDS, depakote level ordered for AM.

## 2019-01-04 NOTE — PROGRESS NOTE ADULT - ASSESSMENT
63F with a history of CAD s/p PCI to OM2 with a AVE in December of 2015, residual 40% LAD disease, neg stress test in 1/2018, osteoarthritis, DM, HTN, HLD who presents as a transfer from [facility] for aphasia now with temporal ICH and new left MCA infarct. She is now with expected evolution of left MCA distribution infarct and prior left temporal ICH leading to encephalomalacia. She is having focal seizures and facial twitching.     - Cont neuro rx    - She has a hx of a remote PCI.   - No anginal symptoms recently reported  - Cont ASA     - Has a moderate focal atheroma in Aortic Arch  - Cont high intensity statin for now.  - s/p ILR placement prior to weekend given suspicion for embolic nature of CVA. No af noted on telemetry to date.  - intermittent Sinus tachycardiac in setting of agitation. Continue to watch  - Check an EKG today    - BP is poorly controlled, and would be more liberal with the use of iv meds given npo, with goal of avoiding sustained hypertension >160. Can add IV hydralazine for better control.   - gradual normotension per neurology    - Appears compensated from HF POV.   - Monitor and replete electrolytes. Keep K>4.0 and Mg>2.0.    - Further cardiac workup will depend on clinical course.   - All other workup per primary team. Will followup.

## 2019-01-04 NOTE — PROGRESS NOTE ADULT - ASSESSMENT
63 years old woman with vascular risk factors of age, HTN, DM II, HPLD and CAD was evaluated at [facility] for language disturbance. On 11/27 she underwent T10 laminectomy and fusion. Postoperatively, she was noted to have Wernicke's aphasia due to left temporal lobar ICH of undetermined etiology. She reported to have had significant improvement in her language deficit in the rehabilitation. In the evening of 12/24, she was reported to have worsening of her aphasia, prompting her presentation to OSH and subsequent transfer to [facility]. CT brain or admission showed expected evolution/resolution of left temporal ICH leading to development of encephalomalacia. MRI brain showed acute infarct in the left MCA distribution adjacent to the prior hemorrhage in the late subacute to chronic stage and expected evolution of prior left frontotemporal convexal subarachnoid hemorrhage as well as was concerning for a dilated cortical vein anterior to the hematoma. Of note, MRI brain (12/7) showed left temporal lobar ICH, left frontotemporal convexal SAH and juxtacortical FLAIR hyperintensities concerning for PRES to my eye. CT brain on 12/30, showed expected evolution of left MCA distribution infarct and prior left temporal ICH leading to encephalomalacia.    Impression and Plan     (I) DWI changes on MRI from cortical spreading depression vs new stroke     -repeat MRI/A head with sedation - pending     (ii) EEG shows repetitive spiking - PLEDs - from left temporal region, no evolving seizures.   - off EEGs due to agitation   -keppra dc'd (may be contributing to agitation)   - On Depakote 750 TID    - On vimpat 200 BID     (iii) UTI     - Positive ua   - started  on ceftriaxone      iv) delirium - agitated, non-verbal, not following instructions.  Etiology not clear, but inactivation of language areas very likely contributing.   No clear source of pain - surgery was ~1 mo ago   avoid narcotics   -start Zyprexa 5mg IM q12    (v) LS spine surgery wound reported possible dehiscence.    - Ortho and Plastic recommendations appreciated

## 2019-01-04 NOTE — PROGRESS NOTE ADULT - SUBJECTIVE AND OBJECTIVE BOX
[facility] - Bora Mcfarland, Mica, Cair, Barry, Noah Lock  Office Number:  [phone]    Patient resting comfortably in bed in NAD. Agitated overnight and this morning.  SR/ST on telemetry.   unable to obtain interval history    ROS: negative unless otherwise mentioned.    Telemetry:  SR/ST    MEDICATIONS  (STANDING):  acetaminophen   Tablet .. 650 milliGRAM(s) Oral every 6 hours  aspirin Suppository 300 milliGRAM(s) Rectal daily  atorvastatin 80 milliGRAM(s) Oral at bedtime  carvedilol 25 milliGRAM(s) Oral every 12 hours  cefTRIAXone   IVPB      dextrose 5% + sodium chloride 0.45%. 1000 milliLiter(s) (125 mL/Hr) IV Continuous <Continuous>  dextrose 50% Injectable 12.5 Gram(s) IV Push once  dextrose 50% Injectable 25 Gram(s) IV Push once  dextrose 50% Injectable 25 Gram(s) IV Push once  enoxaparin Injectable 40 milliGRAM(s) SubCutaneous daily  insulin lispro (HumaLOG) corrective regimen sliding scale   SubCutaneous every 6 hours  labetalol Injectable 10 milliGRAM(s) IV Push once  lacosamide IVPB 200 milliGRAM(s) IV Intermittent every 12 hours  losartan 100 milliGRAM(s) Oral daily  metoprolol tartrate Injectable 5 milliGRAM(s) IV Push every 6 hours  nystatin Ointment 1 Application(s) Topical every 12 hours  OLANZapine Injectable 5 milliGRAM(s) IntraMuscular every 12 hours  valproate sodium IVPB 750 milliGRAM(s) IV Intermittent every 8 hours    MEDICATIONS  (PRN):  dextrose 40% Gel 15 Gram(s) Oral once PRN Blood Glucose LESS THAN 70 milliGRAM(s)/deciliter  glucagon  Injectable 1 milliGRAM(s) IntraMuscular once PRN Glucose LESS THAN 70 milligrams/deciliter  hydrALAZINE Injectable 10 milliGRAM(s) IV Push every 6 hours PRN SBP>160  labetalol Injectable 10 milliGRAM(s) IV Push every 6 hours PRN Systolic blood pressure >160  LORazepam   Injectable 2 milliGRAM(s) IV Push every 5 minutes PRN Sustained GTC Seizure      Allergies    No Known Allergies    Intolerances        Vital Signs Last 24 Hrs  T(C): 36.9 (04 Jan 2019 09:49), Max: 37.1 (03 Jan 2019 16:16)  T(F): 98.4 (04 Jan 2019 09:49), Max: 98.8 (03 Jan 2019 16:16)  HR: 73 (04 Jan 2019 09:49) (73 - 118)  BP: 177/94 (04 Jan 2019 09:49) (158/82 - 200/89)  BP(mean): --  RR: 18 (04 Jan 2019 09:49) (18 - 18)  SpO2: 96% (04 Jan 2019 09:49) (95% - 100%)    I&O's Summary    03 Jan 2019 07:01  -  04 Jan 2019 07:00  --------------------------------------------------------  IN: 1275 mL / OUT: 100 mL / NET: 1175 mL        ON EXAM:    Constitutional: well-nourished, well-developed, sleeping  HEENT:  MMM, sclerae anicteric, conjunctivae clear, no oral cyanosis.  Pulmonary: Non-labored, breath sounds are clear bilaterally, No wheezing, rales or rhonchi  Cardiovascular: Regular, S1 and S2.  No murmur.  No rubs, gallops or clicks  Gastrointestinal: Bowel Sounds present, soft, nontender.   Lymph: No peripheral edema.   Neurological: unable to assess  Skin: No rashes.  Psych:  unable to assess    LABS: All Labs Reviewed:                        11.0   9.21  )-----------( 307      ( 04 Jan 2019 08:15 )             34.2                         10.8   8.26  )-----------( 357      ( 02 Jan 2019 07:00 )             34.1     04 Jan 2019 06:17    143    |  105    |  <4     ----------------------------<  133    3.4     |  23     |  0.76   02 Jan 2019 05:48    145    |  105    |  <4     ----------------------------<  122    3.2     |  21     |  0.65     Ca    8.5        04 Jan 2019 06:17  Ca    8.7        02 Jan 2019 05:48  Mg     1.6       02 Jan 2019 05:48    TPro  6.1    /  Alb  3.2    /  TBili  0.3    /  DBili  <0.1   /  AST  17     /  ALT  14     /  AlkPhos  134    04 Jan 2019 06:17          Blood Culture:

## 2019-01-04 NOTE — PROGRESS NOTE ADULT - SUBJECTIVE AND OBJECTIVE BOX
Neurology Follow up note    Patient is a 63y old  Female who presents with a chief complaint of seizure management s/p stroke vs seizure.    Subjective: Interval History -Pt agitated and confused overnight.    General: distress, moaning,   HEENT: EOM intact, right homonymous hemianopsia by blink to threat   Abdomen: Soft, nontender, nondistended   Extremities: No edema  Back: patient grimaces w/ palpation. Also reported discharge around incision site s/p recent laminectomy.     NEUROLOGICAL EXAM:  Mental status: eyes open, not following commands, delirious.  Cranial Nerves: No facial asymmetry, right homonymous hemianopsia by blink to threat, no nystagmus, no dysarthria, tongue midline  Motor exam: right hemiparesis (RUE and RLE 3/5), LUE and LLE - 5/5   Sensation: Intact to light touch with withdrawal to noxious stimuli (purposeful)   Almost rhythmic/semi-arrhythmic twitching of the right face     Other:    MEDICATIONS  (STANDING):  acetaminophen   Tablet .. 650 milliGRAM(s) Oral every 6 hours  aspirin Suppository 300 milliGRAM(s) Rectal daily  atorvastatin 80 milliGRAM(s) Oral at bedtime  carvedilol 25 milliGRAM(s) Oral every 12 hours  cefTRIAXone   IVPB      dextrose 5% + sodium chloride 0.45%. 1000 milliLiter(s) (125 mL/Hr) IV Continuous <Continuous>  dextrose 50% Injectable 12.5 Gram(s) IV Push once  dextrose 50% Injectable 25 Gram(s) IV Push once  dextrose 50% Injectable 25 Gram(s) IV Push once  enoxaparin Injectable 40 milliGRAM(s) SubCutaneous daily  insulin lispro (HumaLOG) corrective regimen sliding scale   SubCutaneous every 6 hours  labetalol Injectable 10 milliGRAM(s) IV Push once  lacosamide IVPB 200 milliGRAM(s) IV Intermittent every 12 hours  losartan 100 milliGRAM(s) Oral daily  metoprolol tartrate Injectable 5 milliGRAM(s) IV Push every 6 hours  nystatin Ointment 1 Application(s) Topical every 12 hours  OLANZapine Injectable 5 milliGRAM(s) IntraMuscular every 12 hours  valproate sodium IVPB 750 milliGRAM(s) IV Intermittent every 8 hours    MEDICATIONS  (PRN):  dextrose 40% Gel 15 Gram(s) Oral once PRN Blood Glucose LESS THAN 70 milliGRAM(s)/deciliter  glucagon  Injectable 1 milliGRAM(s) IntraMuscular once PRN Glucose LESS THAN 70 milligrams/deciliter  hydrALAZINE Injectable 10 milliGRAM(s) IV Push every 6 hours PRN SBP>160  labetalol Injectable 10 milliGRAM(s) IV Push every 6 hours PRN Systolic blood pressure >160  LORazepam   Injectable 2 milliGRAM(s) IV Push every 5 minutes PRN Sustained GTC Seizure

## 2019-01-05 DIAGNOSIS — E87.6 HYPOKALEMIA: ICD-10-CM

## 2019-01-05 DIAGNOSIS — N39.0 URINARY TRACT INFECTION, SITE NOT SPECIFIED: ICD-10-CM

## 2019-01-05 DIAGNOSIS — I10 ESSENTIAL (PRIMARY) HYPERTENSION: ICD-10-CM

## 2019-01-05 DIAGNOSIS — E11.9 TYPE 2 DIABETES MELLITUS WITHOUT COMPLICATIONS: ICD-10-CM

## 2019-01-05 LAB
ANION GAP SERPL CALC-SCNC: 16 MMOL/L — SIGNIFICANT CHANGE UP (ref 5–17)
BUN SERPL-MCNC: 6 MG/DL — LOW (ref 7–23)
CALCIUM SERPL-MCNC: 8.5 MG/DL — SIGNIFICANT CHANGE UP (ref 8.4–10.5)
CHLORIDE SERPL-SCNC: 106 MMOL/L — SIGNIFICANT CHANGE UP (ref 96–108)
CO2 SERPL-SCNC: 23 MMOL/L — SIGNIFICANT CHANGE UP (ref 22–31)
CREAT SERPL-MCNC: 0.74 MG/DL — SIGNIFICANT CHANGE UP (ref 0.5–1.3)
GLUCOSE BLDC GLUCOMTR-MCNC: 103 MG/DL — HIGH (ref 70–99)
GLUCOSE BLDC GLUCOMTR-MCNC: 113 MG/DL — HIGH (ref 70–99)
GLUCOSE BLDC GLUCOMTR-MCNC: 117 MG/DL — HIGH (ref 70–99)
GLUCOSE BLDC GLUCOMTR-MCNC: 118 MG/DL — HIGH (ref 70–99)
GLUCOSE BLDC GLUCOMTR-MCNC: 182 MG/DL — HIGH (ref 70–99)
GLUCOSE BLDC GLUCOMTR-MCNC: 93 MG/DL — SIGNIFICANT CHANGE UP (ref 70–99)
GLUCOSE SERPL-MCNC: 129 MG/DL — HIGH (ref 70–99)
POTASSIUM SERPL-MCNC: 2.9 MMOL/L — CRITICAL LOW (ref 3.5–5.3)
POTASSIUM SERPL-SCNC: 2.9 MMOL/L — CRITICAL LOW (ref 3.5–5.3)
SODIUM SERPL-SCNC: 145 MMOL/L — SIGNIFICANT CHANGE UP (ref 135–145)
VALPROATE SERPL-MCNC: 56 UG/ML — SIGNIFICANT CHANGE UP (ref 50–100)

## 2019-01-05 PROCEDURE — 95951: CPT | Mod: 26

## 2019-01-05 PROCEDURE — 99232 SBSQ HOSP IP/OBS MODERATE 35: CPT

## 2019-01-05 PROCEDURE — 99223 1ST HOSP IP/OBS HIGH 75: CPT | Mod: GC

## 2019-01-05 PROCEDURE — 95819 EEG AWAKE AND ASLEEP: CPT | Mod: 26,59

## 2019-01-05 PROCEDURE — 99233 SBSQ HOSP IP/OBS HIGH 50: CPT

## 2019-01-05 RX ORDER — ACETAMINOPHEN 500 MG
1000 TABLET ORAL ONCE
Qty: 0 | Refills: 0 | Status: COMPLETED | OUTPATIENT
Start: 2019-01-05 | End: 2019-01-05

## 2019-01-05 RX ORDER — POTASSIUM CHLORIDE 20 MEQ
10 PACKET (EA) ORAL
Qty: 0 | Refills: 0 | Status: COMPLETED | OUTPATIENT
Start: 2019-01-05 | End: 2019-01-05

## 2019-01-05 RX ORDER — LABETALOL HCL 100 MG
10 TABLET ORAL EVERY 6 HOURS
Qty: 0 | Refills: 0 | Status: DISCONTINUED | OUTPATIENT
Start: 2019-01-05 | End: 2019-01-06

## 2019-01-05 RX ORDER — HYDRALAZINE HCL 50 MG
10 TABLET ORAL EVERY 8 HOURS
Qty: 0 | Refills: 0 | Status: DISCONTINUED | OUTPATIENT
Start: 2019-01-05 | End: 2019-01-05

## 2019-01-05 RX ORDER — POTASSIUM CHLORIDE 20 MEQ
10 PACKET (EA) ORAL ONCE
Qty: 0 | Refills: 0 | Status: DISCONTINUED | OUTPATIENT
Start: 2019-01-05 | End: 2019-01-05

## 2019-01-05 RX ORDER — MORPHINE SULFATE 50 MG/1
2 CAPSULE, EXTENDED RELEASE ORAL ONCE
Qty: 0 | Refills: 0 | Status: DISCONTINUED | OUTPATIENT
Start: 2019-01-05 | End: 2019-01-05

## 2019-01-05 RX ADMIN — NYSTATIN CREAM 1 APPLICATION(S): 100000 CREAM TOPICAL at 06:14

## 2019-01-05 RX ADMIN — Medication 10 MILLIGRAM(S): at 18:32

## 2019-01-05 RX ADMIN — SODIUM CHLORIDE 125 MILLILITER(S): 9 INJECTION, SOLUTION INTRAVENOUS at 06:10

## 2019-01-05 RX ADMIN — CEFTRIAXONE 100 GRAM(S): 500 INJECTION, POWDER, FOR SOLUTION INTRAMUSCULAR; INTRAVENOUS at 06:11

## 2019-01-05 RX ADMIN — Medication 1: at 00:19

## 2019-01-05 RX ADMIN — Medication 5 MILLIGRAM(S): at 11:58

## 2019-01-05 RX ADMIN — Medication 10 MILLIGRAM(S): at 09:14

## 2019-01-05 RX ADMIN — Medication 300 MILLIGRAM(S): at 11:54

## 2019-01-05 RX ADMIN — Medication 400 MILLIGRAM(S): at 00:37

## 2019-01-05 RX ADMIN — SODIUM CHLORIDE 125 MILLILITER(S): 9 INJECTION, SOLUTION INTRAVENOUS at 11:55

## 2019-01-05 RX ADMIN — OLANZAPINE 5 MILLIGRAM(S): 15 TABLET, FILM COATED ORAL at 06:11

## 2019-01-05 RX ADMIN — Medication 400 MILLIGRAM(S): at 11:54

## 2019-01-05 RX ADMIN — Medication 1000 MILLIGRAM(S): at 01:40

## 2019-01-05 RX ADMIN — Medication 5 MILLIGRAM(S): at 06:11

## 2019-01-05 RX ADMIN — Medication 100 MILLIEQUIVALENT(S): at 21:18

## 2019-01-05 RX ADMIN — LACOSAMIDE 140 MILLIGRAM(S): 50 TABLET ORAL at 10:18

## 2019-01-05 RX ADMIN — ENOXAPARIN SODIUM 40 MILLIGRAM(S): 100 INJECTION SUBCUTANEOUS at 11:55

## 2019-01-05 RX ADMIN — Medication 25 MILLIGRAM(S): at 21:59

## 2019-01-05 RX ADMIN — Medication 1000 MILLIGRAM(S): at 12:09

## 2019-01-05 RX ADMIN — Medication 10 MILLIGRAM(S): at 22:10

## 2019-01-05 RX ADMIN — Medication 100 MILLIEQUIVALENT(S): at 17:55

## 2019-01-05 RX ADMIN — Medication 100 MILLIEQUIVALENT(S): at 14:39

## 2019-01-05 RX ADMIN — Medication 25 MILLIGRAM(S): at 13:22

## 2019-01-05 RX ADMIN — MORPHINE SULFATE 2 MILLIGRAM(S): 50 CAPSULE, EXTENDED RELEASE ORAL at 19:08

## 2019-01-05 RX ADMIN — NYSTATIN CREAM 1 APPLICATION(S): 100000 CREAM TOPICAL at 17:16

## 2019-01-05 RX ADMIN — OLANZAPINE 5 MILLIGRAM(S): 15 TABLET, FILM COATED ORAL at 17:13

## 2019-01-05 RX ADMIN — Medication 25 MILLIGRAM(S): at 06:15

## 2019-01-05 RX ADMIN — OLANZAPINE 5 MILLIGRAM(S): 15 TABLET, FILM COATED ORAL at 21:26

## 2019-01-05 RX ADMIN — Medication 5 MILLIGRAM(S): at 17:12

## 2019-01-05 NOTE — CONSULT NOTE ADULT - PROBLEM SELECTOR RECOMMENDATION 3
sinus tachycardia. Patient remaining on telemetry no episodes of afib/flutter seen.   possibly 2/2 agitation. continue to monitor. agree with ceftriaxone. Patient can complete 3 day course.   Follow up final urine culture for antibiotic sensitivities. Agree with ceftriaxone. Patient can complete 3 day course.   Follow up final urine culture for antibiotic sensitivities.

## 2019-01-05 NOTE — CONSULT NOTE ADULT - PROBLEM SELECTOR RECOMMENDATION 2
agree with ceftriaxone. Patient can complete 3 day course.   Follow up final urine culture for antibiotic sensitivities. patient persistently hypokalemic. likely 2/2 decreased PO intake.   Check Magnesium as deficiency may cause hypokalemia.  PLEASE CHECK BMP Q12 UNTIL K PERSISTENTLY WNL.   Replete K as appropriate. Patient persistently hypokalemic. likely 2/2 decreased PO intake.   Check Magnesium as deficiency may cause hypokalemia.  PLEASE CHECK BMP Q12 UNTIL K PERSISTENTLY WNL.   Replete K as warranted.

## 2019-01-05 NOTE — CONSULT NOTE ADULT - PROBLEM SELECTOR RECOMMENDATION 4
patient persistently hypokalemic. likely 2/2 decreased PO intake. continue daily repletion as needed. 2/2 stroke. Patient NPO for last few days and requires nutrition. Would do trial of NGT to see if patient tolerates  Need long term GOC discussion with family regarding possible PEG if no improvement.

## 2019-01-05 NOTE — EEG REPORT - NS EEG TEXT BOX
[facility]   COMPREHENSIVE EPILEPSY CENTER   REPORT OF CONTINUOUS VIDEO EEG     [facility]: [address] [place] [address] , [address], [place] [address], Ph#: [phone]  [facility]: [id number], [place] [address], Ph#: [phone]  Office: [address] [place] [address], [facility] [address], [place] [address] Ph#: [phone]    Patient Name: DEMI ARCHIBALD  Age and : 63y ([birth year])  MRN #: [id number]  Location: [address]   Referring Physician: Adrian Lock    Study Date: 18    _____________________________________________________________  TECHNICAL INFORMATION    Placement and Labeling of Electrodes:  The EEG was performed utilizing 20 channels referential EEG connections (coronal over temporal over parasagittal montage) using all standard 10-20 electrode placements with EKG.  Recording was at a sampling rate of 256 samples per second per channel.  Time synchronized digital video recording was done simultaneously with EEG recording.  A low light infrared camera was used for low light recording.  Neel and seizure detection algorithms were utilized.    _____________________________________________________________  HISTORY    Patient is a 63y old  Female who presents with a chief complaint of cva (28 Dec 2018 07:19)      PERTINENT MEDICATION:  MEDICATIONS  (STANDING):  acetaminophen   Tablet .. 650 milliGRAM(s) Oral every 6 hours  aspirin enteric coated 81 milliGRAM(s) Oral daily  atorvastatin 80 milliGRAM(s) Oral at bedtime  dextrose 5%. 1000 milliLiter(s) (50 mL/Hr) IV Continuous <Continuous>  dextrose 50% Injectable 12.5 Gram(s) IV Push once  dextrose 50% Injectable 25 Gram(s) IV Push once  dextrose 50% Injectable 25 Gram(s) IV Push once  DULoxetine 90 milliGRAM(s) Oral daily  enoxaparin Injectable 40 milliGRAM(s) SubCutaneous daily  gabapentin 600 milliGRAM(s) Oral four times a day  insulin lispro (HumaLOG) corrective regimen sliding scale   SubCutaneous three times a day before meals  levETIRAcetam 750 milliGRAM(s) Oral two times a day  levETIRAcetam  IVPB 1000 milliGRAM(s) IV Intermittent once  sodium chloride 0.9%. 1000 milliLiter(s) (50 mL/Hr) IV Continuous <Continuous>    _____________________________________________________________  STUDY INTERPRETATION    Findings: The background was continuous, spontaneously variable and reactive. During wakefulness, the posterior dominant rhythm consisted of asymmetric (seen in right posterior quadrant), well-modulated 8-9 Hz activity, with amplitude to 30 uV, that attenuated to eye opening.  Low amplitude frontal beta was noted in wakefulness.    Generalized Slowing:  -Intermittent Rhythmic Slowing, Generalized (delta)    Focal Slowing:   -Continuous Slowing, Lateralized, Left hemisphere (delta and theta)  -Intermittent Rhythmic Slowing, Lateralized, Left hemisphere (delta)    Sleep Background:  Stage II sleep transients were not recorded.  Drowsiness and stage II sleep transients were not recorded.    Other Non-Epileptiform Findings:  None were present.    Interictal Epileptiform Activity:   -Lateralized Periodic Discharges (LPDs), Regional, Left Parieto-occipital, maximum O1/P7    Ictal:  None    Activation Procedures:   Hyperventilation was not performed.    Photic stimulation was not performed.     Artifacts:  Intermittent myogenic and movement artifacts were noted.    ECG:  The heart rate on single channel ECG was predominantly between 60-80 BPM.    _____________________________________________________________  EEG SUMMARY/CLASSIFICATION  Abnormal EEG in the awake states.  -Lateralized Periodic Discharges (LPDs), Regional, Left Parieto-occipital, maximum O1/P7  -Continuous Slowing, Lateralized, Left hemisphere (delta and theta)  -Intermittent Rhythmic Slowing, Lateralized, Left hemisphere (delta)  -Intermittent Rhythmic Slowing, Generalized (delta)  _____________________________________________________________  EEG IMPRESSION/CLINICAL CORRELATE  There is evidence of a potential seizure focus in the left parieto-occipital region, and a structural lesion in the left hemisphere.  There is also evidence of a mild diffuse encephalopathy.  No overt clinical seizures were seen on video.    Smooth Montesinos MD  EEG / Epilepsy Attending Physician [facility]   COMPREHENSIVE EPILEPSY CENTER   REPORT OF CONTINUOUS VIDEO EEG     [facility]: [address] [place] [address] , [address], [place] [address], Ph#: [phone]  [facility]: [id number], [place] [address], Ph#: [phone]  Office: [address] [place] [address], [facility] [address], [place] [address] Ph#: [phone]    Patient Name: DEMI ARCHIBALD  Age and : 63y (51)  MRN #: [id number]  Location: [facility] [address] A1      Study Date: 2018    _____________________________________________________________  TECHNICAL INFORMATION    Placement and Labeling of Electrodes:  The EEG was performed utilizing 20 channels referential EEG connections (coronal over temporal over parasagittal montage) using all standard 10-20 electrode placements with EKG.  Recording was at a sampling rate of 256 samples per second per channel.  Time synchronized digital video recording was done simultaneously with EEG recording.  A low light infrared camera was used for low light recording.  Neel and seizure detection algorithms were utilized.    _____________________________________________________________  HISTORY    Patient is a 63y old  Female who presents with a chief complaint of cva (28 Dec 2018 07:19)      PERTINENT MEDICATION:  MEDICATIONS  (STANDING):  acetaminophen   Tablet .. 650 milliGRAM(s) Oral every 6 hours  aspirin Suppository 300 milliGRAM(s) Rectal daily  atorvastatin 80 milliGRAM(s) Oral at bedtime  carvedilol 25 milliGRAM(s) Oral every 12 hours  cefTRIAXone   IVPB      cefTRIAXone   IVPB 1 Gram(s) IV Intermittent every 24 hours  dextrose 5% + sodium chloride 0.45%. 1000 milliLiter(s) (125 mL/Hr) IV Continuous <Continuous>  dextrose 50% Injectable 12.5 Gram(s) IV Push once  dextrose 50% Injectable 25 Gram(s) IV Push once  dextrose 50% Injectable 25 Gram(s) IV Push once  enoxaparin Injectable 40 milliGRAM(s) SubCutaneous daily  insulin lispro (HumaLOG) corrective regimen sliding scale   SubCutaneous every 6 hours  labetalol Injectable 10 milliGRAM(s) IV Push once  lacosamide IVPB 200 milliGRAM(s) IV Intermittent every 12 hours  losartan 100 milliGRAM(s) Oral daily  metoprolol tartrate Injectable 5 milliGRAM(s) IV Push every 6 hours  nystatin Ointment 1 Application(s) Topical every 12 hours  OLANZapine Injectable 5 milliGRAM(s) IntraMuscular every 12 hours  valproate sodium IVPB 750 milliGRAM(s) IV Intermittent every 8 hours                          11.0   9.21  )-----------( 307      ( 2019 08:15 )             34.2           145  |  106  |  6<L>  ----------------------------<  129<H>  2.9<LL>   |  23  |  0.74    Ca    8.5      2019 12:54    TPro  6.1  /  Alb  3.2<L>  /  TBili  0.3  /  DBili  <0.1  /  AST  17  /  ALT  14  /  AlkPhos  134<H>        _____________________________________________________________  STUDY INTERPRETATION    Findings: The background was continuous, non reactive. During wakefulness, the posterior dominant rhythm was not present.    Generalized Slowing:  -Intermittent Rhythmic Slowing, Generalized (delta)    Focal Slowing:   -Continuous Slowing, Lateralized, Left hemisphere (delta and theta)  -Intermittent Rhythmic Slowing, Lateralized, Left hemisphere (delta)    Sleep Background:  Stage II sleep transients were not recorded.  Drowsiness and stage II sleep transients were not recorded.    Other Non-Epileptiform Findings:  None were present.    Interictal Epileptiform Activity:   -Lateralized Periodic Discharges (LPDs), Regional, Left Parieto-occipital, maximum O1/P7    Ictal:  None    Activation Procedures:   Hyperventilation was not performed.    Photic stimulation was not performed.     Artifacts:  Intermittent myogenic and movement artifacts were noted.    ECG:  The heart rate on single channel ECG was predominantly between 60-80 BPM.    _____  Imaging Findings  Redemonstration of restricted diffusion and hyperintense DWI signal   consistent with left MCA infarcts with more pronounced restricted   diffusion and hyperintense DWI signal in the left parietal lobe near the   vertex.    Interval new diffuse  hyperintense T2 and FLAIR signal throughout the   right cerebral hemisphere including parietal occipital lobes without   restricted diffusion and ADC T2 shine through this finding may be seen   with posterior reversible encephalopathy strongly suggest correlation   with any elevation of blood pressure or seizure activity.    ________________________________________________________  EEG SUMMARY/CLASSIFICATION  Abnormal EEG in the awake states.  -Lateralized Periodic Discharges (LPDs), Regional, Left Parieto-occipital, maximum O1/P7  -Continuous Slowing, Lateralized, Left hemisphere (delta and theta)  -Intermittent Rhythmic Slowing, Lateralized, Left hemisphere (delta)  -Intermittent Rhythmic Slowing, Generalized (delta)  _____________________________________________________________  EEG IMPRESSION/CLINICAL CORRELATE  There is evidence of a potential seizure focus in the left parieto-occipital region, and a structural lesion in the left hemisphere.  There is also evidence of a mild diffuse encephalopathy.  No overt clinical seizures were seen on video.    Carolina Irwin MD.  Clinical Neurophysiology Fell Patient Name: DEMI ARCHIBALD  Age and : 63y (51)  MRN #: [id number]  [address]    Study Date: 2018    HISTORY    Patient is a 63y old  Female who presents with a chief complaint of cva (28 Dec 2018 07:19)    MEDICATIONS  (STANDING):  acetaminophen   Tablet .. 650 milliGRAM(s) Oral every 6 hours  aspirin Suppository 300 milliGRAM(s) Rectal daily  atorvastatin 80 milliGRAM(s) Oral at bedtime  carvedilol 25 milliGRAM(s) Oral every 12 hours  cefTRIAXone   IVPB      cefTRIAXone   IVPB 1 Gram(s) IV Intermittent every 24 hours  dextrose 5% + sodium chloride 0.45%. 1000 milliLiter(s) (125 mL/Hr) IV Continuous <Continuous>  dextrose 50% Injectable 12.5 Gram(s) IV Push once  dextrose 50% Injectable 25 Gram(s) IV Push once  dextrose 50% Injectable 25 Gram(s) IV Push once  enoxaparin Injectable 40 milliGRAM(s) SubCutaneous daily  insulin lispro (HumaLOG) corrective regimen sliding scale   SubCutaneous every 6 hours  labetalol Injectable 10 milliGRAM(s) IV Push once  lacosamide IVPB 200 milliGRAM(s) IV Intermittent every 12 hours  losartan 100 milliGRAM(s) Oral daily  metoprolol tartrate Injectable 5 milliGRAM(s) IV Push every 6 hours  nystatin Ointment 1 Application(s) Topical every 12 hours  OLANZapine Injectable 5 milliGRAM(s) IntraMuscular every 12 hours  valproate sodium IVPB 750 milliGRAM(s) IV Intermittent every 8 hours      _____________________________________________________________  STUDY INTERPRETATION    Findings: The background was continuous, non reactive. During wakefulness, the posterior dominant rhythm was not present.    Generalized Slowing:  -Intermittent Rhythmic Slowing, Generalized (delta)    Focal Slowing:   -Continuous Slowing, Lateralized, Left hemisphere (delta and theta)  -Intermittent Rhythmic Slowing, Lateralized, Left hemisphere (delta)    Sleep Background:  Stage II sleep transients were not recorded.  Drowsiness and stage II sleep transients were not recorded.    Other Non-Epileptiform Findings:  None were present.    Interictal Epileptiform Activity:   -Lateralized Periodic Discharges (LPDs), Regional, Left occipital, maximum O1/P7    Ictal:  None    Activation Procedures:   Hyperventilation was not performed.    Photic stimulation was not performed.     Artifacts:  Intermittent myogenic and movement artifacts were noted.    ECG:  The heart rate on single channel ECG was predominantly between 60-80 BPM.    ________________________________________________________  EEG SUMMARY/CLASSIFICATION  Abnormal EEG in the awake states.  -Lateralized Periodic Discharges (LPDs), Regional, Left occipital, maximum O1  -Continuous Slowing, Lateralized, Left hemisphere (delta and theta)  -Intermittent Rhythmic Slowing, Lateralized, Left hemisphere (delta)  -Intermittent Rhythmic Slowing, Generalized (delta)  _____________________________________________________________  EEG IMPRESSION/CLINICAL CORRELATE  There is evidence of a potential seizure focus in the left occipital region, and a structural lesion in the left hemisphere.  There is also evidence of a moderate diffuse encephalopathy.  No overt clinical seizures were seen on video.    Carolina Irwin MD.  Clinical Neurophysiology Fellow Patient Name: DEMI ARCHIBALD  Age and : 63y (51)  MRN #: [id number]  [address]    Study Date: 2018    HISTORY    Patient is a 63y old  Female who presents with a chief complaint of cva (28 Dec 2018 07:19)    MEDICATIONS  (STANDING):  acetaminophen   Tablet .. 650 milliGRAM(s) Oral every 6 hours  aspirin Suppository 300 milliGRAM(s) Rectal daily  atorvastatin 80 milliGRAM(s) Oral at bedtime  carvedilol 25 milliGRAM(s) Oral every 12 hours  cefTRIAXone   IVPB      cefTRIAXone   IVPB 1 Gram(s) IV Intermittent every 24 hours  dextrose 5% + sodium chloride 0.45%. 1000 milliLiter(s) (125 mL/Hr) IV Continuous <Continuous>  dextrose 50% Injectable 12.5 Gram(s) IV Push once  dextrose 50% Injectable 25 Gram(s) IV Push once  dextrose 50% Injectable 25 Gram(s) IV Push once  enoxaparin Injectable 40 milliGRAM(s) SubCutaneous daily  insulin lispro (HumaLOG) corrective regimen sliding scale   SubCutaneous every 6 hours  labetalol Injectable 10 milliGRAM(s) IV Push once  lacosamide IVPB 200 milliGRAM(s) IV Intermittent every 12 hours  losartan 100 milliGRAM(s) Oral daily  metoprolol tartrate Injectable 5 milliGRAM(s) IV Push every 6 hours  nystatin Ointment 1 Application(s) Topical every 12 hours  OLANZapine Injectable 5 milliGRAM(s) IntraMuscular every 12 hours  valproate sodium IVPB 750 milliGRAM(s) IV Intermittent every 8 hours      _____________________________________________________________  STUDY INTERPRETATION    Findings: The background was continuous, reactive. During wakefulness, the posterior dominant rhythm was not present.  There was more diffuse arrhythmic Slowing, Generalized theta>delta.  Continuous Slowing was more prominent over  the Left hemisphere (delta and theta)    Sleep Background:  Drowsiness with relative attenuation.  No stage II sleep transients were recorded    Interictal Epileptiform Activity:   -Lateralized Periodic sharp wave Discharges (LPDs), Regional, Left occipital, maximum O1/P7 at 0.5-1hz    Ictal:  None    Activation Procedures:   Hyperventilation was not performed.    Photic stimulation was not performed.     Artifacts:  Intermittent myogenic and movement artifacts were noted.    ECG:  The heart rate on single channel ECG was predominantly between  BPM.    ________________________________________________________  EEG SUMMARY/CLASSIFICATION  Abnormal EEG in the awake states.  -Lateralized Periodic Discharges (LPDs), Regional, Left occipital, maximum O1  -Continuous Slowing, Lateralized, Left hemisphere (delta and theta)  -Slowing, Generalized (theta>delta)  _____________________________________________________________  EEG IMPRESSION/CLINICAL CORRELATE  Risk of seizures from the left occipital region, moderate multifocal cerebral dysfunction, persistently worse in the left hemisphere  No overt clinical seizures were seen.        Carolina Irwin MD.  Clinical Neurophysiology Fellow

## 2019-01-05 NOTE — PROGRESS NOTE ADULT - ASSESSMENT
63F with a history of CAD s/p PCI to OM2 with a AVE in December of 2015, residual 40% LAD disease, neg stress test in 1/2018, osteoarthritis, DM, HTN, HLD who presents as a transfer from [facility] for aphasia now with temporal ICH and new left MCA infarct. She is now with expected evolution of left MCA distribution infarct and prior left temporal ICH leading to encephalomalacia. She is having focal seizures and facial twitching.     - Cont neuro rx  - She has a hx of a remote PCI.   - No anginal symptoms recently reported  - Cont ASA   - Has a moderate focal atheroma in Aortic Arch  - Cont high intensity statin for now.  - s/p ILR placement prior to weekend given suspicion for embolic nature of CVA. No af noted on telemetry to date.  - intermittent Sinus tachycardiac in setting of agitation. Continue to watch  - BP is poorly controlled, and would be more liberal with the use of iv meds given npo, with goal of avoiding sustained hypertension >160. Can add IV hydralazine for better control.   - gradual normotension per neurology  - Appears compensated from HF POV.   - Monitor and replete electrolytes. Keep K>4.0 and Mg>2.0.  -  Further cardiac workup will depend on clinical course.   - All other workup per primary team. Will followup.

## 2019-01-05 NOTE — CONSULT NOTE ADULT - ASSESSMENT
63F hx OA, DM, HTN, HLD, recent L2-5 Laminectomy complicated by left temporal lobar hemorrhage post op on 12/3/18, patient clotilde presented to [facility] on 12/24 from rehab for worsening aphasia and CTH found worsening hemorrhage. She was transferred to [facility] for further care. MRI here showed acute L MCA infarct as well as L frontotemporal convexal SAH and juxtacortical FLAIR concerning for PRES syndrome. Medicine consulted for blood pressure control and UTI. 63yoF w/ PMHx significant for OA, DM, HTN, HLD, recent L2-5 Laminectomy complicated by left temporal lobar hemorrhage post op on 12/3/18, patient clotilde presented to [facility] on 12/24 from rehab for worsening aphasia and CTH found worsening hemorrhage. She was transferred to [facility] for further care. MRI here showed acute L MCA infarct as well as L frontotemporal convexal SAH and juxtacortical FLAIR concerning for PRES syndrome. Medicine consulted for blood pressure control and UTI.
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
## 2019-01-05 NOTE — PROGRESS NOTE ADULT - SUBJECTIVE AND OBJECTIVE BOX
Neurology Follow up note    Patient is a 63y old  Female who presents with a chief complaint of seizure management s/p stroke vs seizure.    Subjective: Interval History no overnight events    General: distress, moaning,   HEENT: EOM intact, right homonymous hemianopsia by blink to threat   Abdomen: Soft, nontender, nondistended   Extremities: No edema  Back: patient grimaces w/ palpation. Also reported discharge around incision site s/p recent laminectomy.     NEUROLOGICAL EXAM:  Mental status: eyes open, not following commands, delirious.  Cranial Nerves: No facial asymmetry, right homonymous hemianopsia by blink to threat, no nystagmus, no dysarthria, tongue midline  Motor exam: right hemiparesis (RUE and RLE 3/5), LUE and LLE - 5/5   Sensation: Intact to light touch with withdrawal to noxious stimuli (purposeful)   Almost rhythmic/semi-arrhythmic twitching of the right face     Other:    MEDICATIONS  (STANDING):  acetaminophen   Tablet .. 650 milliGRAM(s) Oral every 6 hours  aspirin Suppository 300 milliGRAM(s) Rectal daily  atorvastatin 80 milliGRAM(s) Oral at bedtime  carvedilol 25 milliGRAM(s) Oral every 12 hours  cefTRIAXone   IVPB      dextrose 5% + sodium chloride 0.45%. 1000 milliLiter(s) (125 mL/Hr) IV Continuous <Continuous>  dextrose 50% Injectable 12.5 Gram(s) IV Push once  dextrose 50% Injectable 25 Gram(s) IV Push once  dextrose 50% Injectable 25 Gram(s) IV Push once  enoxaparin Injectable 40 milliGRAM(s) SubCutaneous daily  insulin lispro (HumaLOG) corrective regimen sliding scale   SubCutaneous every 6 hours  labetalol Injectable 10 milliGRAM(s) IV Push once  lacosamide IVPB 200 milliGRAM(s) IV Intermittent every 12 hours  losartan 100 milliGRAM(s) Oral daily  metoprolol tartrate Injectable 5 milliGRAM(s) IV Push every 6 hours  nystatin Ointment 1 Application(s) Topical every 12 hours  OLANZapine Injectable 5 milliGRAM(s) IntraMuscular every 12 hours  valproate sodium IVPB 750 milliGRAM(s) IV Intermittent every 8 hours    MEDICATIONS  (PRN):  dextrose 40% Gel 15 Gram(s) Oral once PRN Blood Glucose LESS THAN 70 milliGRAM(s)/deciliter  glucagon  Injectable 1 milliGRAM(s) IntraMuscular once PRN Glucose LESS THAN 70 milligrams/deciliter  hydrALAZINE Injectable 10 milliGRAM(s) IV Push every 6 hours PRN SBP>160  labetalol Injectable 10 milliGRAM(s) IV Push every 6 hours PRN Systolic blood pressure >160  LORazepam   Injectable 2 milliGRAM(s) IV Push every 5 minutes PRN Sustained GTC Seizure Neurology Follow up note    Patient is a 63y old  Female who presents with a chief complaint of seizure management s/p concern for stroke and seizures.    Subjective: Interval History no overnight events, had MRI done, less agitated this AM    General: distress, moaning,   HEENT: EOM intact, right homonymous hemianopsia by blink to threat   Abdomen: Soft, nontender, nondistended   Extremities: No edema  Back: patient grimaces w/ palpation. Also reported discharge around incision site s/p recent laminectomy.     NEUROLOGICAL EXAM:  Mental status: eyes open, not following commands, delirious.  Cranial Nerves: No facial asymmetry, right homonymous hemianopsia by blink to threat, no nystagmus, no dysarthria, tongue midline  Motor exam: right hemiparesis slightly improved (RUE moving spontaneously and RLE 4/5), LUE and LLE - 5/5   Sensation: Intact to light touch with withdrawal to noxious stimuli (purposeful)   Almost rhythmic/semi-arrhythmic twitching of the right face     Other:    MEDICATIONS  (STANDING):  acetaminophen   Tablet .. 650 milliGRAM(s) Oral every 6 hours  aspirin Suppository 300 milliGRAM(s) Rectal daily  atorvastatin 80 milliGRAM(s) Oral at bedtime  carvedilol 25 milliGRAM(s) Oral every 12 hours  cefTRIAXone   IVPB      enoxaparin Injectable 40 milliGRAM(s) SubCutaneous daily  insulin lispro (HumaLOG) corrective regimen sliding scale   SubCutaneous every 6 hours  labetalol Injectable 10 milliGRAM(s) IV Push once  lacosamide IVPB 200 milliGRAM(s) IV Intermittent every 12 hours  losartan 100 milliGRAM(s) Oral daily  metoprolol tartrate Injectable 5 milliGRAM(s) IV Push every 6 hours  nystatin Ointment 1 Application(s) Topical every 12 hours  OLANZapine Injectable 5 milliGRAM(s) IntraMuscular every 12 hours  valproate sodium IVPB 750 milliGRAM(s) IV Intermittent every 8 hours    MEDICATIONS  (PRN):  dextrose 40% Gel 15 Gram(s) Oral once PRN Blood Glucose LESS THAN 70 milliGRAM(s)/deciliter  glucagon  Injectable 1 milliGRAM(s) IntraMuscular once PRN Glucose LESS THAN 70 milligrams/deciliter  hydrALAZINE Injectable 10 milliGRAM(s) IV Push every 6 hours PRN SBP>160  labetalol Injectable 10 milliGRAM(s) IV Push every 6 hours PRN Systolic blood pressure >160  LORazepam   Injectable 2 milliGRAM(s) IV Push every 5 minutes PRN Sustained GTC Seizure Neurology Follow up note    Patient is a 63y old  Female who presents with a chief complaint of seizure management s/p concern for stroke and seizures.    Subjective: Interval History no overnight events, had MRI done, less agitated this AM    General: distress, moaning,   HEENT: EOM intact, right homonymous hemianopsia by blink to threat   Abdomen: Soft, nontender, nondistended   Extremities: No edema  Back: patient grimaces w/ palpation. recent laminectomy.     Vital Signs Last 24 Hrs:  T(C): 36.7 (01-05-19 @ 13:07), Max: 37.4 (01-05-19 @ 09:54)  T(F): 98.1 (01-05-19 @ 13:07), Max: 99.3 (01-05-19 @ 09:54)  HR: 112 (01-05-19 @ 13:04) (76 - 126)  BP: 166/76 (01-05-19 @ 13:04) (132/79 - 192/100)  RR: 18 (01-05-19 @ 09:00) (18 - 18)  SpO2: 97% (01-05-19 @ 09:00) (93% - 98%)  NEUROLOGICAL EXAM:  Mental status: eyes open, not following commands, delirious.  Cranial Nerves: No facial asymmetry, right homonymous hemianopsia by blink to threat, no nystagmus, no dysarthria, tongue midline  Motor exam: right hemiparesis slightly improved (RUE moving spontaneously and RLE 4/5), LUE and LLE - 5/5   Sensation: Intact to light touch with withdrawal to noxious stimuli (purposeful)   Almost rhythmic/semi-arrhythmic twitching of the right face                           11.0<L>  9.21 )-----------( 307     ( 01-04-19 @ 08:15 )             34.2<L>    145  |  106  |  6<L>  ----------------------------<  129<H>    01-05-19 @ 12:54  2.9<LL>   |  23  |  0.74    Ca    8.5      01-05-19 @ 12:54    TPro  6.1  /  Alb  3.2<L>  /  TBili  0.3  /  DBili  <0.1  /  AST  17  /  ALT  14  /  AlkPhos  134<H>  01-04-19 @ 06:17      MEDICATIONS  (STANDING):  acetaminophen   Tablet .. 650 milliGRAM(s) Oral every 6 hours  aspirin Suppository 300 milliGRAM(s) Rectal daily  atorvastatin     < from: MR Head No Cont (01.04.19 @ 21:20) >  Redemonstration of an evolving left temporal 4.3 x 2.3 x 1.6 cm, AP, TR, CC intraparenchymal hemorrhage.  Redemonstration of restricted diffusion and hyperintense DWI signal consistent with left MCA infarcts with more pronounced restricted   diffusion and hyperintense DWI signal in the left parietal lobe near the vertex.  Interval new diffuse  hyperintense T2 and FLAIR signal throughout the right cerebral hemisphere including parietal occipital lobes without   restricted diffusion and ADC T2 shine through this finding may be seen with posterior reversible encephalopathy strongly suggest correlation   with any elevation of blood pressure or seizure activity.  Intracranial vessels remain patent in the proximal A1 and M1 segments with stenoses ofdistal branches. Fusiform narrowing of portions of the   posterior circulation, vasospasm not excluded.     80 milliGRAM(s) Oral at bedtime  carvedilol 25 milliGRAM(s) Oral every 12 hours  cefTRIAXone   IVPB      enoxaparin Injectable 40 milliGRAM(s) SubCutaneous daily  insulin lispro (HumaLOG) corrective regimen sliding scale   SubCutaneous every 6 hours  labetalol Injectable 10 milliGRAM(s) IV Push once  lacosamide IVPB 200 milliGRAM(s) IV Intermittent every 12 hours  losartan 100 milliGRAM(s) Oral daily  metoprolol tartrate Injectable 5 milliGRAM(s) IV Push every 6 hours  nystatin Ointment 1 Application(s) Topical every 12 hours  OLANZapine Injectable 5 milliGRAM(s) IntraMuscular every 12 hours  valproate sodium IVPB 750 milliGRAM(s) IV Intermittent every 8 hours    MEDICATIONS  (PRN):  dextrose 40% Gel 15 Gram(s) Oral once PRN Blood Glucose LESS THAN 70 milliGRAM(s)/deciliter  glucagon  Injectable 1 milliGRAM(s) IntraMuscular once PRN Glucose LESS THAN 70 milligrams/deciliter  hydrALAZINE Injectable 10 milliGRAM(s) IV Push every 6 hours PRN SBP>160  labetalol Injectable 10 milliGRAM(s) IV Push every 6 hours PRN Systolic blood pressure >160  LORazepam   Injectable 2 milliGRAM(s) IV Push every 5 minutes PRN Sustained GTC Seizure

## 2019-01-05 NOTE — CONSULT NOTE ADULT - PROBLEM SELECTOR RECOMMENDATION 5
appears well controlled. c/w ISS Q6 sinus tachycardia. Patient remaining on telemetry no episodes of afib/flutter seen.   possibly 2/2 agitation. continue to monitor. Sinus tachycardia. Patient remaining on telemetry no episodes of afib/flutter seen.   possibly 2/2 agitation. Continue to monitor.

## 2019-01-05 NOTE — CONSULT NOTE ADULT - PROBLEM SELECTOR RECOMMENDATION 6
continue care as per neuro appears well controlled. c/w ISS Q6 Appears well controlled. Continue ISS Q6H and serial FS monitoring

## 2019-01-05 NOTE — PROGRESS NOTE ADULT - ASSESSMENT
63 years old woman with vascular risk factors of age, HTN, DM II, HPLD and CAD was evaluated at [facility] for language disturbance. On 11/27 she underwent T10 laminectomy and fusion. Postoperatively, she was noted to have Wernicke's aphasia due to left temporal lobar ICH of undetermined etiology. She reported to have had significant improvement in her language deficit in the rehabilitation. In the evening of 12/24, she was reported to have worsening of her aphasia, prompting her presentation to OSH and subsequent transfer to [facility]. CT brain or admission showed expected evolution/resolution of left temporal ICH leading to development of encephalomalacia. MRI brain showed acute infarct in the left MCA distribution adjacent to the prior hemorrhage in the late subacute to chronic stage and expected evolution of prior left frontotemporal convexal subarachnoid hemorrhage as well as was concerning for a dilated cortical vein anterior to the hematoma. Of note, MRI brain (12/7) showed left temporal lobar ICH, left frontotemporal convexal SAH and juxtacortical FLAIR hyperintensities concerning for PRES to my eye. CT brain on 12/30, showed expected evolution of left MCA distribution infarct and prior left temporal ICH leading to encephalomalacia.    Impression and Plan     (I) DWI changes on MRI from cortical spreading depression vs new stroke     -repeat MRI/A head with sedation - pending     (ii) EEG shows repetitive spiking - PLEDs - from left temporal region, no evolving seizures.   - off EEGs due to agitation   -keppra dc'd (may be contributing to agitation)   - On Depakote 750 TID    - On vimpat 200 BID     (iii) UTI     - Positive ua   - started  on ceftriaxone      iv) delirium - agitated, non-verbal, not following instructions.  Etiology not clear, but inactivation of language areas very likely contributing.   No clear source of pain - surgery was ~1 mo ago   avoid narcotics   -start Zyprexa 5mg IM q12    (v) LS spine surgery wound reported possible dehiscence.    - Ortho and Plastic recommendations appreciated 63 years old woman with vascular risk factors of age, HTN, DM II, HPLD and CAD was evaluated at [facility] for language disturbance. On 11/27 she underwent T10 laminectomy and fusion. Postoperatively, she was noted to have Wernicke's aphasia due to left temporal lobar ICH of undetermined etiology. She reported to have had significant improvement in her language deficit in the rehabilitation. In the evening of 12/24, she was reported to have worsening of her aphasia, prompting her presentation to OSH and subsequent transfer to [facility]. CT brain or admission showed expected evolution/resolution of left temporal ICH leading to development of encephalomalacia. MRI brain showed acute infarct in the left MCA distribution adjacent to the prior hemorrhage in the late subacute to chronic stage and expected evolution of prior left frontotemporal convexal subarachnoid hemorrhage as well as was concerning for a dilated cortical vein anterior to the hematoma. Of note, MRI brain (12/7) showed left temporal lobar ICH, left frontotemporal convexal SAH and juxtacortical FLAIR hyperintensities concerning for PRES to my eye. CT brain on 12/30, showed expected evolution of left MCA distribution infarct and prior left temporal ICH leading to encephalomalacia.    Impression and Plan     (I) DWI changes on MRI from cortical spreading depression vs new stroke     -repeat MRI/A head with sedation -   Redemonstration of an evolving left temporal 4.3 x 2.3 x 1.6 cm, AP, TR, CC   intraparenchymal hemorrhage.     Redemonstration of restricted diffusion and hyperintense DWI signal   consistent with left MCA infarcts with more pronounced restricted diffusion   and hyperintense DWI signal in the left parietal lobe near the vertex.     Interval new diffuse hyperintense T2 and FLAIR signal throughout the right   cerebral hemisphere including parietal occipital lobes without restricted   diffusion and ADC T2 shine through this finding may be seen with posterior   reversible encephalopathy strongly suggest correlation with any elevation of   blood pressure or seizure activity.     Intracranial vessels remain patent in the proximal A1 and M1 segments with   stenoses of distal branches. Fusiform narrowing of portions of the posterior   circulation, vasospasm not excluded.       (ii) EEG shows repetitive spiking - PLEDs - from left temporal region, no evolving seizures.   - off EEGs due to agitation   -keppra dc'd (may be contributing to agitation)   - On Depakote 750 TID    - On vimpat 200 BID     (iii) UTI     - Positive ua   - started  on ceftriaxone      iv) delirium - agitated, non-verbal, not following instructions.  Etiology not clear, but inactivation of language areas very likely contributing.   No clear source of pain - surgery was ~1 mo ago   avoid narcotics   -start Zyprexa 5mg IM q12    (v) LS spine surgery wound reported possible dehiscence.    - Ortho and Plastic recommendations appreciated 63 years old woman with vascular risk factors of age, HTN, DM II, HPLD and CAD was evaluated at [facility] for language disturbance. On 11/27 she underwent T10 laminectomy and fusion. Postoperatively, she was noted to have Wernicke's aphasia due to left temporal lobar ICH of undetermined etiology. She reported to have had significant improvement in her language deficit in the rehabilitation. In the evening of 12/24, she was reported to have worsening of her aphasia, prompting her presentation to OSH and subsequent transfer to [facility]. CT brain or admission showed expected evolution/resolution of left temporal ICH leading to development of encephalomalacia. MRI brain showed acute infarct in the left MCA distribution adjacent to the prior hemorrhage in the late subacute to chronic stage and expected evolution of prior left frontotemporal convexal subarachnoid hemorrhage as well as was concerning for a dilated cortical vein anterior to the hematoma. Of note, MRI brain (12/7) showed left temporal lobar ICH, left frontotemporal convexal SAH and juxtacortical FLAIR hyperintensities concerning for PRES to my eye. CT brain on 12/30, showed expected evolution of left MCA distribution infarct and prior left temporal ICH leading to encephalomalacia.    Impression and Plan     (I) DWI changes on MRI from cortical spreading depression vs new stroke   -repeat MRI/A head with sedation -   Redemonstration of an evolving left temporal 4.3 x 2.3 x 1.6 cm, AP, TR, CC   intraparenchymal hemorrhage.     Redemonstration of restricted diffusion and hyperintense DWI signal   consistent with left MCA infarcts with more pronounced restricted diffusion   and hyperintense DWI signal in the left parietal lobe near the vertex.     Interval new diffuse hyperintense T2 and FLAIR signal throughout the right   cerebral hemisphere including parietal occipital lobes without restricted   diffusion and ADC T2 shine through this finding may be seen with posterior   reversible encephalopathy strongly suggest correlation with any elevation of   blood pressure or seizure activity.     Intracranial vessels remain patent in the proximal A1 and M1 segments with   stenoses of distal branches. Fusiform narrowing of portions of the posterior   circulation, vasospasm not excluded.       (ii) EEG shows repetitive spiking - PLEDs - from left temporal region, no evolving seizures.   - off EEGs due to agitation   -keppra dc'd (may be contributing to agitation)   - On Depakote 750 TID    - On vimpat 200 BID     (iii) UTI     - Positive ua   - started  on ceftriaxone      iv) delirium - agitated, non-verbal, not following instructions.  Etiology not clear, but inactivation of language areas very likely contributing.   No clear source of pain - surgery was ~1 mo ago   avoid narcotics   -start Zyprexa 5mg IM q12    (v) LS spine surgery wound reported possible dehiscence.    - Ortho and Plastic recommendations appreciated 62 yo F with vascular risk factors of age, HTN, DM II, HPLD and CAD was evaluated at [facility] for language disturbance. On 11/27 she underwent T10 laminectomy and fusion. Postoperatively, she was noted to have Wernicke's aphasia assoc with left temporal lobar ICH/edema of undetermined etiology.  In the evening of 12/24, she was reported to have worsening of her aphasia, prompting her presentation to OSH and subsequent transfer to [facility]. CT brain or admission showed expected evolution/resolution of left temporal ICH leading to development of encephalomalacia. MRI brain showed acute infarct in the left MCA distribution adjacent to the prior hemorrhage in the late subacute to chronic stage and expected evolution of prior left frontotemporal convexal subarachnoid hemorrhage as well as was concerning for a dilated cortical vein anterior to the hematoma.     MRI brain  1/5/18 showed left temporal lobar ICH, left frontotemporal convexal SAH and juxtacortical FLAIR hyperintensities concerning for PRES.     Impression and Plan   concern for PRES/RVCS with left MCA infarct / bleed, .vasospasm  needs better HTN control, cautious reduction target 10-25% reduction / day  cardiology/medicine consult requested  on metoprolol, hydralazine prn, carvedilol.  some component of spasm of vessels, consider CCB/nifedipine if ok per cardiology    (ii) EEG shows repetitive spiking - PLEDs - from left occipital region, no seizures x few days.   -keppra dc'd (may be contributing to agitation)   - On Depakote 750 TID    - On vimpat 200 BID     (iii) UTI   - Positive ua   - started  on ceftriaxone      iv) delirium - Zyprexa 5mg IM q12    (v) LS spine surgery wound reported possible dehiscence.  - Ortho and Plastic recommendations appreciated

## 2019-01-06 LAB
-  AMIKACIN: SIGNIFICANT CHANGE UP
-  AMOXICILLIN/CLAVULANIC ACID: SIGNIFICANT CHANGE UP
-  AMPICILLIN/SULBACTAM: SIGNIFICANT CHANGE UP
-  AMPICILLIN: SIGNIFICANT CHANGE UP
-  AZTREONAM: SIGNIFICANT CHANGE UP
-  CEFAZOLIN: SIGNIFICANT CHANGE UP
-  CEFEPIME: SIGNIFICANT CHANGE UP
-  CEFOXITIN: SIGNIFICANT CHANGE UP
-  CEFTRIAXONE: SIGNIFICANT CHANGE UP
-  CIPROFLOXACIN: SIGNIFICANT CHANGE UP
-  ERTAPENEM: SIGNIFICANT CHANGE UP
-  GENTAMICIN: SIGNIFICANT CHANGE UP
-  IMIPENEM: SIGNIFICANT CHANGE UP
-  LEVOFLOXACIN: SIGNIFICANT CHANGE UP
-  MEROPENEM: SIGNIFICANT CHANGE UP
-  NITROFURANTOIN: SIGNIFICANT CHANGE UP
-  PIPERACILLIN/TAZOBACTAM: SIGNIFICANT CHANGE UP
-  TIGECYCLINE: SIGNIFICANT CHANGE UP
-  TOBRAMYCIN: SIGNIFICANT CHANGE UP
-  TRIMETHOPRIM/SULFAMETHOXAZOLE: SIGNIFICANT CHANGE UP
ANION GAP SERPL CALC-SCNC: 15 MMOL/L — SIGNIFICANT CHANGE UP (ref 5–17)
ANION GAP SERPL CALC-SCNC: 15 MMOL/L — SIGNIFICANT CHANGE UP (ref 5–17)
BUN SERPL-MCNC: 5 MG/DL — LOW (ref 7–23)
BUN SERPL-MCNC: 6 MG/DL — LOW (ref 7–23)
CALCIUM SERPL-MCNC: 7.9 MG/DL — LOW (ref 8.4–10.5)
CALCIUM SERPL-MCNC: 8.2 MG/DL — LOW (ref 8.4–10.5)
CHLORIDE SERPL-SCNC: 107 MMOL/L — SIGNIFICANT CHANGE UP (ref 96–108)
CHLORIDE SERPL-SCNC: 107 MMOL/L — SIGNIFICANT CHANGE UP (ref 96–108)
CO2 SERPL-SCNC: 22 MMOL/L — SIGNIFICANT CHANGE UP (ref 22–31)
CO2 SERPL-SCNC: 24 MMOL/L — SIGNIFICANT CHANGE UP (ref 22–31)
CREAT SERPL-MCNC: 0.69 MG/DL — SIGNIFICANT CHANGE UP (ref 0.5–1.3)
CREAT SERPL-MCNC: 0.74 MG/DL — SIGNIFICANT CHANGE UP (ref 0.5–1.3)
CULTURE RESULTS: SIGNIFICANT CHANGE UP
GLUCOSE BLDC GLUCOMTR-MCNC: 132 MG/DL — HIGH (ref 70–99)
GLUCOSE BLDC GLUCOMTR-MCNC: 136 MG/DL — HIGH (ref 70–99)
GLUCOSE BLDC GLUCOMTR-MCNC: 95 MG/DL — SIGNIFICANT CHANGE UP (ref 70–99)
GLUCOSE SERPL-MCNC: 104 MG/DL — HIGH (ref 70–99)
GLUCOSE SERPL-MCNC: 142 MG/DL — HIGH (ref 70–99)
METHOD TYPE: SIGNIFICANT CHANGE UP
ORGANISM # SPEC MICROSCOPIC CNT: SIGNIFICANT CHANGE UP
ORGANISM # SPEC MICROSCOPIC CNT: SIGNIFICANT CHANGE UP
POTASSIUM SERPL-MCNC: 3.3 MMOL/L — LOW (ref 3.5–5.3)
POTASSIUM SERPL-MCNC: 3.4 MMOL/L — LOW (ref 3.5–5.3)
POTASSIUM SERPL-SCNC: 3.3 MMOL/L — LOW (ref 3.5–5.3)
POTASSIUM SERPL-SCNC: 3.4 MMOL/L — LOW (ref 3.5–5.3)
SODIUM SERPL-SCNC: 144 MMOL/L — SIGNIFICANT CHANGE UP (ref 135–145)
SODIUM SERPL-SCNC: 146 MMOL/L — HIGH (ref 135–145)
SPECIMEN SOURCE: SIGNIFICANT CHANGE UP

## 2019-01-06 PROCEDURE — 71045 X-RAY EXAM CHEST 1 VIEW: CPT | Mod: 26

## 2019-01-06 PROCEDURE — 99232 SBSQ HOSP IP/OBS MODERATE 35: CPT

## 2019-01-06 PROCEDURE — 99233 SBSQ HOSP IP/OBS HIGH 50: CPT

## 2019-01-06 RX ORDER — LOSARTAN POTASSIUM 100 MG/1
100 TABLET, FILM COATED ORAL DAILY
Qty: 0 | Refills: 0 | Status: DISCONTINUED | OUTPATIENT
Start: 2019-01-06 | End: 2019-01-06

## 2019-01-06 RX ORDER — POTASSIUM CHLORIDE 20 MEQ
20 PACKET (EA) ORAL
Qty: 0 | Refills: 0 | Status: COMPLETED | OUTPATIENT
Start: 2019-01-06 | End: 2019-01-06

## 2019-01-06 RX ORDER — ACETAMINOPHEN 500 MG
1000 TABLET ORAL ONCE
Qty: 0 | Refills: 0 | Status: COMPLETED | OUTPATIENT
Start: 2019-01-06 | End: 2019-01-06

## 2019-01-06 RX ORDER — CARVEDILOL PHOSPHATE 80 MG/1
12.5 CAPSULE, EXTENDED RELEASE ORAL EVERY 12 HOURS
Qty: 0 | Refills: 0 | Status: DISCONTINUED | OUTPATIENT
Start: 2019-01-06 | End: 2019-01-06

## 2019-01-06 RX ADMIN — Medication 10 MILLIGRAM(S): at 12:43

## 2019-01-06 RX ADMIN — Medication 5 MILLIGRAM(S): at 11:22

## 2019-01-06 RX ADMIN — OLANZAPINE 5 MILLIGRAM(S): 15 TABLET, FILM COATED ORAL at 05:21

## 2019-01-06 RX ADMIN — Medication 5 MILLIGRAM(S): at 17:21

## 2019-01-06 RX ADMIN — NYSTATIN CREAM 1 APPLICATION(S): 100000 CREAM TOPICAL at 05:30

## 2019-01-06 RX ADMIN — Medication 50 MILLIEQUIVALENT(S): at 11:11

## 2019-01-06 RX ADMIN — Medication 25 MILLIGRAM(S): at 21:49

## 2019-01-06 RX ADMIN — CEFTRIAXONE 100 GRAM(S): 500 INJECTION, POWDER, FOR SOLUTION INTRAMUSCULAR; INTRAVENOUS at 05:18

## 2019-01-06 RX ADMIN — LACOSAMIDE 140 MILLIGRAM(S): 50 TABLET ORAL at 09:56

## 2019-01-06 RX ADMIN — Medication 400 MILLIGRAM(S): at 08:35

## 2019-01-06 RX ADMIN — OLANZAPINE 5 MILLIGRAM(S): 15 TABLET, FILM COATED ORAL at 17:25

## 2019-01-06 RX ADMIN — Medication 25 MILLIGRAM(S): at 13:13

## 2019-01-06 RX ADMIN — LACOSAMIDE 140 MILLIGRAM(S): 50 TABLET ORAL at 00:07

## 2019-01-06 RX ADMIN — Medication 50 MILLIEQUIVALENT(S): at 13:27

## 2019-01-06 RX ADMIN — ATORVASTATIN CALCIUM 80 MILLIGRAM(S): 80 TABLET, FILM COATED ORAL at 21:50

## 2019-01-06 RX ADMIN — Medication 10 MILLIGRAM(S): at 11:23

## 2019-01-06 RX ADMIN — Medication 300 MILLIGRAM(S): at 13:13

## 2019-01-06 RX ADMIN — Medication 50 MILLIEQUIVALENT(S): at 09:27

## 2019-01-06 RX ADMIN — Medication 1000 MILLIGRAM(S): at 09:57

## 2019-01-06 RX ADMIN — Medication 5 MILLIGRAM(S): at 05:20

## 2019-01-06 RX ADMIN — Medication 10 MILLIGRAM(S): at 17:19

## 2019-01-06 RX ADMIN — NYSTATIN CREAM 1 APPLICATION(S): 100000 CREAM TOPICAL at 17:23

## 2019-01-06 RX ADMIN — Medication 10 MILLIGRAM(S): at 05:20

## 2019-01-06 RX ADMIN — ENOXAPARIN SODIUM 40 MILLIGRAM(S): 100 INJECTION SUBCUTANEOUS at 11:23

## 2019-01-06 RX ADMIN — Medication 25 MILLIGRAM(S): at 05:21

## 2019-01-06 NOTE — PROGRESS NOTE ADULT - ASSESSMENT
62 yo F with vascular risk factors of age, HTN, DM II, HPLD and CAD was evaluated at [facility] for language disturbance. On 11/27 she underwent T10 laminectomy and fusion. Postoperatively, she was noted to have Wernicke's aphasia assoc with left temporal lobar ICH/edema of undetermined etiology.  In the evening of 12/24, she was reported to have worsening of her aphasia, prompting her presentation to OSH and subsequent transfer to [facility]. CT brain or admission showed expected evolution/resolution of left temporal ICH leading to development of encephalomalacia. MRI brain showed acute infarct in the left MCA distribution adjacent to the prior hemorrhage in the late subacute to chronic stage and expected evolution of prior left frontotemporal convexal subarachnoid hemorrhage as well as was concerning for a dilated cortical vein anterior to the hematoma.     MRI brain  1/5/18 showed left temporal lobar ICH, left frontotemporal convexal SAH and juxtacortical FLAIR hyperintensities concerning for PRES.     Impression and Plan   concern for PRES/RVCS with left MCA infarct / bleed, .vasospasm  needs better HTN control, cautious reduction target 10-25% reduction / day  cardiology/medicine consult requested  on metoprolol, hydralazine prn, carvedilol.  some component of spasm of vessels, consider CCB/nifedipine if ok per cardiology    (ii) EEG shows repetitive spiking - PLEDs - from left occipital region, no seizures x few days.   -keppra dc'd (may be contributing to agitation)   - On Depakote 750 TID    - On vimpat 200 BID     (iii) UTI   - Positive ua   - started  on ceftriaxone      (iv) delirium - Zyprexa 5mg IM q12    (v) LS spine surgery wound reported possible dehiscence.  - Ortho and Plastic recommendations appreciated 62 yo F with vascular risk factors of age, HTN, DM II, HPLD and CAD was evaluated at [facility] for language disturbance. On 11/27 she underwent T10 laminectomy and fusion. Postoperatively, she was noted to have Wernicke's aphasia assoc with left temporal lobar ICH/edema of undetermined etiology.  In the evening of 12/24, she was reported to have worsening of her aphasia, prompting her presentation to OSH and subsequent transfer to [facility]. CT brain or admission showed expected evolution/resolution of left temporal ICH leading to development of encephalomalacia. MRI brain showed acute infarct in the left MCA distribution adjacent to the prior hemorrhage in the late subacute to chronic stage and expected evolution of prior left frontotemporal convexal subarachnoid hemorrhage as well as was concerning for a dilated cortical vein anterior to the hematoma.     MRI brain  1/5/18 showed left temporal lobar ICH, left frontotemporal convexal SAH and juxtacortical FLAIR hyperintensities concerning for PRES.     Impression and Plan   concern for PRES/RVCS with left MCA infarct / bleed, .vasospasm  needs better HTN control, cautious reduction target 10-25% reduction / day  cardiology/medicine consult requested  - BP control (SBP < 160), metoprolol 5 mg q6, labetalol 10 mg q6; PRN meds Hydralazine and labetalol 10 q6h  some component of spasm of vessels, consider CCB/nifedipine if ok per cardiology    (ii) EEG shows repetitive spiking - PLEDs - from left occipital region, no seizures x few days.   - keppra dc'd (may be contributing to agitation)   - On Depakote 750 TID    - On vimpat 200 BID     (iii) UTI   - Positive ua   - started  on ceftriaxone      (iv) delirium - Zyprexa 5mg IM q12    (v) LS spine surgery wound reported possible dehiscence.  - Ortho and Plastic recommendations appreciated 62 yo F with vascular risk factors of age, HTN, DM II, HPLD and CAD was evaluated at [facility] for language disturbance. On 11/27 she underwent T10 laminectomy and fusion. Postoperatively, she was noted to have Wernicke's aphasia assoc with left temporal lobar ICH/edema of undetermined etiology.  In the evening of 12/24, she was reported to have worsening of her aphasia, prompting her presentation to OSH and subsequent transfer to [facility]. CT brain or admission showed expected evolution/resolution of left temporal ICH leading to development of encephalomalacia. MRI brain showed acute infarct in the left MCA distribution adjacent to the prior hemorrhage in the late subacute to chronic stage and expected evolution of prior left frontotemporal convexal subarachnoid hemorrhage as well as was concerning for a dilated cortical vein anterior to the hematoma.     MRI brain  1/5/18 showed left temporal lobar ICH, left frontotemporal convexal SAH and juxtacortical FLAIR hyperintensities concerning for PRES.   hypokalemia  NPO due to speech/swallow/AMS issues on hydration    Impression and Plan   concern for PRES/RVCS with left MCA infarct / bleed, ?vasospasm  needs better HTN control, cautious reduction target 10-25% reduction / day  cardiology/medicine consult requested  - BP control (SBP < 160), metoprolol 5 mg q6, labetalol 10 mg q6; PRN meds Hydralazine and labetalol 10 q6h  some component of spasm of vessels, consider CCB/nifedipine if ok per cardiology via NGT    (ii) EEG shows repetitive spiking - PLEDs - from left occipital region, no seizures x few days.   - keppra dc'd (may be contributing to agitation)   - On Depakote 750 TID    - On vimpat 200 BID     (iii) UTI   - Positive ua   - started  on ceftriaxone      (iv) delirium - Zyprexa 5mg IM q12    (v) LS spine surgery wound reported possible dehiscence.  - Ortho and Plastic recommendations appreciated 62 yo F with vascular risk factors of age, HTN, DM II, HPLD and CAD was evaluated at [facility] for language disturbance. On 11/27 she underwent T10 laminectomy and fusion. Postoperatively, she was noted to have Wernicke's aphasia assoc with left temporal lobar ICH/edema of undetermined etiology.  In the evening of 12/24, she was reported to have worsening of her aphasia, prompting her presentation to OSH and subsequent transfer to [facility]. CT brain or admission showed expected evolution/resolution of left temporal ICH leading to development of encephalomalacia. MRI brain showed acute infarct in the left MCA distribution adjacent to the prior hemorrhage in the late subacute to chronic stage and expected evolution of prior left frontotemporal convexal subarachnoid hemorrhage as well as was concerning for a dilated cortical vein anterior to the hematoma.     MRI brain  1/5/18 showed left temporal lobar ICH, left frontotemporal convexal SAH and juxtacortical FLAIR hyperintensities concerning for PRES.   hypokalemia  MG tube placed on 1/6, pending XRAY to start PO BP meds, PRES and RCVS are within differential. Team to consider conventional angiogram.    Impression and Plan   concern for PRES/RCVS with left MCA infarct / bleed, ?vasospasm  needs better HTN control, cautious reduction target 10-25% reduction / day  cardiology/medicine consult requested  - BP control (SBP < 160), metoprolol 5 mg q6, labetalol 10 mg q6; PRN meds Hydralazine and labetalol 10 q6h  some component of spasm of vessels, consider CCB/nifedipine if ok per cardiology via NGT    (ii) EEG shows repetitive spiking - PLEDs - from left occipital region, no seizures x few days.   - keppra dc'd (may be contributing to agitation)   - On Depakote 750 TID    - On vimpat 200 BID     (iii) UTI   - Positive ua   - started  on ceftriaxone      (iv) delirium - Zyprexa 5mg IM q12    (v) LS spine surgery wound reported possible dehiscence.  - Ortho and Plastic recommendations appreciated

## 2019-01-06 NOTE — PROGRESS NOTE ADULT - SUBJECTIVE AND OBJECTIVE BOX
Neurology Follow up note    Patient is a 63y old  Female who presents with a chief complaint of seizure management s/p concern for stroke and seizures.    Subjective: Interval History no overnight events, had MRI done, less agitated this AM    HEENT: EOM intact, right homonymous hemianopsia by blink to threat   Abdomen: Soft, nontender, nondistended   Extremities: No edema  Back: patient grimaces w/ palpation. recent laminectomy.     Vital Signs Last 24 Hrs  T(C): 36.6 (06 Jan 2019 06:15), Max: 37.4 (05 Jan 2019 09:54)  T(F): 97.8 (06 Jan 2019 06:15), Max: 99.3 (05 Jan 2019 09:54)  HR: 86 (06 Jan 2019 06:15) (85 - 126)  BP: 143/76 (06 Jan 2019 06:15) (129/69 - 185/95)  BP(mean): --  RR: 18 (06 Jan 2019 06:15) (18 - 18)  SpO2: 99% (06 Jan 2019 06:15) (96% - 99%)    NEUROLOGICAL EXAM:  Mental status: eyes open, not following commands, delirious.  Cranial Nerves: No facial asymmetry, right homonymous hemianopsia by blink to threat, no nystagmus, no dysarthria, tongue midline  Motor exam: right hemiparesis slightly improved (RUE moving spontaneously and RLE 4/5), LUE and LLE - 5/5   Sensation: Intact to light touch with withdrawal to noxious stimuli (purposeful)   Almost rhythmic/semi-arrhythmic twitching of the right face                                      11.0   9.21  )-----------( 307      ( 04 Jan 2019 08:15 )             34.2   01-05    145  |  106  |  6<L>  ----------------------------<  129<H>  2.9<LL>   |  23  |  0.74    Ca    8.5      05 Jan 2019 12:54    MEDICATIONS  (STANDING):  acetaminophen   Tablet .. 650 milliGRAM(s) Oral every 6 hours  aspirin Suppository 300 milliGRAM(s) Rectal daily  atorvastatin 80 milliGRAM(s) Oral at bedtime  carvedilol 25 milliGRAM(s) Oral every 12 hours  cefTRIAXone   IVPB      cefTRIAXone   IVPB 1 Gram(s) IV Intermittent every 24 hours  dextrose 5% + sodium chloride 0.45%. 1000 milliLiter(s) (125 mL/Hr) IV Continuous <Continuous>  dextrose 50% Injectable 12.5 Gram(s) IV Push once  dextrose 50% Injectable 25 Gram(s) IV Push once  dextrose 50% Injectable 25 Gram(s) IV Push once  enoxaparin Injectable 40 milliGRAM(s) SubCutaneous daily  insulin lispro (HumaLOG) corrective regimen sliding scale   SubCutaneous every 6 hours  labetalol Injectable 10 milliGRAM(s) IV Push every 6 hours  lacosamide IVPB 200 milliGRAM(s) IV Intermittent every 12 hours  losartan 100 milliGRAM(s) Oral daily  metoprolol tartrate Injectable 5 milliGRAM(s) IV Push every 6 hours  nystatin Ointment 1 Application(s) Topical every 12 hours  OLANZapine Injectable 5 milliGRAM(s) IntraMuscular every 12 hours  valproate sodium IVPB 750 milliGRAM(s) IV Intermittent every 8 hours    MEDICATIONS  (PRN):  dextrose 40% Gel 15 Gram(s) Oral once PRN Blood Glucose LESS THAN 70 milliGRAM(s)/deciliter  glucagon  Injectable 1 milliGRAM(s) IntraMuscular once PRN Glucose LESS THAN 70 milligrams/deciliter  hydrALAZINE Injectable 10 milliGRAM(s) IV Push every 6 hours PRN SBP>160  labetalol Injectable 10 milliGRAM(s) IV Push every 6 hours PRN Systolic blood pressure >160  LORazepam   Injectable 2 milliGRAM(s) IV Push every 5 minutes PRN Sustained GTC Seizure  OLANZapine Injectable 5 milliGRAM(s) IntraMuscular every 24 hours PRN agitation      < from: MR Head No Cont (01.04.19 @ 21:20) >  Redemonstration of an evolving left temporal 4.3 x 2.3 x 1.6 cm, AP, TR, CC intraparenchymal hemorrhage.  Redemonstration of restricted diffusion and hyperintense DWI signal consistent with left MCA infarcts with more pronounced restricted   diffusion and hyperintense DWI signal in the left parietal lobe near the vertex.  Interval new diffuse  hyperintense T2 and FLAIR signal throughout the right cerebral hemisphere including parietal occipital lobes without   restricted diffusion and ADC T2 shine through this finding may be seen with posterior reversible encephalopathy strongly suggest correlation   with any elevation of blood pressure or seizure activity.  Intracranial vessels remain patent in the proximal A1 and M1 segments with stenoses ofdistal branches. Fusiform narrowing of portions of the   posterior circulation, vasospasm not excluded.     EEG SUMMARY/CLASSIFICATION  Abnormal EEG in the awake states.  -Lateralized Periodic Discharges (LPDs), Regional, Left occipital, maximum O1  -Continuous Slowing, Lateralized, Left hemisphere (delta and theta)  -Intermittent Rhythmic Slowing, Lateralized, Left hemisphere (delta)  -Intermittent Rhythmic Slowing, Generalized (delta)  _____________________________________________________________  EEG IMPRESSION/CLINICAL CORRELATE  There is evidence of a potential seizure focus in the left occipital region, and a structural lesion in the left hemisphere.  There is also evidence of a moderate diffuse encephalopathy.  No overt clinical seizures were seen on video. Neurology Follow up note    Patient is a 63y old  Female who presents with a chief complaint of seizure management s/p concern for stroke and seizures.    Subjective: Interval History no overnight events, less agitated this AM, no overt seizures. still NPO, moaning out, not speaking    HEENT: EOM intact, right homonymous hemianopsia by blink to threat   Abdomen: Soft, nontender, nondistended   Extremities: No edema  Back: patient grimaces w/ palpation. recent laminectomy.     Vital Signs Last 24 Hrs  T(C): 36.6 (06 Jan 2019 06:15), Max: 37.4 (05 Jan 2019 09:54)  T(F): 97.8 (06 Jan 2019 06:15), Max: 99.3 (05 Jan 2019 09:54)  HR: 86 (06 Jan 2019 06:15) (85 - 126)  BP: 143/76 (06 Jan 2019 06:15) (129/69 - 185/95)  BP(mean): --  RR: 18 (06 Jan 2019 06:15) (18 - 18)  SpO2: 99% (06 Jan 2019 06:15) (96% - 99%)    NEUROLOGICAL EXAM:  Mental status: eyes open, not following commands, delirious.  Cranial Nerves: No facial asymmetry, right homonymous hemianopsia by blink to threat, no nystagmus, no dysarthria, tongue midline  Motor exam: right hemiparesis slightly improved (RUE moving spontaneously and RLE 4/5), LUE and LLE - 5/5   Sensation: Intact to light touch with withdrawal to noxious stimuli (purposeful)   Almost rhythmic/semi-arrhythmic twitching of the right face                                      11.0   9.21  )-----------( 307      ( 04 Jan 2019 08:15 )             34.2   01-05    145  |  106  |  6<L>  ----------------------------<  129<H>  2.9<LL>   |  23  |  0.74    Ca    8.5      05 Jan 2019 12:54    MEDICATIONS  (STANDING):  acetaminophen   Tablet .. 650 milliGRAM(s) Oral every 6 hours  aspirin Suppository 300 milliGRAM(s) Rectal daily  atorvastatin 80 milliGRAM(s) Oral at bedtime  carvedilol 25 milliGRAM(s) Oral every 12 hours  cefTRIAXone   IVPB      cefTRIAXone   IVPB 1 Gram(s) IV Intermittent every 24 hours  enoxaparin Injectable 40 milliGRAM(s) SubCutaneous daily  insulin lispro (HumaLOG) corrective regimen sliding scale   SubCutaneous every 6 hours  labetalol Injectable 10 milliGRAM(s) IV Push every 6 hours  lacosamide IVPB 200 milliGRAM(s) IV Intermittent every 12 hours  losartan 100 milliGRAM(s) Oral daily  metoprolol tartrate Injectable 5 milliGRAM(s) IV Push every 6 hours  nystatin Ointment 1 Application(s) Topical every 12 hours  OLANZapine Injectable 5 milliGRAM(s) IntraMuscular every 12 hours  valproate sodium IVPB 750 milliGRAM(s) IV Intermittent every 8 hours    MEDICATIONS  (PRN):  dextrose 40% Gel 15 Gram(s) Oral once PRN Blood Glucose LESS THAN 70 milliGRAM(s)/deciliter  glucagon  Injectable 1 milliGRAM(s) IntraMuscular once PRN Glucose LESS THAN 70 milligrams/deciliter  hydrALAZINE Injectable 10 milliGRAM(s) IV Push every 6 hours PRN SBP>160  labetalol Injectable 10 milliGRAM(s) IV Push every 6 hours PRN Systolic blood pressure >160  LORazepam   Injectable 2 milliGRAM(s) IV Push every 5 minutes PRN Sustained GTC Seizure  OLANZapine Injectable 5 milliGRAM(s) IntraMuscular every 24 hours PRN agitation      < from: MR Head No Cont (01.04.19 @ 21:20) >  Redemonstration of an evolving left temporal 4.3 x 2.3 x 1.6 cm, AP, TR, CC intraparenchymal hemorrhage.  Redemonstration of restricted diffusion and hyperintense DWI signal consistent with left MCA infarcts with more pronounced restricted   diffusion and hyperintense DWI signal in the left parietal lobe near the vertex.  Interval new diffuse  hyperintense T2 and FLAIR signal throughout the right cerebral hemisphere including parietal occipital lobes without   restricted diffusion and ADC T2 shine through this finding may be seen with posterior reversible encephalopathy strongly suggest correlation   with any elevation of blood pressure or seizure activity.  Intracranial vessels remain patent in the proximal A1 and M1 segments with stenoses ofdistal branches. Fusiform narrowing of portions of the   posterior circulation, vasospasm not excluded.     EEG SUMMARY/CLASSIFICATION  Abnormal EEG in the awake states.  -Lateralized Periodic Discharges (LPDs), Regional, Left occipital, maximum O1  -Continuous Slowing, Lateralized, Left hemisphere (delta and theta)  -Intermittent Rhythmic Slowing, Lateralized, Left hemisphere (delta)  -Intermittent Rhythmic Slowing, Generalized (delta)  _____________________________________________________________  EEG IMPRESSION/CLINICAL CORRELATE  There is evidence of a potential seizure focus in the left occipital region, and a structural lesion in the left hemisphere.  There is also evidence of a moderate diffuse encephalopathy.  No overt clinical seizures were seen on video.
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
## 2019-01-06 NOTE — CONSULT NOTE ADULT - ASSESSMENT
62 yo female w left temporal lobar hemorrhage w resultant aphasia. ENT called to assist in difficult NG tube placement after failed attempts. Tube was successfully placed under indirect laryngoscopy, confirmed w gastric rumble.

## 2019-01-06 NOTE — PROGRESS NOTE ADULT - ATTENDING COMMENTS
Agree with housestaff exam, assessment, and plan unless otherwise stated/revised in the note above. Patient evaluated and examined in my presence, case discussed with treatment team for clinical and educational purposes. As per protocol, I discussed available results of testing and plan of care with the patient or advocate, who agrees to the plan, including anticipated benefits of the evaluation and foreseeable risks. Face time for evaluation, education, counseling was >50% of time spent on unit.

## 2019-01-06 NOTE — PROGRESS NOTE ADULT - ASSESSMENT
63F with a history of CAD s/p PCI to OM2 with a AVE in December of 2015, residual 40% LAD disease, neg stress test in 1/2018, osteoarthritis, DM, HTN, HLD who presents as a transfer from [facility] for aphasia now with temporal ICH and new left MCA infarct. She is now with expected evolution of left MCA distribution infarct and prior left temporal ICH leading to encephalomalacia. She is having focal seizures and facial twitching.     - Cont neuro rx  - She has a hx of a remote PCI.   - No anginal symptoms recently reported  - Cont ASA   - Has a moderate focal atheroma in Aortic Arch  - Cont high intensity statin for now.  - s/p ILR placement prior to weekend given suspicion for embolic nature of CVA. No af noted on telemetry to date.  - intermittent Sinus tachycardiac in setting of agitation. Continue to watch  - BP is poorly controlled, and would be more liberal with the use of iv meds given npo, with goal of avoiding sustained hypertension >160. Can add calcium blocker if needed per neuro  - gradual normotension per neurology  - Appears compensated from HF POV.   - Monitor and replete electrolytes. Keep K>4.0 and Mg>2.0.  -  Further cardiac workup will depend on clinical course.   - All other workup per primary team. Will followup.

## 2019-01-06 NOTE — EEG REPORT - NS EEG TEXT BOX
Patient Name: DEMI ARCHIBALD  Age and : 63y (51)  MRN #: [id number]  [address]    Study Date: -2018    HISTORY  Patient is a 63y old  Female who presents with a chief complaint of cva (28 Dec 2018 07:19)    MEDICATIONS  (STANDING):  acetaminophen   Tablet .. 650 milliGRAM(s) Oral every 6 hours  aspirin Suppository 300 milliGRAM(s) Rectal daily  atorvastatin 80 milliGRAM(s) Oral at bedtime  carvedilol 25 milliGRAM(s) Oral every 12 hours  cefTRIAXone   IVPB      insulin lispro (HumaLOG) corrective regimen sliding scale   SubCutaneous every 6 hours  labetalol Injectable 10 milliGRAM(s) IV Push every 6 hours  lacosamide IVPB 200 milliGRAM(s) IV Intermittent every 12 hours  losartan 100 milliGRAM(s) Oral daily  metoprolol tartrate Injectable 5 milliGRAM(s) IV Push every 6 hours  nystatin Ointment 1 Application(s) Topical every 12 hours  OLANZapine Injectable 5 milliGRAM(s) IntraMuscular every 12 hours  potassium chloride  20 mEq/100 mL IVPB 20 milliEquivalent(s) IV Intermittent every 2 hours  valproate sodium IVPB 750 milliGRAM(s) IV Intermittent every 8 hours    _____________________________________________________________  STUDY INTERPRETATION    Findings: The background was continuous, reactive. During wakefulness, the posterior dominant rhythm was not present.  There was more diffuse arrhythmic Slowing, Generalized theta>delta.  Continuous Slowing was more prominent over  the Left hemisphere (delta and theta)    Sleep Background:  Drowsiness with relative attenuation and stage II sleep transients were recorded better formed over the right.    Interictal Epileptiform Activity:   -Lateralized Periodic sharp wave Discharges (LPDs), Regional, Left occipital, maximum O1/P7 at 0.5-1hz    Ictal:  None    Activation Procedures:   Hyperventilation was not performed.    Photic stimulation was not performed.     Artifacts:  Intermittent myogenic and movement artifacts were noted.    ECG:  The heart rate on single channel ECG was predominantly between  BPM.    ________________________________________________________  EEG SUMMARY/CLASSIFICATION  Abnormal EEG in the awake states.  -Lateralized Periodic Discharges (LPDs), Regional, Left occipital, maximum O1  -Continuous Slowing, Lateralized, Left hemisphere (delta and theta)  -Slowing, Generalized (theta>delta)  _____________________________________________________________  EEG IMPRESSION/CLINICAL CORRELATE  Risk of seizures from the left occipital region, moderate multifocal cerebral dysfunction, persistently worse in the left hemisphere  No overt clinical seizures were seen.
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
## 2019-01-07 LAB
ALBUMIN SERPL ELPH-MCNC: 2.9 G/DL — LOW (ref 3.3–5)
ALP SERPL-CCNC: 107 U/L — SIGNIFICANT CHANGE UP (ref 40–120)
ALT FLD-CCNC: 10 U/L — SIGNIFICANT CHANGE UP (ref 10–45)
ANION GAP SERPL CALC-SCNC: 13 MMOL/L — SIGNIFICANT CHANGE UP (ref 5–17)
APPEARANCE UR: CLEAR — SIGNIFICANT CHANGE UP
AST SERPL-CCNC: 18 U/L — SIGNIFICANT CHANGE UP (ref 10–40)
BILIRUB SERPL-MCNC: 0.2 MG/DL — SIGNIFICANT CHANGE UP (ref 0.2–1.2)
BILIRUB UR-MCNC: NEGATIVE — SIGNIFICANT CHANGE UP
BUN SERPL-MCNC: 6 MG/DL — LOW (ref 7–23)
CALCIUM SERPL-MCNC: 8.3 MG/DL — LOW (ref 8.4–10.5)
CHLORIDE SERPL-SCNC: 107 MMOL/L — SIGNIFICANT CHANGE UP (ref 96–108)
CO2 SERPL-SCNC: 25 MMOL/L — SIGNIFICANT CHANGE UP (ref 22–31)
COLOR SPEC: YELLOW — SIGNIFICANT CHANGE UP
CREAT SERPL-MCNC: 0.73 MG/DL — SIGNIFICANT CHANGE UP (ref 0.5–1.3)
DIFF PNL FLD: NEGATIVE — SIGNIFICANT CHANGE UP
GLUCOSE BLDC GLUCOMTR-MCNC: 101 MG/DL — HIGH (ref 70–99)
GLUCOSE BLDC GLUCOMTR-MCNC: 111 MG/DL — HIGH (ref 70–99)
GLUCOSE BLDC GLUCOMTR-MCNC: 126 MG/DL — HIGH (ref 70–99)
GLUCOSE BLDC GLUCOMTR-MCNC: 147 MG/DL — HIGH (ref 70–99)
GLUCOSE BLDC GLUCOMTR-MCNC: 161 MG/DL — HIGH (ref 70–99)
GLUCOSE SERPL-MCNC: 121 MG/DL — HIGH (ref 70–99)
GLUCOSE UR QL: NEGATIVE MG/DL — SIGNIFICANT CHANGE UP
HCT VFR BLD CALC: 32.7 % — LOW (ref 34.5–45)
HGB BLD-MCNC: 10.7 G/DL — LOW (ref 11.5–15.5)
KETONES UR-MCNC: NEGATIVE — SIGNIFICANT CHANGE UP
LEUKOCYTE ESTERASE UR-ACNC: NEGATIVE — SIGNIFICANT CHANGE UP
MCHC RBC-ENTMCNC: 29.3 PG — SIGNIFICANT CHANGE UP (ref 27–34)
MCHC RBC-ENTMCNC: 32.7 GM/DL — SIGNIFICANT CHANGE UP (ref 32–36)
MCV RBC AUTO: 89.6 FL — SIGNIFICANT CHANGE UP (ref 80–100)
NITRITE UR-MCNC: NEGATIVE — SIGNIFICANT CHANGE UP
PH UR: 5.5 — SIGNIFICANT CHANGE UP (ref 5–8)
PLATELET # BLD AUTO: 299 K/UL — SIGNIFICANT CHANGE UP (ref 150–400)
POTASSIUM SERPL-MCNC: 3.6 MMOL/L — SIGNIFICANT CHANGE UP (ref 3.5–5.3)
POTASSIUM SERPL-SCNC: 3.6 MMOL/L — SIGNIFICANT CHANGE UP (ref 3.5–5.3)
PROT SERPL-MCNC: 5.5 G/DL — LOW (ref 6–8.3)
PROT UR-MCNC: 100 MG/DL
RBC # BLD: 3.65 M/UL — LOW (ref 3.8–5.2)
RBC # FLD: 16.3 % — HIGH (ref 10.3–14.5)
SODIUM SERPL-SCNC: 145 MMOL/L — SIGNIFICANT CHANGE UP (ref 135–145)
SP GR SPEC: 1.02 — SIGNIFICANT CHANGE UP (ref 1.01–1.02)
UROBILINOGEN FLD QL: NEGATIVE MG/DL — SIGNIFICANT CHANGE UP
VALPROATE SERPL-MCNC: 75 UG/ML — SIGNIFICANT CHANGE UP (ref 50–100)
WBC # BLD: 5.1 K/UL — SIGNIFICANT CHANGE UP (ref 3.8–10.5)
WBC # FLD AUTO: 5.1 K/UL — SIGNIFICANT CHANGE UP (ref 3.8–10.5)

## 2019-01-07 PROCEDURE — 99232 SBSQ HOSP IP/OBS MODERATE 35: CPT

## 2019-01-07 PROCEDURE — 70496 CT ANGIOGRAPHY HEAD: CPT | Mod: 26

## 2019-01-07 PROCEDURE — 70498 CT ANGIOGRAPHY NECK: CPT | Mod: 26

## 2019-01-07 PROCEDURE — 99233 SBSQ HOSP IP/OBS HIGH 50: CPT

## 2019-01-07 RX ORDER — AMLODIPINE BESYLATE 2.5 MG/1
5 TABLET ORAL ONCE
Qty: 0 | Refills: 0 | Status: COMPLETED | OUTPATIENT
Start: 2019-01-07 | End: 2019-01-07

## 2019-01-07 RX ORDER — SODIUM CHLORIDE 9 MG/ML
1000 INJECTION INTRAMUSCULAR; INTRAVENOUS; SUBCUTANEOUS
Qty: 0 | Refills: 0 | Status: DISCONTINUED | OUTPATIENT
Start: 2019-01-07 | End: 2019-01-09

## 2019-01-07 RX ORDER — AMLODIPINE BESYLATE 2.5 MG/1
10 TABLET ORAL DAILY
Qty: 0 | Refills: 0 | Status: DISCONTINUED | OUTPATIENT
Start: 2019-01-08 | End: 2019-01-22

## 2019-01-07 RX ORDER — AMLODIPINE BESYLATE 2.5 MG/1
5 TABLET ORAL DAILY
Qty: 0 | Refills: 0 | Status: DISCONTINUED | OUTPATIENT
Start: 2019-01-07 | End: 2019-01-07

## 2019-01-07 RX ORDER — NIFEDIPINE 30 MG
30 TABLET, EXTENDED RELEASE 24 HR ORAL DAILY
Qty: 0 | Refills: 0 | Status: DISCONTINUED | OUTPATIENT
Start: 2019-01-07 | End: 2019-01-07

## 2019-01-07 RX ADMIN — Medication 650 MILLIGRAM(S): at 01:24

## 2019-01-07 RX ADMIN — CEFTRIAXONE 100 GRAM(S): 500 INJECTION, POWDER, FOR SOLUTION INTRAMUSCULAR; INTRAVENOUS at 03:06

## 2019-01-07 RX ADMIN — LACOSAMIDE 140 MILLIGRAM(S): 50 TABLET ORAL at 09:22

## 2019-01-07 RX ADMIN — LACOSAMIDE 140 MILLIGRAM(S): 50 TABLET ORAL at 01:22

## 2019-01-07 RX ADMIN — LACOSAMIDE 140 MILLIGRAM(S): 50 TABLET ORAL at 22:49

## 2019-01-07 RX ADMIN — NYSTATIN CREAM 1 APPLICATION(S): 100000 CREAM TOPICAL at 17:34

## 2019-01-07 RX ADMIN — CARVEDILOL PHOSPHATE 25 MILLIGRAM(S): 80 CAPSULE, EXTENDED RELEASE ORAL at 06:09

## 2019-01-07 RX ADMIN — OLANZAPINE 5 MILLIGRAM(S): 15 TABLET, FILM COATED ORAL at 06:10

## 2019-01-07 RX ADMIN — NYSTATIN CREAM 1 APPLICATION(S): 100000 CREAM TOPICAL at 06:50

## 2019-01-07 RX ADMIN — Medication 25 MILLIGRAM(S): at 06:45

## 2019-01-07 RX ADMIN — Medication 10 MILLIGRAM(S): at 03:05

## 2019-01-07 RX ADMIN — Medication 25 MILLIGRAM(S): at 22:35

## 2019-01-07 RX ADMIN — Medication 650 MILLIGRAM(S): at 13:12

## 2019-01-07 RX ADMIN — Medication 650 MILLIGRAM(S): at 06:09

## 2019-01-07 RX ADMIN — LOSARTAN POTASSIUM 100 MILLIGRAM(S): 100 TABLET, FILM COATED ORAL at 06:09

## 2019-01-07 RX ADMIN — Medication 25 MILLIGRAM(S): at 14:39

## 2019-01-07 RX ADMIN — Medication 300 MILLIGRAM(S): at 13:12

## 2019-01-07 RX ADMIN — Medication 10 MILLIGRAM(S): at 23:21

## 2019-01-07 RX ADMIN — Medication 650 MILLIGRAM(S): at 17:33

## 2019-01-07 RX ADMIN — AMLODIPINE BESYLATE 5 MILLIGRAM(S): 2.5 TABLET ORAL at 13:12

## 2019-01-07 RX ADMIN — ENOXAPARIN SODIUM 40 MILLIGRAM(S): 100 INJECTION SUBCUTANEOUS at 13:12

## 2019-01-07 RX ADMIN — ATORVASTATIN CALCIUM 80 MILLIGRAM(S): 80 TABLET, FILM COATED ORAL at 22:31

## 2019-01-07 RX ADMIN — CARVEDILOL PHOSPHATE 25 MILLIGRAM(S): 80 CAPSULE, EXTENDED RELEASE ORAL at 17:33

## 2019-01-07 RX ADMIN — OLANZAPINE 5 MILLIGRAM(S): 15 TABLET, FILM COATED ORAL at 17:33

## 2019-01-07 RX ADMIN — Medication 650 MILLIGRAM(S): at 07:14

## 2019-01-07 RX ADMIN — AMLODIPINE BESYLATE 5 MILLIGRAM(S): 2.5 TABLET ORAL at 19:06

## 2019-01-07 RX ADMIN — Medication 1: at 12:30

## 2019-01-07 RX ADMIN — SODIUM CHLORIDE 60 MILLILITER(S): 9 INJECTION INTRAMUSCULAR; INTRAVENOUS; SUBCUTANEOUS at 18:32

## 2019-01-07 NOTE — PROGRESS NOTE ADULT - SUBJECTIVE AND OBJECTIVE BOX
Neurology Follow up note    Patient is a 63y old  Female who presents with a chief complaint of seizure management s/p concern for stroke and seizures.    Subjective: No overnight events, less agitated still globally aphasic     HEENT: EOM intact, right homonymous hemianopsia by blink to threat   Abdomen: Soft, nontender, nondistended   Extremities: No edema  Back: patient grimaces w/ palpation. recent laminectomy.     Vital Signs Last 24 Hrs  Vital Signs Last 24 Hrs  T(C): 36.8 (07 Jan 2019 07:48), Max: 37.1 (06 Jan 2019 19:12)  T(F): 98.3 (07 Jan 2019 07:48), Max: 98.8 (06 Jan 2019 19:12)  HR: 70 (07 Jan 2019 07:48) (68 - 130)  BP: 157/83 (07 Jan 2019 07:48) (150/81 - 188/100)  BP(mean): --  RR: 16 (07 Jan 2019 07:48) (16 - 20)  SpO2: 95% (07 Jan 2019 07:48) (95% - 98%)    NEUROLOGICAL EXAM:  Mental status: eyes open, not following commands, delirious.  Cranial Nerves: No facial asymmetry, right homonymous hemianopsia by blink to threat, no nystagmus, no dysarthria, tongue midline  Motor exam: right hemiparesis slightly improved (RUE moving spontaneously and RLE 4/5), LUE and LLE - 5/5   Sensation: Intact to light touch with withdrawal to noxious stimuli (purposeful)   Almost rhythmic/semi-arrhythmic twitching of the right face                                      11.0   9.21  )-----------( 307      ( 04 Jan 2019 08:15 )             34.2   01-05    145  |  106  |  6<L>  ----------------------------<  129<H>  2.9<LL>   |  23  |  0.74    Ca    8.5      05 Jan 2019 12:54    MEDICATIONS  (STANDING):  acetaminophen   Tablet .. 650 milliGRAM(s) Oral every 6 hours  aspirin Suppository 300 milliGRAM(s) Rectal daily  atorvastatin 80 milliGRAM(s) Oral at bedtime  carvedilol 25 milliGRAM(s) Oral every 12 hours  cefTRIAXone   IVPB      cefTRIAXone   IVPB 1 Gram(s) IV Intermittent every 24 hours  dextrose 5% + sodium chloride 0.45%. 1000 milliLiter(s) (125 mL/Hr) IV Continuous <Continuous>  dextrose 50% Injectable 12.5 Gram(s) IV Push once  dextrose 50% Injectable 25 Gram(s) IV Push once  dextrose 50% Injectable 25 Gram(s) IV Push once  enoxaparin Injectable 40 milliGRAM(s) SubCutaneous daily  insulin lispro (HumaLOG) corrective regimen sliding scale   SubCutaneous every 6 hours  lacosamide IVPB 200 milliGRAM(s) IV Intermittent every 12 hours  losartan 100 milliGRAM(s) Oral daily  nystatin Ointment 1 Application(s) Topical every 12 hours  OLANZapine Injectable 5 milliGRAM(s) IntraMuscular every 12 hours  valproate sodium IVPB 750 milliGRAM(s) IV Intermittent every 8 hours    MEDICATIONS  (PRN):  dextrose 40% Gel 15 Gram(s) Oral once PRN Blood Glucose LESS THAN 70 milliGRAM(s)/deciliter  glucagon  Injectable 1 milliGRAM(s) IntraMuscular once PRN Glucose LESS THAN 70 milligrams/deciliter  hydrALAZINE Injectable 10 milliGRAM(s) IV Push every 6 hours PRN SBP>160  labetalol Injectable 10 milliGRAM(s) IV Push every 6 hours PRN Systolic blood pressure >160  LORazepam   Injectable 2 milliGRAM(s) IV Push every 5 minutes PRN Sustained GTC Seizure  OLANZapine Injectable 5 milliGRAM(s) IntraMuscular every 24 hours PRN agitation        < from: MR Head No Cont (01.04.19 @ 21:20) >  Redemonstration of an evolving left temporal 4.3 x 2.3 x 1.6 cm, AP, TR, CC intraparenchymal hemorrhage.  Redemonstration of restricted diffusion and hyperintense DWI signal consistent with left MCA infarcts with more pronounced restricted   diffusion and hyperintense DWI signal in the left parietal lobe near the vertex.  Interval new diffuse  hyperintense T2 and FLAIR signal throughout the right cerebral hemisphere including parietal occipital lobes without   restricted diffusion and ADC T2 shine through this finding may be seen with posterior reversible encephalopathy strongly suggest correlation   with any elevation of blood pressure or seizure activity.  Intracranial vessels remain patent in the proximal A1 and M1 segments with stenoses ofdistal branches. Fusiform narrowing of portions of the   posterior circulation, vasospasm not excluded.     EEG - ongoing     Yesterday 01/06/2019    Abnormal EEG in the awake states.  -Lateralized Periodic Discharges (LPDs), Regional, Left occipital, maximum O1  -Continuous Slowing, Lateralized, Left hemisphere (delta and theta)  -Slowing, Generalized (theta>delta)

## 2019-01-07 NOTE — PROGRESS NOTE ADULT - SUBJECTIVE AND OBJECTIVE BOX
THE PATIENT WAS SEEN AND EXAMINED BY ME WITH THE HOUSESTAFF AND STROKE TEAM DURING MORNING ROUNDS.     HPI:  63 year old woman with vascular risk factors of age, HTN, DM II, HPLD and CAD was evaluated at [facility] for language disturbance. On 11/27 she underwent T10 laminectomy and fusion. Postoperatively, she was noted to have Wernicke's aphasia due to left temporal lobar ICH of undetermined etiology. She reported to have had significant improvement in her language deficit in the rehabilitation. In the evening of 12/24, she was reported to have worsening of her aphasia, prompting her presentation to OSH and subsequent transfer to [facility].     ROS: All negative except documented in the HPI.     SUBJECTIVE: No events overnight.  No new neurologic complaints.      acetaminophen   Tablet .. 650 milliGRAM(s) Oral every 6 hours  aspirin Suppository 300 milliGRAM(s) Rectal daily  atorvastatin 80 milliGRAM(s) Oral at bedtime  carvedilol 25 milliGRAM(s) Oral every 12 hours  cefTRIAXone   IVPB      cefTRIAXone   IVPB 1 Gram(s) IV Intermittent every 24 hours  dextrose 40% Gel 15 Gram(s) Oral once PRN  dextrose 50% Injectable 12.5 Gram(s) IV Push once  dextrose 50% Injectable 25 Gram(s) IV Push once  dextrose 50% Injectable 25 Gram(s) IV Push once  enoxaparin Injectable 40 milliGRAM(s) SubCutaneous daily  glucagon  Injectable 1 milliGRAM(s) IntraMuscular once PRN  hydrALAZINE Injectable 10 milliGRAM(s) IV Push every 6 hours PRN  insulin lispro (HumaLOG) corrective regimen sliding scale   SubCutaneous every 6 hours  labetalol Injectable 10 milliGRAM(s) IV Push every 6 hours PRN  lacosamide IVPB 200 milliGRAM(s) IV Intermittent every 12 hours  LORazepam   Injectable 2 milliGRAM(s) IV Push every 5 minutes PRN  losartan 100 milliGRAM(s) Oral daily  nystatin Ointment 1 Application(s) Topical every 12 hours  OLANZapine Injectable 5 milliGRAM(s) IntraMuscular every 12 hours  OLANZapine Injectable 5 milliGRAM(s) IntraMuscular every 24 hours PRN  sodium chloride 0.9%. 1000 milliLiter(s) IV Continuous <Continuous>  valproate sodium IVPB 750 milliGRAM(s) IV Intermittent every 8 hours      PHYSICAL EXAM:   Vital Signs Last 24 Hrs  T(C): 36.6 (07 Jan 2019 15:24), Max: 36.8 (07 Jan 2019 07:48)  T(F): 97.9 (07 Jan 2019 15:24), Max: 98.3 (07 Jan 2019 07:48)  HR: 88 (07 Jan 2019 20:00) (70 - 130)  BP: 159/91 (07 Jan 2019 20:00) (150/70 - 191/94)  BP(mean): 110 (07 Jan 2019 20:00) (101 - 110)  RR: 25 (07 Jan 2019 20:00) (15 - 25)  SpO2: 99% (07 Jan 2019 20:00) (95% - 99%)    General: crying, no distress noted  HEENT: EOM intact, right homonymous hemianopsia by blink to threat   Abdomen: Soft, nontender, nondistended   Extremities: No edema    NEUROLOGICAL EXAM:  Mental status: eyes open, no verbal output, not following commands.  Cranial Nerves: No facial asymmetry, right homonymous hemianopsia by blink to threat, no nystagmus, no dysarthria, tongue midline  Motor exam: mild right hemiparesis (RUE and RLE 5-/5), LUE and LLE - 5/5   Sensation: Intact to light touch   Coordination/ Gait: No dysmetria, gait deferred     LABS:                        10.7   5.1   )-----------( 299      ( 07 Jan 2019 08:54 )             32.7    01-07    145  |  107  |  6<L>  ----------------------------<  121<H>  3.6   |  25  |  0.73    Ca    8.3<L>      07 Jan 2019 08:54    TPro  5.5<L>  /  Alb  2.9<L>  /  TBili  0.2  /  DBili  x   /  AST  18  /  ALT  10  /  AlkPhos  107  01-07    Hemoglobin A1C, Whole Blood: 6.7 % (12-28 @ 07:02)  Hemoglobin A1C, Whole Blood: 7.5 % (11-09 @ 16:00)    IMAGING: Reviewed by me.     CT Head No Cont (01.01.19)  No gross evidence for new infarct or new hemorrhage compared to the   12/30/2018 head CT and 12/26/2018 brain MRI.     CT Head No Cont (12.30.18)  No gross evidence for new infarct or new hemorrhage compared to the   12/26/2018 MRI study.    MRI Head w/wo IV Cont (12.26.18)  Left temporal lobe hematoma without significant interval change in size.  Small amount of subarachnoid blood.  New acute left MCA distribution infarct.  Prominent left temporal region cortical vein. Clinical correlation will   determine the need for conventional angiography to exclude the   possibility of a vascular malformation.    (12.25.18)  CT brain:  Resolving left temporal lobe parenchymal hemorrhage is redemonstrated,   similar to the prior brain CT.    CT angiography neck:  No evidence for arterial dissection. No flow-limiting stenosis. Carotid   bifurcations unremarkable.    CT angiography brain:  No evidence for AVM. No vascular aneurysm or flow-limiting stenosis.    CT venography brain:  No evidence for venous sinus or cortical vein thrombosis. THE PATIENT WAS SEEN AND EXAMINED BY ME WITH THE HOUSESTAFF AND STROKE TEAM DURING MORNING ROUNDS.     HPI:  63 year old woman with vascular risk factors of age, HTN, DM II, HPLD and CAD was evaluated at [facility] for language disturbance. On 11/27 she underwent T10 laminectomy and fusion. Postoperatively, she was noted to have Wernicke's aphasia due to left temporal lobar ICH of undetermined etiology. She reported to have had significant improvement in her language deficit in the rehabilitation. In the evening of 12/24, she was reported to have worsening of her aphasia, prompting her presentation to OSH and subsequent transfer to [facility].     ROS: All negative except documented in the HPI.     SUBJECTIVE: No events overnight.  No new neurologic complaints.      acetaminophen   Tablet .. 650 milliGRAM(s) Oral every 6 hours  aspirin Suppository 300 milliGRAM(s) Rectal daily  atorvastatin 80 milliGRAM(s) Oral at bedtime  carvedilol 25 milliGRAM(s) Oral every 12 hours  cefTRIAXone   IVPB      cefTRIAXone   IVPB 1 Gram(s) IV Intermittent every 24 hours  dextrose 40% Gel 15 Gram(s) Oral once PRN  dextrose 50% Injectable 12.5 Gram(s) IV Push once  dextrose 50% Injectable 25 Gram(s) IV Push once  dextrose 50% Injectable 25 Gram(s) IV Push once  enoxaparin Injectable 40 milliGRAM(s) SubCutaneous daily  glucagon  Injectable 1 milliGRAM(s) IntraMuscular once PRN  hydrALAZINE Injectable 10 milliGRAM(s) IV Push every 6 hours PRN  insulin lispro (HumaLOG) corrective regimen sliding scale   SubCutaneous every 6 hours  labetalol Injectable 10 milliGRAM(s) IV Push every 6 hours PRN  lacosamide IVPB 200 milliGRAM(s) IV Intermittent every 12 hours  LORazepam   Injectable 2 milliGRAM(s) IV Push every 5 minutes PRN  losartan 100 milliGRAM(s) Oral daily  nystatin Ointment 1 Application(s) Topical every 12 hours  OLANZapine Injectable 5 milliGRAM(s) IntraMuscular every 12 hours  OLANZapine Injectable 5 milliGRAM(s) IntraMuscular every 24 hours PRN  sodium chloride 0.9%. 1000 milliLiter(s) IV Continuous <Continuous>  valproate sodium IVPB 750 milliGRAM(s) IV Intermittent every 8 hours      PHYSICAL EXAM:   Vital Signs Last 24 Hrs  T(C): 36.6 (07 Jan 2019 15:24), Max: 36.8 (07 Jan 2019 07:48)  T(F): 97.9 (07 Jan 2019 15:24), Max: 98.3 (07 Jan 2019 07:48)  HR: 88 (07 Jan 2019 20:00) (70 - 130)  BP: 159/91 (07 Jan 2019 20:00) (150/70 - 191/94)  BP(mean): 110 (07 Jan 2019 20:00) (101 - 110)  RR: 25 (07 Jan 2019 20:00) (15 - 25)  SpO2: 99% (07 Jan 2019 20:00) (95% - 99%)    General: crying, no distress noted  HEENT: EOM intact, right homonymous hemianopsia by blink to threat   Abdomen: Soft, nontender, nondistended   Extremities: No edema    NEUROLOGICAL EXAM:  Mental status: eyes open, no verbal output except crying, not following commands.  Cranial Nerves: No facial asymmetry, right homonymous hemianopsia by blink to threat, no nystagmus, no dysarthria, tongue midline  Motor exam: mild right hemiparesis (RUE and RLE 5-/5), LUE and LLE - 5/5   Sensation: Intact to light touch   Coordination/ Gait: No dysmetria, gait deferred     LABS:                        10.7   5.1   )-----------( 299      ( 07 Jan 2019 08:54 )             32.7    01-07    145  |  107  |  6<L>  ----------------------------<  121<H>  3.6   |  25  |  0.73    Ca    8.3<L>      07 Jan 2019 08:54    TPro  5.5<L>  /  Alb  2.9<L>  /  TBili  0.2  /  DBili  x   /  AST  18  /  ALT  10  /  AlkPhos  107  01-07    Hemoglobin A1C, Whole Blood: 6.7 % (12-28 @ 07:02)  Hemoglobin A1C, Whole Blood: 7.5 % (11-09 @ 16:00)    IMAGING: Reviewed by me.     CT Head No Cont (01.01.19)  No gross evidence for new infarct or new hemorrhage compared to the   12/30/2018 head CT and 12/26/2018 brain MRI.     CT Head No Cont (12.30.18)  No gross evidence for new infarct or new hemorrhage compared to the   12/26/2018 MRI study.    MRI Head w/wo IV Cont (12.26.18)  Left temporal lobe hematoma without significant interval change in size.  Small amount of subarachnoid blood.  New acute left MCA distribution infarct.  Prominent left temporal region cortical vein. Clinical correlation will   determine the need for conventional angiography to exclude the   possibility of a vascular malformation.    (12.25.18)  CT brain:  Resolving left temporal lobe parenchymal hemorrhage is redemonstrated,   similar to the prior brain CT.    CT angiography neck:  No evidence for arterial dissection. No flow-limiting stenosis. Carotid   bifurcations unremarkable.    CT angiography brain:  No evidence for AVM. No vascular aneurysm or flow-limiting stenosis.    CT venography brain:  No evidence for venous sinus or cortical vein thrombosis.

## 2019-01-07 NOTE — PROGRESS NOTE ADULT - ASSESSMENT
63F with a history of CAD s/p PCI to OM2 with a AVE in December of 2015, residual 40% LAD disease, neg stress test in 1/2018, osteoarthritis, DM, HTN, HLD who presents as a transfer from [facility] for aphasia now with temporal ICH and new left MCA infarct. She is now withevolution of left MCA distribution infarct and prior left temporal ICH leading to encephalomalacia. She is having focal seizures and facial twitching.     - Cont neuro rx  - She has a hx of a remote PCI.   - No anginal symptoms recently reported  - Cont ASA  - Has a moderate focal atheroma in Aortic Arch. Cont high intensity statin for now.  - s/p ILR placement prior to weekend given suspicion for embolic nature of CVA. No af noted on telemetry to date.  - intermittent Sinus tachycardiac in setting of agitation. Continue to watch  - BP is poorly controlled. Continue with losartan and Coreg at current doses. Add Procardia XL 30 mg po daily and titrate up  - Appears compensated from HF POV.   - Monitor and replete electrolytes. Keep K>4.0 and Mg>2.0.  -  Further cardiac workup will depend on clinical course.   - All other workup per primary team. Will followup.

## 2019-01-07 NOTE — PROGRESS NOTE ADULT - ASSESSMENT
63 years old woman with vascular risk factors of age, HTN, DM II, HPLD and CAD was evaluated at [facility] for language disturbance. On 11/27 she underwent T10 laminectomy and fusion. Postoperatively, she was noted to have Wernicke's aphasia due to left temporal lobar ICH of undetermined etiology. She reported to have had significant improvement in her language deficit in the rehabilitation. In the evening of 12/24, she was reported to have worsening of her aphasia, prompting her presentation to OSH and subsequent transfer to [facility]. CT brain or admission showed expected evolution/resolution of left temporal ICH leading to development of encephalomalacia. MRI brain showed acute infarct in the left MCA distribution adjacent to the prior hemorrhage in the late subacute to chronic stage and expected evolution of prior left frontotemporal convexal subarachnoid hemorrhage as well as was concerning for a dilated cortical vein anterior to the hematoma. Of note, MRI brain (12/7) showed left temporal lobar ICH, left frontotemporal convexal SAH and juxtacortical FLAIR hyperintensities concerning for PRES to my eye. CT brain on 12/30, showed expected evolution of left MCA distribution infarct and prior left temporal ICH leading to encephalomalacia.    Impression:   Left MCA distribution stroke - likely etiology being cryptogenic stroke, probably related to embolism from a proximal source, like cardiac/paradoxical source of embolism  Recent left temporal lobar ICH with associated left frontotemporal convexal subarachnoid hemorrhage - likely etiology could be intracranial hemorrhages in the setting of PRES but possibility of underlying vascular malformation like micro-AVM or large vessel vasculopathy like vasculitis needs to  acute ischemic be ruled out   Seizures - Simple partial seizures/status without secondary generalization - likely etiology being symptomatic seizure in the setting of new/acute ischemic infarct versus 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 Physical therapy/OT recommended acute rehab. Consider repeat MRI brain with and without contrast/MR spectroscopy in about 4-6 weeks to rule out any underlying tissue pathology, preferably upon resolution/complete resorption of prior left temporal lobar ICH; Conventional angiogram to rule out underlying vascular malformation could be considered based on results of a repeat brain imaging/vessel imaging    ANTITHROMBOTIC THERAPY: Considering CT/MRI findings suggestive of late subacute to chronic ICH and new/acute ischemic infarct, benefits of continuing aspirin at this time would outweigh potential risks     PULMONARY: CXR clear, protecting airway, saturating well     CARDIOVASCULAR: IMTIAZ showed EF 74% moderate mitral regurgitation. Moderate focal atheroma noted in aortic arch/descending aorta, continue cardiac monitoring, s/p ICM to screen for occult cardiac arrhythmia like atrial fibrillation being the cause of possible cardiac source of embolism     SBP goal: 120-150    GASTROINTESTINAL: dysphagia screen passed- tolerating diet       Diet: Regular    RENAL: BUN/Cr without acute change, good urine output      Na Goal: Greater than 135     Rodriugez: no    HEMATOLOGY: H/H without acute change, Platelets 229     DVT ppx: lovenox subq    ID: afebrile, no sign of infection    OTHER: Plan endorsed to patient  and daughter at bedside. All questions and concerns addressed.    DISPOSITION: Acute rehab    CORE MEASURES:        Admission NIHSS: 0     TPA: [] YES [x] NO      LDL/HDL: 44/35     Depression Screen: unable to obtain due to aphasia     Statin Therapy: y     Dysphagia Screen: [x] PASS [] FAIL     Smoking [] YES [x] NO      Afib [] YES [x] NO     Stroke Education [x] YES [] NO

## 2019-01-07 NOTE — CHART NOTE - NSCHARTNOTEFT_GEN_A_CORE
Transcranial doppler exam #1 (1/7/19) 13:30pm  mean velocity  cm/sec                              Left         Right  DARY                    28           56  MCA                   37           43  PCA                    20,19       17,26  VERT                  30            34  BA                             42  Full report to follow.  Brendan

## 2019-01-07 NOTE — PROGRESS NOTE ADULT - SUBJECTIVE AND OBJECTIVE BOX
[facility] - Bora Mcfarland Grossman, [place], Barry, Noah Lock  Office Number:  [phone]    Patient resting comfortably in bed in NAD. NGT in place  unable to assess interval history  1:1 for safety    ROS: negative unless otherwise mentioned.    Telemetry:  SR    MEDICATIONS  (STANDING):  acetaminophen   Tablet .. 650 milliGRAM(s) Oral every 6 hours  aspirin Suppository 300 milliGRAM(s) Rectal daily  atorvastatin 80 milliGRAM(s) Oral at bedtime  carvedilol 25 milliGRAM(s) Oral every 12 hours  cefTRIAXone   IVPB      cefTRIAXone   IVPB 1 Gram(s) IV Intermittent every 24 hours  dextrose 5% + sodium chloride 0.45%. 1000 milliLiter(s) (125 mL/Hr) IV Continuous <Continuous>  dextrose 50% Injectable 12.5 Gram(s) IV Push once  dextrose 50% Injectable 25 Gram(s) IV Push once  dextrose 50% Injectable 25 Gram(s) IV Push once  enoxaparin Injectable 40 milliGRAM(s) SubCutaneous daily  insulin lispro (HumaLOG) corrective regimen sliding scale   SubCutaneous every 6 hours  lacosamide IVPB 200 milliGRAM(s) IV Intermittent every 12 hours  losartan 100 milliGRAM(s) Oral daily  nystatin Ointment 1 Application(s) Topical every 12 hours  OLANZapine Injectable 5 milliGRAM(s) IntraMuscular every 12 hours  valproate sodium IVPB 750 milliGRAM(s) IV Intermittent every 8 hours    MEDICATIONS  (PRN):  dextrose 40% Gel 15 Gram(s) Oral once PRN Blood Glucose LESS THAN 70 milliGRAM(s)/deciliter  glucagon  Injectable 1 milliGRAM(s) IntraMuscular once PRN Glucose LESS THAN 70 milligrams/deciliter  hydrALAZINE Injectable 10 milliGRAM(s) IV Push every 6 hours PRN SBP>160  labetalol Injectable 10 milliGRAM(s) IV Push every 6 hours PRN Systolic blood pressure >160  LORazepam   Injectable 2 milliGRAM(s) IV Push every 5 minutes PRN Sustained GTC Seizure  OLANZapine Injectable 5 milliGRAM(s) IntraMuscular every 24 hours PRN agitation      Allergies    No Known Allergies    Intolerances        Vital Signs Last 24 Hrs  T(C): 36.8 (07 Jan 2019 07:48), Max: 37.1 (06 Jan 2019 19:12)  T(F): 98.3 (07 Jan 2019 07:48), Max: 98.8 (06 Jan 2019 19:12)  HR: 70 (07 Jan 2019 07:48) (68 - 130)  BP: 157/83 (07 Jan 2019 07:48) (150/81 - 188/100)  BP(mean): --  RR: 16 (07 Jan 2019 07:48) (16 - 20)  SpO2: 95% (07 Jan 2019 07:48) (95% - 98%)    I&O's Summary    06 Jan 2019 07:01  -  07 Jan 2019 07:00  --------------------------------------------------------  IN: 1450 mL / OUT: 0 mL / NET: 1450 mL        ON EXAM:    Constitutional: NAD, frail  Eyes:  Pupils round, no lesions  ENMT: no exudate or erythema  Pulmonary: breath sounds are clear bilaterally, No wheezing, rales or rhonchi  Cardiovascular: PMI not palpable RRR normal S1 and S2, no murmurs, rubs, gallops or clicks  Gastrointestinal: Bowel Sounds present, soft, nontender.   Lymph: No cervical lymphadenopathy.  Neurological:  unable to assess  Skin: No rashes.  No cyanosis.  Psych: unable to assess  Ext: No lower ext edema  LABS: All Labs Reviewed:                        11.0   9.21  )-----------( 307      ( 04 Jan 2019 08:15 )             34.2     06 Jan 2019 18:21    144    |  107    |  5      ----------------------------<  142    3.4     |  22     |  0.69   06 Jan 2019 06:45    146    |  107    |  6      ----------------------------<  104    3.3     |  24     |  0.74   05 Jan 2019 12:54    145    |  106    |  6      ----------------------------<  129    2.9     |  23     |  0.74     Ca    7.9        06 Jan 2019 18:21  Ca    8.2        06 Jan 2019 06:45  Ca    8.5        05 Jan 2019 12:54            Blood Culture: Organism Escherichia coli  Gram Stain Blood -- Gram Stain --  Specimen Source .Urine Catheterized  Culture-Blood --

## 2019-01-07 NOTE — PROGRESS NOTE ADULT - ASSESSMENT
62 yo F with vascular risk factors of age, HTN, DM II, HPLD and CAD was evaluated at [facility] for language disturbance. On 11/27 she underwent T10 laminectomy and fusion. Postoperatively, she was noted to have Wernicke's aphasia assoc with left temporal lobar ICH/edema of undetermined etiology.  In the evening of 12/24, she was reported to have worsening of her aphasia, prompting her presentation to OSH and subsequent transfer to [facility]. CT brain or admission showed expected evolution/resolution of left temporal ICH leading to development of encephalomalacia. MRI brain showed acute infarct in the left MCA distribution adjacent to the prior hemorrhage in the late subacute to chronic stage and expected evolution of prior left frontotemporal convexal subarachnoid hemorrhage as well as was concerning for a dilated cortical vein anterior to the hematoma.     MRI brain  1/5/18 showed left temporal lobar ICH, Left MCA infarct, left frontotemporal convexal SAH and juxtacortical FLAIR hyperintensities concerning for PRES.   hypokalemia    NG tube placed on 1/6, pending XRAY to start PO BP meds, PRES and RCVS are within differential. Team to consider conventional angiogram.    Impression and Plan     concern for PRES/RCVS with left MCA infarct / bleed, ?vasospasm  needs better HTN control, cautious reduction target 10-25% reduction / day  cardiology/medicine consult requested  - BP control (SBP < 160), metoprolol 5 mg q6, labetalol 10 mg q6; PRN meds Hydralazine and labetalol 10 q6h  some component of spasm of vessels, consider CCB/nifedipine if ok per cardiology via NGT    (ii) EEG shows repetitive spiking - PLEDs - from left occipital region, no seizures x few days.   - keppra dc'd (may be contributing to agitation)   - On Depakote 750 TID    - On vimpat 200 BID     (iii) UTI   - Positive ua   - started  on ceftriaxone  01/04/2018    (iv) delirium - Zyprexa 5mg IM q12    (v) LS spine surgery wound reported possible dehiscence.  - Ortho and Plastic recommendations appreciated

## 2019-01-08 LAB
ANION GAP SERPL CALC-SCNC: 11 MMOL/L — SIGNIFICANT CHANGE UP (ref 5–17)
BUN SERPL-MCNC: 8 MG/DL — SIGNIFICANT CHANGE UP (ref 7–23)
CALCIUM SERPL-MCNC: 8.6 MG/DL — SIGNIFICANT CHANGE UP (ref 8.4–10.5)
CHLORIDE SERPL-SCNC: 109 MMOL/L — HIGH (ref 96–108)
CO2 SERPL-SCNC: 24 MMOL/L — SIGNIFICANT CHANGE UP (ref 22–31)
CREAT SERPL-MCNC: 0.7 MG/DL — SIGNIFICANT CHANGE UP (ref 0.5–1.3)
GLUCOSE BLDC GLUCOMTR-MCNC: 106 MG/DL — HIGH (ref 70–99)
GLUCOSE BLDC GLUCOMTR-MCNC: 111 MG/DL — HIGH (ref 70–99)
GLUCOSE BLDC GLUCOMTR-MCNC: 116 MG/DL — HIGH (ref 70–99)
GLUCOSE BLDC GLUCOMTR-MCNC: 123 MG/DL — HIGH (ref 70–99)
GLUCOSE SERPL-MCNC: 118 MG/DL — HIGH (ref 70–99)
HCT VFR BLD CALC: 33.7 % — LOW (ref 34.5–45)
HGB BLD-MCNC: 10.6 G/DL — LOW (ref 11.5–15.5)
MCHC RBC-ENTMCNC: 28.7 PG — SIGNIFICANT CHANGE UP (ref 27–34)
MCHC RBC-ENTMCNC: 31.5 GM/DL — LOW (ref 32–36)
MCV RBC AUTO: 91.3 FL — SIGNIFICANT CHANGE UP (ref 80–100)
PLATELET # BLD AUTO: 308 K/UL — SIGNIFICANT CHANGE UP (ref 150–400)
POTASSIUM SERPL-MCNC: 3.9 MMOL/L — SIGNIFICANT CHANGE UP (ref 3.5–5.3)
POTASSIUM SERPL-SCNC: 3.9 MMOL/L — SIGNIFICANT CHANGE UP (ref 3.5–5.3)
RBC # BLD: 3.69 M/UL — LOW (ref 3.8–5.2)
RBC # FLD: 17.4 % — HIGH (ref 10.3–14.5)
SODIUM SERPL-SCNC: 144 MMOL/L — SIGNIFICANT CHANGE UP (ref 135–145)
VALPROATE SERPL-MCNC: 88 UG/ML — SIGNIFICANT CHANGE UP (ref 50–100)
WBC # BLD: 6.4 K/UL — SIGNIFICANT CHANGE UP (ref 3.8–10.5)
WBC # FLD AUTO: 6.4 K/UL — SIGNIFICANT CHANGE UP (ref 3.8–10.5)

## 2019-01-08 PROCEDURE — 99233 SBSQ HOSP IP/OBS HIGH 50: CPT

## 2019-01-08 PROCEDURE — 99232 SBSQ HOSP IP/OBS MODERATE 35: CPT

## 2019-01-08 PROCEDURE — 93970 EXTREMITY STUDY: CPT | Mod: 26

## 2019-01-08 PROCEDURE — 93971 EXTREMITY STUDY: CPT | Mod: 26,LT,59

## 2019-01-08 RX ORDER — LACOSAMIDE 50 MG/1
200 TABLET ORAL EVERY 12 HOURS
Qty: 0 | Refills: 0 | Status: DISCONTINUED | OUTPATIENT
Start: 2019-01-08 | End: 2019-01-13

## 2019-01-08 RX ORDER — ASPIRIN/CALCIUM CARB/MAGNESIUM 324 MG
81 TABLET ORAL DAILY
Qty: 0 | Refills: 0 | Status: DISCONTINUED | OUTPATIENT
Start: 2019-01-08 | End: 2019-01-22

## 2019-01-08 RX ADMIN — Medication 650 MILLIGRAM(S): at 06:01

## 2019-01-08 RX ADMIN — Medication 650 MILLIGRAM(S): at 11:44

## 2019-01-08 RX ADMIN — Medication 25 MILLIGRAM(S): at 06:02

## 2019-01-08 RX ADMIN — OLANZAPINE 5 MILLIGRAM(S): 15 TABLET, FILM COATED ORAL at 17:08

## 2019-01-08 RX ADMIN — Medication 650 MILLIGRAM(S): at 17:08

## 2019-01-08 RX ADMIN — CEFTRIAXONE 100 GRAM(S): 500 INJECTION, POWDER, FOR SOLUTION INTRAMUSCULAR; INTRAVENOUS at 03:09

## 2019-01-08 RX ADMIN — ENOXAPARIN SODIUM 40 MILLIGRAM(S): 100 INJECTION SUBCUTANEOUS at 11:45

## 2019-01-08 RX ADMIN — CARVEDILOL PHOSPHATE 25 MILLIGRAM(S): 80 CAPSULE, EXTENDED RELEASE ORAL at 17:07

## 2019-01-08 RX ADMIN — Medication 25 MILLIGRAM(S): at 13:50

## 2019-01-08 RX ADMIN — Medication 650 MILLIGRAM(S): at 00:41

## 2019-01-08 RX ADMIN — ATORVASTATIN CALCIUM 80 MILLIGRAM(S): 80 TABLET, FILM COATED ORAL at 22:05

## 2019-01-08 RX ADMIN — Medication 650 MILLIGRAM(S): at 12:14

## 2019-01-08 RX ADMIN — LACOSAMIDE 140 MILLIGRAM(S): 50 TABLET ORAL at 12:14

## 2019-01-08 RX ADMIN — Medication 650 MILLIGRAM(S): at 06:32

## 2019-01-08 RX ADMIN — Medication 650 MILLIGRAM(S): at 00:09

## 2019-01-08 RX ADMIN — NYSTATIN CREAM 1 APPLICATION(S): 100000 CREAM TOPICAL at 17:09

## 2019-01-08 RX ADMIN — CARVEDILOL PHOSPHATE 25 MILLIGRAM(S): 80 CAPSULE, EXTENDED RELEASE ORAL at 06:01

## 2019-01-08 RX ADMIN — SODIUM CHLORIDE 60 MILLILITER(S): 9 INJECTION INTRAMUSCULAR; INTRAVENOUS; SUBCUTANEOUS at 17:08

## 2019-01-08 RX ADMIN — Medication 25 MILLIGRAM(S): at 22:05

## 2019-01-08 RX ADMIN — NYSTATIN CREAM 1 APPLICATION(S): 100000 CREAM TOPICAL at 06:03

## 2019-01-08 RX ADMIN — AMLODIPINE BESYLATE 10 MILLIGRAM(S): 2.5 TABLET ORAL at 06:01

## 2019-01-08 RX ADMIN — SODIUM CHLORIDE 60 MILLILITER(S): 9 INJECTION INTRAMUSCULAR; INTRAVENOUS; SUBCUTANEOUS at 06:01

## 2019-01-08 RX ADMIN — LACOSAMIDE 140 MILLIGRAM(S): 50 TABLET ORAL at 22:19

## 2019-01-08 RX ADMIN — Medication 650 MILLIGRAM(S): at 17:38

## 2019-01-08 RX ADMIN — LOSARTAN POTASSIUM 100 MILLIGRAM(S): 100 TABLET, FILM COATED ORAL at 06:01

## 2019-01-08 RX ADMIN — OLANZAPINE 5 MILLIGRAM(S): 15 TABLET, FILM COATED ORAL at 05:18

## 2019-01-08 RX ADMIN — Medication 81 MILLIGRAM(S): at 11:44

## 2019-01-08 NOTE — PROGRESS NOTE ADULT - ASSESSMENT
63yoF w/ PMHx significant for OA, DM, HTN, HLD, recent L2-5 Laminectomy complicated by left temporal lobar hemorrhage post op on 12/3/18, patient clotilde presented to [facility] on 12/24 from rehab for worsening aphasia and CTH found worsening hemorrhage. She was transferred to [facility] for further care. MRI here showed acute L MCA infarct as well as L frontotemporal convexal SAH and juxtacortical FLAIR concerning for PRES syndrome. Medicine follow up for blood pressure control.

## 2019-01-08 NOTE — PROGRESS NOTE ADULT - SUBJECTIVE AND OBJECTIVE BOX
THE PATIENT WAS SEEN AND EXAMINED BY ME WITH THE HOUSESTAFF AND STROKE TEAM DURING MORNING ROUNDS.     HPI:  63 year old woman with vascular risk factors of age, HTN, DM II, HPLD and CAD was evaluated at [facility] for language disturbance. On 11/27 she underwent T10 laminectomy and fusion. Postoperatively, she was noted to have Wernicke's aphasia due to left temporal lobar ICH of undetermined etiology. She reported to have had significant improvement in her language deficit in the rehabilitation. In the evening of 12/24, she was reported to have worsening of her aphasia, prompting her presentation to OSH and subsequent transfer to [facility].     ROS: All negative except documented in the HPI.     SUBJECTIVE: No events overnight.  No new neurologic complaints.      acetaminophen   Tablet .. 650 milliGRAM(s) Oral every 6 hours  amLODIPine   Tablet 10 milliGRAM(s) Oral daily  aspirin Suppository 300 milliGRAM(s) Rectal daily  atorvastatin 80 milliGRAM(s) Oral at bedtime  carvedilol 25 milliGRAM(s) Oral every 12 hours  cefTRIAXone   IVPB      cefTRIAXone   IVPB 1 Gram(s) IV Intermittent every 24 hours  dextrose 40% Gel 15 Gram(s) Oral once PRN  dextrose 50% Injectable 12.5 Gram(s) IV Push once  dextrose 50% Injectable 25 Gram(s) IV Push once  dextrose 50% Injectable 25 Gram(s) IV Push once  enoxaparin Injectable 40 milliGRAM(s) SubCutaneous daily  glucagon  Injectable 1 milliGRAM(s) IntraMuscular once PRN  hydrALAZINE Injectable 10 milliGRAM(s) IV Push every 6 hours PRN  insulin lispro (HumaLOG) corrective regimen sliding scale   SubCutaneous every 6 hours  labetalol Injectable 10 milliGRAM(s) IV Push every 6 hours PRN  LORazepam   Injectable 2 milliGRAM(s) IV Push every 5 minutes PRN  losartan 100 milliGRAM(s) Oral daily  nystatin Ointment 1 Application(s) Topical every 12 hours  OLANZapine Injectable 5 milliGRAM(s) IntraMuscular every 12 hours  OLANZapine Injectable 5 milliGRAM(s) IntraMuscular every 24 hours PRN  sodium chloride 0.9%. 1000 milliLiter(s) IV Continuous <Continuous>  valproate sodium IVPB 750 milliGRAM(s) IV Intermittent every 8 hours    PHYSICAL EXAM:   Vital Signs Last 24 Hrs  T(C): 36.7 (07 Jan 2019 20:00), Max: 36.7 (07 Jan 2019 11:55)  T(F): 98.1 (07 Jan 2019 20:00), Max: 98.1 (07 Jan 2019 20:00)  HR: 99 (08 Jan 2019 08:00) (69 - 111)  BP: 138/85 (08 Jan 2019 08:00) (117/55 - 191/94)  BP(mean): 99 (08 Jan 2019 08:00) (68 - 110)  RR: 22 (08 Jan 2019 08:00) (14 - 25)  SpO2: 99% (08 Jan 2019 08:00) (95% - 99%)    General: crying, no distress noted  HEENT: EOM intact, right homonymous hemianopsia by blink to threat   Abdomen: Soft, nontender, nondistended   Extremities: No edema    NEUROLOGICAL EXAM:  Mental status: eyes open, no verbal output except crying, not following commands.  Cranial Nerves: No facial asymmetry, right homonymous hemianopsia by blink to threat, no nystagmus, no dysarthria, tongue midline  Motor exam: mild right hemiparesis (RUE and RLE 5-/5), LUE and LLE - 5/5   Sensation: Intact to light touch   Coordination/ Gait: No dysmetria, gait deferred     LABS:                        10.7   5.1   )-----------( 299      ( 07 Jan 2019 08:54 )             32.7    01-08    144  |  109<H>  |  8   ----------------------------<  118<H>  3.9   |  24  |  0.70    Ca    8.6      08 Jan 2019 05:51    TPro  5.5<L>  /  Alb  2.9<L>  /  TBili  0.2  /  DBili  x   /  AST  18  /  ALT  10  /  AlkPhos  107  01-07    Hemoglobin A1C, Whole Blood: 6.7 % (12-28 @ 07:02)  Hemoglobin A1C, Whole Blood: 7.5 % (11-09 @ 16:00)    IMAGING: Reviewed by me.     CT Head No Cont (01.01.19)  No gross evidence for new infarct or new hemorrhage compared to the   12/30/2018 head CT and 12/26/2018 brain MRI.     CT Head No Cont (12.30.18)  No gross evidence for new infarct or new hemorrhage compared to the   12/26/2018 MRI study.    MRI Head w/wo IV Cont (12.26.18)  Left temporal lobe hematoma without significant interval change in size.  Small amount of subarachnoid blood.  New acute left MCA distribution infarct.  Prominent left temporal region cortical vein. Clinical correlation will   determine the need for conventional angiography to exclude the   possibility of a vascular malformation.    (12.25.18)  CT brain:  Resolving left temporal lobe parenchymal hemorrhage is redemonstrated,   similar to the prior brain CT.    CT angiography neck:  No evidence for arterial dissection. No flow-limiting stenosis. Carotid   bifurcations unremarkable.    CT angiography brain:  No evidence for AVM. No vascular aneurysm or flow-limiting stenosis.    CT venography brain:  No evidence for venous sinus or cortical vein thrombosis. THE PATIENT WAS SEEN AND EXAMINED BY ME WITH THE HOUSESTAFF AND STROKE TEAM DURING MORNING ROUNDS on 1/8.     HPI:  63 year old woman with vascular risk factors of age, HTN, DM II, HPLD and CAD was evaluated at [facility] for language disturbance. On 11/27 she underwent T10 laminectomy and fusion. Postoperatively, she was noted to have Wernicke's aphasia due to left temporal lobar ICH of undetermined etiology. She reported to have had significant improvement in her language deficit in the rehabilitation. In the evening of 12/24, she was reported to have worsening of her aphasia, prompting her presentation to OSH and subsequent transfer to [facility].     ROS: All negative except documented in the HPI.     SUBJECTIVE: No events overnight.  No new neurologic complaints.      acetaminophen   Tablet .. 650 milliGRAM(s) Oral every 6 hours  amLODIPine   Tablet 10 milliGRAM(s) Oral daily  aspirin Suppository 300 milliGRAM(s) Rectal daily  atorvastatin 80 milliGRAM(s) Oral at bedtime  carvedilol 25 milliGRAM(s) Oral every 12 hours  cefTRIAXone   IVPB      cefTRIAXone   IVPB 1 Gram(s) IV Intermittent every 24 hours  dextrose 40% Gel 15 Gram(s) Oral once PRN  dextrose 50% Injectable 12.5 Gram(s) IV Push once  dextrose 50% Injectable 25 Gram(s) IV Push once  dextrose 50% Injectable 25 Gram(s) IV Push once  enoxaparin Injectable 40 milliGRAM(s) SubCutaneous daily  glucagon  Injectable 1 milliGRAM(s) IntraMuscular once PRN  hydrALAZINE Injectable 10 milliGRAM(s) IV Push every 6 hours PRN  insulin lispro (HumaLOG) corrective regimen sliding scale   SubCutaneous every 6 hours  labetalol Injectable 10 milliGRAM(s) IV Push every 6 hours PRN  LORazepam   Injectable 2 milliGRAM(s) IV Push every 5 minutes PRN  losartan 100 milliGRAM(s) Oral daily  nystatin Ointment 1 Application(s) Topical every 12 hours  OLANZapine Injectable 5 milliGRAM(s) IntraMuscular every 12 hours  OLANZapine Injectable 5 milliGRAM(s) IntraMuscular every 24 hours PRN  sodium chloride 0.9%. 1000 milliLiter(s) IV Continuous <Continuous>  valproate sodium IVPB 750 milliGRAM(s) IV Intermittent every 8 hours    PHYSICAL EXAM:   Vital Signs Last 24 Hrs  T(C): 36.7 (07 Jan 2019 20:00), Max: 36.7 (07 Jan 2019 11:55)  T(F): 98.1 (07 Jan 2019 20:00), Max: 98.1 (07 Jan 2019 20:00)  HR: 99 (08 Jan 2019 08:00) (69 - 111)  BP: 138/85 (08 Jan 2019 08:00) (117/55 - 191/94)  BP(mean): 99 (08 Jan 2019 08:00) (68 - 110)  RR: 22 (08 Jan 2019 08:00) (14 - 25)  SpO2: 99% (08 Jan 2019 08:00) (95% - 99%)    General: crying, no distress noted  HEENT: EOM intact, right homonymous hemianopsia by blink to threat   Abdomen: Soft, nontender, nondistended   Extremities: No edema    NEUROLOGICAL EXAM:  Mental status: eyes open, no verbal output except crying, not following commands.  Cranial Nerves: No facial asymmetry, right homonymous hemianopsia by blink to threat, no nystagmus, no dysarthria, tongue midline  Motor exam: mild right hemiparesis (RUE and RLE 5-/5), LUE and LLE - 5/5   Sensation: Intact to light touch   Coordination/ Gait: No dysmetria, gait deferred     LABS:                        10.7   5.1   )-----------( 299      ( 07 Jan 2019 08:54 )             32.7    01-08    144  |  109<H>  |  8   ----------------------------<  118<H>  3.9   |  24  |  0.70    Ca    8.6      08 Jan 2019 05:51    TPro  5.5<L>  /  Alb  2.9<L>  /  TBili  0.2  /  DBili  x   /  AST  18  /  ALT  10  /  AlkPhos  107  01-07    Hemoglobin A1C, Whole Blood: 6.7 % (12-28 @ 07:02)  Hemoglobin A1C, Whole Blood: 7.5 % (11-09 @ 16:00)    IMAGING: Reviewed by me.     CT Head No Cont (01.01.19)  No gross evidence for new infarct or new hemorrhage compared to the   12/30/2018 head CT and 12/26/2018 brain MRI.     CT Head No Cont (12.30.18)  No gross evidence for new infarct or new hemorrhage compared to the   12/26/2018 MRI study.    MRI Head w/wo IV Cont (12.26.18)  Left temporal lobe hematoma without significant interval change in size.  Small amount of subarachnoid blood.  New acute left MCA distribution infarct.  Prominent left temporal region cortical vein. Clinical correlation will   determine the need for conventional angiography to exclude the   possibility of a vascular malformation.    (12.25.18)  CT brain:  Resolving left temporal lobe parenchymal hemorrhage is redemonstrated,   similar to the prior brain CT.    CT angiography neck:  No evidence for arterial dissection. No flow-limiting stenosis. Carotid   bifurcations unremarkable.    CT angiography brain:  No evidence for AVM. No vascular aneurysm or flow-limiting stenosis.    CT venography brain:  No evidence for venous sinus or cortical vein thrombosis. THE PATIENT WAS SEEN AND EXAMINED BY ME WITH THE HOUSESTAFF AND STROKE TEAM DURING MORNING ROUNDS on 1/8.     HPI:  63 year old woman with vascular risk factors of age, HTN, DM II, HPLD and CAD was evaluated at [facility] for language disturbance. On 11/27 she underwent T10 laminectomy and fusion. Postoperatively, she was noted to have Wernicke's aphasia due to left temporal lobar ICH of undetermined etiology. She reported to have had significant improvement in her language deficit in rehabilitation. In the evening of 12/24, she was reported to have worsening of her aphasia, prompting her presentation to OSH and subsequent transfer to [facility].     ROS: All negative except documented in the HPI.     SUBJECTIVE: No events overnight.  No new neurologic complaints.      acetaminophen   Tablet .. 650 milliGRAM(s) Oral every 6 hours  amLODIPine   Tablet 10 milliGRAM(s) Oral daily  aspirin Suppository 300 milliGRAM(s) Rectal daily  atorvastatin 80 milliGRAM(s) Oral at bedtime  carvedilol 25 milliGRAM(s) Oral every 12 hours  cefTRIAXone   IVPB      cefTRIAXone   IVPB 1 Gram(s) IV Intermittent every 24 hours  dextrose 40% Gel 15 Gram(s) Oral once PRN  dextrose 50% Injectable 12.5 Gram(s) IV Push once  dextrose 50% Injectable 25 Gram(s) IV Push once  dextrose 50% Injectable 25 Gram(s) IV Push once  enoxaparin Injectable 40 milliGRAM(s) SubCutaneous daily  glucagon  Injectable 1 milliGRAM(s) IntraMuscular once PRN  hydrALAZINE Injectable 10 milliGRAM(s) IV Push every 6 hours PRN  insulin lispro (HumaLOG) corrective regimen sliding scale   SubCutaneous every 6 hours  labetalol Injectable 10 milliGRAM(s) IV Push every 6 hours PRN  LORazepam   Injectable 2 milliGRAM(s) IV Push every 5 minutes PRN  losartan 100 milliGRAM(s) Oral daily  nystatin Ointment 1 Application(s) Topical every 12 hours  OLANZapine Injectable 5 milliGRAM(s) IntraMuscular every 12 hours  OLANZapine Injectable 5 milliGRAM(s) IntraMuscular every 24 hours PRN  sodium chloride 0.9%. 1000 milliLiter(s) IV Continuous <Continuous>  valproate sodium IVPB 750 milliGRAM(s) IV Intermittent every 8 hours    PHYSICAL EXAM:   Vital Signs Last 24 Hrs  T(C): 36.7 (07 Jan 2019 20:00), Max: 36.7 (07 Jan 2019 11:55)  T(F): 98.1 (07 Jan 2019 20:00), Max: 98.1 (07 Jan 2019 20:00)  HR: 99 (08 Jan 2019 08:00) (69 - 111)  BP: 138/85 (08 Jan 2019 08:00) (117/55 - 191/94)  BP(mean): 99 (08 Jan 2019 08:00) (68 - 110)  RR: 22 (08 Jan 2019 08:00) (14 - 25)  SpO2: 99% (08 Jan 2019 08:00) (95% - 99%)    General: crying, no distress noted  HEENT: EOM intact, right homonymous hemianopsia by blink to threat   Abdomen: Soft, nontender, nondistended   Extremities: No edema    NEUROLOGICAL EXAM:  Mental status: eyes open, no verbal output except crying, not following commands.  Cranial Nerves: No facial asymmetry, right homonymous hemianopsia by blink to threat, no nystagmus, no dysarthria, tongue midline  Motor exam: mild right hemiparesis (RUE and RLE 5-/5), LUE and LLE - 5/5   Sensation: Intact to light touch   Coordination/ Gait: No dysmetria, gait deferred     LABS:                        10.7   5.1   )-----------( 299      ( 07 Jan 2019 08:54 )             32.7    01-08    144  |  109<H>  |  8   ----------------------------<  118<H>  3.9   |  24  |  0.70    Ca    8.6      08 Jan 2019 05:51    TPro  5.5<L>  /  Alb  2.9<L>  /  TBili  0.2  /  DBili  x   /  AST  18  /  ALT  10  /  AlkPhos  107  01-07    Hemoglobin A1C, Whole Blood: 6.7 % (12-28 @ 07:02)  Hemoglobin A1C, Whole Blood: 7.5 % (11-09 @ 16:00)    IMAGING: Reviewed by me.     CT Head No Cont (01.01.19)  No gross evidence for new infarct or new hemorrhage compared to the   12/30/2018 head CT and 12/26/2018 brain MRI.     CT Head No Cont (12.30.18)  No gross evidence for new infarct or new hemorrhage compared to the   12/26/2018 MRI study.    MRI Head w/wo IV Cont (12.26.18)  Left temporal lobe hematoma without significant interval change in size.  Small amount of subarachnoid blood.  New acute left MCA distribution infarct.  Prominent left temporal region cortical vein. Clinical correlation will   determine the need for conventional angiography to exclude the   possibility of a vascular malformation.    (12.25.18)  CT brain:  Resolving left temporal lobe parenchymal hemorrhage is redemonstrated,   similar to the prior brain CT.    CT angiography neck:  No evidence for arterial dissection. No flow-limiting stenosis. Carotid   bifurcations unremarkable.    CT angiography brain:  No evidence for AVM. No vascular aneurysm or flow-limiting stenosis.    CT venography brain:  No evidence for venous sinus or cortical vein thrombosis.

## 2019-01-08 NOTE — PROGRESS NOTE ADULT - SUBJECTIVE AND OBJECTIVE BOX
Patient is not able to give any history. Awake in bed and makes eye contact, but not conversive.    ROS  - unable to reliably obtain    PHYSICAL EXAM:  GENERAL: awake, makes eye contact, not conversive   HEAD:  Atraumatic, Normocephalic  EYES: EOMI, PERRLA, conjunctiva and sclera clear  ENMT: No tonsillar erythema, exudates, or enlargement; Moist mucous membranes  NECK: Supple, No JVD, Normal thyroid  CHEST/LUNG: Clear to auscultation bilaterally; No rales, rhonchi, wheezing, or rubs  HEART: sinus tachycardia,  No murmurs, rubs, or gallops  ABDOMEN: Soft, Nontender, Nondistended; Bowel sounds present  NEURO: AAOx0, no cooperative with neuro exam, all four extremities restrained   EXTREMITIES: No LE edema, no calf tenderness  SKIN: No rashes or lesions

## 2019-01-08 NOTE — PROGRESS NOTE ADULT - SUBJECTIVE AND OBJECTIVE BOX
[facility]    Bora Mcfarland, Mica, Cari, Barry, Ashvin, Noah      [phone]    CHIEF COMPLAINT: Patient is a 63y old  Female who presents with a chief complaint of CVA (07 Jan 2019 08:09)      Follow Up: cad, htn    Interim history: Unable to provide a history on the basis of a poor mental status.  Events noted.    MEDICATIONS  (STANDING):  acetaminophen   Tablet .. 650 milliGRAM(s) Oral every 6 hours  amLODIPine   Tablet 10 milliGRAM(s) Oral daily  aspirin  chewable 81 milliGRAM(s) Oral daily  atorvastatin 80 milliGRAM(s) Oral at bedtime  carvedilol 25 milliGRAM(s) Oral every 12 hours  cefTRIAXone   IVPB      cefTRIAXone   IVPB 1 Gram(s) IV Intermittent every 24 hours  dextrose 50% Injectable 12.5 Gram(s) IV Push once  dextrose 50% Injectable 25 Gram(s) IV Push once  dextrose 50% Injectable 25 Gram(s) IV Push once  enoxaparin Injectable 40 milliGRAM(s) SubCutaneous daily  insulin lispro (HumaLOG) corrective regimen sliding scale   SubCutaneous every 6 hours  lacosamide IVPB 200 milliGRAM(s) IV Intermittent every 12 hours  losartan 100 milliGRAM(s) Oral daily  nystatin Ointment 1 Application(s) Topical every 12 hours  OLANZapine Injectable 5 milliGRAM(s) IntraMuscular every 12 hours  sodium chloride 0.9%. 1000 milliLiter(s) (60 mL/Hr) IV Continuous <Continuous>  valproate sodium IVPB 750 milliGRAM(s) IV Intermittent every 8 hours    MEDICATIONS  (PRN):  dextrose 40% Gel 15 Gram(s) Oral once PRN Blood Glucose LESS THAN 70 milliGRAM(s)/deciliter  glucagon  Injectable 1 milliGRAM(s) IntraMuscular once PRN Glucose LESS THAN 70 milligrams/deciliter  hydrALAZINE Injectable 10 milliGRAM(s) IV Push every 6 hours PRN SBP>160  labetalol Injectable 10 milliGRAM(s) IV Push every 6 hours PRN Systolic blood pressure >160  LORazepam   Injectable 2 milliGRAM(s) IV Push every 5 minutes PRN Sustained GTC Seizure  OLANZapine Injectable 5 milliGRAM(s) IntraMuscular every 24 hours PRN agitation      REVIEW OF SYSTEMS: unable to provide  Vital Signs Last 24 Hrs  T(C): 36.7 (08 Jan 2019 08:00), Max: 36.7 (07 Jan 2019 11:55)  T(F): 98.1 (08 Jan 2019 08:00), Max: 98.1 (07 Jan 2019 20:00)  HR: 73 (08 Jan 2019 10:00) (69 - 111)  BP: 139/68 (08 Jan 2019 10:00) (117/55 - 191/94)  BP(mean): 87 (08 Jan 2019 10:00) (68 - 110)  RR: 12 (08 Jan 2019 10:00) (12 - 25)  SpO2: 99% (08 Jan 2019 10:00) (95% - 99%)    I&O's Summary    07 Jan 2019 07:01  -  08 Jan 2019 07:00  --------------------------------------------------------  IN: 3115 mL / OUT: 0 mL / NET: 3115 mL    08 Jan 2019 07:01  -  08 Jan 2019 11:36  --------------------------------------------------------  IN: 600 mL / OUT: 0 mL / NET: 600 mL        Telemetry past 24h: sr    PHYSICAL EXAM:    Constitutional: well-nourished, well-developed, NAD   HEENT:  MMM, sclerae anicteric, conjunctivae clear, no oral cyanosis.  Pulmonary: Non-labored, breath sounds are clear bilaterally, No wheezing, rales or rhonchi  Cardiovascular: Regular, S1 and S2.  No murmur.  No rubs, gallops or clicks  Gastrointestinal: Bowel Sounds present, soft, nontender.   Lymph: No peripheral edema.   Neurological: unable to evaluate, poor mental status  Skin: No rashes.  Psych:  Mood & affect not evaluable    LABS: All Labs Reviewed:                        10.6   6.40  )-----------( 308      ( 08 Jan 2019 07:16 )             33.7                         10.7   5.1   )-----------( 299      ( 07 Jan 2019 08:54 )             32.7     08 Jan 2019 05:51    144    |  109    |  8      ----------------------------<  118    3.9     |  24     |  0.70   07 Jan 2019 08:54    145    |  107    |  6      ----------------------------<  121    3.6     |  25     |  0.73   06 Jan 2019 18:21    144    |  107    |  5      ----------------------------<  142    3.4     |  22     |  0.69     Ca    8.6        08 Jan 2019 05:51  Ca    8.3        07 Jan 2019 08:54  Ca    7.9        06 Jan 2019 18:21    TPro  5.5    /  Alb  2.9    /  TBili  0.2    /  DBili  x      /  AST  18     /  ALT  10     /  AlkPhos  107    07 Jan 2019 08:54          Blood Culture: Organism Escherichia coli  Gram Stain Blood -- Gram Stain --  Specimen Source .Urine Catheterized  Culture-Blood --            RADIOLOGY:    EKG:    Echo:

## 2019-01-08 NOTE — PROGRESS NOTE ADULT - ASSESSMENT
63 years old woman with vascular risk factors of age, HTN, DM II, HPLD and CAD was evaluated at [facility] for language disturbance. On 11/27 she underwent T10 laminectomy and fusion. Postoperatively, she was noted to have Wernicke's aphasia due to left temporal lobar ICH of undetermined etiology. She reported to have had significant improvement in her language deficit in the rehabilitation. In the evening of 12/24, she was reported to have worsening of her aphasia, prompting her presentation to OSH and subsequent transfer to [facility]. CT brain or admission showed expected evolution/resolution of left temporal ICH leading to development of encephalomalacia. MRI brain showed acute infarct in the left MCA distribution adjacent to the prior hemorrhage in the late subacute to chronic stage and expected evolution of prior left frontotemporal convexal subarachnoid hemorrhage as well as was concerning for a dilated cortical vein anterior to the hematoma. Of note, MRI brain (12/7) showed left temporal lobar ICH, left frontotemporal convexal SAH and juxtacortical FLAIR hyperintensities concerning for PRES to my eye. CT brain on 12/30, showed expected evolution of left MCA distribution infarct and prior left temporal ICH leading to encephalomalacia.    Impression:   Left MCA distribution stroke - likely etiology being cryptogenic stroke, probably related to embolism from a proximal source, like cardiac/paradoxical source of embolism  Recent left temporal lobar ICH with associated left frontotemporal convexal subarachnoid hemorrhage - likely etiology could be intracranial hemorrhages in the setting of PRES but possibility of underlying vascular malformation like micro-AVM or large vessel vasculopathy like vasculitis needs to  acute ischemic be ruled out   Seizures - Simple partial seizures/status without secondary generalization - likely etiology being symptomatic seizure in the setting of new/acute ischemic infarct versus 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 Physical therapy/OT recommended acute rehab. Consider repeat MRI brain with and without contrast/MR spectroscopy in about 4-6 weeks to rule out any underlying tissue pathology, preferably upon resolution/complete resorption of prior left temporal lobar ICH; Conventional angiogram to rule out underlying vascular malformation could be considered based on results of a repeat brain imaging/vessel imaging    ANTITHROMBOTIC THERAPY: Considering CT/MRI findings suggestive of late subacute to chronic ICH and new/acute ischemic infarct, benefits of continuing aspirin at this time would outweigh potential risks     PULMONARY: CXR clear, protecting airway, saturating well     CARDIOVASCULAR: IMTIAZ showed EF 74% moderate mitral regurgitation. Moderate focal atheroma noted in aortic arch/descending aorta, continue cardiac monitoring, s/p ICM to screen for occult cardiac arrhythmia like atrial fibrillation being the cause of possible cardiac source of embolism     SBP goal: 120-150    GASTROINTESTINAL: dysphagia screen passed- tolerating diet       Diet: Regular    RENAL: BUN/Cr without acute change, good urine output      Na Goal: Greater than 135     Rodriguez: no    HEMATOLOGY: H/H without acute change?, Platelets ?     DVT ppx: lovenox subq    ID: afebrile, no sign of infection    OTHER: Plan endorsed to patient  and daughter at bedside. All questions and concerns addressed.    DISPOSITION: Acute rehab    CORE MEASURES:        Admission NIHSS: 0     TPA: [] YES [x] NO      LDL/HDL: 44/35     Depression Screen: unable to obtain due to aphasia     Statin Therapy: y     Dysphagia Screen: [x] PASS [] FAIL     Smoking [] YES [x] NO      Afib [] YES [x] NO     Stroke Education [x] YES [] NO

## 2019-01-08 NOTE — PROGRESS NOTE ADULT - ASSESSMENT
63F with a history of CAD s/p PCI to OM2 with a AVE in December of 2015, residual 40% LAD disease, neg stress test in 1/2018, osteoarthritis, DM, HTN, HLD who presents as a transfer from [facility] for aphasia now with temporal ICH and new left MCA infarct. She is now withevolution of left MCA distribution infarct and prior left temporal ICH leading to encephalomalacia. She is having focal seizures and facial twitching.     - Cont neuro rx  - She has a hx of a remote PCI.   - No anginal symptoms recently reported  - Cont ASA  - Has a moderate focal atheroma in Aortic Arch. Cont high intensity statin for now.  - s/p ILR placement prior to weekend given suspicion for embolic nature of CVA. No af noted on telemetry to date.  - intermittent sinus tachycardia in setting of agitation. Continue to watch  - BP is labile. Continue with losartan and Coreg at current doses. procardia started, can increase as needed  - Appears compensated from HF POV.   - Monitor and replete electrolytes. Keep K>4.0 and Mg>2.0.  -  Further cardiac workup will depend on clinical course.   - All other workup per primary team. Will followup.

## 2019-01-09 LAB
ANION GAP SERPL CALC-SCNC: 12 MMOL/L — SIGNIFICANT CHANGE UP (ref 5–17)
BUN SERPL-MCNC: 10 MG/DL — SIGNIFICANT CHANGE UP (ref 7–23)
CALCIUM SERPL-MCNC: 8.6 MG/DL — SIGNIFICANT CHANGE UP (ref 8.4–10.5)
CHLORIDE SERPL-SCNC: 106 MMOL/L — SIGNIFICANT CHANGE UP (ref 96–108)
CO2 SERPL-SCNC: 25 MMOL/L — SIGNIFICANT CHANGE UP (ref 22–31)
CREAT SERPL-MCNC: 0.69 MG/DL — SIGNIFICANT CHANGE UP (ref 0.5–1.3)
GLUCOSE BLDC GLUCOMTR-MCNC: 104 MG/DL — HIGH (ref 70–99)
GLUCOSE BLDC GLUCOMTR-MCNC: 117 MG/DL — HIGH (ref 70–99)
GLUCOSE BLDC GLUCOMTR-MCNC: 123 MG/DL — HIGH (ref 70–99)
GLUCOSE BLDC GLUCOMTR-MCNC: 131 MG/DL — HIGH (ref 70–99)
GLUCOSE SERPL-MCNC: 135 MG/DL — HIGH (ref 70–99)
HCT VFR BLD CALC: 32 % — LOW (ref 34.5–45)
HGB BLD-MCNC: 10.5 G/DL — LOW (ref 11.5–15.5)
MCHC RBC-ENTMCNC: 29.4 PG — SIGNIFICANT CHANGE UP (ref 27–34)
MCHC RBC-ENTMCNC: 32.8 GM/DL — SIGNIFICANT CHANGE UP (ref 32–36)
MCV RBC AUTO: 89.6 FL — SIGNIFICANT CHANGE UP (ref 80–100)
PLATELET # BLD AUTO: 263 K/UL — SIGNIFICANT CHANGE UP (ref 150–400)
POTASSIUM SERPL-MCNC: 3.8 MMOL/L — SIGNIFICANT CHANGE UP (ref 3.5–5.3)
POTASSIUM SERPL-SCNC: 3.8 MMOL/L — SIGNIFICANT CHANGE UP (ref 3.5–5.3)
RBC # BLD: 3.57 M/UL — LOW (ref 3.8–5.2)
RBC # FLD: 16.1 % — HIGH (ref 10.3–14.5)
SODIUM SERPL-SCNC: 143 MMOL/L — SIGNIFICANT CHANGE UP (ref 135–145)
VALPROATE FREE SERPL-MCNC: 73.8 MG/L — HIGH (ref 4.8–17.3)
WBC # BLD: 6.2 K/UL — SIGNIFICANT CHANGE UP (ref 3.8–10.5)
WBC # FLD AUTO: 6.2 K/UL — SIGNIFICANT CHANGE UP (ref 3.8–10.5)

## 2019-01-09 PROCEDURE — 99233 SBSQ HOSP IP/OBS HIGH 50: CPT

## 2019-01-09 PROCEDURE — 99232 SBSQ HOSP IP/OBS MODERATE 35: CPT

## 2019-01-09 PROCEDURE — 71045 X-RAY EXAM CHEST 1 VIEW: CPT | Mod: 26

## 2019-01-09 RX ORDER — BACITRACIN ZINC 500 UNIT/G
1 OINTMENT IN PACKET (EA) TOPICAL DAILY
Qty: 0 | Refills: 0 | Status: DISCONTINUED | OUTPATIENT
Start: 2019-01-09 | End: 2019-01-22

## 2019-01-09 RX ADMIN — CARVEDILOL PHOSPHATE 25 MILLIGRAM(S): 80 CAPSULE, EXTENDED RELEASE ORAL at 17:41

## 2019-01-09 RX ADMIN — LOSARTAN POTASSIUM 100 MILLIGRAM(S): 100 TABLET, FILM COATED ORAL at 05:13

## 2019-01-09 RX ADMIN — NYSTATIN CREAM 1 APPLICATION(S): 100000 CREAM TOPICAL at 17:41

## 2019-01-09 RX ADMIN — Medication 650 MILLIGRAM(S): at 00:10

## 2019-01-09 RX ADMIN — SODIUM CHLORIDE 60 MILLILITER(S): 9 INJECTION INTRAMUSCULAR; INTRAVENOUS; SUBCUTANEOUS at 05:15

## 2019-01-09 RX ADMIN — CARVEDILOL PHOSPHATE 25 MILLIGRAM(S): 80 CAPSULE, EXTENDED RELEASE ORAL at 05:13

## 2019-01-09 RX ADMIN — Medication 650 MILLIGRAM(S): at 17:42

## 2019-01-09 RX ADMIN — ENOXAPARIN SODIUM 40 MILLIGRAM(S): 100 INJECTION SUBCUTANEOUS at 11:19

## 2019-01-09 RX ADMIN — Medication 650 MILLIGRAM(S): at 23:31

## 2019-01-09 RX ADMIN — Medication 81 MILLIGRAM(S): at 11:19

## 2019-01-09 RX ADMIN — Medication 650 MILLIGRAM(S): at 11:19

## 2019-01-09 RX ADMIN — OLANZAPINE 5 MILLIGRAM(S): 15 TABLET, FILM COATED ORAL at 05:14

## 2019-01-09 RX ADMIN — Medication 25 MILLIGRAM(S): at 05:13

## 2019-01-09 RX ADMIN — AMLODIPINE BESYLATE 10 MILLIGRAM(S): 2.5 TABLET ORAL at 05:13

## 2019-01-09 RX ADMIN — Medication 25 MILLIGRAM(S): at 21:45

## 2019-01-09 RX ADMIN — Medication 25 MILLIGRAM(S): at 15:05

## 2019-01-09 RX ADMIN — LACOSAMIDE 140 MILLIGRAM(S): 50 TABLET ORAL at 11:18

## 2019-01-09 RX ADMIN — CEFTRIAXONE 100 GRAM(S): 500 INJECTION, POWDER, FOR SOLUTION INTRAMUSCULAR; INTRAVENOUS at 03:05

## 2019-01-09 RX ADMIN — Medication 650 MILLIGRAM(S): at 05:43

## 2019-01-09 RX ADMIN — OLANZAPINE 5 MILLIGRAM(S): 15 TABLET, FILM COATED ORAL at 17:42

## 2019-01-09 RX ADMIN — Medication 650 MILLIGRAM(S): at 15:04

## 2019-01-09 RX ADMIN — Medication 1 APPLICATION(S): at 11:19

## 2019-01-09 RX ADMIN — LACOSAMIDE 140 MILLIGRAM(S): 50 TABLET ORAL at 21:45

## 2019-01-09 RX ADMIN — ATORVASTATIN CALCIUM 80 MILLIGRAM(S): 80 TABLET, FILM COATED ORAL at 21:45

## 2019-01-09 RX ADMIN — Medication 650 MILLIGRAM(S): at 00:43

## 2019-01-09 RX ADMIN — NYSTATIN CREAM 1 APPLICATION(S): 100000 CREAM TOPICAL at 05:14

## 2019-01-09 RX ADMIN — Medication 650 MILLIGRAM(S): at 05:13

## 2019-01-09 NOTE — PROGRESS NOTE ADULT - ASSESSMENT
63F with a history of CAD s/p PCI to OM2 with a AVE in December of 2015, residual 40% LAD disease, neg stress test in 1/2018, osteoarthritis, DM, HTN, HLD who presents as a transfer from [facility] for aphasia now with temporal ICH and new left MCA infarct. She is now withevolution of left MCA distribution infarct and prior left temporal ICH leading to encephalomalacia. She is having focal seizures and facial twitching.     - Cont neuro rx  - She has a hx of a remote PCI.   - No anginal symptoms recently reported  - Cont ASA  - Has a moderate focal atheroma in Aortic Arch. Cont high intensity statin for now.  - s/p ILR placement given suspicion for embolic nature of CVA. No af noted on telemetry to date.  - intermittent sinus tachycardia in setting of agitation. Continue to watch  - BP is labile. Continue with losartan and Coreg at current doses.  Cont norvasc 10mg qDAY  - Appears compensated from HF POV.   - Monitor and replete electrolytes. Keep K>4.0 and Mg>2.0.  -  Further cardiac workup will depend on clinical course.   - All other workup per primary team. Will followup.

## 2019-01-09 NOTE — PROGRESS NOTE ADULT - SUBJECTIVE AND OBJECTIVE BOX
THE PATIENT WAS SEEN AND EXAMINED BY ME WITH THE HOUSESTAFF AND STROKE TEAM DURING MORNING ROUNDS.     HPI:  63 year old woman with vascular risk factors of age, HTN, DM II, HPLD and CAD was evaluated at [facility] for language disturbance. On 11/27 she underwent T10 laminectomy and fusion. Postoperatively, she was noted to have Wernicke's aphasia due to left temporal lobar ICH of undetermined etiology. She reported to have had significant improvement in her language deficit in the rehabilitation. In the evening of 12/24, she was reported to have worsening of her aphasia, prompting her presentation to OSH and subsequent transfer to [facility].     ROS: All negative except documented in the HPI.     SUBJECTIVE: No events overnight.  No new neurologic complaints.     acetaminophen   Tablet .. 650 milliGRAM(s) Oral every 6 hours  amLODIPine   Tablet 10 milliGRAM(s) Oral daily  aspirin  chewable 81 milliGRAM(s) Oral daily  atorvastatin 80 milliGRAM(s) Oral at bedtime  carvedilol 25 milliGRAM(s) Oral every 12 hours  cefTRIAXone   IVPB      cefTRIAXone   IVPB 1 Gram(s) IV Intermittent every 24 hours  dextrose 40% Gel 15 Gram(s) Oral once PRN  dextrose 50% Injectable 12.5 Gram(s) IV Push once  dextrose 50% Injectable 25 Gram(s) IV Push once  dextrose 50% Injectable 25 Gram(s) IV Push once  enoxaparin Injectable 40 milliGRAM(s) SubCutaneous daily  glucagon  Injectable 1 milliGRAM(s) IntraMuscular once PRN  hydrALAZINE Injectable 10 milliGRAM(s) IV Push every 6 hours PRN  insulin lispro (HumaLOG) corrective regimen sliding scale   SubCutaneous every 6 hours  labetalol Injectable 10 milliGRAM(s) IV Push every 6 hours PRN  lacosamide IVPB 200 milliGRAM(s) IV Intermittent every 12 hours  LORazepam   Injectable 2 milliGRAM(s) IV Push every 5 minutes PRN  losartan 100 milliGRAM(s) Oral daily  nystatin Ointment 1 Application(s) Topical every 12 hours  OLANZapine Injectable 5 milliGRAM(s) IntraMuscular every 12 hours  OLANZapine Injectable 5 milliGRAM(s) IntraMuscular every 24 hours PRN  sodium chloride 0.9%. 1000 milliLiter(s) IV Continuous <Continuous>  valproate sodium IVPB 750 milliGRAM(s) IV Intermittent every 8 hours      PHYSICAL EXAM:   Vital Signs Last 24 Hrs  T(C): 36.8 (08 Jan 2019 20:00), Max: 36.8 (08 Jan 2019 20:00)  T(F): 98.2 (08 Jan 2019 20:00), Max: 98.2 (08 Jan 2019 20:00)  HR: 88 (09 Jan 2019 06:30) (69 - 99)  BP: 143/112 (09 Jan 2019 06:30) (120/73 - 172/81)  BP(mean): 98 (09 Jan 2019 06:00) (87 - 117)  RR: 16 (09 Jan 2019 06:30) (12 - 26)  SpO2: 96% (09 Jan 2019 06:30) (95% - 100%)    General: crying, no distress noted  HEENT: EOM intact, right homonymous hemianopsia by blink to threat   Abdomen: Soft, nontender, nondistended   Extremities: No edema    NEUROLOGICAL EXAM:  Mental status: eyes open, no verbal output except crying, not following commands.  Cranial Nerves: No facial asymmetry, right homonymous hemianopsia by blink to threat, no nystagmus, no dysarthria, tongue midline  Motor exam: mild right hemiparesis (RUE and RLE 5-/5), LUE and LLE - 5/5   Sensation: Intact to light touch   Coordination/ Gait: No dysmetria, gait deferred     LABS:                        10.6   6.40  )-----------( 308      ( 08 Jan 2019 07:16 )             33.7    01-08    144  |  109<H>  |  8   ----------------------------<  118<H>  3.9   |  24  |  0.70    Ca    8.6      08 Jan 2019 05:51    TPro  5.5<L>  /  Alb  2.9<L>  /  TBili  0.2  /  DBili  x   /  AST  18  /  ALT  10  /  AlkPhos  107  01-07    Hemoglobin A1C, Whole Blood: 6.7 % (12-28 @ 07:02)  Hemoglobin A1C, Whole Blood: 7.5 % (11-09 @ 16:00)    IMAGING: Reviewed by me.     CT Head No Cont (01.01.19)  No gross evidence for new infarct or new hemorrhage compared to the   12/30/2018 head CT and 12/26/2018 brain MRI.     CT Head No Cont (12.30.18)  No gross evidence for new infarct or new hemorrhage compared to the   12/26/2018 MRI study.    MRI Head w/wo IV Cont (12.26.18)  Left temporal lobe hematoma without significant interval change in size.  Small amount of subarachnoid blood.  New acute left MCA distribution infarct.  Prominent left temporal region cortical vein. Clinical correlation will   determine the need for conventional angiography to exclude the   possibility of a vascular malformation.    (12.25.18)  CT brain:  Resolving left temporal lobe parenchymal hemorrhage is redemonstrated,   similar to the prior brain CT.    CT angiography neck:  No evidence for arterial dissection. No flow-limiting stenosis. Carotid   bifurcations unremarkable.    CT angiography brain:  No evidence for AVM. No vascular aneurysm or flow-limiting stenosis.    CT venography brain:  No evidence for venous sinus or cortical vein thrombosis. THE PATIENT WAS SEEN AND EXAMINED BY ME WITH THE HOUSESTAFF AND STROKE TEAM DURING MORNING ROUNDS.     HPI:  63 year old woman with vascular risk factors of age, HTN, DM II, HPLD and CAD was evaluated at [facility] for language disturbance. On 11/27 she underwent T10 laminectomy and fusion. Postoperatively, she was noted to have Wernicke's aphasia due to left temporal lobar ICH of undetermined etiology. She reported to have had significant improvement in her language deficit in the rehabilitation. In the evening of 12/24, she was reported to have worsening of her aphasia, prompting her presentation to OSH and subsequent transfer to [facility].     ROS: All negative except documented in the HPI.     SUBJECTIVE: No events overnight.  No new neurologic complaints.     acetaminophen   Tablet .. 650 milliGRAM(s) Oral every 6 hours  amLODIPine   Tablet 10 milliGRAM(s) Oral daily  aspirin  chewable 81 milliGRAM(s) Oral daily  atorvastatin 80 milliGRAM(s) Oral at bedtime  carvedilol 25 milliGRAM(s) Oral every 12 hours  cefTRIAXone   IVPB      cefTRIAXone   IVPB 1 Gram(s) IV Intermittent every 24 hours  dextrose 40% Gel 15 Gram(s) Oral once PRN  dextrose 50% Injectable 12.5 Gram(s) IV Push once  dextrose 50% Injectable 25 Gram(s) IV Push once  dextrose 50% Injectable 25 Gram(s) IV Push once  enoxaparin Injectable 40 milliGRAM(s) SubCutaneous daily  glucagon  Injectable 1 milliGRAM(s) IntraMuscular once PRN  hydrALAZINE Injectable 10 milliGRAM(s) IV Push every 6 hours PRN  insulin lispro (HumaLOG) corrective regimen sliding scale   SubCutaneous every 6 hours  labetalol Injectable 10 milliGRAM(s) IV Push every 6 hours PRN  lacosamide IVPB 200 milliGRAM(s) IV Intermittent every 12 hours  LORazepam   Injectable 2 milliGRAM(s) IV Push every 5 minutes PRN  losartan 100 milliGRAM(s) Oral daily  nystatin Ointment 1 Application(s) Topical every 12 hours  OLANZapine Injectable 5 milliGRAM(s) IntraMuscular every 12 hours  OLANZapine Injectable 5 milliGRAM(s) IntraMuscular every 24 hours PRN  sodium chloride 0.9%. 1000 milliLiter(s) IV Continuous <Continuous>  valproate sodium IVPB 750 milliGRAM(s) IV Intermittent every 8 hours      PHYSICAL EXAM:   Vital Signs Last 24 Hrs  T(C): 36.8 (08 Jan 2019 20:00), Max: 36.8 (08 Jan 2019 20:00)  T(F): 98.2 (08 Jan 2019 20:00), Max: 98.2 (08 Jan 2019 20:00)  HR: 88 (09 Jan 2019 06:30) (69 - 99)  BP: 143/112 (09 Jan 2019 06:30) (120/73 - 172/81)  BP(mean): 98 (09 Jan 2019 06:00) (87 - 117)  RR: 16 (09 Jan 2019 06:30) (12 - 26)  SpO2: 96% (09 Jan 2019 06:30) (95% - 100%)    General: no distress noted  HEENT: left gaze palsy crosses midline  Abdomen: Soft, nontender, nondistended   Extremities: No edema    NEUROLOGICAL EXAM:  Mental status: eyes open, no verbal output, not following commands.  Cranial Nerves: No facial asymmetry, left gaze palsy crosses midline, no nystagmus, no dysarthria, tongue midline  Motor exam: extremities move spontaneously against gravity however right leg is noted to be moving less.   Sensation: Intact to light touch   Coordination/ Gait: No dysmetria, gait deferred     LABS:                        10.6   6.40  )-----------( 308      ( 08 Jan 2019 07:16 )             33.7    01-08    144  |  109<H>  |  8   ----------------------------<  118<H>  3.9   |  24  |  0.70    Ca    8.6      08 Jan 2019 05:51    TPro  5.5<L>  /  Alb  2.9<L>  /  TBili  0.2  /  DBili  x   /  AST  18  /  ALT  10  /  AlkPhos  107  01-07    Hemoglobin A1C, Whole Blood: 6.7 % (12-28 @ 07:02)  Hemoglobin A1C, Whole Blood: 7.5 % (11-09 @ 16:00)    IMAGING: Reviewed by me.     CT Angio Head/Neck IV Cont (01.07.19)  Normal CTA of the head and neck. No abnormal vascularity,   aneurysms or AVMs appreciated on this patient who had a prior left   temporal parenchymal hemorrhage.    CT Head No Cont (01.01.19)  No gross evidence for new infarct or new hemorrhage compared to the   12/30/2018 head CT and 12/26/2018 brain MRI.     CT Head No Cont (12.30.18)  No gross evidence for new infarct or new hemorrhage compared to the   12/26/2018 MRI study.    MRI Head w/wo IV Cont (12.26.18)  Left temporal lobe hematoma without significant interval change in size.  Small amount of subarachnoid blood.  New acute left MCA distribution infarct.  Prominent left temporal region cortical vein. Clinical correlation will   determine the need for conventional angiography to exclude the   possibility of a vascular malformation.    (12.25.18)  CT brain:  Resolving left temporal lobe parenchymal hemorrhage is redemonstrated,   similar to the prior brain CT.    CT angiography neck:  No evidence for arterial dissection. No flow-limiting stenosis. Carotid   bifurcations unremarkable.    CT angiography brain:  No evidence for AVM. No vascular aneurysm or flow-limiting stenosis.    CT venography brain:  No evidence for venous sinus or cortical vein thrombosis. THE PATIENT WAS SEEN AND EXAMINED BY ME WITH THE HOUSESTAFF AND STROKE TEAM DURING MORNING ROUNDS.     HPI:  63 year old woman with vascular risk factors of age, HTN, DM II, HPLD and CAD was evaluated at [facility] for language disturbance. On 11/27 she underwent T10 laminectomy and fusion. Postoperatively, she was noted to have Wernicke's aphasia due to left temporal lobar ICH of undetermined etiology. She reported to have had significant improvement in her language deficit in rehabilitation. In the evening of 12/24, she was reported to have worsening of her aphasia, prompting her presentation to OSH and subsequent transfer to [facility].     ROS: All negative except documented in the HPI.     SUBJECTIVE: No events overnight.  No new neurologic complaints.     acetaminophen   Tablet .. 650 milliGRAM(s) Oral every 6 hours  amLODIPine   Tablet 10 milliGRAM(s) Oral daily  aspirin  chewable 81 milliGRAM(s) Oral daily  atorvastatin 80 milliGRAM(s) Oral at bedtime  carvedilol 25 milliGRAM(s) Oral every 12 hours  cefTRIAXone   IVPB      cefTRIAXone   IVPB 1 Gram(s) IV Intermittent every 24 hours  dextrose 40% Gel 15 Gram(s) Oral once PRN  dextrose 50% Injectable 12.5 Gram(s) IV Push once  dextrose 50% Injectable 25 Gram(s) IV Push once  dextrose 50% Injectable 25 Gram(s) IV Push once  enoxaparin Injectable 40 milliGRAM(s) SubCutaneous daily  glucagon  Injectable 1 milliGRAM(s) IntraMuscular once PRN  hydrALAZINE Injectable 10 milliGRAM(s) IV Push every 6 hours PRN  insulin lispro (HumaLOG) corrective regimen sliding scale   SubCutaneous every 6 hours  labetalol Injectable 10 milliGRAM(s) IV Push every 6 hours PRN  lacosamide IVPB 200 milliGRAM(s) IV Intermittent every 12 hours  LORazepam   Injectable 2 milliGRAM(s) IV Push every 5 minutes PRN  losartan 100 milliGRAM(s) Oral daily  nystatin Ointment 1 Application(s) Topical every 12 hours  OLANZapine Injectable 5 milliGRAM(s) IntraMuscular every 12 hours  OLANZapine Injectable 5 milliGRAM(s) IntraMuscular every 24 hours PRN  sodium chloride 0.9%. 1000 milliLiter(s) IV Continuous <Continuous>  valproate sodium IVPB 750 milliGRAM(s) IV Intermittent every 8 hours      PHYSICAL EXAM:   Vital Signs Last 24 Hrs  T(C): 36.8 (08 Jan 2019 20:00), Max: 36.8 (08 Jan 2019 20:00)  T(F): 98.2 (08 Jan 2019 20:00), Max: 98.2 (08 Jan 2019 20:00)  HR: 88 (09 Jan 2019 06:30) (69 - 99)  BP: 143/112 (09 Jan 2019 06:30) (120/73 - 172/81)  BP(mean): 98 (09 Jan 2019 06:00) (87 - 117)  RR: 16 (09 Jan 2019 06:30) (12 - 26)  SpO2: 96% (09 Jan 2019 06:30) (95% - 100%)    General: no distress noted  HEENT: left gaze palsy crosses midline  Abdomen: Soft, nontender, nondistended   Extremities: No edema    NEUROLOGICAL EXAM:  Mental status: eyes open but inattentive, no verbal output, not following commands.  Cranial Nerves: No facial asymmetry, mild right gaze palsy crosses midline, no nystagmus, no dysarthria, tongue midline  Motor exam: extremities move spontaneously against gravity however right leg is noted to be moving less.   Sensation: Intact to mildly noxious stimuli  Coordination/ Gait: Unable to cooperate with testing; gait deferred     LABS:                        10.6   6.40  )-----------( 308      ( 08 Jan 2019 07:16 )             33.7    01-08    144  |  109<H>  |  8 ;----------------------------<  118<H>  3.9   |  24  |  0.70    Ca    8.6      08 Jan 2019 05:51    TPro  5.5<L>  /  Alb  2.9<L>  /  TBili  0.2  /  DBili  x   /  AST  18  /  ALT  10  /  AlkPhos  107  01-07    Hemoglobin A1C, Whole Blood: 6.7 % (12-28 @ 07:02)  Hemoglobin A1C, Whole Blood: 7.5 % (11-09 @ 16:00)    IMAGING: Reviewed by me.     CT Angio Head/Neck IV Cont (01.07.19)  Normal CTA of the head and neck. No abnormal vascularity,   aneurysms or AVMs appreciated on this patient who had a prior left   temporal parenchymal hemorrhage.    CT Head No Cont (01.01.19)  No gross evidence for new infarct or new hemorrhage compared to the   12/30/2018 head CT and 12/26/2018 brain MRI.     CT Head No Cont (12.30.18)  No gross evidence for new infarct or new hemorrhage compared to the   12/26/2018 MRI study.    MRI Head w/wo IV Cont (12.26.18)  Left temporal lobe hematoma without significant interval change in size.  Small amount of subarachnoid blood.  New acute left MCA distribution infarct.  Prominent left temporal region cortical vein. Clinical correlation will   determine the need for conventional angiography to exclude the   possibility of a vascular malformation.    (12.25.18)  CT brain:  Resolving left temporal lobe parenchymal hemorrhage is redemonstrated,   similar to the prior brain CT.    CT angiography neck:  No evidence for arterial dissection. No flow-limiting stenosis. Carotid   bifurcations unremarkable.    CT angiography brain:  No evidence for AVM. No vascular aneurysm or flow-limiting stenosis.    CT venography brain:  No evidence for venous sinus or cortical vein thrombosis.

## 2019-01-09 NOTE — CHART NOTE - NSCHARTNOTEFT_GEN_A_CORE
Nutrition Follow Up Note  Patient seen for: f/u    Chart reviewed, events noted. Adm dx: MCA stroke. Global aphasia, +dysphagia w/ ST recommendation for NPO. NGT placed by ENT 1/6.    Source: chart    Diet : 1/6 Glucerna 1.2 60cc/hr x 24 hrs     Enteral /Parenteral Nutrition:  goal 1440cc/day  24 hr intake 1/9 1320cc (92% estimated needs), 1/8 1000cc (69% needs)    GI: no N/V,abd distention, BM 1/7    Wt 1/1 78kg, no recent wt    Pertinent Medications: MEDICATIONS  (STANDING):  acetaminophen   Tablet .. 650 milliGRAM(s) Oral every 6 hours  amLODIPine   Tablet 10 milliGRAM(s) Oral daily  aspirin  chewable 81 milliGRAM(s) Oral daily  atorvastatin 80 milliGRAM(s) Oral at bedtime  carvedilol 25 milliGRAM(s) Oral every 12 hours  cefTRIAXone   IVPB      cefTRIAXone   IVPB 1 Gram(s) IV Intermittent every 24 hours  dextrose 50% Injectable 12.5 Gram(s) IV Push once  dextrose 50% Injectable 25 Gram(s) IV Push once  dextrose 50% Injectable 25 Gram(s) IV Push once  enoxaparin Injectable 40 milliGRAM(s) SubCutaneous daily  insulin lispro (HumaLOG) corrective regimen sliding scale   SubCutaneous every 6 hours  lacosamide IVPB 200 milliGRAM(s) IV Intermittent every 12 hours  losartan 100 milliGRAM(s) Oral daily  nystatin Ointment 1 Application(s) Topical every 12 hours  OLANZapine Injectable 5 milliGRAM(s) IntraMuscular every 12 hours  sodium chloride 0.9%. 1000 milliLiter(s) (60 mL/Hr) IV Continuous <Continuous>  valproate sodium IVPB 750 milliGRAM(s) IV Intermittent every 8 hours    MEDICATIONS  (PRN):  dextrose 40% Gel 15 Gram(s) Oral once PRN Blood Glucose LESS THAN 70 milliGRAM(s)/deciliter  glucagon  Injectable 1 milliGRAM(s) IntraMuscular once PRN Glucose LESS THAN 70 milligrams/deciliter  hydrALAZINE Injectable 10 milliGRAM(s) IV Push every 6 hours PRN SBP>160  labetalol Injectable 10 milliGRAM(s) IV Push every 6 hours PRN Systolic blood pressure >160  LORazepam   Injectable 2 milliGRAM(s) IV Push every 5 minutes PRN Sustained GTC Seizure  OLANZapine Injectable 5 milliGRAM(s) IntraMuscular every 24 hours PRN agitation      Finger Sticks:  POCT Blood Glucose.: 123 mg/dL (01-09 @ 05:17)  POCT Blood Glucose.: 111 mg/dL (01-08 @ 23:21)  POCT Blood Glucose.: 123 mg/dL (01-08 @ 17:21)  POCT Blood Glucose.: 116 mg/dL (01-08 @ 11:44)      Skin per nursing documentation: no pressure injuries documented   Edema: 3+ right foot, hand 2+ left foot, 1+ generalized    Estimated Needs: based on 1/1 wt 78kg, 0420-7684 kcals (20-25 kcals/kg), 78-94gm protein (1.0-1.2gm/kg)      Previous Nutrition Diagnosis: mild malnutrition  Nutrition Diagnosis is: now addressed w/ EN    New Nutrition Diagnosis: none  Related to:    As evidenced by:      Interventions:     Recommend  1) continue Glucerna 1.2 60cc/hr x 24 hrs provides 1728 kcals, 86 gm protein, 1159cc free water  meets 22 Kcal/Kg, 1.1Gm/kg 1/1 wt 79kg  2) obtain current wt    Monitoring and Evaluation:     Continue to monitor Nutritional intake, Tolerance to diet prescription, weights, labs, skin integrity    RD remains available upon request and will follow up per protocol Nutrition Follow Up Note  Patient seen for: f/u    Chart reviewed, events noted. Adm dx: MCA stroke. Global aphasia, +dysphagia w/ ST recommendation for NPO. NGT placed by ENT 1/6.    Source: chart    Diet : 1/6 Glucerna 1.2 60cc/hr x 24 hrs     Enteral /Parenteral Nutrition:  goal 1440cc/day  24 hr intake 1/9 1320cc (92% estimated needs), 1/8 1000cc (69% needs)    GI: no N/V,abd distention, BM 1/7    Wt 1/1 78kg, no recent wt    Pertinent Medications: MEDICATIONS  (STANDING):  acetaminophen   Tablet .. 650 milliGRAM(s) Oral every 6 hours  amLODIPine   Tablet 10 milliGRAM(s) Oral daily  aspirin  chewable 81 milliGRAM(s) Oral daily  atorvastatin 80 milliGRAM(s) Oral at bedtime  carvedilol 25 milliGRAM(s) Oral every 12 hours  cefTRIAXone   IVPB      cefTRIAXone   IVPB 1 Gram(s) IV Intermittent every 24 hours  dextrose 50% Injectable 12.5 Gram(s) IV Push once  dextrose 50% Injectable 25 Gram(s) IV Push once  dextrose 50% Injectable 25 Gram(s) IV Push once  enoxaparin Injectable 40 milliGRAM(s) SubCutaneous daily  insulin lispro (HumaLOG) corrective regimen sliding scale   SubCutaneous every 6 hours  lacosamide IVPB 200 milliGRAM(s) IV Intermittent every 12 hours  losartan 100 milliGRAM(s) Oral daily  nystatin Ointment 1 Application(s) Topical every 12 hours  OLANZapine Injectable 5 milliGRAM(s) IntraMuscular every 12 hours  sodium chloride 0.9%. 1000 milliLiter(s) (60 mL/Hr) IV Continuous <Continuous>  valproate sodium IVPB 750 milliGRAM(s) IV Intermittent every 8 hours    MEDICATIONS  (PRN):  dextrose 40% Gel 15 Gram(s) Oral once PRN Blood Glucose LESS THAN 70 milliGRAM(s)/deciliter  glucagon  Injectable 1 milliGRAM(s) IntraMuscular once PRN Glucose LESS THAN 70 milligrams/deciliter  hydrALAZINE Injectable 10 milliGRAM(s) IV Push every 6 hours PRN SBP>160  labetalol Injectable 10 milliGRAM(s) IV Push every 6 hours PRN Systolic blood pressure >160  LORazepam   Injectable 2 milliGRAM(s) IV Push every 5 minutes PRN Sustained GTC Seizure  OLANZapine Injectable 5 milliGRAM(s) IntraMuscular every 24 hours PRN agitation      Finger Sticks:  POCT Blood Glucose.: 123 mg/dL (01-09 @ 05:17)  POCT Blood Glucose.: 111 mg/dL (01-08 @ 23:21)  POCT Blood Glucose.: 123 mg/dL (01-08 @ 17:21)  POCT Blood Glucose.: 116 mg/dL (01-08 @ 11:44)      Skin per nursing documentation: no pressure injuries documented   Edema: 3+ right foot, hand 2+ left foot, 1+ generalized    Estimated Needs: based on 1/1 wt 78kg, 7133-5065 kcals (20-25 kcals/kg), 78-94gm protein (1.0-1.2gm/kg)      Previous Nutrition Diagnosis: mild malnutrition  Nutrition Diagnosis is: now addressed w/ EN    New Nutrition Diagnosis: none  Related to:    As evidenced by:      Interventions:     Recommend  1) continue Glucerna 1.2 60cc/hr x 24 hrs provides 1728 kcals, 86 gm protein, 1159cc free water  meets 22 Kcal/Kg, 1.1Gm/kg 1/1 wt 78kg  2) obtain current wt    Monitoring and Evaluation:     Continue to monitor Nutritional intake, Tolerance to diet prescription, weights, labs, skin integrity    RD remains available upon request and will follow up per protocol Nutrition Follow Up Note  Patient seen for: f/u    Chart reviewed, events noted. Adm for ICH, new MCA stroke. Global aphasia, +dysphagia w/ ST recommendation for NPO. NGT placed by ENT 1/6.    Source: chart    Diet : 1/6 Glucerna 1.2 60cc/hr x 24 hrs     Enteral /Parenteral Nutrition:  goal 1440cc/day  24 hr intake 1/9 1320cc (92% estimated needs), 1/8 1000cc (69% needs)    GI: no N/V,abd distention, BM 1/7    Wt 1/1 78kg, no recent wt    Pertinent Medications: MEDICATIONS  (STANDING):  acetaminophen   Tablet .. 650 milliGRAM(s) Oral every 6 hours  amLODIPine   Tablet 10 milliGRAM(s) Oral daily  aspirin  chewable 81 milliGRAM(s) Oral daily  atorvastatin 80 milliGRAM(s) Oral at bedtime  carvedilol 25 milliGRAM(s) Oral every 12 hours  cefTRIAXone   IVPB      cefTRIAXone   IVPB 1 Gram(s) IV Intermittent every 24 hours  dextrose 50% Injectable 12.5 Gram(s) IV Push once  dextrose 50% Injectable 25 Gram(s) IV Push once  dextrose 50% Injectable 25 Gram(s) IV Push once  enoxaparin Injectable 40 milliGRAM(s) SubCutaneous daily  insulin lispro (HumaLOG) corrective regimen sliding scale   SubCutaneous every 6 hours  lacosamide IVPB 200 milliGRAM(s) IV Intermittent every 12 hours  losartan 100 milliGRAM(s) Oral daily  nystatin Ointment 1 Application(s) Topical every 12 hours  OLANZapine Injectable 5 milliGRAM(s) IntraMuscular every 12 hours  sodium chloride 0.9%. 1000 milliLiter(s) (60 mL/Hr) IV Continuous <Continuous>  valproate sodium IVPB 750 milliGRAM(s) IV Intermittent every 8 hours    MEDICATIONS  (PRN):  dextrose 40% Gel 15 Gram(s) Oral once PRN Blood Glucose LESS THAN 70 milliGRAM(s)/deciliter  glucagon  Injectable 1 milliGRAM(s) IntraMuscular once PRN Glucose LESS THAN 70 milligrams/deciliter  hydrALAZINE Injectable 10 milliGRAM(s) IV Push every 6 hours PRN SBP>160  labetalol Injectable 10 milliGRAM(s) IV Push every 6 hours PRN Systolic blood pressure >160  LORazepam   Injectable 2 milliGRAM(s) IV Push every 5 minutes PRN Sustained GTC Seizure  OLANZapine Injectable 5 milliGRAM(s) IntraMuscular every 24 hours PRN agitation      Finger Sticks:  POCT Blood Glucose.: 123 mg/dL (01-09 @ 05:17)  POCT Blood Glucose.: 111 mg/dL (01-08 @ 23:21)  POCT Blood Glucose.: 123 mg/dL (01-08 @ 17:21)  POCT Blood Glucose.: 116 mg/dL (01-08 @ 11:44)      Skin per nursing documentation: no pressure injuries documented   Edema: 3+ right foot, hand 2+ left foot, 1+ generalized    Estimated Needs: based on 1/1 wt 78kg, 2795-9891 kcals (20-25 kcals/kg), 78-94gm protein (1.0-1.2gm/kg)      Previous Nutrition Diagnosis: mild malnutrition  Nutrition Diagnosis is: now addressed w/ EN    New Nutrition Diagnosis: none  Related to:    As evidenced by:      Interventions:     Recommend  1) continue Glucerna 1.2 60cc/hr x 24 hrs provides 1728 kcals, 86 gm protein, 1159cc free water  meets 22 Kcal/Kg, 1.1Gm/kg 1/1 wt 78kg  2) obtain current wt    Monitoring and Evaluation:     Continue to monitor Nutritional intake, Tolerance to diet prescription, weights, labs, skin integrity    RD remains available upon request and will follow up per protocol

## 2019-01-09 NOTE — SWALLOW BEDSIDE ASSESSMENT ADULT - SWALLOW EVAL: FUNCTIONAL LEVEL AT TIME OF EVAL
Pt is awake, visually attending to stimuli. Non-verbal. Nods head yes/no with >75% reliability for simple questions. Able to confirm name out of F:3 via head nod. Per d/w RN and PCA, patient has been refusing po intake. Per RN, when given meds crushed in applesauce, coughing noted.
Awake, restless, non-verbal, not attending to stimuli.
Patient is awake; tense; restless; does not attend visually to stimuli
Pt is awake; appears to have some visual attention to clinician, however unsustained.

## 2019-01-09 NOTE — SWALLOW BEDSIDE ASSESSMENT ADULT - SPECIFY REASON(S)
re-assess swallow for tolerance of po intake; r/o dysphagia
re-assess the swallow mechanism; r/o dysphagia.
to assess the swallow mechanism; r/o dysphagia.
to assess the swallow mechanism; r/o dysphagia.
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
## 2019-01-09 NOTE — SWALLOW BEDSIDE ASSESSMENT ADULT - ASPIRATION PRECAUTIONS
yes/for secretions and enteral feeds, if initiated
for secretions and enteral feeds, if initiated/yes
yes/for secretions and enteral feeds, if initiated
yes/for secretions and enteral feeds

## 2019-01-09 NOTE — SWALLOW BEDSIDE ASSESSMENT ADULT - COMMENTS
Pt taken to [facility]; CTH concerning for evolving hemorrhage; transferred to [facility]. Severe expressive aphasia; trouble following simple commands but seems to understand what is going on. Daughter available at bedside; assists with history.CT Head No Cont (12.25.18) Evolving hemorrhagic infarct left temporal region. No new hemorrhage. MRI 12/26: Left temporal lobe hematoma without significant interval change in size. Small amount of subarachnoid blood. New acute left MCA distribution infarct. Prominent left temporal region cortical vein. Clinical correlation will determine the need for conventional angiography to exclude the possibility of a vascular malformation. Per neuro: right homonymous hemianopsia by blink to threat. 12/29 Right facial twitching noted. Evaluated by Neuro: R focal motor seizures in the setting of acute ischemic stroke in L MCA infarct vs. prior L temporal lobe hemorrhage. Plan:-increase keppra from 750 BID to 1000 BID; -c/w EEG monitoring. Pt still having twitching of the left lip and arm w/ preserved awareness after Vimpat load. Keppra and Vimpat. EEG showing evidence of a potential seizure focus in the left parieto-occipital region, +mild diffuse encephalopathy. 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 Will start Klonopin 0.5mg BID for seizure control (though might sedate pt slightly) if pt converts to a full GTC (unlikely) will use Ativan 2mg (ordered) to break the episode, with further management based on the situation at that point.   Swallow hx: was tolerating regular diet prior to seizure activity; diet changed to Puree/no liquids and Bedside swallow evaluation ordered.  Bedside swallow evaluation completed 12/31 with recommendation for NPO, with non-oral nutrition/hydration/medications due to overt signs of laryngeal pen/asp. Bedside swallow re-evaluated 1/2 and 1/3 with rx for NPO, with non-oral nutrition/hydration/medications. Pt taken to [facility]; CTH concerning for evolving hemorrhage; transferred to [facility]. Severe expressive aphasia; trouble following simple commands but seems to understand what is going on. Daughter available at bedside; assists with history.CT Head No Cont (12.25.18) Evolving hemorrhagic infarct left temporal region. No new hemorrhage. MRI 12/26: Left temporal lobe hematoma without significant interval change in size. Small amount of subarachnoid blood. New acute left MCA distribution infarct. Prominent left temporal region cortical vein. Clinical correlation will determine the need for conventional angiography to exclude the possibility of a vascular malformation. Per neuro: right homonymous hemianopsia by blink to threat. 12/29 Right facial twitching noted. Evaluated by Neuro: R focal motor seizures in the setting of acute ischemic stroke in L MCA infarct vs. prior L temporal lobe hemorrhage. Plan:-increase keppra; c/w EEG monitoring. Pt still having twitching of the left lip and arm w/ preserved awareness after Vimpat load. Keppra and Vimpat. EEG showing evidence of a potential seizure focus in the left parieto-occipital region, +mild diffuse encephalopathy. 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 Started Klonopin 0.5mg BID for seizure control (though might sedate pt slightly) if pt converts to a full GTC (unlikely) will use Ativan 2mg (ordered) to break the episode, with further management based on the situation at that point.   Swallow hx: was tolerating regular diet prior to seizure activity; diet changed to Puree/no liquids and Bedside swallow evaluation ordered.  Bedside swallow evaluation completed 12/31 with recommendation for NPO, with non-oral nutrition/hydration/medications due to overt signs of laryngeal pen/asp. Bedside swallow re-evaluated 1/2 and 1/3 with rx for NPO, with non-oral nutrition/hydration/medications. NGT by ENT 1/6. Family considering PEG if no improvement.

## 2019-01-09 NOTE — SWALLOW BEDSIDE ASSESSMENT ADULT - SWALLOW EVAL: DIAGNOSIS
Patient presents with a severe oropharyngeal dysphagia. Significantly latent anterior to posterior oral bolus transport followed by suspected passive spillage of conservative textures over base of tongue; wet breath sounds and weak cough suggestive of laryngeal penetration/aspiration prior to swallow trigger. Significantly latent pharyngeal trigger, reduced hyolaryngeal elevation on palpation.
Patient presents with oral dysphagia marked by closed mouth/clenched jaw; absent awareness of food/utensil. Withdraws to cold/tactile stimuli. Unable to provide po trials; no response to ice chips.
Pt presents oropharyngeal dysphagia with worsening presentation compared to 12/31 exam. Absent oral response to tactile and cold stimuli. +tonic bite reflex. Dry oral mucosa.
Pt presents with oral dysphagia. Pt refusing all po trials. Poor orientation to food/utensil. Closed mouth posture and aversive to presentation of ice chip, spoon, cup. Turns head away from any tactile stimuli to lips/face. Unable to provide oral care. Unable to assess pharyngeal stage swallow due to refusal of po however appears to be managing saliva.

## 2019-01-09 NOTE — SWALLOW BEDSIDE ASSESSMENT ADULT - SWALLOW EVAL: ORAL MUSCULATURE
unable to assess due to poor participation/comprehension
unable to assess due to poor participation/comprehension
unable to fully assess due to poor comprehension
unable to assess due to poor participation/comprehension

## 2019-01-09 NOTE — SWALLOW BEDSIDE ASSESSMENT ADULT - SLP GENERAL OBSERVATIONS
Pt is found awake in bed, restless, turning and tossing, moving arms and legs and head back and forth. RN present and needing to hold patient arms to prevent pulling of lines. Does not visually attend, does not follow directives, non-verbal.
Pt is positioned upright in bed. Awake, restless, tense, appears somewhat contracted. Clenched jaw, closed mouth.
Pt found asleep; aroused easily; visually attending to clinician; slowed response compared to 12/28 eval.
Pt is seated upright; awake; opens eyes with unsustained attention to clinician. Bilateral wrist restraints in place for safety (pulling on lines).

## 2019-01-09 NOTE — PROGRESS NOTE ADULT - ASSESSMENT
63 years old woman with vascular risk factors of age, HTN, DM II, HPLD and CAD was evaluated at [facility] for language disturbance. On 11/27 she underwent T10 laminectomy and fusion. Postoperatively, she was noted to have Wernicke's aphasia due to left temporal lobar ICH of undetermined etiology. She reported to have had significant improvement in her language deficit in the rehabilitation. In the evening of 12/24, she was reported to have worsening of her aphasia, prompting her presentation to OSH and subsequent transfer to [facility]. CT brain or admission showed expected evolution/resolution of left temporal ICH leading to development of encephalomalacia. MRI brain showed acute infarct in the left MCA distribution adjacent to the prior hemorrhage in the late subacute to chronic stage and expected evolution of prior left frontotemporal convexal subarachnoid hemorrhage as well as was concerning for a dilated cortical vein anterior to the hematoma. Of note, MRI brain (12/7) showed left temporal lobar ICH, left frontotemporal convexal SAH and juxtacortical FLAIR hyperintensities concerning for PRES to my eye. CT brain on 12/30, showed expected evolution of left MCA distribution infarct and prior left temporal ICH leading to encephalomalacia.    Impression:   Left MCA distribution stroke - likely etiology being cryptogenic stroke, probably related to embolism from a proximal source, like cardiac/paradoxical source of embolism  Recent left temporal lobar ICH with associated left frontotemporal convexal subarachnoid hemorrhage - likely etiology could be intracranial hemorrhages in the setting of PRES but possibility of underlying vascular malformation like micro-AVM or large vessel vasculopathy like vasculitis needs to  acute ischemic be ruled out   Seizures - Simple partial seizures/status without secondary generalization - likely etiology being symptomatic seizure in the setting of new/acute ischemic infarct versus 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 Physical therapy/OT recommended acute rehab. Consider repeat MRI brain with and without contrast/MR spectroscopy in about 4-6 weeks to rule out any underlying tissue pathology, preferably upon resolution/complete resorption of prior left temporal lobar ICH; Conventional angiogram to rule out underlying vascular malformation could be considered based on results of a repeat brain imaging/vessel imaging    ANTITHROMBOTIC THERAPY: Considering CT/MRI findings suggestive of late subacute to chronic ICH and new/acute ischemic infarct, benefits of continuing aspirin at this time would outweigh potential risks     PULMONARY: CXR clear, protecting airway, saturating well     CARDIOVASCULAR: IMTIAZ showed EF 74% moderate mitral regurgitation. Moderate focal atheroma noted in aortic arch/descending aorta, continue cardiac monitoring, s/p ICM to screen for occult cardiac arrhythmia like atrial fibrillation being the cause of possible cardiac source of embolism. BP better controlled.     SBP goal: 120-150    GASTROINTESTINAL: dysphagia screen passed- tolerating diet       Diet: Regular    RENAL: BUN/Cr without acute change?, good urine output      Na Goal: Greater than 135     Rodriguez: no    HEMATOLOGY: H/H without acute change?, Platelets ?     DVT ppx: lovenox subq    ID: afebrile, no sign of infection    OTHER: Plan endorsed to ? All questions and concerns addressed.    DISPOSITION: Acute rehab    CORE MEASURES:        Admission NIHSS: 0     TPA: [] YES [x] NO      LDL/HDL: 44/35     Depression Screen: unable to obtain due to aphasia     Statin Therapy: y     Dysphagia Screen: [x] PASS [] FAIL     Smoking [] YES [x] NO      Afib [] YES [x] NO     Stroke Education [x] YES [] NO 63 years old woman with vascular risk factors of age, HTN, DM II, HPLD and CAD was evaluated at [facility] for language disturbance. On 11/27 she underwent T10 laminectomy and fusion. Postoperatively, she was noted to have Wernicke's aphasia due to left temporal lobar ICH of undetermined etiology. She reported to have had significant improvement in her language deficit in the rehabilitation. In the evening of 12/24, she was reported to have worsening of her aphasia, prompting her presentation to OSH and subsequent transfer to [facility]. CT brain or admission showed expected evolution/resolution of left temporal ICH leading to development of encephalomalacia. MRI brain showed acute infarct in the left MCA distribution adjacent to the prior hemorrhage in the late subacute to chronic stage and expected evolution of prior left frontotemporal convexal subarachnoid hemorrhage as well as was concerning for a dilated cortical vein anterior to the hematoma. Of note, MRI brain (12/7) showed left temporal lobar ICH, left frontotemporal convexal SAH and juxtacortical FLAIR hyperintensities concerning for PRES to my eye. CT brain on 12/30, showed expected evolution of left MCA distribution infarct and prior left temporal ICH leading to encephalomalacia.    Impression:   Left MCA distribution stroke - likely etiology being cryptogenic stroke, probably related to embolism from a proximal source, like cardiac/paradoxical source of embolism  Recent left temporal lobar ICH with associated left frontotemporal convexal subarachnoid hemorrhage - likely etiology could be intracranial hemorrhages in the setting of PRES but possibility of underlying vascular malformation like micro-AVM or large vessel vasculopathy like vasculitis needs to  acute ischemic be ruled out   Seizures - Simple partial seizures/status without secondary generalization - likely etiology being symptomatic seizure in the setting of new/acute ischemic infarct versus 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 Normal CTA of the head and neck. Physical therapy/OT recommended acute rehab. Consider repeat MRI brain with and without contrast/MR spectroscopy in about 4-6 weeks to rule out any underlying tissue pathology, preferably upon resolution/complete resorption of prior left temporal lobar ICH; Conventional angiogram to rule out underlying vascular malformation could be considered based on results of a repeat brain imaging/vessel imaging    ANTITHROMBOTIC THERAPY: Considering CT/MRI findings suggestive of late subacute to chronic ICH and new/acute ischemic infarct, benefits of continuing aspirin at this time would outweigh potential risks     PULMONARY: CXR clear, protecting airway, saturating well     CARDIOVASCULAR: IMTIAZ showed EF 74% moderate mitral regurgitation. Moderate focal atheroma noted in aortic arch/descending aorta, continue cardiac monitoring, s/p ICM to screen for occult cardiac arrhythmia like atrial fibrillation being the cause of possible cardiac source of embolism. BP better controlled.     SBP goal: 120-150    GASTROINTESTINAL: dysphagia screen passed- tolerating diet       Diet: Regular    RENAL: BUN/Cr without acute change, good urine output      Na Goal: Greater than 135     Rodriguez: no    HEMATOLOGY: H/H without acute change, Platelets 263     DVT ppx: lovenox subq    ID: afebrile, no sign of infection    OTHER: Plan endorsed to son over the phone. All questions and concerns addressed.    DISPOSITION: Acute rehab    CORE MEASURES:        Admission NIHSS: 0     TPA: [] YES [x] NO      LDL/HDL: 44/35     Depression Screen: unable to obtain due to aphasia     Statin Therapy: y     Dysphagia Screen: [x] PASS [] FAIL     Smoking [] YES [x] NO      Afib [] YES [x] NO     Stroke Education [x] YES [] NO 63 years old woman with vascular risk factors of age, HTN, DM II, HPLD and CAD was evaluated at [facility] for language disturbance. On 11/27 she underwent T10 laminectomy and fusion. Postoperatively, she was noted to have Wernicke's aphasia due to left temporal lobar ICH of undetermined etiology. She reported to have had significant improvement in her language deficit in the rehabilitation. In the evening of 12/24, she was reported to have worsening of her aphasia, prompting her presentation to OSH and subsequent transfer to [facility]. CT brain or admission showed expected evolution/resolution of left temporal ICH leading to development of encephalomalacia. MRI brain showed what appeared to be an acute infarct in the left MCA distribution adjacent to the prior hemorrhage in the late subacute to chronic stage, although it is possible that this was not an actual infarct but rather MRI hyperintensity related to previous seizures; and expected evolution of prior left frontotemporal convexal subarachnoid hemorrhage as well as was concerning for a dilated cortical vein anterior to the hematoma. Of note, MRI brain (12/7) showed left temporal lobar ICH, left frontotemporal convexal SAH and juxtacortical FLAIR hyperintensities concerning for PRES to my eye. CT brain on 12/30, showed expected evolution of left MCA distribution "infarct" (versus abnormal signal related to previous seizures)  and prior left temporal ICH leading to encephalomalacia.    Impression:   Possible Left MCA distribution stroke - likely etiology being cryptogenic stroke, probably related to embolism from a proximal source, like cardiac/paradoxical source of embolism. Note it is possible that this imaging abnormality does not represent an actual infarct but rather abnormal signal related to previous seizures.  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 Normal CTA of the head and neck. Physical therapy/OT recommended acute rehab. Consider repeat MRI brain with and without contrast/MR spectroscopy in about 4-6 weeks to rule out an underlying neoplasm preferably upon resolution/complete resorption of prior left temporal lobar ICH; Conventional angiogram to rule out underlying vascular malformation could be considered based on results of a repeat brain imaging/vessel imaging    ANTITHROMBOTIC THERAPY: Considering CT/MRI findings suggestive of late subacute to chronic ICH and possible acute ischemic infarct, benefits of continuing aspirin at this time would outweigh potential risks     PULMONARY: CXR clear, protecting airway, saturating well     CARDIOVASCULAR: IMTIAZ showed EF 74% moderate mitral regurgitation. Moderate focal atheroma noted in aortic arch/descending aorta, continue cardiac monitoring, s/p ICM to screen for occult cardiac arrhythmia like atrial fibrillation being the cause of possible cardiac source of embolism. BP better controlled.     SBP goal: 120-150    GASTROINTESTINAL: dysphagia screen passed- tolerating diet       Diet: Regular    RENAL: BUN/Cr without acute change, good urine output      Na Goal: Greater than 135     Rodriguez: no    HEMATOLOGY: H/H without acute change, Platelets 263     DVT ppx: lovenox subq    ID: afebrile, no sign of infection    OTHER: Plan endorsed to son over the phone. All questions and concerns addressed.    DISPOSITION: Acute rehab    CORE MEASURES:        Admission NIHSS: 0     TPA: [] YES [x] NO      LDL/HDL: 44/35     Depression Screen: unable to obtain due to aphasia     Statin Therapy: y     Dysphagia Screen: [x] PASS [] FAIL     Smoking [] YES [x] NO      Afib [] YES [x] NO     Stroke Education [x] YES [] NO

## 2019-01-09 NOTE — SWALLOW BEDSIDE ASSESSMENT ADULT - SLP PERTINENT HISTORY OF CURRENT PROBLEM
Patient is a 63F with a history of osteoarthritis, DM, HTN, HLD who presents as a transfer from [facility] for aphasia. Patient had a laminectomy of L2-L5 on 11/27 and then post-operatively developed acute onset aphasia on 12/3 and was diagnosed with a left temporal lobar hemorrhage. Patient was discharged to rehab and plan was to obtain a conventional cerebral angiogram in the near future. While at rehab, patient has been improving and has been speaking normally. She has some difficulty walking due to her back problems but has been working with PT there. Over the weekend, staff noted her to be somewhat confused. She sent her  some text messages yesterday that were nonsensical He went to see her and found her unable to speak and staff at her rehab facility believe that this began sometime over the weekend but they are not sure when.
Patient is a 63F with a history of osteoarthritis, DM, HTN, HLD who presents as a transfer from [facility] for aphasia. Patient had a laminectomy of L2-L5 on 11/27 and then post-operatively developed acute onset aphasia on 12/3 and was diagnosed with a left temporal lobar hemorrhage. Patient was discharged to rehab and plan was to obtain a conventional cerebral angiogram in the near future. While at rehab, patient has been improving and has been speaking normally. She has some difficulty walking due to her back problems but has been working with PT there. Over the weekend, staff noted her to be somewhat confused. She sent her  some text messages yesterday that were nonsensical He went to see her and found her unable to speak and staff at her rehab facility believe that this began sometime over the weekend but they are not sure when.
Patient is a 63F with a history of osteoarthritis, DM, HTN, HLD who presents as a transfer from [facility] for aphasia. Patient had a laminectomy of L2-L5 on 11/27 and then post-operatively developed acute onset aphasia on 12/3 and was diagnosed with a left temporal lobar hemorrhage. Patient was discharged to rehab and plan was to obtain a conventional cerebral angiogram in the near future. While at rehab, patient has been improving and has been speaking normally. She has some difficulty walking due to her back problems but has been working with PT there. Over the weekend, staff noted her to be somewhat confused. She sent her  some text messages yesterday that were nonsensical He went to see her and found her unable to speak and staff at her rehab facility believe that this began sometime over the weekend but they are not sure when.
Patient is a 63F with a history of osteoarthritis, DM, HTN, HLD who presents as a transfer from [facility] for aphasia. Patient had a laminectomy of L2-L5 on 11/27 and then post-operatively developed acute onset aphasia on 12/3 and was diagnosed with a left temporal lobar hemorrhage. Patient was discharged to rehab and plan was to obtain a conventional cerebral angiogram in the near future. While at rehab, patient has been improving and has been speaking normally. She has some difficulty walking due to her back problems but has been working with PT there. Over the weekend, staff noted her to be somewhat confused. She sent her  some text messages yesterday that were nonsensical He went to see her and found her unable to speak and staff at her rehab facility believe that this began sometime over the weekend but they are not sure when.

## 2019-01-09 NOTE — SWALLOW BEDSIDE ASSESSMENT ADULT - SWALLOW EVAL: RECOMMENDED DIET
NPO, with non-oral nutrition/hydration/medications.
NPO, with non-oral nutrition/hydration/medications. Consider longer term means as appropriate.

## 2019-01-09 NOTE — SWALLOW BEDSIDE ASSESSMENT ADULT - MUCOSAL QUALITY
dry
unremarkable, based on limited visualization during yawn
unremarkable
unable to assess; tightly closed mouth; no response to verbal, visual or tactile directives

## 2019-01-09 NOTE — SWALLOW BEDSIDE ASSESSMENT ADULT - ASR SWALLOW RECOMMEND DIAG
not appropriate at this time
defer as would not 
defer at this time as would not likely 
not appropriate at this time.

## 2019-01-10 LAB
ANION GAP SERPL CALC-SCNC: 12 MMOL/L — SIGNIFICANT CHANGE UP (ref 5–17)
BUN SERPL-MCNC: 10 MG/DL — SIGNIFICANT CHANGE UP (ref 7–23)
CALCIUM SERPL-MCNC: 8.6 MG/DL — SIGNIFICANT CHANGE UP (ref 8.4–10.5)
CHLORIDE SERPL-SCNC: 104 MMOL/L — SIGNIFICANT CHANGE UP (ref 96–108)
CO2 SERPL-SCNC: 28 MMOL/L — SIGNIFICANT CHANGE UP (ref 22–31)
CREAT SERPL-MCNC: 0.7 MG/DL — SIGNIFICANT CHANGE UP (ref 0.5–1.3)
GLUCOSE BLDC GLUCOMTR-MCNC: 107 MG/DL — HIGH (ref 70–99)
GLUCOSE BLDC GLUCOMTR-MCNC: 90 MG/DL — SIGNIFICANT CHANGE UP (ref 70–99)
GLUCOSE BLDC GLUCOMTR-MCNC: 98 MG/DL — SIGNIFICANT CHANGE UP (ref 70–99)
GLUCOSE SERPL-MCNC: 120 MG/DL — HIGH (ref 70–99)
HCT VFR BLD CALC: 32.2 % — LOW (ref 34.5–45)
HCT VFR BLD CALC: 33.9 % — LOW (ref 34.5–45)
HGB BLD-MCNC: 10.7 G/DL — LOW (ref 11.5–15.5)
HGB BLD-MCNC: 10.8 G/DL — LOW (ref 11.5–15.5)
MCHC RBC-ENTMCNC: 28.7 PG — SIGNIFICANT CHANGE UP (ref 27–34)
MCHC RBC-ENTMCNC: 29.9 PG — SIGNIFICANT CHANGE UP (ref 27–34)
MCHC RBC-ENTMCNC: 31.6 GM/DL — LOW (ref 32–36)
MCHC RBC-ENTMCNC: 33.5 GM/DL — SIGNIFICANT CHANGE UP (ref 32–36)
MCV RBC AUTO: 89.3 FL — SIGNIFICANT CHANGE UP (ref 80–100)
MCV RBC AUTO: 90.9 FL — SIGNIFICANT CHANGE UP (ref 80–100)
PLATELET # BLD AUTO: 207 K/UL — SIGNIFICANT CHANGE UP (ref 150–400)
PLATELET # BLD AUTO: 263 K/UL — SIGNIFICANT CHANGE UP (ref 150–400)
POTASSIUM SERPL-MCNC: 4.1 MMOL/L — SIGNIFICANT CHANGE UP (ref 3.5–5.3)
POTASSIUM SERPL-SCNC: 4.1 MMOL/L — SIGNIFICANT CHANGE UP (ref 3.5–5.3)
RBC # BLD: 3.61 M/UL — LOW (ref 3.8–5.2)
RBC # BLD: 3.73 M/UL — LOW (ref 3.8–5.2)
RBC # FLD: 16.2 % — HIGH (ref 10.3–14.5)
RBC # FLD: 17.4 % — HIGH (ref 10.3–14.5)
SODIUM SERPL-SCNC: 144 MMOL/L — SIGNIFICANT CHANGE UP (ref 135–145)
WBC # BLD: 6.8 K/UL — SIGNIFICANT CHANGE UP (ref 3.8–10.5)
WBC # BLD: 6.95 K/UL — SIGNIFICANT CHANGE UP (ref 3.8–10.5)
WBC # FLD AUTO: 6.8 K/UL — SIGNIFICANT CHANGE UP (ref 3.8–10.5)
WBC # FLD AUTO: 6.95 K/UL — SIGNIFICANT CHANGE UP (ref 3.8–10.5)

## 2019-01-10 PROCEDURE — 99232 SBSQ HOSP IP/OBS MODERATE 35: CPT

## 2019-01-10 PROCEDURE — 99233 SBSQ HOSP IP/OBS HIGH 50: CPT

## 2019-01-10 RX ORDER — LIDOCAINE HCL 20 MG/ML
20 VIAL (ML) INJECTION ONCE
Qty: 0 | Refills: 0 | Status: DISCONTINUED | OUTPATIENT
Start: 2019-01-10 | End: 2019-01-22

## 2019-01-10 RX ADMIN — NYSTATIN CREAM 1 APPLICATION(S): 100000 CREAM TOPICAL at 18:18

## 2019-01-10 RX ADMIN — Medication 10 MILLIGRAM(S): at 18:19

## 2019-01-10 RX ADMIN — Medication 650 MILLIGRAM(S): at 18:18

## 2019-01-10 RX ADMIN — OLANZAPINE 5 MILLIGRAM(S): 15 TABLET, FILM COATED ORAL at 18:18

## 2019-01-10 RX ADMIN — Medication 2 MILLIGRAM(S): at 14:13

## 2019-01-10 RX ADMIN — Medication 650 MILLIGRAM(S): at 12:12

## 2019-01-10 RX ADMIN — ENOXAPARIN SODIUM 40 MILLIGRAM(S): 100 INJECTION SUBCUTANEOUS at 11:42

## 2019-01-10 RX ADMIN — Medication 650 MILLIGRAM(S): at 00:01

## 2019-01-10 RX ADMIN — Medication 25 MILLIGRAM(S): at 15:46

## 2019-01-10 RX ADMIN — Medication 25 MILLIGRAM(S): at 21:12

## 2019-01-10 RX ADMIN — Medication 81 MILLIGRAM(S): at 11:42

## 2019-01-10 RX ADMIN — LACOSAMIDE 140 MILLIGRAM(S): 50 TABLET ORAL at 21:12

## 2019-01-10 RX ADMIN — ATORVASTATIN CALCIUM 80 MILLIGRAM(S): 80 TABLET, FILM COATED ORAL at 21:12

## 2019-01-10 RX ADMIN — CEFTRIAXONE 100 GRAM(S): 500 INJECTION, POWDER, FOR SOLUTION INTRAMUSCULAR; INTRAVENOUS at 04:00

## 2019-01-10 RX ADMIN — OLANZAPINE 5 MILLIGRAM(S): 15 TABLET, FILM COATED ORAL at 06:01

## 2019-01-10 RX ADMIN — CARVEDILOL PHOSPHATE 25 MILLIGRAM(S): 80 CAPSULE, EXTENDED RELEASE ORAL at 18:18

## 2019-01-10 RX ADMIN — Medication 650 MILLIGRAM(S): at 18:54

## 2019-01-10 RX ADMIN — NYSTATIN CREAM 1 APPLICATION(S): 100000 CREAM TOPICAL at 06:00

## 2019-01-10 RX ADMIN — LACOSAMIDE 140 MILLIGRAM(S): 50 TABLET ORAL at 11:41

## 2019-01-10 RX ADMIN — Medication 1 APPLICATION(S): at 11:42

## 2019-01-10 RX ADMIN — Medication 25 MILLIGRAM(S): at 05:59

## 2019-01-10 RX ADMIN — Medication 650 MILLIGRAM(S): at 11:42

## 2019-01-10 NOTE — PROGRESS NOTE ADULT - SUBJECTIVE AND OBJECTIVE BOX
THE PATIENT WAS SEEN AND EXAMINED BY ME WITH THE HOUSESTAFF AND STROKE TEAM DURING MORNING ROUNDS.   HPI:  63 year old woman with vascular risk factors of age, HTN, DM II, HPLD and CAD was evaluated at [facility] for language disturbance. On 11/27 she underwent T10 laminectomy and fusion. Postoperatively, she was noted to have Wernicke's aphasia due to left temporal lobar ICH of undetermined etiology. She reported to have had significant improvement in her language deficit in rehabilitation. In the evening of 12/24, she was reported to have worsening of her aphasia, prompting her presentation to OSH and subsequent transfer to [facility].     SUBJECTIVE: No events overnight.  No new neurologic complaints.      acetaminophen   Tablet .. 650 milliGRAM(s) Oral every 6 hours  amLODIPine   Tablet 10 milliGRAM(s) Oral daily  aspirin  chewable 81 milliGRAM(s) Oral daily  atorvastatin 80 milliGRAM(s) Oral at bedtime  BACItracin   Ointment 1 Application(s) Topical daily  carvedilol 25 milliGRAM(s) Oral every 12 hours  cefTRIAXone   IVPB      cefTRIAXone   IVPB 1 Gram(s) IV Intermittent every 24 hours  dextrose 40% Gel 15 Gram(s) Oral once PRN  dextrose 50% Injectable 12.5 Gram(s) IV Push once  dextrose 50% Injectable 25 Gram(s) IV Push once  dextrose 50% Injectable 25 Gram(s) IV Push once  enoxaparin Injectable 40 milliGRAM(s) SubCutaneous daily  glucagon  Injectable 1 milliGRAM(s) IntraMuscular once PRN  hydrALAZINE Injectable 10 milliGRAM(s) IV Push every 6 hours PRN  insulin lispro (HumaLOG) corrective regimen sliding scale   SubCutaneous every 6 hours  labetalol Injectable 10 milliGRAM(s) IV Push every 6 hours PRN  lacosamide IVPB 200 milliGRAM(s) IV Intermittent every 12 hours  LORazepam   Injectable 2 milliGRAM(s) IV Push every 5 minutes PRN  losartan 100 milliGRAM(s) Oral daily  nystatin Ointment 1 Application(s) Topical every 12 hours  OLANZapine Injectable 5 milliGRAM(s) IntraMuscular every 12 hours  OLANZapine Injectable 5 milliGRAM(s) IntraMuscular every 24 hours PRN  valproate sodium IVPB 750 milliGRAM(s) IV Intermittent every 8 hours      PHYSICAL EXAM:   Vital Signs Last 24 Hrs  T(C): 36.5 (09 Jan 2019 22:30), Max: 36.9 (09 Jan 2019 15:47)  T(F): 97.7 (09 Jan 2019 22:30), Max: 98.4 (09 Jan 2019 15:47)  HR: 92 (10 Volodymyr 2019 06:00) (65 - 92)  BP: 156/88 (10 Volodymyr 2019 06:00) (112/62 - 156/88)  BP(mean): 107 (10 Volodymyr 2019 06:00) (79 - 107)  RR: 9 (10 Volodymyr 2019 06:00) (9 - 26)  SpO2: 99% (10 Volodymyr 2019 06:00) (90% - 99%)    General: no distress noted  HEENT: left gaze palsy crosses midline  Abdomen: Soft, nontender, nondistended   Extremities: No edema    NEUROLOGICAL EXAM:  Mental status: eyes open but inattentive, no verbal output, not following commands.  Cranial Nerves: No facial asymmetry, mild right gaze palsy crosses midline, no nystagmus, no dysarthria, tongue midline  Motor exam: extremities move spontaneously against gravity however right leg is noted to be moving less.   Sensation: Intact to mildly noxious stimuli  Coordination/ Gait: Unable to cooperate with testing; gait deferred     LABS:                        10.8   6.8   )-----------( 263      ( 10 Volodymyr 2019 06:45 )             32.2    01-10    144  |  104  |  10  ----------------------------<  120<H>  4.1   |  28  |  0.70    Ca    8.6      10 Volodymyr 2019 06:45      Hemoglobin A1C, Whole Blood: 6.7 % (12-28 @ 07:02)  Hemoglobin A1C, Whole Blood: 7.5 % (11-09 @ 16:00)      IMAGING: Reviewed by me.   CT Angio Head/Neck IV Cont (01.07.19)  Normal CTA of the head and neck. No abnormal vascularity,   aneurysms or AVMs appreciated on this patient who had a prior left   temporal parenchymal hemorrhage.    CT Head No Cont (01.01.19)  No gross evidence for new infarct or new hemorrhage compared to the   12/30/2018 head CT and 12/26/2018 brain MRI.     CT Head No Cont (12.30.18)  No gross evidence for new infarct or new hemorrhage compared to the   12/26/2018 MRI study.    MRI Head w/wo IV Cont (12.26.18)  Left temporal lobe hematoma without significant interval change in size.  Small amount of subarachnoid blood.  New acute left MCA distribution infarct.  Prominent left temporal region cortical vein. Clinical correlation will   determine the need for conventional angiography to exclude the   possibility of a vascular malformation.    (12.25.18)  CT brain:  Resolving left temporal lobe parenchymal hemorrhage is redemonstrated,   similar to the prior brain CT.    CT angiography neck:  No evidence for arterial dissection. No flow-limiting stenosis. Carotid   bifurcations unremarkable.    CT angiography brain:  No evidence for AVM. No vascular aneurysm or flow-limiting stenosis.    CT venography brain:  No evidence for venous sinus or cortical vein thrombosis. THE PATIENT WAS SEEN AND EXAMINED BY ME WITH THE HOUSESTAFF AND STROKE TEAM DURING MORNING ROUNDS.   HPI:  63 year old woman with vascular risk factors of age, HTN, DM II, HPLD and CAD was evaluated at [facility] for language disturbance. On 11/27 she underwent T10 laminectomy and fusion. Postoperatively, she was noted to have Wernicke's aphasia due to left temporal lobar ICH of undetermined etiology. She reported to have had significant improvement in her language deficit in rehabilitation. In the evening of 12/24, she was reported to have worsening of her aphasia, prompting her presentation to OSH and subsequent transfer to [facility].     SUBJECTIVE: No events overnight.  No new neurologic complaints.      acetaminophen   Tablet .. 650 milliGRAM(s) Oral every 6 hours  amLODIPine   Tablet 10 milliGRAM(s) Oral daily  aspirin  chewable 81 milliGRAM(s) Oral daily  atorvastatin 80 milliGRAM(s) Oral at bedtime  BACItracin   Ointment 1 Application(s) Topical daily  carvedilol 25 milliGRAM(s) Oral every 12 hours  cefTRIAXone   IVPB      cefTRIAXone   IVPB 1 Gram(s) IV Intermittent every 24 hours  dextrose 40% Gel 15 Gram(s) Oral once PRN  dextrose 50% Injectable 12.5 Gram(s) IV Push once  dextrose 50% Injectable 25 Gram(s) IV Push once  dextrose 50% Injectable 25 Gram(s) IV Push once  enoxaparin Injectable 40 milliGRAM(s) SubCutaneous daily  glucagon  Injectable 1 milliGRAM(s) IntraMuscular once PRN  hydrALAZINE Injectable 10 milliGRAM(s) IV Push every 6 hours PRN  insulin lispro (HumaLOG) corrective regimen sliding scale   SubCutaneous every 6 hours  labetalol Injectable 10 milliGRAM(s) IV Push every 6 hours PRN  lacosamide IVPB 200 milliGRAM(s) IV Intermittent every 12 hours  LORazepam   Injectable 2 milliGRAM(s) IV Push every 5 minutes PRN  losartan 100 milliGRAM(s) Oral daily  nystatin Ointment 1 Application(s) Topical every 12 hours  OLANZapine Injectable 5 milliGRAM(s) IntraMuscular every 12 hours  OLANZapine Injectable 5 milliGRAM(s) IntraMuscular every 24 hours PRN  valproate sodium IVPB 750 milliGRAM(s) IV Intermittent every 8 hours      PHYSICAL EXAM:   Vital Signs Last 24 Hrs  T(C): 36.5 (09 Jan 2019 22:30), Max: 36.9 (09 Jan 2019 15:47)  T(F): 97.7 (09 Jan 2019 22:30), Max: 98.4 (09 Jan 2019 15:47)  HR: 92 (10 Volodymyr 2019 06:00) (65 - 92)  BP: 156/88 (10 Volodymyr 2019 06:00) (112/62 - 156/88)  BP(mean): 107 (10 Volodymyr 2019 06:00) (79 - 107)  RR: 9 (10 Volodymyr 2019 06:00) (9 - 26)  SpO2: 99% (10 Volodymyr 2019 06:00) (90% - 99%)    General: no distress noted  HEENT: right gaze palsy  Abdomen: Soft, nontender, nondistended   Extremities: No edema    NEUROLOGICAL EXAM:  Mental status: eyes open but inattentive for most of exam, able to make eye contact at some points, no verbal output, not following commands.  Cranial Nerves: No facial asymmetry, mild right gaze palsy crosses midline, no nystagmus, no dysarthria, tongue midline  Motor exam: extremities move spontaneously against gravity however right leg is noted to be moving less.   Sensation: Intact to mildly noxious stimuli  Coordination/ Gait: Unable to cooperate with testing; gait deferred     LABS:                        10.8   6.8   )-----------( 263      ( 10 Volodymyr 2019 06:45 )             32.2    01-10    144  |  104  |  10  ----------------------------<  120<H>  4.1   |  28  |  0.70    Ca    8.6      10 Volodymyr 2019 06:45      Hemoglobin A1C, Whole Blood: 6.7 % (12-28 @ 07:02)  Hemoglobin A1C, Whole Blood: 7.5 % (11-09 @ 16:00)      IMAGING: Reviewed by me.   CT Angio Head/Neck IV Cont (01.07.19)  Normal CTA of the head and neck. No abnormal vascularity,   aneurysms or AVMs appreciated on this patient who had a prior left   temporal parenchymal hemorrhage.    CT Head No Cont (01.01.19)  No gross evidence for new infarct or new hemorrhage compared to the   12/30/2018 head CT and 12/26/2018 brain MRI.     CT Head No Cont (12.30.18)  No gross evidence for new infarct or new hemorrhage compared to the   12/26/2018 MRI study.    MRI Head w/wo IV Cont (12.26.18)  Left temporal lobe hematoma without significant interval change in size.  Small amount of subarachnoid blood.  New acute left MCA distribution infarct.  Prominent left temporal region cortical vein. Clinical correlation will   determine the need for conventional angiography to exclude the   possibility of a vascular malformation.    (12.25.18)  CT brain:  Resolving left temporal lobe parenchymal hemorrhage is redemonstrated,   similar to the prior brain CT.    CT angiography neck:  No evidence for arterial dissection. No flow-limiting stenosis. Carotid   bifurcations unremarkable.    CT angiography brain:  No evidence for AVM. No vascular aneurysm or flow-limiting stenosis.    CT venography brain:  No evidence for venous sinus or cortical vein thrombosis.

## 2019-01-10 NOTE — PROGRESS NOTE ADULT - SUBJECTIVE AND OBJECTIVE BOX
[facility] - Bora Mcfarland Grossman, [place], [place], Noah Lock  Office Number:  [phone]    Agitated. Unable to obtain interval history. documented to have coarse BS overnight.    ROS: negative unless otherwise mentioned.    Telemetry:  SR    MEDICATIONS  (STANDING):  acetaminophen   Tablet .. 650 milliGRAM(s) Oral every 6 hours  amLODIPine   Tablet 10 milliGRAM(s) Oral daily  aspirin  chewable 81 milliGRAM(s) Oral daily  atorvastatin 80 milliGRAM(s) Oral at bedtime  BACItracin   Ointment 1 Application(s) Topical daily  carvedilol 25 milliGRAM(s) Oral every 12 hours  cefTRIAXone   IVPB      cefTRIAXone   IVPB 1 Gram(s) IV Intermittent every 24 hours  dextrose 50% Injectable 12.5 Gram(s) IV Push once  dextrose 50% Injectable 25 Gram(s) IV Push once  dextrose 50% Injectable 25 Gram(s) IV Push once  enoxaparin Injectable 40 milliGRAM(s) SubCutaneous daily  insulin lispro (HumaLOG) corrective regimen sliding scale   SubCutaneous every 6 hours  lacosamide IVPB 200 milliGRAM(s) IV Intermittent every 12 hours  losartan 100 milliGRAM(s) Oral daily  nystatin Ointment 1 Application(s) Topical every 12 hours  OLANZapine Injectable 5 milliGRAM(s) IntraMuscular every 12 hours  valproate sodium IVPB 750 milliGRAM(s) IV Intermittent every 8 hours    MEDICATIONS  (PRN):  dextrose 40% Gel 15 Gram(s) Oral once PRN Blood Glucose LESS THAN 70 milliGRAM(s)/deciliter  glucagon  Injectable 1 milliGRAM(s) IntraMuscular once PRN Glucose LESS THAN 70 milligrams/deciliter  hydrALAZINE Injectable 10 milliGRAM(s) IV Push every 6 hours PRN SBP>160  labetalol Injectable 10 milliGRAM(s) IV Push every 6 hours PRN Systolic blood pressure >160  LORazepam   Injectable 2 milliGRAM(s) IV Push every 5 minutes PRN Sustained GTC Seizure  OLANZapine Injectable 5 milliGRAM(s) IntraMuscular every 24 hours PRN agitation      Allergies    No Known Allergies    Intolerances        Vital Signs Last 24 Hrs  T(C): 36.8 (10 Volodymyr 2019 08:08), Max: 36.9 (09 Jan 2019 15:47)  T(F): 98.2 (10 Volodymyr 2019 08:08), Max: 98.4 (09 Jan 2019 15:47)  HR: 75 (10 Volodymyr 2019 08:00) (65 - 92)  BP: 124/67 (10 Volodymyr 2019 08:00) (112/62 - 156/88)  BP(mean): 85 (10 Volodymyr 2019 08:00) (79 - 107)  RR: 22 (10 Volodymyr 2019 08:00) (9 - 26)  SpO2: 96% (10 Volodymyr 2019 08:00) (90% - 99%)    I&O's Summary    09 Jan 2019 07:01  -  10 Volodymyr 2019 07:00  --------------------------------------------------------  IN: 460 mL / OUT: 700 mL / NET: -240 mL        ON EXAM:    Constitutional: agitated  HEENT: Moist Mucous Membranes, Anicteric, +NGT  Pulmonary: Decreased breath sounds b/l and coarse, no wheezing or crackles  Cardiovascular: Regular, S1 and S2, No murmurs, rubs, gallops or clicks  Gastrointestinal: Bowel Sounds present, soft, nontender.   Lymph: No peripheral edema. No lymphadenopathy.  Skin: No visible rashes or ulcers.  Psych:  unable to assess    LABS: All Labs Reviewed:                        10.8   6.8   )-----------( 263      ( 10 Volodymyr 2019 06:45 )             32.2                         10.5   6.2   )-----------( 263      ( 09 Jan 2019 09:19 )             32.0                         10.6   6.40  )-----------( 308      ( 08 Jan 2019 07:16 )             33.7     10 Volodymyr 2019 06:45    144    |  104    |  10     ----------------------------<  120    4.1     |  28     |  0.70   09 Jan 2019 09:19    143    |  106    |  10     ----------------------------<  135    3.8     |  25     |  0.69   08 Jan 2019 05:51    144    |  109    |  8      ----------------------------<  118    3.9     |  24     |  0.70     Ca    8.6        10 Volodymyr 2019 06:45  Ca    8.6        09 Jan 2019 09:19  Ca    8.6        08 Jan 2019 05:51            Blood Culture:

## 2019-01-10 NOTE — PROGRESS NOTE ADULT - ASSESSMENT
63 years old woman with vascular risk factors of age, HTN, DM II, HPLD and CAD was evaluated at [facility] for language disturbance. On 11/27 she underwent T10 laminectomy and fusion. Postoperatively, she was noted to have Wernicke's aphasia due to left temporal lobar ICH of undetermined etiology. She reported to have had significant improvement in her language deficit in the rehabilitation. In the evening of 12/24, she was reported to have worsening of her aphasia, prompting her presentation to OSH and subsequent transfer to [facility]. CT brain or admission showed expected evolution/resolution of left temporal ICH leading to development of encephalomalacia. MRI brain showed what appeared to be an acute infarct in the left MCA distribution adjacent to the prior hemorrhage in the late subacute to chronic stage, although it is possible that this was not an actual infarct but rather MRI hyperintensity related to previous seizures; and expected evolution of prior left frontotemporal convexal subarachnoid hemorrhage as well as was concerning for a dilated cortical vein anterior to the hematoma. Of note, MRI brain (12/7) showed left temporal lobar ICH, left frontotemporal convexal SAH and juxtacortical FLAIR hyperintensities concerning for PRES to my eye. CT brain on 12/30, showed expected evolution of left MCA distribution "infarct" (versus abnormal signal related to previous seizures)  and prior left temporal ICH leading to encephalomalacia.    Impression:   Possible Left MCA distribution stroke - likely etiology being cryptogenic stroke, probably related to embolism from a proximal source, like cardiac/paradoxical source of embolism. Note it is possible that this imaging abnormality does not represent an actual infarct but rather abnormal signal related to previous seizures.  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 Normal CTA of the head and neck. Physical therapy/OT recommended acute rehab. Consider repeat MRI brain with and without contrast/MR spectroscopy in about 4-6 weeks to rule out an underlying neoplasm preferably upon resolution/complete resorption of prior left temporal lobar ICH; Conventional angiogram to rule out underlying vascular malformation could be considered based on results of a repeat brain imaging/vessel imaging    ANTITHROMBOTIC THERAPY: Considering CT/MRI findings suggestive of late subacute to chronic ICH and possible acute ischemic infarct, benefits of continuing aspirin at this time would outweigh potential risks     PULMONARY: CXR clear, protecting airway, saturating well     CARDIOVASCULAR: IMTIAZ showed EF 74% moderate mitral regurgitation. Moderate focal atheroma noted in aortic arch/descending aorta, continue cardiac monitoring, s/p ICM to screen for occult cardiac arrhythmia like atrial fibrillation being the cause of possible cardiac source of embolism. BP better controlled.     SBP goal: 120-150    GASTROINTESTINAL: NPO per SLP eval, d/w family re: alternate means of nutrition      Diet: NPO-NGT    RENAL: BUN/Cr without acute change, good urine output      Na Goal: Greater than 135     Rodriguez: no    HEMATOLOGY: H/H without acute change, Platelets 263     DVT ppx: lovenox subq    ID: afebrile, no sign of infection    OTHER: Plan endorsed to family previously over the phone. All questions and concerns addressed.    DISPOSITION: Acute rehab    CORE MEASURES:        Admission NIHSS: 0     TPA: [] YES [x] NO      LDL/HDL: 44/35     Depression Screen: unable to obtain due to aphasia     Statin Therapy: y     Dysphagia Screen: [x] PASS [] FAIL     Smoking [] YES [x] NO      Afib [] YES [x] NO     Stroke Education [x] YES [] NO 63 years old woman with vascular risk factors of age, HTN, DM II, HPLD and CAD was evaluated at [facility] for language disturbance. On 11/27 she underwent T10 laminectomy and fusion. Postoperatively, she was noted to have Wernicke's aphasia due to left temporal lobar ICH of undetermined etiology. She reported to have had significant improvement in her language deficit in the rehabilitation. In the evening of 12/24, she was reported to have worsening of her aphasia, prompting her presentation to OSH and subsequent transfer to [facility]. CT brain or admission showed expected evolution/resolution of left temporal ICH leading to development of encephalomalacia. MRI brain showed what appeared to be an acute infarct in the left MCA distribution adjacent to the prior hemorrhage in the late subacute to chronic stage, although it is possible that this was not an actual infarct but rather MRI hyperintensity related to previous seizures; and expected evolution of prior left frontotemporal convexal subarachnoid hemorrhage as well as was concerning for a dilated cortical vein anterior to the hematoma. Of note, MRI brain (12/7) showed left temporal lobar ICH, left frontotemporal convexal SAH and juxtacortical FLAIR hyperintensities concerning for PRES to my eye. CT brain on 12/30, showed expected evolution of left MCA distribution "infarct" (versus abnormal signal related to previous seizures)  and prior left temporal ICH leading to encephalomalacia.    Impression:   Possible Left MCA distribution stroke - likely etiology being cryptogenic stroke, probably related to embolism from a proximal source, like cardiac/paradoxical source of embolism. Note it is possible that this imaging abnormality does not represent an actual infarct but rather abnormal signal related to previous seizures.  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 Normal CTA of the head and neck. Physical therapy/OT recommended acute rehab. Consider repeat MRI brain with and without contrast/MR spectroscopy in about 4-6 weeks to rule out an underlying neoplasm preferably upon resolution/complete resorption of prior left temporal lobar ICH; Conventional angiogram to rule out underlying vascular malformation could be considered based on results of a repeat brain imaging/vessel imaging    ANTITHROMBOTIC THERAPY: Considering CT/MRI findings suggestive of late subacute to chronic ICH and possible acute ischemic infarct, benefits of continuing aspirin at this time would outweigh potential risks     PULMONARY: CXR clear, protecting airway, saturating well     CARDIOVASCULAR: IMTIAZ showed EF 74% moderate mitral regurgitation. Moderate focal atheroma noted in aortic arch/descending aorta, continue cardiac monitoring, s/p ICM to screen for occult cardiac arrhythmia like atrial fibrillation being the cause of possible cardiac source of embolism. BP better controlled. Cardio consult appreciated      SBP goal: 120-150    GASTROINTESTINAL: NPO per SLP eval, d/w family re: alternate means of nutrition      Diet: NPO-NGT    RENAL: BUN/Cr without acute change, good urine output      Na Goal: Greater than 135     Rodriguez: no    HEMATOLOGY: H/H without acute change, Platelets 263     DVT ppx: lovenox subq    ID: afebrile, no sign of infection    OTHER: Plan endorsed to family over the phone. All questions and concerns addressed.    DISPOSITION: Acute rehab    CORE MEASURES:        Admission NIHSS: 0     TPA: [] YES [x] NO      LDL/HDL: 44/35     Depression Screen: unable to obtain due to aphasia     Statin Therapy: y     Dysphagia Screen: [x] PASS [] FAIL     Smoking [] YES [x] NO      Afib [] YES [x] NO     Stroke Education [x] YES [] NO 63 years old woman with vascular risk factors of age, HTN, DM II, HPLD and CAD was evaluated at [facility] for language disturbance. On 11/27 she underwent T10 laminectomy and fusion. Postoperatively, she was noted to have Wernicke's aphasia due to left temporal lobar ICH of undetermined etiology. She reported to have had significant improvement in her language deficit in the rehabilitation. In the evening of 12/24, she was reported to have worsening of her aphasia, prompting her presentation to OSH and subsequent transfer to [facility]. CT brain or admission showed expected evolution/resolution of left temporal ICH leading to development of encephalomalacia. MRI brain showed what appeared to be an acute infarct in the left MCA distribution adjacent to the prior hemorrhage in the late subacute to chronic stage, although it is possible that this was not an actual infarct but rather MRI hyperintensity related to previous seizures; and expected evolution of prior left frontotemporal convexal subarachnoid hemorrhage as well as was concerning for a dilated cortical vein anterior to the hematoma. Of note, MRI brain (12/7) showed left temporal lobar ICH, left frontotemporal convexal SAH and juxtacortical FLAIR hyperintensities concerning for PRES to my eye. CT brain on 12/30, showed expected evolution of left MCA distribution "infarct" (versus abnormal signal related to previous seizures)  and prior left temporal ICH leading to encephalomalacia.    Impression:   Possible Left MCA distribution stroke - likely etiology being cryptogenic stroke, probably related to embolism from a proximal source, like cardiac/paradoxical source of embolism. Note it is possible that this imaging abnormality does not represent an actual infarct but rather abnormal signal related to previous seizures.  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     NEURO: Neurologically noted with global aphasia and inattention- unchanged from previous, though likely to be PRES, had planned to perform LP  to rule out any infectious or autoimmune etiology for altered mental status; LP not performed due to purulent drainage had previous spinal surgery site-ID consulted for further management; while unlikely, the presence of infection might raise the possibility of endocarditis or even partially treated meningitis; we'll obtain MRI of lumbar spine and TTE;  continue monitoring for neurologic deterioration in setting of cerebral edema  mass effect and brain compression, keep SBP between 120-150, LDL 44-continue with home statin as  LDL is currently at goal, MRI Brain w/wo contrast noted above. Normal CTA of the head and neck. Physical therapy/OT recommended acute rehab. Consider repeat MRI brain with and without contrast/MR spectroscopy in about 4-6 weeks to rule out an underlying neoplasm preferably upon resolution/complete resorption of prior left temporal lobar ICH; Conventional angiogram to rule out underlying vascular malformation could be considered based on results of a repeat brain imaging/vessel imaging    ANTITHROMBOTIC THERAPY: Considering CT/MRI findings suggestive of late subacute to chronic ICH and possible acute ischemic infarct, benefits of continuing aspirin at this time would outweigh potential risks     PULMONARY: CXR clear, protecting airway, saturating well     CARDIOVASCULAR: IMTIAZ showed EF 74% moderate mitral regurgitation. Moderate focal atheroma noted in aortic arch/descending aorta, continue cardiac monitoring, s/p ICM to screen for occult cardiac arrhythmia like atrial fibrillation being the cause of possible cardiac source of embolism. BP better controlled. Cardio consult appreciated      SBP goal: 120-150    GASTROINTESTINAL: NPO per SLP muriel, d/w family re: alternate means of nutrition      Diet: NPO-NGT    RENAL: BUN/Cr without acute change, good urine output      Na Goal: Greater than 135     Rodriguez: no    HEMATOLOGY: H/H without acute change, Platelets 263     DVT ppx: lovenox subq    ID: afebrile, purulent drainage at previous spinal surgical site-ID consulted    OTHER: Plan endorsed to family over the phone. All questions and concerns addressed.    DISPOSITION: Acute rehab    CORE MEASURES:        Admission NIHSS: 0     TPA: [] YES [x] NO      LDL/HDL: 44/35     Depression Screen: unable to obtain due to aphasia     Statin Therapy: y     Dysphagia Screen: [x] PASS [] FAIL     Smoking [] YES [x] NO      Afib [] YES [x] NO     Stroke Education [x] YES [] NO

## 2019-01-10 NOTE — PROGRESS NOTE ADULT - ASSESSMENT
63F with a history of CAD s/p PCI to OM2 with a AVE in December of 2015, residual 40% LAD disease, neg stress test in 1/2018, osteoarthritis, DM, HTN, HLD who presents as a transfer from [facility] for aphasia now with temporal ICH and new left MCA infarct. She is now withevolution of left MCA distribution infarct and prior left temporal ICH leading to encephalomalacia. She is having focal seizures and facial twitching.     - Cont neuro rx. plan for LP today  - She has a hx of a remote PCI.   - No anginal symptoms recently reported. Cont ASA  - Has a moderate focal atheroma in Aortic Arch. Cont high intensity statin for now.  - s/p ILR placement given suspicion for embolic nature of CVA. No af noted on telemetry to date.  - intermittent sinus tachycardia in setting of agitation. Continue to watch  - BP is labile. Continue with losartan and Coreg at current doses, along with Norvasc 10 mg po daily.  - Appears compensated from HF POV.   - Monitor and replete electrolytes. Keep K>4.0 and Mg>2.0.  -  Further cardiac workup will depend on clinical course.   - All other workup per primary team. Will followup.

## 2019-01-11 ENCOUNTER — APPOINTMENT (OUTPATIENT)
Dept: ELECTROPHYSIOLOGY | Facility: CLINIC | Age: 64
End: 2019-01-11

## 2019-01-11 LAB
AMMONIA BLD-MCNC: 49 UMOL/L — SIGNIFICANT CHANGE UP (ref 11–55)
ANION GAP SERPL CALC-SCNC: 13 MMOL/L — SIGNIFICANT CHANGE UP (ref 5–17)
BUN SERPL-MCNC: 10 MG/DL — SIGNIFICANT CHANGE UP (ref 7–23)
CALCIUM SERPL-MCNC: 8.4 MG/DL — SIGNIFICANT CHANGE UP (ref 8.4–10.5)
CHLORIDE SERPL-SCNC: 101 MMOL/L — SIGNIFICANT CHANGE UP (ref 96–108)
CO2 SERPL-SCNC: 26 MMOL/L — SIGNIFICANT CHANGE UP (ref 22–31)
CREAT SERPL-MCNC: 0.7 MG/DL — SIGNIFICANT CHANGE UP (ref 0.5–1.3)
CRP SERPL-MCNC: 1.22 MG/DL — HIGH (ref 0–0.4)
ERYTHROCYTE [SEDIMENTATION RATE] IN BLOOD: 31 MM/HR — HIGH (ref 0–20)
GLUCOSE BLDC GLUCOMTR-MCNC: 102 MG/DL — HIGH (ref 70–99)
GLUCOSE BLDC GLUCOMTR-MCNC: 109 MG/DL — HIGH (ref 70–99)
GLUCOSE BLDC GLUCOMTR-MCNC: 118 MG/DL — HIGH (ref 70–99)
GLUCOSE BLDC GLUCOMTR-MCNC: 130 MG/DL — HIGH (ref 70–99)
GLUCOSE SERPL-MCNC: 139 MG/DL — HIGH (ref 70–99)
HCT VFR BLD CALC: 37.3 % — SIGNIFICANT CHANGE UP (ref 34.5–45)
HGB BLD-MCNC: 11.9 G/DL — SIGNIFICANT CHANGE UP (ref 11.5–15.5)
MCHC RBC-ENTMCNC: 29.5 PG — SIGNIFICANT CHANGE UP (ref 27–34)
MCHC RBC-ENTMCNC: 31.9 GM/DL — LOW (ref 32–36)
MCV RBC AUTO: 92.6 FL — SIGNIFICANT CHANGE UP (ref 80–100)
PLATELET # BLD AUTO: 200 K/UL — SIGNIFICANT CHANGE UP (ref 150–400)
POTASSIUM SERPL-MCNC: 4.1 MMOL/L — SIGNIFICANT CHANGE UP (ref 3.5–5.3)
POTASSIUM SERPL-SCNC: 4.1 MMOL/L — SIGNIFICANT CHANGE UP (ref 3.5–5.3)
PROCALCITONIN SERPL-MCNC: 0.32 NG/ML — HIGH (ref 0.02–0.1)
RBC # BLD: 4.03 M/UL — SIGNIFICANT CHANGE UP (ref 3.8–5.2)
RBC # FLD: 16.2 % — HIGH (ref 10.3–14.5)
SODIUM SERPL-SCNC: 140 MMOL/L — SIGNIFICANT CHANGE UP (ref 135–145)
WBC # BLD: 6 K/UL — SIGNIFICANT CHANGE UP (ref 3.8–10.5)
WBC # FLD AUTO: 6 K/UL — SIGNIFICANT CHANGE UP (ref 3.8–10.5)

## 2019-01-11 PROCEDURE — 72148 MRI LUMBAR SPINE W/O DYE: CPT | Mod: 26

## 2019-01-11 PROCEDURE — 99233 SBSQ HOSP IP/OBS HIGH 50: CPT

## 2019-01-11 RX ORDER — SODIUM CHLORIDE 9 MG/ML
1000 INJECTION INTRAMUSCULAR; INTRAVENOUS; SUBCUTANEOUS
Qty: 0 | Refills: 0 | Status: DISCONTINUED | OUTPATIENT
Start: 2019-01-11 | End: 2019-01-13

## 2019-01-11 RX ORDER — VALPROIC ACID (AS SODIUM SALT) 250 MG/5ML
500 SOLUTION, ORAL ORAL EVERY 12 HOURS
Qty: 0 | Refills: 0 | Status: DISCONTINUED | OUTPATIENT
Start: 2019-01-11 | End: 2019-01-11

## 2019-01-11 RX ORDER — DIAZEPAM 5 MG
5 TABLET ORAL ONCE
Qty: 0 | Refills: 0 | Status: DISCONTINUED | OUTPATIENT
Start: 2019-01-11 | End: 2019-01-11

## 2019-01-11 RX ADMIN — CARVEDILOL PHOSPHATE 25 MILLIGRAM(S): 80 CAPSULE, EXTENDED RELEASE ORAL at 17:22

## 2019-01-11 RX ADMIN — Medication 650 MILLIGRAM(S): at 05:03

## 2019-01-11 RX ADMIN — ATORVASTATIN CALCIUM 80 MILLIGRAM(S): 80 TABLET, FILM COATED ORAL at 23:00

## 2019-01-11 RX ADMIN — Medication 650 MILLIGRAM(S): at 17:22

## 2019-01-11 RX ADMIN — LOSARTAN POTASSIUM 100 MILLIGRAM(S): 100 TABLET, FILM COATED ORAL at 05:03

## 2019-01-11 RX ADMIN — Medication 650 MILLIGRAM(S): at 06:00

## 2019-01-11 RX ADMIN — NYSTATIN CREAM 1 APPLICATION(S): 100000 CREAM TOPICAL at 05:04

## 2019-01-11 RX ADMIN — Medication 1 APPLICATION(S): at 12:06

## 2019-01-11 RX ADMIN — Medication 81 MILLIGRAM(S): at 12:06

## 2019-01-11 RX ADMIN — AMLODIPINE BESYLATE 10 MILLIGRAM(S): 2.5 TABLET ORAL at 05:03

## 2019-01-11 RX ADMIN — LACOSAMIDE 140 MILLIGRAM(S): 50 TABLET ORAL at 12:36

## 2019-01-11 RX ADMIN — NYSTATIN CREAM 1 APPLICATION(S): 100000 CREAM TOPICAL at 17:37

## 2019-01-11 RX ADMIN — Medication 650 MILLIGRAM(S): at 13:00

## 2019-01-11 RX ADMIN — ENOXAPARIN SODIUM 40 MILLIGRAM(S): 100 INJECTION SUBCUTANEOUS at 12:06

## 2019-01-11 RX ADMIN — CARVEDILOL PHOSPHATE 25 MILLIGRAM(S): 80 CAPSULE, EXTENDED RELEASE ORAL at 05:03

## 2019-01-11 RX ADMIN — Medication 5 MILLIGRAM(S): at 16:50

## 2019-01-11 RX ADMIN — LACOSAMIDE 140 MILLIGRAM(S): 50 TABLET ORAL at 22:55

## 2019-01-11 RX ADMIN — Medication 650 MILLIGRAM(S): at 00:46

## 2019-01-11 RX ADMIN — Medication 25 MILLIGRAM(S): at 05:04

## 2019-01-11 RX ADMIN — CEFTRIAXONE 100 GRAM(S): 500 INJECTION, POWDER, FOR SOLUTION INTRAMUSCULAR; INTRAVENOUS at 03:58

## 2019-01-11 RX ADMIN — OLANZAPINE 5 MILLIGRAM(S): 15 TABLET, FILM COATED ORAL at 05:04

## 2019-01-11 RX ADMIN — Medication 650 MILLIGRAM(S): at 12:07

## 2019-01-11 RX ADMIN — Medication 650 MILLIGRAM(S): at 01:36

## 2019-01-11 NOTE — PHARMACOTHERAPY INTERVENTION NOTE - COMMENTS
Valproic acid level:   Valproic Acid, Free: 73.8 mg/L [4.8 - 17.3] [Nonlinear drug binding properties result in the     fraction of free Valproic Acid increasing as total drug     increases. The free fraction may range from 5% to 25%     for the total drug range of  mg/L.  IA #[id number]] (01-08-19 @ 16:45)    Valproic Acid Level, Serum: 88 ug/mL [50 - 100] (01-08-19 @ 12:08)  Valproic Acid Level, Serum: 75 ug/mL [50 - 100] (01-07-19 @ 08:54)  Valproic Acid Level, Serum: 56 ug/mL [50 - 100] (01-05-19 @ 06:53)  Valproic Acid Level, Serum: 42 ug/mL [50 - 100] (01-04-19 @ 06:17)      Current Medication:  valproate sodium IVPB 750 mg every 8 hours    Recommendation:  Recommend to hold valproic acid dose today and check serum levels tomorrow. Valproic acid level:   Valproic Acid, Free: 73.8 mg/L [4.8 - 17.3] [Nonlinear drug binding properties result in the     fraction of free Valproic Acid increasing as total drug     increases. The free fraction may range from 5% to 25%     for the total drug range of  mg/L.  IA #[id number]] (01-08-19 @ 16:45)    Valproic Acid Level, Serum: 88 ug/mL [50 - 100] (01-08-19 @ 12:08)  Valproic Acid Level, Serum: 75 ug/mL [50 - 100] (01-07-19 @ 08:54)  Valproic Acid Level, Serum: 56 ug/mL [50 - 100] (01-05-19 @ 06:53)  Valproic Acid Level, Serum: 42 ug/mL [50 - 100] (01-04-19 @ 06:17)      Current Medication:  valproate sodium IVPB 750 mg every 8 hours    Recommendation:  Recommend to hold valproic acid dose today due to high free valproic level and check serum levels tomorrow.

## 2019-01-11 NOTE — CONSULT NOTE ADULT - CONSULT REQUESTED DATE/TIME
28-Dec-2018 09:20
02-Jan-2019
03-Jan-2019 23:45
05-Jan-2019 16:59
06-Jan-2019
11-Jan-2019 12:40
11-Jan-2019 17:13
26-Dec-2018 07:57
28-Dec-2018 12:50
29-Dec-2018 11:25

## 2019-01-11 NOTE — PROGRESS NOTE ADULT - SUBJECTIVE AND OBJECTIVE BOX
[facility] - Bora Mcfarland, Mica, Cari, Barry, Noah Lock  Office Number:  [phone]    Patient resting comfortably in bed in NAD.  Laying flat with no respiratory distress.  Unable to provide any history.  S/p LP    F/U for:  CAD    Telemetry:  NSR    MEDICATIONS  (STANDING):  acetaminophen   Tablet .. 650 milliGRAM(s) Oral every 6 hours  amLODIPine   Tablet 10 milliGRAM(s) Oral daily  aspirin  chewable 81 milliGRAM(s) Oral daily  atorvastatin 80 milliGRAM(s) Oral at bedtime  BACItracin   Ointment 1 Application(s) Topical daily  carvedilol 25 milliGRAM(s) Oral every 12 hours  cefTRIAXone   IVPB      cefTRIAXone   IVPB 1 Gram(s) IV Intermittent every 24 hours  dextrose 50% Injectable 12.5 Gram(s) IV Push once  dextrose 50% Injectable 25 Gram(s) IV Push once  dextrose 50% Injectable 25 Gram(s) IV Push once  enoxaparin Injectable 40 milliGRAM(s) SubCutaneous daily  insulin lispro (HumaLOG) corrective regimen sliding scale   SubCutaneous every 6 hours  lacosamide IVPB 200 milliGRAM(s) IV Intermittent every 12 hours  lidocaine 2% Injectable 20 milliLiter(s) Local Injection once  losartan 100 milliGRAM(s) Oral daily  nystatin Ointment 1 Application(s) Topical every 12 hours  OLANZapine Injectable 5 milliGRAM(s) IntraMuscular every 12 hours  valproate sodium IVPB 750 milliGRAM(s) IV Intermittent every 8 hours    MEDICATIONS  (PRN):  dextrose 40% Gel 15 Gram(s) Oral once PRN Blood Glucose LESS THAN 70 milliGRAM(s)/deciliter  glucagon  Injectable 1 milliGRAM(s) IntraMuscular once PRN Glucose LESS THAN 70 milligrams/deciliter  hydrALAZINE Injectable 10 milliGRAM(s) IV Push every 6 hours PRN SBP>160  labetalol Injectable 10 milliGRAM(s) IV Push every 6 hours PRN Systolic blood pressure >160  LORazepam   Injectable 2 milliGRAM(s) IV Push every 5 minutes PRN Sustained GTC Seizure  OLANZapine Injectable 5 milliGRAM(s) IntraMuscular every 24 hours PRN agitation      Allergies    No Known Allergies             Vital Signs Last 24 Hrs  T(C): 36.8 (11 Jan 2019 07:17), Max: 37.5 (10 Volodymyr 2019 20:00)  T(F): 98.2 (11 Jan 2019 07:17), Max: 99.5 (10 Volodymyr 2019 20:00)  HR: 77 (11 Jan 2019 06:00) (75 - 111)  BP: 125/61 (11 Jan 2019 06:00) (124/67 - 190/98)  BP(mean): 77 (11 Jan 2019 06:00) (74 - 128)  RR: 16 (11 Jan 2019 06:00) (13 - 25)  SpO2: 100% (11 Jan 2019 06:00) (93% - 100%)    I&O's Summary    10 Volodymyr 2019 07:01  -  11 Jan 2019 07:00  --------------------------------------------------------  IN: 1270 mL / OUT: 900 mL / NET: 370 mL        ON EXAM:    Constitutional: Resting in bed. minimally responsive  HEENT: Moist Mucous Membranes, Anicteric, +NGT  Pulmonary: Decreased breath sounds b/l and coarse, no wheezing or crackles  Cardiovascular: Regular, S1 and S2, No murmurs, rubs, gallops or clicks  Gastrointestinal: Bowel Sounds present, soft, nontender.  Obese  Lymph: No peripheral edema. No lymphadenopathy.  Skin: No visible rashes or ulcers.  Psych:  unable to assess    LABS: All Labs Reviewed:                        11.9   6.0   )-----------( 200      ( 11 Jan 2019 05:36 )             37.3                         10.7   6.95  )-----------( 207      ( 10 Volodymyr 2019 18:28 )             33.9                         10.8   6.8   )-----------( 263      ( 10 Volodymyr 2019 06:45 )             32.2     11 Jan 2019 05:36    140    |  101    |  10     ----------------------------<  139    4.1     |  26     |  0.70   10 Volodymyr 2019 06:45    144    |  104    |  10     ----------------------------<  120    4.1     |  28     |  0.70   09 Jan 2019 09:19    143    |  106    |  10     ----------------------------<  135    3.8     |  25     |  0.69     Ca    8.4        11 Jan 2019 05:36  Ca    8.6        10 Volodymyr 2019 06:45  Ca    8.6        09 Jan 2019 09:19

## 2019-01-11 NOTE — CHART NOTE - NSCHARTNOTEFT_GEN_A_CORE
Patient seen and examined with attending Dr Concepcion  Incision examined and appears well healing, small 3mm area of wound dehiscence mid-aspect of incision, no surrounding erythema or drainage  Patient withdraws to tactile stimuli; unable to assess neuro status further due to aphasia/inability to follow commands    MRI lumbar spine performed ans results copied here:    < from: MR Lumbar Spine No Cont (01.11.19 @ 16:01) >    IMPRESSION:    Postoperative changes including multilevel laminectomies and posterior   spinal fusion.    Cystic collection within the laminectomy bed producing effacement of the   thecal sac most prominent at the L4-L5 level where there is moderate to   marked thecal sac compression and crowding of nerve roots. This may   represent a seroma, pseudomeningocele or CSF leak.    Fluid collection within the dorsal postoperative bed which may or may not   communicate with the collection within the laminectomy bed and may   represent a postoperative seroma however the possibility of an abscess   collection is not entirely excluded.    Plan:  - IR contacted for therapeutic/diagnostic aspiration of deep collection on Monday 1/14  - Patient should be NPO pMN on 1/13 and DVT chemo prophylaxis held  - Local wound care for incision  - No acute orthopedic intervention at this time  - Will continue to follow Patient seen and examined with attending Dr Concepcion  Incision examined and appears well healing, small 3mm area of wound dehiscence mid-aspect of incision, no surrounding erythema or drainage  Patient withdraws to tactile stimuli; unable to assess neuro status further due to aphasia/inability to follow commands    MRI lumbar spine performed ans results copied here:    < from: MR Lumbar Spine No Cont (01.11.19 @ 16:01) >    IMPRESSION:    Postoperative changes including multilevel laminectomies and posterior   spinal fusion.    Cystic collection within the laminectomy bed producing effacement of the   thecal sac most prominent at the L4-L5 level where there is moderate to   marked thecal sac compression and crowding of nerve roots. This may   represent a seroma, pseudomeningocele or CSF leak.    Fluid collection within the dorsal postoperative bed which may or may not   communicate with the collection within the laminectomy bed and may   represent a postoperative seroma however the possibility of an abscess   collection is not entirely excluded.    Plan:  - IR contacted for therapeutic/diagnostic aspiration of deep collection on Saturday 1/12  - Patient should be NPO pMN and DVT chemo prophylaxis held  - Local wound care for incision  - No acute orthopedic intervention at this time  - Will continue to follow

## 2019-01-11 NOTE — CONSULT NOTE ADULT - PROBLEM SELECTOR RECOMMENDATION 2
- on Aspirin  - Physical therapy/OT recommended acute rehab  - BP control and statin therapy per neurology team

## 2019-01-11 NOTE — CHART NOTE - NSCHARTNOTEFT_GEN_A_CORE
IR Consult Note    Requested procedure: aspiration of a post-operative lumbar fluid collection  Indication: Fluid collection within the post-operative laminectomy bed  Requesting physician: Basil George MD (ortho)    Relevant Clinical Information: Mrs. Renteria is a 63 year old female with a h/o DM, HTN, HLD, transferred from [facility] after L2-L5 laminectomy (11/27) complicated by a hemorrhagic stroke on 12/3. She was transferred to a rehab facility and was admitted to [facility] for altered mental status on 12/25. During her hospital course, Mrs. Renteria was noted to be having seizures and delirious. Orthopedic surgery was consulted on 1/2 for post-operative wound dehiscence. An MR spine was performed on 1/11/2019 and demonstrated a post-operative fluid collection resulting abd crowding of the nerve roots.     Prior IR consults: none  Hemodynamically stable: yes    Laboratory results   a.	Platelets: 200  b.	INR: 1.2 (12/25)    Available imaging studies: contemporaneous MR spine  History of allergies: NKA  Anticoagulation Medications: Aspirin 81 mg (DVT ppx currently being held)      A/P: 63 year old female here for post-operative hemorrhagic stroke after L2-L5 laminectomy with a fluid collection identified on the most recent MR spine    - Tube feeds stopped tonight for procedure tomorrow  - continue holding DVT ppx until 24 hrs after procedure  - will plan on performing CT guided fluid aspiration tomorrow

## 2019-01-11 NOTE — CONSULT NOTE ADULT - PROBLEM SELECTOR RECOMMENDATION 9
- pt failed speech and swallow evaluations on 1/2 and 1/3, and 1/9  - recommend NPO with NGT feeds as pt is at risk for aspiration  - plan for upper gastrointestinal endoscopy with PEG placement monday, stop NGT feeds after midnight sunday night  - will reach out to family, however family in agreement as per neurology team

## 2019-01-11 NOTE — PROGRESS NOTE ADULT - ASSESSMENT
63F with a history of CAD s/p PCI to OM2 with a AVE in December of 2015, residual 40% LAD disease, neg stress test in 1/2018, osteoarthritis, DM, HTN, HLD who presents as a transfer from [facility] for aphasia now with temporal ICH and new left MCA infarct. She is now withevolution of left MCA distribution infarct and prior left temporal ICH leading to encephalomalacia. She is having focal seizures and facial twitching.     - Cont neuro rx.  s/p LP.  Neuro follow up  - She has a hx of a remote PCI.   - No anginal symptoms recently reported. Cont ASA 81 QD  - Has a moderate focal atheroma in Aortic Arch. Cont high intensity statin for now.  - s/p ILR placement given suspicion for embolic nature of CVA. No af noted on telemetry to date.  - intermittent sinus tachycardia in setting of agitation. Continue to watch  - BP is labile. Continue with losartan and Coreg at current doses, along with Norvasc 10 mg po daily.  - She continues to require IV medications for control of her labile BP.  I would continue prn IV hydralazine and labetalol to control her blood pressure spikes.  Last dose of IV hydralazine yesterday afternoon.  - Appears compensated from HF POV.   - Monitor and replete electrolytes. Keep K>4.0 and Mg>2.0.  -  Further cardiac workup will depend on clinical course.   - All other workup per primary team. Will followup.

## 2019-01-11 NOTE — PROGRESS NOTE ADULT - SUBJECTIVE AND OBJECTIVE BOX
THE PATIENT WAS SEEN AND EXAMINED BY ME WITH THE HOUSESTAFF AND STROKE TEAM DURING MORNING ROUNDS.   HPI:  63 year old woman with vascular risk factors of age, HTN, DM II, HPLD and CAD was evaluated at [facility] for language disturbance. On 11/27 she underwent T10 laminectomy and fusion. Postoperatively, she was noted to have Wernicke's aphasia due to left temporal lobar ICH of undetermined etiology. She reported to have had significant improvement in her language deficit in rehabilitation. In the evening of 12/24, she was reported to have worsening of her aphasia, prompting her presentation to OSH and subsequent transfer to [facility].       SUBJECTIVE: No events overnight.  No new neurologic complaints.      acetaminophen   Tablet .. 650 milliGRAM(s) Oral every 6 hours  amLODIPine   Tablet 10 milliGRAM(s) Oral daily  aspirin  chewable 81 milliGRAM(s) Oral daily  atorvastatin 80 milliGRAM(s) Oral at bedtime  BACItracin   Ointment 1 Application(s) Topical daily  carvedilol 25 milliGRAM(s) Oral every 12 hours  cefTRIAXone   IVPB      cefTRIAXone   IVPB 1 Gram(s) IV Intermittent every 24 hours  dextrose 40% Gel 15 Gram(s) Oral once PRN  dextrose 50% Injectable 12.5 Gram(s) IV Push once  dextrose 50% Injectable 25 Gram(s) IV Push once  dextrose 50% Injectable 25 Gram(s) IV Push once  enoxaparin Injectable 40 milliGRAM(s) SubCutaneous daily  glucagon  Injectable 1 milliGRAM(s) IntraMuscular once PRN  hydrALAZINE Injectable 10 milliGRAM(s) IV Push every 6 hours PRN  insulin lispro (HumaLOG) corrective regimen sliding scale   SubCutaneous every 6 hours  labetalol Injectable 10 milliGRAM(s) IV Push every 6 hours PRN  lacosamide IVPB 200 milliGRAM(s) IV Intermittent every 12 hours  lidocaine 2% Injectable 20 milliLiter(s) Local Injection once  LORazepam   Injectable 2 milliGRAM(s) IV Push every 5 minutes PRN  losartan 100 milliGRAM(s) Oral daily  nystatin Ointment 1 Application(s) Topical every 12 hours  OLANZapine Injectable 5 milliGRAM(s) IntraMuscular every 12 hours  OLANZapine Injectable 5 milliGRAM(s) IntraMuscular every 24 hours PRN  valproate sodium IVPB 750 milliGRAM(s) IV Intermittent every 8 hours      PHYSICAL EXAM:   Vital Signs Last 24 Hrs  T(C): 36.8 (11 Jan 2019 07:17), Max: 37.5 (10 Volodymyr 2019 20:00)  T(F): 98.2 (11 Jan 2019 07:17), Max: 99.5 (10 Volodymyr 2019 20:00)  HR: 77 (11 Jan 2019 06:00) (75 - 111)  BP: 125/61 (11 Jan 2019 06:00) (124/67 - 190/98)  BP(mean): 77 (11 Jan 2019 06:00) (74 - 128)  RR: 16 (11 Jan 2019 06:00) (13 - 25)  SpO2: 100% (11 Jan 2019 06:00) (93% - 100%)    General: no distress noted  HEENT: right gaze palsy  Abdomen: Soft, nontender, nondistended   Extremities: No edema    NEUROLOGICAL EXAM:  Mental status: eyes open but inattentive for most of exam, able to make eye contact at some points, no verbal output, not following commands.  Cranial Nerves: No facial asymmetry, mild right gaze palsy crosses midline, no nystagmus, no dysarthria, tongue midline  Motor exam: extremities move spontaneously against gravity however right leg is noted to be moving less.   Sensation: Intact to mildly noxious stimuli  Coordination/ Gait: Unable to cooperate with testing; gait deferred     LABS:                        11.9   6.0   )-----------( 200      ( 11 Jan 2019 05:36 )             37.3    01-11    140  |  101  |  10  ----------------------------<  139<H>  4.1   |  26  |  0.70    Ca    8.4      11 Jan 2019 05:36      Hemoglobin A1C, Whole Blood: 6.7 % (12-28 @ 07:02)  Hemoglobin A1C, Whole Blood: 7.5 % (11-09 @ 16:00)      IMAGING: Reviewed by me.   CT Angio Head/Neck IV Cont (01.07.19)  Normal CTA of the head and neck. No abnormal vascularity,   aneurysms or AVMs appreciated on this patient who had a prior left   temporal parenchymal hemorrhage.    CT Head No Cont (01.01.19)  No gross evidence for new infarct or new hemorrhage compared to the   12/30/2018 head CT and 12/26/2018 brain MRI.     CT Head No Cont (12.30.18)  No gross evidence for new infarct or new hemorrhage compared to the   12/26/2018 MRI study.    MRI Head w/wo IV Cont (12.26.18)  Left temporal lobe hematoma without significant interval change in size.  Small amount of subarachnoid blood.  New acute left MCA distribution infarct.  Prominent left temporal region cortical vein. Clinical correlation will   determine the need for conventional angiography to exclude the   possibility of a vascular malformation.    (12.25.18)  CT brain:  Resolving left temporal lobe parenchymal hemorrhage is redemonstrated,   similar to the prior brain CT.    CT angiography neck:  No evidence for arterial dissection. No flow-limiting stenosis. Carotid   bifurcations unremarkable.    CT angiography brain:  No evidence for AVM. No vascular aneurysm or flow-limiting stenosis.    CT venography brain:  No evidence for venous sinus or cortical vein thrombosis.

## 2019-01-11 NOTE — CONSULT NOTE ADULT - CONSULT REQUESTED BY NAME
Ashvin
Dr. Libman
Dr. robertson
Inpatient
JAMEE Lock
Neuro Resident
Smooth Montesinos
medicine
neurology
Dr. Adalberto Lock
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
## 2019-01-11 NOTE — CONSULT NOTE ADULT - CONSULT REASON
ILR implant
Back wound
CVA, CAD
Evaluate Rehabilitation Needs
Hypertension, UTI
NGtube placement
dysphagia
post op wound complication
seizures, AED management
wound drainage
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
## 2019-01-11 NOTE — PROGRESS NOTE ADULT - ASSESSMENT
63 years old woman with vascular risk factors of age, HTN, DM II, HPLD and CAD was evaluated at [facility] for language disturbance. On 11/27 she underwent T10 laminectomy and fusion. Postoperatively, she was noted to have Wernicke's aphasia due to left temporal lobar ICH of undetermined etiology. She reported to have had significant improvement in her language deficit in the rehabilitation. In the evening of 12/24, she was reported to have worsening of her aphasia, prompting her presentation to OSH and subsequent transfer to [facility]. CT brain or admission showed expected evolution/resolution of left temporal ICH leading to development of encephalomalacia. MRI brain showed what appeared to be an acute infarct in the left MCA distribution adjacent to the prior hemorrhage in the late subacute to chronic stage, although it is possible that this was not an actual infarct but rather MRI hyperintensity related to previous seizures; and expected evolution of prior left frontotemporal convexal subarachnoid hemorrhage as well as was concerning for a dilated cortical vein anterior to the hematoma. Of note, MRI brain (12/7) showed left temporal lobar ICH, left frontotemporal convexal SAH and juxtacortical FLAIR hyperintensities concerning for PRES to my eye. CT brain on 12/30, showed expected evolution of left MCA distribution "infarct" (versus abnormal signal related to previous seizures)  and prior left temporal ICH leading to encephalomalacia.    Impression:   Possible Left MCA distribution stroke - likely etiology being cryptogenic stroke, probably related to embolism from a proximal source, like cardiac/paradoxical source of embolism. Note it is possible that this imaging abnormality does not represent an actual infarct but rather abnormal signal related to previous seizures.  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     NEURO: Neurologically noted with global aphasia and inattention- unchanged from previous, though likely to be PRES, had planned to perform LP  to rule out any infectious or autoimmune etiology for altered mental status; LP not performed due to purulent drainage had previous spinal surgery site-ID consulted for further management; while unlikely, the presence of infection might raise the possibility of endocarditis or even partially treated meningitis; we'll obtain MRI of lumbar spine and TTE;  continue monitoring for neurologic deterioration in setting of cerebral edema  mass effect and brain compression, keep SBP between 120-150, LDL 44-continue with home statin as  LDL is currently at goal, MRI Brain w/wo contrast noted above. Normal CTA of the head and neck. Physical therapy/OT recommended acute rehab. Consider repeat MRI brain with and without contrast/MR spectroscopy in about 4-6 weeks to rule out an underlying neoplasm preferably upon resolution/complete resorption of prior left temporal lobar ICH; Conventional angiogram to rule out underlying vascular malformation could be considered based on results of a repeat brain imaging/vessel imaging    ANTITHROMBOTIC THERAPY: Considering CT/MRI findings suggestive of late subacute to chronic ICH and possible acute ischemic infarct, benefits of continuing aspirin at this time would outweigh potential risks     PULMONARY: CXR clear, protecting airway, saturating well     CARDIOVASCULAR: IMTIAZ showed EF 74% moderate mitral regurgitation. Moderate focal atheroma noted in aortic arch/descending aorta, continue cardiac monitoring, s/p ICM to screen for occult cardiac arrhythmia like atrial fibrillation being the cause of possible cardiac source of embolism. BP better controlled. Cardio consult appreciated, repeat echo pending.      SBP goal: 120-150    GASTROINTESTINAL: NPO per SLP eval, d/w family re: alternate means of nutrition      Diet: NPO-NGT    RENAL: BUN/Cr without acute change, good urine output      Na Goal: Greater than 135     Rodriguez: no    HEMATOLOGY: H/H demonstrated anemia 1/10/19- now resolved, continue to monitor, Platelets 200     DVT ppx: lovenox subq    ID: afebrile, purulent drainage at previous spinal surgical site-ID consulted    OTHER: Plan endorsed to family over the phone. All questions and concerns addressed. Surgical specialties reconsulted.    DISPOSITION: Acute rehab    CORE MEASURES:        Admission NIHSS: 0     TPA: [] YES [x] NO      LDL/HDL: 44/35     Depression Screen: unable to obtain due to aphasia     Statin Therapy: y     Dysphagia Screen: [x] PASS [] FAIL     Smoking [] YES [x] NO      Afib [] YES [x] NO     Stroke Education [x] YES [] NO 63 years old woman with vascular risk factors of age, HTN, DM II, HPLD and CAD was evaluated at [facility] for language disturbance. On 11/27 she underwent T10 laminectomy and fusion. Postoperatively, she was noted to have Wernicke's aphasia due to left temporal lobar ICH of undetermined etiology. She reported to have had significant improvement in her language deficit in the rehabilitation. In the evening of 12/24, she was reported to have worsening of her aphasia, prompting her presentation to OSH and subsequent transfer to [facility]. CT brain or admission showed expected evolution/resolution of left temporal ICH leading to development of encephalomalacia. MRI brain showed what appeared to be an acute infarct in the left MCA distribution adjacent to the prior hemorrhage in the late subacute to chronic stage, although it is possible that this was not an actual infarct but rather MRI hyperintensity related to previous seizures; and expected evolution of prior left frontotemporal convexal subarachnoid hemorrhage as well as was concerning for a dilated cortical vein anterior to the hematoma. Of note, MRI brain (12/7) showed left temporal lobar ICH, left frontotemporal convexal SAH and juxtacortical FLAIR hyperintensities concerning for PRES to my eye. CT brain on 12/30, showed expected evolution of left MCA distribution "infarct" (versus abnormal signal related to previous seizures)  and prior left temporal ICH leading to encephalomalacia.    Impression:   Possible Left MCA distribution stroke - likely etiology being cryptogenic stroke, probably related to embolism from a proximal source, like cardiac/paradoxical source of embolism. Note it is possible that this imaging abnormality does not represent an actual infarct but rather abnormal signal related to previous seizures.  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     NEURO: Neurologically noted with global aphasia and inattention- unchanged from previous, though likely to be PRES, had planned to perform LP  to rule out any infectious or autoimmune etiology for altered mental status; LP not performed due to purulent drainage had previous spinal surgery site-ID consulted for further management; while unlikely, the presence of infection might raise the possibility of endocarditis or even partially treated meningitis; we'll obtain MRI of lumbar spine and TTE;  continue monitoring for neurologic deterioration in setting of cerebral edema  mass effect and brain compression, keep SBP between 120-150, LDL 44-continue with home statin as  LDL is currently at goal, MRI Brain w/wo contrast noted above. Normal CTA of the head and neck. Physical therapy/OT recommended acute rehab. Consider repeat MRI brain with and without contrast/MR spectroscopy in about 4-6 weeks to rule out an underlying neoplasm preferably upon resolution/complete resorption of prior left temporal lobar ICH; Conventional angiogram to rule out underlying vascular malformation could be considered based on results of a repeat brain imaging/vessel imaging    ANTITHROMBOTIC THERAPY: Considering CT/MRI findings suggestive of late subacute to chronic ICH and possible acute ischemic infarct, benefits of continuing aspirin at this time would outweigh potential risks     PULMONARY: CXR clear, protecting airway, saturating well     CARDIOVASCULAR: IMTIAZ showed EF 74% moderate mitral regurgitation. Moderate focal atheroma noted in aortic arch/descending aorta, continue cardiac monitoring, s/p ICM to screen for occult cardiac arrhythmia like atrial fibrillation being the cause of possible cardiac source of embolism. BP better controlled. Cardio consult appreciated, repeat echo pending.      SBP goal: 120-150    GASTROINTESTINAL: NPO per SLP muriel, d/w family re: alternate means of nutrition      Diet: NPO-NGT    RENAL: BUN/Cr without acute change, good urine output      Na Goal: Greater than 135     Rodriguez: no    HEMATOLOGY: H/H demonstrated anemia 1/10/19- now resolved, continue to monitor, Platelets 200     DVT ppx: lovenox subq    ID: afebrile, purulent drainage at previous spinal surgical site-ID consulted, blood cultures and MR spine pending, wound culture as well, on ceftraizone for UTI- day 7/7    OTHER: Plan endorsed to family over the phone. All questions and concerns addressed. Surgical specialties reconsulted. D/W Dr. Snyder as well for further advisement and care per neurology service. Epilepsy consulted for further recommendations re: Valproate levels and AED adjustment.    DISPOSITION: Acute rehab    CORE MEASURES:        Admission NIHSS: 0     TPA: [] YES [x] NO      LDL/HDL: 44/35     Depression Screen: unable to obtain due to aphasia     Statin Therapy: y     Dysphagia Screen: [x] PASS [] FAIL     Smoking [] YES [x] NO      Afib [] YES [x] NO     Stroke Education [x] YES [] NO 63 years old woman with vascular risk factors of age, HTN, DM II, HPLD and CAD was evaluated at [facility] for language disturbance. On 11/27 she underwent T10 laminectomy and fusion. Postoperatively, she was noted to have Wernicke's aphasia due to left temporal lobar ICH of undetermined etiology. She reported to have had significant improvement in her language deficit in the rehabilitation. In the evening of 12/24, she was reported to have worsening of her aphasia, prompting her presentation to OSH and subsequent transfer to [facility]. CT brain or admission showed expected evolution/resolution of left temporal ICH leading to development of encephalomalacia. MRI brain showed what appeared to be an acute infarct in the left MCA distribution adjacent to the prior hemorrhage in the late subacute to chronic stage, although it is possible that this was not an actual infarct but rather MRI hyperintensity related to previous seizures; and expected evolution of prior left frontotemporal convexal subarachnoid hemorrhage as well as was concerning for a dilated cortical vein anterior to the hematoma. Of note, MRI brain (12/7) showed left temporal lobar ICH, left frontotemporal convexal SAH and juxtacortical FLAIR hyperintensities concerning for PRES to my eye. CT brain on 12/30, showed expected evolution of left MCA distribution "infarct" (versus abnormal signal related to previous seizures)  and prior left temporal ICH leading to encephalomalacia.    Impression:   Possible Left MCA distribution "stroke" -initially thought likely etiology being cryptogenic stroke, probably related to embolism from a proximal source, like cardiac/paradoxical source of embolism. Note it is possible or probable that this imaging abnormality does not represent an actual infarct but rather abnormal signal related to previous seizures.  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     NEURO: Neurologically noted with global aphasia and inattention- unchanged from previous, though likely to be PRES, had planned to perform LP  to rule out any infectious or autoimmune etiology for altered mental status; LP not performed due to purulent drainage had previous spinal surgery site-ID consulted for further management; while unlikely, the presence of infection might raise the possibility of endocarditis or even partially treated meningitis; we'll obtain MRI of lumbar spine, blood cultures (although she's been on antibiotics for a UTI) and TTE;  continue monitoring for neurologic deterioration in setting of cerebral edema  mass effect and brain compression, keep SBP between 120-150, LDL 44-continue with home statin as  LDL is currently at goal, MRI Brain w/wo contrast noted above. Normal CTA of the head and neck. Physical therapy/OT recommended acute rehab. Consider repeat MRI brain with and without contrast/MR spectroscopy in about 4-6 weeks to rule out an underlying neoplasm preferably upon resolution/complete resorption of prior left temporal lobar ICH; Conventional angiogram to rule out underlying vascular malformation could be considered based on results of a repeat brain imaging/vessel imaging    ANTITHROMBOTIC THERAPY: Considering CT/MRI findings suggestive of late subacute to chronic ICH and possible acute ischemic infarct (although more likely this was actually abnormal MRI signal related to prior seizures or other pathology), benefits of continuing aspirin at this time would outweigh potential risks despite uncertainty of diagnosis)    PULMONARY: CXR clear, protecting airway, saturating well     CARDIOVASCULAR: IMTIZA showed EF 74% moderate mitral regurgitation. Moderate focal atheroma noted in aortic arch/descending aorta, continue cardiac monitoring, s/p ICM to screen for occult cardiac arrhythmia like atrial fibrillation being a cause of possible cardiac source of embolism. BP better controlled. Cardio consult appreciated, repeat echo pending.      SBP goal: 120-150    GASTROINTESTINAL: NPO per SLP muriel, d/w family re: alternate means of nutrition      Diet: NPO-NGT    RENAL: BUN/Cr without acute change, good urine output      Na Goal: Greater than 135     Rodriguez: no    HEMATOLOGY: H/H demonstrated anemia 1/10/19- now resolved, continue to monitor, Platelets 200     DVT ppx: lovenox subq    ID: afebrile, purulent drainage at previous spinal surgical site-ID consulted, blood cultures and MR spine pending, wound culture as well, on ceftraizone for UTI- day 7/7    OTHER: Plan endorsed to family over the phone. All questions and concerns addressed. Surgical specialties reconsulted. D/W Dr. Snyder as well for further advisement and care per neurology service. Epilepsy consulted for further recommendations re: Valproate levels and AED adjustment.    DISPOSITION: Acute rehab    CORE MEASURES:        Admission NIHSS: 0     TPA: [] YES [x] NO      LDL/HDL: 44/35     Depression Screen: unable to obtain due to aphasia     Statin Therapy: y     Dysphagia Screen: [x] PASS [] FAIL     Smoking [] YES [x] NO      Afib [] YES [x] NO     Stroke Education [x] YES [] NO

## 2019-01-11 NOTE — CONSULT NOTE ADULT - ASSESSMENT
63 year old female with HTN, DM II, HPLD and CAD with left temporal lobar ICH of undetermined etiology presents with worsening aphasia, found to have possible Left MCA distribution stroke. GI consulted for dysphagia.

## 2019-01-12 LAB
ANION GAP SERPL CALC-SCNC: 9 MMOL/L — SIGNIFICANT CHANGE UP (ref 5–17)
B BURGDOR C6 AB SER-ACNC: NEGATIVE — SIGNIFICANT CHANGE UP
B BURGDOR IGG+IGM SER-ACNC: 0.1 INDEX — SIGNIFICANT CHANGE UP (ref 0.01–0.89)
BUN SERPL-MCNC: 14 MG/DL — SIGNIFICANT CHANGE UP (ref 7–23)
CALCIUM SERPL-MCNC: 8.5 MG/DL — SIGNIFICANT CHANGE UP (ref 8.4–10.5)
CHLORIDE SERPL-SCNC: 96 MMOL/L — SIGNIFICANT CHANGE UP (ref 96–108)
CO2 SERPL-SCNC: 27 MMOL/L — SIGNIFICANT CHANGE UP (ref 22–31)
CREAT SERPL-MCNC: 0.71 MG/DL — SIGNIFICANT CHANGE UP (ref 0.5–1.3)
CRYPTOC AG FLD QL: NEGATIVE — SIGNIFICANT CHANGE UP
GLUCOSE BLDC GLUCOMTR-MCNC: 100 MG/DL — HIGH (ref 70–99)
GLUCOSE BLDC GLUCOMTR-MCNC: 113 MG/DL — HIGH (ref 70–99)
GLUCOSE BLDC GLUCOMTR-MCNC: 138 MG/DL — HIGH (ref 70–99)
GLUCOSE BLDC GLUCOMTR-MCNC: 97 MG/DL — SIGNIFICANT CHANGE UP (ref 70–99)
GLUCOSE BLDC GLUCOMTR-MCNC: 98 MG/DL — SIGNIFICANT CHANGE UP (ref 70–99)
GLUCOSE SERPL-MCNC: 129 MG/DL — HIGH (ref 70–99)
HCT VFR BLD CALC: 32.9 % — LOW (ref 34.5–45)
HGB BLD-MCNC: 10.9 G/DL — LOW (ref 11.5–15.5)
MCHC RBC-ENTMCNC: 30 PG — SIGNIFICANT CHANGE UP (ref 27–34)
MCHC RBC-ENTMCNC: 33.1 GM/DL — SIGNIFICANT CHANGE UP (ref 32–36)
MCV RBC AUTO: 90.7 FL — SIGNIFICANT CHANGE UP (ref 80–100)
PLATELET # BLD AUTO: 173 K/UL — SIGNIFICANT CHANGE UP (ref 150–400)
POTASSIUM SERPL-MCNC: 3.6 MMOL/L — SIGNIFICANT CHANGE UP (ref 3.5–5.3)
POTASSIUM SERPL-SCNC: 3.6 MMOL/L — SIGNIFICANT CHANGE UP (ref 3.5–5.3)
RBC # BLD: 3.62 M/UL — LOW (ref 3.8–5.2)
RBC # FLD: 16.4 % — HIGH (ref 10.3–14.5)
SODIUM SERPL-SCNC: 132 MMOL/L — LOW (ref 135–145)
T PALLIDUM AB TITR SER: NEGATIVE — SIGNIFICANT CHANGE UP
WBC # BLD: 8.5 K/UL — SIGNIFICANT CHANGE UP (ref 3.8–10.5)
WBC # FLD AUTO: 8.5 K/UL — SIGNIFICANT CHANGE UP (ref 3.8–10.5)

## 2019-01-12 PROCEDURE — 99233 SBSQ HOSP IP/OBS HIGH 50: CPT

## 2019-01-12 PROCEDURE — 77012 CT SCAN FOR NEEDLE BIOPSY: CPT | Mod: 26

## 2019-01-12 PROCEDURE — 99232 SBSQ HOSP IP/OBS MODERATE 35: CPT

## 2019-01-12 PROCEDURE — 10160 PNXR ASPIR ABSC HMTMA BULLA: CPT

## 2019-01-12 RX ORDER — MORPHINE SULFATE 50 MG/1
2 CAPSULE, EXTENDED RELEASE ORAL EVERY 6 HOURS
Qty: 0 | Refills: 0 | Status: DISCONTINUED | OUTPATIENT
Start: 2019-01-12 | End: 2019-01-14

## 2019-01-12 RX ORDER — MORPHINE SULFATE 50 MG/1
2 CAPSULE, EXTENDED RELEASE ORAL ONCE
Qty: 0 | Refills: 0 | Status: DISCONTINUED | OUTPATIENT
Start: 2019-01-12 | End: 2019-01-12

## 2019-01-12 RX ADMIN — Medication 650 MILLIGRAM(S): at 00:20

## 2019-01-12 RX ADMIN — OLANZAPINE 5 MILLIGRAM(S): 15 TABLET, FILM COATED ORAL at 00:45

## 2019-01-12 RX ADMIN — Medication 81 MILLIGRAM(S): at 17:07

## 2019-01-12 RX ADMIN — Medication 650 MILLIGRAM(S): at 17:08

## 2019-01-12 RX ADMIN — MORPHINE SULFATE 2 MILLIGRAM(S): 50 CAPSULE, EXTENDED RELEASE ORAL at 05:54

## 2019-01-12 RX ADMIN — CARVEDILOL PHOSPHATE 25 MILLIGRAM(S): 80 CAPSULE, EXTENDED RELEASE ORAL at 17:08

## 2019-01-12 RX ADMIN — SODIUM CHLORIDE 75 MILLILITER(S): 9 INJECTION INTRAMUSCULAR; INTRAVENOUS; SUBCUTANEOUS at 00:00

## 2019-01-12 RX ADMIN — MORPHINE SULFATE 2 MILLIGRAM(S): 50 CAPSULE, EXTENDED RELEASE ORAL at 15:34

## 2019-01-12 RX ADMIN — NYSTATIN CREAM 1 APPLICATION(S): 100000 CREAM TOPICAL at 06:30

## 2019-01-12 RX ADMIN — OLANZAPINE 5 MILLIGRAM(S): 15 TABLET, FILM COATED ORAL at 22:01

## 2019-01-12 RX ADMIN — LACOSAMIDE 140 MILLIGRAM(S): 50 TABLET ORAL at 09:37

## 2019-01-12 RX ADMIN — Medication 1 APPLICATION(S): at 12:16

## 2019-01-12 RX ADMIN — NYSTATIN CREAM 1 APPLICATION(S): 100000 CREAM TOPICAL at 17:23

## 2019-01-12 RX ADMIN — Medication 650 MILLIGRAM(S): at 01:00

## 2019-01-12 RX ADMIN — Medication 10 MILLIGRAM(S): at 15:53

## 2019-01-12 RX ADMIN — MORPHINE SULFATE 2 MILLIGRAM(S): 50 CAPSULE, EXTENDED RELEASE ORAL at 06:29

## 2019-01-12 RX ADMIN — LACOSAMIDE 140 MILLIGRAM(S): 50 TABLET ORAL at 21:31

## 2019-01-12 RX ADMIN — Medication 650 MILLIGRAM(S): at 23:27

## 2019-01-12 RX ADMIN — MORPHINE SULFATE 2 MILLIGRAM(S): 50 CAPSULE, EXTENDED RELEASE ORAL at 17:23

## 2019-01-12 RX ADMIN — Medication 650 MILLIGRAM(S): at 17:23

## 2019-01-12 RX ADMIN — ATORVASTATIN CALCIUM 80 MILLIGRAM(S): 80 TABLET, FILM COATED ORAL at 21:31

## 2019-01-12 NOTE — PROVIDER CONTACT NOTE (OTHER) - ACTION/TREATMENT ORDERED:
Dr Denson will address
OK to Wait until Depacon is finished before hanging potassium
As per NP give Ativan if seizure progressed to GTC .If  pt lethargic cont to monitor give klonopin when pt alert able to handle po
As per provider stop fluids until confirmation of chest x-ray. Will continue to monitor the patient
Dr Uriostegui give metoprolol now and will come to evaluate patient.
Morphine 2mg IVPx1 given
PRN labetalol 10mg IVP to be ordered for HR > 120.  Patient to be assessed at bedside.
Provider to come at bedside to attempt IV insertion.
Resident is currently getting in contact with Dr. Bradley (attending physician) for pain option management
continue to monitor

## 2019-01-12 NOTE — PROGRESS NOTE ADULT - SUBJECTIVE AND OBJECTIVE BOX
Vascular & Interventional Radiology Post-Procedure Note    Pre-Procedure Diagnosis: Paraspinal fluid collection in laminectomy bed  Post-Procedure Diagnosis: Same as pre.  Indications for Procedure: Concern for abscess vs seroma vs CSF leak    : Dr. Escobar, Dr. Wright    Procedure Details/Findings:  - 10cc straw-colored fluid aspirated.  - Dry sterile dressing applied.    Complications: None.  Estimated Blood Loss: Minimal  Specimen: Sample of fluid sent for culture and Beta-2-transferrin level.  Contrast: None  Sedation: as per anesthesiologist.  Patient Condition/Disposition: Stable to PACU.    Plan:  - f/u culture and beta-2-transferrin level.

## 2019-01-12 NOTE — PROVIDER CONTACT NOTE (OTHER) - DATE AND TIME:
01-Jan-2019 16:45
03-Jan-2019 20:00
04-Jan-2019 06:30
05-Jan-2019 06:25
05-Jan-2019 15:00
09-Jan-2019 20:20
12-Jan-2019 05:45
29-Dec-2018 09:23
30-Dec-2018 20:30
31-Dec-2018 13:30

## 2019-01-12 NOTE — PROVIDER CONTACT NOTE (OTHER) - BACKGROUND
CVA
Patient admitted for left MCA stroke 12/24 with hemorrhagic conversion, and seizures. Hx of laminectomy, DM, depression, HTN,
Patient p/w acute aphasia, left temporal lobar hemorrhage h/o DM, HTN, seizures, on NS 0.9% at 60ml/hr
Pt s/p laminectomy, admitted with stroke and seizures. Patient received IV tylenol earlier in am.
Pt two previous IV's. Currently unavailable.  Attempted multiple times to reinsert IV. Unable to give pt medications at this time.
patient admitted for r/o seizures
pt on tele, loop recorder placed
pt with rapid eye movement right side facial  twitching  right arm movement clenching teeth drooling lethargic .Neuro checks changed declined
s/p stroke and seizures on VEEG
stroke/seizure

## 2019-01-12 NOTE — PROGRESS NOTE ADULT - ASSESSMENT
63F with a history of CAD s/p PCI to OM2 with a AVE in December of 2015, residual 40% LAD disease, neg stress test in 1/2018, osteoarthritis, DM, HTN, HLD who presents as a transfer from [facility] for aphasia now with temporal ICH and new left MCA infarct. She is now withevolution of left MCA distribution infarct and prior left temporal ICH leading to encephalomalacia. She is having focal seizures and facial twitching.     - Cont neuro rx.  .  Neuro follow up  - She has a hx of a remote PCI.   - No anginal symptoms recently reported. Cont ASA 81 QD  - Has a moderate focal atheroma in Aortic Arch. Cont high intensity statin for now.  - s/p ILR placement given suspicion for embolic nature of CVA. No af noted on telemetry to date.  - intermittent sinus tachycardia in setting of agitation. Continue to watch    - BP is labile. better overall controlled.   - Continue with losartan and Coreg at current doses, along with Norvasc 10 mg po daily.  - I would continue prn IV hydralazine and labetalol to control her blood pressure spikes.       - Appears compensated from HF POV.     - optimized from CV POV for necessary CT guided spinal collection apsiration.   - Monitor and replete electrolytes. Keep K>4.0 and Mg>2.0.  -  Further cardiac workup will depend on clinical course.   - All other workup per primary team. Will followup.

## 2019-01-12 NOTE — PROVIDER CONTACT NOTE (OTHER) - REASON
4 beats of wide complex tachycardia
BP elevated 200/96 
C/o pain
PRN pain medication needed
Patient has a new onset of coarse breath sounds
Patient ordered for potassium, depacon going through IV
Tachycardia
Unable to obtain IV access, delay in medications
pt with rapid eye movement right side facial  twitching  right arm movement clenching teeth drooling lethargic. neuro checks changed declined
laminectomy incision and drain site, look infected

## 2019-01-12 NOTE — PROVIDER CONTACT NOTE (OTHER) - RECOMMENDATIONS
As per provider stop fluids until confirmation of chest x-ray. Will continue to monitor the patient
IV team to put in new IV access.
Notified NP
PRN IV medication for tachycardia
PRN pain medication.
dr Lock to see patient
eval/tx/assess pt give anticonvulsives treat seizure activity pt lethagic
give 6pm metoprolol now?, advise on how to proceed to meet her needs ?ativan, prn pain medication, need for ng tube vs s/s re-eval.
please evaluate,

## 2019-01-12 NOTE — PROGRESS NOTE ADULT - SUBJECTIVE AND OBJECTIVE BOX
CC: f/u for spine wound    Patient reports nothing not speaking    REVIEW OF SYSTEMS:  All other review of systems cannot get (Comprehensive ROS)    Antimicrobials Day #  :    Other Medications Reviewed    T(F): 98.3 (01-12-19 @ 08:00), Max: 99.1 (01-12-19 @ 00:00)  HR: 82 (01-12-19 @ 18:00)  BP: 113/78 (01-12-19 @ 18:00)  RR: 16 (01-12-19 @ 18:00)  SpO2: 98% (01-12-19 @ 18:00)  Wt(kg): --    PHYSICAL EXAM:  General: alert, more purposeful today no acute distress  Eyes:  anicteric, no conjunctival injection, no discharge  Oropharynx: no lesions or injection 	  Neck: supple, without adenopathy  Lungs: clear to auscultation  Heart: regular rate and rhythm; no murmur, rubs or gallops  Abdomen: soft, nondistended, nontender, without mass or organomegaly  Skin: no lesions  Extremities: no clubbing, cyanosis, or edema  Neurologic: alert, looks when name called, tries to talk a bit  back wound no drainage    LAB RESULTS:                        10.9   8.5   )-----------( 173      ( 12 Jan 2019 02:07 )             32.9     01-12    132<L>  |  96  |  14  ----------------------------<  129<H>  3.6   |  27  |  0.71    Ca    8.5      12 Jan 2019 02:07          MICROBIOLOGY:  RECENT CULTURES:  01-11 @ 17:35 Skin wound on back     No growth      01-10 @ 18:55 .Blood Blood-Venous     No growth to date.    lyme, syphillis neg      RADIOLOGY REVIEWED:  < from: MR Lumbar Spine No Cont (01.11.19 @ 16:01) >  IMPRESSION:    Postoperative changes including multilevel laminectomies and posterior   spinal fusion.    Cystic collection within the laminectomy bed producing effacement of the   thecal sac most prominent at the L4-L5 level where there is moderate to   marked thecal sac compression and crowding of nerve roots. This may   represent a seroma, pseudomeningocele or CSF leak.    Fluid collection within the dorsal postoperative bed which may or may not     < end of copied text >      Assessment:  Patient s/p laminectomy and fusion, post op bleed into brain , got better neurologically then deteriorated so returns to hospital. Now maybe a stroke too, had neg noe. has changes of possible pres but LP wanted to r/o other causes of brain abnormality. no fever to suggest infectious cause. Spine wound does not look infected but fluid collection precludes lp so aspirated, not looking grossly infected and test sent to r/o csf leak. If no leak can likely get rid of fluid with drain then do lp  Plan:  monitor off further abx  follow up fluid cultures and test for csf  f/u paraneoplastic panel and anti nmda test  for LP when can do safely. No compelling evidence for infection so will not subject her to potential drug toxicities with empiric antimicrobics

## 2019-01-12 NOTE — PROVIDER CONTACT NOTE (OTHER) - NAME OF MD/NP/PA/DO NOTIFIED:
Anni Harris NP
Dr Moody Silva
Dr. North (neurology resident)
Harmony Sargent
JONATHON Hutton
Judithe Apollon-Louissaint (NP)
MD Hatch
Melva Ku
SAIMA Torres
Dr mills
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
## 2019-01-12 NOTE — PROGRESS NOTE ADULT - SUBJECTIVE AND OBJECTIVE BOX
GASTROENTEROLOGY    Patient seen and examined at bedside. Tolerating tube feeds  PEG placement planned on Monday      MEDICATIONS  (STANDING):  acetaminophen   Tablet .. 650 milliGRAM(s) Oral every 6 hours  amLODIPine   Tablet 10 milliGRAM(s) Oral daily  aspirin  chewable 81 milliGRAM(s) Oral daily  atorvastatin 80 milliGRAM(s) Oral at bedtime  BACItracin   Ointment 1 Application(s) Topical daily  carvedilol 25 milliGRAM(s) Oral every 12 hours  dextrose 50% Injectable 12.5 Gram(s) IV Push once  dextrose 50% Injectable 25 Gram(s) IV Push once  dextrose 50% Injectable 25 Gram(s) IV Push once  insulin lispro (HumaLOG) corrective regimen sliding scale   SubCutaneous every 6 hours  lacosamide IVPB 200 milliGRAM(s) IV Intermittent every 12 hours  lidocaine 2% Injectable 20 milliLiter(s) Local Injection once  losartan 100 milliGRAM(s) Oral daily  nystatin Ointment 1 Application(s) Topical every 12 hours  OLANZapine Injectable 5 milliGRAM(s) IntraMuscular every 12 hours  sodium chloride 0.9%. 1000 milliLiter(s) (75 mL/Hr) IV Continuous <Continuous>        T(F): 98.3 (19 @ 08:00), Max: 99.2 (19 @ 18:23)  HR: 75 (19 @ 10:00) (73 - 101)  BP: 142/68 (19 @ 10:00) (106/60 - 152/85)  RR: 21 (19 @ 10:00) (13 - 24)  SpO2: 89% (19 @ 10:00) (89% - 100%)  Wt(kg): --    PHYSICAL EXAM  GENERAL:   NAD  HEENT:  NC/AT,  , no JVD, sclera-anicteric  CHEST:  clear to ascultation bilaterally, respirations nonlabored  HEART:  +S1+S2 regular rate and rhythm   ABDOMEN:  Soft, non-tender, non-distended, normoactive bowel sounds, no masses  EXTEREMITIES:  no cyanosis,clubbing or edema  NEURO:  Alert, oriented, no asterixis, no tremor, no encephalopathy  SKIN:  No rash/warm/dry      LABS:                        10.9<L>  8.5   )-----------( 173      ( 2019 02:07 )             32.9<L>  2019 02:07    01-12    132<L>  |  96  |  14  ----------------------------<  129<H>  3.6   |  27  |  0.71    Ca    8.5      2019 02:07          I&O's Detail    2019 07:01  -  2019 07:00  --------------------------------------------------------  IN:    Glucerna: 360 mL    sodium chloride 0.9%.: 600 mL  Total IN: 960 mL    OUT:    Incontinent per Collection Ba mL  Total OUT: 400 mL    Total NET: 560 mL          Culture - Blood (collected 10 Volodymyr 2019 18:55)  Source: .Blood Blood-Venous  Preliminary Report (2019 19:01):    No growth to date.    Culture - Blood (collected 10 Volodymyr 2019 18:55)  Source: .Blood Blood-Venous  Preliminary Report (2019 19:01):    No growth to date. GASTROENTEROLOGY    Patient seen and examined at bedside. Non-verbal. Tolerating tube feeds  PEG placement planned on Monday      MEDICATIONS  (STANDING):  acetaminophen   Tablet .. 650 milliGRAM(s) Oral every 6 hours  amLODIPine   Tablet 10 milliGRAM(s) Oral daily  aspirin  chewable 81 milliGRAM(s) Oral daily  atorvastatin 80 milliGRAM(s) Oral at bedtime  BACItracin   Ointment 1 Application(s) Topical daily  carvedilol 25 milliGRAM(s) Oral every 12 hours  dextrose 50% Injectable 12.5 Gram(s) IV Push once  dextrose 50% Injectable 25 Gram(s) IV Push once  dextrose 50% Injectable 25 Gram(s) IV Push once  insulin lispro (HumaLOG) corrective regimen sliding scale   SubCutaneous every 6 hours  lacosamide IVPB 200 milliGRAM(s) IV Intermittent every 12 hours  lidocaine 2% Injectable 20 milliLiter(s) Local Injection once  losartan 100 milliGRAM(s) Oral daily  nystatin Ointment 1 Application(s) Topical every 12 hours  OLANZapine Injectable 5 milliGRAM(s) IntraMuscular every 12 hours  sodium chloride 0.9%. 1000 milliLiter(s) (75 mL/Hr) IV Continuous <Continuous>        T(F): 98.3 (19 @ 08:00), Max: 99.2 (19 @ 18:23)  HR: 75 (19 @ 10:00) (73 - 101)  BP: 142/68 (19 @ 10:00) (106/60 - 152/85)  RR: 21 (19 @ 10:00) (13 - 24)  SpO2: 89% (19 @ 10:00) (89% - 100%)  Wt(kg): --    PHYSICAL EXAM  GENERAL:   NAD  HEENT:  NC/AT, no JVD, sclera-anicteric  CHEST:  clear to ascultation bilaterally, respirations nonlabored  HEART:  +S1+S2 regular rate and rhythm   ABDOMEN:  Soft, non-distended, +  keofeed tube  EXTEREMITIES:  no cyanosis,clubbing or edema  NEURO:  opening eyes spontaneously, non-verbal  SKIN:  No rash/warm/dry      LABS:                        10.9<L>  8.5   )-----------( 173      ( 2019 02:07 )             32.9<L>  2019 02:07    01-12    132<L>  |  96  |  14  ----------------------------<  129<H>  3.6   |  27  |  0.71    Ca    8.5      2019 02:07          I&O's Detail    2019 07:01  -  2019 07:00  --------------------------------------------------------  IN:    Glucerna: 360 mL    sodium chloride 0.9%.: 600 mL  Total IN: 960 mL    OUT:    Incontinent per Collection Ba mL  Total OUT: 400 mL    Total NET: 560 mL          Culture - Blood (collected 10 Volodymyr 2019 18:55)  Source: .Blood Blood-Venous  Preliminary Report (2019 19:01):    No growth to date.    Culture - Blood (collected 10 Volodymyr 2019 18:55)  Source: .Blood Blood-Venous  Preliminary Report (2019 19:01):    No growth to date.

## 2019-01-12 NOTE — PROGRESS NOTE ADULT - SUBJECTIVE AND OBJECTIVE BOX
Vascular & Interventional Radiology Pre-Procedure Note    Procedure Name: Paraspinal fluid collection drainage    HPI: 62yo female here for post-op hemorrhagic stroke after L2-L5 laminectomy with a paraspinal fluid collection seen on recent MRI.    Allergies: None  Medications (Abx/Cardiac/Anticoagulation/Blood Products)    amLODIPine   Tablet: 10 milliGRAM(s) Oral (01-11 @ 05:03)  aspirin  chewable: 81 milliGRAM(s) Oral (01-11 @ 12:06)  carvedilol: 25 milliGRAM(s) Oral (01-11 @ 17:22)  cefTRIAXone   IVPB: 100 mL/Hr IV Intermittent (01-11 @ 03:58)  enoxaparin Injectable: 40 milliGRAM(s) SubCutaneous (01-11 @ 12:06)  losartan: 100 milliGRAM(s) Oral (01-11 @ 05:03)    Data:    T(C): 36.8  HR: 84  BP: 154/72  RR: 10  SpO2: 98%    -WBC 8.5 / HgB 10.9 / Hct 32.9 / Plt 173  -Na 132 / Cl 96 / BUN 14 / Glucose 129  -K 3.6 / CO2 27 / Cr 0.71  -ALT -- / Alk Phos -- / T.Bili --      Plan:   -63y Female presents for paraspinal fluid collection drainage at the laminectomy bed.  -Risks/Benefits/alternatives explained with the patient's  and witnessed informed consent obtained.

## 2019-01-12 NOTE — PROGRESS NOTE ADULT - ASSESSMENT
63 years old woman with vascular risk factors of age, HTN, DM II, HPLD and CAD was evaluated at [facility] for language disturbance. On 11/27 she underwent T10 laminectomy and fusion. Postoperatively, she was noted to have Wernicke's aphasia due to left temporal lobar ICH of undetermined etiology. She reported to have had significant improvement in her language deficit in the rehabilitation. In the evening of 12/24, she was reported to have worsening of her aphasia, prompting her presentation to OSH and subsequent transfer to [facility]. CT brain or admission showed expected evolution/resolution of left temporal ICH leading to development of encephalomalacia. MRI brain showed what appeared to be an acute infarct in the left MCA distribution adjacent to the prior hemorrhage in the late subacute to chronic stage, although it is possible that this was not an actual infarct but rather MRI hyperintensity related to previous seizures; and expected evolution of prior left frontotemporal convexal subarachnoid hemorrhage as well as was concerning for a dilated cortical vein anterior to the hematoma. Of note, MRI brain (12/7) showed left temporal lobar ICH, left frontotemporal convexal SAH and juxtacortical FLAIR hyperintensities concerning for PRES to my eye. CT brain on 12/30, showed expected evolution of left MCA distribution "infarct" (versus abnormal signal related to previous seizures)  and prior left temporal ICH leading to encephalomalacia.    Impression:   Possible Left MCA distribution "stroke" -initially thought likely etiology being cryptogenic stroke, probably related to embolism from a proximal source, like cardiac/paradoxical source of embolism. Note it is possible or probable that this imaging abnormality does not represent an actual infarct but rather abnormal signal related to previous seizures.  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      Neurologically noted with global aphasia and inattention- unchanged from previous, though likely to be PRES, had planned to perform LP  to rule out any infectious or autoimmune etiology for altered mental status; LP not performed due to purulent drainage had previous spinal surgery site-ID consulted for further management; while unlikely, the presence of infection might raise the possibility of endocarditis or even partially treated meningitis; we'll obtain MRI of lumbar spine, blood cultures (although she's been on antibiotics for a UTI) and TTE;  continue monitoring for neurologic deterioration in setting of cerebral edema  mass effect and brain compression, keep SBP between 120-150, LDL 44-continue with home statin as  LDL is currently at goal, MRI Brain w/wo contrast noted above. Normal CTA of the head and neck. Physical therapy/OT recommended acute rehab. Consider repeat MRI brain with and without contrast/MR spectroscopy in about 4-6 weeks to rule out an underlying neoplasm preferably upon resolution/complete resorption of prior left temporal lobar ICH; Conventional angiogram to rule out underlying vascular malformation could be considered based on results of a repeat brain imaging/vessel imaging.  LP at bedside attempted w/ purulent drainage at previous spinal surgical site-ID consulted, blood cultures and MR spine pending, wound culture as well, on ceftraizone for UTI- day 7/7    Plan:  - IR guided LP today 63 years old woman with vascular risk factors of age, HTN, DM II, HPLD and CAD was evaluated at [facility] for language disturbance. On 11/27 she underwent T10 laminectomy and fusion. Postoperatively, she was noted to have Wernicke's aphasia due to left temporal lobar ICH of undetermined etiology. She reported to have had significant improvement in her language deficit in the rehabilitation. In the evening of 12/24, she was reported to have worsening of her aphasia, prompting her presentation to OSH and subsequent transfer to [facility]. CT brain or admission showed expected evolution/resolution of left temporal ICH leading to development of encephalomalacia. MRI brain showed what appeared to be an acute infarct in the left MCA distribution adjacent to the prior hemorrhage in the late subacute to chronic stage, although it is possible that this was not an actual infarct but rather MRI hyperintensity related to previous seizures; and expected evolution of prior left frontotemporal convexal subarachnoid hemorrhage as well as was concerning for a dilated cortical vein anterior to the hematoma. Of note, MRI brain (12/7) showed left temporal lobar ICH, left frontotemporal convexal SAH and juxtacortical FLAIR hyperintensities concerning for PRES to my eye. CT brain on 12/30, showed expected evolution of left MCA distribution "infarct" (versus abnormal signal related to previous seizures)  and prior left temporal ICH leading to encephalomalacia.    Impression:   Possible Left MCA distribution "stroke" -initially thought likely etiology being cryptogenic stroke, probably related to embolism from a proximal source, like cardiac/paradoxical source of embolism. Note it is possible or probable that this imaging abnormality does not represent an actual infarct but rather abnormal signal related to previous seizures.  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      Neurologically noted with global aphasia and inattention- unchanged from previous, though likely to be PRES, had planned to perform LP  to rule out any infectious or autoimmune etiology for altered mental status; LP not performed due to purulent drainage had previous spinal surgery site-ID consulted for further management; while unlikely, the presence of infection might raise the possibility of endocarditis or even partially treated meningitis; we'll obtain MRI of lumbar spine, blood cultures (although she's been on antibiotics for a UTI) and TTE;  continue monitoring for neurologic deterioration in setting of cerebral edema  mass effect and brain compression, keep SBP between 120-150, LDL 44-continue with home statin as  LDL is currently at goal, MRI Brain w/wo contrast noted above. Normal CTA of the head and neck. Physical therapy/OT recommended acute rehab. Consider repeat MRI brain with and without contrast/MR spectroscopy in about 4-6 weeks to rule out an underlying neoplasm preferably upon resolution/complete resorption of prior left temporal lobar ICH; Conventional angiogram to rule out underlying vascular malformation could be considered based on results of a repeat brain imaging/vessel imaging.  LP at bedside attempted w/ purulent drainage at previous spinal surgical site-ID consulted, blood cultures and MR spine pending, wound culture as well, on ceftraizone for UTI- day 7/7    Plan:  - IR guided aspiration today  - once cleared will get IR guided LP

## 2019-01-12 NOTE — PROGRESS NOTE ADULT - SUBJECTIVE AND OBJECTIVE BOX
THE PATIENT WAS SEEN AND EXAMINED BY ME WITH THE HOUSESTAFF AND STROKE TEAM DURING MORNING ROUNDS.   HPI:  63 year old woman with vascular risk factors of age, HTN, DM II, HPLD and CAD was evaluated at [facility] for language disturbance. On 11/27 she underwent T10 laminectomy and fusion. Postoperatively, she was noted to have Wernicke's aphasia due to left temporal lobar ICH of undetermined etiology. She reported to have had significant improvement in her language deficit in rehabilitation. In the evening of 12/24, she was reported to have worsening of her aphasia, prompting her presentation to OSH and subsequent transfer to [facility].       SUBJECTIVE: No events overnight.       acetaminophen   Tablet .. 650 milliGRAM(s) Oral every 6 hours  amLODIPine   Tablet 10 milliGRAM(s) Oral daily  aspirin  chewable 81 milliGRAM(s) Oral daily  atorvastatin 80 milliGRAM(s) Oral at bedtime  BACItracin   Ointment 1 Application(s) Topical daily  carvedilol 25 milliGRAM(s) Oral every 12 hours  cefTRIAXone   IVPB      cefTRIAXone   IVPB 1 Gram(s) IV Intermittent every 24 hours  dextrose 40% Gel 15 Gram(s) Oral once PRN  dextrose 50% Injectable 12.5 Gram(s) IV Push once  dextrose 50% Injectable 25 Gram(s) IV Push once  dextrose 50% Injectable 25 Gram(s) IV Push once  enoxaparin Injectable 40 milliGRAM(s) SubCutaneous daily  glucagon  Injectable 1 milliGRAM(s) IntraMuscular once PRN  hydrALAZINE Injectable 10 milliGRAM(s) IV Push every 6 hours PRN  insulin lispro (HumaLOG) corrective regimen sliding scale   SubCutaneous every 6 hours  labetalol Injectable 10 milliGRAM(s) IV Push every 6 hours PRN  lacosamide IVPB 200 milliGRAM(s) IV Intermittent every 12 hours  lidocaine 2% Injectable 20 milliLiter(s) Local Injection once  LORazepam   Injectable 2 milliGRAM(s) IV Push every 5 minutes PRN  losartan 100 milliGRAM(s) Oral daily  nystatin Ointment 1 Application(s) Topical every 12 hours  OLANZapine Injectable 5 milliGRAM(s) IntraMuscular every 12 hours  OLANZapine Injectable 5 milliGRAM(s) IntraMuscular every 24 hours PRN  valproate sodium IVPB 750 milliGRAM(s) IV Intermittent every 8 hours      PHYSICAL EXAM:   Vital Signs Last 24 Hrs  T(C): 37.3 (12 Jan 2019 00:00), Max: 37.3 (11 Jan 2019 18:23)  T(F): 99.1 (12 Jan 2019 00:00), Max: 99.2 (11 Jan 2019 18:23)  HR: 76 (12 Jan 2019 06:00) (66 - 101)  BP: 141/73 (12 Jan 2019 06:00) (98/55 - 152/85)  BP(mean): 92 (12 Jan 2019 06:00) (68 - 102)  RR: 13 (12 Jan 2019 06:00) (13 - 24)  SpO2: 94% (12 Jan 2019 06:00) (93% - 100%)    General: no distress noted  HEENT: right gaze palsy  Abdomen: Soft, nontender, nondistended   Extremities: No edema    NEUROLOGICAL EXAM:  Mental status: eyes open but inattentive for most of exam, able to make eye contact at some points, no verbal output, not following commands.  Cranial Nerves: No facial asymmetry, mild right gaze palsy crosses midline, no nystagmus, no dysarthria, tongue midline  Motor exam: extremities move spontaneously against gravity however right leg is noted to be moving less.   Sensation: Intact to mildly noxious stimuli  Coordination/ Gait: Unable to cooperate with testing; gait deferred     LABS:                        11.9   6.0   )-----------( 200      ( 11 Jan 2019 05:36 )             37.3    01-11    140  |  101  |  10  ----------------------------<  139<H>  4.1   |  26  |  0.70    Ca    8.4      11 Jan 2019 05:36      Hemoglobin A1C, Whole Blood: 6.7 % (12-28 @ 07:02)  Hemoglobin A1C, Whole Blood: 7.5 % (11-09 @ 16:00)      IMAGING: Reviewed by me.   CT Angio Head/Neck IV Cont (01.07.19)  Normal CTA of the head and neck. No abnormal vascularity,   aneurysms or AVMs appreciated on this patient who had a prior left   temporal parenchymal hemorrhage.    CT Head No Cont (01.01.19)  No gross evidence for new infarct or new hemorrhage compared to the   12/30/2018 head CT and 12/26/2018 brain MRI.     CT Head No Cont (12.30.18)  No gross evidence for new infarct or new hemorrhage compared to the   12/26/2018 MRI study.    MRI Head w/wo IV Cont (12.26.18)  Left temporal lobe hematoma without significant interval change in size.  Small amount of subarachnoid blood.  New acute left MCA distribution infarct.  Prominent left temporal region cortical vein. Clinical correlation will   determine the need for conventional angiography to exclude the   possibility of a vascular malformation.    (12.25.18)  CT brain:  Resolving left temporal lobe parenchymal hemorrhage is redemonstrated,   similar to the prior brain CT.    CT angiography neck:  No evidence for arterial dissection. No flow-limiting stenosis. Carotid   bifurcations unremarkable.    CT angiography brain:  No evidence for AVM. No vascular aneurysm or flow-limiting stenosis.    CT venography brain:  No evidence for venous sinus or cortical vein thrombosis. THE PATIENT WAS SEEN AND EXAMINED BY ME WITH THE HOUSESTAFF AND STROKE TEAM DURING MORNING ROUNDS.   HPI:  63 year old woman with vascular risk factors of age, HTN, DM II, HPLD and CAD was evaluated at [facility] for language disturbance. On 11/27 she underwent T10 laminectomy and fusion. Postoperatively, she was noted to have Wernicke's aphasia due to left temporal lobar ICH of undetermined etiology. She reported to have had significant improvement in her language deficit in rehabilitation. In the evening of 12/24, she was reported to have worsening of her aphasia, prompting her presentation to OSH and subsequent transfer to [facility].       SUBJECTIVE: No events overnight.       acetaminophen   Tablet .. 650 milliGRAM(s) Oral every 6 hours  amLODIPine   Tablet 10 milliGRAM(s) Oral daily  aspirin  chewable 81 milliGRAM(s) Oral daily  atorvastatin 80 milliGRAM(s) Oral at bedtime  BACItracin   Ointment 1 Application(s) Topical daily  carvedilol 25 milliGRAM(s) Oral every 12 hours  cefTRIAXone   IVPB      cefTRIAXone   IVPB 1 Gram(s) IV Intermittent every 24 hours  dextrose 40% Gel 15 Gram(s) Oral once PRN  dextrose 50% Injectable 12.5 Gram(s) IV Push once  dextrose 50% Injectable 25 Gram(s) IV Push once  dextrose 50% Injectable 25 Gram(s) IV Push once  enoxaparin Injectable 40 milliGRAM(s) SubCutaneous daily  glucagon  Injectable 1 milliGRAM(s) IntraMuscular once PRN  hydrALAZINE Injectable 10 milliGRAM(s) IV Push every 6 hours PRN  insulin lispro (HumaLOG) corrective regimen sliding scale   SubCutaneous every 6 hours  labetalol Injectable 10 milliGRAM(s) IV Push every 6 hours PRN  lacosamide IVPB 200 milliGRAM(s) IV Intermittent every 12 hours  lidocaine 2% Injectable 20 milliLiter(s) Local Injection once  LORazepam   Injectable 2 milliGRAM(s) IV Push every 5 minutes PRN  losartan 100 milliGRAM(s) Oral daily  nystatin Ointment 1 Application(s) Topical every 12 hours  OLANZapine Injectable 5 milliGRAM(s) IntraMuscular every 12 hours  OLANZapine Injectable 5 milliGRAM(s) IntraMuscular every 24 hours PRN  valproate sodium IVPB 750 milliGRAM(s) IV Intermittent every 8 hours      PHYSICAL EXAM:   Vital Signs Last 24 Hrs  T(C): 37.3 (12 Jan 2019 00:00), Max: 37.3 (11 Jan 2019 18:23)  T(F): 99.1 (12 Jan 2019 00:00), Max: 99.2 (11 Jan 2019 18:23)  HR: 76 (12 Jan 2019 06:00) (66 - 101)  BP: 141/73 (12 Jan 2019 06:00) (98/55 - 152/85)  BP(mean): 92 (12 Jan 2019 06:00) (68 - 102)  RR: 13 (12 Jan 2019 06:00) (13 - 24)  SpO2: 94% (12 Jan 2019 06:00) (93% - 100%)    General: no distress noted  HEENT: right gaze palsy  Abdomen: Soft, nontender, nondistended   Extremities: No edema  Back: LP site appearing dry, well healing w/o any discharge    NEUROLOGICAL EXAM:  Mental status: eyes open but inattentive for most of exam, able to make eye contact at some points, no verbal output, not following commands.  Cranial Nerves: No facial asymmetry, mild right gaze palsy crosses midline, no nystagmus, no dysarthria, tongue midline  Motor exam: extremities move spontaneously against gravity however right leg is noted to be moving less.   Sensation: Intact to mildly noxious stimuli  Coordination/ Gait: Unable to cooperate with testing; gait deferred     LABS:              01-12    132<L>  |  96  |  14  ----------------------------<  129<H>  3.6   |  27  |  0.71    Ca    8.5      12 Jan 2019 02:07                          10.9   8.5   )-----------( 173      ( 12 Jan 2019 02:07 )             32.9         IMAGING: Reviewed by me.   CT Angio Head/Neck IV Cont (01.07.19)  Normal CTA of the head and neck. No abnormal vascularity,   aneurysms or AVMs appreciated on this patient who had a prior left   temporal parenchymal hemorrhage.    CT Head No Cont (01.01.19)  No gross evidence for new infarct or new hemorrhage compared to the   12/30/2018 head CT and 12/26/2018 brain MRI.     CT Head No Cont (12.30.18)  No gross evidence for new infarct or new hemorrhage compared to the   12/26/2018 MRI study.    MRI Head w/wo IV Cont (12.26.18)  Left temporal lobe hematoma without significant interval change in size.  Small amount of subarachnoid blood.  New acute left MCA distribution infarct.  Prominent left temporal region cortical vein. Clinical correlation will   determine the need for conventional angiography to exclude the   possibility of a vascular malformation.    (12.25.18)  CT brain:  Resolving left temporal lobe parenchymal hemorrhage is redemonstrated,   similar to the prior brain CT.    CT angiography neck:  No evidence for arterial dissection. No flow-limiting stenosis. Carotid   bifurcations unremarkable.    CT angiography brain:  No evidence for AVM. No vascular aneurysm or flow-limiting stenosis.    CT venography brain:  No evidence for venous sinus or cortical vein thrombosis.

## 2019-01-12 NOTE — PROVIDER CONTACT NOTE (OTHER) - SITUATION
C/o pain
Patient moaning loudly in room, patient tossing and turning and is restless.
Patient ordered for potassium, depacon going through IV
Telemetry technician called for heart rate 150-160's.
Unable to obtain IV access, delay in IV medications
Upon ascultation patient is found to have a new onset of coarse breath sounds.
pt admitted with stroke
pt continues with elevated blood pressure and heart rate as above
pt with rapid eye movement right side facial  twitching  right arm movement clenching teeth drooling lethargic neuro checks changed declined
while changing patient, noted that lami incision looked infected, and possilble dehisence

## 2019-01-12 NOTE — PROVIDER CONTACT NOTE (OTHER) - ASSESSMENT
C/o pain
Patient alert, has expressive aphasia, unable to follow commands. Pt agitated/restless. Patient with stiffened arms. No facial twitching or right arm twitching noticed. (pt's seizure presentation)
Patient alert, restless, agitated at times
Patient in bed, tachycardic.
Patient is neurologically stable, and vital signs stable
Pt alert. Agitated, restless, unable to follow commands.
patient is agitated and restless and is unable to communicate,
pt with rapid eye movement right side facial  twitching  right arm movement clenching teeth drooling lethargic
pt with rt facial twitching
no change

## 2019-01-13 LAB
ANION GAP SERPL CALC-SCNC: 12 MMOL/L — SIGNIFICANT CHANGE UP (ref 5–17)
BUN SERPL-MCNC: 9 MG/DL — SIGNIFICANT CHANGE UP (ref 7–23)
CALCIUM SERPL-MCNC: 8.2 MG/DL — LOW (ref 8.4–10.5)
CHLORIDE SERPL-SCNC: 103 MMOL/L — SIGNIFICANT CHANGE UP (ref 96–108)
CO2 SERPL-SCNC: 28 MMOL/L — SIGNIFICANT CHANGE UP (ref 22–31)
CREAT SERPL-MCNC: 0.64 MG/DL — SIGNIFICANT CHANGE UP (ref 0.5–1.3)
CULTURE RESULTS: SIGNIFICANT CHANGE UP
ERYTHROCYTE [SEDIMENTATION RATE] IN BLOOD: 61 MM/HR — HIGH (ref 0–20)
GLUCOSE BLDC GLUCOMTR-MCNC: 102 MG/DL — HIGH (ref 70–99)
GLUCOSE BLDC GLUCOMTR-MCNC: 110 MG/DL — HIGH (ref 70–99)
GLUCOSE BLDC GLUCOMTR-MCNC: 118 MG/DL — HIGH (ref 70–99)
GLUCOSE BLDC GLUCOMTR-MCNC: 136 MG/DL — HIGH (ref 70–99)
GLUCOSE SERPL-MCNC: 107 MG/DL — HIGH (ref 70–99)
GRAM STN FLD: SIGNIFICANT CHANGE UP
HCT VFR BLD CALC: 31.3 % — LOW (ref 34.5–45)
HGB BLD-MCNC: 10.3 G/DL — LOW (ref 11.5–15.5)
MCHC RBC-ENTMCNC: 30 PG — SIGNIFICANT CHANGE UP (ref 27–34)
MCHC RBC-ENTMCNC: 32.9 GM/DL — SIGNIFICANT CHANGE UP (ref 32–36)
MCV RBC AUTO: 91 FL — SIGNIFICANT CHANGE UP (ref 80–100)
PLATELET # BLD AUTO: 137 K/UL — LOW (ref 150–400)
POTASSIUM SERPL-MCNC: 3.6 MMOL/L — SIGNIFICANT CHANGE UP (ref 3.5–5.3)
POTASSIUM SERPL-SCNC: 3.6 MMOL/L — SIGNIFICANT CHANGE UP (ref 3.5–5.3)
RBC # BLD: 3.43 M/UL — LOW (ref 3.8–5.2)
RBC # FLD: 16.1 % — HIGH (ref 10.3–14.5)
SODIUM SERPL-SCNC: 143 MMOL/L — SIGNIFICANT CHANGE UP (ref 135–145)
SPECIMEN SOURCE: SIGNIFICANT CHANGE UP
SPECIMEN SOURCE: SIGNIFICANT CHANGE UP
VALPROATE SERPL-MCNC: 22 UG/ML — LOW (ref 50–100)
VALPROATE SERPL-MCNC: 23 UG/ML — LOW (ref 50–100)
WBC # BLD: 7.2 K/UL — SIGNIFICANT CHANGE UP (ref 3.8–10.5)
WBC # FLD AUTO: 7.2 K/UL — SIGNIFICANT CHANGE UP (ref 3.8–10.5)

## 2019-01-13 PROCEDURE — 99232 SBSQ HOSP IP/OBS MODERATE 35: CPT

## 2019-01-13 PROCEDURE — 70551 MRI BRAIN STEM W/O DYE: CPT | Mod: 26

## 2019-01-13 RX ORDER — POLYETHYLENE GLYCOL 3350 17 G/17G
17 POWDER, FOR SOLUTION ORAL DAILY
Qty: 0 | Refills: 0 | Status: DISCONTINUED | OUTPATIENT
Start: 2019-01-13 | End: 2019-01-22

## 2019-01-13 RX ORDER — DIAZEPAM 5 MG
5 TABLET ORAL ONCE
Qty: 0 | Refills: 0 | Status: DISCONTINUED | OUTPATIENT
Start: 2019-01-13 | End: 2019-01-13

## 2019-01-13 RX ORDER — LACOSAMIDE 50 MG/1
200 TABLET ORAL EVERY 12 HOURS
Qty: 0 | Refills: 0 | Status: DISCONTINUED | OUTPATIENT
Start: 2019-01-13 | End: 2019-01-15

## 2019-01-13 RX ORDER — VALPROIC ACID (AS SODIUM SALT) 250 MG/5ML
250 SOLUTION, ORAL ORAL
Qty: 0 | Refills: 0 | Status: DISCONTINUED | OUTPATIENT
Start: 2019-01-13 | End: 2019-01-15

## 2019-01-13 RX ORDER — VALPROIC ACID (AS SODIUM SALT) 250 MG/5ML
750 SOLUTION, ORAL ORAL
Qty: 0 | Refills: 0 | Status: DISCONTINUED | OUTPATIENT
Start: 2019-01-13 | End: 2019-01-13

## 2019-01-13 RX ADMIN — NYSTATIN CREAM 1 APPLICATION(S): 100000 CREAM TOPICAL at 17:46

## 2019-01-13 RX ADMIN — Medication 650 MILLIGRAM(S): at 05:15

## 2019-01-13 RX ADMIN — Medication 650 MILLIGRAM(S): at 00:00

## 2019-01-13 RX ADMIN — Medication 650 MILLIGRAM(S): at 17:45

## 2019-01-13 RX ADMIN — Medication 5 MILLIGRAM(S): at 20:34

## 2019-01-13 RX ADMIN — MORPHINE SULFATE 2 MILLIGRAM(S): 50 CAPSULE, EXTENDED RELEASE ORAL at 11:32

## 2019-01-13 RX ADMIN — OLANZAPINE 5 MILLIGRAM(S): 15 TABLET, FILM COATED ORAL at 17:47

## 2019-01-13 RX ADMIN — NYSTATIN CREAM 1 APPLICATION(S): 100000 CREAM TOPICAL at 05:17

## 2019-01-13 RX ADMIN — OLANZAPINE 5 MILLIGRAM(S): 15 TABLET, FILM COATED ORAL at 05:14

## 2019-01-13 RX ADMIN — Medication 650 MILLIGRAM(S): at 11:13

## 2019-01-13 RX ADMIN — MORPHINE SULFATE 2 MILLIGRAM(S): 50 CAPSULE, EXTENDED RELEASE ORAL at 11:02

## 2019-01-13 RX ADMIN — MORPHINE SULFATE 2 MILLIGRAM(S): 50 CAPSULE, EXTENDED RELEASE ORAL at 04:11

## 2019-01-13 RX ADMIN — SODIUM CHLORIDE 75 MILLILITER(S): 9 INJECTION INTRAMUSCULAR; INTRAVENOUS; SUBCUTANEOUS at 00:42

## 2019-01-13 RX ADMIN — Medication 1 APPLICATION(S): at 11:14

## 2019-01-13 RX ADMIN — Medication 26.25 MILLIGRAM(S): at 18:09

## 2019-01-13 RX ADMIN — AMLODIPINE BESYLATE 10 MILLIGRAM(S): 2.5 TABLET ORAL at 05:16

## 2019-01-13 RX ADMIN — SODIUM CHLORIDE 75 MILLILITER(S): 9 INJECTION INTRAMUSCULAR; INTRAVENOUS; SUBCUTANEOUS at 11:04

## 2019-01-13 RX ADMIN — LACOSAMIDE 140 MILLIGRAM(S): 50 TABLET ORAL at 21:57

## 2019-01-13 RX ADMIN — POLYETHYLENE GLYCOL 3350 17 GRAM(S): 17 POWDER, FOR SOLUTION ORAL at 20:34

## 2019-01-13 RX ADMIN — Medication 81 MILLIGRAM(S): at 11:13

## 2019-01-13 RX ADMIN — Medication 650 MILLIGRAM(S): at 11:43

## 2019-01-13 RX ADMIN — LOSARTAN POTASSIUM 100 MILLIGRAM(S): 100 TABLET, FILM COATED ORAL at 05:15

## 2019-01-13 RX ADMIN — ATORVASTATIN CALCIUM 80 MILLIGRAM(S): 80 TABLET, FILM COATED ORAL at 21:56

## 2019-01-13 RX ADMIN — CARVEDILOL PHOSPHATE 25 MILLIGRAM(S): 80 CAPSULE, EXTENDED RELEASE ORAL at 18:28

## 2019-01-13 RX ADMIN — Medication 650 MILLIGRAM(S): at 05:58

## 2019-01-13 RX ADMIN — CARVEDILOL PHOSPHATE 25 MILLIGRAM(S): 80 CAPSULE, EXTENDED RELEASE ORAL at 05:15

## 2019-01-13 RX ADMIN — LACOSAMIDE 140 MILLIGRAM(S): 50 TABLET ORAL at 10:17

## 2019-01-13 NOTE — PROGRESS NOTE ADULT - SUBJECTIVE AND OBJECTIVE BOX
Neurology Progress Note      HPI:  63 year old woman with vascular risk factors of age, HTN, DM II, HPLD and CAD was evaluated at [facility] for language disturbance. On 11/27 she underwent T10 laminectomy and fusion. Postoperatively, she was noted to have Wernicke's aphasia due to left temporal lobar ICH of undetermined etiology. She reported to have had significant improvement in her language deficit in rehabilitation. In the evening of 12/24, she was reported to have worsening of her aphasia, prompting her presentation to OSH and subsequent transfer to [facility].       SUBJECTIVE: No events overnight.       MEDICATIONS  (STANDING):  acetaminophen   Tablet .. 650 milliGRAM(s) Oral every 6 hours  amLODIPine   Tablet 10 milliGRAM(s) Oral daily  aspirin  chewable 81 milliGRAM(s) Oral daily  atorvastatin 80 milliGRAM(s) Oral at bedtime  BACItracin   Ointment 1 Application(s) Topical daily  carvedilol 25 milliGRAM(s) Oral every 12 hours  dextrose 50% Injectable 12.5 Gram(s) IV Push once  dextrose 50% Injectable 25 Gram(s) IV Push once  dextrose 50% Injectable 25 Gram(s) IV Push once  insulin lispro (HumaLOG) corrective regimen sliding scale   SubCutaneous every 6 hours  lacosamide IVPB 200 milliGRAM(s) IV Intermittent every 12 hours  lidocaine 2% Injectable 20 milliLiter(s) Local Injection once  losartan 100 milliGRAM(s) Oral daily  nystatin Ointment 1 Application(s) Topical every 12 hours  OLANZapine Injectable 5 milliGRAM(s) IntraMuscular every 12 hours  sodium chloride 0.9%. 1000 milliLiter(s) (75 mL/Hr) IV Continuous <Continuous>    MEDICATIONS  (PRN):  dextrose 40% Gel 15 Gram(s) Oral once PRN Blood Glucose LESS THAN 70 milliGRAM(s)/deciliter  glucagon  Injectable 1 milliGRAM(s) IntraMuscular once PRN Glucose LESS THAN 70 milligrams/deciliter  hydrALAZINE Injectable 10 milliGRAM(s) IV Push every 6 hours PRN SBP>160  labetalol Injectable 10 milliGRAM(s) IV Push every 6 hours PRN Systolic blood pressure >160  LORazepam   Injectable 2 milliGRAM(s) IV Push every 5 minutes PRN Sustained GTC Seizure  morphine  - Injectable 2 milliGRAM(s) IV Push every 6 hours PRN Severe Pain (7 - 10)  OLANZapine Injectable 5 milliGRAM(s) IntraMuscular every 24 hours PRN agitation      PHYSICAL EXAM:   Vital Signs Last 24 Hrs  T(C): 36.4 (13 Jan 2019 08:13), Max: 37 (12 Jan 2019 23:18)  T(F): 97.6 (13 Jan 2019 08:13), Max: 98.6 (12 Jan 2019 23:18)  HR: 81 (13 Jan 2019 08:13) (75 - 92)  BP: 148/76 (13 Jan 2019 08:13) (113/78 - 154/72)  BP(mean): 94 (12 Jan 2019 22:00) (80 - 117)  RR: 20 (13 Jan 2019 08:13) (10 - 21)  SpO2: 95% (13 Jan 2019 08:13) (88% - 100%)    General: no distress noted  HEENT: right gaze palsy  Abdomen: Soft, nontender, nondistended   Extremities: No edema  Back: LP site appearing dry, well healing w/o any discharge    NEUROLOGICAL EXAM:  Mental status: eyes open but inattentive for most of exam, able to make eye contact at some points, no verbal output, not following commands.  Cranial Nerves: No facial asymmetry, mild right gaze palsy crosses midline, no nystagmus, no dysarthria, tongue midline  Motor exam: extremities move spontaneously against gravity however right leg is noted to be moving less.   Sensation: Intact to mildly noxious stimuli  Coordination/ Gait: Unable to cooperate with testing; gait deferred     LABS:                        10.3   7.2   )-----------( 137      ( 13 Jan 2019 07:07 )             31.3   01-13    143  |  103  |  9   ----------------------------<  107<H>  3.6   |  28  |  0.64    Ca    8.2<L>      13 Jan 2019 07:07            IMAGING: Reviewed by me.   CT Angio Head/Neck IV Cont (01.07.19)  Normal CTA of the head and neck. No abnormal vascularity,   aneurysms or AVMs appreciated on this patient who had a prior left   temporal parenchymal hemorrhage.    CT Head No Cont (01.01.19)  No gross evidence for new infarct or new hemorrhage compared to the   12/30/2018 head CT and 12/26/2018 brain MRI.     CT Head No Cont (12.30.18)  No gross evidence for new infarct or new hemorrhage compared to the   12/26/2018 MRI study.    MRI Head w/wo IV Cont (12.26.18)  Left temporal lobe hematoma without significant interval change in size.  Small amount of subarachnoid blood.  New acute left MCA distribution infarct.  Prominent left temporal region cortical vein. Clinical correlation will   determine the need for conventional angiography to exclude the   possibility of a vascular malformation.    (12.25.18)  CT brain:  Resolving left temporal lobe parenchymal hemorrhage is redemonstrated,   similar to the prior brain CT.    CT angiography neck:  No evidence for arterial dissection. No flow-limiting stenosis. Carotid   bifurcations unremarkable.    CT angiography brain:  No evidence for AVM. No vascular aneurysm or flow-limiting stenosis.    CT venography brain:  No evidence for venous sinus or cortical vein thrombosis.

## 2019-01-13 NOTE — PROVIDER CONTACT NOTE (MEDICATION) - ASSESSMENT
Pt unable to follow commands, globally aphasic, restless, pulling at lines, NGT, b/l wrist restraints. As per EMAR, pt last BM was 1/8/19, tolerating feeds; abdomen tender, audible bowel sounds in all quadrants.

## 2019-01-13 NOTE — PROGRESS NOTE ADULT - ASSESSMENT
63F with a history of CAD s/p PCI to OM2 with a AVE in December of 2015, residual 40% LAD disease, neg stress test in 1/2018, osteoarthritis, DM, HTN, HLD who presents as a transfer from [facility] for aphasia now with temporal ICH and new left MCA infarct. She is now withevolution of left MCA distribution infarct and prior left temporal ICH leading to encephalomalacia. She is having focal seizures and facial twitching.     - Cont neuro rx.   Neuro follow up  - She has a hx of a remote PCI.   - No anginal symptoms recently reported. Cont ASA 81 QD  - Has a moderate focal atheroma in Aortic Arch. Cont high intensity statin for now.  - s/p ILR placement given suspicion for embolic nature of CVA. No af noted on telemetry to date.  - intermittent sinus tachycardia in setting of agitation. Continue to watch    - BP is labile. better overall controlled.   - Continue with losartan and Coreg at current doses, along with Norvasc 10 mg po daily.  - I would continue prn IV hydralazine and labetalol to control her blood pressure spikes.       - Appears compensated from HF POV.     - tolerated IR guided paraspinal fluid aspiration without cardiac sequale   - Monitor and replete electrolytes. Keep K>4.0 and Mg>2.0.  -  Further cardiac workup will depend on clinical course.   - All other workup per primary team. Will followup.

## 2019-01-13 NOTE — PROGRESS NOTE ADULT - SUBJECTIVE AND OBJECTIVE BOX
GASTROENTEROLOGY    Patient seen and examined at bedside. No events noted  Non-verbal  PEG tube placement planned tomorrow       MEDICATIONS  (STANDING):  acetaminophen   Tablet .. 650 milliGRAM(s) Oral every 6 hours  amLODIPine   Tablet 10 milliGRAM(s) Oral daily  aspirin  chewable 81 milliGRAM(s) Oral daily  atorvastatin 80 milliGRAM(s) Oral at bedtime  BACItracin   Ointment 1 Application(s) Topical daily  carvedilol 25 milliGRAM(s) Oral every 12 hours  dextrose 50% Injectable 12.5 Gram(s) IV Push once  dextrose 50% Injectable 25 Gram(s) IV Push once  dextrose 50% Injectable 25 Gram(s) IV Push once  insulin lispro (HumaLOG) corrective regimen sliding scale   SubCutaneous every 6 hours  lacosamide IVPB 200 milliGRAM(s) IV Intermittent every 12 hours  lidocaine 2% Injectable 20 milliLiter(s) Local Injection once  losartan 100 milliGRAM(s) Oral daily  nystatin Ointment 1 Application(s) Topical every 12 hours  OLANZapine Injectable 5 milliGRAM(s) IntraMuscular every 12 hours  sodium chloride 0.9%. 1000 milliLiter(s) (75 mL/Hr) IV Continuous <Continuous>        T(F): 97.6 (01-13-19 @ 08:13), Max: 98.6 (01-12-19 @ 23:18)  HR: 81 (01-13-19 @ 08:13) (79 - 92)  BP: 148/76 (01-13-19 @ 08:13) (113/78 - 154/72)  RR: 20 (01-13-19 @ 08:13) (10 - 21)  SpO2: 95% (01-13-19 @ 08:13) (88% - 100%)  Wt(kg): --    PHYSICAL EXAM  GENERAL:   NAD  HEENT:  NC/AT, no JVD, sclera-anicteric, keofeed tube right nare  CHEST:  clear to ascultation bilaterally, respirations nonlabored  HEART:  +S1+S2 regular rate and rhythm   ABDOMEN:  Soft, normoactive bowel sounds, no masses  EXTEREMITIES:  no cyanosis,clubbing or edema  NEURO:  non-verbal  SKIN:  No rash/warm/dry      LABS:                        10.3<L>  7.2   )-----------( 137<L>    ( 13 Jan 2019 07:07 )             31.3<L>  13 Jan 2019 07:07    01-13    143  |  103  |  9   ----------------------------<  107<H>  3.6   |  28  |  0.64    Ca    8.2<L>      13 Jan 2019 07:07          I&O's Detail    12 Jan 2019 07:01  -  13 Jan 2019 07:00  --------------------------------------------------------  IN:  Total IN: 0 mL    OUT:    Voided: 400 mL  Total OUT: 400 mL    Total NET: -400 mL          Culture - Body Fluid with Gram Stain (collected 13 Jan 2019 00:27)  Source: .Body Fluid Interstitial Fluid  Gram Stain (13 Jan 2019 05:08):    polymorphonuclear leukocytes seen    No organisms seen    by cytocentrifuge    Culture - Other (collected 11 Jan 2019 17:35)  Source: Skin wound on back  Preliminary Report (12 Jan 2019 16:19):    No growth    Culture - Blood (collected 10 Volodymyr 2019 18:55)  Source: .Blood Blood-Venous  Preliminary Report (11 Jan 2019 19:01):    No growth to date.    Culture - Blood (collected 10 Volodymyr 2019 18:55)  Source: .Blood Blood-Venous  Preliminary Report (11 Jan 2019 19:01):    No growth to date.
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
## 2019-01-13 NOTE — PROGRESS NOTE ADULT - ASSESSMENT
63 years old woman with vascular risk factors of age, HTN, DM II, HPLD and CAD was evaluated at [facility] for language disturbance. On 11/27 she underwent T10 laminectomy and fusion. Postoperatively, she was noted to have Wernicke's aphasia due to left temporal lobar ICH of undetermined etiology. She reported to have had significant improvement in her language deficit in the rehabilitation. In the evening of 12/24, she was reported to have worsening of her aphasia, prompting her presentation to OSH and subsequent transfer to [facility]. CT brain or admission showed expected evolution/resolution of left temporal ICH leading to development of encephalomalacia. MRI brain showed what appeared to be an acute infarct in the left MCA distribution adjacent to the prior hemorrhage in the late subacute to chronic stage, although it is possible that this was not an actual infarct but rather MRI hyperintensity related to previous seizures; and expected evolution of prior left frontotemporal convexal subarachnoid hemorrhage as well as was concerning for a dilated cortical vein anterior to the hematoma. Of note, MRI brain (12/7) showed left temporal lobar ICH, left frontotemporal convexal SAH and juxtacortical FLAIR hyperintensities concerning for PRES to my eye. CT brain on 12/30, showed expected evolution of left MCA distribution "infarct" (versus abnormal signal related to previous seizures)  and prior left temporal ICH leading to encephalomalacia.    Impression:   Possible Left MCA distribution "stroke" -initially thought likely etiology being cryptogenic stroke, probably related to embolism from a proximal source, like cardiac/paradoxical source of embolism. Note it is possible or probable that this imaging abnormality does not represent an actual infarct but rather abnormal signal related to previous seizures.  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     Neurologically noted with global aphasia and inattention- unchanged from previous, though likely to be PRES, had planned to perform LP  to rule out any infectious or autoimmune etiology for altered mental status; LP not performed due to purulent drainage had previous spinal surgery site-ID consulted for further management; while unlikely, the presence of infection might raise the possibility of endocarditis or even partially treated meningitis; we'll obtain MRI of lumbar spine, blood cultures (although she's been on antibiotics for a UTI) and TTE;  continue monitoring for neurologic deterioration in setting of cerebral edema  mass effect and brain compression, keep SBP between 120-150, LDL 44-continue with home statin as  LDL is currently at goal, MRI Brain w/wo contrast noted above. Normal CTA of the head and neck. Physical therapy/OT recommended acute rehab. Consider repeat MRI brain with and without contrast/MR spectroscopy in about 4-6 weeks to rule out an underlying neoplasm preferably upon resolution/complete resorption of prior left temporal lobar ICH; Conventional angiogram to rule out underlying vascular malformation could be considered based on results of a repeat brain imaging/vessel imaging.  LP at bedside attempted w/ purulent drainage at previous spinal surgical site-ID consulted, blood cultures and MR spine pending, wound culture as well, on ceftraizone for UTI- day 7/7    Plan:  - IR guided aspiration today  - once cleared will get IR guided LP 63 years old woman with vascular risk factors of age, HTN, DM II, HPLD and CAD was evaluated at [facility] for language disturbance. On 11/27 she underwent T10 laminectomy and fusion. Postoperatively, she was noted to have Wernicke's aphasia due to left temporal lobar ICH of undetermined etiology. She reported to have had significant improvement in her language deficit in the rehabilitation. In the evening of 12/24, she was reported to have worsening of her aphasia, prompting her presentation to OSH and subsequent transfer to [facility]. CT brain or admission showed expected evolution/resolution of left temporal ICH leading to development of encephalomalacia. MRI brain showed what appeared to be an acute infarct in the left MCA distribution adjacent to the prior hemorrhage in the late subacute to chronic stage, although it is possible that this was not an actual infarct but rather MRI hyperintensity related to previous seizures; and expected evolution of prior left frontotemporal convexal subarachnoid hemorrhage as well as was concerning for a dilated cortical vein anterior to the hematoma. Of note, MRI brain (12/7) showed left temporal lobar ICH, left frontotemporal convexal SAH and juxtacortical FLAIR hyperintensities concerning for PRES to my eye. CT brain on 12/30, showed expected evolution of left MCA distribution "infarct" (versus abnormal signal related to previous seizures)  and prior left temporal ICH leading to encephalomalacia.    Impression:   Possible Left MCA distribution "stroke" -initially thought likely etiology being cryptogenic stroke, probably related to embolism from a proximal source, like cardiac/paradoxical source of embolism. Note it is possible or probable that this imaging abnormality does not represent an actual infarct but rather abnormal signal related to previous seizures.  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     Neurologically noted with global aphasia and inattention- unchanged from previous, though likely to be PRES, had planned to perform LP  to rule out any infectious or autoimmune etiology for altered mental status; LP not performed due to purulent drainage had previous spinal surgery site-ID consulted for further management; while unlikely, the presence of infection might raise the possibility of endocarditis or even partially treated meningitis; we'll obtain MRI of lumbar spine, blood cultures (although she's been on antibiotics for a UTI) and TTE;  continue monitoring for neurologic deterioration in setting of cerebral edema  mass effect and brain compression, keep SBP between 120-150, LDL 44-continue with home statin as  LDL is currently at goal, MRI Brain w/wo contrast noted above. Normal CTA of the head and neck. Physical therapy/OT recommended acute rehab. Consider repeat MRI brain with and without contrast/MR spectroscopy in about 4-6 weeks to rule out an underlying neoplasm preferably upon resolution/complete resorption of prior left temporal lobar ICH; Conventional angiogram to rule out underlying vascular malformation could be considered based on results of a repeat brain imaging/vessel imaging.  LP at bedside attempted w/ purulent drainage at previous spinal surgical site-ID consulted, blood cultures, wound culture as well, on ceftraizone for UTI- day 7/7    Plan:  - s/p IR guided aspiration of fluid collection in the dorsal postoperative bed  - once cleared will get IR guided LP 63 years old woman with vascular risk factors of age, HTN, DM II, HPLD and CAD was evaluated at [facility] for language disturbance. On 11/27 she underwent T10 laminectomy and fusion. Postoperatively, she was noted to have Wernicke's aphasia due to left temporal lobar ICH of undetermined etiology. She reported to have had significant improvement in her language deficit in the rehabilitation. In the evening of 12/24, she was reported to have worsening of her aphasia, prompting her presentation to OSH and subsequent transfer to [facility]. CT brain or admission showed expected evolution/resolution of left temporal ICH leading to development of encephalomalacia. MRI brain showed what appeared to be an acute infarct in the left MCA distribution adjacent to the prior hemorrhage in the late subacute to chronic stage, although it is possible that this was not an actual infarct but rather MRI hyperintensity related to previous seizures; and expected evolution of prior left frontotemporal convexal subarachnoid hemorrhage as well as was concerning for a dilated cortical vein anterior to the hematoma. Of note, MRI brain (12/7) showed left temporal lobar ICH, left frontotemporal convexal SAH and juxtacortical FLAIR hyperintensities concerning for PRES to my eye. CT brain on 12/30, showed expected evolution of left MCA distribution "infarct" (versus abnormal signal related to previous seizures)  and prior left temporal ICH leading to encephalomalacia.    Impression:   Possible Left MCA distribution "stroke" -initially thought likely etiology being cryptogenic stroke, probably related to embolism from a proximal source, like cardiac/paradoxical source of embolism. Note it is possible or probable that this imaging abnormality does not represent an actual infarct but rather abnormal signal related to previous seizures.  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     Neurologically noted with global aphasia and inattention- unchanged from previous, though likely to be PRES, had planned to perform LP  to rule out any infectious or autoimmune etiology for altered mental status; LP not performed due to purulent drainage had previous spinal surgery site-ID consulted for further management; while unlikely, the presence of infection might raise the possibility of endocarditis or even partially treated meningitis; we'll obtain MRI of lumbar spine, blood cultures (although she's been on antibiotics for a UTI) and TTE;  continue monitoring for neurologic deterioration in setting of cerebral edema  mass effect and brain compression, keep SBP between 120-150, LDL 44-continue with home statin as  LDL is currently at goal, MRI Brain w/wo contrast noted above. Normal CTA of the head and neck. Physical therapy/OT recommended acute rehab. Consider repeat MRI brain with and without contrast/MR spectroscopy in about 4-6 weeks to rule out an underlying neoplasm preferably upon resolution/complete resorption of prior left temporal lobar ICH; Conventional angiogram to rule out underlying vascular malformation could be considered based on results of a repeat brain imaging/vessel imaging.  LP at bedside attempted w/ purulent drainage at previous spinal surgical site-ID consulted, blood cultures, wound culture as well, on ceftraizone for UTI- day 7/7    Plan:  - s/p IR guided aspiration of fluid collection in the dorsal postoperative bed  - repeat MRI brain w/wo contrast (ordered)  - For LP - Spoke with ID attending, most likely a non-infected seroma, most likely not CSF. Imaging reviewed. Would proceed with xr guided LP to avoid in complication at this time. IR consult in the AM. 63 years old woman with vascular risk factors of age, HTN, DM II, HPLD and CAD was evaluated at [facility] for language disturbance. On 11/27 she underwent T10 laminectomy and fusion. Postoperatively, she was noted to have Wernicke's aphasia due to left temporal lobar ICH of undetermined etiology. She reported to have had significant improvement in her language deficit in the rehabilitation. In the evening of 12/24, she was reported to have worsening of her aphasia, prompting her presentation to OSH and subsequent transfer to [facility]. CT brain or admission showed expected evolution/resolution of left temporal ICH leading to development of encephalomalacia. MRI brain showed what appeared to be an acute infarct in the left MCA distribution adjacent to the prior hemorrhage in the late subacute to chronic stage, although it is possible that this was not an actual infarct but rather MRI hyperintensity related to previous seizures; and expected evolution of prior left frontotemporal convexal subarachnoid hemorrhage as well as was concerning for a dilated cortical vein anterior to the hematoma. Of note, MRI brain (12/7) showed left temporal lobar ICH, left frontotemporal convexal SAH and juxtacortical FLAIR hyperintensities concerning for PRES to my eye. CT brain on 12/30, showed expected evolution of left MCA distribution "infarct" (versus abnormal signal related to previous seizures)  and prior left temporal ICH leading to encephalomalacia.    Impression:   Possible Left MCA distribution "stroke" -initially thought likely etiology being cryptogenic stroke, probably related to embolism from a proximal source, like cardiac/paradoxical source of embolism. Note it is possible or probable that this imaging abnormality does not represent an actual infarct but rather abnormal signal related to previous seizures.  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     Neurologically noted with global aphasia and inattention- unchanged from previous, though likely to be PRES, had planned to perform LP  to rule out any infectious or autoimmune etiology for altered mental status; LP not performed due to purulent drainage at previous spinal surgery site-ID consulted for further management; while unlikely, the presence of infection might raise the possibility of endocarditis or even partially treated meningitis; we'll obtain MRI of lumbar spine, blood cultures (although she's been on antibiotics for a UTI) and TTE;  continue monitoring for neurologic deterioration in setting of cerebral edema  mass effect and brain compression, keep SBP between 120-150, LDL 44-continue with home statin as  LDL is currently at goal, MRI Brain w/wo contrast noted above. Normal CTA of the head and neck. Physical therapy/OT recommended acute rehab. Consider repeat MRI brain with and without contrast/MR spectroscopy in about 4-6 weeks to rule out an underlying neoplasm preferably upon resolution/complete resorption of prior left temporal lobar ICH; Conventional angiogram to rule out underlying vascular malformation could be considered based on results of a repeat brain imaging/vessel imaging.    Plan:  - s/p IR guided aspiration of fluid collection in the dorsal postoperative bed  - repeat MRI brain w/wo contrast (ordered)  - For LP - Spoke with ID attending, most likely a non-infected seroma, most likely not CSF. Imaging reviewed. Would proceed with xr guided LP to avoid in complication at this time. IR consult in the AM. 63 years old woman with vascular risk factors of age, HTN, DM II, HPLD and CAD was evaluated at [facility] for language disturbance. On 11/27 she underwent T10 laminectomy and fusion. Postoperatively, she was noted to have Wernicke's aphasia due to left temporal lobar ICH of undetermined etiology. She reported to have had significant improvement in her language deficit in the rehabilitation. In the evening of 12/24, she was reported to have worsening of her aphasia, prompting her presentation to OSH and subsequent transfer to [facility]. CT brain or admission showed expected evolution/resolution of left temporal ICH leading to development of encephalomalacia. MRI brain showed what appeared to be an acute infarct in the left MCA distribution adjacent to the prior hemorrhage in the late subacute to chronic stage, although it is possible that this was not an actual infarct but rather MRI hyperintensity related to previous seizures; and expected evolution of prior left frontotemporal convexal subarachnoid hemorrhage as well as was concerning for a dilated cortical vein anterior to the hematoma. Of note, MRI brain (12/7) showed left temporal lobar ICH, left frontotemporal convexal SAH and juxtacortical FLAIR hyperintensities concerning for PRES to my eye. CT brain on 12/30, showed expected evolution of left MCA distribution "infarct" (versus abnormal signal related to previous seizures)  and prior left temporal ICH leading to encephalomalacia.    Impression:   Possible Left MCA distribution "stroke" -initially thought likely etiology being cryptogenic stroke, probably related to embolism from a proximal source, like cardiac/paradoxical source of embolism. Note it is possible or probable that this imaging abnormality does not represent an actual infarct but rather abnormal signal related to previous seizures.  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     Neurologically noted with global aphasia and inattention- unchanged from previous, though likely to be PRES, had planned to perform LP  to rule out any infectious or autoimmune etiology for altered mental status; LP not performed due to purulent drainage at previous spinal surgery site-ID consulted for further management; while unlikely, the presence of infection might raise the possibility of endocarditis or even partially treated meningitis; we'll obtain MRI of lumbar spine, blood cultures (although she's been on antibiotics for a UTI) and TTE;  continue monitoring for neurologic deterioration in setting of cerebral edema  mass effect and brain compression, keep SBP between 120-150, LDL 44-continue with home statin as  LDL is currently at goal, MRI Brain w/wo contrast noted above. Normal CTA of the head and neck. Physical therapy/OT recommended acute rehab. Consider repeat MRI brain with and without contrast/MR spectroscopy in about 4-6 weeks to rule out an underlying neoplasm preferably upon resolution/complete resorption of prior left temporal lobar ICH; Conventional angiogram to rule out underlying vascular malformation could be considered based on results of a repeat brain imaging/vessel imaging.    Plan:  - s/p IR guided aspiration of fluid collection in the dorsal postoperative bed  - re-started Depakote 250 BID (was dc'ed due to elevated level) Will re-sent a level in the am on 1/14/19 (ordered)  - repeat MRI brain w/wo contrast (ordered)  - For LP - Spoke with ID attending, most likely a non-infected seroma, most likely not CSF. Imaging reviewed. Would proceed with xr guided LP to avoid in complication at this time. IR consult in the AM.

## 2019-01-13 NOTE — PROVIDER CONTACT NOTE (MEDICATION) - SITUATION
Pt needs medication to tolerate MRI. Pt globally aphasic, does not follow commands, b/l wrist restraints. Also, last BM 1/8/19 as per EMAR

## 2019-01-13 NOTE — PROGRESS NOTE ADULT - SUBJECTIVE AND OBJECTIVE BOX
CC: f/u for spine wound    Patient reports nothing intelligible    REVIEW OF SYSTEMS:  All other review of systems cannot get (Comprehensive ROS)    Antimicrobials Day #  :    Other Medications Reviewed    T(F): 97.7 (01-13-19 @ 13:30), Max: 98.6 (01-12-19 @ 23:18)  HR: 93 (01-13-19 @ 13:30)  BP: 150/81 (01-13-19 @ 13:30)  RR: 18 (01-13-19 @ 13:30)  SpO2: 96% (01-13-19 @ 13:30)  Wt(kg): --    PHYSICAL EXAM:  General: pulling on restraints,   Eyes:  anicteric, no conjunctival injection, no discharge  Oropharynx: no lesions or injection 	  Neck: supple, without adenopathy  Lungs: clear to auscultation  Heart: regular rate and rhythm; no murmur, rubs or gallops  Abdomen: soft, nondistended, nontender, without mass or organomegaly  Skin: no lesions  Extremities: no clubbing, cyanosis, or edema  Neurologic:not following commands    LAB RESULTS:                        10.3   7.2   )-----------( 137      ( 13 Jan 2019 07:07 )             31.3     01-13    143  |  103  |  9   ----------------------------<  107<H>  3.6   |  28  |  0.64    Ca    8.2<L>      13 Jan 2019 07:07          MICROBIOLOGY:  RECENT CULTURES:  01-13 @ 00:27 .Body Fluid Interstitial Fluid       polymorphonuclear leukocytes seen  No organisms seen  by cytocentrifuge    01-11 @ 17:35 Skin wound on back     No growth      01-10 @ 18:55 .Blood Blood-Venous     No growth to date.    RADIOLOGY REVIEWED:      < from: MR Lumbar Spine No Cont (01.11.19 @ 16:01) >  IMPRESSION:    Postoperative changes including multilevel laminectomies and posterior   spinal fusion.    Cystic collection within the laminectomy bed producing effacement of the   thecal sac most prominent at the L4-L5 level where there is moderate to   marked thecal sac compression and crowding of nerve roots. This may   represent a seroma, pseudomeningocele or CSF leak.    Fluid collection within the dorsal postoperative bed which may or may not   communicate with the collection within the laminectomy bed and may   represent a postoperative seroma however the possibility of an abscess   collection is not entirely excluded.    < end of copied text >      Assessment:  Patient s/p spine laminectomy and fusion, had post op bleed in brain for unclear reason, had improved clinically , went to rehab then deteriorated so came back. She had some seizure, repeat scan with possible infarct and PRES type changes. LP to be done but has fluid collection in surgical site near lumbar area. Fluid aspirated, so far not appearing infected and chemical test sent to see if csf . The wound does not look infected either but the issue with LP is that the needle for tap needs to be inserted through where the collection is. So far from ID perspective, no compelling evidence for cns infection, wound infection or collection infection  Plan:  monitor off antibiotics  follow up fluid collection culture and test for csf  need ortho spine and IR to decide how to do an lp in this situation. IF no csf leak, maybe can drain seroma then do lp

## 2019-01-14 LAB
ANION GAP SERPL CALC-SCNC: 13 MMOL/L — SIGNIFICANT CHANGE UP (ref 5–17)
BUN SERPL-MCNC: 9 MG/DL — SIGNIFICANT CHANGE UP (ref 7–23)
CALCIUM SERPL-MCNC: 8.5 MG/DL — SIGNIFICANT CHANGE UP (ref 8.4–10.5)
CHLORIDE SERPL-SCNC: 102 MMOL/L — SIGNIFICANT CHANGE UP (ref 96–108)
CO2 SERPL-SCNC: 27 MMOL/L — SIGNIFICANT CHANGE UP (ref 22–31)
CREAT SERPL-MCNC: 0.67 MG/DL — SIGNIFICANT CHANGE UP (ref 0.5–1.3)
GLUCOSE BLDC GLUCOMTR-MCNC: 106 MG/DL — HIGH (ref 70–99)
GLUCOSE BLDC GLUCOMTR-MCNC: 110 MG/DL — HIGH (ref 70–99)
GLUCOSE BLDC GLUCOMTR-MCNC: 112 MG/DL — HIGH (ref 70–99)
GLUCOSE SERPL-MCNC: 109 MG/DL — HIGH (ref 70–99)
HCT VFR BLD CALC: 30 % — LOW (ref 34.5–45)
HGB BLD-MCNC: 10 G/DL — LOW (ref 11.5–15.5)
MCHC RBC-ENTMCNC: 29.7 PG — SIGNIFICANT CHANGE UP (ref 27–34)
MCHC RBC-ENTMCNC: 33.1 GM/DL — SIGNIFICANT CHANGE UP (ref 32–36)
MCV RBC AUTO: 89.6 FL — SIGNIFICANT CHANGE UP (ref 80–100)
PLATELET # BLD AUTO: 139 K/UL — LOW (ref 150–400)
POTASSIUM SERPL-MCNC: 3.7 MMOL/L — SIGNIFICANT CHANGE UP (ref 3.5–5.3)
POTASSIUM SERPL-SCNC: 3.7 MMOL/L — SIGNIFICANT CHANGE UP (ref 3.5–5.3)
RBC # BLD: 3.35 M/UL — LOW (ref 3.8–5.2)
RBC # FLD: 16.6 % — HIGH (ref 10.3–14.5)
SODIUM SERPL-SCNC: 142 MMOL/L — SIGNIFICANT CHANGE UP (ref 135–145)
VALPROATE SERPL-MCNC: 33 UG/ML — LOW (ref 50–100)
WBC # BLD: 7.6 K/UL — SIGNIFICANT CHANGE UP (ref 3.8–10.5)
WBC # FLD AUTO: 7.6 K/UL — SIGNIFICANT CHANGE UP (ref 3.8–10.5)

## 2019-01-14 PROCEDURE — 99233 SBSQ HOSP IP/OBS HIGH 50: CPT | Mod: GC

## 2019-01-14 PROCEDURE — 99232 SBSQ HOSP IP/OBS MODERATE 35: CPT

## 2019-01-14 PROCEDURE — 99231 SBSQ HOSP IP/OBS SF/LOW 25: CPT

## 2019-01-14 RX ORDER — MORPHINE SULFATE 50 MG/1
1 CAPSULE, EXTENDED RELEASE ORAL EVERY 6 HOURS
Qty: 0 | Refills: 0 | Status: DISCONTINUED | OUTPATIENT
Start: 2019-01-14 | End: 2019-01-15

## 2019-01-14 RX ADMIN — NYSTATIN CREAM 1 APPLICATION(S): 100000 CREAM TOPICAL at 05:45

## 2019-01-14 RX ADMIN — Medication 650 MILLIGRAM(S): at 17:43

## 2019-01-14 RX ADMIN — Medication 26.25 MILLIGRAM(S): at 17:43

## 2019-01-14 RX ADMIN — Medication 650 MILLIGRAM(S): at 00:30

## 2019-01-14 RX ADMIN — Medication 650 MILLIGRAM(S): at 00:00

## 2019-01-14 RX ADMIN — LACOSAMIDE 140 MILLIGRAM(S): 50 TABLET ORAL at 10:48

## 2019-01-14 RX ADMIN — ATORVASTATIN CALCIUM 80 MILLIGRAM(S): 80 TABLET, FILM COATED ORAL at 22:07

## 2019-01-14 RX ADMIN — CARVEDILOL PHOSPHATE 25 MILLIGRAM(S): 80 CAPSULE, EXTENDED RELEASE ORAL at 17:43

## 2019-01-14 RX ADMIN — LACOSAMIDE 140 MILLIGRAM(S): 50 TABLET ORAL at 22:08

## 2019-01-14 RX ADMIN — Medication 26.25 MILLIGRAM(S): at 05:45

## 2019-01-14 RX ADMIN — NYSTATIN CREAM 1 APPLICATION(S): 100000 CREAM TOPICAL at 17:48

## 2019-01-14 RX ADMIN — OLANZAPINE 5 MILLIGRAM(S): 15 TABLET, FILM COATED ORAL at 05:45
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
## 2019-01-14 NOTE — PROGRESS NOTE ADULT - SUBJECTIVE AND OBJECTIVE BOX
CC: f/u for    Patient reports    REVIEW OF SYSTEMS:  All other review of systems negative (Comprehensive ROS)    Antimicrobials Day #  :    Other Medications Reviewed    T(F): 98.4 (01-14-19 @ 19:48), Max: 98.5 (01-14-19 @ 04:47)  HR: 90 (01-14-19 @ 19:48)  BP: 156/89 (01-14-19 @ 19:48)  RR: 18 (01-14-19 @ 19:48)  SpO2: 94% (01-14-19 @ 19:48)  Wt(kg): --    PHYSICAL EXAM:  General: alert, no acute distress  Eyes:  anicteric, no conjunctival injection, no discharge  Oropharynx: no lesions or injection 	  Neck: supple, without adenopathy  Lungs: clear to auscultation  Heart: regular rate and rhythm; no murmur, rubs or gallops  Abdomen: soft, nondistended, nontender, without mass or organomegaly  Skin: no lesions  Extremities: no clubbing, cyanosis, or edema  Neurologic: not following commands, will open eyes  wound clean and intact  LAB RESULTS:                        10.0   7.6   )-----------( 139      ( 14 Jan 2019 06:44 )             30.0     01-14    142  |  102  |  9   ----------------------------<  109<H>  3.7   |  27  |  0.67    Ca    8.5      14 Jan 2019 06:44          MICROBIOLOGY:  RECENT CULTURES:  01-13 @ 00:27 .Body Fluid Interstitial Fluid     No growth    polymorphonuclear leukocytes seen  No organisms seen  by cytocentrifuge    01-11 @ 17:35 Skin wound on back     No growth at 48 hours      01-10 @ 18:55 .Blood Blood-Venous     No growth to date.          RADIOLOGY REVIEWED:    < from: MR Head No Cont (01.13.19 @ 22:58) >    IMPRESSION:    Evolving left temporal lobe hematoma without significant interval change   in size from the prior MR. Mildly decreased surrounding edema.    Mild diffusion restriction and T2 hyperintensity within the left   temporo-occipital region which has decreased from the prior exam favoring   an evolving MCA distribution infarct.    Patchy bilateral areas of T2 hyperintensity seen previously have   significantly decreased likely representing resolving PRES.        < end of copied text >    Assessment:  Patient s/p laminectomy fusion, post op cns bleed, had improved then worsened, seizure and high bp , found to have changes c/w pres on mri now resolving. spine wound looks uninfected. the collection in the surgical bed was aspirated and culture negative and not purulent. await result of test to see if c/w csf. No evidence of infection anywhere given lack of fever, normal wbc, nl exam except for neuro status, spontaneous improvement in the mri, negative cultures.   Plan:  monitor off abx  defer to neuro if needs lp. No ID contraindication to do it but given presence of the collection not sure it can be done accurately or safely  would favor hematology evaluation for thrombocytopenia

## 2019-01-14 NOTE — PROGRESS NOTE ADULT - SUBJECTIVE AND OBJECTIVE BOX
Neurology Progress Note    HPI:  63 year old woman with vascular risk factors of age, HTN, DM II, HPLD and CAD was evaluated at [facility] for language disturbance. On 11/27 she underwent T10 laminectomy and fusion. Postoperatively, she was noted to have Wernicke's aphasia due to left temporal lobar ICH of undetermined etiology. She reported to have had significant improvement in her language deficit in rehabilitation. In the evening of 12/24, she was reported to have worsening of her aphasia, prompting her presentation to OSH and subsequent transfer to [facility].    SUBJECTIVE: No events overnight, NPO for procedure.    MEDICATIONS  (STANDING):  acetaminophen   Tablet .. 650 milliGRAM(s) Oral every 6 hours  amLODIPine   Tablet 10 milliGRAM(s) Oral daily  aspirin  chewable 81 milliGRAM(s) Oral daily  atorvastatin 80 milliGRAM(s) Oral at bedtime  BACItracin   Ointment 1 Application(s) Topical daily  carvedilol 25 milliGRAM(s) Oral every 12 hours  dextrose 50% Injectable 12.5 Gram(s) IV Push once  dextrose 50% Injectable 25 Gram(s) IV Push once  dextrose 50% Injectable 25 Gram(s) IV Push once  insulin lispro (HumaLOG) corrective regimen sliding scale   SubCutaneous every 6 hours  lacosamide IVPB 200 milliGRAM(s) IV Intermittent every 12 hours  lidocaine 2% Injectable 20 milliLiter(s) Local Injection once  losartan 100 milliGRAM(s) Oral daily  nystatin Ointment 1 Application(s) Topical every 12 hours  OLANZapine Injectable 5 milliGRAM(s) IntraMuscular every 12 hours  polyethylene glycol 3350 17 Gram(s) Oral daily  valproate sodium IVPB 250 milliGRAM(s) IV Intermittent two times a day    MEDICATIONS  (PRN):  dextrose 40% Gel 15 Gram(s) Oral once PRN Blood Glucose LESS THAN 70 milliGRAM(s)/deciliter  glucagon  Injectable 1 milliGRAM(s) IntraMuscular once PRN Glucose LESS THAN 70 milligrams/deciliter  hydrALAZINE Injectable 10 milliGRAM(s) IV Push every 6 hours PRN SBP>160  labetalol Injectable 10 milliGRAM(s) IV Push every 6 hours PRN Systolic blood pressure >160  LORazepam   Injectable 2 milliGRAM(s) IV Push every 5 minutes PRN Sustained GTC Seizure  morphine  - Injectable 1 milliGRAM(s) IV Push every 6 hours PRN Severe Pain (7 - 10)  OLANZapine Injectable 5 milliGRAM(s) IntraMuscular every 24 hours PRN agitation      PHYSICAL EXAM:   Vital Signs Last 24 Hrs  Vital Signs Last 24 Hrs  T(C): 36.6 (14 Jan 2019 08:30), Max: 37 (13 Jan 2019 15:51)  T(F): 97.9 (14 Jan 2019 08:30), Max: 98.6 (13 Jan 2019 15:51)  HR: 86 (14 Jan 2019 08:30) (76 - 101)  BP: 133/74 (14 Jan 2019 08:30) (133/74 - 156/74)  BP(mean): --  RR: 18 (14 Jan 2019 08:30) (18 - 18)  SpO2: 95% (14 Jan 2019 08:30) (93% - 96%)    General: no distress noted  HEENT: right gaze palsy  Abdomen: Soft, nontender, nondistended   Extremities: No edema  Back: LP site appearing dry, well healing w/o any discharge    NEUROLOGICAL EXAM:  Mental status: eyes open but inattentive for most of exam, able to make eye contact at some points, no verbal output, not following commands.  Cranial Nerves: No facial asymmetry, mild right gaze palsy crosses midline, no nystagmus, no dysarthria, tongue midline  Motor exam: extremities move spontaneously against gravity however right leg is noted to be moving less.   Sensation: Intact to mildly noxious stimuli  Coordination/ Gait: Unable to cooperate with testing; gait deferred       01-14    142  |  102  |  9   ----------------------------<  109<H>  3.7   |  27  |  0.67    Ca    8.5      14 Jan 2019 06:44    IMAGING: Reviewed by me.   CT Angio Head/Neck IV Cont (01.07.19)  Normal CTA of the head and neck. No abnormal vascularity,   aneurysms or AVMs appreciated on this patient who had a prior left   temporal parenchymal hemorrhage.    CT Head No Cont (01.01.19)  No gross evidence for new infarct or new hemorrhage compared to the   12/30/2018 head CT and 12/26/2018 brain MRI.     CT Head No Cont (12.30.18)  No gross evidence for new infarct or new hemorrhage compared to the   12/26/2018 MRI study.    MRI Head w/wo IV Cont (12.26.18)  Left temporal lobe hematoma without significant interval change in size.  Small amount of subarachnoid blood.  New acute left MCA distribution infarct.  Prominent left temporal region cortical vein. Clinical correlation will   determine the need for conventional angiography to exclude the   possibility of a vascular malformation.    (12.25.18)  CT brain:  Resolving left temporal lobe parenchymal hemorrhage is redemonstrated,   similar to the prior brain CT.    CT angiography neck:  No evidence for arterial dissection. No flow-limiting stenosis. Carotid   bifurcations unremarkable.    CT angiography brain:  No evidence for AVM. No vascular aneurysm or flow-limiting stenosis.    CT venography brain:  No evidence for venous sinus or cortical vein thrombosis.

## 2019-01-14 NOTE — PROGRESS NOTE ADULT - SUBJECTIVE AND OBJECTIVE BOX
[facility] - Bora Mcfarland, Cari Nino, Barry, Noah Lock  Office Number:  [phone]    Patient resting comfortably in bed in NAD.  Lethargic, not able to provide any meaningful history    F/U for:  CAD, HTN    MEDICATIONS  (STANDING):  acetaminophen   Tablet .. 650 milliGRAM(s) Oral every 6 hours  amLODIPine   Tablet 10 milliGRAM(s) Oral daily  aspirin  chewable 81 milliGRAM(s) Oral daily  atorvastatin 80 milliGRAM(s) Oral at bedtime  BACItracin   Ointment 1 Application(s) Topical daily  carvedilol 25 milliGRAM(s) Oral every 12 hours  dextrose 50% Injectable 12.5 Gram(s) IV Push once  dextrose 50% Injectable 25 Gram(s) IV Push once  dextrose 50% Injectable 25 Gram(s) IV Push once  insulin lispro (HumaLOG) corrective regimen sliding scale   SubCutaneous every 6 hours  lacosamide IVPB 200 milliGRAM(s) IV Intermittent every 12 hours  lidocaine 2% Injectable 20 milliLiter(s) Local Injection once  losartan 100 milliGRAM(s) Oral daily  nystatin Ointment 1 Application(s) Topical every 12 hours  OLANZapine Injectable 5 milliGRAM(s) IntraMuscular every 12 hours  polyethylene glycol 3350 17 Gram(s) Oral daily  valproate sodium IVPB 250 milliGRAM(s) IV Intermittent two times a day    MEDICATIONS  (PRN):  dextrose 40% Gel 15 Gram(s) Oral once PRN Blood Glucose LESS THAN 70 milliGRAM(s)/deciliter  glucagon  Injectable 1 milliGRAM(s) IntraMuscular once PRN Glucose LESS THAN 70 milligrams/deciliter  hydrALAZINE Injectable 10 milliGRAM(s) IV Push every 6 hours PRN SBP>160  labetalol Injectable 10 milliGRAM(s) IV Push every 6 hours PRN Systolic blood pressure >160  LORazepam   Injectable 2 milliGRAM(s) IV Push every 5 minutes PRN Sustained GTC Seizure  morphine  - Injectable 2 milliGRAM(s) IV Push every 6 hours PRN Severe Pain (7 - 10)  OLANZapine Injectable 5 milliGRAM(s) IntraMuscular every 24 hours PRN agitation      Allergies    No Known Allergies    Intolerances        Vital Signs Last 24 Hrs  T(C): 36.6 (14 Jan 2019 08:30), Max: 37 (13 Jan 2019 15:51)  T(F): 97.9 (14 Jan 2019 08:30), Max: 98.6 (13 Jan 2019 15:51)  HR: 86 (14 Jan 2019 08:30) (76 - 101)  BP: 133/74 (14 Jan 2019 08:30) (133/74 - 156/74)  BP(mean): --  RR: 18 (14 Jan 2019 08:30) (18 - 18)  SpO2: 95% (14 Jan 2019 08:30) (93% - 96%)    I&O's Summary    13 Jan 2019 07:01  -  14 Jan 2019 07:00  --------------------------------------------------------  IN: 1775 mL / OUT: 1100 mL / NET: 675 mL        ON EXAM:    Constitutional: NAD, lethargic  HEENT: Dry MM, Anicteric, NGT  Pulmonary: Decreased breath sounds b/l. No rales, crackles or wheeze appreciated.   Cardiovascular: Regular, S1 and S2, No murmurs, rubs, gallops or clicks  Gastrointestinal: Bowel Sounds present, soft, nontender.   Lymph: No peripheral edema. No lymphadenopathy.  Skin: No visible rashes or ulcers.  Psych: confused, lethargic    LABS: All Labs Reviewed:                        10.0   7.6   )-----------( 139      ( 14 Jan 2019 06:44 )             30.0                         10.3   7.2   )-----------( 137      ( 13 Jan 2019 07:07 )             31.3                         10.9   8.5   )-----------( 173      ( 12 Jan 2019 02:07 )             32.9     14 Jan 2019 06:44    142    |  102    |  9      ----------------------------<  109    3.7     |  27     |  0.67   13 Jan 2019 07:07    143    |  103    |  9      ----------------------------<  107    3.6     |  28     |  0.64   12 Jan 2019 02:07    132    |  96     |  14     ----------------------------<  129    3.6     |  27     |  0.71     Ca    8.5        14 Jan 2019 06:44  Ca    8.2        13 Jan 2019 07:07  Ca    8.5        12 Jan 2019 02:07            Blood Culture: Organism --  Gram Stain Blood -- Gram Stain   polymorphonuclear leukocytes seen  No organisms seen  by cytocentrifuge  Specimen Source .Body Fluid Interstitial Fluid  Culture-Blood --    Organism --  Gram Stain Blood -- Gram Stain --  Specimen Source Skin wound on back  Culture-Blood --    Organism --  Gram Stain Blood -- Gram Stain --  Specimen Source .Blood Blood-Venous  Culture-Blood --

## 2019-01-14 NOTE — PROGRESS NOTE ADULT - ASSESSMENT
63 years old woman with vascular risk factors of age, HTN, DM II, HPLD and CAD was evaluated at [facility] for language disturbance. On 11/27 she underwent T10 laminectomy and fusion. Postoperatively, she was noted to have Wernicke's aphasia due to left temporal lobar ICH of undetermined etiology. She reported to have had significant improvement in her language deficit in the rehabilitation. In the evening of 12/24, she was reported to have worsening of her aphasia, prompting her presentation to OSH and subsequent transfer to [facility]. CT brain or admission showed expected evolution/resolution of left temporal ICH leading to development of encephalomalacia. MRI brain showed what appeared to be an acute infarct in the left MCA distribution adjacent to the prior hemorrhage in the late subacute to chronic stage, although it is possible that this was not an actual infarct but rather MRI hyperintensity related to previous seizures; and expected evolution of prior left frontotemporal convexal subarachnoid hemorrhage as well as was concerning for a dilated cortical vein anterior to the hematoma. Of note, MRI brain (12/7) showed left temporal lobar ICH, left frontotemporal convexal SAH and juxtacortical FLAIR hyperintensities concerning for PRES to my eye. CT brain on 12/30, showed expected evolution of left MCA distribution "infarct" (versus abnormal signal related to previous seizures)  and prior left temporal ICH leading to encephalomalacia.    Impression:  Possible Left MCA distribution "stroke" -initially thought likely etiology being cryptogenic stroke, probably related to embolism from a proximal source, like cardiac/paradoxical source of embolism. Note it is possible or probable that this imaging abnormality does not represent an actual infarct but rather abnormal signal related to previous seizures vs. PRES.  Recent left temporal lobar ICH with associated left frontotemporal convexal subarachnoid hemorrhage - likely etiology could be intracranial hemorrhage in the setting of PRES but possibility of underlying vascular malformation like micro-AVM or large vessel vasculopathy like vasculitis needs to  be considered.  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    Neurologically noted with global aphasia and inattention- unchanged from previous, though likely to be PRES, planned to perform LP  to rule out any infectious or autoimmune etiology for altered mental status. Keep SBP between 120-150, LDL 44-continue with home statin as LDL is currently at goal, MRI Brain w/wo contrast noted above. Normal CTA of the head and neck. Physical therapy/OT recommended acute TBI rehab. Repeat MRI brain with and without contrast/MR spectroscopy end of February/begining of March to rule out an underlying neoplasm preferably upon resolution/complete resorption of prior left temporal lobar ICH; Conventional angiogram to rule out underlying vascular malformation could be considered based on results of a repeat brain imaging/vessel imaging.    Plan:  - s/p IR guided aspiration of fluid collection in the dorsal postoperative bed, B2-Transferring pending, Cx thus far -itive  - c/w Depakote 250 BID (level improving) and Vimpat 200mg BID  - For LP - Spoke with ID attending, most likely a non-infected seroma, most likely not CSF. Imaging reviewed. Would proceed with xr guided LP to avoid in complication at this time. IR consult placed.  - Planned for PEG today  - s/p LOOP, cardio on board, reccs appreciated, episodes of sinus tach w/ agitation
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
## 2019-01-14 NOTE — PROGRESS NOTE ADULT - ASSESSMENT
63F with a history of CAD s/p PCI to OM2 with a AVE in December of 2015, residual 40% LAD disease, neg stress test in 1/2018, osteoarthritis, DM, HTN, HLD who presents as a transfer from [facility] for aphasia now with temporal ICH and new left MCA infarct. She is now withevolution of left MCA distribution infarct and prior left temporal ICH leading to encephalomalacia. She is having focal seizures and facial twitching.     - Cont neuro rx.   Neuro follow up  - She has a hx of a remote PCI.   - No anginal symptoms recently reported. Cont ASA 81 QD  - Has a moderate focal atheroma in Aortic Arch. Cont high intensity statin for now.  - s/p ILR placement given suspicion for embolic nature of CVA. No af noted on telemetry to date.  - intermittent sinus tachycardia in setting of agitation. Continue to watch    - BP is labile. better overall controlled.   - Not able to dose PO medications at present.   - I would continue prn IV hydralazine and labetalol to control her blood pressure spikes.      - Appears compensated from HF POV.     - tolerated IR guided paraspinal fluid aspiration without cardiac sequale   - Monitor and replete electrolytes. Keep K>4.0 and Mg>2.0.  -  Further cardiac workup will depend on clinical course.   - All other workup per primary team. Will followup.

## 2019-01-15 DIAGNOSIS — Z71.89 OTHER SPECIFIED COUNSELING: ICD-10-CM

## 2019-01-15 LAB
ANION GAP SERPL CALC-SCNC: 12 MMOL/L — SIGNIFICANT CHANGE UP (ref 5–17)
APPEARANCE CSF: ABNORMAL
APPEARANCE SPUN FLD: ABNORMAL
APTT BLD: 26.2 SEC — LOW (ref 27.5–36.3)
BUN SERPL-MCNC: 10 MG/DL — SIGNIFICANT CHANGE UP (ref 7–23)
C-ANCA SER-ACNC: NEGATIVE — SIGNIFICANT CHANGE UP
CALCIUM SERPL-MCNC: 8.9 MG/DL — SIGNIFICANT CHANGE UP (ref 8.4–10.5)
CHLORIDE SERPL-SCNC: 103 MMOL/L — SIGNIFICANT CHANGE UP (ref 96–108)
CO2 SERPL-SCNC: 23 MMOL/L — SIGNIFICANT CHANGE UP (ref 22–31)
COLOR CSF: ABNORMAL
CREAT SERPL-MCNC: 0.67 MG/DL — SIGNIFICANT CHANGE UP (ref 0.5–1.3)
CSF PCR RESULT: SIGNIFICANT CHANGE UP
CULTURE RESULTS: SIGNIFICANT CHANGE UP
CULTURE RESULTS: SIGNIFICANT CHANGE UP
GLUCOSE BLDC GLUCOMTR-MCNC: 102 MG/DL — HIGH (ref 70–99)
GLUCOSE BLDC GLUCOMTR-MCNC: 105 MG/DL — HIGH (ref 70–99)
GLUCOSE BLDC GLUCOMTR-MCNC: 109 MG/DL — HIGH (ref 70–99)
GLUCOSE BLDC GLUCOMTR-MCNC: 98 MG/DL — SIGNIFICANT CHANGE UP (ref 70–99)
GLUCOSE CSF-MCNC: 80 MG/DL — HIGH (ref 40–70)
GLUCOSE SERPL-MCNC: 94 MG/DL — SIGNIFICANT CHANGE UP (ref 70–99)
GRAM STN FLD: SIGNIFICANT CHANGE UP
INR BLD: 1.07 RATIO — SIGNIFICANT CHANGE UP (ref 0.88–1.16)
LABORATORY COMMENT REPORT: SIGNIFICANT CHANGE UP
LDH CSF L TO P-CCNC: 460 U/L — SIGNIFICANT CHANGE UP
LDH FLD-CCNC: 460 U/L — SIGNIFICANT CHANGE UP
LYMPHOCYTES # CSF: 16 % — LOW (ref 40–80)
MONOS+MACROS NFR CSF: 7 % — LOW (ref 15–45)
NEUTROPHILS # CSF: 77 % — HIGH (ref 0–6)
NRBC NFR CSF: 11 /UL — HIGH (ref 0–5)
P-ANCA SER-ACNC: NEGATIVE — SIGNIFICANT CHANGE UP
POTASSIUM SERPL-MCNC: 5.2 MMOL/L — SIGNIFICANT CHANGE UP (ref 3.5–5.3)
POTASSIUM SERPL-SCNC: 5.2 MMOL/L — SIGNIFICANT CHANGE UP (ref 3.5–5.3)
PROT CSF-MCNC: >525 MG/DL — SIGNIFICANT CHANGE UP (ref 15–45)
PROTHROM AB SERPL-ACNC: 12.2 SEC — SIGNIFICANT CHANGE UP (ref 10–13.1)
RBC # CSF: HIGH /UL (ref 0–0)
SODIUM SERPL-SCNC: 138 MMOL/L — SIGNIFICANT CHANGE UP (ref 135–145)
SOURCE HSV 1/2: SIGNIFICANT CHANGE UP
SPECIMEN SOURCE: SIGNIFICANT CHANGE UP
TUBE TYPE: SIGNIFICANT CHANGE UP
VALPROATE FREE SERPL-MCNC: 6.8 MG/L — SIGNIFICANT CHANGE UP (ref 4.8–17.3)

## 2019-01-15 PROCEDURE — 99232 SBSQ HOSP IP/OBS MODERATE 35: CPT

## 2019-01-15 PROCEDURE — 77003 FLUOROGUIDE FOR SPINE INJECT: CPT | Mod: 26

## 2019-01-15 PROCEDURE — 70553 MRI BRAIN STEM W/O & W/DYE: CPT | Mod: 26

## 2019-01-15 PROCEDURE — 62270 DX LMBR SPI PNXR: CPT

## 2019-01-15 RX ORDER — LACOSAMIDE 50 MG/1
200 TABLET ORAL
Qty: 0 | Refills: 0 | Status: DISCONTINUED | OUTPATIENT
Start: 2019-01-15 | End: 2019-01-22

## 2019-01-15 RX ORDER — OLANZAPINE 15 MG/1
5 TABLET, FILM COATED ORAL
Qty: 0 | Refills: 0 | Status: DISCONTINUED | OUTPATIENT
Start: 2019-01-15 | End: 2019-01-22

## 2019-01-15 RX ORDER — VALPROIC ACID (AS SODIUM SALT) 250 MG/5ML
250 SOLUTION, ORAL ORAL
Qty: 0 | Refills: 0 | Status: DISCONTINUED | OUTPATIENT
Start: 2019-01-15 | End: 2019-01-22

## 2019-01-15 RX ORDER — MORPHINE SULFATE 50 MG/1
1 CAPSULE, EXTENDED RELEASE ORAL EVERY 8 HOURS
Qty: 0 | Refills: 0 | Status: DISCONTINUED | OUTPATIENT
Start: 2019-01-15 | End: 2019-01-16

## 2019-01-15 RX ORDER — DIVALPROEX SODIUM 500 MG/1
250 TABLET, DELAYED RELEASE ORAL
Qty: 0 | Refills: 0 | Status: DISCONTINUED | OUTPATIENT
Start: 2019-01-15 | End: 2019-01-15

## 2019-01-15 RX ADMIN — Medication 26.25 MILLIGRAM(S): at 05:32

## 2019-01-15 RX ADMIN — POLYETHYLENE GLYCOL 3350 17 GRAM(S): 17 POWDER, FOR SOLUTION ORAL at 11:31

## 2019-01-15 RX ADMIN — CARVEDILOL PHOSPHATE 25 MILLIGRAM(S): 80 CAPSULE, EXTENDED RELEASE ORAL at 18:03

## 2019-01-15 RX ADMIN — Medication 1 APPLICATION(S): at 11:35

## 2019-01-15 RX ADMIN — OLANZAPINE 5 MILLIGRAM(S): 15 TABLET, FILM COATED ORAL at 18:04

## 2019-01-15 RX ADMIN — ATORVASTATIN CALCIUM 80 MILLIGRAM(S): 80 TABLET, FILM COATED ORAL at 21:23

## 2019-01-15 RX ADMIN — Medication 650 MILLIGRAM(S): at 00:26

## 2019-01-15 RX ADMIN — Medication 250 MILLIGRAM(S): at 18:03

## 2019-01-15 RX ADMIN — NYSTATIN CREAM 1 APPLICATION(S): 100000 CREAM TOPICAL at 18:19

## 2019-01-15 RX ADMIN — NYSTATIN CREAM 1 APPLICATION(S): 100000 CREAM TOPICAL at 05:33

## 2019-01-15 RX ADMIN — OLANZAPINE 5 MILLIGRAM(S): 15 TABLET, FILM COATED ORAL at 18:19

## 2019-01-15 RX ADMIN — Medication 650 MILLIGRAM(S): at 11:31

## 2019-01-15 RX ADMIN — Medication 650 MILLIGRAM(S): at 18:03

## 2019-01-15 RX ADMIN — Medication 650 MILLIGRAM(S): at 01:00

## 2019-01-15 RX ADMIN — OLANZAPINE 5 MILLIGRAM(S): 15 TABLET, FILM COATED ORAL at 05:32

## 2019-01-15 RX ADMIN — LACOSAMIDE 200 MILLIGRAM(S): 50 TABLET ORAL at 18:03

## 2019-01-15 RX ADMIN — Medication 81 MILLIGRAM(S): at 11:31

## 2019-01-15 NOTE — PROGRESS NOTE ADULT - SUBJECTIVE AND OBJECTIVE BOX
Neurology Progress Note    HPI:  63 year old woman with vascular risk factors of age, HTN, DM II, HPLD and CAD was evaluated at [facility] for language disturbance. On 11/27 she underwent T10 laminectomy and fusion. Postoperatively, she was noted to have Wernicke's aphasia due to left temporal lobar ICH of undetermined etiology. She reported to have had significant improvement in her language deficit in rehabilitation. In the evening of 12/24, she was reported to have worsening of her aphasia, prompting her presentation to OSH and subsequent transfer to [facility].    SUBJECTIVE: No events overnight, NPO for procedure.    MEDICATIONS  (STANDING):  acetaminophen   Tablet .. 650 milliGRAM(s) Oral every 6 hours  amLODIPine   Tablet 10 milliGRAM(s) Oral daily  aspirin  chewable 81 milliGRAM(s) Oral daily  atorvastatin 80 milliGRAM(s) Oral at bedtime  BACItracin   Ointment 1 Application(s) Topical daily  carvedilol 25 milliGRAM(s) Oral every 12 hours  dextrose 50% Injectable 12.5 Gram(s) IV Push once  dextrose 50% Injectable 25 Gram(s) IV Push once  dextrose 50% Injectable 25 Gram(s) IV Push once  insulin lispro (HumaLOG) corrective regimen sliding scale   SubCutaneous every 6 hours  lacosamide IVPB 200 milliGRAM(s) IV Intermittent every 12 hours  lidocaine 2% Injectable 20 milliLiter(s) Local Injection once  losartan 100 milliGRAM(s) Oral daily  nystatin Ointment 1 Application(s) Topical every 12 hours  OLANZapine Injectable 5 milliGRAM(s) IntraMuscular every 12 hours  polyethylene glycol 3350 17 Gram(s) Oral daily  valproate sodium IVPB 250 milliGRAM(s) IV Intermittent two times a day    MEDICATIONS  (PRN):  dextrose 40% Gel 15 Gram(s) Oral once PRN Blood Glucose LESS THAN 70 milliGRAM(s)/deciliter  glucagon  Injectable 1 milliGRAM(s) IntraMuscular once PRN Glucose LESS THAN 70 milligrams/deciliter  hydrALAZINE Injectable 10 milliGRAM(s) IV Push every 6 hours PRN SBP>160  labetalol Injectable 10 milliGRAM(s) IV Push every 6 hours PRN Systolic blood pressure >160  LORazepam   Injectable 2 milliGRAM(s) IV Push every 5 minutes PRN Sustained GTC Seizure  morphine  - Injectable 1 milliGRAM(s) IV Push every 6 hours PRN Severe Pain (7 - 10)  OLANZapine Injectable 5 milliGRAM(s) IntraMuscular every 24 hours PRN agitation      PHYSICAL EXAM:   Vital Signs Last 24 Hrs  T(C): 36.6 (15 Volodymyr 2019 07:40), Max: 36.9 (14 Jan 2019 19:48)  T(F): 97.9 (15 Volodymyr 2019 07:40), Max: 98.4 (14 Jan 2019 19:48)  HR: 76 (15 Volodymyr 2019 07:40) (76 - 91)  BP: 123/64 (15 Volodymyr 2019 07:40) (123/64 - 156/89)  BP(mean): --  RR: 18 (15 Volodymyr 2019 07:40) (18 - 20)  SpO2: 95% (15 Volodymyr 2019 07:40) (93% - 95%)    General: no distress noted  HEENT: right gaze palsy  Abdomen: Soft, nontender, nondistended   Extremities: No edema  Back: LP site appearing dry, well healing w/o any discharge    NEUROLOGICAL EXAM:  Mental status: eyes open but inattentive for most of exam, able to make eye contact at some points, no verbal output, not following commands.  Cranial Nerves: No facial asymmetry, mild right gaze palsy crosses midline, no nystagmus, no dysarthria, tongue midline  Motor exam: extremities move spontaneously against gravity however right leg is noted to be moving less.   Sensation: Intact to mildly noxious stimuli  Coordination/ Gait: Unable to cooperate with testing; gait deferred       01-14    142  |  102  |  9   ----------------------------<  109<H>  3.7   |  27  |  0.67    Ca    8.5      14 Jan 2019 06:44    IMAGING: Reviewed by me.   CT Angio Head/Neck IV Cont (01.07.19)  Normal CTA of the head and neck. No abnormal vascularity,   aneurysms or AVMs appreciated on this patient who had a prior left   temporal parenchymal hemorrhage.    CT Head No Cont (01.01.19)  No gross evidence for new infarct or new hemorrhage compared to the   12/30/2018 head CT and 12/26/2018 brain MRI.     CT Head No Cont (12.30.18)  No gross evidence for new infarct or new hemorrhage compared to the   12/26/2018 MRI study.    MRI Head w/wo IV Cont (12.26.18)  Left temporal lobe hematoma without significant interval change in size.  Small amount of subarachnoid blood.  New acute left MCA distribution infarct.  Prominent left temporal region cortical vein. Clinical correlation will   determine the need for conventional angiography to exclude the   possibility of a vascular malformation.    (12.25.18)  CT brain:  Resolving left temporal lobe parenchymal hemorrhage is redemonstrated,   similar to the prior brain CT.    CT angiography neck:  No evidence for arterial dissection. No flow-limiting stenosis. Carotid   bifurcations unremarkable.    CT angiography brain:  No evidence for AVM. No vascular aneurysm or flow-limiting stenosis.    CT venography brain:  No evidence for venous sinus or cortical vein thrombosis.

## 2019-01-15 NOTE — CHART NOTE - NSCHARTNOTEFT_GEN_A_CORE
Nutrition Follow Up Note  Patient seen for follow-up. Per chart, 63F s/p lower back fusion c/b left MCA Stroke s/p encephalomalacia, found to have lumbar fluid collection, global aphasia. S/p PEG placement (1/14) after 3 failed speech and swallow evaluations and NGT placement on 1/6.    Source: RN, medical record    Pt is nonverbal and unable to communicate. Per RN, pt has not received feeds since PEG placement on (1/14) and is scheduled for an MRI of the head today, 1/15. Per chart, pt was tolerating Glucerna 1.2 well with NGT.    Enteral /Parenteral Nutrition: PEG, currently not receiving feeds.  Order: Glucerna 1.2 @1.2 60cc/hr x 24 hrs, to provides 1728 kcals, 86 gm protein, 1159cc free water  meets 22 Kcal/Kg, 1.1Gm/kg 1/1 wt 78kg      Weight: 172 pounds (1/1)  no new weights  % Weight Change    Pertinent Medications: MEDICATIONS  (STANDING):  acetaminophen   Tablet .. 650 milliGRAM(s) Oral every 6 hours  amLODIPine   Tablet 10 milliGRAM(s) Oral daily  aspirin  chewable 81 milliGRAM(s) Oral daily  atorvastatin 80 milliGRAM(s) Oral at bedtime  BACItracin   Ointment 1 Application(s) Topical daily  carvedilol 25 milliGRAM(s) Oral every 12 hours  dextrose 50% Injectable 12.5 Gram(s) IV Push once  dextrose 50% Injectable 25 Gram(s) IV Push once  dextrose 50% Injectable 25 Gram(s) IV Push once  insulin lispro (HumaLOG) corrective regimen sliding scale   SubCutaneous every 6 hours  lacosamide 200 milliGRAM(s) Oral two times a day  lidocaine 2% Injectable 20 milliLiter(s) Local Injection once  losartan 100 milliGRAM(s) Oral daily  nystatin Ointment 1 Application(s) Topical every 12 hours  OLANZapine 5 milliGRAM(s) Oral two times a day  polyethylene glycol 3350 17 Gram(s) Oral daily  valproic  acid Syrup 250 milliGRAM(s) Oral two times a day    MEDICATIONS  (PRN):  dextrose 40% Gel 15 Gram(s) Oral once PRN Blood Glucose LESS THAN 70 milliGRAM(s)/deciliter  glucagon  Injectable 1 milliGRAM(s) IntraMuscular once PRN Glucose LESS THAN 70 milligrams/deciliter  hydrALAZINE Injectable 10 milliGRAM(s) IV Push every 6 hours PRN SBP>160  labetalol Injectable 10 milliGRAM(s) IV Push every 6 hours PRN Systolic blood pressure >160  LORazepam   Injectable 2 milliGRAM(s) IV Push every 5 minutes PRN Sustained GTC Seizure  morphine  - Injectable 1 milliGRAM(s) IV Push every 8 hours PRN Severe Pain (7 - 10)  OLANZapine Injectable 5 milliGRAM(s) IntraMuscular every 24 hours PRN agitation    Pertinent Labs: 01-15 @ 09:38: Na 138, BUN 10, Cr 0.67, BG 94, K+ 5.2, Phos --, Mg --, Alk Phos --, ALT/SGPT --, AST/SGOT --, HbA1c --    Finger Sticks:  POCT Blood Glucose.: 105 mg/dL (01-15 @ 05:34)  POCT Blood Glucose.: 109 mg/dL (01-15 @ 01:03)  POCT Blood Glucose.: 110 mg/dL (01-14 @ 17:34)  POCT Blood Glucose.: 106 mg/dL (01-14 @ 12:17)      Skin per nursing documentation: no pressure injuries per flow sheets  Edema: +1 generalized left arm    Estimated Needs:   [x] no change since previous assessment  [ ] recalculated:     Previous Nutrition Diagnosis: mild malnutrition  Nutrition Diagnosis is: ongoing- being addressed with enteral nutrition to meet needs    New Nutrition Diagnosis: n/a  Related to:    As evidenced by:       Recommend  1) Resume Glucerna 1.2 starting at 10mL/hr increasing 10mL every 4 hours to assess patient tolerance, when medically feasible. Goal rate of [id number] hours.  2) Obtain current weight  3) Continue to monitor tolerance to EN, labs, weights and skin integrity.    Monitoring and Evaluation:     Continue to monitor Nutritional intake, Tolerance to diet prescription, weights, labs, skin integrity    RD remains available upon request and will follow up per protocol Nutrition Follow Up Note  Patient seen for follow-up. Per chart, 63F s/p lower back fusion c/b left MCA Stroke s/p encephalomalacia, found to have lumbar fluid collection, global aphasia. S/p PEG placement (1/14) after 3 failed speech and swallow evaluations and NGT placement on 1/6. PMHx: HTN, DM2, HLD, CAD, ICH    Source: RN, medical record    Pt is nonverbal and unable to communicate. Per RN, pt has not received feeds since PEG placement on (1/14) and is scheduled for an MRI of the head today, 1/15. Per chart, pt was tolerating Glucerna 1.2 well with NGT.    Enteral /Parenteral Nutrition: PEG, currently not receiving feeds.  Order: Glucerna 1.2 @1.2 60cc/hr x 24 hrs, to provides 1728 kcals, 86 gm protein, 1159cc free water  meets 22 Kcal/Kg, 1.1Gm/kg 1/1 wt 78kg      Weight: 172 pounds (1/1)  no new weights  % Weight Change    Pertinent Medications: MEDICATIONS  (STANDING):  acetaminophen   Tablet .. 650 milliGRAM(s) Oral every 6 hours  amLODIPine   Tablet 10 milliGRAM(s) Oral daily  aspirin  chewable 81 milliGRAM(s) Oral daily  atorvastatin 80 milliGRAM(s) Oral at bedtime  BACItracin   Ointment 1 Application(s) Topical daily  carvedilol 25 milliGRAM(s) Oral every 12 hours  dextrose 50% Injectable 12.5 Gram(s) IV Push once  dextrose 50% Injectable 25 Gram(s) IV Push once  dextrose 50% Injectable 25 Gram(s) IV Push once  insulin lispro (HumaLOG) corrective regimen sliding scale   SubCutaneous every 6 hours  lacosamide 200 milliGRAM(s) Oral two times a day  lidocaine 2% Injectable 20 milliLiter(s) Local Injection once  losartan 100 milliGRAM(s) Oral daily  nystatin Ointment 1 Application(s) Topical every 12 hours  OLANZapine 5 milliGRAM(s) Oral two times a day  polyethylene glycol 3350 17 Gram(s) Oral daily  valproic  acid Syrup 250 milliGRAM(s) Oral two times a day    MEDICATIONS  (PRN):  dextrose 40% Gel 15 Gram(s) Oral once PRN Blood Glucose LESS THAN 70 milliGRAM(s)/deciliter  glucagon  Injectable 1 milliGRAM(s) IntraMuscular once PRN Glucose LESS THAN 70 milligrams/deciliter  hydrALAZINE Injectable 10 milliGRAM(s) IV Push every 6 hours PRN SBP>160  labetalol Injectable 10 milliGRAM(s) IV Push every 6 hours PRN Systolic blood pressure >160  LORazepam   Injectable 2 milliGRAM(s) IV Push every 5 minutes PRN Sustained GTC Seizure  morphine  - Injectable 1 milliGRAM(s) IV Push every 8 hours PRN Severe Pain (7 - 10)  OLANZapine Injectable 5 milliGRAM(s) IntraMuscular every 24 hours PRN agitation    Pertinent Labs: 01-15 @ 09:38: Na 138, BUN 10, Cr 0.67, BG 94, K+ 5.2    Finger Sticks:  POCT Blood Glucose.: 105 mg/dL (01-15 @ 05:34)  POCT Blood Glucose.: 109 mg/dL (01-15 @ 01:03)  POCT Blood Glucose.: 110 mg/dL (01-14 @ 17:34)  POCT Blood Glucose.: 106 mg/dL (01-14 @ 12:17)      Skin per nursing documentation: no pressure injuries per flow sheets  Edema: +1 generalized left arm    Estimated Needs:   [x] no change since previous assessment  [ ] recalculated:     Previous Nutrition Diagnosis: mild malnutrition  Nutrition Diagnosis is: ongoing- being addressed with enteral nutrition to meet needs    New Nutrition Diagnosis: n/a  Related to:    As evidenced by:       Recommend  1) Resume Glucerna 1.2 starting at 10mL/hr increasing 10mL every 4 hours to assess patient tolerance, when medically feasible. Goal rate of [id number] hours.  2) Obtain current weight  3) Continue to monitor tolerance to EN, labs, weights and skin integrity.    Monitoring and Evaluation:     Continue to monitor Nutritional intake, Tolerance to diet prescription, weights, labs, skin integrity    RD remains available upon request and will follow up per protocol Nutrition Follow Up Note  Patient seen for follow-up. Per chart, 63F s/p lower back fusion c/b left MCA Stroke s/p encephalomalacia, found to have lumbar fluid collection, global aphasia. S/p PEG placement (1/14) after 3 failed speech and swallow evaluations and NGT placement on 1/6. PMHx: HTN, DM2, HLD, CAD, ICH    Source: RN, medical record    Pt is nonverbal and unable to communicate. Per RN, pt has not received feeds since PEG placement on (1/14) and is scheduled for an MRI of the head today, 1/15. Per chart, pt was tolerating Glucerna 1.2 well with NGT.    Enteral /Parenteral Nutrition: PEG, currently not receiving feeds.  Order: Glucerna 1.2 @60cc/hr x 24 hrs, to provides 1728 kcals, 86 gm protein, 1159cc free water  meets 22 Kcal/Kg, 1.1Gm/kg 1/1 wt 78kg      Weight: 172 pounds (1/1)  no new weights  % Weight Change    Pertinent Medications: MEDICATIONS  (STANDING):  acetaminophen   Tablet .. 650 milliGRAM(s) Oral every 6 hours  amLODIPine   Tablet 10 milliGRAM(s) Oral daily  aspirin  chewable 81 milliGRAM(s) Oral daily  atorvastatin 80 milliGRAM(s) Oral at bedtime  BACItracin   Ointment 1 Application(s) Topical daily  carvedilol 25 milliGRAM(s) Oral every 12 hours  dextrose 50% Injectable 12.5 Gram(s) IV Push once  dextrose 50% Injectable 25 Gram(s) IV Push once  dextrose 50% Injectable 25 Gram(s) IV Push once  insulin lispro (HumaLOG) corrective regimen sliding scale   SubCutaneous every 6 hours  lacosamide 200 milliGRAM(s) Oral two times a day  lidocaine 2% Injectable 20 milliLiter(s) Local Injection once  losartan 100 milliGRAM(s) Oral daily  nystatin Ointment 1 Application(s) Topical every 12 hours  OLANZapine 5 milliGRAM(s) Oral two times a day  polyethylene glycol 3350 17 Gram(s) Oral daily  valproic  acid Syrup 250 milliGRAM(s) Oral two times a day    MEDICATIONS  (PRN):  dextrose 40% Gel 15 Gram(s) Oral once PRN Blood Glucose LESS THAN 70 milliGRAM(s)/deciliter  glucagon  Injectable 1 milliGRAM(s) IntraMuscular once PRN Glucose LESS THAN 70 milligrams/deciliter  hydrALAZINE Injectable 10 milliGRAM(s) IV Push every 6 hours PRN SBP>160  labetalol Injectable 10 milliGRAM(s) IV Push every 6 hours PRN Systolic blood pressure >160  LORazepam   Injectable 2 milliGRAM(s) IV Push every 5 minutes PRN Sustained GTC Seizure  morphine  - Injectable 1 milliGRAM(s) IV Push every 8 hours PRN Severe Pain (7 - 10)  OLANZapine Injectable 5 milliGRAM(s) IntraMuscular every 24 hours PRN agitation    Pertinent Labs: 01-15 @ 09:38: Na 138, BUN 10, Cr 0.67, BG 94, K+ 5.2    Finger Sticks:  POCT Blood Glucose.: 105 mg/dL (01-15 @ 05:34)  POCT Blood Glucose.: 109 mg/dL (01-15 @ 01:03)  POCT Blood Glucose.: 110 mg/dL (01-14 @ 17:34)  POCT Blood Glucose.: 106 mg/dL (01-14 @ 12:17)      Skin per nursing documentation: no pressure injuries per flow sheets  Edema: +1 generalized left arm    Estimated Needs:   [x] no change since previous assessment  [ ] recalculated:     Previous Nutrition Diagnosis: mild malnutrition  Nutrition Diagnosis is: ongoing- being addressed with enteral nutrition to meet needs    New Nutrition Diagnosis: n/a  Related to:    As evidenced by:       Recommend  1) Resume Glucerna 1.2 starting at 10mL/hr increasing 10mL every 4 hours to assess patient tolerance, when medically feasible. Goal rate of 17pRm97 hours.  2) Obtain current weight  3) Continue to monitor tolerance to EN, labs, weights and skin integrity.    Monitoring and Evaluation:     Continue to monitor Nutritional intake, Tolerance to diet prescription, weights, labs, skin integrity    RD remains available upon request and will follow up per protocol Nutrition Follow Up Note  Patient seen for follow-up. Per chart, 63F s/p lower back fusion c/b left MCA Stroke s/p encephalomalacia, found to have lumbar fluid collection, global aphasia. S/p PEG placement (1/14) after 3 failed speech and swallow evaluations and NGT placement on 1/6. PMHx: HTN, DM2, HLD, CAD, ICH    Source: RN, medical record    Pt is nonverbal and unable to communicate. Per RN, pt has not received feeds since PEG placement on (1/14) and is scheduled for an MRI of the head today, 1/15. Per chart, pt was tolerating Glucerna 1.2 well with NGT.    Enteral /Parenteral Nutrition: PEG, currently not receiving feeds.  Order: Glucerna 1.2 @60cc/hr x 24 hrs, to provides 1728 kcals, 86 gm protein, 1159cc free water  meets 22 Kcal/Kg, 1.1Gm/kg 1/1 wt 78kg      Weight: 172 pounds (1/1)  no new weights  % Weight Change    Pertinent Medications: MEDICATIONS  (STANDING):  acetaminophen   Tablet .. 650 milliGRAM(s) Oral every 6 hours  amLODIPine   Tablet 10 milliGRAM(s) Oral daily  aspirin  chewable 81 milliGRAM(s) Oral daily  atorvastatin 80 milliGRAM(s) Oral at bedtime  BACItracin   Ointment 1 Application(s) Topical daily  carvedilol 25 milliGRAM(s) Oral every 12 hours  dextrose 50% Injectable 12.5 Gram(s) IV Push once  dextrose 50% Injectable 25 Gram(s) IV Push once  dextrose 50% Injectable 25 Gram(s) IV Push once  insulin lispro (HumaLOG) corrective regimen sliding scale   SubCutaneous every 6 hours  lacosamide 200 milliGRAM(s) Oral two times a day  lidocaine 2% Injectable 20 milliLiter(s) Local Injection once  losartan 100 milliGRAM(s) Oral daily  nystatin Ointment 1 Application(s) Topical every 12 hours  OLANZapine 5 milliGRAM(s) Oral two times a day  polyethylene glycol 3350 17 Gram(s) Oral daily  valproic  acid Syrup 250 milliGRAM(s) Oral two times a day    MEDICATIONS  (PRN):  dextrose 40% Gel 15 Gram(s) Oral once PRN Blood Glucose LESS THAN 70 milliGRAM(s)/deciliter  glucagon  Injectable 1 milliGRAM(s) IntraMuscular once PRN Glucose LESS THAN 70 milligrams/deciliter  hydrALAZINE Injectable 10 milliGRAM(s) IV Push every 6 hours PRN SBP>160  labetalol Injectable 10 milliGRAM(s) IV Push every 6 hours PRN Systolic blood pressure >160  LORazepam   Injectable 2 milliGRAM(s) IV Push every 5 minutes PRN Sustained GTC Seizure  morphine  - Injectable 1 milliGRAM(s) IV Push every 8 hours PRN Severe Pain (7 - 10)  OLANZapine Injectable 5 milliGRAM(s) IntraMuscular every 24 hours PRN agitation    Pertinent Labs: 01-15 @ 09:38: Na 138, BUN 10, Cr 0.67, BG 94, K+ 5.2    Finger Sticks:  POCT Blood Glucose.: 105 mg/dL (01-15 @ 05:34)  POCT Blood Glucose.: 109 mg/dL (01-15 @ 01:03)  POCT Blood Glucose.: 110 mg/dL (01-14 @ 17:34)  POCT Blood Glucose.: 106 mg/dL (01-14 @ 12:17)      Skin per nursing documentation: no pressure injuries per flow sheets  Edema: +1 generalized left arm    Estimated Needs:   [x] no change since previous assessment  [ ] recalculated:     Previous Nutrition Diagnosis: mild malnutrition  Nutrition Diagnosis is: ongoing- being addressed with enteral nutrition to meet needs    New Nutrition Diagnosis: n/a  Related to:    As evidenced by:       Recommend  1) Resume Glucerna 1.2 starting at 20mL/hr increasing 10mL every 4 hours to assess patient tolerance, when medically feasible. Goal rate of 29lDc58 hours.  2) Obtain current weight  3) Continue to monitor tolerance to EN, labs, weights and skin integrity.    Monitoring and Evaluation:     Continue to monitor Nutritional intake, Tolerance to diet prescription, weights, labs, skin integrity    RD remains available upon request and will follow up per protocol Nutrition Follow Up Note  Patient seen for follow-up. Per chart, 63F s/p lower back fusion c/b left MCA Stroke s/p encephalomalacia, found to have lumbar fluid collection, global aphasia. S/p PEG placement (1/14) after 3 failed speech and swallow evaluations and NGT placement on 1/6. PMHx: HTN, DM2, HLD, CAD, ICH    Source: RN, medical record    Pt is nonverbal and unable to communicate. Per RN, pt has not received feeds since PEG placement on (1/14) and is scheduled for an MRI of the head today, 1/15. Per chart, pt was tolerating Glucerna 1.2 well with NGT.    Enteral /Parenteral Nutrition: PEG, currently not receiving feeds.  Order: Glucerna 1.2 @60cc/hr x 24 hrs, to provides 1728 kcals, 86 gm protein, 1159cc free water  meets 22 Kcal/Kg, 1.1Gm/kg 1/1 wt 78kg      Weight: 172 pounds (1/1)  no new weights  % Weight Change    Pertinent Medications: MEDICATIONS  (STANDING):  acetaminophen   Tablet .. 650 milliGRAM(s) Oral every 6 hours  amLODIPine   Tablet 10 milliGRAM(s) Oral daily  aspirin  chewable 81 milliGRAM(s) Oral daily  atorvastatin 80 milliGRAM(s) Oral at bedtime  BACItracin   Ointment 1 Application(s) Topical daily  carvedilol 25 milliGRAM(s) Oral every 12 hours  dextrose 50% Injectable 12.5 Gram(s) IV Push once  dextrose 50% Injectable 25 Gram(s) IV Push once  dextrose 50% Injectable 25 Gram(s) IV Push once  insulin lispro (HumaLOG) corrective regimen sliding scale   SubCutaneous every 6 hours  lacosamide 200 milliGRAM(s) Oral two times a day  lidocaine 2% Injectable 20 milliLiter(s) Local Injection once  losartan 100 milliGRAM(s) Oral daily  nystatin Ointment 1 Application(s) Topical every 12 hours  OLANZapine 5 milliGRAM(s) Oral two times a day  polyethylene glycol 3350 17 Gram(s) Oral daily  valproic  acid Syrup 250 milliGRAM(s) Oral two times a day    MEDICATIONS  (PRN):  dextrose 40% Gel 15 Gram(s) Oral once PRN Blood Glucose LESS THAN 70 milliGRAM(s)/deciliter  glucagon  Injectable 1 milliGRAM(s) IntraMuscular once PRN Glucose LESS THAN 70 milligrams/deciliter  hydrALAZINE Injectable 10 milliGRAM(s) IV Push every 6 hours PRN SBP>160  labetalol Injectable 10 milliGRAM(s) IV Push every 6 hours PRN Systolic blood pressure >160  LORazepam   Injectable 2 milliGRAM(s) IV Push every 5 minutes PRN Sustained GTC Seizure  morphine  - Injectable 1 milliGRAM(s) IV Push every 8 hours PRN Severe Pain (7 - 10)  OLANZapine Injectable 5 milliGRAM(s) IntraMuscular every 24 hours PRN agitation    Pertinent Labs: 01-15 @ 09:38: Na 138, BUN 10, Cr 0.67, BG 94, K+ 5.2    Finger Sticks:  POCT Blood Glucose.: 105 mg/dL (01-15 @ 05:34)  POCT Blood Glucose.: 109 mg/dL (01-15 @ 01:03)  POCT Blood Glucose.: 110 mg/dL (01-14 @ 17:34)  POCT Blood Glucose.: 106 mg/dL (01-14 @ 12:17)      Skin per nursing documentation: no pressure injuries per flow sheets  Edema: +1 generalized left arm    Estimated Needs:   [x] no change since previous assessment  [ ] recalculated:     Previous Nutrition Diagnosis: mild malnutrition  Nutrition Diagnosis is: ongoing- being addressed with enteral nutrition to meet needs    New Nutrition Diagnosis: n/a  Related to:    As evidenced by:       Recommend  1) When medically feasible, if PEG is functioning properly resume Glucerna 1.2 @35pHz29 hrs  2) If needed, consider reducing rate to 20mL/hr increasing 10mL every 4 hours to assess patient tolerance. Goal rate of [id number] hours.  3) Obtain current weight  4) Continue to monitor tolerance to EN, labs, weights and skin integrity.    Monitoring and Evaluation:     Continue to monitor Nutritional intake, Tolerance to diet prescription, weights, labs, skin integrity    RD remains available upon request and will follow up per protocol Nutrition Follow Up Note  Patient seen for follow-up. Per chart, 63F s/p lower back fusion c/b left MCA Stroke s/p encephalomalacia, found to have lumbar fluid collection, global aphasia. S/p PEG placement (1/14) after 3 failed speech and swallow evaluations and NGT placement on 1/6. PMHx: HTN, DM2, HLD, CAD, ICH    Source: RN, medical record    Pt is nonverbal and unable to communicate. Per RN, pt has not received feeds since PEG placement on (1/14) and is scheduled for an MRI of the head today, 1/15. Per chart, pt was tolerating Glucerna 1.2 well with NGT.    Enteral /Parenteral Nutrition: PEG, currently not receiving feeds.  Order: Glucerna 1.2 @60cc/hr x 24 hrs, to provides 1728 kcals, 86 gm protein, 1159cc free water  meets 22 Kcal/Kg, 1.1Gm/kg 1/1 wt 78kg      Weight: 172 pounds (1/1)  no new weights  % Weight Change    Pertinent Medications: MEDICATIONS  (STANDING):  acetaminophen   Tablet .. 650 milliGRAM(s) Oral every 6 hours  amLODIPine   Tablet 10 milliGRAM(s) Oral daily  aspirin  chewable 81 milliGRAM(s) Oral daily  atorvastatin 80 milliGRAM(s) Oral at bedtime  BACItracin   Ointment 1 Application(s) Topical daily  carvedilol 25 milliGRAM(s) Oral every 12 hours  dextrose 50% Injectable 12.5 Gram(s) IV Push once  dextrose 50% Injectable 25 Gram(s) IV Push once  dextrose 50% Injectable 25 Gram(s) IV Push once  insulin lispro (HumaLOG) corrective regimen sliding scale   SubCutaneous every 6 hours  lacosamide 200 milliGRAM(s) Oral two times a day  lidocaine 2% Injectable 20 milliLiter(s) Local Injection once  losartan 100 milliGRAM(s) Oral daily  nystatin Ointment 1 Application(s) Topical every 12 hours  OLANZapine 5 milliGRAM(s) Oral two times a day  polyethylene glycol 3350 17 Gram(s) Oral daily  valproic  acid Syrup 250 milliGRAM(s) Oral two times a day    MEDICATIONS  (PRN):  dextrose 40% Gel 15 Gram(s) Oral once PRN Blood Glucose LESS THAN 70 milliGRAM(s)/deciliter  glucagon  Injectable 1 milliGRAM(s) IntraMuscular once PRN Glucose LESS THAN 70 milligrams/deciliter  hydrALAZINE Injectable 10 milliGRAM(s) IV Push every 6 hours PRN SBP>160  labetalol Injectable 10 milliGRAM(s) IV Push every 6 hours PRN Systolic blood pressure >160  LORazepam   Injectable 2 milliGRAM(s) IV Push every 5 minutes PRN Sustained GTC Seizure  morphine  - Injectable 1 milliGRAM(s) IV Push every 8 hours PRN Severe Pain (7 - 10)  OLANZapine Injectable 5 milliGRAM(s) IntraMuscular every 24 hours PRN agitation    Pertinent Labs: 01-15 @ 09:38: Na 138, BUN 10, Cr 0.67, BG 94, K+ 5.2    Finger Sticks:  POCT Blood Glucose.: 105 mg/dL (01-15 @ 05:34)  POCT Blood Glucose.: 109 mg/dL (01-15 @ 01:03)  POCT Blood Glucose.: 110 mg/dL (01-14 @ 17:34)  POCT Blood Glucose.: 106 mg/dL (01-14 @ 12:17)      Skin per nursing documentation: no pressure injuries per flow sheets  Edema: +1 generalized left arm    Estimated Needs:   [x] no change since previous assessment  [ ] recalculated:     Previous Nutrition Diagnosis: mild malnutrition  Nutrition Diagnosis is: ongoing- being addressed with enteral nutrition to meet needs    New Nutrition Diagnosis: n/a  Related to:    As evidenced by:       Recommend  1) When medically feasible, if PEG is functioning properly resume Glucerna 1.2 @49wMf50 hrs  2) If not tolerated, consider reducing rate to 20mL/hr increasing 10mL every 4 hours to assess patient tolerance. Goal rate of [id number] hours.  3) Obtain current weight  4) Continue to monitor tolerance to EN, labs, weights and skin integrity.    Monitoring and Evaluation:     Continue to monitor Nutritional intake, Tolerance to diet prescription, weights, labs, skin integrity    RD remains available upon request and will follow up per protocol

## 2019-01-15 NOTE — PROGRESS NOTE ADULT - SUBJECTIVE AND OBJECTIVE BOX
HISTORY OF PRESENT ILLNESS  Ms. Renteria is a 63 year old female with a PMHx of osteoarthritis, DM, HTN, HLD who presents as a transfer from [facility] for aphasia. Patient had a laminectomy of L2-L5 on 11/27 and then post-operatively developed acute onset aphasia on 12/3 and was diagnosed with a left temporal lobar hemorrhage. Patient was discharged to rehab and plan was to obtain a conventional cerebral angiogram in the near future. While at rehab, patient has been improving and has been speaking normally. She has some difficulty walking due to her back problems but has been working with PT there. On 12/24, staff noted her to be somewhat confused. She sent her  some text messages the day prior that were nonsensical He went to see her and found her unable to speak. She was taken to [facility] where CTH was concerning for possibly evolving hemorrhage, so she was transferred to [facility] on 12/25/18. CTH on admission showed expected evolution/resolution of left temporal ICH leading to development of encephalomalacia. MRI brain an acute left MCA CVA adjacent to the prior hemorrhage and expected evolution of prior left frontotemporal subarachnoid hemorrhage. Repeat CTH on 12/30, showed expected evolution of left MCA distribution infarct versus abnormal signal related to previous seizures and prior left temporal ICH leading to encephalomalacia. There was believe that ?infarct may represent area of previous seizure or PRES syndrome. She was placed on Depakote and Vimpat for seizure PPx. Vascular studies of the head and neck revealed no vascular abnormalities. She had implantable loop recorder placed on 12/29/18. PEG tube placed on 1/14/19.    Hospital course notable for paraspinal fluid collection at site of previous surgery. She underwent aspiration of fluid by interventional radiology on 1/12/19. Evaluated by ID and fluid most likely not CSF, most likely seroma. Also noted to have episodes of agitation.     TODAY'S REVIEW OF SYMPTOMS    SUBJECTIVE    VITALS  T(C): 36.6 (01-15-19 @ 07:40)  T(F): 97.9 (01-15-19 @ 07:40), Max: 98.4 (01-14-19 @ 19:48)  HR: 76 (01-15-19 @ 07:40) (76 - 91)  BP: 123/64 (01-15-19 @ 07:40) (123/64 - 156/89)  RR:  (18 - 20)  SpO2:  (93% - 95%)  Wt(kg): --    PHYSICAL EXAM    CURRENT FUNCTIONAL STATUS  Bed mobility - Max A x 2    RECENT LABS/IMAGING             10.0   7.6   )-----------( 139      ( 14 Jan 2019 06:44 )             30.0     142  |  102  |  9   ----------------------------<  109<H>  3.7   |  27  |  0.67    Ca    8.5      14 Jan 2019 06:44    MEDICATIONS   MEDICATIONS  (STANDING):  acetaminophen   Tablet .. 650 milliGRAM(s) Oral every 6 hours  amLODIPine   Tablet 10 milliGRAM(s) Oral daily  aspirin  chewable 81 milliGRAM(s) Oral daily  atorvastatin 80 milliGRAM(s) Oral at bedtime  BACItracin   Ointment 1 Application(s) Topical daily  carvedilol 25 milliGRAM(s) Oral every 12 hours  insulin lispro (HumaLOG) corrective regimen sliding scale   SubCutaneous every 6 hours  lacosamide IVPB 200 milliGRAM(s) IV Intermittent every 12 hours  lidocaine 2% Injectable 20 milliLiter(s) Local Injection once  losartan 100 milliGRAM(s) Oral daily  nystatin Ointment 1 Application(s) Topical every 12 hours  OLANZapine Injectable 5 milliGRAM(s) IntraMuscular every 12 hours  polyethylene glycol 3350 17 Gram(s) Oral daily  valproate sodium IVPB 250 milliGRAM(s) IV Intermittent two times a day    MEDICATIONS  (PRN):  dextrose 40% Gel 15 Gram(s) Oral once PRN Blood Glucose LESS THAN 70 milliGRAM(s)/deciliter  glucagon  Injectable 1 milliGRAM(s) IntraMuscular once PRN Glucose LESS THAN 70 milligrams/deciliter  hydrALAZINE Injectable 10 milliGRAM(s) IV Push every 6 hours PRN SBP>160  labetalol Injectable 10 milliGRAM(s) IV Push every 6 hours PRN Systolic blood pressure >160  LORazepam   Injectable 2 milliGRAM(s) IV Push every 5 minutes PRN Sustained GTC Seizure  morphine  - Injectable 1 milliGRAM(s) IV Push every 6 hours PRN Severe Pain (7 - 10)  OLANZapine Injectable 5 milliGRAM(s) IntraMuscular every 24 hours PRN agitation    ASSESSMENT/PLAN  63 year old female with aphasia that presented after having lumbar surgery. She was at rehab when she was noticed to have worsened aphasia secondary to possible left MCA CVA versus seizure versus late subacute-chronic left temporal ICH. HISTORY OF PRESENT ILLNESS  Ms. Renteria is a 63 year old female with a PMHx of osteoarthritis, DM, HTN, HLD who presents as a transfer from [facility] for aphasia. Patient had a laminectomy of L2-L5 on 11/27 and then post-operatively developed acute onset aphasia on 12/3 and was diagnosed with a left temporal lobar hemorrhage. Patient was discharged to rehab and plan was to obtain a conventional cerebral angiogram in the near future. While at rehab, patient has been improving and has been speaking normally. She has some difficulty walking due to her back problems but has been working with PT there. On 12/24, staff noted her to be somewhat confused. She sent her  some text messages the day prior that were nonsensical He went to see her and found her unable to speak. She was taken to [facility] where CTH was concerning for possibly evolving hemorrhage, so she was transferred to [facility] on 12/25/18. CTH on admission showed expected evolution/resolution of left temporal ICH leading to development of encephalomalacia. MRI brain an acute left MCA CVA adjacent to the prior hemorrhage and expected evolution of prior left frontotemporal subarachnoid hemorrhage. Repeat CTH on 12/30, showed expected evolution of left MCA distribution infarct versus abnormal signal related to previous seizures and prior left temporal ICH leading to encephalomalacia. There was believe that ?infarct may represent area of previous seizure or PRES syndrome. She was placed on Depakote and Vimpat for seizure PPx. Vascular studies of the head and neck revealed no vascular abnormalities. She had implantable loop recorder placed on 12/29/18. PEG tube placed on 1/14/19.    Hospital course notable for paraspinal fluid collection at site of previous surgery. She underwent aspiration of fluid by interventional radiology on 1/12/19. Evaluated by ID and fluid most likely not CSF, most likely seroma. Also noted to have episodes of agitation.     TODAY'S REVIEW OF SYMPTOMS  Unable to obtain secondary to aphasia    SUBJECTIVE  Patient seen and examined. Patient not following simple commands. Makes noises which are incomprehensible. Patient grimaces to pain with movement of the left lower extremity.    VITALS  T(C): 36.6 (01-15-19 @ 07:40)  T(F): 97.9 (01-15-19 @ 07:40), Max: 98.4 (01-14-19 @ 19:48)  HR: 76 (01-15-19 @ 07:40) (76 - 91)  BP: 123/64 (01-15-19 @ 07:40) (123/64 - 156/89)  RR:  (18 - 20)  SpO2:  (93% - 95%)  Wt(kg): --    PHYSICAL EXAM  Constitutional - NAD, Comfortable  HEENT - NCAT, Eyes closed throughout exam  Chest - No increased work of breathing  Cardiovascular - All extremities well perfused, S1S2  Abdomen - Soft, (+) PEG, Non-distended   Extremities - No bilateral edema appreciated, Patient grimaces to pain with movement of left lower extremity  Neurologic Exam -                    Cognitive - Eyes closed throughout exam, No command following noted     Communication - Incomprehensible sounds     Motor - Unable to fully assess secondary to participation level but appears to have at least 3/5 strength in bilateral upper extremities     Reflexes - DTR intact and symmetrical, Bilateral lower extremity unsustained clonus appreciated  Psychiatric - Flat affect with grimacing related to pain    CURRENT FUNCTIONAL STATUS  Bed mobility - Max A x 2    RECENT LABS/IMAGING             10.0   7.6   )-----------( 139      ( 14 Jan 2019 06:44 )             30.0     142  |  102  |  9   ----------------------------<  109<H>  3.7   |  27  |  0.67    Ca    8.5      14 Jan 2019 06:44    MEDICATIONS   MEDICATIONS  (STANDING):  acetaminophen   Tablet .. 650 milliGRAM(s) Oral every 6 hours  amLODIPine   Tablet 10 milliGRAM(s) Oral daily  aspirin  chewable 81 milliGRAM(s) Oral daily  atorvastatin 80 milliGRAM(s) Oral at bedtime  BACItracin   Ointment 1 Application(s) Topical daily  carvedilol 25 milliGRAM(s) Oral every 12 hours  insulin lispro (HumaLOG) corrective regimen sliding scale   SubCutaneous every 6 hours  lacosamide IVPB 200 milliGRAM(s) IV Intermittent every 12 hours  lidocaine 2% Injectable 20 milliLiter(s) Local Injection once  losartan 100 milliGRAM(s) Oral daily  nystatin Ointment 1 Application(s) Topical every 12 hours  OLANZapine Injectable 5 milliGRAM(s) IntraMuscular every 12 hours  polyethylene glycol 3350 17 Gram(s) Oral daily  valproate sodium IVPB 250 milliGRAM(s) IV Intermittent two times a day    MEDICATIONS  (PRN):  hydrALAZINE Injectable 10 milliGRAM(s) IV Push every 6 hours PRN SBP>160  labetalol Injectable 10 milliGRAM(s) IV Push every 6 hours PRN Systolic blood pressure >160  LORazepam   Injectable 2 milliGRAM(s) IV Push every 5 minutes PRN Sustained GTC Seizure  morphine  - Injectable 1 milliGRAM(s) IV Push every 6 hours PRN Severe Pain (7 - 10)  OLANZapine Injectable 5 milliGRAM(s) IntraMuscular every 24 hours PRN agitation    ASSESSMENT/PLAN  63 year old female with aphasia that presented after having lumbar surgery. She was at rehab when she was noticed to have worsened aphasia secondary to possible left MCA CVA versus seizure versus late subacute-chronic left temporal ICH.    Disposition -   Precautions - Falls, Cardiac  PT - Bed mobility, Transfers, ROM, strengthening, endurance, balance  OT - ROM, transfers, ADL's, strengthening, cognitive  SLP - Swallow and cognitive evaluation and treatment  Seizure PPx - Vimpat, Depakote  Pain control - Morphine PRN, Tylenol ARDEN  Secondary CVA PPx - ASA, Atorvastatin HISTORY OF PRESENT ILLNESS  Ms. Renteria is a 63 year old female with a PMHx of osteoarthritis, DM, HTN, HLD who presents as a transfer from [facility] for aphasia. Patient had a laminectomy of L2-L5 on 11/27 and then post-operatively developed acute onset aphasia on 12/3 and was diagnosed with a left temporal lobar hemorrhage. Patient was discharged to rehab and plan was to obtain a conventional cerebral angiogram in the near future. While at rehab, patient has been improving and has been speaking normally. She has some difficulty walking due to her back problems but has been working with PT there. On 12/24, staff noted her to be somewhat confused. She sent her  some text messages the day prior that were nonsensical He went to see her and found her unable to speak. She was taken to [facility] where CTH was concerning for possibly evolving hemorrhage, so she was transferred to [facility] on 12/25/18. CTH on admission showed expected evolution/resolution of left temporal ICH leading to development of encephalomalacia. MRI brain an acute left MCA CVA adjacent to the prior hemorrhage and expected evolution of prior left frontotemporal subarachnoid hemorrhage. Repeat CTH on 12/30, showed expected evolution of left MCA distribution infarct versus abnormal signal related to previous seizures and prior left temporal ICH leading to encephalomalacia. There was believe that ?infarct may represent area of previous seizure or PRES syndrome. She was placed on Depakote and Vimpat for seizure PPx. Vascular studies of the head and neck revealed no vascular abnormalities. She had implantable loop recorder placed on 12/29/18. PEG tube placed on 1/14/19.    Hospital course notable for paraspinal fluid collection at site of previous surgery. She underwent aspiration of fluid by interventional radiology on 1/12/19. Evaluated by ID and fluid most likely not CSF, most likely seroma. Also noted to have episodes of agitation.     TODAY'S REVIEW OF SYMPTOMS  Unable to obtain secondary to aphasia    SUBJECTIVE  Patient seen and examined. Patient not following simple commands. Makes noises which are incomprehensible. Patient grimaces to pain with movement of the left lower extremity.    VITALS  T(C): 36.6 (01-15-19 @ 07:40)  T(F): 97.9 (01-15-19 @ 07:40), Max: 98.4 (01-14-19 @ 19:48)  HR: 76 (01-15-19 @ 07:40) (76 - 91)  BP: 123/64 (01-15-19 @ 07:40) (123/64 - 156/89)  RR:  (18 - 20)  SpO2:  (93% - 95%)  Wt(kg): --    PHYSICAL EXAM  Constitutional - NAD, Comfortable  HEENT - NCAT, Eyes closed throughout exam  Chest - No increased work of breathing  Cardiovascular - All extremities well perfused, S1S2  Abdomen - Soft, (+) PEG, Non-distended   Extremities - No bilateral edema appreciated, Patient grimaces to pain with movement of left lower extremity  Neurologic Exam -                    Cognitive - Eyes closed throughout exam, No command following noted     Communication - Incomprehensible sounds     Motor - Unable to fully assess secondary to participation level but appears to have at least 3/5 strength in bilateral upper extremities     Reflexes - DTR intact and symmetrical, Bilateral lower extremity unsustained clonus appreciated  Psychiatric - Flat affect with grimacing related to pain    CURRENT FUNCTIONAL STATUS  Bed mobility - Max A x 2    RECENT LABS/IMAGING             10.0   7.6   )-----------( 139      ( 14 Jan 2019 06:44 )             30.0     142  |  102  |  9   ----------------------------<  109<H>  3.7   |  27  |  0.67    Ca    8.5      14 Jan 2019 06:44    MEDICATIONS   MEDICATIONS  (STANDING):  acetaminophen   Tablet .. 650 milliGRAM(s) Oral every 6 hours  amLODIPine   Tablet 10 milliGRAM(s) Oral daily  aspirin  chewable 81 milliGRAM(s) Oral daily  atorvastatin 80 milliGRAM(s) Oral at bedtime  BACItracin   Ointment 1 Application(s) Topical daily  carvedilol 25 milliGRAM(s) Oral every 12 hours  insulin lispro (HumaLOG) corrective regimen sliding scale   SubCutaneous every 6 hours  lacosamide IVPB 200 milliGRAM(s) IV Intermittent every 12 hours  lidocaine 2% Injectable 20 milliLiter(s) Local Injection once  losartan 100 milliGRAM(s) Oral daily  nystatin Ointment 1 Application(s) Topical every 12 hours  OLANZapine Injectable 5 milliGRAM(s) IntraMuscular every 12 hours  polyethylene glycol 3350 17 Gram(s) Oral daily  valproate sodium IVPB 250 milliGRAM(s) IV Intermittent two times a day    MEDICATIONS  (PRN):  hydrALAZINE Injectable 10 milliGRAM(s) IV Push every 6 hours PRN SBP>160  labetalol Injectable 10 milliGRAM(s) IV Push every 6 hours PRN Systolic blood pressure >160  LORazepam   Injectable 2 milliGRAM(s) IV Push every 5 minutes PRN Sustained GTC Seizure  morphine  - Injectable 1 milliGRAM(s) IV Push every 6 hours PRN Severe Pain (7 - 10)  OLANZapine Injectable 5 milliGRAM(s) IntraMuscular every 24 hours PRN agitation    ASSESSMENT/PLAN  63 year old female with aphasia that presented after having lumbar surgery. She was at rehab when she was noticed to have worsened aphasia secondary to possible left MCA CVA versus seizure versus late subacute-chronic left temporal ICH.    Disposition - Plans for LP. Patient also still with limited ability to participate with bedside therapy, will continue to follow to make final rehabilitation recommendations  Precautions - Falls, Cardiac  PT - Bed mobility, Transfers, ROM, strengthening, endurance, balance  OT - ROM, transfers, ADL's, strengthening, cognitive  SLP - Swallow and cognitive evaluation and treatment  Seizure PPx - Vimpat, Depakote  Pain control - Morphine PRN, Tylenol ARDEN  Secondary CVA PPx - ASA, Atorvastatin

## 2019-01-15 NOTE — PROGRESS NOTE ADULT - ATTENDING COMMENTS
Seen and examined with Fellow. Agree with note.   Patient with significant cognitive and functional deficits.  Will follow up to assess for appropriate rehab plan.

## 2019-01-15 NOTE — PROGRESS NOTE ADULT - ASSESSMENT
63F with a history of CAD s/p PCI to OM2 with a AVE in December of 2015, residual 40% LAD disease, neg stress test in 1/2018, osteoarthritis, DM, HTN, HLD who presents as a transfer from [facility] for aphasia now with temporal ICH and new left MCA infarct. She is now withevolution of left MCA distribution infarct and prior left temporal ICH leading to encephalomalacia. She is having focal seizures and facial twitching.     - Cont neuro rx.   Neuro follow up  - She has a hx of a remote PCI.   - No anginal symptoms recently reported. Cont ASA 81 QD  - Has a moderate focal atheroma in Aortic Arch. Cont high intensity statin for now.  - s/p ILR placement given suspicion for embolic nature of CVA. No af noted on telemetry to date.  - intermittent sinus tachycardia in setting of agitation. Continue to watch    - BP is labile. better overall controlled.   - Not able to dose PO medications at present.   - I would continue prn IV hydralazine and labetalol to control her blood pressure spikes.      - Appears compensated from HF POV.     - tolerated IR guided paraspinal fluid aspiration without cardiac sequale   - Monitor and replete electrolytes. Keep K>4.0 and Mg>2.0.  -  Further cardiac workup will depend on clinical course.   - All other workup per primary team. Will followup.

## 2019-01-15 NOTE — PROGRESS NOTE ADULT - SUBJECTIVE AND OBJECTIVE BOX
CLINICAL INDICATION:     Patient presents for fluoroscopically guided lumbar puncture.  Risks and benefits were discussed with the patient and/or patient health care proxy.  Risks discussed included bleeding, infection, nerve damage, and headache.

## 2019-01-15 NOTE — PROGRESS NOTE ADULT - SUBJECTIVE AND OBJECTIVE BOX
[facility]    Bora Mcfarland, Mica, Cari, Barry, Ashvin, Noah      [phone]    CHIEF COMPLAINT: Patient is a 63y old  Female who presents with a chief complaint of neurologic disorder (14 Jan 2019 19:49)      Follow Up: cad, s/p pci, sz    Interim history: leth, not easily arousable. events noted    MEDICATIONS  (STANDING):  acetaminophen   Tablet .. 650 milliGRAM(s) Oral every 6 hours  amLODIPine   Tablet 10 milliGRAM(s) Oral daily  aspirin  chewable 81 milliGRAM(s) Oral daily  atorvastatin 80 milliGRAM(s) Oral at bedtime  BACItracin   Ointment 1 Application(s) Topical daily  carvedilol 25 milliGRAM(s) Oral every 12 hours  dextrose 50% Injectable 12.5 Gram(s) IV Push once  dextrose 50% Injectable 25 Gram(s) IV Push once  dextrose 50% Injectable 25 Gram(s) IV Push once  insulin lispro (HumaLOG) corrective regimen sliding scale   SubCutaneous every 6 hours  lacosamide 200 milliGRAM(s) Oral two times a day  lidocaine 2% Injectable 20 milliLiter(s) Local Injection once  losartan 100 milliGRAM(s) Oral daily  nystatin Ointment 1 Application(s) Topical every 12 hours  OLANZapine 5 milliGRAM(s) Oral two times a day  polyethylene glycol 3350 17 Gram(s) Oral daily  valproic  acid Syrup 250 milliGRAM(s) Oral two times a day    MEDICATIONS  (PRN):  dextrose 40% Gel 15 Gram(s) Oral once PRN Blood Glucose LESS THAN 70 milliGRAM(s)/deciliter  glucagon  Injectable 1 milliGRAM(s) IntraMuscular once PRN Glucose LESS THAN 70 milligrams/deciliter  hydrALAZINE Injectable 10 milliGRAM(s) IV Push every 6 hours PRN SBP>160  labetalol Injectable 10 milliGRAM(s) IV Push every 6 hours PRN Systolic blood pressure >160  LORazepam   Injectable 2 milliGRAM(s) IV Push every 5 minutes PRN Sustained GTC Seizure  morphine  - Injectable 1 milliGRAM(s) IV Push every 8 hours PRN Severe Pain (7 - 10)  OLANZapine Injectable 5 milliGRAM(s) IntraMuscular every 24 hours PRN agitation      REVIEW OF SYSTEMS: unable (ms)    Vital Signs Last 24 Hrs  T(C): 36.6 (15 Volodymyr 2019 07:40), Max: 36.9 (14 Jan 2019 19:48)  T(F): 97.9 (15 Volodymyr 2019 07:40), Max: 98.4 (14 Jan 2019 19:48)  HR: 76 (15 Volodymyr 2019 07:40) (76 - 91)  BP: 123/64 (15 Volodymyr 2019 07:40) (123/64 - 156/89)  BP(mean): --  RR: 18 (15 Volodymyr 2019 07:40) (18 - 20)  SpO2: 95% (15 Volodymyr 2019 07:40) (93% - 95%)    I&O's Summary    14 Jan 2019 07:01  -  15 Volodymyr 2019 07:00  --------------------------------------------------------  IN: 700 mL / OUT: 600 mL / NET: 100 mL        Telemetry past 24h: sr    PHYSICAL EXAM:    Constitutional: well-nourished, well-developed, leth  HEENT:  MMM, sclerae anicteric, conjunctivae clear, no oral cyanosis.  Pulmonary: Non-labored, breath sounds are clear ant, No wheezing, rales or rhonchi  Cardiovascular: Regular, S1 and S2.  No murmur.  No rubs, gallops or clicks  Gastrointestinal: Bowel Sounds present, soft, nontender.   Lymph: No peripheral edema.   Neurological: leth  Skin: No rashes.  Psych:  leth    LABS: All Labs Reviewed:                        10.0   7.6   )-----------( 139      ( 14 Jan 2019 06:44 )             30.0                         10.3   7.2   )-----------( 137      ( 13 Jan 2019 07:07 )             31.3     15 Volodymyr 2019 09:38    138    |  103    |  10     ----------------------------<  94     5.2     |  23     |  0.67   14 Jan 2019 06:44    142    |  102    |  9      ----------------------------<  109    3.7     |  27     |  0.67   13 Jan 2019 07:07    143    |  103    |  9      ----------------------------<  107    3.6     |  28     |  0.64     Ca    8.9        15 Volodymyr 2019 09:38  Ca    8.5        14 Jan 2019 06:44  Ca    8.2        13 Jan 2019 07:07      PT/INR - ( 15 Volodymyr 2019 10:24 )   PT: 12.2 sec;   INR: 1.07 ratio         PTT - ( 15 Volodymyr 2019 10:24 )  PTT:26.2 sec      Blood Culture: Organism --  Gram Stain Blood -- Gram Stain   polymorphonuclear leukocytes seen  No organisms seen  by cytocentrifuge  Specimen Source .Body Fluid Interstitial Fluid  Culture-Blood --    Organism --  Gram Stain Blood -- Gram Stain --  Specimen Source Skin wound on back  Culture-Blood --    Organism --  Gram Stain Blood -- Gram Stain --  Specimen Source .Blood Blood-Venous  Culture-Blood --            RADIOLOGY:    EKG:    Echo:

## 2019-01-15 NOTE — PROGRESS NOTE ADULT - SUBJECTIVE AND OBJECTIVE BOX
Interval Events: s/p EGD with successful placement of externally removable PEG tube   no leakage/drainage from PEG site  meds and water through PEG started  feeds to start today     MEDICATIONS  (STANDING):  acetaminophen   Tablet .. 650 milliGRAM(s) Oral every 6 hours  amLODIPine   Tablet 10 milliGRAM(s) Oral daily  aspirin  chewable 81 milliGRAM(s) Oral daily  atorvastatin 80 milliGRAM(s) Oral at bedtime  BACItracin   Ointment 1 Application(s) Topical daily  carvedilol 25 milliGRAM(s) Oral every 12 hours  dextrose 50% Injectable 12.5 Gram(s) IV Push once  dextrose 50% Injectable 25 Gram(s) IV Push once  dextrose 50% Injectable 25 Gram(s) IV Push once  insulin lispro (HumaLOG) corrective regimen sliding scale   SubCutaneous every 6 hours  lacosamide 200 milliGRAM(s) Oral two times a day  lidocaine 2% Injectable 20 milliLiter(s) Local Injection once  losartan 100 milliGRAM(s) Oral daily  nystatin Ointment 1 Application(s) Topical every 12 hours  OLANZapine 5 milliGRAM(s) Oral two times a day  polyethylene glycol 3350 17 Gram(s) Oral daily  valproic  acid Syrup 250 milliGRAM(s) Oral two times a day    MEDICATIONS  (PRN):  dextrose 40% Gel 15 Gram(s) Oral once PRN Blood Glucose LESS THAN 70 milliGRAM(s)/deciliter  glucagon  Injectable 1 milliGRAM(s) IntraMuscular once PRN Glucose LESS THAN 70 milligrams/deciliter  hydrALAZINE Injectable 10 milliGRAM(s) IV Push every 6 hours PRN SBP>160  labetalol Injectable 10 milliGRAM(s) IV Push every 6 hours PRN Systolic blood pressure >160  LORazepam   Injectable 2 milliGRAM(s) IV Push every 5 minutes PRN Sustained GTC Seizure  morphine  - Injectable 1 milliGRAM(s) IV Push every 8 hours PRN Severe Pain (7 - 10)  OLANZapine Injectable 5 milliGRAM(s) IntraMuscular every 24 hours PRN agitation      Allergies    No Known Allergies    Intolerances        Review of Systems: unable to obtain.     Vital Signs Last 24 Hrs  T(C): 36.6 (15 Volodymyr 2019 07:40), Max: 36.9 (14 Jan 2019 19:48)  T(F): 97.9 (15 Volodymyr 2019 07:40), Max: 98.4 (14 Jan 2019 19:48)  HR: 76 (15 Volodymyr 2019 07:40) (76 - 91)  BP: 123/64 (15 Volodymyr 2019 07:40) (123/64 - 156/89)  BP(mean): --  RR: 18 (15 Volodymyr 2019 07:40) (18 - 20)  SpO2: 95% (15 Volodymyr 2019 07:40) (93% - 95%)    PHYSICAL EXAM:      PHYSICAL EXAM  GENERAL:   NAD  HEENT:  NC/AT, no JVD, sclera-anicteric  CHEST:  clear to ascultation bilaterally, respirations nonlabored  HEART:  +S1+S2 regular rate and rhythm   ABDOMEN:  Soft, normoactive bowel sounds, no masses, + PEG with abdominal binder c/d/i   EXTREMITIES:  no cyanosis, clubbing or edema  NEURO:  non-verbal  SKIN:  No rash/warm/dry      LABS:                        10.0   7.6   )-----------( 139      ( 14 Jan 2019 06:44 )             30.0     01-15    138  |  103  |  10  ----------------------------<  94  5.2   |  23  |  0.67    Ca    8.9      15 Volodymyr 2019 09:38      PT/INR - ( 15 Volodymyr 2019 10:24 )   PT: 12.2 sec;   INR: 1.07 ratio         PTT - ( 15 Volodymyr 2019 10:24 )  PTT:26.2 sec      RADIOLOGY & ADDITIONAL TESTS:

## 2019-01-15 NOTE — PROGRESS NOTE ADULT - ASSESSMENT
63 years old woman with vascular risk factors of age, HTN, DM II, HPLD and CAD was evaluated at [facility] for language disturbance. On 11/27 she underwent T10 laminectomy and fusion. Postoperatively, she was noted to have Wernicke's aphasia due to left temporal lobar ICH of undetermined etiology. She reported to have had significant improvement in her language deficit in the rehabilitation. In the evening of 12/24, she was reported to have worsening of her aphasia, prompting her presentation to OSH and subsequent transfer to [facility]. CT brain or admission showed expected evolution/resolution of left temporal ICH leading to development of encephalomalacia. MRI brain showed what appeared to be an acute infarct in the left MCA distribution adjacent to the prior hemorrhage in the late subacute to chronic stage, although it is possible that this was not an actual infarct but rather MRI hyperintensity related to previous seizures; and expected evolution of prior left frontotemporal convexal subarachnoid hemorrhage as well as was concerning for a dilated cortical vein anterior to the hematoma. Of note, MRI brain (12/7) showed left temporal lobar ICH, left frontotemporal convexal SAH and juxtacortical FLAIR hyperintensities concerning for PRES to my eye. CT brain on 12/30, showed expected evolution of left MCA distribution "infarct" (versus abnormal signal related to previous seizures)  and prior left temporal ICH leading to encephalomalacia.    Impression:  Possible Left MCA distribution "stroke" -initially thought likely etiology being cryptogenic stroke, probably related to embolism from a proximal source, like cardiac/paradoxical source of embolism. Note it is possible or probable that this imaging abnormality does not represent an actual infarct but rather abnormal signal related to previous seizures vs. PRES.  Recent left temporal lobar ICH with associated left frontotemporal convexal subarachnoid hemorrhage - likely etiology could be intracranial hemorrhage in the setting of PRES but possibility of underlying vascular malformation like micro-AVM or large vessel vasculopathy like vasculitis needs to  be considered.  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    Neurologically noted with global aphasia and inattention- unchanged from previous, though likely to be PRES, planned to perform LP  to rule out any infectious or autoimmune etiology for altered mental status. Keep SBP between 120-150, LDL 44-continue with home statin as LDL is currently at goal, MRI Brain w/wo contrast noted above. Normal CTA of the head and neck. Physical therapy/OT recommended acute TBI rehab. Repeat MRI brain with and without contrast/MR spectroscopy end of February/begining of March to rule out an underlying neoplasm preferably upon resolution/complete resorption of prior left temporal lobar ICH; Conventional angiogram to rule out underlying vascular malformation could be considered based on results of a repeat brain imaging/vessel imaging.    Plan:  - s/p IR guided aspiration of fluid collection in the dorsal postoperative bed, B2-Transferring pending, Cx thus far -itive  - Will repeat CBC to assess for thrombocytopenia  - c/w Depakote 250 BID and Vimpat 200mg BID  - For LP - Spoke with ID attending, most likely a non-infected seroma, most likely not CSF. Imaging reviewed. Would proceed with xr guided LP to avoid complications at this time. IR consult placed.  - PEG placed on 1/14  - s/p LOOP, cardio on board, reccs appreciated, episodes of sinus tach w/ agitation

## 2019-01-15 NOTE — PROGRESS NOTE ADULT - SUBJECTIVE AND OBJECTIVE BOX
Patient seen and examined.  Patient does not respond meaningfully to voice, does not follow commands.     Vital Signs Last 24 Hrs  T(C): 36.8 (15 Volodymyr 2019 04:32), Max: 36.9 (14 Jan 2019 19:48)  T(F): 98.3 (15 Volodymyr 2019 04:32), Max: 98.4 (14 Jan 2019 19:48)  HR: 91 (15 Volodymyr 2019 04:32) (86 - 91)  BP: 146/79 (15 Volodymyr 2019 04:32) (133/74 - 156/89)  BP(mean): --  RR: 20 (15 Volodymyr 2019 04:32) (18 - 20)  SpO2: 94% (15 Volodymyr 2019 04:32) (93% - 95%)    01-13 @ 07:01 - 01-14 @ 07:00  --------------------------------------------------------  IN: 1775 mL / OUT: 1100 mL / NET: 675 mL    01-14 @ 07:01 - 01-15 @ 06:48  --------------------------------------------------------  IN: 700 mL / OUT: 600 mL / NET: 100 mL                            10.0   7.6   )-----------( 139      ( 14 Jan 2019 06:44 )             30.0   01-14    142  |  102  |  9   ----------------------------<  109<H>  3.7   |  27  |  0.67    Ca    8.5      14 Jan 2019 06:44      Exam:  Gen: NAD:  BLLE Motor HF/KE/TA/GCS/EHL grossly intact; SILT; wwp  WWP          Assessment/Plan:  This is a 63yFemale with wound dehiscence of T10-Pelvis.     Plan:  - FU IR Asp labs and cultures  - Local wound care for incision  - No acute orthopedic intervention at this time  - Will continue to follow.

## 2019-01-15 NOTE — PROGRESS NOTE ADULT - SUBJECTIVE AND OBJECTIVE BOX
Status post lumbar puncture at the L2/L3 and L5/S1 levels utilizing a 20g spinal needle.      6cc of bloody cerebral spinal fluid was obtained.    No immediate complications.

## 2019-01-16 LAB
AUTO DIFF PNL BLD: ABNORMAL
B2 TRANSFERRIN FLD QL: NEGATIVE — SIGNIFICANT CHANGE UP
CRYPTOC AG CSF-ACNC: NEGATIVE — SIGNIFICANT CHANGE UP
GLUCOSE BLDC GLUCOMTR-MCNC: 113 MG/DL — HIGH (ref 70–99)
GLUCOSE BLDC GLUCOMTR-MCNC: 137 MG/DL — HIGH (ref 70–99)
GLUCOSE BLDC GLUCOMTR-MCNC: 91 MG/DL — SIGNIFICANT CHANGE UP (ref 70–99)
HCT VFR BLD CALC: 31.3 % — LOW (ref 34.5–45)
HGB BLD-MCNC: 10.2 G/DL — LOW (ref 11.5–15.5)
MCHC RBC-ENTMCNC: 29.4 PG — SIGNIFICANT CHANGE UP (ref 27–34)
MCHC RBC-ENTMCNC: 32.5 GM/DL — SIGNIFICANT CHANGE UP (ref 32–36)
MCV RBC AUTO: 90.5 FL — SIGNIFICANT CHANGE UP (ref 80–100)
N-METHYL-DA RECEPTOR AB IGG BY CBA-IFA, SERUM W/RFLX TITER: SIGNIFICANT CHANGE UP
PLATELET # BLD AUTO: 179 K/UL — SIGNIFICANT CHANGE UP (ref 150–400)
RBC # BLD: 3.46 M/UL — LOW (ref 3.8–5.2)
RBC # FLD: 16.4 % — HIGH (ref 10.3–14.5)
WBC # BLD: 10.7 K/UL — HIGH (ref 3.8–10.5)
WBC # FLD AUTO: 10.7 K/UL — HIGH (ref 3.8–10.5)

## 2019-01-16 PROCEDURE — 99232 SBSQ HOSP IP/OBS MODERATE 35: CPT | Mod: GC

## 2019-01-16 PROCEDURE — 99232 SBSQ HOSP IP/OBS MODERATE 35: CPT

## 2019-01-16 RX ORDER — MORPHINE SULFATE 50 MG/1
1 CAPSULE, EXTENDED RELEASE ORAL EVERY 12 HOURS
Qty: 0 | Refills: 0 | Status: DISCONTINUED | OUTPATIENT
Start: 2019-01-16 | End: 2019-01-22

## 2019-01-16 RX ADMIN — Medication 650 MILLIGRAM(S): at 11:57

## 2019-01-16 RX ADMIN — Medication 650 MILLIGRAM(S): at 05:34

## 2019-01-16 RX ADMIN — NYSTATIN CREAM 1 APPLICATION(S): 100000 CREAM TOPICAL at 18:03

## 2019-01-16 RX ADMIN — Medication 650 MILLIGRAM(S): at 00:30

## 2019-01-16 RX ADMIN — LACOSAMIDE 200 MILLIGRAM(S): 50 TABLET ORAL at 18:02

## 2019-01-16 RX ADMIN — OLANZAPINE 5 MILLIGRAM(S): 15 TABLET, FILM COATED ORAL at 18:03

## 2019-01-16 RX ADMIN — Medication 250 MILLIGRAM(S): at 05:04

## 2019-01-16 RX ADMIN — Medication 1 APPLICATION(S): at 11:57

## 2019-01-16 RX ADMIN — LACOSAMIDE 200 MILLIGRAM(S): 50 TABLET ORAL at 05:04

## 2019-01-16 RX ADMIN — OLANZAPINE 5 MILLIGRAM(S): 15 TABLET, FILM COATED ORAL at 05:04

## 2019-01-16 RX ADMIN — LOSARTAN POTASSIUM 100 MILLIGRAM(S): 100 TABLET, FILM COATED ORAL at 05:04

## 2019-01-16 RX ADMIN — Medication 81 MILLIGRAM(S): at 11:57

## 2019-01-16 RX ADMIN — ATORVASTATIN CALCIUM 80 MILLIGRAM(S): 80 TABLET, FILM COATED ORAL at 22:20

## 2019-01-16 RX ADMIN — Medication 650 MILLIGRAM(S): at 18:34

## 2019-01-16 RX ADMIN — AMLODIPINE BESYLATE 10 MILLIGRAM(S): 2.5 TABLET ORAL at 05:04

## 2019-01-16 RX ADMIN — Medication 250 MILLIGRAM(S): at 18:02

## 2019-01-16 RX ADMIN — Medication 650 MILLIGRAM(S): at 18:02

## 2019-01-16 RX ADMIN — CARVEDILOL PHOSPHATE 25 MILLIGRAM(S): 80 CAPSULE, EXTENDED RELEASE ORAL at 05:04

## 2019-01-16 RX ADMIN — Medication 650 MILLIGRAM(S): at 12:45

## 2019-01-16 RX ADMIN — Medication 650 MILLIGRAM(S): at 05:04

## 2019-01-16 RX ADMIN — NYSTATIN CREAM 1 APPLICATION(S): 100000 CREAM TOPICAL at 05:04

## 2019-01-16 RX ADMIN — CARVEDILOL PHOSPHATE 25 MILLIGRAM(S): 80 CAPSULE, EXTENDED RELEASE ORAL at 18:02

## 2019-01-16 RX ADMIN — Medication 650 MILLIGRAM(S): at 00:00

## 2019-01-16 NOTE — PROGRESS NOTE ADULT - SUBJECTIVE AND OBJECTIVE BOX
Interval Events: pt seen and examined  feeds started, pt is tolerating them well as per RN  no leakage/drainage from PEG site.     MEDICATIONS  (STANDING):  acetaminophen   Tablet .. 650 milliGRAM(s) Oral every 6 hours  amLODIPine   Tablet 10 milliGRAM(s) Oral daily  aspirin  chewable 81 milliGRAM(s) Oral daily  atorvastatin 80 milliGRAM(s) Oral at bedtime  BACItracin   Ointment 1 Application(s) Topical daily  carvedilol 25 milliGRAM(s) Oral every 12 hours  dextrose 50% Injectable 12.5 Gram(s) IV Push once  dextrose 50% Injectable 25 Gram(s) IV Push once  dextrose 50% Injectable 25 Gram(s) IV Push once  insulin lispro (HumaLOG) corrective regimen sliding scale   SubCutaneous every 6 hours  lacosamide 200 milliGRAM(s) Oral two times a day  lidocaine 2% Injectable 20 milliLiter(s) Local Injection once  losartan 100 milliGRAM(s) Oral daily  nystatin Ointment 1 Application(s) Topical every 12 hours  OLANZapine 5 milliGRAM(s) Oral two times a day  polyethylene glycol 3350 17 Gram(s) Oral daily  valproic  acid Syrup 250 milliGRAM(s) Oral two times a day    MEDICATIONS  (PRN):  dextrose 40% Gel 15 Gram(s) Oral once PRN Blood Glucose LESS THAN 70 milliGRAM(s)/deciliter  glucagon  Injectable 1 milliGRAM(s) IntraMuscular once PRN Glucose LESS THAN 70 milligrams/deciliter  hydrALAZINE Injectable 10 milliGRAM(s) IV Push every 6 hours PRN SBP>160  labetalol Injectable 10 milliGRAM(s) IV Push every 6 hours PRN Systolic blood pressure >160  LORazepam   Injectable 2 milliGRAM(s) IV Push every 5 minutes PRN Sustained GTC Seizure  morphine  - Injectable 1 milliGRAM(s) IV Push every 12 hours PRN Severe Pain (7 - 10)  OLANZapine Injectable 5 milliGRAM(s) IntraMuscular every 24 hours PRN agitation      Allergies    No Known Allergies    Intolerances        Review of Systems: unable to obtain.     Vital Signs Last 24 Hrs  T(C): 36.8 (16 Jan 2019 04:17), Max: 37.3 (15 Volodymyr 2019 21:00)  T(F): 98.2 (16 Jan 2019 04:17), Max: 99.1 (15 Volodymyr 2019 21:00)  HR: 95 (16 Jan 2019 05:00) (59 - 97)  BP: 148/75 (16 Jan 2019 05:00) (126/68 - 148/75)  BP(mean): 89 (15 Volodymyr 2019 16:45) (89 - 96)  RR: 18 (16 Jan 2019 04:17) (16 - 22)  SpO2: 96% (16 Jan 2019 04:17) (95% - 100%)    PHYSICAL EXAM  GENERAL:   NAD  HEENT:  NC/AT, no JVD, sclera-anicteric  CHEST:  clear to ascultation bilaterally, respirations nonlabored  HEART:  +S1+S2 regular rate and rhythm   ABDOMEN:  Soft, normoactive bowel sounds, no masses, + PEG with abdominal binder c/d/i   EXTREMITIES:  no cyanosis, clubbing or edema  NEURO:  non-verbal  SKIN:  No rash/warm/dry      LABS:                        10.2   10.7  )-----------( 179      ( 16 Jan 2019 09:12 )             31.3     01-15    138  |  103  |  10  ----------------------------<  94  5.2   |  23  |  0.67    Ca    8.9      15 Volodymyr 2019 09:38      PT/INR - ( 15 Volodymyr 2019 10:24 )   PT: 12.2 sec;   INR: 1.07 ratio         PTT - ( 15 Volodymyr 2019 10:24 )  PTT:26.2 sec      RADIOLOGY & ADDITIONAL TESTS:

## 2019-01-16 NOTE — PROGRESS NOTE ADULT - ASSESSMENT
63 years old woman with vascular risk factors of age, HTN, DM II, HPLD and CAD was evaluated at [facility] for language disturbance. On 11/27 she underwent T10 laminectomy and fusion. Postoperatively, she was noted to have Wernicke's aphasia due to left temporal lobar ICH of undetermined etiology. She reported to have had significant improvement in her language deficit in the rehabilitation. In the evening of 12/24, she was reported to have worsening of her aphasia, prompting her presentation to OSH and subsequent transfer to [facility]. CT brain or admission showed expected evolution/resolution of left temporal ICH leading to development of encephalomalacia. MRI brain showed what appeared to be an acute infarct in the left MCA distribution adjacent to the prior hemorrhage in the late subacute to chronic stage, although it is possible that this was not an actual infarct but rather MRI hyperintensity related to previous seizures; and expected evolution of prior left frontotemporal convexal subarachnoid hemorrhage as well as was concerning for a dilated cortical vein anterior to the hematoma. Of note, MRI brain (12/7) showed left temporal lobar ICH, left frontotemporal convexal SAH and juxtacortical FLAIR hyperintensities concerning for PRES to my eye. CT brain on 12/30, showed expected evolution of left MCA distribution "infarct" (versus abnormal signal related to previous seizures)  and prior left temporal ICH leading to encephalomalacia.    Impression:  Possible Left MCA distribution "stroke" -initially thought likely etiology being cryptogenic stroke, probably related to embolism from a proximal source, like cardiac/paradoxical source of embolism. Note it is possible or probable that this imaging abnormality does not represent an actual infarct but rather abnormal signal related to previous seizures vs. PRES.  Recent left temporal lobar ICH with associated left frontotemporal convexal subarachnoid hemorrhage - likely etiology could be intracranial hemorrhage in the setting of PRES but possibility of underlying vascular malformation like micro-AVM or large vessel vasculopathy like vasculitis needs to  be considered.  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    Neurologically noted with global aphasia and inattention- unchanged from previous, though likely to be PRES, planned to perform LP  to rule out any infectious or autoimmune etiology for altered mental status. Keep SBP between 120-150, LDL 44-continue with home statin as LDL is currently at goal, MRI Brain w/wo contrast noted above. Normal CTA of the head and neck. Physical therapy/OT recommended acute TBI rehab. Repeat MRI brain with and without contrast/MR spectroscopy end of February/begining of March to rule out an underlying neoplasm preferably upon resolution/complete resorption of prior left temporal lobar ICH; Conventional angiogram to rule out underlying vascular malformation could be considered based on results of a repeat brain imaging/vessel imaging.    Plan:  - s/p IR guided aspiration of fluid collection in the dorsal postoperative bed, B2-Transferring pending, Cx thus far -itive  - Will repeat CBC to assess for thrombocytopenia  - c/w Depakote 250 BID and Vimpat 200mg BID  - LP: thus far unrevealing, PCR, Crypto, HSV negative  --- WNV, ACE, Lyme, IgG, CMV, Bands, VDRL and NMDA pending  - Repeat MRI Head w/wo resolving diffusion abnormality and improving left temporal lobe hemorrhage, SAH.  - PEG placed on 1/14, getting TF  - s/p LOOP, cardio on board, reccs appreciated, episodes of sinus tach w/ agitation 63 years old woman with vascular risk factors of age, HTN, DM II, HPLD and CAD was evaluated at [facility] for language disturbance. On 11/27 she underwent T10 laminectomy and fusion. Postoperatively, she was noted to have Wernicke's aphasia due to left temporal lobar ICH of undetermined etiology. She reported to have had significant improvement in her language deficit in the rehabilitation. In the evening of 12/24, she was reported to have worsening of her aphasia, prompting her presentation to OSH and subsequent transfer to [facility]. CT brain or admission showed expected evolution/resolution of left temporal ICH leading to development of encephalomalacia. MRI brain showed what appeared to be an acute infarct in the left MCA distribution adjacent to the prior hemorrhage in the late subacute to chronic stage, although it is possible that this was not an actual infarct but rather MRI hyperintensity related to previous seizures; and expected evolution of prior left frontotemporal convexal subarachnoid hemorrhage as well as was concerning for a dilated cortical vein anterior to the hematoma. Of note, MRI brain (12/7) showed left temporal lobar ICH, left frontotemporal convexal SAH and juxtacortical FLAIR hyperintensities concerning for PRES to my eye. CT brain on 12/30, showed expected evolution of left MCA distribution "infarct" (versus abnormal signal related to previous seizures)  and prior left temporal ICH leading to encephalomalacia.    Impression:  Possible Left MCA distribution "stroke" -initially thought likely etiology being cryptogenic stroke, probably related to embolism from a proximal source, like cardiac/paradoxical source of embolism. Note it is possible or probable that this imaging abnormality does not represent an actual infarct but rather abnormal signal related to previous seizures vs. PRES.  Recent left temporal lobar ICH with associated left frontotemporal convexal subarachnoid hemorrhage - likely etiology could be intracranial hemorrhage in the setting of PRES but possibility of underlying vascular malformation like micro-AVM or large vessel vasculopathy like vasculitis needs to  be considered.  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    Neurologically noted with global aphasia and inattention- unchanged from previous, though likely to be PRES, planned to perform LP  to rule out any infectious or autoimmune etiology for altered mental status. Keep SBP between 120-150, LDL 44-continue with home statin as LDL is currently at goal, MRI Brain w/wo contrast noted above. Normal CTA of the head and neck. Physical therapy/OT recommended acute TBI rehab. Repeat MRI brain with and without contrast/MR spectroscopy end of February/begining of March to rule out an underlying neoplasm preferably upon resolution/complete resorption of prior left temporal lobar ICH; Conventional angiogram to rule out underlying vascular malformation could be considered based on results of a repeat brain imaging/vessel imaging.    Plan:  - s/p IR guided aspiration of fluid collection in the dorsal postoperative bed, B2-Transferring pending, Cx thus far -itive  - Thrombocytopenia resolved  - c/w Depakote 250 BID and Vimpat 200mg BID  - LP: thus far unrevealing, PCR, Crypto, HSV negative  --- WNV, ACE, Lyme, IgG, CMV, Bands, VDRL and NMDA pending  - Repeat MRI Head w/wo resolving diffusion abnormality and improving left temporal lobe hemorrhage, SAH.  - PEG placed on 1/14, getting TF  - s/p LOOP, cardio on board, reccs appreciated, episodes of sinus tach w/ agitation  - Will start discharge to rehab planning, pt needs PT re-eval for

## 2019-01-16 NOTE — PROGRESS NOTE ADULT - ASSESSMENT
63F with a history of CAD s/p PCI to OM2 with a AVE in December of 2015, residual 40% LAD disease, neg stress test in 1/2018, osteoarthritis, DM, HTN, HLD who presents as a transfer from [facility] for aphasia now with temporal ICH and new left MCA infarct. She is now withevolution of left MCA distribution infarct and prior left temporal ICH leading to encephalomalacia. She is having focal seizures and facial twitching.     - Cont neuro rx.   Neuro follow up  - She has a hx of a remote PCI.   - No anginal symptoms recently reported. Cont ASA 81 QD  - Has a moderate focal atheroma in Aortic Arch. Cont high intensity statin for now.  - s/p ILR placement given suspicion for embolic nature of CVA. No af noted on telemetry to date.  - intermittent sinus tachycardia in setting of agitation. Continue to watch    - BP is labile. better overall controlled.   - I would continue prn IV hydralazine and labetalol to control her blood pressure spikes.      - Appears compensated from HF POV.     - tolerated IR guided paraspinal fluid aspiration without cardiac sequale   - Monitor and replete electrolytes. Keep K>4.0 and Mg>2.0.  -  Further cardiac workup will depend on clinical course.   - All other workup per primary team. Will followup.

## 2019-01-16 NOTE — PROGRESS NOTE ADULT - SUBJECTIVE AND OBJECTIVE BOX
CC: f/u for altered ms    Patient reports  nothing not verbal just moans  REVIEW OF SYSTEMS:  All other review of systems cannot get (Comprehensive ROS)    Antimicrobials Day #  :    Other Medications Reviewed    T(F): 98.4 (01-16-19 @ 15:47), Max: 99.1 (01-15-19 @ 21:00)  HR: 95 (01-16-19 @ 18:01)  BP: 161/80 (01-16-19 @ 18:01)  RR: 18 (01-16-19 @ 15:47)  SpO2: 97% (01-16-19 @ 15:47)  Wt(kg): --    PHYSICAL EXAM:  General: no acute distress  Eyes:  anicteric, no conjunctival injection, no discharge  Oropharynx: no lesions or injection 	  Neck: supple, without adenopathy  Lungs: clear to auscultation  Heart: regular rate and rhythm; no murmur, rubs or gallops  Abdomen: soft, nondistended, nontender, without mass or organomegaly, peg  Skin: no lesions  Extremities: no clubbing, cyanosis, or edema  Neurologic, very lethargic, not speaking or reacting much to stimuli  back wound dry  LAB RESULTS:                        10.2   10.7  )-----------( 179      ( 16 Jan 2019 09:12 )             31.3     01-15    138  |  103  |  10  ----------------------------<  94  5.2   |  23  |  0.67    Ca    8.9      15 Volodymyr 2019 09:38          MICROBIOLOGY:  RECENT CULTURES:  01-15 @ 21:57 .CSF CSF     No growth    No polymorphonuclear cells seen  No organisms seen  by cytocentrifuge    01-13 @ 00:27 .Body Fluid Interstitial Fluid     No growth    polymorphonuclear leukocytes seen  No organisms seen  by cytocentrifuge        RADIOLOGY REVIEWED:    < from: MR Head w/wo IV Cont (01.15.19 @ 15:03) >  remarkable in appearance.    Impression:    Resolving left temporoparietal diffusion abnormality now with associated   enhancement likely representing subacute infarction.  Resolving left temporal lobe hematoma.  Residua from prior subarachnoid hemorrhage      < end of copied text >      Assessment:  Patient s/p lumbar lami and fusion, post op iph with aphasia, got better went to rehab then got worse again now with very poor ms. Has an infarct too now, had pres now mri findings resolved, had a seizure. Has an uninfected seroma in back that was aspirated and now underwent LP with no finding of infection based on parameters, pcr, cultures to date. wound is not infected at present  Plan:  monitor off antibiotics  local wound care

## 2019-01-16 NOTE — PROGRESS NOTE ADULT - SUBJECTIVE AND OBJECTIVE BOX
Neurology Progress Note    HPI:  63 year old woman with vascular risk factors of age, HTN, DM II, HPLD and CAD was evaluated at [facility] for language disturbance. On 11/27 she underwent T10 laminectomy and fusion. Postoperatively, she was noted to have Wernicke's aphasia due to left temporal lobar ICH of undetermined etiology. She reported to have had significant improvement in her language deficit in rehabilitation. In the evening of 12/24, she was reported to have worsening of her aphasia, prompting her presentation to OSH and subsequent transfer to [facility].    SUBJECTIVE: No acute events overnight.    Vital Signs Last 24 Hrs  T(C): 36.8 (16 Jan 2019 04:17), Max: 37.3 (15 Volodymyr 2019 21:00)  T(F): 98.2 (16 Jan 2019 04:17), Max: 99.1 (15 Volodymyr 2019 21:00)  HR: 95 (16 Jan 2019 05:00) (59 - 97)  BP: 148/75 (16 Jan 2019 05:00) (123/64 - 148/75)  BP(mean): 89 (15 Volodymyr 2019 16:45) (89 - 96)  RR: 18 (16 Jan 2019 04:17) (16 - 22)  SpO2: 96% (16 Jan 2019 04:17) (95% - 100%)      PHYSICAL EXAM:   General: no distress noted  HEENT: right gaze palsy  Abdomen: Soft, nontender, nondistended   Extremities: No edema  Back: LP site appearing dry, well healing w/o any discharge    NEUROLOGICAL EXAM:  Mental status: eyes open but inattentive for most of exam, able to make eye contact at some points, no verbal output, not following commands.  Cranial Nerves: No facial asymmetry, mild right gaze palsy crosses midline, no nystagmus, no dysarthria, tongue midline  Motor exam: extremities move spontaneously against gravity however right leg is noted to be moving less.   Sensation: Intact to mildly noxious stimuli  Coordination/ Gait: Unable to cooperate with testing; gait deferred       01-15    138  |  103  |  10  ----------------------------<  94  5.2   |  23  |  0.67    Ca    8.9      15 Volodymyr 2019 09:38    MEDICATIONS  (STANDING):  acetaminophen   Tablet .. 650 milliGRAM(s) Oral every 6 hours  amLODIPine   Tablet 10 milliGRAM(s) Oral daily  aspirin  chewable 81 milliGRAM(s) Oral daily  atorvastatin 80 milliGRAM(s) Oral at bedtime  BACItracin   Ointment 1 Application(s) Topical daily  carvedilol 25 milliGRAM(s) Oral every 12 hours  dextrose 50% Injectable 12.5 Gram(s) IV Push once  dextrose 50% Injectable 25 Gram(s) IV Push once  dextrose 50% Injectable 25 Gram(s) IV Push once  insulin lispro (HumaLOG) corrective regimen sliding scale   SubCutaneous every 6 hours  lacosamide 200 milliGRAM(s) Oral two times a day  lidocaine 2% Injectable 20 milliLiter(s) Local Injection once  losartan 100 milliGRAM(s) Oral daily  nystatin Ointment 1 Application(s) Topical every 12 hours  OLANZapine 5 milliGRAM(s) Oral two times a day  polyethylene glycol 3350 17 Gram(s) Oral daily  valproic  acid Syrup 250 milliGRAM(s) Oral two times a day    MEDICATIONS  (PRN):  dextrose 40% Gel 15 Gram(s) Oral once PRN Blood Glucose LESS THAN 70 milliGRAM(s)/deciliter  glucagon  Injectable 1 milliGRAM(s) IntraMuscular once PRN Glucose LESS THAN 70 milligrams/deciliter  hydrALAZINE Injectable 10 milliGRAM(s) IV Push every 6 hours PRN SBP>160  labetalol Injectable 10 milliGRAM(s) IV Push every 6 hours PRN Systolic blood pressure >160  LORazepam   Injectable 2 milliGRAM(s) IV Push every 5 minutes PRN Sustained GTC Seizure  morphine  - Injectable 1 milliGRAM(s) IV Push every 8 hours PRN Severe Pain (7 - 10)  OLANZapine Injectable 5 milliGRAM(s) IntraMuscular every 24 hours PRN agitation    IMAGING: Reviewed by me.   CT Angio Head/Neck IV Cont (01.07.19)  Normal CTA of the head and neck. No abnormal vascularity,   aneurysms or AVMs appreciated on this patient who had a prior left   temporal parenchymal hemorrhage.    CT Head No Cont (01.01.19)  No gross evidence for new infarct or new hemorrhage compared to the   12/30/2018 head CT and 12/26/2018 brain MRI.     CT Head No Cont (12.30.18)  No gross evidence for new infarct or new hemorrhage compared to the   12/26/2018 MRI study.    MRI Head w/wo IV Cont (12.26.18)  Left temporal lobe hematoma without significant interval change in size.  Small amount of subarachnoid blood.  New acute left MCA distribution infarct.  Prominent left temporal region cortical vein. Clinical correlation will   determine the need for conventional angiography to exclude the   possibility of a vascular malformation.    (12.25.18)  CT brain:  Resolving left temporal lobe parenchymal hemorrhage is redemonstrated,   similar to the prior brain CT.    CT angiography neck:  No evidence for arterial dissection. No flow-limiting stenosis. Carotid   bifurcations unremarkable.    CT angiography brain:  No evidence for AVM. No vascular aneurysm or flow-limiting stenosis.    CT venography brain:  No evidence for venous sinus or cortical vein thrombosis.
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
## 2019-01-17 LAB
ALBUMIN SERPL ELPH-MCNC: 2261 MG/DL — LOW (ref 3500–5200)
CULTURE RESULTS: SIGNIFICANT CHANGE UP
GLUCOSE BLDC GLUCOMTR-MCNC: 101 MG/DL — HIGH (ref 70–99)
GLUCOSE BLDC GLUCOMTR-MCNC: 102 MG/DL — HIGH (ref 70–99)
GLUCOSE BLDC GLUCOMTR-MCNC: 114 MG/DL — HIGH (ref 70–99)
GLUCOSE BLDC GLUCOMTR-MCNC: 124 MG/DL — HIGH (ref 70–99)
GLUCOSE BLDC GLUCOMTR-MCNC: 127 MG/DL — HIGH (ref 70–99)
GLUCOSE BLDC GLUCOMTR-MCNC: 129 MG/DL — HIGH (ref 70–99)
IGG FLD-MCNC: 544 MG/DL — LOW (ref 610–1660)
IGG/ALB SER: 0.24 RATIO — SIGNIFICANT CHANGE UP
SPECIMEN SOURCE: SIGNIFICANT CHANGE UP
VDRL CSF-TITR: NEGATIVE — SIGNIFICANT CHANGE UP

## 2019-01-17 PROCEDURE — 99232 SBSQ HOSP IP/OBS MODERATE 35: CPT

## 2019-01-17 RX ADMIN — AMLODIPINE BESYLATE 10 MILLIGRAM(S): 2.5 TABLET ORAL at 06:38

## 2019-01-17 RX ADMIN — OLANZAPINE 5 MILLIGRAM(S): 15 TABLET, FILM COATED ORAL at 06:39

## 2019-01-17 RX ADMIN — ATORVASTATIN CALCIUM 80 MILLIGRAM(S): 80 TABLET, FILM COATED ORAL at 21:54

## 2019-01-17 RX ADMIN — Medication 650 MILLIGRAM(S): at 18:00

## 2019-01-17 RX ADMIN — CARVEDILOL PHOSPHATE 25 MILLIGRAM(S): 80 CAPSULE, EXTENDED RELEASE ORAL at 06:38

## 2019-01-17 RX ADMIN — Medication 650 MILLIGRAM(S): at 12:40

## 2019-01-17 RX ADMIN — LACOSAMIDE 200 MILLIGRAM(S): 50 TABLET ORAL at 06:38

## 2019-01-17 RX ADMIN — OLANZAPINE 5 MILLIGRAM(S): 15 TABLET, FILM COATED ORAL at 17:25

## 2019-01-17 RX ADMIN — Medication 650 MILLIGRAM(S): at 06:38

## 2019-01-17 RX ADMIN — Medication 650 MILLIGRAM(S): at 23:28

## 2019-01-17 RX ADMIN — CARVEDILOL PHOSPHATE 25 MILLIGRAM(S): 80 CAPSULE, EXTENDED RELEASE ORAL at 17:25

## 2019-01-17 RX ADMIN — Medication 650 MILLIGRAM(S): at 06:34

## 2019-01-17 RX ADMIN — LACOSAMIDE 200 MILLIGRAM(S): 50 TABLET ORAL at 17:25

## 2019-01-17 RX ADMIN — LOSARTAN POTASSIUM 100 MILLIGRAM(S): 100 TABLET, FILM COATED ORAL at 06:39

## 2019-01-17 RX ADMIN — Medication 250 MILLIGRAM(S): at 06:38

## 2019-01-17 RX ADMIN — MORPHINE SULFATE 1 MILLIGRAM(S): 50 CAPSULE, EXTENDED RELEASE ORAL at 14:20

## 2019-01-17 RX ADMIN — Medication 650 MILLIGRAM(S): at 08:14

## 2019-01-17 RX ADMIN — Medication 650 MILLIGRAM(S): at 17:25

## 2019-01-17 RX ADMIN — POLYETHYLENE GLYCOL 3350 17 GRAM(S): 17 POWDER, FOR SOLUTION ORAL at 12:41

## 2019-01-17 RX ADMIN — Medication 1 APPLICATION(S): at 12:41

## 2019-01-17 RX ADMIN — Medication 81 MILLIGRAM(S): at 12:40

## 2019-01-17 RX ADMIN — MORPHINE SULFATE 1 MILLIGRAM(S): 50 CAPSULE, EXTENDED RELEASE ORAL at 15:00

## 2019-01-17 RX ADMIN — Medication 650 MILLIGRAM(S): at 13:40

## 2019-01-17 RX ADMIN — Medication 250 MILLIGRAM(S): at 17:25

## 2019-01-17 RX ADMIN — NYSTATIN CREAM 1 APPLICATION(S): 100000 CREAM TOPICAL at 17:26

## 2019-01-17 RX ADMIN — NYSTATIN CREAM 1 APPLICATION(S): 100000 CREAM TOPICAL at 06:39

## 2019-01-17 NOTE — PROGRESS NOTE ADULT - SUBJECTIVE AND OBJECTIVE BOX
Neurology Progress Note    HPI:  63 year old woman with vascular risk factors of age, HTN, DM II, HPLD and CAD was evaluated at [facility] for language disturbance. On 11/27 she underwent T10 laminectomy and fusion. Postoperatively, she was noted to have Wernicke's aphasia due to left temporal lobar ICH of undetermined etiology. She reported to have had significant improvement in her language deficit in rehabilitation. In the evening of 12/24, she was reported to have worsening of her aphasia, prompting her presentation to OSH and subsequent transfer to [facility].    SUBJECTIVE: No acute events overnight.    Vital Signs Last 24 Hrs  Vital Signs Last 24 Hrs  T(C): 36.6 (17 Jan 2019 09:46), Max: 36.9 (16 Jan 2019 15:47)  T(F): 97.8 (17 Jan 2019 09:46), Max: 98.5 (16 Jan 2019 19:19)  HR: 88 (17 Jan 2019 09:46) (84 - 95)  BP: 129/84 (17 Jan 2019 09:46) (120/68 - 161/80)  BP(mean): --  RR: 18 (17 Jan 2019 09:46) (18 - 18)  SpO2: 96% (17 Jan 2019 09:46) (94% - 97%)      PHYSICAL EXAM:   General: no distress noted  HEENT: right gaze palsy  Abdomen: Soft, nontender, nondistended   Extremities: No edema  Back: LP site appearing dry, well healing w/o any discharge    NEUROLOGICAL EXAM:  Mental status: eyes open but inattentive for most of exam, able to make eye contact at some points, no verbal output, not following commands.  Cranial Nerves: No facial asymmetry, mild right gaze palsy crosses midline, no nystagmus, no dysarthria, tongue midline  Motor exam: extremities move spontaneously against gravity however right leg is noted to be moving less.   Sensation: Intact to mildly noxious stimuli  Coordination/ Gait: Unable to cooperate with testing; gait deferred       TPro  x   /  Alb  2261<L>  /  TBili  x   /  DBili  x   /  AST  x   /  ALT  x   /  AlkPhos  x   01-15    MEDICATIONS  (STANDING):  acetaminophen   Tablet .. 650 milliGRAM(s) Oral every 6 hours  amLODIPine   Tablet 10 milliGRAM(s) Oral daily  aspirin  chewable 81 milliGRAM(s) Oral daily  atorvastatin 80 milliGRAM(s) Oral at bedtime  BACItracin   Ointment 1 Application(s) Topical daily  carvedilol 25 milliGRAM(s) Oral every 12 hours  dextrose 50% Injectable 12.5 Gram(s) IV Push once  dextrose 50% Injectable 25 Gram(s) IV Push once  dextrose 50% Injectable 25 Gram(s) IV Push once  insulin lispro (HumaLOG) corrective regimen sliding scale   SubCutaneous every 6 hours  lacosamide 200 milliGRAM(s) Oral two times a day  lidocaine 2% Injectable 20 milliLiter(s) Local Injection once  losartan 100 milliGRAM(s) Oral daily  nystatin Ointment 1 Application(s) Topical every 12 hours  OLANZapine 5 milliGRAM(s) Oral two times a day  polyethylene glycol 3350 17 Gram(s) Oral daily  valproic  acid Syrup 250 milliGRAM(s) Oral two times a day    MEDICATIONS  (PRN):  dextrose 40% Gel 15 Gram(s) Oral once PRN Blood Glucose LESS THAN 70 milliGRAM(s)/deciliter  glucagon  Injectable 1 milliGRAM(s) IntraMuscular once PRN Glucose LESS THAN 70 milligrams/deciliter  hydrALAZINE Injectable 10 milliGRAM(s) IV Push every 6 hours PRN SBP>160  labetalol Injectable 10 milliGRAM(s) IV Push every 6 hours PRN Systolic blood pressure >160  LORazepam   Injectable 2 milliGRAM(s) IV Push every 5 minutes PRN Sustained GTC Seizure  morphine  - Injectable 1 milliGRAM(s) IV Push every 12 hours PRN Severe Pain (7 - 10)  OLANZapine Injectable 5 milliGRAM(s) IntraMuscular every 24 hours PRN agitation    IMAGING: Reviewed by me.   CT Angio Head/Neck IV Cont (01.07.19)  Normal CTA of the head and neck. No abnormal vascularity,   aneurysms or AVMs appreciated on this patient who had a prior left   temporal parenchymal hemorrhage.    CT Head No Cont (01.01.19)  No gross evidence for new infarct or new hemorrhage compared to the   12/30/2018 head CT and 12/26/2018 brain MRI.     CT Head No Cont (12.30.18)  No gross evidence for new infarct or new hemorrhage compared to the   12/26/2018 MRI study.    MRI Head w/wo IV Cont (12.26.18)  Left temporal lobe hematoma without significant interval change in size.  Small amount of subarachnoid blood.  New acute left MCA distribution infarct.  Prominent left temporal region cortical vein. Clinical correlation will   determine the need for conventional angiography to exclude the   possibility of a vascular malformation.    (12.25.18)  CT brain:  Resolving left temporal lobe parenchymal hemorrhage is redemonstrated,   similar to the prior brain CT.    CT angiography neck:  No evidence for arterial dissection. No flow-limiting stenosis. Carotid   bifurcations unremarkable.    CT angiography brain:  No evidence for AVM. No vascular aneurysm or flow-limiting stenosis.    CT venography brain:  No evidence for venous sinus or cortical vein thrombosis.

## 2019-01-17 NOTE — PROGRESS NOTE ADULT - ATTENDING COMMENTS
Seen and examined with Fellow. Agree with note.   Patient inconsistent with verbal instruction (<50%).  Patient will need subacute rehabilitation when stable.

## 2019-01-17 NOTE — PROGRESS NOTE ADULT - SUBJECTIVE AND OBJECTIVE BOX
Interval Events; Pt seen and examined. No acute overnight events  Pt is tolerating PEG feeds  PEG with drainage around site this morning which was cleaned, now dressing is c/d/i    MEDICATIONS  (STANDING):  acetaminophen   Tablet .. 650 milliGRAM(s) Oral every 6 hours  amLODIPine   Tablet 10 milliGRAM(s) Oral daily  aspirin  chewable 81 milliGRAM(s) Oral daily  atorvastatin 80 milliGRAM(s) Oral at bedtime  BACItracin   Ointment 1 Application(s) Topical daily  carvedilol 25 milliGRAM(s) Oral every 12 hours  dextrose 50% Injectable 12.5 Gram(s) IV Push once  dextrose 50% Injectable 25 Gram(s) IV Push once  dextrose 50% Injectable 25 Gram(s) IV Push once  insulin lispro (HumaLOG) corrective regimen sliding scale   SubCutaneous every 6 hours  lacosamide 200 milliGRAM(s) Oral two times a day  lidocaine 2% Injectable 20 milliLiter(s) Local Injection once  losartan 100 milliGRAM(s) Oral daily  nystatin Ointment 1 Application(s) Topical every 12 hours  OLANZapine 5 milliGRAM(s) Oral two times a day  polyethylene glycol 3350 17 Gram(s) Oral daily  valproic  acid Syrup 250 milliGRAM(s) Oral two times a day    MEDICATIONS  (PRN):  dextrose 40% Gel 15 Gram(s) Oral once PRN Blood Glucose LESS THAN 70 milliGRAM(s)/deciliter  glucagon  Injectable 1 milliGRAM(s) IntraMuscular once PRN Glucose LESS THAN 70 milligrams/deciliter  hydrALAZINE Injectable 10 milliGRAM(s) IV Push every 6 hours PRN SBP>160  labetalol Injectable 10 milliGRAM(s) IV Push every 6 hours PRN Systolic blood pressure >160  LORazepam   Injectable 2 milliGRAM(s) IV Push every 5 minutes PRN Sustained GTC Seizure  morphine  - Injectable 1 milliGRAM(s) IV Push every 12 hours PRN Severe Pain (7 - 10)  OLANZapine Injectable 5 milliGRAM(s) IntraMuscular every 24 hours PRN agitation      Allergies    No Known Allergies    Intolerances        Review of Systems: unable to obtain.     Vital Signs Last 24 Hrs  T(C): 36.6 (17 Jan 2019 09:46), Max: 36.9 (16 Jan 2019 15:47)  T(F): 97.8 (17 Jan 2019 09:46), Max: 98.5 (16 Jan 2019 19:19)  HR: 88 (17 Jan 2019 09:46) (84 - 95)  BP: 129/84 (17 Jan 2019 09:46) (120/68 - 161/80)  BP(mean): --  RR: 18 (17 Jan 2019 09:46) (18 - 18)  SpO2: 96% (17 Jan 2019 09:46) (94% - 97%)      PHYSICAL EXAM  GENERAL:   NAD  HEENT:  NC/AT, no JVD, sclera-anicteric  CHEST:  clear to ascultation bilaterally, respirations nonlabored  HEART:  +S1+S2 regular rate and rhythm   ABDOMEN:  Soft, normoactive bowel sounds, no masses, + PEG with abdominal binder c/d/i   EXTREMITIES:  no cyanosis, clubbing or edema  NEURO:  non-verbal  SKIN:  No rash/warm/dry          LABS:                        10.2   10.7  )-----------( 179      ( 16 Jan 2019 09:12 )             31.3         TPro  x   /  Alb  2261<L>  /  TBili  x   /  DBili  x   /  AST  x   /  ALT  x   /  AlkPhos  x   01-15          RADIOLOGY & ADDITIONAL TESTS:

## 2019-01-17 NOTE — PROGRESS NOTE ADULT - ASSESSMENT
63 years old woman with vascular risk factors of age, HTN, DM II, HPLD and CAD was evaluated at [facility] for language disturbance. On 11/27 she underwent T10 laminectomy and fusion. Postoperatively, she was noted to have Wernicke's aphasia due to left temporal lobar ICH of undetermined etiology. She reported to have had significant improvement in her language deficit in the rehabilitation. In the evening of 12/24, she was reported to have worsening of her aphasia, prompting her presentation to OSH and subsequent transfer to [facility]. CT brain or admission showed expected evolution/resolution of left temporal ICH leading to development of encephalomalacia. MRI brain showed what appeared to be an acute infarct in the left MCA distribution adjacent to the prior hemorrhage in the late subacute to chronic stage, although it is possible that this was not an actual infarct but rather MRI hyperintensity related to previous seizures; and expected evolution of prior left frontotemporal convexal subarachnoid hemorrhage as well as was concerning for a dilated cortical vein anterior to the hematoma. Of note, MRI brain (12/7) showed left temporal lobar ICH, left frontotemporal convexal SAH and juxtacortical FLAIR hyperintensities concerning for PRES to my eye. CT brain on 12/30, showed expected evolution of left MCA distribution "infarct" (versus abnormal signal related to previous seizures)  and prior left temporal ICH leading to encephalomalacia.    Impression:  Possible Left MCA distribution "stroke" -initially thought likely etiology being cryptogenic stroke, probably related to embolism from a proximal source, like cardiac/paradoxical source of embolism. Note it is possible or probable that this imaging abnormality does not represent an actual infarct but rather abnormal signal related to previous seizures vs. PRES.  Recent left temporal lobar ICH with associated left frontotemporal convexal subarachnoid hemorrhage - likely etiology could be intracranial hemorrhage in the setting of PRES but possibility of underlying vascular malformation like micro-AVM or large vessel vasculopathy like vasculitis needs to  be considered.  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    Neurologically noted with global aphasia and inattention- unchanged from previous, though likely to be PRES, planned to perform LP  to rule out any infectious or autoimmune etiology for altered mental status. Keep SBP between 120-150, LDL 44-continue with home statin as LDL is currently at goal, MRI Brain w/wo contrast noted above. Normal CTA of the head and neck. Physical therapy/OT recommended acute TBI rehab. Repeat MRI brain with and without contrast/MR spectroscopy end of February/begining of March to rule out an underlying neoplasm preferably upon resolution/complete resorption of prior left temporal lobar ICH; Conventional angiogram to rule out underlying vascular malformation could be considered based on results of a repeat brain imaging/vessel imaging.    Plan:  - s/p IR guided aspiration of fluid collection in the dorsal postoperative bed, B2-Transferring negative, Cx thus far -itive  - Thrombocytopenia resolved  - c/w Depakote 250 BID and Vimpat 200mg BID  - LP: thus far unrevealing, PCR, Crypto, HSV negative, IgG low  --- WNV, ACE, Lyme, CMV, Bands, VDRL and NMDA pending  - Repeat MRI Head w/wo resolving diffusion abnormality and improving left temporal lobe hemorrhage, SAH.  - PEG placed on 1/14, getting TF  - s/p LOOP, cardio on board, reccs appreciated, episodes of sinus tach w/ agitation  - Will start discharge to rehab planning, pt needs Rehab re-eval
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
## 2019-01-18 LAB
CULTURE RESULTS: NO GROWTH — SIGNIFICANT CHANGE UP
GLUCOSE BLDC GLUCOMTR-MCNC: 119 MG/DL — HIGH (ref 70–99)
GLUCOSE BLDC GLUCOMTR-MCNC: 128 MG/DL — HIGH (ref 70–99)
GLUCOSE BLDC GLUCOMTR-MCNC: 144 MG/DL — HIGH (ref 70–99)
INNER EAR 68KD AB FLD QL: <5 U/L — SIGNIFICANT CHANGE UP
SPECIMEN SOURCE: SIGNIFICANT CHANGE UP
WNV IGG CSF IA-ACNC: POSITIVE
WNV IGM CSF IA-ACNC: NEGATIVE — SIGNIFICANT CHANGE UP

## 2019-01-18 PROCEDURE — 99232 SBSQ HOSP IP/OBS MODERATE 35: CPT | Mod: GC

## 2019-01-18 PROCEDURE — 99232 SBSQ HOSP IP/OBS MODERATE 35: CPT

## 2019-01-18 RX ADMIN — Medication 1 APPLICATION(S): at 12:10

## 2019-01-18 RX ADMIN — LACOSAMIDE 200 MILLIGRAM(S): 50 TABLET ORAL at 17:42

## 2019-01-18 RX ADMIN — ATORVASTATIN CALCIUM 80 MILLIGRAM(S): 80 TABLET, FILM COATED ORAL at 21:14

## 2019-01-18 RX ADMIN — Medication 650 MILLIGRAM(S): at 18:16

## 2019-01-18 RX ADMIN — Medication 250 MILLIGRAM(S): at 05:59

## 2019-01-18 RX ADMIN — Medication 650 MILLIGRAM(S): at 05:59

## 2019-01-18 RX ADMIN — Medication 650 MILLIGRAM(S): at 17:42

## 2019-01-18 RX ADMIN — CARVEDILOL PHOSPHATE 25 MILLIGRAM(S): 80 CAPSULE, EXTENDED RELEASE ORAL at 05:59

## 2019-01-18 RX ADMIN — OLANZAPINE 5 MILLIGRAM(S): 15 TABLET, FILM COATED ORAL at 05:59

## 2019-01-18 RX ADMIN — LOSARTAN POTASSIUM 100 MILLIGRAM(S): 100 TABLET, FILM COATED ORAL at 05:59

## 2019-01-18 RX ADMIN — CARVEDILOL PHOSPHATE 25 MILLIGRAM(S): 80 CAPSULE, EXTENDED RELEASE ORAL at 17:42

## 2019-01-18 RX ADMIN — MORPHINE SULFATE 1 MILLIGRAM(S): 50 CAPSULE, EXTENDED RELEASE ORAL at 04:02

## 2019-01-18 RX ADMIN — Medication 81 MILLIGRAM(S): at 12:10

## 2019-01-18 RX ADMIN — Medication 650 MILLIGRAM(S): at 23:45

## 2019-01-18 RX ADMIN — LACOSAMIDE 200 MILLIGRAM(S): 50 TABLET ORAL at 05:59

## 2019-01-18 RX ADMIN — Medication 650 MILLIGRAM(S): at 13:00

## 2019-01-18 RX ADMIN — OLANZAPINE 5 MILLIGRAM(S): 15 TABLET, FILM COATED ORAL at 17:42

## 2019-01-18 RX ADMIN — NYSTATIN CREAM 1 APPLICATION(S): 100000 CREAM TOPICAL at 06:00

## 2019-01-18 RX ADMIN — Medication 250 MILLIGRAM(S): at 17:42

## 2019-01-18 RX ADMIN — Medication 650 MILLIGRAM(S): at 12:11

## 2019-01-18 RX ADMIN — NYSTATIN CREAM 1 APPLICATION(S): 100000 CREAM TOPICAL at 17:43

## 2019-01-18 RX ADMIN — AMLODIPINE BESYLATE 10 MILLIGRAM(S): 2.5 TABLET ORAL at 05:59

## 2019-01-18 NOTE — PROGRESS NOTE ADULT - ASSESSMENT
63 years old woman with vascular risk factors of age, HTN, DM II, HPLD and CAD was evaluated at [facility] for language disturbance. On 11/27 she underwent T10 laminectomy and fusion. Postoperatively, she was noted to have Wernicke's aphasia due to left temporal lobar ICH of undetermined etiology. She reported to have had significant improvement in her language deficit in the rehabilitation. In the evening of 12/24, she was reported to have worsening of her aphasia, prompting her presentation to OSH and subsequent transfer to [facility]. CT brain or admission showed expected evolution/resolution of left temporal ICH leading to development of encephalomalacia. MRI brain showed what appeared to be an acute infarct in the left MCA distribution adjacent to the prior hemorrhage in the late subacute to chronic stage, although it is possible that this was not an actual infarct but rather MRI hyperintensity related to previous seizures; and expected evolution of prior left frontotemporal convexal subarachnoid hemorrhage as well as was concerning for a dilated cortical vein anterior to the hematoma. Of note, MRI brain (12/7) showed left temporal lobar ICH, left frontotemporal convexal SAH and juxtacortical FLAIR hyperintensities concerning for PRES to my eye. CT brain on 12/30, showed expected evolution of left MCA distribution "infarct" (versus abnormal signal related to previous seizures)  and prior left temporal ICH leading to encephalomalacia.    Impression:  Possible Left MCA distribution "stroke" -initially thought likely etiology being cryptogenic stroke, probably related to embolism from a proximal source, like cardiac/paradoxical source of embolism. Note it is possible or probable that this imaging abnormality does not represent an actual infarct but rather abnormal signal related to previous seizures vs. PRES.  Recent left temporal lobar ICH with associated left frontotemporal convexal subarachnoid hemorrhage - likely etiology could be intracranial hemorrhage in the setting of PRES but possibility of underlying vascular malformation like micro-AVM or large vessel vasculopathy like vasculitis needs to  be considered.  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    Neurologically noted with global aphasia and inattention- unchanged from previous, though likely to be PRES, planned to perform LP  to rule out any infectious or autoimmune etiology for altered mental status. Keep SBP between 120-150, LDL 44-continue with home statin as LDL is currently at goal, MRI Brain w/wo contrast noted above. Normal CTA of the head and neck. Physical therapy/OT recommended acute TBI rehab. Repeat MRI brain with and without contrast/MR spectroscopy end of February/begining of March to rule out an underlying neoplasm preferably upon resolution/complete resorption of prior left temporal lobar ICH; Conventional angiogram to rule out underlying vascular malformation could be considered based on results of a repeat brain imaging/vessel imaging.    Plan:  - s/p IR guided aspiration of fluid collection in the dorsal postoperative bed, B2-Transferring negative, Cx thus far -itive  - Thrombocytopenia resolved  - c/w Depakote 250 BID and Vimpat 200mg BID  - LP: thus far unrevealing, PCR, Crypto, HSV negative, IgG low  --- WNV, ACE, Lyme, CMV, Bands, VDRL and NMDA pending  - Repeat MRI Head w/wo resolving diffusion abnormality and improving left temporal lobe hemorrhage, SAH.  - PEG placed on 1/14, getting TF  - s/p LOOP, cardio on board, reccs appreciated, episodes of sinus tach w/ agitation  - PT/OT consulted appreciated; will plan for discharge to Subacute rehab 63 years old woman with vascular risk factors of age, HTN, DM II, HPLD and CAD was evaluated at [facility] for language disturbance. On 11/27 she underwent T10 laminectomy and fusion. Postoperatively, she was noted to have Wernicke's aphasia due to left temporal lobar ICH of undetermined etiology. She reported to have had significant improvement in her language deficit in the rehabilitation. In the evening of 12/24, she was reported to have worsening of her aphasia, prompting her presentation to OSH and subsequent transfer to [facility]. CT brain or admission showed expected evolution/resolution of left temporal ICH leading to development of encephalomalacia. MRI brain showed what appeared to be an acute infarct in the left MCA distribution adjacent to the prior hemorrhage in the late subacute to chronic stage, although it is possible that this was not an actual infarct but rather MRI hyperintensity related to previous seizures; and expected evolution of prior left frontotemporal convexal subarachnoid hemorrhage as well as was concerning for a dilated cortical vein anterior to the hematoma. Of note, MRI brain (12/7) showed left temporal lobar ICH, left frontotemporal convexal SAH and juxtacortical FLAIR hyperintensities concerning for PRES to my eye. CT brain on 12/30, showed expected evolution of left MCA distribution "infarct" (versus abnormal signal related to previous seizures)  and prior left temporal ICH leading to encephalomalacia.    Impression:  Possible Left MCA distribution "stroke" -initially thought likely etiology being cryptogenic stroke, probably related to embolism from a proximal source, like cardiac/paradoxical source of embolism. Note it is possible or probable that this imaging abnormality does not represent an actual infarct but rather abnormal signal related to previous seizures vs. PRES.  Recent left temporal lobar ICH with associated left frontotemporal convexal subarachnoid hemorrhage - likely etiology could be intracranial hemorrhage in the setting of PRES but possibility of underlying vascular malformation like micro-AVM or large vessel vasculopathy like vasculitis needs to  be considered.  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    Neurologically noted with global aphasia and inattention- unchanged from previous, though likely to be PRES, planned to perform LP  to rule out any infectious or autoimmune etiology for altered mental status. Keep SBP between 120-150, LDL 44-continue with home statin as LDL is currently at goal, MRI Brain w/wo contrast noted above. Normal CTA of the head and neck. Physical therapy/OT recommended acute TBI rehab. Repeat MRI brain with and without contrast/MR spectroscopy end of February/begining of March to rule out an underlying neoplasm preferably upon resolution/complete resorption of prior left temporal lobar ICH; Conventional angiogram to rule out underlying vascular malformation could be considered based on results of a repeat brain imaging/vessel imaging.    Plan:  - s/p IR guided aspiration of fluid collection in the dorsal postoperative bed, B2-Transferring negative, Cx thus far -itive  - Thrombocytopenia resolved  - c/w Depakote 250 BID and Vimpat 200mg BID  - LP: thus far unrevealing, PCR, Crypto, HSV negative, IgG low  --- WNV, ACE, Lyme, CMV, Bands, VDRL and NMDA pending  - Repeat MRI Head w/wo resolving diffusion abnormality and improving left temporal lobe hemorrhage, SAH.  - PEG placed on 1/14, getting TF  - s/p LOOP, cardio on board, reccs appreciated, episodes of sinus tach w/ agitation  - PT/OT consulted appreciated; plan for discharge to Subacute rehab (1/21/19)

## 2019-01-18 NOTE — PROGRESS NOTE ADULT - ASSESSMENT
63F with a history of CAD s/p PCI to OM2 with a AVE in December of 2015, residual 40% LAD disease, neg stress test in 1/2018, osteoarthritis, DM, HTN, HLD who presents as a transfer from [facility] for aphasia now with temporal ICH and new left MCA infarct. She is now withevolution of left MCA distribution infarct and prior left temporal ICH leading to encephalomalacia. She is having focal seizures and facial twitching.     - Cont neuro rx.   Neuro follow up  - She has a hx of a remote PCI.   - No anginal symptoms recently reported. Cont ASA 81 QD  - Has a moderate focal atheroma in Aortic Arch. Cont high intensity statin for now.  - s/p ILR placement given suspicion for embolic nature of CVA. No af noted on telemetry to date.  - intermittent sinus tachycardia in setting of agitation. Continue to watch    - BP is labile. better overall controlled.   - I would continue prn IV hydralazine and labetalol to control her blood pressure spikes.      - Appears compensated from HF POV.     - tolerated IR guided paraspinal fluid aspiration without cardiac sequale   - Monitor and replete electrolytes. Keep K>4.0 and Mg>2.0.  -  Further cardiac workup will depend on clinical course.   - All other workup per primary team. Will followup.

## 2019-01-18 NOTE — PROGRESS NOTE ADULT - SUBJECTIVE AND OBJECTIVE BOX
***INCOMPLETE***    Neurology Progress Note    Subjective: No acute events overnight. ***    HPI:  63 year old woman with vascular risk factors of age, HTN, DM II, HPLD and CAD was evaluated at [facility] for language disturbance. On 11/27 she underwent T10 laminectomy and fusion. Postoperatively, she was noted to have Wernicke's aphasia due to left temporal lobar ICH of undetermined etiology. She reported to have had significant improvement in her language deficit in rehabilitation. In the evening of 12/24, she was reported to have worsening of her aphasia, prompting her presentation to OSH and subsequent transfer to [facility].    Vital Signs Last 24 Hrs  Vital Signs Last 24 Hrs  T(C): 36.7 (18 Jan 2019 08:44), Max: 37.2 (17 Jan 2019 15:25)  T(F): 98.1 (18 Jan 2019 08:44), Max: 99 (17 Jan 2019 15:25)  HR: 76 (18 Jan 2019 08:44) (66 - 83)  BP: 144/80 (18 Jan 2019 08:44) (116/80 - 144/80)  BP(mean): --  RR: 18 (18 Jan 2019 08:44) (18 - 20)  SpO2: 97% (18 Jan 2019 08:44) (93% - 97%)      PHYSICAL EXAM:   General: no distress noted  HEENT: right gaze palsy  Abdomen: Soft, nontender, nondistended   Extremities: No edema  Back: LP site appearing dry, well healing w/o any discharge    NEUROLOGICAL EXAM:  Mental status: eyes open but inattentive for most of exam, able to make eye contact at some points, no verbal output, not following commands.  Cranial Nerves: No facial asymmetry, mild right gaze palsy crosses midline, no nystagmus, no dysarthria, tongue midline  Motor exam: extremities move spontaneously against gravity however right leg is noted to be moving less.   Sensation: Intact to mildly noxious stimuli  Coordination/ Gait: Unable to cooperate with testing; gait deferred       TPro  x   /  Alb  2261<L>  /  TBili  x   /  DBili  x   /  AST  x   /  ALT  x   /  AlkPhos  x   01-15    MEDICATIONS  (STANDING):  acetaminophen   Tablet .. 650 milliGRAM(s) Oral every 6 hours  amLODIPine   Tablet 10 milliGRAM(s) Oral daily  aspirin  chewable 81 milliGRAM(s) Oral daily  atorvastatin 80 milliGRAM(s) Oral at bedtime  BACItracin   Ointment 1 Application(s) Topical daily  carvedilol 25 milliGRAM(s) Oral every 12 hours  dextrose 50% Injectable 12.5 Gram(s) IV Push once  dextrose 50% Injectable 25 Gram(s) IV Push once  dextrose 50% Injectable 25 Gram(s) IV Push once  insulin lispro (HumaLOG) corrective regimen sliding scale   SubCutaneous every 6 hours  lacosamide 200 milliGRAM(s) Oral two times a day  lidocaine 2% Injectable 20 milliLiter(s) Local Injection once  losartan 100 milliGRAM(s) Oral daily  nystatin Ointment 1 Application(s) Topical every 12 hours  OLANZapine 5 milliGRAM(s) Oral two times a day  polyethylene glycol 3350 17 Gram(s) Oral daily  valproic  acid Syrup 250 milliGRAM(s) Oral two times a day    MEDICATIONS  (PRN):  dextrose 40% Gel 15 Gram(s) Oral once PRN Blood Glucose LESS THAN 70 milliGRAM(s)/deciliter  glucagon  Injectable 1 milliGRAM(s) IntraMuscular once PRN Glucose LESS THAN 70 milligrams/deciliter  hydrALAZINE Injectable 10 milliGRAM(s) IV Push every 6 hours PRN SBP>160  labetalol Injectable 10 milliGRAM(s) IV Push every 6 hours PRN Systolic blood pressure >160  LORazepam   Injectable 2 milliGRAM(s) IV Push every 5 minutes PRN Sustained GTC Seizure  morphine  - Injectable 1 milliGRAM(s) IV Push every 12 hours PRN Severe Pain (7 - 10)  OLANZapine Injectable 5 milliGRAM(s) IntraMuscular every 24 hours PRN agitation    IMAGING: Reviewed by me.   CT Angio Head/Neck IV Cont (01.07.19)  Normal CTA of the head and neck. No abnormal vascularity,   aneurysms or AVMs appreciated on this patient who had a prior left   temporal parenchymal hemorrhage.    CT Head No Cont (01.01.19)  No gross evidence for new infarct or new hemorrhage compared to the   12/30/2018 head CT and 12/26/2018 brain MRI.     CT Head No Cont (12.30.18)  No gross evidence for new infarct or new hemorrhage compared to the   12/26/2018 MRI study.    MRI Head w/wo IV Cont (12.26.18)  Left temporal lobe hematoma without significant interval change in size.  Small amount of subarachnoid blood.  New acute left MCA distribution infarct.  Prominent left temporal region cortical vein. Clinical correlation will   determine the need for conventional angiography to exclude the   possibility of a vascular malformation.    (12.25.18)  CT brain:  Resolving left temporal lobe parenchymal hemorrhage is redemonstrated,   similar to the prior brain CT.    CT angiography neck:  No evidence for arterial dissection. No flow-limiting stenosis. Carotid   bifurcations unremarkable.    CT angiography brain:  No evidence for AVM. No vascular aneurysm or flow-limiting stenosis.    CT venography brain:  No evidence for venous sinus or cortical vein thrombosis. Neurology Progress Note    Subjective: No acute events overnight. Patient seen and examined at bedside. No acute changes in clinical status.     HPI:  63 year old woman with vascular risk factors of age, HTN, DM II, HPLD and CAD was evaluated at [facility] for language disturbance. On 11/27 she underwent T10 laminectomy and fusion. Postoperatively, she was noted to have Wernicke's aphasia due to left temporal lobar ICH of undetermined etiology. She reported to have had significant improvement in her language deficit in rehabilitation. In the evening of 12/24, she was reported to have worsening of her aphasia, prompting her presentation to OSH and subsequent transfer to [facility].    Vital Signs Last 24 Hrs  Vital Signs Last 24 Hrs  T(C): 36.7 (18 Jan 2019 08:44), Max: 37.2 (17 Jan 2019 15:25)  T(F): 98.1 (18 Jan 2019 08:44), Max: 99 (17 Jan 2019 15:25)  HR: 76 (18 Jan 2019 08:44) (66 - 83)  BP: 144/80 (18 Jan 2019 08:44) (116/80 - 144/80)  BP(mean): --  RR: 18 (18 Jan 2019 08:44) (18 - 20)  SpO2: 97% (18 Jan 2019 08:44) (93% - 97%)      PHYSICAL EXAM:   General: no distress noted  HEENT: right gaze palsy  Abdomen: Soft, nontender, nondistended   Extremities: No edema  Back: LP site appearing dry, well healing w/o any discharge    NEUROLOGICAL EXAM:  Mental status: eyes open but inattentive for most of exam, able to make eye contact at some points, no verbal output, not following commands.  Cranial Nerves: No facial asymmetry, mild right gaze palsy crosses midline, no nystagmus, no dysarthria, tongue midline  Motor exam: extremities move spontaneously against gravity however right leg is noted to be moving less.   Sensation: Intact to mildly noxious stimuli  Coordination/ Gait: Unable to cooperate with testing; gait deferred       TPro  x   /  Alb  2261<L>  /  TBili  x   /  DBili  x   /  AST  x   /  ALT  x   /  AlkPhos  x   01-15    MEDICATIONS  (STANDING):  acetaminophen   Tablet .. 650 milliGRAM(s) Oral every 6 hours  amLODIPine   Tablet 10 milliGRAM(s) Oral daily  aspirin  chewable 81 milliGRAM(s) Oral daily  atorvastatin 80 milliGRAM(s) Oral at bedtime  BACItracin   Ointment 1 Application(s) Topical daily  carvedilol 25 milliGRAM(s) Oral every 12 hours  dextrose 50% Injectable 12.5 Gram(s) IV Push once  dextrose 50% Injectable 25 Gram(s) IV Push once  dextrose 50% Injectable 25 Gram(s) IV Push once  insulin lispro (HumaLOG) corrective regimen sliding scale   SubCutaneous every 6 hours  lacosamide 200 milliGRAM(s) Oral two times a day  lidocaine 2% Injectable 20 milliLiter(s) Local Injection once  losartan 100 milliGRAM(s) Oral daily  nystatin Ointment 1 Application(s) Topical every 12 hours  OLANZapine 5 milliGRAM(s) Oral two times a day  polyethylene glycol 3350 17 Gram(s) Oral daily  valproic  acid Syrup 250 milliGRAM(s) Oral two times a day    MEDICATIONS  (PRN):  dextrose 40% Gel 15 Gram(s) Oral once PRN Blood Glucose LESS THAN 70 milliGRAM(s)/deciliter  glucagon  Injectable 1 milliGRAM(s) IntraMuscular once PRN Glucose LESS THAN 70 milligrams/deciliter  hydrALAZINE Injectable 10 milliGRAM(s) IV Push every 6 hours PRN SBP>160  labetalol Injectable 10 milliGRAM(s) IV Push every 6 hours PRN Systolic blood pressure >160  LORazepam   Injectable 2 milliGRAM(s) IV Push every 5 minutes PRN Sustained GTC Seizure  morphine  - Injectable 1 milliGRAM(s) IV Push every 12 hours PRN Severe Pain (7 - 10)  OLANZapine Injectable 5 milliGRAM(s) IntraMuscular every 24 hours PRN agitation    IMAGING: Reviewed by me.   CT Angio Head/Neck IV Cont (01.07.19)  Normal CTA of the head and neck. No abnormal vascularity,   aneurysms or AVMs appreciated on this patient who had a prior left   temporal parenchymal hemorrhage.    CT Head No Cont (01.01.19)  No gross evidence for new infarct or new hemorrhage compared to the   12/30/2018 head CT and 12/26/2018 brain MRI.     CT Head No Cont (12.30.18)  No gross evidence for new infarct or new hemorrhage compared to the   12/26/2018 MRI study.    MRI Head w/wo IV Cont (12.26.18)  Left temporal lobe hematoma without significant interval change in size.  Small amount of subarachnoid blood.  New acute left MCA distribution infarct.  Prominent left temporal region cortical vein. Clinical correlation will   determine the need for conventional angiography to exclude the   possibility of a vascular malformation.    (12.25.18)  CT brain:  Resolving left temporal lobe parenchymal hemorrhage is redemonstrated,   similar to the prior brain CT.    CT angiography neck:  No evidence for arterial dissection. No flow-limiting stenosis. Carotid   bifurcations unremarkable.    CT angiography brain:  No evidence for AVM. No vascular aneurysm or flow-limiting stenosis.    CT venography brain:  No evidence for venous sinus or cortical vein thrombosis.

## 2019-01-18 NOTE — PROGRESS NOTE ADULT - SUBJECTIVE AND OBJECTIVE BOX
Interval Events: Pt seen and examined. No acute overnight events  Pt is tolerating PEG feeds, no residuals as per RN.   + 2 episodes of loose stool today    MEDICATIONS  (STANDING):  acetaminophen   Tablet .. 650 milliGRAM(s) Oral every 6 hours  amLODIPine   Tablet 10 milliGRAM(s) Oral daily  aspirin  chewable 81 milliGRAM(s) Oral daily  atorvastatin 80 milliGRAM(s) Oral at bedtime  BACItracin   Ointment 1 Application(s) Topical daily  carvedilol 25 milliGRAM(s) Oral every 12 hours  dextrose 50% Injectable 12.5 Gram(s) IV Push once  dextrose 50% Injectable 25 Gram(s) IV Push once  dextrose 50% Injectable 25 Gram(s) IV Push once  insulin lispro (HumaLOG) corrective regimen sliding scale   SubCutaneous every 6 hours  lacosamide 200 milliGRAM(s) Oral two times a day  lidocaine 2% Injectable 20 milliLiter(s) Local Injection once  losartan 100 milliGRAM(s) Oral daily  nystatin Ointment 1 Application(s) Topical every 12 hours  OLANZapine 5 milliGRAM(s) Oral two times a day  polyethylene glycol 3350 17 Gram(s) Oral daily  valproic  acid Syrup 250 milliGRAM(s) Oral two times a day    MEDICATIONS  (PRN):  dextrose 40% Gel 15 Gram(s) Oral once PRN Blood Glucose LESS THAN 70 milliGRAM(s)/deciliter  glucagon  Injectable 1 milliGRAM(s) IntraMuscular once PRN Glucose LESS THAN 70 milligrams/deciliter  hydrALAZINE Injectable 10 milliGRAM(s) IV Push every 6 hours PRN SBP>160  labetalol Injectable 10 milliGRAM(s) IV Push every 6 hours PRN Systolic blood pressure >160  LORazepam   Injectable 2 milliGRAM(s) IV Push every 5 minutes PRN Sustained GTC Seizure  morphine  - Injectable 1 milliGRAM(s) IV Push every 12 hours PRN Severe Pain (7 - 10)  OLANZapine Injectable 5 milliGRAM(s) IntraMuscular every 24 hours PRN agitation      Allergies    No Known Allergies    Intolerances        Review of Systems: unable to attain.    Vital Signs Last 24 Hrs  T(C): 36.7 (18 Jan 2019 08:44), Max: 37.2 (17 Jan 2019 15:25)  T(F): 98.1 (18 Jan 2019 08:44), Max: 99 (17 Jan 2019 15:25)  HR: 76 (18 Jan 2019 08:44) (66 - 84)  BP: 144/80 (18 Jan 2019 08:44) (116/80 - 144/80)  BP(mean): --  RR: 18 (18 Jan 2019 08:44) (18 - 20)  SpO2: 97% (18 Jan 2019 08:44) (93% - 97%)    PHYSICAL EXAM  GENERAL:   NAD  HEENT:  NC/AT, no JVD, sclera-anicteric  CHEST:  clear to ascultation bilaterally, respirations nonlabored  HEART:  +S1+S2 regular rate and rhythm   ABDOMEN:  Soft, normoactive bowel sounds, no masses, + PEG with abdominal binder c/d/i   EXTREMITIES:  no cyanosis, clubbing or edema  NEURO:  non-verbal  SKIN:  No rash/warm/dry      LABS:                RADIOLOGY & ADDITIONAL TESTS:
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
## 2019-01-19 LAB
GLUCOSE BLDC GLUCOMTR-MCNC: 109 MG/DL — HIGH (ref 70–99)
GLUCOSE BLDC GLUCOMTR-MCNC: 110 MG/DL — HIGH (ref 70–99)
GLUCOSE BLDC GLUCOMTR-MCNC: 127 MG/DL — HIGH (ref 70–99)
GLUCOSE BLDC GLUCOMTR-MCNC: 128 MG/DL — HIGH (ref 70–99)
GLUCOSE BLDC GLUCOMTR-MCNC: 142 MG/DL — HIGH (ref 70–99)
GLUCOSE BLDC GLUCOMTR-MCNC: 99 MG/DL — SIGNIFICANT CHANGE UP (ref 70–99)

## 2019-01-19 PROCEDURE — 99232 SBSQ HOSP IP/OBS MODERATE 35: CPT

## 2019-01-19 RX ORDER — ACETAMINOPHEN 500 MG
650 TABLET ORAL EVERY 6 HOURS
Qty: 0 | Refills: 0 | Status: DISCONTINUED | OUTPATIENT
Start: 2019-01-19 | End: 2019-01-22

## 2019-01-19 RX ORDER — DIAZEPAM 5 MG
10 TABLET ORAL AT BEDTIME
Qty: 0 | Refills: 0 | Status: DISCONTINUED | OUTPATIENT
Start: 2019-01-19 | End: 2019-01-22

## 2019-01-19 RX ADMIN — Medication 1 APPLICATION(S): at 11:41

## 2019-01-19 RX ADMIN — Medication 10 MILLIGRAM(S): at 22:31

## 2019-01-19 RX ADMIN — AMLODIPINE BESYLATE 10 MILLIGRAM(S): 2.5 TABLET ORAL at 05:37

## 2019-01-19 RX ADMIN — OLANZAPINE 5 MILLIGRAM(S): 15 TABLET, FILM COATED ORAL at 03:41

## 2019-01-19 RX ADMIN — NYSTATIN CREAM 1 APPLICATION(S): 100000 CREAM TOPICAL at 05:37

## 2019-01-19 RX ADMIN — Medication 650 MILLIGRAM(S): at 11:41

## 2019-01-19 RX ADMIN — ATORVASTATIN CALCIUM 80 MILLIGRAM(S): 80 TABLET, FILM COATED ORAL at 22:31

## 2019-01-19 RX ADMIN — NYSTATIN CREAM 1 APPLICATION(S): 100000 CREAM TOPICAL at 17:02

## 2019-01-19 RX ADMIN — Medication 650 MILLIGRAM(S): at 00:45

## 2019-01-19 RX ADMIN — CARVEDILOL PHOSPHATE 25 MILLIGRAM(S): 80 CAPSULE, EXTENDED RELEASE ORAL at 05:37

## 2019-01-19 RX ADMIN — LACOSAMIDE 200 MILLIGRAM(S): 50 TABLET ORAL at 05:37

## 2019-01-19 RX ADMIN — Medication 81 MILLIGRAM(S): at 11:42

## 2019-01-19 RX ADMIN — Medication 650 MILLIGRAM(S): at 06:35

## 2019-01-19 RX ADMIN — Medication 650 MILLIGRAM(S): at 05:37

## 2019-01-19 RX ADMIN — LOSARTAN POTASSIUM 100 MILLIGRAM(S): 100 TABLET, FILM COATED ORAL at 05:37

## 2019-01-19 RX ADMIN — LACOSAMIDE 200 MILLIGRAM(S): 50 TABLET ORAL at 17:02

## 2019-01-19 RX ADMIN — OLANZAPINE 5 MILLIGRAM(S): 15 TABLET, FILM COATED ORAL at 05:37

## 2019-01-19 RX ADMIN — CARVEDILOL PHOSPHATE 25 MILLIGRAM(S): 80 CAPSULE, EXTENDED RELEASE ORAL at 17:02

## 2019-01-19 RX ADMIN — Medication 250 MILLIGRAM(S): at 17:02

## 2019-01-19 RX ADMIN — Medication 650 MILLIGRAM(S): at 13:00

## 2019-01-19 RX ADMIN — Medication 250 MILLIGRAM(S): at 05:37

## 2019-01-19 RX ADMIN — OLANZAPINE 5 MILLIGRAM(S): 15 TABLET, FILM COATED ORAL at 17:02

## 2019-01-19 NOTE — PROGRESS NOTE ADULT - ASSESSMENT
63 years old woman with vascular risk factors of age, HTN, DM II, HPLD and CAD was evaluated at [facility] for language disturbance. On 11/27 she underwent T10 laminectomy and fusion. Postoperatively, she was noted to have Wernicke's aphasia due to left temporal lobar ICH of undetermined etiology. She reported to have had significant improvement in her language deficit in the rehabilitation. In the evening of 12/24, she was reported to have worsening of her aphasia, prompting her presentation to OSH and subsequent transfer to [facility]. CT brain or admission showed expected evolution/resolution of left temporal ICH leading to development of encephalomalacia. MRI brain showed what appeared to be an acute infarct in the left MCA distribution adjacent to the prior hemorrhage in the late subacute to chronic stage, although it is possible that this was not an actual infarct but rather MRI hyperintensity related to previous seizures; and expected evolution of prior left frontotemporal convexal subarachnoid hemorrhage as well as was concerning for a dilated cortical vein anterior to the hematoma. Of note, MRI brain (12/7) showed left temporal lobar ICH, left frontotemporal convexal SAH and juxtacortical FLAIR hyperintensities concerning for PRES to my eye. CT brain on 12/30, showed expected evolution of left MCA distribution "infarct" (versus abnormal signal related to previous seizures)  and prior left temporal ICH leading to encephalomalacia.    Impression:  Possible Left MCA distribution "stroke" -initially thought likely etiology being cryptogenic stroke, probably related to embolism from a proximal source, like cardiac/paradoxical source of embolism. Note it is possible or probable that this imaging abnormality does not represent an actual infarct but rather abnormal signal related to previous seizures vs. PRES.  Recent left temporal lobar ICH with associated left frontotemporal convexal subarachnoid hemorrhage - likely etiology could be intracranial hemorrhage in the setting of PRES but possibility of underlying vascular malformation like micro-AVM or large vessel vasculopathy like vasculitis needs to  be considered.  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    Neurologically noted with global aphasia and inattention- unchanged from previous, though likely to be PRES, planned to perform LP  to rule out any infectious or autoimmune etiology for altered mental status. Keep SBP between 120-150, LDL 44-continue with home statin as LDL is currently at goal, MRI Brain w/wo contrast noted above. Normal CTA of the head and neck. Physical therapy/OT recommended acute TBI rehab. Repeat MRI brain with and without contrast/MR spectroscopy end of February/begining of March to rule out an underlying neoplasm preferably upon resolution/complete resorption of prior left temporal lobar ICH; Conventional angiogram to rule out underlying vascular malformation could be considered based on results of a repeat brain imaging/vessel imaging.    Plan:  - s/p IR guided aspiration of fluid collection in the dorsal postoperative bed, B2-Transferring negative, Cx thus far -itive  - Thrombocytopenia resolved  - c/w Depakote 250 BID and Vimpat 200mg BID  - LP: thus far unrevealing, PCR, Crypto, HSV negative, IgG low  --- WNV, ACE, Lyme, CMV, Bands, VDRL and NMDA pending  - Repeat MRI Head w/wo resolving diffusion abnormality and improving left temporal lobe hemorrhage, SAH.  - PEG placed on 1/14, getting TF  - s/p LOOP, cardio on board, reccs appreciated, episodes of sinus tach w/ agitation  - PT/OT consulted appreciated; plan for discharge to Subacute rehab (1/21/19)

## 2019-01-19 NOTE — PROGRESS NOTE ADULT - SUBJECTIVE AND OBJECTIVE BOX
[facility]    Bora Mcfarland, Mica, Cari, Barry, Ashvin, Noah      [phone]    CHIEF COMPLAINT: Patient is a 63y old  Female who presents with a chief complaint of CAD (17 Jan 2019 10:05)      Follow Up: cad, temporal ich    Interim history: events noted, unable to provide a hx    MEDICATIONS  (STANDING):  acetaminophen   Tablet .. 650 milliGRAM(s) Oral every 6 hours  amLODIPine   Tablet 10 milliGRAM(s) Oral daily  aspirin  chewable 81 milliGRAM(s) Oral daily  atorvastatin 80 milliGRAM(s) Oral at bedtime  BACItracin   Ointment 1 Application(s) Topical daily  carvedilol 25 milliGRAM(s) Oral every 12 hours  dextrose 50% Injectable 12.5 Gram(s) IV Push once  dextrose 50% Injectable 25 Gram(s) IV Push once  dextrose 50% Injectable 25 Gram(s) IV Push once  diazepam    Tablet 10 milliGRAM(s) Oral at bedtime  insulin lispro (HumaLOG) corrective regimen sliding scale   SubCutaneous every 6 hours  lacosamide 200 milliGRAM(s) Oral two times a day  lidocaine 2% Injectable 20 milliLiter(s) Local Injection once  losartan 100 milliGRAM(s) Oral daily  nystatin Ointment 1 Application(s) Topical every 12 hours  OLANZapine 5 milliGRAM(s) Oral two times a day  polyethylene glycol 3350 17 Gram(s) Oral daily  valproic  acid Syrup 250 milliGRAM(s) Oral two times a day    MEDICATIONS  (PRN):  dextrose 40% Gel 15 Gram(s) Oral once PRN Blood Glucose LESS THAN 70 milliGRAM(s)/deciliter  glucagon  Injectable 1 milliGRAM(s) IntraMuscular once PRN Glucose LESS THAN 70 milligrams/deciliter  hydrALAZINE Injectable 10 milliGRAM(s) IV Push every 6 hours PRN SBP>160  labetalol Injectable 10 milliGRAM(s) IV Push every 6 hours PRN Systolic blood pressure >160  LORazepam   Injectable 2 milliGRAM(s) IV Push every 5 minutes PRN Sustained GTC Seizure  morphine  - Injectable 1 milliGRAM(s) IV Push every 12 hours PRN Severe Pain (7 - 10)  OLANZapine Injectable 5 milliGRAM(s) IntraMuscular every 24 hours PRN agitation      REVIEW OF SYSTEMS: unable    Vital Signs Last 24 Hrs  T(C): 36.4 (19 Jan 2019 08:47), Max: 37 (18 Jan 2019 16:17)  T(F): 97.6 (19 Jan 2019 08:47), Max: 98.6 (18 Jan 2019 16:17)  HR: 73 (19 Jan 2019 08:47) (58 - 99)  BP: 153/79 (19 Jan 2019 08:47) (133/74 - 161/75)  BP(mean): --  RR: 16 (19 Jan 2019 08:47) (16 - 18)  SpO2: 94% (19 Jan 2019 08:47) (94% - 97%)    I&O's Summary    18 Jan 2019 07:01  -  19 Jan 2019 07:00  --------------------------------------------------------  IN: 0 mL / OUT: 900 mL / NET: -900 mL        Telemetry past 24h:    PHYSICAL EXAM:    Constitutional: well-nourished, well-developed, NAD   HEENT:  MMM, sclerae anicteric, conjunctivae clear, no oral cyanosis.  Pulmonary: Non-labored, breath sounds are clear bilaterally, No wheezing, rales or rhonchi  Cardiovascular: Regular, S1 and S2.  No murmur.  No rubs, gallops or clicks  Gastrointestinal: Bowel Sounds present, soft, nontender.   Lymph: No peripheral edema.   Neurological: leth  Skin: No rashes.  Psych:  leth    LABS: All Labs Reviewed:                Blood Culture: Organism --  Gram Stain Blood -- Gram Stain   No polymorphonuclear cells seen  No organisms seen  by cytocentrifuge  Specimen Source .CSF CSF  Culture-Blood --            RADIOLOGY:    EKG:    Echo:

## 2019-01-19 NOTE — PROGRESS NOTE ADULT - ASSESSMENT
63F with a history of CAD s/p PCI to OM2 with a AVE in December of 2015, residual 40% LAD disease, neg stress test in 1/2018, osteoarthritis, DM, HTN, HLD who presents as a transfer from [facility] for aphasia now with temporal ICH and new left MCA infarct. She is now withevolution of left MCA distribution infarct and prior left temporal ICH leading to encephalomalacia. She is having focal seizures and facial twitching.     - Cont neuro rx.   Neuro follow up  - She has a hx of a remote PCI.   - No anginal symptoms recently reported. Cont ASA 81 QD  - Has a moderate focal atheroma in Aortic Arch. Cont high intensity statin for now.  - s/p ILR placement given suspicion for embolic nature of CVA. No af noted on telemetry to date.    - BP is labile. better overall controlled.   - I would continue prn IV hydralazine and labetalol to control her blood pressure spikes.      - Appears compensated from HF POV.     - tolerated IR guided paraspinal fluid aspiration without cardiac sequale   - Monitor and replete electrolytes. Keep K>4.0 and Mg>2.0.  -  Further cardiac workup will depend on clinical course.   - All other workup and dc planning per primary team.

## 2019-01-19 NOTE — PROGRESS NOTE ADULT - SUBJECTIVE AND OBJECTIVE BOX
Interval Events: Pt seen and examined  as pt RN feeds held overnight due to residuals of >60 cc  feeds restarted and pt is tolerating them well without residuals  no further loose stools     MEDICATIONS  (STANDING):  acetaminophen   Tablet .. 650 milliGRAM(s) Oral every 6 hours  amLODIPine   Tablet 10 milliGRAM(s) Oral daily  aspirin  chewable 81 milliGRAM(s) Oral daily  atorvastatin 80 milliGRAM(s) Oral at bedtime  BACItracin   Ointment 1 Application(s) Topical daily  carvedilol 25 milliGRAM(s) Oral every 12 hours  dextrose 50% Injectable 12.5 Gram(s) IV Push once  dextrose 50% Injectable 25 Gram(s) IV Push once  dextrose 50% Injectable 25 Gram(s) IV Push once  diazepam    Tablet 10 milliGRAM(s) Oral at bedtime  insulin lispro (HumaLOG) corrective regimen sliding scale   SubCutaneous every 6 hours  lacosamide 200 milliGRAM(s) Oral two times a day  lidocaine 2% Injectable 20 milliLiter(s) Local Injection once  losartan 100 milliGRAM(s) Oral daily  nystatin Ointment 1 Application(s) Topical every 12 hours  OLANZapine 5 milliGRAM(s) Oral two times a day  polyethylene glycol 3350 17 Gram(s) Oral daily  valproic  acid Syrup 250 milliGRAM(s) Oral two times a day    MEDICATIONS  (PRN):  dextrose 40% Gel 15 Gram(s) Oral once PRN Blood Glucose LESS THAN 70 milliGRAM(s)/deciliter  glucagon  Injectable 1 milliGRAM(s) IntraMuscular once PRN Glucose LESS THAN 70 milligrams/deciliter  hydrALAZINE Injectable 10 milliGRAM(s) IV Push every 6 hours PRN SBP>160  labetalol Injectable 10 milliGRAM(s) IV Push every 6 hours PRN Systolic blood pressure >160  LORazepam   Injectable 2 milliGRAM(s) IV Push every 5 minutes PRN Sustained GTC Seizure  morphine  - Injectable 1 milliGRAM(s) IV Push every 12 hours PRN Severe Pain (7 - 10)  OLANZapine Injectable 5 milliGRAM(s) IntraMuscular every 24 hours PRN agitation      Allergies    No Known Allergies    Intolerances        Review of Systems: unable to obtain.     Vital Signs Last 24 Hrs  T(C): 36.4 (19 Jan 2019 08:47), Max: 37 (18 Jan 2019 16:17)  T(F): 97.6 (19 Jan 2019 08:47), Max: 98.6 (18 Jan 2019 16:17)  HR: 73 (19 Jan 2019 08:47) (58 - 99)  BP: 153/79 (19 Jan 2019 08:47) (133/74 - 161/75)  BP(mean): --  RR: 16 (19 Jan 2019 08:47) (16 - 18)  SpO2: 94% (19 Jan 2019 08:47) (94% - 97%)    PHYSICAL EXAM  GENERAL:   NAD  HEENT:  NC/AT, no JVD, sclera-anicteric  CHEST:  clear to ascultation bilaterally, respirations nonlabored  HEART:  +S1+S2 regular rate and rhythm   ABDOMEN:  Soft, normoactive bowel sounds, no masses, + PEG with abdominal binder c/d/i   EXTREMITIES:  no cyanosis, clubbing or edema  NEURO:  non-verbal  SKIN:  No rash/warm/dry    LABS:                RADIOLOGY & ADDITIONAL TESTS:

## 2019-01-19 NOTE — PROGRESS NOTE ADULT - SUBJECTIVE AND OBJECTIVE BOX
Neurology Progress Note    Subjective: No acute events overnight. Patient seen and examined at bedside. No acute changes in clinical status.     HPI:  63 year old woman with vascular risk factors of age, HTN, DM II, HPLD and CAD was evaluated at [facility] for language disturbance. On 11/27 she underwent T10 laminectomy and fusion. Postoperatively, she was noted to have Wernicke's aphasia due to left temporal lobar ICH of undetermined etiology. She reported to have had significant improvement in her language deficit in rehabilitation. In the evening of 12/24, she was reported to have worsening of her aphasia, prompting her presentation to OSH and subsequent transfer to [facility].    Vital Signs Last 24 Hrs  T(C): 36.4 (19 Jan 2019 00:00), Max: 37 (18 Jan 2019 16:17)  T(F): 97.6 (19 Jan 2019 00:00), Max: 98.6 (18 Jan 2019 16:17)  HR: 99 (19 Jan 2019 00:00) (58 - 99)  BP: 149/83 (19 Jan 2019 00:00) (133/74 - 161/75)  BP(mean): --  RR: 16 (19 Jan 2019 00:00) (16 - 18)  SpO2: 94% (19 Jan 2019 00:00) (94% - 97%)    PHYSICAL EXAM:   General: no distress noted  HEENT: right gaze palsy  Abdomen: Soft, nontender, nondistended   Extremities: No edema  Back: LP site appearing dry, well healing w/o any discharge    NEUROLOGICAL EXAM:  Mental status: eyes open but inattentive for most of exam, able to make eye contact at some points, no verbal output, not following commands.  Cranial Nerves: No facial asymmetry, mild right gaze palsy crosses midline, no nystagmus, no dysarthria, tongue midline  Motor exam: extremities move spontaneously against gravity however right leg is noted to be moving less.   Sensation: Intact to mildly noxious stimuli  Coordination/ Gait: Unable to cooperate with testing; gait deferred       TPro  x   /  Alb  2261<L>  /  TBili  x   /  DBili  x   /  AST  x   /  ALT  x   /  AlkPhos  x   01-15    MEDICATIONS  (STANDING):  acetaminophen   Tablet .. 650 milliGRAM(s) Oral every 6 hours  amLODIPine   Tablet 10 milliGRAM(s) Oral daily  aspirin  chewable 81 milliGRAM(s) Oral daily  atorvastatin 80 milliGRAM(s) Oral at bedtime  BACItracin   Ointment 1 Application(s) Topical daily  carvedilol 25 milliGRAM(s) Oral every 12 hours  dextrose 50% Injectable 12.5 Gram(s) IV Push once  dextrose 50% Injectable 25 Gram(s) IV Push once  dextrose 50% Injectable 25 Gram(s) IV Push once  diazepam    Tablet 10 milliGRAM(s) Oral at bedtime  insulin lispro (HumaLOG) corrective regimen sliding scale   SubCutaneous every 6 hours  lacosamide 200 milliGRAM(s) Oral two times a day  lidocaine 2% Injectable 20 milliLiter(s) Local Injection once  losartan 100 milliGRAM(s) Oral daily  nystatin Ointment 1 Application(s) Topical every 12 hours  OLANZapine 5 milliGRAM(s) Oral two times a day  polyethylene glycol 3350 17 Gram(s) Oral daily  valproic  acid Syrup 250 milliGRAM(s) Oral two times a day    MEDICATIONS  (PRN):  dextrose 40% Gel 15 Gram(s) Oral once PRN Blood Glucose LESS THAN 70 milliGRAM(s)/deciliter  glucagon  Injectable 1 milliGRAM(s) IntraMuscular once PRN Glucose LESS THAN 70 milligrams/deciliter  hydrALAZINE Injectable 10 milliGRAM(s) IV Push every 6 hours PRN SBP>160  labetalol Injectable 10 milliGRAM(s) IV Push every 6 hours PRN Systolic blood pressure >160  LORazepam   Injectable 2 milliGRAM(s) IV Push every 5 minutes PRN Sustained GTC Seizure  morphine  - Injectable 1 milliGRAM(s) IV Push every 12 hours PRN Severe Pain (7 - 10)  OLANZapine Injectable 5 milliGRAM(s) IntraMuscular every 24 hours PRN agitation      IMAGING: Reviewed by me.   CT Angio Head/Neck IV Cont (01.07.19)  Normal CTA of the head and neck. No abnormal vascularity,   aneurysms or AVMs appreciated on this patient who had a prior left   temporal parenchymal hemorrhage.    CT Head No Cont (01.01.19)  No gross evidence for new infarct or new hemorrhage compared to the   12/30/2018 head CT and 12/26/2018 brain MRI.     CT Head No Cont (12.30.18)  No gross evidence for new infarct or new hemorrhage compared to the   12/26/2018 MRI study.    MRI Head w/wo IV Cont (12.26.18)  Left temporal lobe hematoma without significant interval change in size.  Small amount of subarachnoid blood.  New acute left MCA distribution infarct.  Prominent left temporal region cortical vein. Clinical correlation will   determine the need for conventional angiography to exclude the   possibility of a vascular malformation.    (12.25.18)  CT brain:  Resolving left temporal lobe parenchymal hemorrhage is redemonstrated,   similar to the prior brain CT.    CT angiography neck:  No evidence for arterial dissection. No flow-limiting stenosis. Carotid   bifurcations unremarkable.    CT angiography brain:  No evidence for AVM. No vascular aneurysm or flow-limiting stenosis.    CT venography brain:  No evidence for venous sinus or cortical vein thrombosis.

## 2019-01-20 LAB
GLUCOSE BLDC GLUCOMTR-MCNC: 116 MG/DL — HIGH (ref 70–99)
GLUCOSE BLDC GLUCOMTR-MCNC: 117 MG/DL — HIGH (ref 70–99)
GLUCOSE BLDC GLUCOMTR-MCNC: 119 MG/DL — HIGH (ref 70–99)
GLUCOSE BLDC GLUCOMTR-MCNC: 129 MG/DL — HIGH (ref 70–99)
GLUCOSE BLDC GLUCOMTR-MCNC: 130 MG/DL — HIGH (ref 70–99)
GLUCOSE BLDC GLUCOMTR-MCNC: 148 MG/DL — HIGH (ref 70–99)

## 2019-01-20 PROCEDURE — 99232 SBSQ HOSP IP/OBS MODERATE 35: CPT | Mod: GC

## 2019-01-20 PROCEDURE — 99232 SBSQ HOSP IP/OBS MODERATE 35: CPT

## 2019-01-20 RX ADMIN — MORPHINE SULFATE 1 MILLIGRAM(S): 50 CAPSULE, EXTENDED RELEASE ORAL at 09:46

## 2019-01-20 RX ADMIN — LACOSAMIDE 200 MILLIGRAM(S): 50 TABLET ORAL at 06:02

## 2019-01-20 RX ADMIN — LOSARTAN POTASSIUM 100 MILLIGRAM(S): 100 TABLET, FILM COATED ORAL at 06:03

## 2019-01-20 RX ADMIN — CARVEDILOL PHOSPHATE 25 MILLIGRAM(S): 80 CAPSULE, EXTENDED RELEASE ORAL at 17:10

## 2019-01-20 RX ADMIN — NYSTATIN CREAM 1 APPLICATION(S): 100000 CREAM TOPICAL at 06:03

## 2019-01-20 RX ADMIN — Medication 250 MILLIGRAM(S): at 06:02

## 2019-01-20 RX ADMIN — OLANZAPINE 5 MILLIGRAM(S): 15 TABLET, FILM COATED ORAL at 06:03

## 2019-01-20 RX ADMIN — Medication 81 MILLIGRAM(S): at 12:46

## 2019-01-20 RX ADMIN — OLANZAPINE 5 MILLIGRAM(S): 15 TABLET, FILM COATED ORAL at 17:10

## 2019-01-20 RX ADMIN — Medication 10 MILLIGRAM(S): at 21:03

## 2019-01-20 RX ADMIN — CARVEDILOL PHOSPHATE 25 MILLIGRAM(S): 80 CAPSULE, EXTENDED RELEASE ORAL at 06:03

## 2019-01-20 RX ADMIN — AMLODIPINE BESYLATE 10 MILLIGRAM(S): 2.5 TABLET ORAL at 06:02

## 2019-01-20 RX ADMIN — MORPHINE SULFATE 1 MILLIGRAM(S): 50 CAPSULE, EXTENDED RELEASE ORAL at 08:46

## 2019-01-20 RX ADMIN — LACOSAMIDE 200 MILLIGRAM(S): 50 TABLET ORAL at 17:10

## 2019-01-20 RX ADMIN — Medication 250 MILLIGRAM(S): at 17:10

## 2019-01-20 RX ADMIN — NYSTATIN CREAM 1 APPLICATION(S): 100000 CREAM TOPICAL at 17:22

## 2019-01-20 RX ADMIN — Medication 1 APPLICATION(S): at 12:43

## 2019-01-20 RX ADMIN — ATORVASTATIN CALCIUM 80 MILLIGRAM(S): 80 TABLET, FILM COATED ORAL at 21:03

## 2019-01-20 NOTE — PROGRESS NOTE ADULT - SUBJECTIVE AND OBJECTIVE BOX
Interval Events: Pt seen and examined  as pt RN feeds held this morning due to residuals of >60 cc  feeds restarted, monitor residuals  no further loose stools     MEDICATIONS  (STANDING):  amLODIPine   Tablet 10 milliGRAM(s) Oral daily  aspirin  chewable 81 milliGRAM(s) Oral daily  atorvastatin 80 milliGRAM(s) Oral at bedtime  BACItracin   Ointment 1 Application(s) Topical daily  carvedilol 25 milliGRAM(s) Oral every 12 hours  dextrose 50% Injectable 12.5 Gram(s) IV Push once  dextrose 50% Injectable 25 Gram(s) IV Push once  dextrose 50% Injectable 25 Gram(s) IV Push once  diazepam    Tablet 10 milliGRAM(s) Oral at bedtime  insulin lispro (HumaLOG) corrective regimen sliding scale   SubCutaneous every 6 hours  lacosamide 200 milliGRAM(s) Oral two times a day  lidocaine 2% Injectable 20 milliLiter(s) Local Injection once  losartan 100 milliGRAM(s) Oral daily  nystatin Ointment 1 Application(s) Topical every 12 hours  OLANZapine 5 milliGRAM(s) Oral two times a day  polyethylene glycol 3350 17 Gram(s) Oral daily  valproic  acid Syrup 250 milliGRAM(s) Oral two times a day    MEDICATIONS  (PRN):  acetaminophen   Tablet .. 650 milliGRAM(s) Oral every 6 hours PRN Temp greater or equal to 38.5C (101.3F), Moderate Pain (4 - 6)  dextrose 40% Gel 15 Gram(s) Oral once PRN Blood Glucose LESS THAN 70 milliGRAM(s)/deciliter  glucagon  Injectable 1 milliGRAM(s) IntraMuscular once PRN Glucose LESS THAN 70 milligrams/deciliter  hydrALAZINE Injectable 10 milliGRAM(s) IV Push every 6 hours PRN SBP>160  labetalol Injectable 10 milliGRAM(s) IV Push every 6 hours PRN Systolic blood pressure >160  LORazepam   Injectable 2 milliGRAM(s) IV Push every 5 minutes PRN Sustained GTC Seizure  morphine  - Injectable 1 milliGRAM(s) IV Push every 12 hours PRN Severe Pain (7 - 10)  OLANZapine Injectable 5 milliGRAM(s) IntraMuscular every 24 hours PRN agitation      Allergies    No Known Allergies    Intolerances    Review of Systems: unable to obtain.     Vital Signs Last 24 Hrs  T(C): 36.4 (20 Jan 2019 08:52), Max: 36.8 (20 Jan 2019 04:45)  T(F): 97.6 (20 Jan 2019 08:52), Max: 98.2 (20 Jan 2019 04:45)  HR: 82 (20 Jan 2019 08:52) (71 - 98)  BP: 131/73 (20 Jan 2019 08:52) (131/73 - 145/76)  BP(mean): --  RR: 18 (20 Jan 2019 08:52) (16 - 18)  SpO2: 98% (20 Jan 2019 08:52) (95% - 98%)    PHYSICAL EXAM  GENERAL:   NAD  HEENT:  NC/AT, no JVD, sclera-anicteric  CHEST:  clear to ascultation bilaterally, respirations nonlabored  HEART:  +S1+S2 regular rate and rhythm   ABDOMEN:  Soft, normoactive bowel sounds, no masses, + PEG with abdominal binder c/d/i   EXTREMITIES:  no cyanosis, clubbing or edema  NEURO:  non-verbal  SKIN:  No rash/warm/dry      LABS:                RADIOLOGY & ADDITIONAL TESTS:

## 2019-01-20 NOTE — PROGRESS NOTE ADULT - SUBJECTIVE AND OBJECTIVE BOX
[facility]    Bora Mcfarland, Mica, Cari, Barry, Ashvin, Noah      [phone]    CHIEF COMPLAINT: Patient is a 63y old  Female who presents with a chief complaint of Word finding difficulty (20 Jan 2019 10:32)      Follow Up: cad, ich    Interim history: altered ms, unable to obtain hx. events noted    MEDICATIONS  (STANDING):  amLODIPine   Tablet 10 milliGRAM(s) Oral daily  aspirin  chewable 81 milliGRAM(s) Oral daily  atorvastatin 80 milliGRAM(s) Oral at bedtime  BACItracin   Ointment 1 Application(s) Topical daily  carvedilol 25 milliGRAM(s) Oral every 12 hours  dextrose 50% Injectable 12.5 Gram(s) IV Push once  dextrose 50% Injectable 25 Gram(s) IV Push once  dextrose 50% Injectable 25 Gram(s) IV Push once  diazepam    Tablet 10 milliGRAM(s) Oral at bedtime  insulin lispro (HumaLOG) corrective regimen sliding scale   SubCutaneous every 6 hours  lacosamide 200 milliGRAM(s) Oral two times a day  lidocaine 2% Injectable 20 milliLiter(s) Local Injection once  losartan 100 milliGRAM(s) Oral daily  nystatin Ointment 1 Application(s) Topical every 12 hours  OLANZapine 5 milliGRAM(s) Oral two times a day  polyethylene glycol 3350 17 Gram(s) Oral daily  valproic  acid Syrup 250 milliGRAM(s) Oral two times a day    MEDICATIONS  (PRN):  acetaminophen   Tablet .. 650 milliGRAM(s) Oral every 6 hours PRN Temp greater or equal to 38.5C (101.3F), Moderate Pain (4 - 6)  dextrose 40% Gel 15 Gram(s) Oral once PRN Blood Glucose LESS THAN 70 milliGRAM(s)/deciliter  glucagon  Injectable 1 milliGRAM(s) IntraMuscular once PRN Glucose LESS THAN 70 milligrams/deciliter  hydrALAZINE Injectable 10 milliGRAM(s) IV Push every 6 hours PRN SBP>160  labetalol Injectable 10 milliGRAM(s) IV Push every 6 hours PRN Systolic blood pressure >160  LORazepam   Injectable 2 milliGRAM(s) IV Push every 5 minutes PRN Sustained GTC Seizure  morphine  - Injectable 1 milliGRAM(s) IV Push every 12 hours PRN Severe Pain (7 - 10)  OLANZapine Injectable 5 milliGRAM(s) IntraMuscular every 24 hours PRN agitation      REVIEW OF SYSTEMS: unable (ms)    Vital Signs Last 24 Hrs  T(C): 36.4 (20 Jan 2019 08:52), Max: 36.8 (20 Jan 2019 04:45)  T(F): 97.6 (20 Jan 2019 08:52), Max: 98.2 (20 Jan 2019 04:45)  HR: 82 (20 Jan 2019 08:52) (71 - 98)  BP: 131/73 (20 Jan 2019 08:52) (131/73 - 145/76)  BP(mean): --  RR: 18 (20 Jan 2019 08:52) (16 - 18)  SpO2: 98% (20 Jan 2019 08:52) (95% - 98%)    I&O's Summary    19 Jan 2019 07:01  -  20 Jan 2019 07:00  --------------------------------------------------------  IN: 2760 mL / OUT: 1275 mL / NET: 1485 mL        Telemetry past 24h:    PHYSICAL EXAM:    Constitutional: well-nourished, well-developed, NAD   HEENT:  MMM, sclerae anicteric, conjunctivae clear, no oral cyanosis.  Pulmonary: Non-labored, breath sounds are clear bilaterally, No wheezing, rales or rhonchi  Cardiovascular: Regular, S1 and S2.  No murmur.  No rubs, gallops or clicks  Gastrointestinal: Bowel Sounds present, soft, nontender.   Lymph: No peripheral edema.   Neurological: lethargic nonverbal  Skin: No rashes.  Psych: lethargic nonverbal    LABS: All Labs Reviewed:                Blood Culture: Organism --  Gram Stain Blood -- Gram Stain   No polymorphonuclear cells seen  No organisms seen  by cytocentrifuge  Specimen Source .CSF CSF  Culture-Blood --            RADIOLOGY:    EKG:    Echo:

## 2019-01-20 NOTE — PROGRESS NOTE ADULT - ASSESSMENT
63F with a history of CAD s/p PCI to OM2 with a AVE in December of 2015, residual 40% LAD disease, neg stress test in 1/2018, osteoarthritis, DM, HTN, HLD who presents as a transfer from [facility] for aphasia now with temporal ICH and new left MCA infarct. She is now withevolution of left MCA distribution infarct and prior left temporal ICH leading to encephalomalacia. She is having focal seizures and facial twitching.     - Cont neuro rx.   Neuro follow up  - She has a hx of a remote PCI.   - No anginal symptoms recently reported. Cont ASA 81 QD  - Has a moderate focal atheroma in Aortic Arch. Cont high intensity statin for now.  - s/p ILR placement given suspicion for embolic nature of CVA. No af noted on telemetry to date.    - BP overall controlled.   - I would continue prn IV hydralazine and labetalol to control her blood pressure spikes, though her peak sbp is in the 140s.      - Appears compensated from HF POV.     - tolerated IR guided paraspinal fluid aspiration without cardiac sequale   - Monitor and replete electrolytes. Keep K>4.0 and Mg>2.0.  -  Further cardiac workup will depend on clinical course.   - All other workup and dc planning per primary team.

## 2019-01-20 NOTE — PROGRESS NOTE ADULT - SUBJECTIVE AND OBJECTIVE BOX
Neurology Follow up note    Name: DEMI ARCHIBALD    Subjective:    HPI: 63 year old woman with vascular risk factors of age, HTN, DM II, HPLD and CAD was evaluated at [facility] for language disturbance. On 11/27 she underwent T10 laminectomy and fusion. Postoperatively, she was noted to have Wernicke's aphasia due to left temporal lobar ICH of undetermined etiology. She reported to have had significant improvement in her language deficit in rehabilitation. In the evening of 12/24, she was reported to have worsening of her aphasia, prompting her presentation to OSH and subsequent transfer to [facility].    MEDICATIONS  (STANDING):  amLODIPine   Tablet 10 milliGRAM(s) Oral daily  aspirin  chewable 81 milliGRAM(s) Oral daily  atorvastatin 80 milliGRAM(s) Oral at bedtime  BACItracin   Ointment 1 Application(s) Topical daily  carvedilol 25 milliGRAM(s) Oral every 12 hours  dextrose 50% Injectable 12.5 Gram(s) IV Push once  dextrose 50% Injectable 25 Gram(s) IV Push once  dextrose 50% Injectable 25 Gram(s) IV Push once  diazepam    Tablet 10 milliGRAM(s) Oral at bedtime  insulin lispro (HumaLOG) corrective regimen sliding scale   SubCutaneous every 6 hours  lacosamide 200 milliGRAM(s) Oral two times a day  lidocaine 2% Injectable 20 milliLiter(s) Local Injection once  losartan 100 milliGRAM(s) Oral daily  nystatin Ointment 1 Application(s) Topical every 12 hours  OLANZapine 5 milliGRAM(s) Oral two times a day  polyethylene glycol 3350 17 Gram(s) Oral daily  valproic  acid Syrup 250 milliGRAM(s) Oral two times a day    MEDICATIONS  (PRN):  acetaminophen   Tablet .. 650 milliGRAM(s) Oral every 6 hours PRN Temp greater or equal to 38.5C (101.3F), Moderate Pain (4 - 6)  dextrose 40% Gel 15 Gram(s) Oral once PRN Blood Glucose LESS THAN 70 milliGRAM(s)/deciliter  glucagon  Injectable 1 milliGRAM(s) IntraMuscular once PRN Glucose LESS THAN 70 milligrams/deciliter  hydrALAZINE Injectable 10 milliGRAM(s) IV Push every 6 hours PRN SBP>160  labetalol Injectable 10 milliGRAM(s) IV Push every 6 hours PRN Systolic blood pressure >160  LORazepam   Injectable 2 milliGRAM(s) IV Push every 5 minutes PRN Sustained GTC Seizure  morphine  - Injectable 1 milliGRAM(s) IV Push every 12 hours PRN Severe Pain (7 - 10)  OLANZapine Injectable 5 milliGRAM(s) IntraMuscular every 24 hours PRN agitation    Allergies  No Known Allergies    Objective:   Vital Signs Last 24 Hrs  T(C): 36.4 (20 Jan 2019 08:52), Max: 36.8 (20 Jan 2019 04:45)  T(F): 97.6 (20 Jan 2019 08:52), Max: 98.2 (20 Jan 2019 04:45)  HR: 82 (20 Jan 2019 08:52) (71 - 98)  BP: 131/73 (20 Jan 2019 08:52) (131/73 - 145/76)  RR: 18 (20 Jan 2019 08:52) (16 - 18)  SpO2: 98% (20 Jan 2019 08:52) (95% - 98%)    PHYSICAL EXAM:   General: no distress noted  HEENT: right gaze palsy  Abdomen: Soft, nontender, nondistended   Extremities: No edema  Back: LP site appearing dry, well healing w/o any discharge    NEUROLOGICAL EXAM:  Mental status: eyes open but inattentive for most of exam, able to make eye contact at some points, no verbal output, not following commands.  Cranial Nerves: No facial asymmetry, mild right gaze palsy crosses midline, no nystagmus, no dysarthria, tongue midline  Motor exam: extremities move spontaneously against gravity however right leg is noted to be moving less.   Sensation: Intact to mildly noxious stimuli  Coordination/ Gait: Unable to cooperate with testing; gait deferred     Radiology    CT Angio Head/Neck IV Cont (01.07.19)  Normal CTA of the head and neck. No abnormal vascularity,   aneurysms or AVMs appreciated on this patient who had a prior left   temporal parenchymal hemorrhage.    CT Head No Cont (01.01.19)  No gross evidence for new infarct or new hemorrhage compared to the   12/30/2018 head CT and 12/26/2018 brain MRI.     CT Head No Cont (12.30.18)  No gross evidence for new infarct or new hemorrhage compared to the   12/26/2018 MRI study.    MRI Head w/wo IV Cont (12.26.18)  Left temporal lobe hematoma without significant interval change in size.  Small amount of subarachnoid blood.  New acute left MCA distribution infarct.  Prominent left temporal region cortical vein. Clinical correlation will   determine the need for conventional angiography to exclude the   possibility of a vascular malformation.    (12.25.18)  CT brain:  Resolving left temporal lobe parenchymal hemorrhage is redemonstrated,   similar to the prior brain CT.    CT angiography neck:  No evidence for arterial dissection. No flow-limiting stenosis. Carotid   bifurcations unremarkable.    CT angiography brain:  No evidence for AVM. No vascular aneurysm or flow-limiting stenosis.    CT venography brain:  No evidence for venous sinus or cortical vein thrombosis. Neurology Follow up note    Name: DEMI ARCHIBALD    Subjective: no acute events overnight.    HPI: 63 year old woman with vascular risk factors of age, HTN, DM II, HPLD and CAD was evaluated at [facility] for language disturbance. On 11/27 she underwent T10 laminectomy and fusion. Postoperatively, she was noted to have Wernicke's aphasia due to left temporal lobar ICH of undetermined etiology. She reported to have had significant improvement in her language deficit in rehabilitation. In the evening of 12/24, she was reported to have worsening of her aphasia, prompting her presentation to OSH and subsequent transfer to [facility].    MEDICATIONS  (STANDING):  amLODIPine   Tablet 10 milliGRAM(s) Oral daily  aspirin  chewable 81 milliGRAM(s) Oral daily  atorvastatin 80 milliGRAM(s) Oral at bedtime  BACItracin   Ointment 1 Application(s) Topical daily  carvedilol 25 milliGRAM(s) Oral every 12 hours  dextrose 50% Injectable 12.5 Gram(s) IV Push once  dextrose 50% Injectable 25 Gram(s) IV Push once  dextrose 50% Injectable 25 Gram(s) IV Push once  diazepam    Tablet 10 milliGRAM(s) Oral at bedtime  insulin lispro (HumaLOG) corrective regimen sliding scale   SubCutaneous every 6 hours  lacosamide 200 milliGRAM(s) Oral two times a day  lidocaine 2% Injectable 20 milliLiter(s) Local Injection once  losartan 100 milliGRAM(s) Oral daily  nystatin Ointment 1 Application(s) Topical every 12 hours  OLANZapine 5 milliGRAM(s) Oral two times a day  polyethylene glycol 3350 17 Gram(s) Oral daily  valproic  acid Syrup 250 milliGRAM(s) Oral two times a day    MEDICATIONS  (PRN):  acetaminophen   Tablet .. 650 milliGRAM(s) Oral every 6 hours PRN Temp greater or equal to 38.5C (101.3F), Moderate Pain (4 - 6)  dextrose 40% Gel 15 Gram(s) Oral once PRN Blood Glucose LESS THAN 70 milliGRAM(s)/deciliter  glucagon  Injectable 1 milliGRAM(s) IntraMuscular once PRN Glucose LESS THAN 70 milligrams/deciliter  hydrALAZINE Injectable 10 milliGRAM(s) IV Push every 6 hours PRN SBP>160  labetalol Injectable 10 milliGRAM(s) IV Push every 6 hours PRN Systolic blood pressure >160  LORazepam   Injectable 2 milliGRAM(s) IV Push every 5 minutes PRN Sustained GTC Seizure  morphine  - Injectable 1 milliGRAM(s) IV Push every 12 hours PRN Severe Pain (7 - 10)  OLANZapine Injectable 5 milliGRAM(s) IntraMuscular every 24 hours PRN agitation    Allergies  No Known Allergies    Objective:   Vital Signs Last 24 Hrs  T(C): 36.4 (20 Jan 2019 08:52), Max: 36.8 (20 Jan 2019 04:45)  T(F): 97.6 (20 Jan 2019 08:52), Max: 98.2 (20 Jan 2019 04:45)  HR: 82 (20 Jan 2019 08:52) (71 - 98)  BP: 131/73 (20 Jan 2019 08:52) (131/73 - 145/76)  RR: 18 (20 Jan 2019 08:52) (16 - 18)  SpO2: 98% (20 Jan 2019 08:52) (95% - 98%)    PHYSICAL EXAM:   General: no distress noted  HEENT: right gaze palsy  Abdomen: Soft, nontender, nondistended   Extremities: No edema  Back: LP site appearing dry, well healing w/o any discharge    NEUROLOGICAL EXAM:  Mental status: eyes open but inattentive for most of exam, able to make eye contact at some points, no verbal output, not following commands.  Cranial Nerves: No facial asymmetry, mild right gaze palsy crosses midline, no nystagmus, no dysarthria, tongue midline  Motor exam: extremities move spontaneously against gravity however right leg is noted to be moving less.   Sensation: Intact to mildly noxious stimuli  Coordination/ Gait: Unable to cooperate with testing; gait deferred     Radiology    CT Angio Head/Neck IV Cont (01.07.19)  Normal CTA of the head and neck. No abnormal vascularity,   aneurysms or AVMs appreciated on this patient who had a prior left   temporal parenchymal hemorrhage.    CT Head No Cont (01.01.19)  No gross evidence for new infarct or new hemorrhage compared to the   12/30/2018 head CT and 12/26/2018 brain MRI.     CT Head No Cont (12.30.18)  No gross evidence for new infarct or new hemorrhage compared to the   12/26/2018 MRI study.    MRI Head w/wo IV Cont (12.26.18)  Left temporal lobe hematoma without significant interval change in size.  Small amount of subarachnoid blood.  New acute left MCA distribution infarct.  Prominent left temporal region cortical vein. Clinical correlation will   determine the need for conventional angiography to exclude the   possibility of a vascular malformation.    (12.25.18)  CT brain:  Resolving left temporal lobe parenchymal hemorrhage is redemonstrated,   similar to the prior brain CT.    CT angiography neck:  No evidence for arterial dissection. No flow-limiting stenosis. Carotid   bifurcations unremarkable.    CT angiography brain:  No evidence for AVM. No vascular aneurysm or flow-limiting stenosis.    CT venography brain:  No evidence for venous sinus or cortical vein thrombosis.

## 2019-01-20 NOTE — PROGRESS NOTE ADULT - ASSESSMENT
63 years old woman with vascular risk factors of age, HTN, DM II, HPLD and CAD was evaluated at [facility] for language disturbance. On 11/27 she underwent T10 laminectomy and fusion. Postoperatively, she was noted to have Wernicke's aphasia due to left temporal lobar ICH of undetermined etiology. She reported to have had significant improvement in her language deficit in the rehabilitation. In the evening of 12/24, she was reported to have worsening of her aphasia, prompting her presentation to OSH and subsequent transfer to [facility]. CT brain or admission showed expected evolution/resolution of left temporal ICH leading to development of encephalomalacia. MRI brain showed what appeared to be an acute infarct in the left MCA distribution adjacent to the prior hemorrhage in the late subacute to chronic stage, although it is possible that this was not an actual infarct but rather MRI hyperintensity related to previous seizures; and expected evolution of prior left frontotemporal convexal subarachnoid hemorrhage as well as was concerning for a dilated cortical vein anterior to the hematoma. Of note, MRI brain (12/7) showed left temporal lobar ICH, left frontotemporal convexal SAH and juxtacortical FLAIR hyperintensities concerning for PRES to my eye. CT brain on 12/30, showed expected evolution of left MCA distribution "infarct" (versus abnormal signal related to previous seizures)  and prior left temporal ICH leading to encephalomalacia.    Impression:  Possible Left MCA distribution "stroke" -initially thought likely etiology being cryptogenic stroke, probably related to embolism from a proximal source, like cardiac/paradoxical source of embolism. Note it is possible or probable that this imaging abnormality does not represent an actual infarct but rather abnormal signal related to previous seizures vs. PRES.  Recent left temporal lobar ICH with associated left frontotemporal convexal subarachnoid hemorrhage - likely etiology could be intracranial hemorrhage in the setting of PRES but possibility of underlying vascular malformation like micro-AVM or large vessel vasculopathy like vasculitis needs to  be considered.  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    Neurologically noted with global aphasia and inattention- unchanged from previous, though likely to be PRES, planned to perform LP  to rule out any infectious or autoimmune etiology for altered mental status. Keep SBP between 120-150, LDL 44-continue with home statin as LDL is currently at goal, MRI Brain w/wo contrast noted above. Normal CTA of the head and neck. Physical therapy/OT recommended acute TBI rehab. Repeat MRI brain with and without contrast/MR spectroscopy end of February/begining of March to rule out an underlying neoplasm preferably upon resolution/complete resorption of prior left temporal lobar ICH; Conventional angiogram to rule out underlying vascular malformation could be considered based on results of a repeat brain imaging/vessel imaging.    Plan:  - s/p IR guided aspiration of fluid collection in the dorsal postoperative bed, B2-Transferring negative, Cx thus far -itive  - Thrombocytopenia resolved  - c/w Depakote 250 BID and Vimpat 200mg BID  - LP: thus far unrevealing, PCR, Crypto, HSV negative, IgG low  --- WNV, ACE, Lyme, CMV, Bands, VDRL and NMDA pending  - Repeat MRI Head w/wo resolving diffusion abnormality and improving left temporal lobe hemorrhage, SAH.  - PEG placed on 1/14, getting TF  - s/p LOOP, cardio on board, reccs appreciated, episodes of sinus tach w/ agitation  - PT/OT consulted appreciated; plan for discharge to Subacute rehab (1/21/19)

## 2019-01-21 LAB
ANION GAP SERPL CALC-SCNC: 13 MMOL/L — SIGNIFICANT CHANGE UP (ref 5–17)
BUN SERPL-MCNC: 18 MG/DL — SIGNIFICANT CHANGE UP (ref 7–23)
CALCIUM SERPL-MCNC: 9.3 MG/DL — SIGNIFICANT CHANGE UP (ref 8.4–10.5)
CHLORIDE SERPL-SCNC: 100 MMOL/L — SIGNIFICANT CHANGE UP (ref 96–108)
CO2 SERPL-SCNC: 25 MMOL/L — SIGNIFICANT CHANGE UP (ref 22–31)
CREAT SERPL-MCNC: 0.77 MG/DL — SIGNIFICANT CHANGE UP (ref 0.5–1.3)
GLUCOSE BLDC GLUCOMTR-MCNC: 124 MG/DL — HIGH (ref 70–99)
GLUCOSE BLDC GLUCOMTR-MCNC: 132 MG/DL — HIGH (ref 70–99)
GLUCOSE BLDC GLUCOMTR-MCNC: 135 MG/DL — HIGH (ref 70–99)
GLUCOSE BLDC GLUCOMTR-MCNC: 141 MG/DL — HIGH (ref 70–99)
GLUCOSE BLDC GLUCOMTR-MCNC: 162 MG/DL — HIGH (ref 70–99)
GLUCOSE SERPL-MCNC: 138 MG/DL — HIGH (ref 70–99)
MAGNESIUM SERPL-MCNC: 2.3 MG/DL — SIGNIFICANT CHANGE UP (ref 1.6–2.6)
POTASSIUM SERPL-MCNC: 4.7 MMOL/L — SIGNIFICANT CHANGE UP (ref 3.5–5.3)
POTASSIUM SERPL-SCNC: 4.7 MMOL/L — SIGNIFICANT CHANGE UP (ref 3.5–5.3)
SODIUM SERPL-SCNC: 138 MMOL/L — SIGNIFICANT CHANGE UP (ref 135–145)

## 2019-01-21 PROCEDURE — 99232 SBSQ HOSP IP/OBS MODERATE 35: CPT

## 2019-01-21 PROCEDURE — 99232 SBSQ HOSP IP/OBS MODERATE 35: CPT | Mod: GC

## 2019-01-21 RX ADMIN — CARVEDILOL PHOSPHATE 25 MILLIGRAM(S): 80 CAPSULE, EXTENDED RELEASE ORAL at 17:33

## 2019-01-21 RX ADMIN — Medication 650 MILLIGRAM(S): at 05:37

## 2019-01-21 RX ADMIN — LOSARTAN POTASSIUM 100 MILLIGRAM(S): 100 TABLET, FILM COATED ORAL at 05:44

## 2019-01-21 RX ADMIN — LACOSAMIDE 200 MILLIGRAM(S): 50 TABLET ORAL at 17:35

## 2019-01-21 RX ADMIN — Medication 1 APPLICATION(S): at 11:37

## 2019-01-21 RX ADMIN — Medication 10 MILLIGRAM(S): at 21:32

## 2019-01-21 RX ADMIN — POLYETHYLENE GLYCOL 3350 17 GRAM(S): 17 POWDER, FOR SOLUTION ORAL at 11:38

## 2019-01-21 RX ADMIN — Medication 81 MILLIGRAM(S): at 11:37

## 2019-01-21 RX ADMIN — Medication 250 MILLIGRAM(S): at 05:37

## 2019-01-21 RX ADMIN — OLANZAPINE 5 MILLIGRAM(S): 15 TABLET, FILM COATED ORAL at 17:32

## 2019-01-21 RX ADMIN — Medication 250 MILLIGRAM(S): at 17:32

## 2019-01-21 RX ADMIN — ATORVASTATIN CALCIUM 80 MILLIGRAM(S): 80 TABLET, FILM COATED ORAL at 21:32

## 2019-01-21 RX ADMIN — AMLODIPINE BESYLATE 10 MILLIGRAM(S): 2.5 TABLET ORAL at 05:44

## 2019-01-21 RX ADMIN — CARVEDILOL PHOSPHATE 25 MILLIGRAM(S): 80 CAPSULE, EXTENDED RELEASE ORAL at 05:44

## 2019-01-21 RX ADMIN — NYSTATIN CREAM 1 APPLICATION(S): 100000 CREAM TOPICAL at 17:35

## 2019-01-21 RX ADMIN — OLANZAPINE 5 MILLIGRAM(S): 15 TABLET, FILM COATED ORAL at 05:37

## 2019-01-21 RX ADMIN — LACOSAMIDE 200 MILLIGRAM(S): 50 TABLET ORAL at 05:37

## 2019-01-21 RX ADMIN — NYSTATIN CREAM 1 APPLICATION(S): 100000 CREAM TOPICAL at 05:37

## 2019-01-21 RX ADMIN — Medication 650 MILLIGRAM(S): at 06:10

## 2019-01-21 NOTE — PROGRESS NOTE ADULT - ASSESSMENT
63F with a history of CAD s/p PCI to OM2 with a AVE in December of 2015, residual 40% LAD disease, neg stress test in 1/2018, osteoarthritis, DM, HTN, HLD who presents as a transfer from [facility] for aphasia now with temporal ICH and new left MCA infarct. She is now withevolution of left MCA distribution infarct and prior left temporal ICH leading to encephalomalacia. She is having focal seizures and facial twitching.     - Cont neuro rx.   Neuro follow up  - She has a hx of a remote PCI.   - No anginal symptoms recently reported. Cont ASA 81 QD  - Has a moderate focal atheroma in Aortic Arch. Cont high intensity statin for now.  - s/p ILR placement given suspicion for embolic nature of CVA. No af noted on telemetry to date.    - BP overall controlled.   - I would continue prn IV hydralazine and labetalol to control her blood pressure spikes, though her peak sbp is in the 140s.      - Appears compensated from HF POV.     - tolerated IR guided paraspinal fluid aspiration without cardiac sequale   - Monitor and replete electrolytes. Keep K>4.0 and Mg>2.0.  -  Further cardiac workup will depend on clinical course.   - All other workup and dc planning per primary team.

## 2019-01-21 NOTE — PROGRESS NOTE ADULT - SUBJECTIVE AND OBJECTIVE BOX
Interval Events: Pt seen and examined.  Pt is tolerating PEG feeds as per rn without residuals  no bm today  no n/v    MEDICATIONS  (STANDING):  amLODIPine   Tablet 10 milliGRAM(s) Oral daily  aspirin  chewable 81 milliGRAM(s) Oral daily  atorvastatin 80 milliGRAM(s) Oral at bedtime  BACItracin   Ointment 1 Application(s) Topical daily  carvedilol 25 milliGRAM(s) Oral every 12 hours  dextrose 50% Injectable 12.5 Gram(s) IV Push once  dextrose 50% Injectable 25 Gram(s) IV Push once  dextrose 50% Injectable 25 Gram(s) IV Push once  diazepam    Tablet 10 milliGRAM(s) Oral at bedtime  insulin lispro (HumaLOG) corrective regimen sliding scale   SubCutaneous every 6 hours  lacosamide 200 milliGRAM(s) Oral two times a day  lidocaine 2% Injectable 20 milliLiter(s) Local Injection once  losartan 100 milliGRAM(s) Oral daily  nystatin Ointment 1 Application(s) Topical every 12 hours  OLANZapine 5 milliGRAM(s) Oral two times a day  polyethylene glycol 3350 17 Gram(s) Oral daily  valproic  acid Syrup 250 milliGRAM(s) Oral two times a day    MEDICATIONS  (PRN):  acetaminophen   Tablet .. 650 milliGRAM(s) Oral every 6 hours PRN Temp greater or equal to 38.5C (101.3F), Moderate Pain (4 - 6)  dextrose 40% Gel 15 Gram(s) Oral once PRN Blood Glucose LESS THAN 70 milliGRAM(s)/deciliter  glucagon  Injectable 1 milliGRAM(s) IntraMuscular once PRN Glucose LESS THAN 70 milligrams/deciliter  hydrALAZINE Injectable 10 milliGRAM(s) IV Push every 6 hours PRN SBP>160  labetalol Injectable 10 milliGRAM(s) IV Push every 6 hours PRN Systolic blood pressure >160  morphine  - Injectable 1 milliGRAM(s) IV Push every 12 hours PRN Severe Pain (7 - 10)  OLANZapine Injectable 5 milliGRAM(s) IntraMuscular every 24 hours PRN agitation      Allergies    No Known Allergies    Intolerances        Review of Systems: unable to obtain.     Vital Signs Last 24 Hrs  T(C): 36.4 (21 Jan 2019 08:07), Max: 36.6 (20 Jan 2019 16:00)  T(F): 97.5 (21 Jan 2019 08:07), Max: 97.8 (20 Jan 2019 16:00)  HR: 77 (21 Jan 2019 08:07) (77 - 96)  BP: 105/69 (21 Jan 2019 08:07) (105/69 - 147/77)  BP(mean): --  RR: 22 (21 Jan 2019 08:07) (18 - 22)  SpO2: 97% (21 Jan 2019 08:07) (94% - 98%)    PHYSICAL EXAM  GENERAL:   NAD  HEENT:  NC/AT, no JVD, sclera-anicteric  CHEST:  clear to ascultation bilaterally, respirations nonlabored  HEART:  +S1+S2 regular rate and rhythm   ABDOMEN:  Soft, normoactive bowel sounds, no masses, + PEG with abdominal binder c/d/i   EXTREMITIES:  no cyanosis, clubbing or edema  NEURO:  non-verbal  SKIN:  No rash/warm/dry        LABS:    01-21    138  |  100  |  18  ----------------------------<  138<H>  4.7   |  25  |  0.77    Ca    9.3      21 Jan 2019 06:40  Mg     2.3     01-21            RADIOLOGY & ADDITIONAL TESTS:

## 2019-01-21 NOTE — PROGRESS NOTE ADULT - SUBJECTIVE AND OBJECTIVE BOX
[facility]    Bora Mcfarland, Mica, Cari, Barry, Ashvin, Noah      [phone]    CHIEF COMPLAINT: Patient is a 63y old  Female who presents with a chief complaint of Word finding difficulty (21 Jan 2019 07:46)      Follow Up: ich, h/o cad    Interim history: unable to provide hx, events noted.    MEDICATIONS  (STANDING):  amLODIPine   Tablet 10 milliGRAM(s) Oral daily  aspirin  chewable 81 milliGRAM(s) Oral daily  atorvastatin 80 milliGRAM(s) Oral at bedtime  BACItracin   Ointment 1 Application(s) Topical daily  carvedilol 25 milliGRAM(s) Oral every 12 hours  dextrose 50% Injectable 12.5 Gram(s) IV Push once  dextrose 50% Injectable 25 Gram(s) IV Push once  dextrose 50% Injectable 25 Gram(s) IV Push once  diazepam    Tablet 10 milliGRAM(s) Oral at bedtime  insulin lispro (HumaLOG) corrective regimen sliding scale   SubCutaneous every 6 hours  lacosamide 200 milliGRAM(s) Oral two times a day  lidocaine 2% Injectable 20 milliLiter(s) Local Injection once  losartan 100 milliGRAM(s) Oral daily  nystatin Ointment 1 Application(s) Topical every 12 hours  OLANZapine 5 milliGRAM(s) Oral two times a day  polyethylene glycol 3350 17 Gram(s) Oral daily  valproic  acid Syrup 250 milliGRAM(s) Oral two times a day    MEDICATIONS  (PRN):  acetaminophen   Tablet .. 650 milliGRAM(s) Oral every 6 hours PRN Temp greater or equal to 38.5C (101.3F), Moderate Pain (4 - 6)  dextrose 40% Gel 15 Gram(s) Oral once PRN Blood Glucose LESS THAN 70 milliGRAM(s)/deciliter  glucagon  Injectable 1 milliGRAM(s) IntraMuscular once PRN Glucose LESS THAN 70 milligrams/deciliter  hydrALAZINE Injectable 10 milliGRAM(s) IV Push every 6 hours PRN SBP>160  labetalol Injectable 10 milliGRAM(s) IV Push every 6 hours PRN Systolic blood pressure >160  morphine  - Injectable 1 milliGRAM(s) IV Push every 12 hours PRN Severe Pain (7 - 10)  OLANZapine Injectable 5 milliGRAM(s) IntraMuscular every 24 hours PRN agitation      REVIEW OF SYSTEMS: unable (ms)    Vital Signs Last 24 Hrs  T(C): 36.4 (21 Jan 2019 08:07), Max: 36.6 (20 Jan 2019 16:00)  T(F): 97.5 (21 Jan 2019 08:07), Max: 97.8 (20 Jan 2019 16:00)  HR: 77 (21 Jan 2019 08:07) (77 - 96)  BP: 105/69 (21 Jan 2019 08:07) (105/69 - 147/77)  BP(mean): --  RR: 22 (21 Jan 2019 08:07) (18 - 22)  SpO2: 97% (21 Jan 2019 08:07) (94% - 98%)    I&O's Summary    20 Jan 2019 07:01  -  21 Jan 2019 07:00  --------------------------------------------------------  IN: 1480 mL / OUT: 550 mL / NET: 930 mL        Telemetry past 24h:    PHYSICAL EXAM:    Constitutional: well-nourished, well-developed, NAD   HEENT:  MMM, sclerae anicteric, conjunctivae clear, no oral cyanosis.  Pulmonary: Non-labored, breath sounds are clear bilaterally, No wheezing, rales or rhonchi  Cardiovascular: Regular, S1 and S2.  No murmur.  No rubs, gallops or clicks  Gastrointestinal: Bowel Sounds present, soft, nontender.   Lymph: No peripheral edema.   Neurological: leth non verbal  Skin: No rashes.  Psych:  leth non verbal    LABS: All Labs Reviewed:    21 Jan 2019 06:40    138    |  100    |  18     ----------------------------<  138    4.7     |  25     |  0.77     Ca    9.3        21 Jan 2019 06:40  Mg     2.3       21 Jan 2019 06:40            Blood Culture:         RADIOLOGY:    EKG:    Echo:

## 2019-01-21 NOTE — PROGRESS NOTE ADULT - NSHPATTENDINGPLANDISCUSS_GEN_ALL_CORE
Stroke Team - Guy Raza and Ashvin
housestaff
Dr. Lock
Neurology residents and PA on stroke service
Neurology residents, PA and NP on stroke service
neurology residents, PA and NP
Neurology PA on stroke service
neurology residents, PA and NP
stroke fellow Dr. Cobb
Neurology PA on stroke team
neurology residents PA and NP
hus
neurology residents and PAs
Dr. Lock

## 2019-01-21 NOTE — PROGRESS NOTE ADULT - ASSESSMENT
63 years old woman with vascular risk factors of age, HTN, DM II, HPLD and CAD was evaluated at [facility] for language disturbance. On 11/27 she underwent T10 laminectomy and fusion. Postoperatively, she was noted to have Wernicke's aphasia due to left temporal lobar ICH of undetermined etiology. She reported to have had significant improvement in her language deficit in the rehabilitation. In the evening of 12/24, she was reported to have worsening of her aphasia, prompting her presentation to OSH and subsequent transfer to [facility]. CT brain or admission showed expected evolution/resolution of left temporal ICH leading to development of encephalomalacia. MRI brain showed what appeared to be an acute infarct in the left MCA distribution adjacent to the prior hemorrhage in the late subacute to chronic stage, although it is possible that this was not an actual infarct but rather MRI hyperintensity related to previous seizures; and expected evolution of prior left frontotemporal convexal subarachnoid hemorrhage as well as was concerning for a dilated cortical vein anterior to the hematoma. Of note, MRI brain (12/7) showed left temporal lobar ICH, left frontotemporal convexal SAH and juxtacortical FLAIR hyperintensities concerning for PRES to my eye. CT brain on 12/30, showed expected evolution of left MCA distribution "infarct" (versus abnormal signal related to previous seizures)  and prior left temporal ICH leading to encephalomalacia.    Impression:  Possible Left MCA distribution "stroke" -initially thought likely etiology being cryptogenic stroke, probably related to embolism from a proximal source, like cardiac/paradoxical source of embolism. Note it is possible or probable that this imaging abnormality does not represent an actual infarct but rather abnormal signal related to previous seizures vs. PRES.  Recent left temporal lobar ICH with associated left frontotemporal convexal subarachnoid hemorrhage - likely etiology could be intracranial hemorrhage in the setting of PRES but possibility of underlying vascular malformation like micro-AVM or large vessel vasculopathy like vasculitis needs to  be considered.  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    Neurologically noted with global aphasia and inattention- unchanged from previous, though likely to be PRES, planned to perform LP  to rule out any infectious or autoimmune etiology for altered mental status. Keep SBP between 120-150, LDL 44-continue with home statin as LDL is currently at goal, MRI Brain w/wo contrast noted above. Normal CTA of the head and neck. Physical therapy/OT recommended acute TBI rehab. Repeat MRI brain with and without contrast/MR spectroscopy end of February/begining of March to rule out an underlying neoplasm preferably upon resolution/complete resorption of prior left temporal lobar ICH; Conventional angiogram to rule out underlying vascular malformation could be considered based on results of a repeat brain imaging/vessel imaging.    Plan:  - s/p IR guided aspiration of fluid collection in the dorsal postoperative bed, B2-Transferring negative, Cx thus far -itive  - Thrombocytopenia resolved  - c/w Depakote 250 BID and Vimpat 200mg BID  - LP: thus far unrevealing, PCR, Crypto, HSV negative, IgG low  --- WNV, ACE, Lyme, CMV, Bands, VDRL and NMDA pending  - Repeat MRI Head w/wo resolving diffusion abnormality and improving left temporal lobe hemorrhage, SAH.  - PEG placed on 1/14, getting TF  - s/p LOOP, cardio on board, reccs appreciated, episodes of sinus tach w/ agitation  - PT/OT consulted appreciated; plan for discharge to Subacute rehab (1/22/19)

## 2019-01-21 NOTE — PROGRESS NOTE ADULT - SUBJECTIVE AND OBJECTIVE BOX
CC: f/u for aphasia, spine wound fluid    Patient reports  nothing  intelligible  REVIEW OF SYSTEMS:  All other review of systems negative (Comprehensive ROS)    Antimicrobials Day #  :    Other Medications Reviewed    T(F): 98.4 (01-21-19 @ 16:35), Max: 98.4 (01-21-19 @ 16:35)  HR: 88 (01-21-19 @ 16:35)  BP: 109/68 (01-21-19 @ 16:35)  RR: 18 (01-21-19 @ 16:35)  SpO2: 100% (01-21-19 @ 16:35)  Wt(kg): --    PHYSICAL EXAM:  General: , no acute distress  Eyes:  anicteric, no conjunctival injection, no discharge  Oropharynx: no lesions or injection 	  Neck: supple, without adenopathy  Lungs: clear to auscultation  Heart: regular rate and rhythm; no murmur, rubs or gallops  Abdomen: soft, nondistended, nontender, without mass or organomegaly, peg  Skin: no lesions  Extremities: no clubbing, cyanosis, or edema  Neurologic: sleepy moans    LAB RESULTS:    01-21    138  |  100  |  18  ----------------------------<  138<H>  4.7   |  25  |  0.77    Ca    9.3      21 Jan 2019 06:40  Mg     2.3     01-21          MICROBIOLOGY:  RECENT CULTURES:  csf pcr, cx, crypt ag all neg  lyme neg, rpr neg    RADIOLOGY REVIEWED:    < from: MR Head w/wo IV Cont (01.15.19 @ 15:03) >    Impression:    Resolving left temporoparietal diffusion abnormality now with associated   enhancement likely representing subacute infarction.  Resolving left temporal lobe hematoma.  Residua from prior subarachnoid hemorrhage  < from: IMTIAZ w/TTE (w/3D Echo) (12.27.18 @ 11:39) >  Conclusions:  1. Normal mitral valve. Moderate mitral regurgitation. BP  178/80  2. Moderate focal  atheroma noted in aortic arch/descending  aorta.  3. Normal left atrium.  LA volume index = 21 cc/m2. No left  atrial or left atrial appendage thrombus.  4. Increased relative wall thickness with normal left  ventricular mass index, consistent with concentric left  ventricular remodeling.  5. Normal right ventricular size and function.  6. Agitated saline injection and color flow Doppler  demonstrates no evidence of a patent foramen ovale.  *** No previous Echo exam.    < end of copied text >        < end of copied text >    Assessment:  Patient s/p lumbar spine lami fusion, post op brain bleed then stroke, had gotten better then worse, LP with high protein but no significant wbc pleocytosis. cultures neg from fluid. She has a seroma in operative bed and that is culture negative too. Suspect csf high protein related to spine surgery situation. No infection apparent.   Plan: monitor off antibiotics  call if further input is needed

## 2019-01-21 NOTE — PROGRESS NOTE ADULT - SUBJECTIVE AND OBJECTIVE BOX
Neurology Follow up note    Name: DEMI ARCHIBALD    Subjective: no acute events overnight.    HPI: 63 year old woman with vascular risk factors of age, HTN, DM II, HPLD and CAD was evaluated at [facility] for language disturbance. On 11/27 she underwent T10 laminectomy and fusion. Postoperatively, she was noted to have Wernicke's aphasia due to left temporal lobar ICH of undetermined etiology. She reported to have had significant improvement in her language deficit in rehabilitation. In the evening of 12/24, she was reported to have worsening of her aphasia, prompting her presentation to OSH and subsequent transfer to [facility].    MEDICATIONS  (STANDING):  amLODIPine   Tablet 10 milliGRAM(s) Oral daily  aspirin  chewable 81 milliGRAM(s) Oral daily  atorvastatin 80 milliGRAM(s) Oral at bedtime  BACItracin   Ointment 1 Application(s) Topical daily  carvedilol 25 milliGRAM(s) Oral every 12 hours  dextrose 50% Injectable 12.5 Gram(s) IV Push once  dextrose 50% Injectable 25 Gram(s) IV Push once  dextrose 50% Injectable 25 Gram(s) IV Push once  diazepam    Tablet 10 milliGRAM(s) Oral at bedtime  insulin lispro (HumaLOG) corrective regimen sliding scale   SubCutaneous every 6 hours  lacosamide 200 milliGRAM(s) Oral two times a day  lidocaine 2% Injectable 20 milliLiter(s) Local Injection once  losartan 100 milliGRAM(s) Oral daily  nystatin Ointment 1 Application(s) Topical every 12 hours  OLANZapine 5 milliGRAM(s) Oral two times a day  polyethylene glycol 3350 17 Gram(s) Oral daily  valproic  acid Syrup 250 milliGRAM(s) Oral two times a day    MEDICATIONS  (PRN):  acetaminophen   Tablet .. 650 milliGRAM(s) Oral every 6 hours PRN Temp greater or equal to 38.5C (101.3F), Moderate Pain (4 - 6)  dextrose 40% Gel 15 Gram(s) Oral once PRN Blood Glucose LESS THAN 70 milliGRAM(s)/deciliter  glucagon  Injectable 1 milliGRAM(s) IntraMuscular once PRN Glucose LESS THAN 70 milligrams/deciliter  hydrALAZINE Injectable 10 milliGRAM(s) IV Push every 6 hours PRN SBP>160  labetalol Injectable 10 milliGRAM(s) IV Push every 6 hours PRN Systolic blood pressure >160  LORazepam   Injectable 2 milliGRAM(s) IV Push every 5 minutes PRN Sustained GTC Seizure  morphine  - Injectable 1 milliGRAM(s) IV Push every 12 hours PRN Severe Pain (7 - 10)  OLANZapine Injectable 5 milliGRAM(s) IntraMuscular every 24 hours PRN agitation    Allergies  No Known Allergies    Objective:   Vital Signs Last 24 Hrs  T(C): 36.1 (21 Jan 2019 05:43), Max: 36.6 (20 Jan 2019 16:00)  T(F): 97 (21 Jan 2019 05:43), Max: 97.8 (20 Jan 2019 16:00)  HR: 89 (21 Jan 2019 05:43) (82 - 96)  BP: 147/77 (21 Jan 2019 05:43) (112/61 - 147/77)  BP(mean): --  RR: 18 (21 Jan 2019 05:43) (18 - 20)  SpO2: 98% (21 Jan 2019 05:43) (94% - 98%)    PHYSICAL EXAM:   General: no distress noted  HEENT: right gaze palsy  Abdomen: Soft, nontender, nondistended   Extremities: No edema  Back: LP site appearing dry, well healing w/o any discharge    NEUROLOGICAL EXAM:  Mental status: eyes open but inattentive for most of exam, able to make eye contact at some points, no verbal output, not following commands.  Cranial Nerves: No facial asymmetry, mild right gaze palsy crosses midline, no nystagmus, no dysarthria, tongue midline  Motor exam: extremities move spontaneously against gravity however right leg is noted to be moving less.   Sensation: Intact to mildly noxious stimuli  Coordination/ Gait: Unable to cooperate with testing; gait deferred     Radiology    CT Angio Head/Neck IV Cont (01.07.19)  Normal CTA of the head and neck. No abnormal vascularity,   aneurysms or AVMs appreciated on this patient who had a prior left   temporal parenchymal hemorrhage.    CT Head No Cont (01.01.19)  No gross evidence for new infarct or new hemorrhage compared to the   12/30/2018 head CT and 12/26/2018 brain MRI.     CT Head No Cont (12.30.18)  No gross evidence for new infarct or new hemorrhage compared to the   12/26/2018 MRI study.    MRI Head w/wo IV Cont (12.26.18)  Left temporal lobe hematoma without significant interval change in size.  Small amount of subarachnoid blood.  New acute left MCA distribution infarct.  Prominent left temporal region cortical vein. Clinical correlation will   determine the need for conventional angiography to exclude the   possibility of a vascular malformation.    (12.25.18)  CT brain:  Resolving left temporal lobe parenchymal hemorrhage is redemonstrated,   similar to the prior brain CT.    CT angiography neck:  No evidence for arterial dissection. No flow-limiting stenosis. Carotid   bifurcations unremarkable.    CT angiography brain:  No evidence for AVM. No vascular aneurysm or flow-limiting stenosis.    CT venography brain:  No evidence for venous sinus or cortical vein thrombosis.

## 2019-01-22 VITALS — SYSTOLIC BLOOD PRESSURE: 114 MMHG | HEART RATE: 77 BPM | DIASTOLIC BLOOD PRESSURE: 68 MMHG

## 2019-01-22 LAB — GLUCOSE BLDC GLUCOMTR-MCNC: 166 MG/DL — HIGH (ref 70–99)

## 2019-01-22 PROCEDURE — 97535 SELF CARE MNGMENT TRAINING: CPT

## 2019-01-22 PROCEDURE — 86403 PARTICLE AGGLUT ANTBDY SCRN: CPT

## 2019-01-22 PROCEDURE — 85730 THROMBOPLASTIN TIME PARTIAL: CPT

## 2019-01-22 PROCEDURE — 72148 MRI LUMBAR SPINE W/O DYE: CPT

## 2019-01-22 PROCEDURE — 92610 EVALUATE SWALLOWING FUNCTION: CPT

## 2019-01-22 PROCEDURE — 89051 BODY FLUID CELL COUNT: CPT

## 2019-01-22 PROCEDURE — 87483 CNS DNA AMP PROBE TYPE 12-25: CPT

## 2019-01-22 PROCEDURE — 87205 SMEAR GRAM STAIN: CPT

## 2019-01-22 PROCEDURE — 77003 FLUOROGUIDE FOR SPINE INJECT: CPT

## 2019-01-22 PROCEDURE — 93970 EXTREMITY STUDY: CPT

## 2019-01-22 PROCEDURE — 72070 X-RAY EXAM THORAC SPINE 2VWS: CPT

## 2019-01-22 PROCEDURE — 97112 NEUROMUSCULAR REEDUCATION: CPT

## 2019-01-22 PROCEDURE — 86036 ANCA SCREEN EACH ANTIBODY: CPT

## 2019-01-22 PROCEDURE — 70553 MRI BRAIN STEM W/O & W/DYE: CPT

## 2019-01-22 PROCEDURE — 87529 HSV DNA AMP PROBE: CPT

## 2019-01-22 PROCEDURE — 82140 ASSAY OF AMMONIA: CPT

## 2019-01-22 PROCEDURE — 70544 MR ANGIOGRAPHY HEAD W/O DYE: CPT

## 2019-01-22 PROCEDURE — 87496 CYTOMEG DNA AMP PROBE: CPT

## 2019-01-22 PROCEDURE — 85027 COMPLETE CBC AUTOMATED: CPT

## 2019-01-22 PROCEDURE — 97162 PT EVAL MOD COMPLEX 30 MIN: CPT

## 2019-01-22 PROCEDURE — 70450 CT HEAD/BRAIN W/O DYE: CPT

## 2019-01-22 PROCEDURE — 85610 PROTHROMBIN TIME: CPT

## 2019-01-22 PROCEDURE — 84157 ASSAY OF PROTEIN OTHER: CPT

## 2019-01-22 PROCEDURE — 81001 URINALYSIS AUTO W/SCOPE: CPT

## 2019-01-22 PROCEDURE — 93306 TTE W/DOPPLER COMPLETE: CPT

## 2019-01-22 PROCEDURE — 83036 HEMOGLOBIN GLYCOSYLATED A1C: CPT

## 2019-01-22 PROCEDURE — 76376 3D RENDER W/INTRP POSTPROCES: CPT

## 2019-01-22 PROCEDURE — 77012 CT SCAN FOR NEEDLE BIOPSY: CPT

## 2019-01-22 PROCEDURE — 97530 THERAPEUTIC ACTIVITIES: CPT

## 2019-01-22 PROCEDURE — 97165 OT EVAL LOW COMPLEX 30 MIN: CPT

## 2019-01-22 PROCEDURE — C1729: CPT

## 2019-01-22 PROCEDURE — 70498 CT ANGIOGRAPHY NECK: CPT

## 2019-01-22 PROCEDURE — 97110 THERAPEUTIC EXERCISES: CPT

## 2019-01-22 PROCEDURE — 83735 ASSAY OF MAGNESIUM: CPT

## 2019-01-22 PROCEDURE — 92523 SPEECH SOUND LANG COMPREHEN: CPT

## 2019-01-22 PROCEDURE — 85652 RBC SED RATE AUTOMATED: CPT

## 2019-01-22 PROCEDURE — 87186 SC STD MICRODIL/AGAR DIL: CPT

## 2019-01-22 PROCEDURE — 33285 INSJ SUBQ CAR RHYTHM MNTR: CPT

## 2019-01-22 PROCEDURE — L8699: CPT

## 2019-01-22 PROCEDURE — 99238 HOSP IP/OBS DSCHRG MGMT 30/<: CPT

## 2019-01-22 PROCEDURE — 70496 CT ANGIOGRAPHY HEAD: CPT

## 2019-01-22 PROCEDURE — 86592 SYPHILIS TEST NON-TREP QUAL: CPT

## 2019-01-22 PROCEDURE — 74018 RADEX ABDOMEN 1 VIEW: CPT

## 2019-01-22 PROCEDURE — 87476 LYME DIS DNA AMP PROBE: CPT

## 2019-01-22 PROCEDURE — 62270 DX LMBR SPI PNXR: CPT

## 2019-01-22 PROCEDURE — 72100 X-RAY EXAM L-S SPINE 2/3 VWS: CPT

## 2019-01-22 PROCEDURE — 99285 EMERGENCY DEPT VISIT HI MDM: CPT

## 2019-01-22 PROCEDURE — 93312 ECHO TRANSESOPHAGEAL: CPT

## 2019-01-22 PROCEDURE — G0515: CPT

## 2019-01-22 PROCEDURE — 80164 ASSAY DIPROPYLACETIC ACD TOT: CPT

## 2019-01-22 PROCEDURE — 99232 SBSQ HOSP IP/OBS MODERATE 35: CPT

## 2019-01-22 PROCEDURE — 70547 MR ANGIOGRAPHY NECK W/O DYE: CPT

## 2019-01-22 PROCEDURE — 92526 ORAL FUNCTION THERAPY: CPT

## 2019-01-22 PROCEDURE — 80053 COMPREHEN METABOLIC PANEL: CPT

## 2019-01-22 PROCEDURE — 80076 HEPATIC FUNCTION PANEL: CPT

## 2019-01-22 PROCEDURE — 71045 X-RAY EXAM CHEST 1 VIEW: CPT

## 2019-01-22 PROCEDURE — 70551 MRI BRAIN STEM W/O DYE: CPT

## 2019-01-22 PROCEDURE — 83615 LACTATE (LD) (LDH) ENZYME: CPT

## 2019-01-22 PROCEDURE — 82945 GLUCOSE OTHER FLUID: CPT

## 2019-01-22 PROCEDURE — 93971 EXTREMITY STUDY: CPT

## 2019-01-22 PROCEDURE — 87075 CULTR BACTERIA EXCEPT BLOOD: CPT

## 2019-01-22 PROCEDURE — 83916 OLIGOCLONAL BANDS: CPT

## 2019-01-22 PROCEDURE — 87086 URINE CULTURE/COLONY COUNT: CPT

## 2019-01-22 PROCEDURE — 95951: CPT

## 2019-01-22 PROCEDURE — C9254: CPT

## 2019-01-22 PROCEDURE — A9585: CPT

## 2019-01-22 PROCEDURE — 95819 EEG AWAKE AND ASLEEP: CPT

## 2019-01-22 PROCEDURE — 87070 CULTURE OTHR SPECIMN AEROBIC: CPT

## 2019-01-22 PROCEDURE — 80165 DIPROPYLACETIC ACID FREE: CPT

## 2019-01-22 PROCEDURE — 84145 PROCALCITONIN (PCT): CPT

## 2019-01-22 PROCEDURE — 86788 WEST NILE VIRUS AB IGM: CPT

## 2019-01-22 PROCEDURE — 80061 LIPID PANEL: CPT

## 2019-01-22 PROCEDURE — 86618 LYME DISEASE ANTIBODY: CPT

## 2019-01-22 PROCEDURE — 87040 BLOOD CULTURE FOR BACTERIA: CPT

## 2019-01-22 PROCEDURE — 86140 C-REACTIVE PROTEIN: CPT

## 2019-01-22 PROCEDURE — 10160 PNXR ASPIR ABSC HMTMA BULLA: CPT

## 2019-01-22 PROCEDURE — 86780 TREPONEMA PALLIDUM: CPT

## 2019-01-22 PROCEDURE — 86789 WEST NILE VIRUS ANTIBODY: CPT

## 2019-01-22 PROCEDURE — 80048 BASIC METABOLIC PNL TOTAL CA: CPT

## 2019-01-22 PROCEDURE — 82164 ANGIOTENSIN I ENZYME TEST: CPT

## 2019-01-22 PROCEDURE — 86255 FLUORESCENT ANTIBODY SCREEN: CPT

## 2019-01-22 PROCEDURE — C1764: CPT

## 2019-01-22 PROCEDURE — 82962 GLUCOSE BLOOD TEST: CPT

## 2019-01-22 PROCEDURE — 33282: CPT

## 2019-01-22 RX ORDER — AMLODIPINE BESYLATE 2.5 MG/1
1 TABLET ORAL
Qty: 0 | Refills: 0 | COMMUNITY
Start: 2019-01-22

## 2019-01-22 RX ORDER — NYSTATIN CREAM 100000 [USP'U]/G
1 CREAM TOPICAL
Qty: 0 | Refills: 0 | DISCHARGE
Start: 2019-01-22

## 2019-01-22 RX ORDER — SODIUM CHLORIDE 9 MG/ML
250 INJECTION INTRAMUSCULAR; INTRAVENOUS; SUBCUTANEOUS ONCE
Qty: 0 | Refills: 0 | Status: COMPLETED | OUTPATIENT
Start: 2019-01-22 | End: 2019-01-22

## 2019-01-22 RX ORDER — VALPROIC ACID (AS SODIUM SALT) 250 MG/5ML
5 SOLUTION, ORAL ORAL
Qty: 0 | Refills: 0 | COMMUNITY
Start: 2019-01-22

## 2019-01-22 RX ORDER — BACITRACIN ZINC 500 UNIT/G
1 OINTMENT IN PACKET (EA) TOPICAL
Qty: 0 | Refills: 0 | DISCHARGE
Start: 2019-01-22

## 2019-01-22 RX ORDER — OLANZAPINE 15 MG/1
5 TABLET, FILM COATED ORAL
Qty: 0 | Refills: 0 | COMMUNITY
Start: 2019-01-22

## 2019-01-22 RX ORDER — CARVEDILOL PHOSPHATE 80 MG/1
1 CAPSULE, EXTENDED RELEASE ORAL
Qty: 0 | Refills: 0 | DISCHARGE
Start: 2019-01-22

## 2019-01-22 RX ORDER — OLANZAPINE 15 MG/1
1 TABLET, FILM COATED ORAL
Qty: 0 | Refills: 0 | DISCHARGE
Start: 2019-01-22

## 2019-01-22 RX ORDER — LACOSAMIDE 50 MG/1
1 TABLET ORAL
Qty: 0 | Refills: 0 | COMMUNITY
Start: 2019-01-22

## 2019-01-22 RX ORDER — VALPROIC ACID (AS SODIUM SALT) 250 MG/5ML
10 SOLUTION, ORAL ORAL
Qty: 0 | Refills: 0 | COMMUNITY
Start: 2019-01-22

## 2019-01-22 RX ADMIN — LACOSAMIDE 200 MILLIGRAM(S): 50 TABLET ORAL at 05:09

## 2019-01-22 RX ADMIN — Medication 81 MILLIGRAM(S): at 11:07

## 2019-01-22 RX ADMIN — Medication 250 MILLIGRAM(S): at 05:09

## 2019-01-22 RX ADMIN — Medication 1: at 05:49

## 2019-01-22 RX ADMIN — CARVEDILOL PHOSPHATE 25 MILLIGRAM(S): 80 CAPSULE, EXTENDED RELEASE ORAL at 05:09

## 2019-01-22 RX ADMIN — AMLODIPINE BESYLATE 10 MILLIGRAM(S): 2.5 TABLET ORAL at 05:09

## 2019-01-22 RX ADMIN — LOSARTAN POTASSIUM 100 MILLIGRAM(S): 100 TABLET, FILM COATED ORAL at 05:09

## 2019-01-22 RX ADMIN — OLANZAPINE 5 MILLIGRAM(S): 15 TABLET, FILM COATED ORAL at 05:09

## 2019-01-22 RX ADMIN — NYSTATIN CREAM 1 APPLICATION(S): 100000 CREAM TOPICAL at 05:09

## 2019-01-22 RX ADMIN — SODIUM CHLORIDE 250 MILLILITER(S): 9 INJECTION INTRAMUSCULAR; INTRAVENOUS; SUBCUTANEOUS at 09:18

## 2019-01-22 NOTE — PROGRESS NOTE ADULT - ASSESSMENT
63F with a history of CAD s/p PCI to OM2 with a AVE in December of 2015, residual 40% LAD disease, neg stress test in 1/2018, osteoarthritis, DM, HTN, HLD who presents as a transfer from [facility] for aphasia now with temporal ICH and new left MCA infarct. She is now withevolution of left MCA distribution infarct and prior left temporal ICH leading to encephalomalacia. She is having focal seizures and facial twitching.     - Cont neuro rx.   Neuro follow up  - She has a hx of a remote PCI.   - No anginal symptoms recently reported. Cont ASA 81 QD  - Has a moderate focal atheroma in Aortic Arch. Cont high intensity statin for now.  - s/p ILR placement given suspicion for embolic nature of CVA. No af noted on telemetry to date.    - BP low this morning, s/p IVF  - Would d/c amlodipine for now, and can continue with losartan and carvedilol    - Appears compensated from HF POV.     - tolerated IR guided paraspinal fluid aspiration without cardiac sequale   - Monitor and replete electrolytes. Keep K>4.0 and Mg>2.0.  - Further cardiac workup will depend on clinical course.   - All other workup and dc planning per primary team.

## 2019-01-22 NOTE — PROGRESS NOTE ADULT - PROBLEM SELECTOR PROBLEM 1
Dysphagia
CVA (cerebral vascular accident)
CVA (cerebral vascular accident)
Dysphagia
HTN (hypertension)
Dysphagia

## 2019-01-22 NOTE — PROGRESS NOTE ADULT - SUBJECTIVE AND OBJECTIVE BOX
[facility] - Bora Mcfarland, Mica, Cari, Barry, Noah Lock  Office Number:  [phone]    Patient resting comfortably in bed in NAD.  Sleeping comfortably. Unable to obtain interval history  hypotensive this morning, s/p IVF    ROS: negative unless otherwise mentioned.    Telemetry:  off    MEDICATIONS  (STANDING):  amLODIPine   Tablet 10 milliGRAM(s) Oral daily  aspirin  chewable 81 milliGRAM(s) Oral daily  atorvastatin 80 milliGRAM(s) Oral at bedtime  BACItracin   Ointment 1 Application(s) Topical daily  carvedilol 25 milliGRAM(s) Oral every 12 hours  dextrose 50% Injectable 12.5 Gram(s) IV Push once  dextrose 50% Injectable 25 Gram(s) IV Push once  dextrose 50% Injectable 25 Gram(s) IV Push once  diazepam    Tablet 10 milliGRAM(s) Oral at bedtime  insulin lispro (HumaLOG) corrective regimen sliding scale   SubCutaneous every 6 hours  lacosamide 200 milliGRAM(s) Oral two times a day  lidocaine 2% Injectable 20 milliLiter(s) Local Injection once  losartan 100 milliGRAM(s) Oral daily  nystatin Ointment 1 Application(s) Topical every 12 hours  OLANZapine 5 milliGRAM(s) Oral two times a day  polyethylene glycol 3350 17 Gram(s) Oral daily  valproic  acid Syrup 250 milliGRAM(s) Oral two times a day    MEDICATIONS  (PRN):  acetaminophen   Tablet .. 650 milliGRAM(s) Oral every 6 hours PRN Temp greater or equal to 38.5C (101.3F), Moderate Pain (4 - 6)  dextrose 40% Gel 15 Gram(s) Oral once PRN Blood Glucose LESS THAN 70 milliGRAM(s)/deciliter  glucagon  Injectable 1 milliGRAM(s) IntraMuscular once PRN Glucose LESS THAN 70 milligrams/deciliter  hydrALAZINE Injectable 10 milliGRAM(s) IV Push every 6 hours PRN SBP>160  labetalol Injectable 10 milliGRAM(s) IV Push every 6 hours PRN Systolic blood pressure >160  OLANZapine Injectable 5 milliGRAM(s) IntraMuscular every 24 hours PRN agitation      Allergies    No Known Allergies    Intolerances        Vital Signs Last 24 Hrs  T(C): 36.4 (22 Jan 2019 08:38), Max: 36.9 (21 Jan 2019 16:35)  T(F): 97.6 (22 Jan 2019 08:38), Max: 98.5 (21 Jan 2019 23:52)  HR: 77 (22 Jan 2019 09:41) (77 - 90)  BP: 114/68 (22 Jan 2019 09:41) (97/60 - 114/68)  BP(mean): --  RR: 20 (22 Jan 2019 08:38) (16 - 20)  SpO2: 97% (22 Jan 2019 08:38) (94% - 100%)    I&O's Summary    21 Jan 2019 07:01  -  22 Jan 2019 07:00  --------------------------------------------------------  IN: 3240 mL / OUT: 1500 mL / NET: 1740 mL        ON EXAM:    Constitutional: well-nourished, well-developed, NAD   HEENT:  MMM, sclerae anicteric, conjunctivae clear, no oral cyanosis.  Pulmonary: Non-labored, breath sounds are clear bilaterally, No wheezing, rales or rhonchi  Cardiovascular: Regular, S1 and S2.  No murmur.  No rubs, gallops or clicks  Gastrointestinal: Bowel Sounds present, soft, nontender.   Lymph: No peripheral edema.   Neurological: leth non verbal  Skin: No rashes.  Psych:  leth non verbal  LABS: All Labs Reviewed:    21 Jan 2019 06:40    138    |  100    |  18     ----------------------------<  138    4.7     |  25     |  0.77     Ca    9.3        21 Jan 2019 06:40  Mg     2.3       21 Jan 2019 06:40            Blood Culture:

## 2019-01-22 NOTE — PROGRESS NOTE ADULT - PROBLEM SELECTOR PROBLEM 2
CVA (cerebral vascular accident)
Seizure
Seizure
CVA (cerebral vascular accident)

## 2019-01-22 NOTE — PROGRESS NOTE ADULT - REASON FOR ADMISSION
Aphasia
Stroke
altered ms
altered ms
aphasia
aphasia, bleed/stroke
neurologic disorder
CAD
CVA
ICH
Word finding difficulty
Word finding difficulty
cad, temporal ich
cva
ich
ich
temporal ich
blood pressure management
seizure management s/p stroke
seizure management s/p stroke

## 2019-01-22 NOTE — PROGRESS NOTE ADULT - PROBLEM SELECTOR PLAN 1
- s/p EGD with successful placement of externally removable PEG tube 1/14  - patient failed speech and swallow evaluations on 1/2 and 1/3, and 1/9  - recommend NPO with PEG feeds as pt is at risk for aspiration  - daily PEG care  - monitor residuals   - pt is tolerating feeds well
- blood pressure between 150-170s yesterday but this today bp range 130-160; favorable range per primary team is 120-150  - c/w amlodipine 10 mg po daily and losartan 100 mg po daily, if third agent needed to keep patient add goal can add a beta blocker  - discussed with nurse at bedside
- s/p EGD with successful placement of externally removable PEG tube 1/14  - patient failed speech and swallow evaluations on 1/2 and 1/3, and 1/9  - recommend NPO with PEG feeds as pt is at risk for aspiration  - daily PEG care  - monitor residuals   - feeds started, pt is tolerating them well
- s/p EGD with successful placement of externally removable PEG tube 1/14  - patient failed speech and swallow evaluations on 1/2 and 1/3, and 1/9  - recommend NPO with PEG feeds as pt is at risk for aspiration  - daily PEG care  - monitor residuals   - feeds to start today
- s/p EGD with successful placement of externally removable PEG tube 1/14  - patient failed speech and swallow evaluations on 1/2 and 1/3, and 1/9  - recommend NPO with PEG feeds as pt is at risk for aspiration  - daily PEG care  - monitor residuals   - pt is tolerating feeds well
Aphasia 2/2 Left MCA infarct of unknown etiology, possibly cryptogenic.   Stroke Team: inpatient workup complete, outpatient follow-up. Consider repeat MRI brain with and without contrast/MR spectroscopy in about 4-6 weeks to rule out any underlying tissue pathology, preferably upon resolution/complete resorption of prior left temporal lobar ICH; Conventional angiogram to rule out underlying vascular malformation could be considered based on results of a repeat brain imaging/vessel imaging.   -c/w secondary stroke prevention w/ risk factor control
Aphasia 2/2 Left MCA infarct of unknown etiology, possibly cryptogenic.   Stroke Team: inpatient workup complete, outpatient follow-up. Consider repeat MRI brain with and without contrast/MR spectroscopy in about 4-6 weeks to rule out any underlying tissue pathology, preferably upon resolution/complete resorption of prior left temporal lobar ICH; Conventional angiogram to rule out underlying vascular malformation could be considered based on results of a repeat brain imaging/vessel imaging.   -c/w secondary stroke prevention w/ risk factor control
Recommendation: - patient failed speech and swallow evaluations on 1/2 and 1/3, and 1/9  - recommend NPO with non-oral feeds as patient is at risk for aspiration  - plan for upper gastrointestinal endoscopy with PEG placement Monday 1/14, please HOLD NGT FEEDS AT MIDNIGHT TONIGHT  - will reach out to family, however family in agreement as per neurology team.
Recommendation: - patient failed speech and swallow evaluations on 1/2 and 1/3, and 1/9  - recommend NPO with non-oral feeds as patient is at risk for aspiration  - plan for upper gastrointestinal endoscopy with PEG placement Monday, please HOLD NGT feeds after midnight on Sunday night  - will reach out to family, however family in agreement as per neurology team.
- s/p EGD with successful placement of externally removable PEG tube 1/14  - patient failed speech and swallow evaluations on 1/2 and 1/3, and 1/9  - recommend NPO with PEG feeds as pt is at risk for aspiration  - daily PEG care  - monitor residuals   - pt is tolerating feeds well

## 2019-01-22 NOTE — PROGRESS NOTE ADULT - PROVIDER SPECIALTY LIST ADULT
Cardiology
Gastroenterology
Infectious Disease
Internal Medicine
Intervent Radiology
Neurology
Orthopedics
Radiology
Radiology
Rehab Medicine
Rehab Medicine
Neurology
Gastroenterology
Gastroenterology

## 2019-01-22 NOTE — PROGRESS NOTE ADULT - SUBJECTIVE AND OBJECTIVE BOX
Interval Events: Pt seen and examined.  Pt is tolerating PEG feeds as per rn   no bm today    MEDICATIONS  (STANDING):  aspirin  chewable 81 milliGRAM(s) Oral daily  atorvastatin 80 milliGRAM(s) Oral at bedtime  BACItracin   Ointment 1 Application(s) Topical daily  carvedilol 25 milliGRAM(s) Oral every 12 hours  dextrose 50% Injectable 12.5 Gram(s) IV Push once  dextrose 50% Injectable 25 Gram(s) IV Push once  dextrose 50% Injectable 25 Gram(s) IV Push once  diazepam    Tablet 10 milliGRAM(s) Oral at bedtime  insulin lispro (HumaLOG) corrective regimen sliding scale   SubCutaneous every 6 hours  lacosamide 200 milliGRAM(s) Oral two times a day  lidocaine 2% Injectable 20 milliLiter(s) Local Injection once  losartan 100 milliGRAM(s) Oral daily  nystatin Ointment 1 Application(s) Topical every 12 hours  OLANZapine 5 milliGRAM(s) Oral two times a day  polyethylene glycol 3350 17 Gram(s) Oral daily  valproic  acid Syrup 250 milliGRAM(s) Oral two times a day    MEDICATIONS  (PRN):  acetaminophen   Tablet .. 650 milliGRAM(s) Oral every 6 hours PRN Temp greater or equal to 38.5C (101.3F), Moderate Pain (4 - 6)  dextrose 40% Gel 15 Gram(s) Oral once PRN Blood Glucose LESS THAN 70 milliGRAM(s)/deciliter  glucagon  Injectable 1 milliGRAM(s) IntraMuscular once PRN Glucose LESS THAN 70 milligrams/deciliter  hydrALAZINE Injectable 10 milliGRAM(s) IV Push every 6 hours PRN SBP>160  labetalol Injectable 10 milliGRAM(s) IV Push every 6 hours PRN Systolic blood pressure >160  OLANZapine Injectable 5 milliGRAM(s) IntraMuscular every 24 hours PRN agitation      Allergies    No Known Allergies    Intolerances      Review of Systems: unable to obtain.       Vital Signs Last 24 Hrs  T(C): 36.4 (22 Jan 2019 08:38), Max: 36.9 (21 Jan 2019 16:35)  T(F): 97.6 (22 Jan 2019 08:38), Max: 98.5 (21 Jan 2019 23:52)  HR: 77 (22 Jan 2019 09:41) (77 - 90)  BP: 114/68 (22 Jan 2019 09:41) (97/60 - 114/68)  BP(mean): --  RR: 20 (22 Jan 2019 08:38) (16 - 20)  SpO2: 97% (22 Jan 2019 08:38) (94% - 100%)    PHYSICAL EXAM    GENERAL:   NAD  HEENT:  NC/AT, no JVD, sclera-anicteric  CHEST:  clear to ascultation bilaterally, respirations nonlabored  HEART:  +S1+S2 regular rate and rhythm   ABDOMEN:  Soft, normoactive bowel sounds, no masses, + PEG with abdominal binder c/d/i   EXTREMITIES:  no cyanosis, clubbing or edema  NEURO:  non-verbal  SKIN:  No rash/warm/dry    LABS:    01-21    138  |  100  |  18  ----------------------------<  138<H>  4.7   |  25  |  0.77    Ca    9.3      21 Jan 2019 06:40  Mg     2.3     01-21            RADIOLOGY & ADDITIONAL TESTS:

## 2019-01-22 NOTE — PROGRESS NOTE ADULT - PROBLEM SELECTOR PLAN 2
- on Aspirin  - Physical therapy/OT recommended acute rehab  - BP control and statin therapy per neurology team.
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   Plan:   -repeat CT 01/02/2019 - stable   -c/w Keppra 1500mg BID, Vimpat 200mg BID (patient now NPO, convert to IV equivalent)   -d/c klonopin (not available in IV, IV valium out of stock), start IV ativan prn agitation   -seizure precautions
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   Plan:   -repeat CTH today to monitor for any progression or new bleed   -c/w Keppra 1500mg BID, Vimpat 200mg BID (patient now NPO, convert to IV equivalent)   -d/c klonopin (not available in IV, IV valium out of stock), start IV ativan prn agitation   -seizure precautions
Recommendation: - on Aspirin  - Physical therapy/OT recommended acute rehab  - BP control and statin therapy per neurology team.
Recommendation: - on Aspirin  - Physical therapy/OT recommended acute rehab  - BP control and statin therapy per neurology team.

## 2019-01-25 LAB — OLIGOCLONAL BANDS CSF ELPH-IMP: SIGNIFICANT CHANGE UP

## 2019-02-04 LAB
ALBUMIN CSF-MCNC: SIGNIFICANT CHANGE UP (ref 14–25)
IGG CSF-MCNC: SIGNIFICANT CHANGE UP
IGG SYNTH RATE SER+CSF CALC-MRATE: SIGNIFICANT CHANGE UP MG/DAY
IGG/ALB CLEAR SER+CSF-RTO: SIGNIFICANT CHANGE UP
IGG/ALB CSF: SIGNIFICANT CHANGE UP RATIO

## 2019-02-19 ENCOUNTER — APPOINTMENT (OUTPATIENT)
Dept: ELECTROPHYSIOLOGY | Facility: CLINIC | Age: 64
End: 2019-02-19

## 2019-02-23 ENCOUNTER — INPATIENT (INPATIENT)
Facility: HOSPITAL | Age: 64
LOS: 0 days | Discharge: ANOTHER HOSPITAL | DRG: 100 | End: 2019-02-24
Attending: HOSPITALIST | Admitting: HOSPITALIST
Payer: COMMERCIAL

## 2019-02-23 VITALS
TEMPERATURE: 96 F | WEIGHT: 167.99 LBS | DIASTOLIC BLOOD PRESSURE: 100 MMHG | HEART RATE: 77 BPM | RESPIRATION RATE: 14 BRPM | SYSTOLIC BLOOD PRESSURE: 160 MMHG | OXYGEN SATURATION: 98 %

## 2019-02-23 VITALS
SYSTOLIC BLOOD PRESSURE: 138 MMHG | OXYGEN SATURATION: 100 % | DIASTOLIC BLOOD PRESSURE: 80 MMHG | RESPIRATION RATE: 16 BRPM | HEART RATE: 97 BPM

## 2019-02-23 DIAGNOSIS — Z98.89 OTHER SPECIFIED POSTPROCEDURAL STATES: Chronic | ICD-10-CM

## 2019-02-23 DIAGNOSIS — Z92.89 PERSONAL HISTORY OF OTHER MEDICAL TREATMENT: Chronic | ICD-10-CM

## 2019-02-23 DIAGNOSIS — R41.82 ALTERED MENTAL STATUS, UNSPECIFIED: ICD-10-CM

## 2019-02-23 DIAGNOSIS — Z98.890 OTHER SPECIFIED POSTPROCEDURAL STATES: Chronic | ICD-10-CM

## 2019-02-23 LAB
ALBUMIN SERPL ELPH-MCNC: 3 G/DL — LOW (ref 3.3–5)
ALP SERPL-CCNC: 66 U/L — SIGNIFICANT CHANGE UP (ref 40–120)
ALT FLD-CCNC: 17 U/L DA — SIGNIFICANT CHANGE UP (ref 10–45)
ANION GAP SERPL CALC-SCNC: 10 MMOL/L — SIGNIFICANT CHANGE UP (ref 5–17)
APPEARANCE UR: ABNORMAL
APTT BLD: 31.7 SEC — SIGNIFICANT CHANGE UP (ref 27.5–36.3)
AST SERPL-CCNC: 19 U/L — SIGNIFICANT CHANGE UP (ref 10–40)
BACTERIA # UR AUTO: ABNORMAL /HPF
BASOPHILS # BLD AUTO: 0 K/UL — SIGNIFICANT CHANGE UP (ref 0–0.2)
BASOPHILS NFR BLD AUTO: 0.7 % — SIGNIFICANT CHANGE UP (ref 0–2)
BILIRUB SERPL-MCNC: 0.3 MG/DL — SIGNIFICANT CHANGE UP (ref 0.2–1.2)
BILIRUB UR-MCNC: NEGATIVE — SIGNIFICANT CHANGE UP
BUN SERPL-MCNC: 38 MG/DL — HIGH (ref 7–23)
CALCIUM SERPL-MCNC: 9.4 MG/DL — SIGNIFICANT CHANGE UP (ref 8.4–10.5)
CHLORIDE SERPL-SCNC: 97 MMOL/L — SIGNIFICANT CHANGE UP (ref 96–108)
CO2 BLDA-SCNC: 27 MMOL/L — SIGNIFICANT CHANGE UP (ref 22–30)
CO2 SERPL-SCNC: 30 MMOL/L — SIGNIFICANT CHANGE UP (ref 22–31)
COLOR SPEC: YELLOW — SIGNIFICANT CHANGE UP
CREAT SERPL-MCNC: 1.2 MG/DL — SIGNIFICANT CHANGE UP (ref 0.5–1.3)
DIFF PNL FLD: ABNORMAL
EOSINOPHIL # BLD AUTO: 0.1 K/UL — SIGNIFICANT CHANGE UP (ref 0–0.5)
EOSINOPHIL NFR BLD AUTO: 1.8 % — SIGNIFICANT CHANGE UP (ref 0–6)
EPI CELLS # UR: SIGNIFICANT CHANGE UP
GLUCOSE BLDC GLUCOMTR-MCNC: 96 MG/DL — SIGNIFICANT CHANGE UP (ref 70–99)
GLUCOSE SERPL-MCNC: 165 MG/DL — HIGH (ref 70–99)
GLUCOSE UR QL: NEGATIVE — SIGNIFICANT CHANGE UP
HCT VFR BLD CALC: 33.9 % — LOW (ref 34.5–45)
HGB BLD-MCNC: 10.9 G/DL — LOW (ref 11.5–15.5)
HOROWITZ INDEX BLDA+IHG-RTO: SIGNIFICANT CHANGE UP
INR BLD: 0.93 RATIO — SIGNIFICANT CHANGE UP (ref 0.88–1.16)
KETONES UR-MCNC: NEGATIVE — SIGNIFICANT CHANGE UP
LACTATE SERPL-SCNC: 1.2 MMOL/L — SIGNIFICANT CHANGE UP (ref 0.7–2)
LACTATE SERPL-SCNC: 1.9 MMOL/L — SIGNIFICANT CHANGE UP (ref 0.7–2)
LEUKOCYTE ESTERASE UR-ACNC: ABNORMAL
LYMPHOCYTES # BLD AUTO: 1.2 K/UL — SIGNIFICANT CHANGE UP (ref 1–3.3)
LYMPHOCYTES # BLD AUTO: 20.6 % — SIGNIFICANT CHANGE UP (ref 13–44)
MCHC RBC-ENTMCNC: 30.4 PG — SIGNIFICANT CHANGE UP (ref 27–34)
MCHC RBC-ENTMCNC: 32 GM/DL — SIGNIFICANT CHANGE UP (ref 32–36)
MCV RBC AUTO: 95.1 FL — SIGNIFICANT CHANGE UP (ref 80–100)
MONOCYTES # BLD AUTO: 0.5 K/UL — SIGNIFICANT CHANGE UP (ref 0–0.9)
MONOCYTES NFR BLD AUTO: 8 % — SIGNIFICANT CHANGE UP (ref 1–9)
NEUTROPHILS # BLD AUTO: 4.2 K/UL — SIGNIFICANT CHANGE UP (ref 1.8–7.4)
NEUTROPHILS NFR BLD AUTO: 68.9 % — SIGNIFICANT CHANGE UP (ref 43–77)
NITRITE UR-MCNC: NEGATIVE — SIGNIFICANT CHANGE UP
PCO2 BLDA: 40 MMHG — SIGNIFICANT CHANGE UP (ref 32–46)
PH BLDA: 7.43 — SIGNIFICANT CHANGE UP (ref 7.35–7.45)
PH UR: 7 — SIGNIFICANT CHANGE UP (ref 5–8)
PLATELET # BLD AUTO: 313 K/UL — SIGNIFICANT CHANGE UP (ref 150–400)
PO2 BLDA: 85 MMHG — SIGNIFICANT CHANGE UP (ref 74–108)
POTASSIUM SERPL-MCNC: 5.3 MMOL/L — SIGNIFICANT CHANGE UP (ref 3.5–5.3)
POTASSIUM SERPL-SCNC: 5.3 MMOL/L — SIGNIFICANT CHANGE UP (ref 3.5–5.3)
PROT SERPL-MCNC: 7.4 G/DL — SIGNIFICANT CHANGE UP (ref 6–8.3)
PROT UR-MCNC: 15
PROTHROM AB SERPL-ACNC: 10.4 SEC — SIGNIFICANT CHANGE UP (ref 10–12.9)
RBC # BLD: 3.57 M/UL — LOW (ref 3.8–5.2)
RBC # FLD: 14.6 % — HIGH (ref 10.3–14.5)
RBC CASTS # UR COMP ASSIST: SIGNIFICANT CHANGE UP /HPF (ref 0–4)
SAO2 % BLDA: 97 % — HIGH (ref 92–96)
SODIUM SERPL-SCNC: 137 MMOL/L — SIGNIFICANT CHANGE UP (ref 135–145)
SP GR SPEC: 1 — LOW (ref 1.01–1.02)
UROBILINOGEN FLD QL: NEGATIVE — SIGNIFICANT CHANGE UP
WBC # BLD: 6 K/UL — SIGNIFICANT CHANGE UP (ref 3.8–10.5)
WBC # FLD AUTO: 6 K/UL — SIGNIFICANT CHANGE UP (ref 3.8–10.5)
WBC UR QL: ABNORMAL /HPF (ref 0–5)

## 2019-02-23 PROCEDURE — 99285 EMERGENCY DEPT VISIT HI MDM: CPT

## 2019-02-23 PROCEDURE — 70450 CT HEAD/BRAIN W/O DYE: CPT | Mod: 26,59

## 2019-02-23 PROCEDURE — 93010 ELECTROCARDIOGRAM REPORT: CPT

## 2019-02-23 PROCEDURE — 71045 X-RAY EXAM CHEST 1 VIEW: CPT | Mod: 26

## 2019-02-23 PROCEDURE — 70498 CT ANGIOGRAPHY NECK: CPT | Mod: 26

## 2019-02-23 PROCEDURE — 71045 X-RAY EXAM CHEST 1 VIEW: CPT | Mod: 26,77

## 2019-02-23 PROCEDURE — 99291 CRITICAL CARE FIRST HOUR: CPT

## 2019-02-23 PROCEDURE — 70496 CT ANGIOGRAPHY HEAD: CPT | Mod: 26

## 2019-02-23 RX ORDER — SODIUM CHLORIDE 9 MG/ML
1000 INJECTION, SOLUTION INTRAVENOUS
Qty: 0 | Refills: 0 | Status: DISCONTINUED | OUTPATIENT
Start: 2019-02-23 | End: 2019-02-24

## 2019-02-23 RX ORDER — PROPOFOL 10 MG/ML
15 INJECTION, EMULSION INTRAVENOUS
Qty: 1000 | Refills: 0 | Status: DISCONTINUED | OUTPATIENT
Start: 2019-02-23 | End: 2019-02-24

## 2019-02-23 RX ORDER — HEPARIN SODIUM 5000 [USP'U]/ML
5000 INJECTION INTRAVENOUS; SUBCUTANEOUS EVERY 8 HOURS
Qty: 0 | Refills: 0 | Status: DISCONTINUED | OUTPATIENT
Start: 2019-02-23 | End: 2019-02-24

## 2019-02-23 RX ORDER — SODIUM CHLORIDE 9 MG/ML
500 INJECTION INTRAMUSCULAR; INTRAVENOUS; SUBCUTANEOUS ONCE
Qty: 0 | Refills: 0 | Status: COMPLETED | OUTPATIENT
Start: 2019-02-23 | End: 2019-02-23

## 2019-02-23 RX ORDER — CARVEDILOL PHOSPHATE 80 MG/1
25 CAPSULE, EXTENDED RELEASE ORAL EVERY 12 HOURS
Qty: 0 | Refills: 0 | Status: DISCONTINUED | OUTPATIENT
Start: 2019-02-23 | End: 2019-02-23

## 2019-02-23 RX ORDER — LANOLIN ALCOHOL/MO/W.PET/CERES
5 CREAM (GRAM) TOPICAL AT BEDTIME
Qty: 0 | Refills: 0 | Status: DISCONTINUED | OUTPATIENT
Start: 2019-02-23 | End: 2019-02-23

## 2019-02-23 RX ORDER — LANOLIN ALCOHOL/MO/W.PET/CERES
3 CREAM (GRAM) TOPICAL AT BEDTIME
Qty: 0 | Refills: 0 | Status: DISCONTINUED | OUTPATIENT
Start: 2019-02-23 | End: 2019-02-23

## 2019-02-23 RX ORDER — DEXTROSE 50 % IN WATER 50 %
12.5 SYRINGE (ML) INTRAVENOUS ONCE
Qty: 0 | Refills: 0 | Status: DISCONTINUED | OUTPATIENT
Start: 2019-02-23 | End: 2019-02-24

## 2019-02-23 RX ORDER — DEXTROSE 50 % IN WATER 50 %
25 SYRINGE (ML) INTRAVENOUS ONCE
Qty: 0 | Refills: 0 | Status: DISCONTINUED | OUTPATIENT
Start: 2019-02-23 | End: 2019-02-24

## 2019-02-23 RX ORDER — AMITRIPTYLINE HCL 25 MG
50 TABLET ORAL AT BEDTIME
Qty: 0 | Refills: 0 | Status: DISCONTINUED | OUTPATIENT
Start: 2019-02-23 | End: 2019-02-23

## 2019-02-23 RX ORDER — OLANZAPINE 15 MG/1
5 TABLET, FILM COATED ORAL
Qty: 0 | Refills: 0 | Status: DISCONTINUED | OUTPATIENT
Start: 2019-02-23 | End: 2019-02-23

## 2019-02-23 RX ORDER — PIPERACILLIN AND TAZOBACTAM 4; .5 G/20ML; G/20ML
3.38 INJECTION, POWDER, LYOPHILIZED, FOR SOLUTION INTRAVENOUS EVERY 8 HOURS
Qty: 0 | Refills: 0 | Status: DISCONTINUED | OUTPATIENT
Start: 2019-02-23 | End: 2019-02-24

## 2019-02-23 RX ORDER — INSULIN LISPRO 100/ML
VIAL (ML) SUBCUTANEOUS AT BEDTIME
Qty: 0 | Refills: 0 | Status: DISCONTINUED | OUTPATIENT
Start: 2019-02-23 | End: 2019-02-24

## 2019-02-23 RX ORDER — ACETAMINOPHEN 500 MG
650 TABLET ORAL ONCE
Qty: 0 | Refills: 0 | Status: COMPLETED | OUTPATIENT
Start: 2019-02-23 | End: 2019-02-23

## 2019-02-23 RX ORDER — ACETAMINOPHEN 500 MG
650 TABLET ORAL EVERY 6 HOURS
Qty: 0 | Refills: 0 | Status: DISCONTINUED | OUTPATIENT
Start: 2019-02-23 | End: 2019-02-24

## 2019-02-23 RX ORDER — PIPERACILLIN AND TAZOBACTAM 4; .5 G/20ML; G/20ML
3.38 INJECTION, POWDER, LYOPHILIZED, FOR SOLUTION INTRAVENOUS ONCE
Qty: 0 | Refills: 0 | Status: COMPLETED | OUTPATIENT
Start: 2019-02-23 | End: 2019-02-23

## 2019-02-23 RX ORDER — ASPIRIN/CALCIUM CARB/MAGNESIUM 324 MG
81 TABLET ORAL DAILY
Qty: 0 | Refills: 0 | Status: DISCONTINUED | OUTPATIENT
Start: 2019-02-23 | End: 2019-02-24

## 2019-02-23 RX ORDER — ETOMIDATE 2 MG/ML
20 INJECTION INTRAVENOUS ONCE
Qty: 0 | Refills: 0 | Status: COMPLETED | OUTPATIENT
Start: 2019-02-23 | End: 2019-02-23

## 2019-02-23 RX ORDER — ATORVASTATIN CALCIUM 80 MG/1
80 TABLET, FILM COATED ORAL AT BEDTIME
Qty: 0 | Refills: 0 | Status: DISCONTINUED | OUTPATIENT
Start: 2019-02-23 | End: 2019-02-24

## 2019-02-23 RX ORDER — SODIUM CHLORIDE 9 MG/ML
1000 INJECTION INTRAMUSCULAR; INTRAVENOUS; SUBCUTANEOUS
Qty: 0 | Refills: 0 | Status: DISCONTINUED | OUTPATIENT
Start: 2019-02-23 | End: 2019-02-24

## 2019-02-23 RX ORDER — BACLOFEN 100 %
10 POWDER (GRAM) MISCELLANEOUS AT BEDTIME
Qty: 0 | Refills: 0 | Status: DISCONTINUED | OUTPATIENT
Start: 2019-02-23 | End: 2019-02-24

## 2019-02-23 RX ORDER — VANCOMYCIN HCL 1 G
1000 VIAL (EA) INTRAVENOUS ONCE
Qty: 0 | Refills: 0 | Status: COMPLETED | OUTPATIENT
Start: 2019-02-23 | End: 2019-02-23

## 2019-02-23 RX ORDER — VALPROIC ACID (AS SODIUM SALT) 250 MG/5ML
1000 SOLUTION, ORAL ORAL ONCE
Qty: 0 | Refills: 0 | Status: COMPLETED | OUTPATIENT
Start: 2019-02-23 | End: 2019-02-23

## 2019-02-23 RX ORDER — VALPROIC ACID (AS SODIUM SALT) 250 MG/5ML
500 SOLUTION, ORAL ORAL EVERY 6 HOURS
Qty: 0 | Refills: 0 | Status: DISCONTINUED | OUTPATIENT
Start: 2019-02-23 | End: 2019-02-24

## 2019-02-23 RX ORDER — GLUCAGON INJECTION, SOLUTION 0.5 MG/.1ML
1 INJECTION, SOLUTION SUBCUTANEOUS ONCE
Qty: 0 | Refills: 0 | Status: DISCONTINUED | OUTPATIENT
Start: 2019-02-23 | End: 2019-02-24

## 2019-02-23 RX ORDER — SODIUM CHLORIDE 9 MG/ML
2400 INJECTION INTRAMUSCULAR; INTRAVENOUS; SUBCUTANEOUS ONCE
Qty: 0 | Refills: 0 | Status: COMPLETED | OUTPATIENT
Start: 2019-02-23 | End: 2019-02-23

## 2019-02-23 RX ORDER — VALPROIC ACID (AS SODIUM SALT) 250 MG/5ML
500 SOLUTION, ORAL ORAL EVERY 8 HOURS
Qty: 0 | Refills: 0 | Status: DISCONTINUED | OUTPATIENT
Start: 2019-02-23 | End: 2019-02-23

## 2019-02-23 RX ORDER — INSULIN LISPRO 100/ML
VIAL (ML) SUBCUTANEOUS
Qty: 0 | Refills: 0 | Status: DISCONTINUED | OUTPATIENT
Start: 2019-02-23 | End: 2019-02-24

## 2019-02-23 RX ORDER — LACOSAMIDE 50 MG/1
200 TABLET ORAL
Qty: 0 | Refills: 0 | Status: DISCONTINUED | OUTPATIENT
Start: 2019-02-23 | End: 2019-02-24

## 2019-02-23 RX ORDER — DEXTROSE 50 % IN WATER 50 %
15 SYRINGE (ML) INTRAVENOUS ONCE
Qty: 0 | Refills: 0 | Status: DISCONTINUED | OUTPATIENT
Start: 2019-02-23 | End: 2019-02-24

## 2019-02-23 RX ADMIN — Medication 30 MILLIGRAM(S): at 21:52

## 2019-02-23 RX ADMIN — Medication 2 MILLIGRAM(S): at 23:01

## 2019-02-23 RX ADMIN — PIPERACILLIN AND TAZOBACTAM 200 GRAM(S): 4; .5 INJECTION, POWDER, LYOPHILIZED, FOR SOLUTION INTRAVENOUS at 14:20

## 2019-02-23 RX ADMIN — PIPERACILLIN AND TAZOBACTAM 3.38 GRAM(S): 4; .5 INJECTION, POWDER, LYOPHILIZED, FOR SOLUTION INTRAVENOUS at 14:34

## 2019-02-23 RX ADMIN — HEPARIN SODIUM 5000 UNIT(S): 5000 INJECTION INTRAVENOUS; SUBCUTANEOUS at 22:01

## 2019-02-23 RX ADMIN — SODIUM CHLORIDE 2000 MILLILITER(S): 9 INJECTION INTRAMUSCULAR; INTRAVENOUS; SUBCUTANEOUS at 18:40

## 2019-02-23 RX ADMIN — SODIUM CHLORIDE 2400 MILLILITER(S): 9 INJECTION INTRAMUSCULAR; INTRAVENOUS; SUBCUTANEOUS at 14:15

## 2019-02-23 RX ADMIN — ATORVASTATIN CALCIUM 80 MILLIGRAM(S): 80 TABLET, FILM COATED ORAL at 22:01

## 2019-02-23 RX ADMIN — Medication 10 MILLIGRAM(S): at 22:01

## 2019-02-23 RX ADMIN — ETOMIDATE 20 MILLIGRAM(S): 2 INJECTION INTRAVENOUS at 23:02

## 2019-02-23 RX ADMIN — SODIUM CHLORIDE 100 MILLILITER(S): 9 INJECTION INTRAMUSCULAR; INTRAVENOUS; SUBCUTANEOUS at 23:03

## 2019-02-23 RX ADMIN — Medication 250 MILLIGRAM(S): at 14:35

## 2019-02-23 RX ADMIN — Medication 650 MILLIGRAM(S): at 14:15

## 2019-02-23 RX ADMIN — Medication 650 MILLIGRAM(S): at 17:17

## 2019-02-23 NOTE — H&P ADULT - NSHPLABSRESULTS_GEN_ALL_CORE
.                            10.9   6.0   )-----------( 313      ( 2019 14:20 )             33.9     Lactate, Blood: 1.9 mmol/L ( @ 14:20)        137  |  97  |  38  ----------------------------<  165  5.3   |  30  |  1.20    Ca    9.4      2019 14:20    TPro  7.4  /  Alb  3.0  /  TBili  0.3  /  DBili  x   /  AST  19  /  ALT  17  /  AlkPhos  66      PT/INR - ( 2019 14:20 )   PT: 10.4 sec;   INR: 0.93 ratio         PTT - ( 2019 14:20 )  PTT:31.7 sec       [id number] 6.7     Chol 128 mg/dL LDL 44 mg/dL HDL 35 mg/dL Trig 243 mg/dL      Urinalysis Basic - ( 2019 14:20 )    Color: Yellow / Appearance: Slightly Turbid / S.005 / pH: x  Gluc: x / Ketone: Negative  / Bili: Negative / Urobili: Negative   Blood: x / Protein: 15 / Nitrite: Negative   Leuk Esterase: Moderate / RBC: 0-4 /HPF / WBC 11-25 /HPF   Sq Epi: x / Non Sq Epi: Neg.-Few / Bacteria: Many /HPF    OLD RECORDS REVIEWED: Cr 0.77 in 2019    EKG: SR at 91, Q waves in III and avF, 1st degree AVB - interpreted by me personally    < from: CT Angio Neck w/ IV Cont (19 @ 15:30) >    -CTA neck: No hemodynamically significant stenosis.    -CTA head: No hemodynamically significant stenosis or occlusion.    < end of copied text >    < from: CT Head No Cont (19 @ 14:12) >    Focal area of encephalomalacia within the left temporoparietal region   likely representing sequela of recent hemorrhagic infarct.    Age related involutional and microvascular ischemic changes, without   evidence of intracranial hemorrhage, mass effect or midline shift.    < end of copied text >    < from: Xray Chest 1 View-PORTABLE IMMEDIATE (19 @ 14:32) >    IMPRESSION:   No evidence of active chest disease.          < end of copied text >

## 2019-02-23 NOTE — ED PROVIDER NOTE - PHYSICAL EXAMINATION
General:     NAD  Eyes: pupils minimally reactive b/l to light  Head:     no signs of trauma  Neck:     trachea midline  Lungs:     b/l rhonchi diffuse. worse b/l lower lobes  CVS:     RRR  Abd:     +PEG. no signs of infection  Spine: no signs of previous spinal wound  Ext:   no deformities   Neuro: pt responsive to pain

## 2019-02-23 NOTE — CONSULT NOTE ADULT - ASSESSMENT
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       Dr Sharp from neurology has been seeing this patient in rehab and recently increased VA to 500 q8.  She is also on max vimpat.       Sent here from rehab for AMS.  ? post ictal state.  RX for UTI.  This patient was admitted to the tele floor, ICU was consulted because there were 3 measurements of low BP in a 15 minute period preceded by and followed by BP systolic >140.  I think the low BP was spurious as her lactate was normal and she is otherwise warm and well perfused.  Her bedside POCUS shows preserved LV fx no pericardial effusion and a plump IVC.      More of a concern for me is the AMS and jerky movements of the lower extremities.    Discussed with Dr Sharp of neurology, trying to arrange for [place] [facility] [place] in concern fro NCSE.     In the interim, while the transfer center is trying to arrange for transfer to a tertiary center will load with 1G VA level has been sent.       ABX for possible UTI cultures sent.     Mild YAZ on fluid therapy    Check ABG   Recycle lactate  DVT prophylaxis    Will hold olanzapine and other potentially sedating meds till MS improves.    This case was discussed in detail with Dr Hendrix, day e-ICU attending, and now Dr Browning evening e-ICU attending. 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       Dr Sharp from neurology has been seeing this patient in rehab and recently increased VA to 500 q8.  She is also on max vimpat.       Sent here from rehab for AMS.  ? post ictal state.  RX for UTI.  This patient was admitted to the tele floor, ICU was consulted because there were 3 measurements of low BP in a 15 minute period preceded by and followed by BP systolic >140.  I think the low BP was spurious as her lactate was normal and she is otherwise warm and well perfused.  Her bedside POCUS shows preserved LV fx no pericardial effusion and a plump IVC.      More of a concern for me is the AMS and jerky movements of the lower extremities.    Discussed with Dr Sharp of neurology, trying to arrange for transfer to [facility] [place] in concern fro NCSE.     In the interim, while the transfer center is trying to arrange for transfer to a tertiary center will load with 1G VA level has been sent.       ABX for possible UTI cultures sent.     Mild YAZ on fluid therapy    Check ABG   Recycle lactate  DVT prophylaxis    Will hold olanzapine and other potentially sedating meds till MS improves.    This case was discussed in detail with Dr Hendrix, day e-ICU attending, and now Dr Browning evening e-ICU attending.     Addendum  1055pm though ABG acceptable patient not protecting her airway sonorous breathing, intubate

## 2019-02-23 NOTE — ED PROVIDER NOTE - OBJECTIVE STATEMENT
pt 63 year old woman HTN, DM II, HPLD and CAD on 11/27 she underwent T10 laminectomy and fusion. Postoperatively, she was noted to have Wernicke's aphasia due to left temporal lobar ICH of undetermined etiology and while I rehab had significant improvement in speech. In 12/24, she had worsening of her aphasia which showed evolving hemorrhage. since yesteray   hx obtained from previous chart and daughter at bedside

## 2019-02-23 NOTE — H&P ADULT - NSHPPHYSICALEXAM_GEN_ALL_CORE
Vital Signs Last 24 Hrs  T(F): 100.2 (23 Feb 2019 14:15), Max: 100.2 (23 Feb 2019 14:15)  HR: 108 (23 Feb 2019 16:16) (77 - 108)  BP: 137/77 (23 Feb 2019 16:16) (137/72 - 160/100)  RR: 16 (23 Feb 2019 16:16) (14 - 16)  SpO2: 98% (23 Feb 2019 16:16) (98% - 100%)    PHYSICAL EXAM:  GENERAL: NAD  HEAD:  Atraumatic, Normocephalic  EYES: conjunctiva and sclera clear, right pupil sluggish, left pupil reactive  ENMT: Moist mucous membranes  NECK: No JVD on limited exam  CHEST/LUNG: Clear to auscultation bilaterally limited to poor effort and limited anteriorly  HEART: Regular rate and rhythm; S1/S2, No murmurs  ABDOMEN: Soft, Nontender, Nondistended; Bowel sounds present; PEG +  VASCULAR: Normal pulses, Normal capillary refill  EXTREMITIES: No calf tenderness, No cyanosis, No edema  LYMPH: No lymphadenopathy noted  SKIN: Warm, Intact  PSYCH: Inattentive  NERVOUS SYSTEM:  Poor concentration; Responds to painful stimuli; Limited exam
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
## 2019-02-23 NOTE — PROVIDER CONTACT NOTE (MEDICATION) - ASSESSMENT
showing persistent signs of sz twitching like activity and persists to be lethargic concerns for airway protection

## 2019-02-23 NOTE — ED ADULT NURSE NOTE - NSIMPLEMENTINTERV_GEN_ALL_ED
Implemented All Fall Risk Interventions:  [place] to call system. Call bell, personal items and telephone within reach. Instruct patient to call for assistance. Room bathroom lighting operational. Non-slip footwear when patient is off stretcher. Physically safe environment: no spills, clutter or unnecessary equipment. Stretcher in lowest position, wheels locked, appropriate side rails in place. Provide visual cue, wrist band, yellow gown, etc. Monitor gait and stability. Monitor for mental status changes and reorient to person, place, and time. Review medications for side effects contributing to fall risk. Reinforce activity limits and safety measures with patient and family.

## 2019-02-23 NOTE — PROCEDURE NOTE - NSTRACHPOSTINTU_RESP_A_CORE
Breath sounds equal/Chest excursion noted/Positive end tidal Co2 noted/Appropriate capnography/Chest X-Ray/Breath sounds bilateral

## 2019-02-23 NOTE — PROVIDER CONTACT NOTE (OTHER) - BACKGROUND
patient is admitted from ED for change in mental status with known history of CVA and multiple seizure episodes

## 2019-02-23 NOTE — H&P ADULT - NSHPSOCIALHISTORY_GEN_ALL_CORE
Social History: Unable to obtain secondary to aphasia    Family History: Reviewed from prior charts, unable to confirm with patient due to aphaisa

## 2019-02-23 NOTE — ED ADULT NURSE NOTE - CHIEF COMPLAINT QUOTE
EMS brought in pt after pt was found unresponsive.  Pt has history of seizures and strokes. Pt has been altered since yesterday. Pt went to CT scan immediately.  Pt eyes open, but does not respond to verbal, tactile or painful stimulation.

## 2019-02-23 NOTE — ED ADULT NURSE NOTE - NS ED NURSE RECORD ANOTHER VITAL SIGN
GENERAL: NAD, Patient appears tired and weak.   EYES: EOMI, PERRLA   NECK: Mild pharyngeal erythema with no exudates. Uvula midline  CHEST/LUNG: Patient does not appear tachypneic. Patient is not using accessory muscles. Chest rise symmetrical. Mild wheeze auscultated to upper lung fields B/L.  HEART: No reproducible chest wall tenderness. Regular rate and rhythm; No murmurs, rubs, or gallops  ABDOMEN: Soft, Nontender, Nondistended; Bowel sounds present x 4  EXTREMITIES: Radial pules present B/L.  PSYCH: AAOx3, cooperative, appropriate  NEUROLOGY: AAOx3. Patient has generalized weakness 3/5 B/L. Finger to nose intact. Sensation intact. GENERAL: NAD, Patient appears tired and weak.   EYES: EOMI, PERRLA   NECK: Mild pharyngeal erythema with no exudates. Uvula midline  CHEST/LUNG: Patient does not appear tachypneic. Patient is not using accessory muscles. Chest rise symmetrical. Mild wheeze auscultated to lung fields B/L.  HEART: No reproducible chest wall tenderness. Regular rate and rhythm; No murmurs, rubs, or gallops  ABDOMEN: Soft, Nontender, Nondistended; Bowel sounds present x 4  EXTREMITIES: Radial pules present B/L.  PSYCH: AAOx3, cooperative, appropriate  NEUROLOGY: AAOx3. Patient has generalized weakness 3/5 B/L. Finger to nose intact. Sensation intact. Yes

## 2019-02-23 NOTE — ED ADULT NURSE REASSESSMENT NOTE - NS ED NURSE REASSESS COMMENT FT1
Pt transported to ccu.  ICU PA is not available - on phone - advised ICU RN Yin that pt has seizure meds ordered for 22;00; daughter is not aware when seizure meds were last given.
called icu pa to request order for icu.
Pt became hypotensive. Dr Cedillo made aware.  ML infusing.

## 2019-02-23 NOTE — H&P ADULT - HISTORY OF PRESENT ILLNESS
UNABLE TO OBTAIN HISTORY FROM PATIENT WHO IS APHASIC     This is a 63 year old woman with HTN, DM II, HLD, CAD (hx of two stents), s/p T10 laminectomy and fusion on 11/27/18 with postoperative Wernicke's aphasia due to left temporal lobar ICH of undetermined etiology, which had improved, and then one month later, on 12/24, s/p worsening of her aphasia due to a new left MCA distribution ischemic infarct adjacent to the prior hemorrhage complicated by need for PEG and seizures, comes to the hospital today for 1 day of inattention / staring into space / not recognizing familiar faces / increased lethargy. At baseline, patient is aphasic, but can makes sense occasionally. A/O x 2 at baseline, per the daughter at bedside.

## 2019-02-23 NOTE — H&P ADULT - NSHPOUTPATIENTPROVIDERS_GEN_ALL_CORE
Name of PMD unknown - daughter will let us know tomorrow  Dr. Concepcion - Back surgeon  Dr. Sharp - Neurologist

## 2019-02-23 NOTE — ED PROVIDER NOTE - ATTENDING CONTRIBUTION TO CARE
muriel with SUE Marion. Patient with staring spell and not alert or interactive per usual. Daughter present at bedside and states that she has been altered since yesterday. She states that she has extensive neuro history - seizures, ischemic and hemorrhagic stroke. On several medications. Pt with temp rectal of 100.2, foul smelling and cloudy urine. Vitals stable. FS normal. Plan for stat ct head, labs , urine, abx and consider cta if neg. Will d/w Dr Sharp. I performed a face to face bedside interview with patient regarding history of present illness, review of symptoms and past medical history. I completed an independent physical exam.  I have discussed the patient's plan of care with Physician Assistant (PA). I agree with note as stated above, having amended the EMR as needed to reflect my findings.   This includes History of Present Illness, HIV, Past Medical/Surgical/Family/Social History, Allergies and Home Medications, Review of Systems, Physical Exam, and any Progress Notes during the time I functioned as the attending physician for this patient.

## 2019-02-23 NOTE — ED PROVIDER NOTE - PROGRESS NOTE DETAILS
spoke with Dr. calzada. recently had to adjust her Depakote because he level was sub therapeutic. will send off a level and admit to Dr. Cedillo

## 2019-02-23 NOTE — H&P ADULT - ASSESSMENT
63F with hx of HTN, DM2, CAD, CVA x2 (left sided hemorrhagic, followed one month later by left sided ischemic CVA) with residual seizure disorder, aphasia and dysphagia s/p PEG who presents with "staring into space", inattentiveness and increased lethargy x 1 day    #Metabolic encephalopathy  #Dehydration  #Possible urinary tract infection, does not meet sepsis criteria  #YAZ  #Seizure disorder, r/o postictal state  #Differential includes infectious etiology (r/o UTI), vs metabolic (dehydration, Cr 0.77-->Cr 1.2), vs new CVA (CT head negative) vs non-convulsive status epilepticus (less likely, patient is slowly improving, per daughter), vs postictal state  -Check serum levels of AEDs  -Neuro consult - patient known to Dr. Sharp  -Possible repeat CT head in AM vs brain MRI - defer to neuro  -EEG  -Tele monitor x 24 hour - suspect can d/c tele tomorrow if no arrhythmias overnight  -Empiric Abx pending urine culture results  -HOLD ACEi for YAZ    #CAD  -ASA, BB, statin    #DM2 on sliding scale insulin, controlled, A1C 6.7  -ISS, FS    #Aphasia s/p CVA  -monitor for improvement   -outpatient SLP    #Dysphagia s/p CVA  -PEG feeds    #Weakness s/p CVA  -PT eval, return to [facility] upon d/c from hospital    #DVT ppx: HSQ    IMPROVE VTE Individual Risk Assessment          RISK                                                          Points  [  ] Previous VTE                                                3  [  ] Thrombophilia                                             2  [  ] Lower limb paralysis                                   2        (unable to hold up >15 seconds)    [  ] Current Cancer                                             2         (within 6 months)  [x  ] Immobilization > 24 hrs                              1  [  ] ICU/CCU stay > 24 hours                             1  [x  ] Age > 60                                                         1    IMPROVE VTE Score: 2 63F with hx of HTN, DM2, CAD, CVA x2 (left sided hemorrhagic, followed one month later by left sided ischemic CVA) with residual seizure disorder, aphasia and dysphagia s/p PEG who presents with "staring into space", inattentiveness and increased lethargy x 1 day    #Metabolic encephalopathy  #Dehydration  #Possible urinary tract infection, does not meet sepsis criteria  #YAZ  #Seizure disorder, r/o postictal state  #Differential includes infectious etiology (r/o UTI), vs metabolic (dehydration, Cr 0.77-->Cr 1.2), vs new CVA (CT head negative) vs non-convulsive status epilepticus (less likely, patient is slowly improving, per daughter), vs postictal state  -Check serum levels of AEDs  -Neuro consult - patient known to Dr. Sharp  -Possible repeat CT head in AM vs brain MRI - defer to neuro  -EEG  -Tele monitor x 24 hour - suspect can d/c tele tomorrow if no arrhythmias overnight  -Empiric Abx pending urine culture results  -HOLD ACEi for YAZ    #CAD  -ASA, BB, statin    #DM2 on sliding scale insulin, controlled, A1C 6.7  -ISS, FS    #Aphasia s/p CVA  -monitor for improvement   -outpatient SLP    #Dysphagia s/p CVA  -PEG feeds    #Weakness s/p CVA  -PT eval, return to [facility] upon d/c from hospital    #DVT ppx: HSQ    IMPROVE VTE Individual Risk Assessment          RISK                                                          Points  [  ] Previous VTE                                                3  [  ] Thrombophilia                                             2  [  ] Lower limb paralysis                                   2        (unable to hold up >15 seconds)    [  ] Current Cancer                                             2         (within 6 months)  [x  ] Immobilization > 24 hrs                              1  [  ] ICU/CCU stay > 24 hours                             1  [x  ] Age > 60                                                         1    IMPROVE VTE Score: 2        DISPO: PATIENT GOING TO ICU. SEE APPENDED NOTE BELOW. CRITICAL CARE TIME SPENT ON PATIENT 70 MINUTES

## 2019-02-23 NOTE — ED ADULT NURSE NOTE - OBJECTIVE STATEMENT
63 year old female 63 year old female presents to ED with altered mental status since yesterday. Pt has a history of seizures and CVA. Pt is aphasic and is responsive to pain.

## 2019-02-23 NOTE — ED ADULT TRIAGE NOTE - CHIEF COMPLAINT QUOTE
EMS brought in pt after pt was was found unresponsive.  Pt has history of seizures and strokes. Pt has been altered since yesterday. Pt went to CT scan immediately. EMS brought in pt after pt was found unresponsive.  Pt has history of seizures and strokes. Pt has been altered since yesterday. Pt went to CT scan immediately.  Pt eyes open, but does not respond to verbal, tactile or painful stimulation.

## 2019-02-23 NOTE — H&P ADULT - PSH
History of cardiac catheterization  12/2015 with stent times  - [facility]  History of carpal tunnel surgery of left wrist    History of carpal tunnel surgery of right wrist    History of knee surgery  left times 2 ;  2001 , 2002   right knee : 2004  History of shoulder surgery

## 2019-02-23 NOTE — PROVIDER CONTACT NOTE (OTHER) - SITUATION
patient suddenly became hypotensive, BP repeated several times, still low. manual BP taken by SUE Stewart

## 2019-02-24 ENCOUNTER — INPATIENT (INPATIENT)
Facility: HOSPITAL | Age: 64
LOS: 10 days | Discharge: NURSING, REHAB OR LONG-TERM CARE | DRG: 56 | End: 2019-03-07
Payer: COMMERCIAL

## 2019-02-24 VITALS
SYSTOLIC BLOOD PRESSURE: 143 MMHG | HEART RATE: 100 BPM | RESPIRATION RATE: 14 BRPM | HEIGHT: 64 IN | WEIGHT: 175.93 LBS | DIASTOLIC BLOOD PRESSURE: 85 MMHG | OXYGEN SATURATION: 100 %

## 2019-02-24 DIAGNOSIS — Z92.89 PERSONAL HISTORY OF OTHER MEDICAL TREATMENT: Chronic | ICD-10-CM

## 2019-02-24 DIAGNOSIS — Z98.890 OTHER SPECIFIED POSTPROCEDURAL STATES: Chronic | ICD-10-CM

## 2019-02-24 DIAGNOSIS — Z98.89 OTHER SPECIFIED POSTPROCEDURAL STATES: Chronic | ICD-10-CM

## 2019-02-24 LAB
-  COAGULASE NEGATIVE STAPHYLOCOCCUS: SIGNIFICANT CHANGE UP
ALBUMIN SERPL ELPH-MCNC: 2.6 G/DL — LOW (ref 3.3–5)
ALBUMIN SERPL ELPH-MCNC: 3.2 G/DL — LOW (ref 3.3–5)
ALP SERPL-CCNC: 43 U/L — SIGNIFICANT CHANGE UP (ref 40–120)
ALP SERPL-CCNC: 54 U/L — SIGNIFICANT CHANGE UP (ref 40–120)
ALT FLD-CCNC: 13 U/L — SIGNIFICANT CHANGE UP (ref 10–45)
ALT FLD-CCNC: 8 U/L — LOW (ref 10–45)
ANION GAP SERPL CALC-SCNC: 12 MMOL/L — SIGNIFICANT CHANGE UP (ref 5–17)
ANION GAP SERPL CALC-SCNC: 12 MMOL/L — SIGNIFICANT CHANGE UP (ref 5–17)
ANION GAP SERPL CALC-SCNC: 8 MMOL/L — SIGNIFICANT CHANGE UP (ref 5–17)
AST SERPL-CCNC: 12 U/L — SIGNIFICANT CHANGE UP (ref 10–40)
AST SERPL-CCNC: 35 U/L — SIGNIFICANT CHANGE UP (ref 10–40)
BILIRUB SERPL-MCNC: 0.2 MG/DL — SIGNIFICANT CHANGE UP (ref 0.2–1.2)
BILIRUB SERPL-MCNC: 0.2 MG/DL — SIGNIFICANT CHANGE UP (ref 0.2–1.2)
BLD GP AB SCN SERPL QL: NEGATIVE — SIGNIFICANT CHANGE UP
BUN SERPL-MCNC: 20 MG/DL — SIGNIFICANT CHANGE UP (ref 7–23)
BUN SERPL-MCNC: 26 MG/DL — HIGH (ref 7–23)
BUN SERPL-MCNC: 27 MG/DL — HIGH (ref 7–23)
CALCIUM SERPL-MCNC: 7.8 MG/DL — LOW (ref 8.4–10.5)
CALCIUM SERPL-MCNC: 9 MG/DL — SIGNIFICANT CHANGE UP (ref 8.4–10.5)
CALCIUM SERPL-MCNC: 9.1 MG/DL — SIGNIFICANT CHANGE UP (ref 8.4–10.5)
CHLORIDE SERPL-SCNC: 102 MMOL/L — SIGNIFICANT CHANGE UP (ref 96–108)
CHLORIDE SERPL-SCNC: 104 MMOL/L — SIGNIFICANT CHANGE UP (ref 96–108)
CHLORIDE SERPL-SCNC: 109 MMOL/L — HIGH (ref 96–108)
CO2 SERPL-SCNC: 22 MMOL/L — SIGNIFICANT CHANGE UP (ref 22–31)
CO2 SERPL-SCNC: 24 MMOL/L — SIGNIFICANT CHANGE UP (ref 22–31)
CO2 SERPL-SCNC: 26 MMOL/L — SIGNIFICANT CHANGE UP (ref 22–31)
CREAT SERPL-MCNC: 0.9 MG/DL — SIGNIFICANT CHANGE UP (ref 0.5–1.3)
CREAT SERPL-MCNC: 0.9 MG/DL — SIGNIFICANT CHANGE UP (ref 0.5–1.3)
CREAT SERPL-MCNC: 0.92 MG/DL — SIGNIFICANT CHANGE UP (ref 0.5–1.3)
GLUCOSE BLDC GLUCOMTR-MCNC: 106 MG/DL — HIGH (ref 70–99)
GLUCOSE BLDC GLUCOMTR-MCNC: 109 MG/DL — HIGH (ref 70–99)
GLUCOSE BLDC GLUCOMTR-MCNC: 67 MG/DL — LOW (ref 70–99)
GLUCOSE BLDC GLUCOMTR-MCNC: 96 MG/DL — SIGNIFICANT CHANGE UP (ref 70–99)
GLUCOSE SERPL-MCNC: 105 MG/DL — HIGH (ref 70–99)
GLUCOSE SERPL-MCNC: 124 MG/DL — HIGH (ref 70–99)
GLUCOSE SERPL-MCNC: 185 MG/DL — HIGH (ref 70–99)
GRAM STN FLD: SIGNIFICANT CHANGE UP
HCT VFR BLD CALC: 23.7 % — LOW (ref 34.5–45)
HCT VFR BLD CALC: 29.9 % — LOW (ref 34.5–45)
HCV AB S/CO SERPL IA: 0.13 S/CO — SIGNIFICANT CHANGE UP
HCV AB SERPL-IMP: SIGNIFICANT CHANGE UP
HGB BLD-MCNC: 7.2 G/DL — LOW (ref 11.5–15.5)
HGB BLD-MCNC: 9.2 G/DL — LOW (ref 11.5–15.5)
LACTATE SERPL-SCNC: 1.7 MMOL/L — SIGNIFICANT CHANGE UP (ref 0.5–2)
MAGNESIUM SERPL-MCNC: 1.8 MG/DL — SIGNIFICANT CHANGE UP (ref 1.6–2.6)
MAGNESIUM SERPL-MCNC: 2.2 MG/DL — SIGNIFICANT CHANGE UP (ref 1.6–2.6)
MAGNESIUM SERPL-MCNC: 2.2 MG/DL — SIGNIFICANT CHANGE UP (ref 1.6–2.6)
MCHC RBC-ENTMCNC: 30.4 GM/DL — LOW (ref 32–36)
MCHC RBC-ENTMCNC: 30.4 PG — SIGNIFICANT CHANGE UP (ref 27–34)
MCHC RBC-ENTMCNC: 30.6 PG — SIGNIFICANT CHANGE UP (ref 27–34)
MCHC RBC-ENTMCNC: 30.8 GM/DL — LOW (ref 32–36)
MCV RBC AUTO: 100 FL — SIGNIFICANT CHANGE UP (ref 80–100)
MCV RBC AUTO: 99.3 FL — SIGNIFICANT CHANGE UP (ref 80–100)
METHOD TYPE: SIGNIFICANT CHANGE UP
NRBC # BLD: 0 /100 WBCS — SIGNIFICANT CHANGE UP (ref 0–0)
NRBC # BLD: 0 /100 WBCS — SIGNIFICANT CHANGE UP (ref 0–0)
PLATELET # BLD AUTO: 174 K/UL — SIGNIFICANT CHANGE UP (ref 150–400)
PLATELET # BLD AUTO: 223 K/UL — SIGNIFICANT CHANGE UP (ref 150–400)
POTASSIUM SERPL-MCNC: 4.1 MMOL/L — SIGNIFICANT CHANGE UP (ref 3.5–5.3)
POTASSIUM SERPL-MCNC: 4.5 MMOL/L — SIGNIFICANT CHANGE UP (ref 3.5–5.3)
POTASSIUM SERPL-MCNC: 5.1 MMOL/L — SIGNIFICANT CHANGE UP (ref 3.5–5.3)
POTASSIUM SERPL-SCNC: 4.1 MMOL/L — SIGNIFICANT CHANGE UP (ref 3.5–5.3)
POTASSIUM SERPL-SCNC: 4.5 MMOL/L — SIGNIFICANT CHANGE UP (ref 3.5–5.3)
POTASSIUM SERPL-SCNC: 5.1 MMOL/L — SIGNIFICANT CHANGE UP (ref 3.5–5.3)
PROT SERPL-MCNC: 5 G/DL — LOW (ref 6–8.3)
PROT SERPL-MCNC: 6.6 G/DL — SIGNIFICANT CHANGE UP (ref 6–8.3)
RBC # BLD: 2.37 M/UL — LOW (ref 3.8–5.2)
RBC # BLD: 3.01 M/UL — LOW (ref 3.8–5.2)
RBC # FLD: 15.5 % — HIGH (ref 10.3–14.5)
RBC # FLD: 15.7 % — HIGH (ref 10.3–14.5)
RH IG SCN BLD-IMP: POSITIVE — SIGNIFICANT CHANGE UP
SODIUM SERPL-SCNC: 138 MMOL/L — SIGNIFICANT CHANGE UP (ref 135–145)
SODIUM SERPL-SCNC: 140 MMOL/L — SIGNIFICANT CHANGE UP (ref 135–145)
SODIUM SERPL-SCNC: 141 MMOL/L — SIGNIFICANT CHANGE UP (ref 135–145)
SPECIMEN SOURCE: SIGNIFICANT CHANGE UP
VALPROATE SERPL-MCNC: 86 UG/ML — SIGNIFICANT CHANGE UP (ref 50–100)
VALPROATE SERPL-MCNC: 93.8 UG/ML — SIGNIFICANT CHANGE UP (ref 50–100)
WBC # BLD: 5.59 K/UL — SIGNIFICANT CHANGE UP (ref 3.8–10.5)
WBC # BLD: 6.08 K/UL — SIGNIFICANT CHANGE UP (ref 3.8–10.5)
WBC # FLD AUTO: 5.59 K/UL — SIGNIFICANT CHANGE UP (ref 3.8–10.5)
WBC # FLD AUTO: 6.08 K/UL — SIGNIFICANT CHANGE UP (ref 3.8–10.5)

## 2019-02-24 PROCEDURE — 85027 COMPLETE CBC AUTOMATED: CPT

## 2019-02-24 PROCEDURE — 99223 1ST HOSP IP/OBS HIGH 75: CPT

## 2019-02-24 PROCEDURE — 81001 URINALYSIS AUTO W/SCOPE: CPT

## 2019-02-24 PROCEDURE — 80164 ASSAY DIPROPYLACETIC ACD TOT: CPT

## 2019-02-24 PROCEDURE — 99285 EMERGENCY DEPT VISIT HI MDM: CPT | Mod: 25

## 2019-02-24 PROCEDURE — 99233 SBSQ HOSP IP/OBS HIGH 50: CPT | Mod: GC

## 2019-02-24 PROCEDURE — 96375 TX/PRO/DX INJ NEW DRUG ADDON: CPT

## 2019-02-24 PROCEDURE — 93970 EXTREMITY STUDY: CPT | Mod: 26

## 2019-02-24 PROCEDURE — 71045 X-RAY EXAM CHEST 1 VIEW: CPT | Mod: 26,76

## 2019-02-24 PROCEDURE — 85610 PROTHROMBIN TIME: CPT

## 2019-02-24 PROCEDURE — 82962 GLUCOSE BLOOD TEST: CPT

## 2019-02-24 PROCEDURE — 87186 SC STD MICRODIL/AGAR DIL: CPT

## 2019-02-24 PROCEDURE — 87150 DNA/RNA AMPLIFIED PROBE: CPT

## 2019-02-24 PROCEDURE — 94002 VENT MGMT INPAT INIT DAY: CPT

## 2019-02-24 PROCEDURE — 71045 X-RAY EXAM CHEST 1 VIEW: CPT

## 2019-02-24 PROCEDURE — 83605 ASSAY OF LACTIC ACID: CPT

## 2019-02-24 PROCEDURE — 36415 COLL VENOUS BLD VENIPUNCTURE: CPT

## 2019-02-24 PROCEDURE — 87086 URINE CULTURE/COLONY COUNT: CPT

## 2019-02-24 PROCEDURE — 70496 CT ANGIOGRAPHY HEAD: CPT

## 2019-02-24 PROCEDURE — 36600 WITHDRAWAL OF ARTERIAL BLOOD: CPT

## 2019-02-24 PROCEDURE — 93005 ELECTROCARDIOGRAM TRACING: CPT

## 2019-02-24 PROCEDURE — 80053 COMPREHEN METABOLIC PANEL: CPT

## 2019-02-24 PROCEDURE — 96365 THER/PROPH/DIAG IV INF INIT: CPT | Mod: XU

## 2019-02-24 PROCEDURE — 70450 CT HEAD/BRAIN W/O DYE: CPT

## 2019-02-24 PROCEDURE — 85730 THROMBOPLASTIN TIME PARTIAL: CPT

## 2019-02-24 PROCEDURE — 70498 CT ANGIOGRAPHY NECK: CPT

## 2019-02-24 PROCEDURE — 82803 BLOOD GASES ANY COMBINATION: CPT

## 2019-02-24 PROCEDURE — 87040 BLOOD CULTURE FOR BACTERIA: CPT

## 2019-02-24 RX ORDER — SODIUM CHLORIDE 9 MG/ML
1000 INJECTION, SOLUTION INTRAVENOUS
Qty: 0 | Refills: 0 | Status: DISCONTINUED | OUTPATIENT
Start: 2019-02-24 | End: 2019-03-07

## 2019-02-24 RX ORDER — CEFTRIAXONE 500 MG/1
1 INJECTION, POWDER, FOR SOLUTION INTRAMUSCULAR; INTRAVENOUS ONCE
Qty: 0 | Refills: 0 | Status: COMPLETED | OUTPATIENT
Start: 2019-02-24 | End: 2019-02-24

## 2019-02-24 RX ORDER — GLUCAGON INJECTION, SOLUTION 0.5 MG/.1ML
1 INJECTION, SOLUTION SUBCUTANEOUS ONCE
Qty: 0 | Refills: 0 | Status: DISCONTINUED | OUTPATIENT
Start: 2019-02-24 | End: 2019-03-07

## 2019-02-24 RX ORDER — NOREPINEPHRINE BITARTRATE/D5W 8 MG/250ML
0.05 PLASTIC BAG, INJECTION (ML) INTRAVENOUS
Qty: 8 | Refills: 0 | Status: DISCONTINUED | OUTPATIENT
Start: 2019-02-24 | End: 2019-02-25

## 2019-02-24 RX ORDER — CEFTRIAXONE 500 MG/1
INJECTION, POWDER, FOR SOLUTION INTRAMUSCULAR; INTRAVENOUS
Qty: 0 | Refills: 0 | Status: DISCONTINUED | OUTPATIENT
Start: 2019-02-24 | End: 2019-02-28

## 2019-02-24 RX ORDER — VALPROIC ACID (AS SODIUM SALT) 250 MG/5ML
500 SOLUTION, ORAL ORAL EVERY 8 HOURS
Qty: 0 | Refills: 0 | Status: DISCONTINUED | OUTPATIENT
Start: 2019-02-24 | End: 2019-03-03

## 2019-02-24 RX ORDER — PROPOFOL 10 MG/ML
5 INJECTION, EMULSION INTRAVENOUS
Qty: 500 | Refills: 0 | Status: DISCONTINUED | OUTPATIENT
Start: 2019-02-24 | End: 2019-02-24

## 2019-02-24 RX ORDER — ACETAMINOPHEN 500 MG
1000 TABLET ORAL ONCE
Qty: 0 | Refills: 0 | Status: COMPLETED | OUTPATIENT
Start: 2019-02-24 | End: 2019-02-24

## 2019-02-24 RX ORDER — LEVETIRACETAM 250 MG/1
500 TABLET, FILM COATED ORAL EVERY 12 HOURS
Qty: 0 | Refills: 0 | Status: DISCONTINUED | OUTPATIENT
Start: 2019-02-24 | End: 2019-03-03

## 2019-02-24 RX ORDER — HEPARIN SODIUM 5000 [USP'U]/ML
5000 INJECTION INTRAVENOUS; SUBCUTANEOUS EVERY 8 HOURS
Qty: 0 | Refills: 0 | Status: DISCONTINUED | OUTPATIENT
Start: 2019-02-24 | End: 2019-03-01

## 2019-02-24 RX ORDER — VALPROIC ACID (AS SODIUM SALT) 250 MG/5ML
250 SOLUTION, ORAL ORAL EVERY 8 HOURS
Qty: 0 | Refills: 0 | Status: DISCONTINUED | OUTPATIENT
Start: 2019-02-24 | End: 2019-02-24

## 2019-02-24 RX ORDER — PROPOFOL 10 MG/ML
33.42 INJECTION, EMULSION INTRAVENOUS
Qty: 1000 | Refills: 0 | Status: DISCONTINUED | OUTPATIENT
Start: 2019-02-24 | End: 2019-02-24

## 2019-02-24 RX ORDER — DEXTROSE 50 % IN WATER 50 %
25 SYRINGE (ML) INTRAVENOUS ONCE
Qty: 0 | Refills: 0 | Status: COMPLETED | OUTPATIENT
Start: 2019-02-24 | End: 2019-02-24

## 2019-02-24 RX ORDER — LACOSAMIDE 50 MG/1
200 TABLET ORAL EVERY 12 HOURS
Qty: 0 | Refills: 0 | Status: DISCONTINUED | OUTPATIENT
Start: 2019-02-24 | End: 2019-03-01

## 2019-02-24 RX ORDER — CARVEDILOL PHOSPHATE 80 MG/1
25 CAPSULE, EXTENDED RELEASE ORAL EVERY 12 HOURS
Qty: 0 | Refills: 0 | Status: DISCONTINUED | OUTPATIENT
Start: 2019-02-24 | End: 2019-02-24

## 2019-02-24 RX ORDER — DEXTROSE 50 % IN WATER 50 %
15 SYRINGE (ML) INTRAVENOUS ONCE
Qty: 0 | Refills: 0 | Status: DISCONTINUED | OUTPATIENT
Start: 2019-02-24 | End: 2019-03-07

## 2019-02-24 RX ORDER — VANCOMYCIN HCL 1 G
1250 VIAL (EA) INTRAVENOUS EVERY 12 HOURS
Qty: 0 | Refills: 0 | Status: DISCONTINUED | OUTPATIENT
Start: 2019-02-24 | End: 2019-02-25

## 2019-02-24 RX ORDER — DEXTROSE 50 % IN WATER 50 %
12.5 SYRINGE (ML) INTRAVENOUS ONCE
Qty: 0 | Refills: 0 | Status: DISCONTINUED | OUTPATIENT
Start: 2019-02-24 | End: 2019-03-07

## 2019-02-24 RX ORDER — CEFTRIAXONE 500 MG/1
1 INJECTION, POWDER, FOR SOLUTION INTRAMUSCULAR; INTRAVENOUS EVERY 24 HOURS
Qty: 0 | Refills: 0 | Status: DISCONTINUED | OUTPATIENT
Start: 2019-02-25 | End: 2019-02-28

## 2019-02-24 RX ORDER — CHLORHEXIDINE GLUCONATE 213 G/1000ML
1 SOLUTION TOPICAL DAILY
Qty: 0 | Refills: 0 | Status: DISCONTINUED | OUTPATIENT
Start: 2019-02-24 | End: 2019-03-04

## 2019-02-24 RX ORDER — INSULIN LISPRO 100/ML
VIAL (ML) SUBCUTANEOUS EVERY 6 HOURS
Qty: 0 | Refills: 0 | Status: DISCONTINUED | OUTPATIENT
Start: 2019-02-24 | End: 2019-03-07

## 2019-02-24 RX ORDER — ACETAMINOPHEN 500 MG
650 TABLET ORAL EVERY 6 HOURS
Qty: 0 | Refills: 0 | Status: DISCONTINUED | OUTPATIENT
Start: 2019-02-24 | End: 2019-03-04

## 2019-02-24 RX ORDER — SODIUM CHLORIDE 9 MG/ML
1000 INJECTION INTRAMUSCULAR; INTRAVENOUS; SUBCUTANEOUS ONCE
Qty: 0 | Refills: 0 | Status: COMPLETED | OUTPATIENT
Start: 2019-02-24 | End: 2019-02-24

## 2019-02-24 RX ORDER — CHLORHEXIDINE GLUCONATE 213 G/1000ML
15 SOLUTION TOPICAL
Qty: 0 | Refills: 0 | Status: DISCONTINUED | OUTPATIENT
Start: 2019-02-24 | End: 2019-02-25

## 2019-02-24 RX ADMIN — LEVETIRACETAM 400 MILLIGRAM(S): 250 TABLET, FILM COATED ORAL at 17:29

## 2019-02-24 RX ADMIN — CHLORHEXIDINE GLUCONATE 1 APPLICATION(S): 213 SOLUTION TOPICAL at 11:48

## 2019-02-24 RX ADMIN — LACOSAMIDE 140 MILLIGRAM(S): 50 TABLET ORAL at 09:22

## 2019-02-24 RX ADMIN — HEPARIN SODIUM 5000 UNIT(S): 5000 INJECTION INTRAVENOUS; SUBCUTANEOUS at 10:29

## 2019-02-24 RX ADMIN — CHLORHEXIDINE GLUCONATE 15 MILLILITER(S): 213 SOLUTION TOPICAL at 17:41

## 2019-02-24 RX ADMIN — CHLORHEXIDINE GLUCONATE 15 MILLILITER(S): 213 SOLUTION TOPICAL at 08:28

## 2019-02-24 RX ADMIN — Medication 1000 MILLIGRAM(S): at 18:55

## 2019-02-24 RX ADMIN — SODIUM CHLORIDE 2000 MILLILITER(S): 9 INJECTION INTRAMUSCULAR; INTRAVENOUS; SUBCUTANEOUS at 20:22

## 2019-02-24 RX ADMIN — Medication 26.25 MILLIGRAM(S): at 13:49

## 2019-02-24 RX ADMIN — HEPARIN SODIUM 5000 UNIT(S): 5000 INJECTION INTRAVENOUS; SUBCUTANEOUS at 17:41

## 2019-02-24 RX ADMIN — Medication 25 MILLILITER(S): at 07:00

## 2019-02-24 RX ADMIN — CARVEDILOL PHOSPHATE 25 MILLIGRAM(S): 80 CAPSULE, EXTENDED RELEASE ORAL at 10:29

## 2019-02-24 RX ADMIN — LEVETIRACETAM 400 MILLIGRAM(S): 250 TABLET, FILM COATED ORAL at 06:25

## 2019-02-24 RX ADMIN — Medication 166.67 MILLIGRAM(S): at 20:22

## 2019-02-24 RX ADMIN — CEFTRIAXONE 100 GRAM(S): 500 INJECTION, POWDER, FOR SOLUTION INTRAMUSCULAR; INTRAVENOUS at 10:29

## 2019-02-24 RX ADMIN — Medication 27.5 MILLIGRAM(S): at 21:45

## 2019-02-24 RX ADMIN — SODIUM CHLORIDE 4000 MILLILITER(S): 9 INJECTION INTRAMUSCULAR; INTRAVENOUS; SUBCUTANEOUS at 20:22

## 2019-02-24 RX ADMIN — Medication 400 MILLIGRAM(S): at 17:47

## 2019-02-24 NOTE — H&P ADULT - PSH
History of cardiac catheterization  12/2015 with stent times  - [facility]  History of carpal tunnel surgery of left wrist    History of carpal tunnel surgery of right wrist    History of knee surgery  left times 2 ;  2001 , 2002   right knee : 2004  History of shoulder surgery

## 2019-02-24 NOTE — PROVIDER CONTACT NOTE (CHANGE IN STATUS NOTIFICATION) - ACTION/TREATMENT ORDERED:
1g tylenol IV, turned off cpap on vent and went to AC as ordered
1930 another liter of NS infused as ordered per MD Dennis,  levophed titrated to maintain MAP above 65.  ice packs applied under arms and between legs

## 2019-02-24 NOTE — PROGRESS NOTE ADULT - ASSESSMENT
63F with hx of HTN, DM2, CAD, CVA x2 (left sided hemorrhagic, followed one month later by left sided ischemic CVA) with residual seizure disorder, aphasia and dysphagia s/p PEG who presents with "staring into space", inattentiveness and increased lethargy x 1 day    NEURO  #Status Epilepticus   -s/p intubation at [facility]   -no clear inciting event  -plan for vEEEG per Dr. Eng  -c/w keppra 2g BID  -c/w vimpat 200 mg BID  -f/u depakote level and dose by level     CARDS  #CAD s/p stents   -c/w home coreg, home lipitor, home ASA  -hold home losartan in the setting of YAZ  RESP  -intubated for airway protection in the setting of status epilepticus   -sedation holiday/weaning trial per neurology recs     GI  #PEG Dependent  -holding tube feeds for now; please restart per neuro recs     RENAL  #YAZ  -unclear etiology; dont think it is 2/2 dehydration as specific gravity is low on UA   -urine lytes for further eval   -hold ARB     #UTI  -patient with (+) UA on admission  -s/p CTX at [place]  -no fevers, no leukocytosis, completely hemodynamically stable so less suspicion for urosepsis contributing to patient's AMS picture  -complete 3 day course of CTX     ENDO  #DMT2  -SSI while NPO    PSYCH  #depression  -hold home olanzapine and elavil      #FEN  -NPO  -HSQ for DVT ppx  -fall precautions 63F with hx of HTN, DM2, CAD, CVA x2 (left sided hemorrhagic, followed one month later by left sided ischemic CVA) with residual seizure disorder, aphasia and dysphagia s/p PEG who presents with "staring into space", inattentiveness and increased lethargy x 1 day    NEURO  #Status Epilepticus   - s/p intubation at [facility]   - no clear inciting event though has history of temporal hemorrhagic infarct in 11/18  - vEEG overnight and through today shows only rare Rt>Lt temporal sharp wave activity, no seizures  - c/w vEEEG per Dr. Eng  - c/w keppra 2g BID  - c/w vimpat 200 mg BID  - c/w depakote 500mg q8 level, daily levels  - consider MRI   - IV lorazepam 1mg only for convulsive seizures >1min.    Respiratory  - intubated for airway protection in the setting of status epilepticus y  - wean off sedation and will attempt to extubate given mental status     Cardiology  Hemodynamically stable not requiring any pressor support    #r/o DVT  - f/u LE dopplers     #CAD s/p stents   -c/w home coreg, home lipitor, home ASA  -hold home losartan in the setting of YAZ      GI  #PEG Dependent  -holding tube feeds for now; please restart per neuro recs     RENAL  #YAZ  -unclear etiology; don't think it is 2/2 dehydration as specific gravity is low on UA   -urine lytes for further eval   -hold ARB     #UTI  -patient with (+) UA on admission  -s/p CTX at [place]  -no fevers, no leukocytosis, completely hemodynamically stable so less suspicion for urosepsis contributing to patient's AMS picture  -complete 3 day course of CTX     ENDO  #DMT2  -SSI while NPO    PSYCH  #depression  -hold home olanzapine and elavil      #FEN  -NPO  -HSQ for DVT ppx  Lines: IJ placed on 2/24  -fall precautions

## 2019-02-24 NOTE — H&P ADULT - ASSESSMENT
63F with hx of HTN, DM2, CAD, CVA x2 (left sided hemorrhagic, followed one month later by left sided ischemic CVA) with residual seizure disorder, aphasia and dysphagia s/p PEG who presents with "staring into space", inattentiveness and increased lethargy x 1 day    NEURO  #Status Epilepticus   -s/p intubation at [facility]   -no clear inciting event  -plan for vEEEG per Dr. Eng  -c/w keppra 2g BID  -c/w vimpat 200 mg BID  -f/u depakote level and dose by level     #CARDS       #YAZ  #Seizure disorder, r/o postictal state  #Differential includes infectious etiology (r/o UTI), vs metabolic (dehydration, Cr 0.77-->Cr 1.2), vs new CVA (CT head negative) vs non-convulsive status epilepticus (less likely, patient is slowly improving, per daughter), vs postictal state  -Check serum levels of AEDs  -Neuro consult - patient known to Dr. Sharp  -Possible repeat CT head in AM vs brain MRI - defer to neuro  -EEG  -Tele monitor x 24 hour - suspect can d/c tele tomorrow if no arrhythmias overnight  -Empiric Abx pending urine culture results  -HOLD ACEi for YAZ    #CAD  -ASA, BB, statin    #DM2 on sliding scale insulin, controlled, A1C 6.7  -ISS, FS    #Aphasia s/p CVA  -monitor for improvement   -outpatient SLP    #Dysphagia s/p CVA  -PEG feeds    #Weakness s/p CVA  -PT eval, return to [facility] upon d/c from hospital    #DVT ppx: HSQ    IMPROVE VTE Individual Risk Assessment          RISK                                                          Points  [  ] Previous VTE                                                3  [  ] Thrombophilia                                             2  [  ] Lower limb paralysis                                   2        (unable to hold up >15 seconds)    [  ] Current Cancer                                             2         (within 6 months)  [x  ] Immobilization > 24 hrs                              1  [  ] ICU/CCU stay > 24 hours                             1  [x  ] Age > 60                                                         1    IMPROVE VTE Score: 2        DISPO: PATIENT GOING TO ICU. SEE APPENDED NOTE BELOW. CRITICAL CARE TIME SPENT ON PATIENT 70 MINUTES 63F with hx of HTN, DM2, CAD, CVA x2 (left sided hemorrhagic, followed one month later by left sided ischemic CVA) with residual seizure disorder, aphasia and dysphagia s/p PEG who presents with "staring into space", inattentiveness and increased lethargy x 1 day    NEURO  #Status Epilepticus   -s/p intubation at [facility]   -no clear inciting event  -plan for vEEEG per Dr. Eng  -c/w keppra 2g BID  -c/w vimpat 200 mg BID  -f/u depakote level and dose by level     CARDS  #CAD s/p stents   -c/w home coreg, home lipitor, home ASA  -hold home losartan until BP can tolerate    RESP  -intubated for airway protection in the setting of status epilepticus   -sedation holiday/weaning trial per neurology recs     GI  -stable continue to monitor    PSYCH  #depression  -hold home olanzapine and elavil      #YAZ  #Seizure disorder, r/o postictal state  #Differential includes infectious etiology (r/o UTI), vs metabolic (dehydration, Cr 0.77-->Cr 1.2), vs new CVA (CT head negative) vs non-convulsive status epilepticus (less likely, patient is slowly improving, per daughter), vs postictal state  -Check serum levels of AEDs  -Neuro consult - patient known to Dr. Sharp  -Possible repeat CT head in AM vs brain MRI - defer to neuro  -EEG  -Tele monitor x 24 hour - suspect can d/c tele tomorrow if no arrhythmias overnight  -Empiric Abx pending urine culture results  -HOLD ACEi for YAZ    #CAD  -ASA, BB, statin    #DM2 on sliding scale insulin, controlled, A1C 6.7  -ISS, FS    #Aphasia s/p CVA  -monitor for improvement   -outpatient SLP    #Dysphagia s/p CVA  -PEG feeds    #Weakness s/p CVA  -PT eval, return to [facility] upon d/c from hospital    #DVT ppx: HSQ    IMPROVE VTE Individual Risk Assessment          RISK                                                          Points  [  ] Previous VTE                                                3  [  ] Thrombophilia                                             2  [  ] Lower limb paralysis                                   2        (unable to hold up >15 seconds)    [  ] Current Cancer                                             2         (within 6 months)  [x  ] Immobilization > 24 hrs                              1  [  ] ICU/CCU stay > 24 hours                             1  [x  ] Age > 60                                                         1    IMPROVE VTE Score: 2        DISPO: PATIENT GOING TO ICU. SEE APPENDED NOTE BELOW. CRITICAL CARE TIME SPENT ON PATIENT 70 MINUTES 63F with hx of HTN, DM2, CAD, CVA x2 (left sided hemorrhagic, followed one month later by left sided ischemic CVA) with residual seizure disorder, aphasia and dysphagia s/p PEG who presents with "staring into space", inattentiveness and increased lethargy x 1 day    NEURO  #Status Epilepticus   -s/p intubation at [facility]   -no clear inciting event  -plan for vEEEG per Dr. nEg  -c/w keppra 2g BID  -c/w vimpat 200 mg BID  -f/u depakote level and dose by level     CARDS  #CAD s/p stents   -c/w home coreg, home lipitor, home ASA  -hold home losartan in the setting of YAZ  RESP  -intubated for airway protection in the setting of status epilepticus   -sedation holiday/weaning trial per neurology recs     GI  #PEG Dependent  -holding tube feeds for now; please restart per neuro recs     RENAL  #YAZ  -unclear etiology; dont think it is 2/2 dehydration as specific gravity is low on UA   -urine lytes for further eval   -hold ARB     #UTI  -patient with (+) UA on admission  -s/p CTX at [place]  -no fevers, no leukocytosis, completely hemodynamically stable so less suspicion for urosepsis contributing to patient's AMS picture  -complete 3 day course of CTX     ENDO  #DMT2  -SSI while NPO    PSYCH  #depression  -hold home olanzapine and elavil      #FEN  -NPO  -HSQ for DVT ppx  -fall precautions

## 2019-02-24 NOTE — PROGRESS NOTE ADULT - SUBJECTIVE AND OBJECTIVE BOX
incomplete  OVERNIGHT EVENTS:    SUBJECTIVE / INTERVAL HPI: Patient seen and examined at bedside.     VITAL SIGNS:  Vital Signs Last 24 Hrs  T(C): 36.2 (2019 05:29), Max: 37.9 (2019 14:15)  T(F): 97.1 (2019 05:29), Max: 100.2 (2019 14:15)  HR: 102 (2019 07:00) (77 - 113)  BP: 153/88 (2019 07:00) (66/36 - 168/81)  BP(mean): 101 (2019 07:00) (54 - 121)  RR: 14 (2019 07:00) (12 - 18)  SpO2: 100% (2019 07:00) (89% - 100%)    PHYSICAL EXAM:    General: WDWN  HEENT: NC/AT; PERRL, anicteric sclera; MMM  Neck: supple  Cardiovascular: +S1/S2; RRR  Respiratory: CTA B/L; no W/R/R  Gastrointestinal: soft, NT/ND; +BSx4  Extremities: WWP; no edema, clubbing or cyanosis  Vascular: 2+ radial, DP/PT pulses B/L  Neurological: AAOx3; no focal deficits    MEDICATIONS:  MEDICATIONS  (STANDING):  cefTRIAXone   IVPB 1 Gram(s) IV Intermittent once  cefTRIAXone   IVPB      chlorhexidine 0.12% Liquid 15 milliLiter(s) Oral Mucosa two times a day  chlorhexidine 2% Cloths 1 Application(s) Topical daily  dextrose 5%. 1000 milliLiter(s) (50 mL/Hr) IV Continuous <Continuous>  dextrose 50% Injectable 12.5 Gram(s) IV Push once  dextrose 50% Injectable 25 milliLiter(s) IV Push once  heparin  Injectable 5000 Unit(s) SubCutaneous every 8 hours  insulin lispro (HumaLOG) corrective regimen sliding scale   SubCutaneous every 6 hours  lacosamide IVPB 200 milliGRAM(s) IV Intermittent every 12 hours  levETIRAcetam  IVPB 500 milliGRAM(s) IV Intermittent every 12 hours  propofol Infusion 5 MICROgram(s)/kG/Min (2.394 mL/Hr) IV Continuous <Continuous>    MEDICATIONS  (PRN):  dextrose 40% Gel 15 Gram(s) Oral once PRN Blood Glucose LESS THAN 70 milliGRAM(s)/deciliter  glucagon  Injectable 1 milliGRAM(s) IntraMuscular once PRN Glucose LESS THAN 70 milligrams/deciliter  LORazepam   Injectable 2 milliGRAM(s) IV Push once PRN for seizure > 2 minutes      ALLERGIES:  Allergies    No Known Allergies    Intolerances        LABS:                        10.9   6.0   )-----------( 313      ( 2019 14:20 )             33.9         137  |  97  |  38<H>  ----------------------------<  165<H>  5.3   |  30  |  1.20    Ca    9.4      2019 14:20    TPro  7.4  /  Alb  3.0<L>  /  TBili  0.3  /  DBili  x   /  AST  19  /  ALT  17  /  AlkPhos  66      PT/INR - ( 2019 14:20 )   PT: 10.4 sec;   INR: 0.93 ratio         PTT - ( 2019 14:20 )  PTT:31.7 sec  Urinalysis Basic - ( 2019 14:20 )    Color: Yellow / Appearance: Slightly Turbid / S.005 / pH: x  Gluc: x / Ketone: Negative  / Bili: Negative / Urobili: Negative   Blood: x / Protein: 15 / Nitrite: Negative   Leuk Esterase: Moderate / RBC: 0-4 /HPF / WBC 11-25 /HPF   Sq Epi: x / Non Sq Epi: Neg.-Few / Bacteria: Many /HPF      CAPILLARY BLOOD GLUCOSE      POCT Blood Glucose.: 67 mg/dL (2019 05:56)      RADIOLOGY & ADDITIONAL TESTS: Reviewed. OVERNIGHT EVENTS:  Admitted overnight, intubated and sedated, TLC placed  SUBJECTIVE / INTERVAL HPI: Patient seen and examined at bedside.   Patient laying in bed, intubated and sedated, yanes placed. Unable to assess ROS     VITAL SIGNS:  Vital Signs Last 24 Hrs  T(C): 36.2 (2019 05:29), Max: 37.9 (2019 14:15)  T(F): 97.1 (2019 05:29), Max: 100.2 (2019 14:15)  HR: 102 (2019 07:00) (77 - 113)  BP: 153/88 (2019 07:00) (66/36 - 168/81)  BP(mean): 101 (2019 07:00) (54 - 121)  RR: 14 (2019 07:00) (12 - 18)  SpO2: 100% (2019 07:00) (89% - 100%)    PHYSICAL EXAM:    General: WDWN, elderly overweight female   HEENT: NC/AT; PERRL, anicteric sclera; MMM  Neck: supple, no JVD  Cardiovascular: +S1/S2; RRR  Respiratory: CTA B/L; no W/R/R  Gastrointestinal: soft, NT/ND; +BSx4  Extremities: WWP; no edema, clubbing or cyanosis  Vascular: 2+ radial, DP/PT pulses B/L  Neurological: AAOx3; no focal deficits    MEDICATIONS:  MEDICATIONS  (STANDING):  cefTRIAXone   IVPB 1 Gram(s) IV Intermittent once  cefTRIAXone   IVPB      chlorhexidine 0.12% Liquid 15 milliLiter(s) Oral Mucosa two times a day  chlorhexidine 2% Cloths 1 Application(s) Topical daily  dextrose 5%. 1000 milliLiter(s) (50 mL/Hr) IV Continuous <Continuous>  dextrose 50% Injectable 12.5 Gram(s) IV Push once  dextrose 50% Injectable 25 milliLiter(s) IV Push once  heparin  Injectable 5000 Unit(s) SubCutaneous every 8 hours  insulin lispro (HumaLOG) corrective regimen sliding scale   SubCutaneous every 6 hours  lacosamide IVPB 200 milliGRAM(s) IV Intermittent every 12 hours  levETIRAcetam  IVPB 500 milliGRAM(s) IV Intermittent every 12 hours  propofol Infusion 5 MICROgram(s)/kG/Min (2.394 mL/Hr) IV Continuous <Continuous>    MEDICATIONS  (PRN):  dextrose 40% Gel 15 Gram(s) Oral once PRN Blood Glucose LESS THAN 70 milliGRAM(s)/deciliter  glucagon  Injectable 1 milliGRAM(s) IntraMuscular once PRN Glucose LESS THAN 70 milligrams/deciliter  LORazepam   Injectable 2 milliGRAM(s) IV Push once PRN for seizure > 2 minutes      ALLERGIES:  Allergies    No Known Allergies    Intolerances        LABS:                        10.9   6.0   )-----------( 313      ( 2019 14:20 )             33.9         137  |  97  |  38<H>  ----------------------------<  165<H>  5.3   |  30  |  1.20    Ca    9.4      2019 14:20    TPro  7.4  /  Alb  3.0<L>  /  TBili  0.3  /  DBili  x   /  AST  19  /  ALT  17  /  AlkPhos  66      PT/INR - ( 2019 14:20 )   PT: 10.4 sec;   INR: 0.93 ratio         PTT - ( 2019 14:20 )  PTT:31.7 sec  Urinalysis Basic - ( 2019 14:20 )    Color: Yellow / Appearance: Slightly Turbid / S.005 / pH: x  Gluc: x / Ketone: Negative  / Bili: Negative / Urobili: Negative   Blood: x / Protein: 15 / Nitrite: Negative   Leuk Esterase: Moderate / RBC: 0-4 /HPF / WBC 11-25 /HPF   Sq Epi: x / Non Sq Epi: Neg.-Few / Bacteria: Many /HPF      CAPILLARY BLOOD GLUCOSE      POCT Blood Glucose.: 67 mg/dL (2019 05:56)      RADIOLOGY & ADDITIONAL TESTS: Reviewed. OVERNIGHT EVENTS:  Admitted overnight, intubated and sedated, TLC placed  SUBJECTIVE / INTERVAL HPI: Patient seen and examined at bedside.   Patient laying in bed, intubated and sedated, yanes placed. Unable to assess ROS     VITAL SIGNS:  Vital Signs Last 24 Hrs  T(C): 36.2 (2019 05:29), Max: 37.9 (2019 14:15)  T(F): 97.1 (2019 05:29), Max: 100.2 (2019 14:15)  HR: 102 (2019 07:00) (77 - 113)  BP: 153/88 (2019 07:00) (66/36 - 168/81)  BP(mean): 101 (2019 07:00) (54 - 121)  RR: 14 (2019 07:00) (12 - 18)  SpO2: 100% (2019 07:00) (89% - 100%)    PHYSICAL EXAM:    General: WDWN, elderly overweight female   HEENT: NC/AT; PERRL, anicteric sclera; MMM  Neck: supple, no JVD  Cardiovascular: +S1/S2; RRR  Respiratory: CTA B/L; no W/R/R  Gastrointestinal: soft, NT/ND; +BSx4  Extremities: WWP; no edema, clubbing or cyanosis  Vascular: 2+ radial, DP/PT pulses B/L  Neurological: unable to assess, intubated and sedated    MEDICATIONS:  MEDICATIONS  (STANDING):  cefTRIAXone   IVPB 1 Gram(s) IV Intermittent once  cefTRIAXone   IVPB      chlorhexidine 0.12% Liquid 15 milliLiter(s) Oral Mucosa two times a day  chlorhexidine 2% Cloths 1 Application(s) Topical daily  dextrose 5%. 1000 milliLiter(s) (50 mL/Hr) IV Continuous <Continuous>  dextrose 50% Injectable 12.5 Gram(s) IV Push once  dextrose 50% Injectable 25 milliLiter(s) IV Push once  heparin  Injectable 5000 Unit(s) SubCutaneous every 8 hours  insulin lispro (HumaLOG) corrective regimen sliding scale   SubCutaneous every 6 hours  lacosamide IVPB 200 milliGRAM(s) IV Intermittent every 12 hours  levETIRAcetam  IVPB 500 milliGRAM(s) IV Intermittent every 12 hours  propofol Infusion 5 MICROgram(s)/kG/Min (2.394 mL/Hr) IV Continuous <Continuous>    MEDICATIONS  (PRN):  dextrose 40% Gel 15 Gram(s) Oral once PRN Blood Glucose LESS THAN 70 milliGRAM(s)/deciliter  glucagon  Injectable 1 milliGRAM(s) IntraMuscular once PRN Glucose LESS THAN 70 milligrams/deciliter  LORazepam   Injectable 2 milliGRAM(s) IV Push once PRN for seizure > 2 minutes      ALLERGIES:  Allergies    No Known Allergies    Intolerances        LABS:                        10.9   6.0   )-----------( 313      ( 2019 14:20 )             33.9         137  |  97  |  38<H>  ----------------------------<  165<H>  5.3   |  30  |  1.20    Ca    9.4      2019 14:20    TPro  7.4  /  Alb  3.0<L>  /  TBili  0.3  /  DBili  x   /  AST  19  /  ALT  17  /  AlkPhos  66      PT/INR - ( 2019 14:20 )   PT: 10.4 sec;   INR: 0.93 ratio         PTT - ( 2019 14:20 )  PTT:31.7 sec  Urinalysis Basic - ( 2019 14:20 )    Color: Yellow / Appearance: Slightly Turbid / S.005 / pH: x  Gluc: x / Ketone: Negative  / Bili: Negative / Urobili: Negative   Blood: x / Protein: 15 / Nitrite: Negative   Leuk Esterase: Moderate / RBC: 0-4 /HPF / WBC 11-25 /HPF   Sq Epi: x / Non Sq Epi: Neg.-Few / Bacteria: Many /HPF      CAPILLARY BLOOD GLUCOSE      POCT Blood Glucose.: 67 mg/dL (2019 05:56)      RADIOLOGY & ADDITIONAL TESTS: Reviewed.

## 2019-02-24 NOTE — PROVIDER CONTACT NOTE (EICU) - BACKGROUND
I got call from Dr. Joya about the blood culture results. Patient has gram positive cocci in blood. Patient has been transferred to [facility]. I called Dr. Paige (ICU fellow) in [facility] and conveyed results with suggestion of adding of vancomycin along with already ordered ceftriaxone.

## 2019-02-24 NOTE — DISCHARGE NOTE ADULT - HOSPITAL COURSE
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       Dr Sharp from neurology has been seeing this patient in rehab and recently increased VA to 500 q8.  She is also on max vimpat.       Sent here from rehab for AMS.  ? post ictal state.  RX for UTI.  This patient was admitted to the tele floor, ICU was consulted because there were 3 measurements of low BP in a 15 minute period preceded by and followed by BP systolic >140.  I think the low BP was spurious as her lactate was normal and she is otherwise warm and well perfused.  Her bedside POCUS shows preserved LV fx no pericardial effusion and a plump IVC.      More of a concern for me is the AMS and jerky movements of the lower extremities.    Discussed with Dr Sharp of neurology, trying to arrange for transfer to [facility] in concern fro NCSE.     In the interim, while the transfer center is trying to arrange for transfer to a tertiary center will load with 1G VA level has been sent.       ABX for possible UTI cultures sent.     Mild YAZ on fluid therapy.    Intubated fro transfer as not protecting airway.      Seent to [facility] fro v EEG monitoring 63F obesity DM HTN GERD CAD HLD depression,  spine sx last fall complicated by a major hemorrhagic followed by ischemic CVA with difficult to control sz's at that time, and  left her with aphasia and the requirement of  a PEG feeding tube.       Dr Sharp from neurology has been seeing this patient in rehab and recently increased VA to 500 q8.  She is also on max vimpat.       Sent here from rehab for AMS.  ? post ictal state.  RX for UTI.  This patient was admitted to the tele floor, ICU was consulted because there were 3 measurements of low BP in a 15 minute period preceded by and followed by BP systolic >140.  I think the low BP was spurious as her lactate was normal and she is otherwise warm and well perfused.  Her bedside POCUS shows preserved LV fx no pericardial effusion and a plump IVC.      More of a concern for me is the AMS and jerky movements of the lower extremities.    Discussed with Dr Sharp of neurology, trying to arrange for transfer to [facility] in concern fro NCSE.     In the interim, while the transfer center is trying to arrange for transfer to a tertiary center will load with 1G VA level has been sent.       ABX for possible UTI cultures sent.     Mild YAZ on fluid therapy.    Intubated fro transfer as not protecting airway.      Sent to [facility] fro v EEG monitoring     For full account of hospital course please refer to actual medical records. As this is a brief summery.

## 2019-02-24 NOTE — PROCEDURE NOTE - PROCEDURE
<<-----Click on this checkbox to enter Procedure Central line placement  02/24/2019    Active  VITALIYINDIO

## 2019-02-24 NOTE — H&P ADULT - ATTENDING COMMENTS
note SH: no pertinent positive elements  plan for continuous visual and eeg monitoring to eval for status epilepticus.  intubated for airway protection. cont mech vent and sedation for comfort  UTI, treating with abx, with no signs of systemic sepsis or meningitis at this time.

## 2019-02-24 NOTE — DISCHARGE NOTE ADULT - PATIENT PORTAL LINK FT
You can access the [facility] Portal, offered by [facility], by registering with the following website: [email or web address]

## 2019-02-24 NOTE — H&P ADULT - NSHPLABSRESULTS_GEN_ALL_CORE
10.9   6.0   )-----------( 313      ( 2019 14:20 )             33.9     Lactate, Blood: 1.9 mmol/L ( @ 14:20)        137  |  97  |  38  ----------------------------<  165  5.3   |  30  |  1.20    Ca    9.4      2019 14:20    TPro  7.4  /  Alb  3.0  /  TBili  0.3  /  DBili  x   /  AST  19  /  ALT  17  /  AlkPhos  66      PT/INR - ( 2019 14:20 )   PT: 10.4 sec;   INR: 0.93 ratio         PTT - ( 2019 14:20 )  PTT:31.7 sec       [id number] 6.7     Chol 128 mg/dL LDL 44 mg/dL HDL 35 mg/dL Trig 243 mg/dL      Urinalysis Basic - ( 2019 14:20 )    Color: Yellow / Appearance: Slightly Turbid / S.005 / pH: x  Gluc: x / Ketone: Negative  / Bili: Negative / Urobili: Negative   Blood: x / Protein: 15 / Nitrite: Negative   Leuk Esterase: Moderate / RBC: 0-4 /HPF / WBC 11-25 /HPF   Sq Epi: x / Non Sq Epi: Neg.-Few / Bacteria: Many /HPF    OLD RECORDS REVIEWED: Cr 0.77 in 2019    EKG: SR at 91, Q waves in III and avF, 1st degree AVB     CT Angio Neck w/ IV Cont     -CTA neck: No hemodynamically significant stenosis.    -CTA head: No hemodynamically significant stenosis or occlusion.    CT Head No Cont     Focal area of encephalomalacia within the left temporoparietal region   likely representing sequela of recent hemorrhagic infarct.    Age related involutional and microvascular ischemic changes, without   evidence of intracranial hemorrhage, mass effect or midline shift.      Xray Chest 1 View-PORTABLE IMMEDIATE     IMPRESSION:   No evidence of active chest disease.

## 2019-02-24 NOTE — PROCEDURE NOTE - NSINFORMCONSENT_GEN_A_CORE
Benefits, risks, and possible complications of procedure explained to patient/caregiver who verbalized understanding and gave verbal consent./by HCP (daughter)

## 2019-02-24 NOTE — H&P ADULT - NSHPPHYSICALEXAM_GEN_ALL_CORE
Vital Signs Last 24 Hrs  T(C): 36.8 (24 Feb 2019 01:56), Max: 37.9 (23 Feb 2019 14:15)  T(F): 98.3 (24 Feb 2019 01:56), Max: 100.2 (23 Feb 2019 14:15)  HR: 96 (24 Feb 2019 02:00) (77 - 113)  BP: 122/63 (24 Feb 2019 02:00) (66/36 - 160/100)  BP(mean): 88 (24 Feb 2019 02:00) (54 - 106)  RR: 14 (24 Feb 2019 02:00) (12 - 18)  SpO2: 100% (24 Feb 2019 02:00) (89% - 100%)    PHYSICAL EXAM:  GENERAL: NAD; intubated  HEAD:  Atraumatic, Normocephalic  EYES: conjunctiva and sclera clear, right pupil sluggish, left pupil reactive  ENMT: Moist mucous membranes  NECK: No JVD on limited exam  CHEST/LUNG: Clear to auscultation bilaterally limited to poor effort and limited anteriorly  HEART: Regular rate and rhythm; S1/S2, No murmurs  ABDOMEN: Soft, Nontender, Nondistended; Bowel sounds present; PEG +  VASCULAR: Normal pulses, Normal capillary refill  EXTREMITIES: No calf tenderness, No cyanosis, No edema  LYMPH: No lymphadenopathy noted

## 2019-02-24 NOTE — PATIENT PROFILE ADULT - NSPROMUTPARTICIPCAREFT_GEN_A_NUR
daughter is involve in the care and  of the patient is the HCP daughter is involve in the care and  is the patient is the HCP

## 2019-02-24 NOTE — PROVIDER CONTACT NOTE (CHANGE IN STATUS NOTIFICATION) - RECOMMENDATIONS
pt trended back with CAMILLE da silva, MD Patel notified and aware and came to bedside.  1-L NS bolus started and levophed infusion started as ordered.
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
## 2019-02-24 NOTE — CONSULT NOTE ADULT - ASSESSMENT
62 yo woman with post-spine surgery Left temporal hemorrhage from unknown specific cause 11/2018, then decline 12/2018 with question of etiology, seizure, PRES, infarct or combination, but discharged back to rehabilitation late 01/2019, but a decline in functioning in the past 1-2 days, leading to a clinical diagnosis of status epilepticus, a recurrent problem dealt with at [facility].  She appears to have been discharges on depakote/valproate through PEG 250mg bid, in addition to vimpat/lacosamide 200mg bid, though on admission to [facility], the valpraote dose was 500mg tid.  Levetiracetam also given in their hospital.  She was intubated for airway protection at [facility] and transferred to [facility] by EICU due to being the only available ICU bed in the system with vEEG capability.   vEEG overnight and through today shows only rare Rt>Lt temporal sharp wave activity, no seizures.  She is showing more awake-appearing EEG currently.    Plan:  1) continue wean to extubation  2) continue VPA/valproic acid, IV at 500mg q8h - daily levels  3) continue lacosamide/vimpat at 200mg bid  4) continue levetiracetam/keppra at 500mg bid.    5) will need to consider how to make this regimen more resistant to breakthrough seizure activity/status if this is actually what is occurring.  Will keep on vEEG to help assess this risk of recurrence.  6) IV lorazepam 1mg only for convulsive seizures >1min.  7) will consider re-imaging with MRI  8) pt also on olanzepine 5mg bid, will assess for need.

## 2019-02-24 NOTE — DISCHARGE NOTE ADULT - CARE PLAN
Principal Discharge DX:	Seizure  Goal:	seizure free  Assessment and plan of treatment:	Transfer for video eeg  Secondary Diagnosis:	DM (diabetes mellitus)  Secondary Diagnosis:	Altered mental state  Secondary Diagnosis:	HTN (hypertension)

## 2019-02-24 NOTE — H&P ADULT - HISTORY OF PRESENT ILLNESS
This is a 63 year old woman with HTN, DM II, HLD, CAD (hx of two stents), s/p T10 laminectomy and fusion on 11/27/18 with postoperative Wernicke's aphasia due to left temporal lobar ICH of undetermined etiology, which had improved, and then one month later, on 12/24, s/p worsening of her aphasia due to a new left MCA distribution ischemic infarct adjacent to the prior hemorrhage complicated by need for PEG and seizures, comes to the hospital today for 1 day of inattention / staring into space / not recognizing familiar faces / increased lethargy. At baseline, patient is aphasic, but can makes sense occasionally. A/O x 2 at baseline, per the daughter at bedside. 64 y/o F with a PMHx of HTN, DM II, HLD, CAD (hx of two stents), s/p T10 laminectomy and fusion on 11/27/18 with postoperative Wernicke's aphasia due to left temporal lobar ICH of undetermined etiology, which had improved, and then one month later, on 12/24, s/p worsening of her aphasia due to a new left MCA distribution ischemic infarct adjacent to the prior hemorrhage complicated by need for PEG and seizures, who presented to [facility] on 2/23 with a 1 day hx of inattention / staring into space / not recognizing familiar faces / increased lethargy. At baseline, patient is aphasic, but can makes sense occasionally. A/O x 2 at baseline, per the daughter at bedside. Patient developed status epilepticus and was emergently intubated there and subsequently transferred here to [facility] for VEEG monitoring. Patient was loaded with depakote prior to departure from [facility]. Spoke with Dr. Eng who recommended continuing her keppra and vimpat and to dose depakote by level. Patient in stable condition, intubated with normal BP and HR and was not agitated.     Vital Signs Last 24 Hrs  T(C): 36.8 (24 Feb 2019 01:56), Max: 37.9 (23 Feb 2019 14:15)  T(F): 98.3 (24 Feb 2019 01:56), Max: 100.2 (23 Feb 2019 14:15)  HR: 96 (24 Feb 2019 02:00) (77 - 113)  BP: 122/63 (24 Feb 2019 02:00) (66/36 - 160/100)  BP(mean): 88 (24 Feb 2019 02:00) (54 - 106)  RR: 14 (24 Feb 2019 02:00) (12 - 18)  SpO2: 100% (24 Feb 2019 02:00) (89% - 100%)    CBC Full  -  ( 23 Feb 2019 14:20 )  WBC Count : 6.0 K/uL  Hemoglobin : 10.9 g/dL  Hematocrit : 33.9 %  Platelet Count - Automated : 313 K/uL  Mean Cell Volume : 95.1 fl  Mean Cell Hemoglobin : 30.4 pg  Mean Cell Hemoglobin Concentration : 32.0 gm/dL  Auto Neutrophil # : 4.2 K/uL  Auto Lymphocyte # : 1.2 K/uL  Auto Monocyte # : 0.5 K/uL  Auto Eosinophil # : 0.1 K/uL  Auto Basophil # : 0.0 K/uL  Auto Neutrophil % : 68.9 %  Auto Lymphocyte % : 20.6 %  Auto Monocyte % : 8.0 %  Auto Eosinophil % : 1.8 %  Auto Basophil % : 0.7 %    02-23    137  |  97  |  38<H>  ----------------------------<  165<H>  5.3   |  30  |  1.20    Ca    9.4      23 Feb 2019 14:20    TPro  7.4  /  Alb  3.0<L>  /  TBili  0.3  /  DBili  x   /  AST  19  /  ALT  17  /  AlkPhos  66  02-23

## 2019-02-25 ENCOUNTER — FORM ENCOUNTER (OUTPATIENT)
Age: 64
End: 2019-02-25

## 2019-02-25 LAB
-  AMIKACIN: SIGNIFICANT CHANGE UP
-  AMPICILLIN/SULBACTAM: SIGNIFICANT CHANGE UP
-  AMPICILLIN: SIGNIFICANT CHANGE UP
-  AZTREONAM: SIGNIFICANT CHANGE UP
-  CEFAZOLIN: SIGNIFICANT CHANGE UP
-  CEFEPIME: SIGNIFICANT CHANGE UP
-  CEFOXITIN: SIGNIFICANT CHANGE UP
-  CEFTRIAXONE: SIGNIFICANT CHANGE UP
-  CIPROFLOXACIN: SIGNIFICANT CHANGE UP
-  ERTAPENEM: SIGNIFICANT CHANGE UP
-  GENTAMICIN: SIGNIFICANT CHANGE UP
-  IMIPENEM: SIGNIFICANT CHANGE UP
-  LEVOFLOXACIN: SIGNIFICANT CHANGE UP
-  MEROPENEM: SIGNIFICANT CHANGE UP
-  NITROFURANTOIN: SIGNIFICANT CHANGE UP
-  PIPERACILLIN/TAZOBACTAM: SIGNIFICANT CHANGE UP
-  TIGECYCLINE: SIGNIFICANT CHANGE UP
-  TOBRAMYCIN: SIGNIFICANT CHANGE UP
-  TRIMETHOPRIM/SULFAMETHOXAZOLE: SIGNIFICANT CHANGE UP
ANION GAP SERPL CALC-SCNC: 11 MMOL/L — SIGNIFICANT CHANGE UP (ref 5–17)
BUN SERPL-MCNC: 18 MG/DL — SIGNIFICANT CHANGE UP (ref 7–23)
CALCIUM SERPL-MCNC: 8.8 MG/DL — SIGNIFICANT CHANGE UP (ref 8.4–10.5)
CHLORIDE SERPL-SCNC: 107 MMOL/L — SIGNIFICANT CHANGE UP (ref 96–108)
CO2 SERPL-SCNC: 23 MMOL/L — SIGNIFICANT CHANGE UP (ref 22–31)
CREAT SERPL-MCNC: 0.85 MG/DL — SIGNIFICANT CHANGE UP (ref 0.5–1.3)
CULTURE RESULTS: SIGNIFICANT CHANGE UP
CULTURE RESULTS: SIGNIFICANT CHANGE UP
GLUCOSE BLDC GLUCOMTR-MCNC: 102 MG/DL — HIGH (ref 70–99)
GLUCOSE BLDC GLUCOMTR-MCNC: 118 MG/DL — HIGH (ref 70–99)
GLUCOSE BLDC GLUCOMTR-MCNC: 96 MG/DL — SIGNIFICANT CHANGE UP (ref 70–99)
GLUCOSE BLDC GLUCOMTR-MCNC: 97 MG/DL — SIGNIFICANT CHANGE UP (ref 70–99)
GLUCOSE SERPL-MCNC: 124 MG/DL — HIGH (ref 70–99)
HCT VFR BLD CALC: 26.7 % — LOW (ref 34.5–45)
HGB BLD-MCNC: 8.1 G/DL — LOW (ref 11.5–15.5)
MAGNESIUM SERPL-MCNC: 2 MG/DL — SIGNIFICANT CHANGE UP (ref 1.6–2.6)
MCHC RBC-ENTMCNC: 29.7 PG — SIGNIFICANT CHANGE UP (ref 27–34)
MCHC RBC-ENTMCNC: 30.3 GM/DL — LOW (ref 32–36)
MCV RBC AUTO: 97.8 FL — SIGNIFICANT CHANGE UP (ref 80–100)
METHOD TYPE: SIGNIFICANT CHANGE UP
NRBC # BLD: 0 /100 WBCS — SIGNIFICANT CHANGE UP (ref 0–0)
ORGANISM # SPEC MICROSCOPIC CNT: SIGNIFICANT CHANGE UP
PLATELET # BLD AUTO: 196 K/UL — SIGNIFICANT CHANGE UP (ref 150–400)
POTASSIUM SERPL-MCNC: 3.8 MMOL/L — SIGNIFICANT CHANGE UP (ref 3.5–5.3)
POTASSIUM SERPL-SCNC: 3.8 MMOL/L — SIGNIFICANT CHANGE UP (ref 3.5–5.3)
RBC # BLD: 2.73 M/UL — LOW (ref 3.8–5.2)
RBC # FLD: 15.4 % — HIGH (ref 10.3–14.5)
SODIUM SERPL-SCNC: 141 MMOL/L — SIGNIFICANT CHANGE UP (ref 135–145)
SPECIMEN SOURCE: SIGNIFICANT CHANGE UP
SPECIMEN SOURCE: SIGNIFICANT CHANGE UP
WBC # BLD: 6.6 K/UL — SIGNIFICANT CHANGE UP (ref 3.8–10.5)
WBC # FLD AUTO: 6.6 K/UL — SIGNIFICANT CHANGE UP (ref 3.8–10.5)

## 2019-02-25 PROCEDURE — 99233 SBSQ HOSP IP/OBS HIGH 50: CPT | Mod: GC

## 2019-02-25 PROCEDURE — 99232 SBSQ HOSP IP/OBS MODERATE 35: CPT

## 2019-02-25 PROCEDURE — 95951: CPT | Mod: 26

## 2019-02-25 RX ORDER — CARVEDILOL PHOSPHATE 80 MG/1
25 CAPSULE, EXTENDED RELEASE ORAL EVERY 12 HOURS
Qty: 0 | Refills: 0 | Status: DISCONTINUED | OUTPATIENT
Start: 2019-02-25 | End: 2019-02-26

## 2019-02-25 RX ADMIN — LEVETIRACETAM 400 MILLIGRAM(S): 250 TABLET, FILM COATED ORAL at 06:02

## 2019-02-25 RX ADMIN — Medication 27.5 MILLIGRAM(S): at 12:05

## 2019-02-25 RX ADMIN — CHLORHEXIDINE GLUCONATE 15 MILLILITER(S): 213 SOLUTION TOPICAL at 06:02

## 2019-02-25 RX ADMIN — LACOSAMIDE 140 MILLIGRAM(S): 50 TABLET ORAL at 00:08

## 2019-02-25 RX ADMIN — Medication 27.5 MILLIGRAM(S): at 05:13

## 2019-02-25 RX ADMIN — HEPARIN SODIUM 5000 UNIT(S): 5000 INJECTION INTRAVENOUS; SUBCUTANEOUS at 12:05

## 2019-02-25 RX ADMIN — LEVETIRACETAM 400 MILLIGRAM(S): 250 TABLET, FILM COATED ORAL at 17:41

## 2019-02-25 RX ADMIN — HEPARIN SODIUM 5000 UNIT(S): 5000 INJECTION INTRAVENOUS; SUBCUTANEOUS at 01:44

## 2019-02-25 RX ADMIN — CEFTRIAXONE 100 GRAM(S): 500 INJECTION, POWDER, FOR SOLUTION INTRAMUSCULAR; INTRAVENOUS at 04:14

## 2019-02-25 RX ADMIN — Medication 166.67 MILLIGRAM(S): at 06:28

## 2019-02-25 RX ADMIN — LACOSAMIDE 140 MILLIGRAM(S): 50 TABLET ORAL at 12:17

## 2019-02-25 RX ADMIN — HEPARIN SODIUM 5000 UNIT(S): 5000 INJECTION INTRAVENOUS; SUBCUTANEOUS at 17:41

## 2019-02-25 RX ADMIN — Medication 27.5 MILLIGRAM(S): at 21:15

## 2019-02-25 RX ADMIN — LACOSAMIDE 140 MILLIGRAM(S): 50 TABLET ORAL at 23:44

## 2019-02-25 RX ADMIN — CARVEDILOL PHOSPHATE 25 MILLIGRAM(S): 80 CAPSULE, EXTENDED RELEASE ORAL at 17:41

## 2019-02-25 NOTE — DIETITIAN INITIAL EVALUATION ADULT. - NS AS NUTRI INTERV ENTERAL NUTRITION
Composition/Concentration/Rate/Feeding tube flush/Volume/Schedule/Site care/Recommend initiating Jevity 1.2 Reji @ 51mL/hr x 24hrs via PEG (1224mL TV, 1469kcal, 68g pro, 988mL free H2O, 122% RDI, 1.25g/kg IBW protein). Recommend starting at 20mL/hr and advancing by [id number] to goal as tolerated. Additional free H2O per team. Monitor for s/s intolerance; maintain aspiration precautions at all times. Please place VBF protocol order.

## 2019-02-25 NOTE — PROGRESS NOTE ADULT - SUBJECTIVE AND OBJECTIVE BOX
Subjective  Patient seen and examined at bedside, and was recently extubated. No verbal output at this time. Patient open eyes to name but does not follow commands. Observed moving left arm only spontaneously in bed.     ROS  Unobtainable 2/2 to non-verbal and/or recent extubation     MEDICATIONS  (STANDING):  cefTRIAXone   IVPB      cefTRIAXone   IVPB 1 Gram(s) IV Intermittent every 24 hours  chlorhexidine 2% Cloths 1 Application(s) Topical daily  dextrose 5%. 1000 milliLiter(s) (50 mL/Hr) IV Continuous <Continuous>  dextrose 50% Injectable 12.5 Gram(s) IV Push once  heparin  Injectable 5000 Unit(s) SubCutaneous every 8 hours  insulin lispro (HumaLOG) corrective regimen sliding scale   SubCutaneous every 6 hours  lacosamide IVPB 200 milliGRAM(s) IV Intermittent every 12 hours  levETIRAcetam  IVPB 500 milliGRAM(s) IV Intermittent every 12 hours  valproate sodium IVPB 500 milliGRAM(s) IV Intermittent every 8 hours    MEDICATIONS  (PRN):  acetaminophen   Tablet .. 650 milliGRAM(s) Oral every 6 hours PRN Mild Pain (1 - 3)  dextrose 40% Gel 15 Gram(s) Oral once PRN Blood Glucose LESS THAN 70 milliGRAM(s)/deciliter  glucagon  Injectable 1 milliGRAM(s) IntraMuscular once PRN Glucose LESS THAN 70 milligrams/deciliter  LORazepam   Injectable 2 milliGRAM(s) IV Push once PRN for seizure > 2 minutes      T(C): 37.4 (02-25-19 @ 13:52), Max: 38.4 (02-24-19 @ 19:00)  HR: 116 (02-25-19 @ 15:00) (72 - 120)  BP: 114/58 (02-25-19 @ 15:00) (53/30 - 152/68)  RR: 23 (02-25-19 @ 15:00) (10 - 27)  SpO2: 99% (02-25-19 @ 15:00) (94% - 100%)  Wt(kg): --    Eyes open spontaneously. Conversational with appropriate sentences.  EOMI. Visual fields full. PERRL 3>2. Face symmetrical.  Full strength throughout.  Finger-nose-finger intact R/L.  Intact to light touch throughout.    CBC Full  -  ( 25 Feb 2019 05:01 )  WBC Count : 6.60 K/uL  Hemoglobin : 8.1 g/dL  Hematocrit : 26.7 %  Platelet Count - Automated : 196 K/uL  Mean Cell Volume : 97.8 fl  Mean Cell Hemoglobin : 29.7 pg  Mean Cell Hemoglobin Concentration : 30.3 gm/dL  Auto Neutrophil # : x  Auto Lymphocyte # : x  Auto Monocyte # : x  Auto Eosinophil # : x  Auto Basophil # : x  Auto Neutrophil % : x  Auto Lymphocyte % : x  Auto Monocyte % : x  Auto Eosinophil % : x  Auto Basophil % : x    02-25    141  |  107  |  18  ----------------------------<  124<H>  3.8   |  23  |  0.85    Ca    8.8      25 Feb 2019 05:01  Mg     2.0     02-25    TPro  5.0<L>  /  Alb  2.6<L>  /  TBili  0.2  /  DBili  x   /  AST  12  /  ALT  8<L>  /  AlkPhos  43  02-24    LIVER FUNCTIONS - ( 24 Feb 2019 20:11 )  Alb: 2.6 g/dL / Pro: 5.0 g/dL / ALK PHOS: 43 U/L / ALT: 8 U/L / AST: 12 U/L / GGT: x                 EEG: Subjective  Patient seen and examined at bedside, and was recently extubated. No verbal output at this time. Patient open eyes to name but does not follow commands. Observed moving left arm only spontaneously in bed.     ROS  Unobtainable 2/2 to non-verbal and/or recent extubation     MEDICATIONS  (STANDING):  cefTRIAXone   IVPB      cefTRIAXone   IVPB 1 Gram(s) IV Intermittent every 24 hours  chlorhexidine 2% Cloths 1 Application(s) Topical daily  dextrose 5%. 1000 milliLiter(s) (50 mL/Hr) IV Continuous <Continuous>  dextrose 50% Injectable 12.5 Gram(s) IV Push once  heparin  Injectable 5000 Unit(s) SubCutaneous every 8 hours  insulin lispro (HumaLOG) corrective regimen sliding scale   SubCutaneous every 6 hours  lacosamide IVPB 200 milliGRAM(s) IV Intermittent every 12 hours  levETIRAcetam  IVPB 500 milliGRAM(s) IV Intermittent every 12 hours  valproate sodium IVPB 500 milliGRAM(s) IV Intermittent every 8 hours    MEDICATIONS  (PRN):  acetaminophen   Tablet .. 650 milliGRAM(s) Oral every 6 hours PRN Mild Pain (1 - 3)  dextrose 40% Gel 15 Gram(s) Oral once PRN Blood Glucose LESS THAN 70 milliGRAM(s)/deciliter  glucagon  Injectable 1 milliGRAM(s) IntraMuscular once PRN Glucose LESS THAN 70 milligrams/deciliter  LORazepam   Injectable 2 milliGRAM(s) IV Push once PRN for seizure > 2 minutes      T(C): 37.4 (02-25-19 @ 13:52), Max: 38.4 (02-24-19 @ 19:00)  HR: 116 (02-25-19 @ 15:00) (72 - 120)  BP: 114/58 (02-25-19 @ 15:00) (53/30 - 152/68)  RR: 23 (02-25-19 @ 15:00) (10 - 27)  SpO2: 99% (02-25-19 @ 15:00) (94% - 100%)  Wt(kg): --    Patient opens eyes to verbal stimuli, non-verbal. Does not follow commands.  PERRLA 3mm brisk, blink to threat bilaterally. Tracks bedside activity  Moves left arm spontaneously and intermittently. Right arm withdraws to painful stimuli. LE withdraws to painful stimuli with toe flickering  No tremor or clonus.  Gait and sensation deferred.     CBC Full  -  ( 25 Feb 2019 05:01 )  WBC Count : 6.60 K/uL  Hemoglobin : 8.1 g/dL  Hematocrit : 26.7 %  Platelet Count - Automated : 196 K/uL  Mean Cell Volume : 97.8 fl  Mean Cell Hemoglobin : 29.7 pg  Mean Cell Hemoglobin Concentration : 30.3 gm/dL  Auto Neutrophil # : x  Auto Lymphocyte # : x  Auto Monocyte # : x  Auto Eosinophil # : x  Auto Basophil # : x  Auto Neutrophil % : x  Auto Lymphocyte % : x  Auto Monocyte % : x  Auto Eosinophil % : x  Auto Basophil % : x    02-25    141  |  107  |  18  ----------------------------<  124<H>  3.8   |  23  |  0.85    Ca    8.8      25 Feb 2019 05:01  Mg     2.0     02-25    TPro  5.0<L>  /  Alb  2.6<L>  /  TBili  0.2  /  DBili  x   /  AST  12  /  ALT  8<L>  /  AlkPhos  43  02-24    LIVER FUNCTIONS - ( 24 Feb 2019 20:11 )  Alb: 2.6 g/dL / Pro: 5.0 g/dL / ALK PHOS: 43 U/L / ALT: 8 U/L / AST: 12 U/L / GGT: x                 EEG: Subjective  Patient seen and examined at bedside, and was recently extubated. No verbal output at this time. Patient open eyes to name but does not follow commands. Observed moving left arm only spontaneously in bed. Patient was febrile overnight with Tmax 101.2F, and was started on Vancomycin. Of note, patient had a positive UA on 2/23.     Depakote level on admission was 93.8 (2/24/19)    ROS  Unobtainable 2/2 to non-verbal and/or recent extubation     MEDICATIONS  (STANDING):  cefTRIAXone   IVPB      cefTRIAXone   IVPB 1 Gram(s) IV Intermittent every 24 hours  chlorhexidine 2% Cloths 1 Application(s) Topical daily  dextrose 5%. 1000 milliLiter(s) (50 mL/Hr) IV Continuous <Continuous>  dextrose 50% Injectable 12.5 Gram(s) IV Push once  heparin  Injectable 5000 Unit(s) SubCutaneous every 8 hours  insulin lispro (HumaLOG) corrective regimen sliding scale   SubCutaneous every 6 hours  lacosamide IVPB 200 milliGRAM(s) IV Intermittent every 12 hours  levETIRAcetam  IVPB 500 milliGRAM(s) IV Intermittent every 12 hours  valproate sodium IVPB 500 milliGRAM(s) IV Intermittent every 8 hours    MEDICATIONS  (PRN):  acetaminophen   Tablet .. 650 milliGRAM(s) Oral every 6 hours PRN Mild Pain (1 - 3)  dextrose 40% Gel 15 Gram(s) Oral once PRN Blood Glucose LESS THAN 70 milliGRAM(s)/deciliter  glucagon  Injectable 1 milliGRAM(s) IntraMuscular once PRN Glucose LESS THAN 70 milligrams/deciliter  LORazepam   Injectable 2 milliGRAM(s) IV Push once PRN for seizure > 2 minutes      T(C): 37.4 (02-25-19 @ 13:52), Max: 38.4 (02-24-19 @ 19:00)  HR: 116 (02-25-19 @ 15:00) (72 - 120)  BP: 114/58 (02-25-19 @ 15:00) (53/30 - 152/68)  RR: 23 (02-25-19 @ 15:00) (10 - 27)  SpO2: 99% (02-25-19 @ 15:00) (94% - 100%)  Wt(kg): --    Patient opens eyes to verbal stimuli, non-verbal. Does not follow commands.  PERRLA 3mm brisk, blink to threat bilaterally. Tracks bedside activity  Moves left arm spontaneously and intermittently. Right arm withdraws to painful stimuli. LE withdraws to painful stimuli with toe flickering  No tremor or clonus.  Gait and sensation deferred.     CBC Full  -  ( 25 Feb 2019 05:01 )  WBC Count : 6.60 K/uL  Hemoglobin : 8.1 g/dL  Hematocrit : 26.7 %  Platelet Count - Automated : 196 K/uL  Mean Cell Volume : 97.8 fl  Mean Cell Hemoglobin : 29.7 pg  Mean Cell Hemoglobin Concentration : 30.3 gm/dL  Auto Neutrophil # : x  Auto Lymphocyte # : x  Auto Monocyte # : x  Auto Eosinophil # : x  Auto Basophil # : x  Auto Neutrophil % : x  Auto Lymphocyte % : x  Auto Monocyte % : x  Auto Eosinophil % : x  Auto Basophil % : x    02-25    141  |  107  |  18  ----------------------------<  124<H>  3.8   |  23  |  0.85    Ca    8.8      25 Feb 2019 05:01  Mg     2.0     02-25    TPro  5.0<L>  /  Alb  2.6<L>  /  TBili  0.2  /  DBili  x   /  AST  12  /  ALT  8<L>  /  AlkPhos  43  02-24    LIVER FUNCTIONS - ( 24 Feb 2019 20:11 )  Alb: 2.6 g/dL / Pro: 5.0 g/dL / ALK PHOS: 43 U/L / ALT: 8 U/L / AST: 12 U/L / GGT: x                 EEG:

## 2019-02-25 NOTE — PROGRESS NOTE ADULT - SUBJECTIVE AND OBJECTIVE BOX
incomplete   OVERNIGHT EVENTS:    SUBJECTIVE / INTERVAL HPI: Patient seen and examined at bedside.     VITAL SIGNS:  Vital Signs Last 24 Hrs  T(C): 37.1 (2019 05:07), Max: 38.4 (2019 19:00)  T(F): 98.8 (2019 05:07), Max: 101.2 (2019 19:00)  HR: 106 (2019 06:00) (72 - 112)  BP: 137/65 (2019 05:41) (53/30 - 153/88)  BP(mean): 85 (2019 05:41) (39 - 115)  RR: 12 (2019 05:41) (10 - 16)  SpO2: 100% (2019 06:00) (99% - 100%)    PHYSICAL EXAM:    General: WDWN  HEENT: NC/AT; PERRL, anicteric sclera; MMM  Neck: supple  Cardiovascular: +S1/S2; RRR  Respiratory: CTA B/L; no W/R/R  Gastrointestinal: soft, NT/ND; +BSx4  Extremities: WWP; no edema, clubbing or cyanosis  Vascular: 2+ radial, DP/PT pulses B/L  Neurological: AAOx3; no focal deficits    MEDICATIONS:  MEDICATIONS  (STANDING):  cefTRIAXone   IVPB      cefTRIAXone   IVPB 1 Gram(s) IV Intermittent every 24 hours  chlorhexidine 0.12% Liquid 15 milliLiter(s) Oral Mucosa two times a day  chlorhexidine 2% Cloths 1 Application(s) Topical daily  dextrose 5%. 1000 milliLiter(s) (50 mL/Hr) IV Continuous <Continuous>  dextrose 50% Injectable 12.5 Gram(s) IV Push once  heparin  Injectable 5000 Unit(s) SubCutaneous every 8 hours  insulin lispro (HumaLOG) corrective regimen sliding scale   SubCutaneous every 6 hours  lacosamide IVPB 200 milliGRAM(s) IV Intermittent every 12 hours  levETIRAcetam  IVPB 500 milliGRAM(s) IV Intermittent every 12 hours  norepinephrine Infusion 0.05 MICROgram(s)/kG/Min (7.481 mL/Hr) IV Continuous <Continuous>  valproate sodium IVPB 500 milliGRAM(s) IV Intermittent every 8 hours  vancomycin  IVPB 1250 milliGRAM(s) IV Intermittent every 12 hours    MEDICATIONS  (PRN):  acetaminophen   Tablet .. 650 milliGRAM(s) Oral every 6 hours PRN Mild Pain (1 - 3)  dextrose 40% Gel 15 Gram(s) Oral once PRN Blood Glucose LESS THAN 70 milliGRAM(s)/deciliter  glucagon  Injectable 1 milliGRAM(s) IntraMuscular once PRN Glucose LESS THAN 70 milligrams/deciliter  LORazepam   Injectable 2 milliGRAM(s) IV Push once PRN for seizure > 2 minutes      ALLERGIES:  Allergies    No Known Allergies    Intolerances        LABS:                        8.1    6.60  )-----------( 196      ( 2019 05:01 )             26.7     02-    141  |  107  |  18  ----------------------------<  124<H>  3.8   |  23  |  0.85    Ca    8.8      2019 05:01  Mg     2.0         TPro  5.0<L>  /  Alb  2.6<L>  /  TBili  0.2  /  DBili  x   /  AST  12  /  ALT  8<L>  /  AlkPhos  43  02-24    PT/INR - ( 2019 14:20 )   PT: 10.4 sec;   INR: 0.93 ratio         PTT - ( 2019 14:20 )  PTT:31.7 sec  Urinalysis Basic - ( 2019 14:20 )    Color: Yellow / Appearance: Slightly Turbid / S.005 / pH: x  Gluc: x / Ketone: Negative  / Bili: Negative / Urobili: Negative   Blood: x / Protein: 15 / Nitrite: Negative   Leuk Esterase: Moderate / RBC: 0-4 /HPF / WBC 11-25 /HPF   Sq Epi: x / Non Sq Epi: Neg.-Few / Bacteria: Many /HPF      CAPILLARY BLOOD GLUCOSE      POCT Blood Glucose.: 118 mg/dL (2019 06:05)      RADIOLOGY & ADDITIONAL TESTS: Reviewed. incomplete   OVERNIGHT EVENTS:    SUBJECTIVE / INTERVAL HPI: Patient seen and examined at bedside. Overnight patient spiked a fever of 101.2 F at 19:02 with concomitant hypotension down to 53/30. Pt was started on 0.05mcg/kg/min and received vancomycin 1250mg. BP corrected appropriately and levophed was discontinued this AM. Pt remained afebrile since then. At time of exam pt was unresponsive, unable to elicit ROS.     VITAL SIGNS:  Vital Signs Last 24 Hrs  T(C): 37.1 (2019 05:07), Max: 38.4 (2019 19:00)  T(F): 98.8 (2019 05:07), Max: 101.2 (2019 19:00)  HR: 106 (2019 06:00) (72 - 112)  BP: 137/65 (2019 05:41) (53/30 - 153/88)  BP(mean): 85 (2019 05:41) (39 - 115)  RR: 12 (2019 05:41) (10 - 16)  SpO2: 100% (2019 06:00) (99% - 100%)    PHYSICAL EXAM:    General: WDWN, NAD, unresponsive to noxious stimuli, sound, touch.   HEENT: NC/AT; MMM  Cardiovascular: +S1/S2; RRR, distant heart sounds  Respiratory: CTA B/L; no W/R/R  Gastrointestinal: soft, ND; absent BS  Extremities: WWP; no edema, clubbing or cyanosis  Vascular: 2+ radial, DP/PT pulses B/L  Neurological: unresponsive as above, unable to perform neuro exam.    MEDICATIONS:  MEDICATIONS  (STANDING):  cefTRIAXone   IVPB      cefTRIAXone   IVPB 1 Gram(s) IV Intermittent every 24 hours  chlorhexidine 0.12% Liquid 15 milliLiter(s) Oral Mucosa two times a day  chlorhexidine 2% Cloths 1 Application(s) Topical daily  dextrose 5%. 1000 milliLiter(s) (50 mL/Hr) IV Continuous <Continuous>  dextrose 50% Injectable 12.5 Gram(s) IV Push once  heparin  Injectable 5000 Unit(s) SubCutaneous every 8 hours  insulin lispro (HumaLOG) corrective regimen sliding scale   SubCutaneous every 6 hours  lacosamide IVPB 200 milliGRAM(s) IV Intermittent every 12 hours  levETIRAcetam  IVPB 500 milliGRAM(s) IV Intermittent every 12 hours  norepinephrine Infusion 0.05 MICROgram(s)/kG/Min (7.481 mL/Hr) IV Continuous <Continuous>  valproate sodium IVPB 500 milliGRAM(s) IV Intermittent every 8 hours  vancomycin  IVPB 1250 milliGRAM(s) IV Intermittent every 12 hours    MEDICATIONS  (PRN):  acetaminophen   Tablet .. 650 milliGRAM(s) Oral every 6 hours PRN Mild Pain (1 - 3)  dextrose 40% Gel 15 Gram(s) Oral once PRN Blood Glucose LESS THAN 70 milliGRAM(s)/deciliter  glucagon  Injectable 1 milliGRAM(s) IntraMuscular once PRN Glucose LESS THAN 70 milligrams/deciliter  LORazepam   Injectable 2 milliGRAM(s) IV Push once PRN for seizure > 2 minutes      ALLERGIES:  Allergies    No Known Allergies    Intolerances        LABS:                        8.1    6.60  )-----------( 196      ( 2019 05:01 )             26.7     02-    141  |  107  |  18  ----------------------------<  124<H>  3.8   |  23  |  0.85    Ca    8.8      2019 05:01  Mg     2.0     -    TPro  5.0<L>  /  Alb  2.6<L>  /  TBili  0.2  /  DBili  x   /  AST  12  /  ALT  8<L>  /  AlkPhos  43  02-24    PT/INR - ( 2019 14:20 )   PT: 10.4 sec;   INR: 0.93 ratio         PTT - ( 2019 14:20 )  PTT:31.7 sec  Urinalysis Basic - ( 2019 14:20 )    Color: Yellow / Appearance: Slightly Turbid / S.005 / pH: x  Gluc: x / Ketone: Negative  / Bili: Negative / Urobili: Negative   Blood: x / Protein: 15 / Nitrite: Negative   Leuk Esterase: Moderate / RBC: 0-4 /HPF / WBC 11-25 /HPF   Sq Epi: x / Non Sq Epi: Neg.-Few / Bacteria: Many /HPF      CAPILLARY BLOOD GLUCOSE      POCT Blood Glucose.: 118 mg/dL (2019 06:05)      RADIOLOGY & ADDITIONAL TESTS: Reviewed. SUBJECTIVE / INTERVAL HPI: Patient seen and examined at bedside.  Overnight patient spiked a fever of 101.2 F at 19:02 with concomitant hypotension down to 53/30. Pt was started on 0.05mcg/kg/min and received vancomycin 1250mg. BP corrected appropriately and levophed was discontinued this AM. Pt remained afebrile since then. At time of exam pt was unresponsive, unable to elicit ROS.     VITAL SIGNS:  Vital Signs Last 24 Hrs  T(C): 37.1 (2019 05:07), Max: 38.4 (2019 19:00)  T(F): 98.8 (2019 05:07), Max: 101.2 (2019 19:00)  HR: 106 (2019 06:00) (72 - 112)  BP: 137/65 (2019 05:41) (53/30 - 153/88)  BP(mean): 85 (2019 05:41) (39 - 115)  RR: 12 (2019 05:41) (10 - 16)  SpO2: 100% (2019 06:00) (99% - 100%)    PHYSICAL EXAM:    General: WDWN, NAD, unresponsive to noxious stimuli, sound, touch.   HEENT: NC/AT; MMM  Cardiovascular: +S1/S2; RRR, distant heart sounds  Respiratory: CTA B/L; no W/R/R  Gastrointestinal: soft, ND; absent BS  Extremities: WWP; no edema, clubbing or cyanosis  Vascular: 2+ radial, DP/PT pulses B/L  Neurological: unresponsive as above, unable to perform neuro exam.    MEDICATIONS:  MEDICATIONS  (STANDING):  cefTRIAXone   IVPB      cefTRIAXone   IVPB 1 Gram(s) IV Intermittent every 24 hours  chlorhexidine 0.12% Liquid 15 milliLiter(s) Oral Mucosa two times a day  chlorhexidine 2% Cloths 1 Application(s) Topical daily  dextrose 5%. 1000 milliLiter(s) (50 mL/Hr) IV Continuous <Continuous>  dextrose 50% Injectable 12.5 Gram(s) IV Push once  heparin  Injectable 5000 Unit(s) SubCutaneous every 8 hours  insulin lispro (HumaLOG) corrective regimen sliding scale   SubCutaneous every 6 hours  lacosamide IVPB 200 milliGRAM(s) IV Intermittent every 12 hours  levETIRAcetam  IVPB 500 milliGRAM(s) IV Intermittent every 12 hours  norepinephrine Infusion 0.05 MICROgram(s)/kG/Min (7.481 mL/Hr) IV Continuous <Continuous>  valproate sodium IVPB 500 milliGRAM(s) IV Intermittent every 8 hours  vancomycin  IVPB 1250 milliGRAM(s) IV Intermittent every 12 hours    MEDICATIONS  (PRN):  acetaminophen   Tablet .. 650 milliGRAM(s) Oral every 6 hours PRN Mild Pain (1 - 3)  dextrose 40% Gel 15 Gram(s) Oral once PRN Blood Glucose LESS THAN 70 milliGRAM(s)/deciliter  glucagon  Injectable 1 milliGRAM(s) IntraMuscular once PRN Glucose LESS THAN 70 milligrams/deciliter  LORazepam   Injectable 2 milliGRAM(s) IV Push once PRN for seizure > 2 minutes      ALLERGIES:  Allergies    No Known Allergies    Intolerances        LABS:                        8.1    6.60  )-----------( 196      ( 2019 05:01 )             26.7     02-    141  |  107  |  18  ----------------------------<  124<H>  3.8   |  23  |  0.85    Ca    8.8      2019 05:01  Mg     2.0     -    TPro  5.0<L>  /  Alb  2.6<L>  /  TBili  0.2  /  DBili  x   /  AST  12  /  ALT  8<L>  /  AlkPhos  43  02-24    PT/INR - ( 2019 14:20 )   PT: 10.4 sec;   INR: 0.93 ratio         PTT - ( 2019 14:20 )  PTT:31.7 sec  Urinalysis Basic - ( 2019 14:20 )    Color: Yellow / Appearance: Slightly Turbid / S.005 / pH: x  Gluc: x / Ketone: Negative  / Bili: Negative / Urobili: Negative   Blood: x / Protein: 15 / Nitrite: Negative   Leuk Esterase: Moderate / RBC: 0-4 /HPF / WBC 11-25 /HPF   Sq Epi: x / Non Sq Epi: Neg.-Few / Bacteria: Many /HPF      CAPILLARY BLOOD GLUCOSE      POCT Blood Glucose.: 118 mg/dL (2019 06:05)      RADIOLOGY & ADDITIONAL TESTS: Reviewed.

## 2019-02-25 NOTE — PROGRESS NOTE ADULT - ASSESSMENT
64 yo woman with post-spine surgery Left temporal hemorrhage from unknown specific cause 11/2018, then decline 12/2018 with question of etiology, seizure, PRES, infarct or combination, but discharged back to rehabilitation late 01/2019, but a decline in functioning in the past 1-2 days, leading to a clinical diagnosis of status epilepticus, a recurrent problem dealt with at [facility].  She appears to have been discharges on depakote/valproate through PEG 250mg bid, in addition to vimpat/lacosamide 200mg bid, though on admission to [place], the valpraote dose was 500mg tid.  Levetiracetam also given in their hospital.  She was intubated for airway protection at [place] and transferred to [facility] by EICU due to being the only available ICU bed in the system with vEEG capability.   vEEG on 2/25 showed rare Rt>Lt temporal sharp wave activity, no seizures. No seizures on EEG today.     Plan:  1) continue VPA/valproic acid, IV at 500mg q8h - daily levels  2) continue lacosamide/vimpat at 200mg bid  3) continue levetiracetam/keppra at 500mg bid.    4) IV lorazepam 1mg only for convulsive seizures >1min.  5) will consider re-imaging with MRI 64 yo woman with post-spine surgery Left temporal hemorrhage from unknown specific cause 11/2018, then decline 12/2018 with question of etiology, seizure, PRES, infarct or combination, but discharged back to rehabilitation late 01/2019, but a decline in functioning in the past 1-2 days, leading to a clinical diagnosis of status epilepticus, a recurrent problem dealt with at [facility].  She appears to have been discharges on depakote/valproate through PEG 250mg bid, in addition to vimpat/lacosamide 200mg bid, though on admission to [place], the valpraote dose was 500mg tid.  Levetiracetam also given in their hospital.  She was intubated for airway protection at [place] and transferred to [facility] by EICU due to being the only available ICU bed in the system with vEEG capability.   vEEG for the past 2 days showed rare Rt>Lt temporal sharp wave activity, no seizures. Patient's current medical condition includes UTI positive bacteremia.     Plan:  1) continue VPA/valproic acid, IV at 500mg q8h - daily levels  2) continue lacosamide/vimpat at 200mg bid  3) continue levetiracetam/keppra at 500mg bid.    4) IV lorazepam 1mg only for convulsive seizures >1min.  5) will consider re-imaging with MRI brain w/o contrast  6) Neurological assessments Q2hrs  7) Maintain Seizure & Fall precautions

## 2019-02-25 NOTE — PROGRESS NOTE ADULT - ASSESSMENT
63F with hx of HTN, DM2, CAD, CVA x2 (left sided hemorrhagic, followed one month later by left sided ischemic CVA) with residual seizure disorder, aphasia and dysphagia s/p PEG who presents with "staring into space", inattentiveness and increased lethargy x 1 day    NEURO  #Status Epilepticus   - s/p intubation at [facility]   - no clear inciting event though has history of temporal hemorrhagic infarct in 11/18  - vEEG overnight and through today shows only rare Rt>Lt temporal sharp wave activity, no seizures  - c/w vEEEG per Dr. Eng  - c/w keppra 2g BID  - c/w vimpat 200 mg BID  - c/w depakote 500mg q8 level, daily levels  - consider MRI   - IV lorazepam 1mg only for convulsive seizures >1min.    Respiratory  - intubated for airway protection in the setting of status epilepticus y  - wean off sedation and will attempt to extubate given mental status     Cardiology  Hemodynamically stable not requiring any pressor support    #r/o DVT  - f/u LE dopplers     #CAD s/p stents   -c/w home coreg, home lipitor, home ASA  -hold home losartan in the setting of YAZ      GI  #PEG Dependent  -holding tube feeds for now; please restart per neuro recs     RENAL  #YAZ  -unclear etiology; don't think it is 2/2 dehydration as specific gravity is low on UA   -urine lytes for further eval   -hold ARB     #UTI  -patient with (+) UA on admission  -s/p CTX at [place]  -no fevers, no leukocytosis, completely hemodynamically stable so less suspicion for urosepsis contributing to patient's AMS picture  -complete 3 day course of CTX     ENDO  #DMT2  -SSI while NPO    PSYCH  #depression  -hold home olanzapine and elavil      #FEN  -NPO  -HSQ for DVT ppx  Lines: IJ placed on 2/24  -fall precautions 63F with hx of HTN, DM2, CAD, CVA x2 (left sided hemorrhagic, followed one month later by left sided ischemic CVA) with residual seizure disorder, aphasia and dysphagia s/p PEG who presents with "staring into space", inattentiveness and increased lethargy x 1 day    NEURO  #Status Epilepticus   - s/p intubation at [facility], now extubated  - no clear inciting event though has history of temporal hemorrhagic infarct in 11/18  - vEEG overnight and through today shows only rare Rt>Lt temporal sharp wave activity, no seizures  - c/w vEEEG per Dr. Eng  - c/w keppra 2g BID  - c/w vimpat 200 mg BID  - c/w depakote 500mg q8 level, daily levels  - consider MRI   - IV lorazepam 1mg only for convulsive seizures >1min.    Respiratory  - Extubated AM 2/25, comfortable now on NC    Cardiology  Hemodynamically stable not requiring any pressor support    #r/o DVT  - f/u LE dopplers     #CAD s/p stents   -currently holding home coreg, home lipitor, home ASA  -hold home losartan in the setting of YAZ      GI  #PEG Dependent  -restart tube feeds    RENAL  #YAZ - now resolved  -unclear etiology; don't think it is 2/2 dehydration as specific gravity is low on UA   -continue monitoring urine lytes   -hold ARB     #UTI  -patient with (+) UA on admission, with intermittent fevers, Tmax 101.2F  -s/p CTX at [place]  -no leukocytosis, completely hemodynamically stable so less suspicion for urosepsis contributing to patient's AMS picture  -complete 3 day course of CTX  -d/c vancomycin, coag neg staph in 2/23 Bcx, one bottle thought to be contaminant. Repeat Bcx 2/24 pre-vanco show NGTD    ENDO  #DMT2  -SSI while NPO    PSYCH  #depression  -hold home olanzapine and elavil      #FEN  -NPO  -HSQ for DVT ppx  Lines: IJ placed on 2/24  -fall precautions 63F with hx of HTN, DM2, CAD, CVA x2 (left sided hemorrhagic, followed one month later by left sided ischemic CVA) with residual seizure disorder, aphasia and dysphagia s/p PEG who presents with "staring into space", inattentiveness and increased lethargy x 1 day    NEURO  #Status Epilepticus   - s/p intubation at [facility], now extubated  - no clear inciting event though has history of temporal hemorrhagic infarct in 11/18  - vEEG overnight and through today shows only rare Rt>Lt temporal sharp wave activity, no seizures  - c/w vEEEG per Dr. Eng  - c/w keppra 2g BID  - c/w vimpat 200 mg BID  - c/w depakote 500mg q8 level, daily levels  - consider MRI   - IV lorazepam 1mg only for convulsive seizures >1min.    Respiratory  - Extubated AM 2/25, comfortable now on NC    Cardiology  Hemodynamically stable not requiring any pressor support    #r/o DVT  - f/u LE dopplers     #CAD s/p stents   -currently holding home coreg, home lipitor, home ASA  -hold home losartan in the setting of YAZ      GI  #PEG Dependent  -restart tube feeds    RENAL  #YAZ - now resolved  -unclear etiology; don't think it is 2/2 dehydration as specific gravity is low on UA   -continue monitoring urine lytes   -hold ARB     #UTI  -patient with (+) UA on admission, with intermittent fevers, Tmax 101.2F  -s/p CTX at [place]  -no leukocytosis, completely hemodynamically stable so less suspicion for urosepsis contributing to patient's AMS picture  -complete 3 day course of CTX  -d/c vancomycin, coag neg staph in 2/23 Bcx, one bottle thought to be contaminant. Repeat Bcx 2/24 pre-vanco show NGTD    ENDO  #DMT2  -SSI while NPO    PSYCH  #depression  -hold home olanzapine and elavil      #FEN  -NPO  -HSQ for DVT ppx  Lines: IJ placed on 2/24  -fall precautions

## 2019-02-25 NOTE — DIETITIAN INITIAL EVALUATION ADULT. - OTHER INFO
62 yo/female with PMHx HTN, DM, HLD, CAD, T10 laminectomy and fusion on 11/27/18 c/b post-op Wernicke's aphasia d/t L. temporal ICH. Initially aphasia had started to resolve, but worsened and c/b new L. MCA on 12/24 w/seizures and eventual PEG placement. Pt admitted to [facility] with worsening mental status, and found to be in status epilepticus0- intubated and brought to MICU for vEEG monitoring. Pt seen in room and discussed during rounds. At time of visit, pt intubated on CPAP mode, not sedated. MAP 89, not requiring pressors. Pt awake, but not following commands. Unable to obtain nutrition hx at this time, no family present. Pt extubated later during rounds. Plan to initiate PEG feeds later today per MD. Skin intact pressure-wise. NKFA. Per EMR, pt UBW of 202# during admit at [facility], indicating ~25# weight loss. Will attempt to obtain further nutrition hx, including nutrition support regimen, as feasible. NFPE negative for malnutrition. Will continue to follow per RD protocol. 64 yo/female with PMHx HTN, DM, HLD, CAD, T10 laminectomy and fusion on 11/27/18 c/b post-op Wernicke's aphasia d/t L. temporal ICH. Initially aphasia had started to resolve, but worsened and c/b new L. MCA on 12/24 w/seizures and eventual PEG placement. Pt admitted to [facility] with worsening mental status, and found to be in status epilepticus0- intubated and brought to MICU for vEEG monitoring. Pt seen in room and discussed during rounds. At time of visit, pt intubated on CPAP mode, not sedated. MAP 89, not requiring pressors. Pt awake, but not following commands. Unable to obtain nutrition hx at this time, no family present. Pt extubated later during rounds. Plan to initiate PEG feeds later today per MD. Skin intact pressure-wise. NKFA. Per EMR, pt UBW of 202# during admit at [facility], indicating ~25# weight loss. Will attempt to obtain further nutrition hx, including nutrition support regimen, as feasible. NFPE not completed at this time as unknown wt and % intakes- will complete at f/u visit as feasible. Will continue to follow per RD protocol.

## 2019-02-25 NOTE — AIRWAY REMOVAL NOTE  ADULT & PEDS - ARTIFICAL AIRWAY REMOVAL COMMENTS
pt at baseline for neur status, interm following commands, aphasic, at this time able to maintain airway
pt extubated. well tolerated.

## 2019-02-25 NOTE — DIETITIAN INITIAL EVALUATION ADULT. - ENERGY NEEDS
Height 64"; #; #; 147%IBW  BMI 30.2  Ideal body weight used for calculations as pt >120% of IBW. Needs estimated for maintenance in older adults; increased calorie needs d/t recent significant weight loss

## 2019-02-26 LAB
ANION GAP SERPL CALC-SCNC: 8 MMOL/L — SIGNIFICANT CHANGE UP (ref 5–17)
BUN SERPL-MCNC: 13 MG/DL — SIGNIFICANT CHANGE UP (ref 7–23)
CALCIUM SERPL-MCNC: 8.9 MG/DL — SIGNIFICANT CHANGE UP (ref 8.4–10.5)
CHLORIDE SERPL-SCNC: 106 MMOL/L — SIGNIFICANT CHANGE UP (ref 96–108)
CO2 SERPL-SCNC: 27 MMOL/L — SIGNIFICANT CHANGE UP (ref 22–31)
CREAT SERPL-MCNC: 0.76 MG/DL — SIGNIFICANT CHANGE UP (ref 0.5–1.3)
GLUCOSE BLDC GLUCOMTR-MCNC: 102 MG/DL — HIGH (ref 70–99)
GLUCOSE BLDC GLUCOMTR-MCNC: 110 MG/DL — HIGH (ref 70–99)
GLUCOSE BLDC GLUCOMTR-MCNC: 114 MG/DL — HIGH (ref 70–99)
GLUCOSE SERPL-MCNC: 127 MG/DL — HIGH (ref 70–99)
GRAM STN FLD: SIGNIFICANT CHANGE UP
HCT VFR BLD CALC: 25.9 % — LOW (ref 34.5–45)
HGB BLD-MCNC: 8 G/DL — LOW (ref 11.5–15.5)
MAGNESIUM SERPL-MCNC: 2 MG/DL — SIGNIFICANT CHANGE UP (ref 1.6–2.6)
MCHC RBC-ENTMCNC: 30.7 PG — SIGNIFICANT CHANGE UP (ref 27–34)
MCHC RBC-ENTMCNC: 30.9 GM/DL — LOW (ref 32–36)
MCV RBC AUTO: 99.2 FL — SIGNIFICANT CHANGE UP (ref 80–100)
NRBC # BLD: 0 /100 WBCS — SIGNIFICANT CHANGE UP (ref 0–0)
PLATELET # BLD AUTO: 148 K/UL — LOW (ref 150–400)
POTASSIUM SERPL-MCNC: 3.9 MMOL/L — SIGNIFICANT CHANGE UP (ref 3.5–5.3)
POTASSIUM SERPL-SCNC: 3.9 MMOL/L — SIGNIFICANT CHANGE UP (ref 3.5–5.3)
RBC # BLD: 2.61 M/UL — LOW (ref 3.8–5.2)
RBC # FLD: 15.2 % — HIGH (ref 10.3–14.5)
SODIUM SERPL-SCNC: 141 MMOL/L — SIGNIFICANT CHANGE UP (ref 135–145)
WBC # BLD: 4.09 K/UL — SIGNIFICANT CHANGE UP (ref 3.8–10.5)
WBC # FLD AUTO: 4.09 K/UL — SIGNIFICANT CHANGE UP (ref 3.8–10.5)

## 2019-02-26 PROCEDURE — 99232 SBSQ HOSP IP/OBS MODERATE 35: CPT

## 2019-02-26 PROCEDURE — 95951: CPT | Mod: 26

## 2019-02-26 PROCEDURE — 99233 SBSQ HOSP IP/OBS HIGH 50: CPT | Mod: GC

## 2019-02-26 PROCEDURE — 71045 X-RAY EXAM CHEST 1 VIEW: CPT | Mod: 26

## 2019-02-26 RX ORDER — CARVEDILOL PHOSPHATE 80 MG/1
25 CAPSULE, EXTENDED RELEASE ORAL EVERY 12 HOURS
Qty: 0 | Refills: 0 | Status: DISCONTINUED | OUTPATIENT
Start: 2019-02-26 | End: 2019-03-04

## 2019-02-26 RX ADMIN — Medication 27.5 MILLIGRAM(S): at 14:42

## 2019-02-26 RX ADMIN — LEVETIRACETAM 400 MILLIGRAM(S): 250 TABLET, FILM COATED ORAL at 06:05

## 2019-02-26 RX ADMIN — HEPARIN SODIUM 5000 UNIT(S): 5000 INJECTION INTRAVENOUS; SUBCUTANEOUS at 02:29

## 2019-02-26 RX ADMIN — HEPARIN SODIUM 5000 UNIT(S): 5000 INJECTION INTRAVENOUS; SUBCUTANEOUS at 18:25

## 2019-02-26 RX ADMIN — CEFTRIAXONE 100 GRAM(S): 500 INJECTION, POWDER, FOR SOLUTION INTRAMUSCULAR; INTRAVENOUS at 04:29

## 2019-02-26 RX ADMIN — CHLORHEXIDINE GLUCONATE 1 APPLICATION(S): 213 SOLUTION TOPICAL at 06:30

## 2019-02-26 RX ADMIN — Medication 27.5 MILLIGRAM(S): at 21:57

## 2019-02-26 RX ADMIN — CARVEDILOL PHOSPHATE 25 MILLIGRAM(S): 80 CAPSULE, EXTENDED RELEASE ORAL at 18:25

## 2019-02-26 RX ADMIN — LACOSAMIDE 140 MILLIGRAM(S): 50 TABLET ORAL at 12:06

## 2019-02-26 RX ADMIN — Medication 27.5 MILLIGRAM(S): at 05:30

## 2019-02-26 RX ADMIN — HEPARIN SODIUM 5000 UNIT(S): 5000 INJECTION INTRAVENOUS; SUBCUTANEOUS at 12:06

## 2019-02-26 RX ADMIN — CARVEDILOL PHOSPHATE 25 MILLIGRAM(S): 80 CAPSULE, EXTENDED RELEASE ORAL at 06:03

## 2019-02-26 RX ADMIN — LEVETIRACETAM 400 MILLIGRAM(S): 250 TABLET, FILM COATED ORAL at 18:25

## 2019-02-26 NOTE — PROGRESS NOTE ADULT - SUBJECTIVE AND OBJECTIVE BOX
Subjective  Patient seen and examined at bedside, and appears to be more alert today.     ROS  Unobtainable 2/2 aphasia     MEDICATIONS  (STANDING):  carvedilol 25 milliGRAM(s) Oral every 12 hours  cefTRIAXone   IVPB      cefTRIAXone   IVPB 1 Gram(s) IV Intermittent every 24 hours  chlorhexidine 2% Cloths 1 Application(s) Topical daily  dextrose 5%. 1000 milliLiter(s) (50 mL/Hr) IV Continuous <Continuous>  dextrose 50% Injectable 12.5 Gram(s) IV Push once  heparin  Injectable 5000 Unit(s) SubCutaneous every 8 hours  insulin lispro (HumaLOG) corrective regimen sliding scale   SubCutaneous every 6 hours  lacosamide IVPB 200 milliGRAM(s) IV Intermittent every 12 hours  levETIRAcetam  IVPB 500 milliGRAM(s) IV Intermittent every 12 hours  valproate sodium IVPB 500 milliGRAM(s) IV Intermittent every 8 hours    MEDICATIONS  (PRN):  acetaminophen   Tablet .. 650 milliGRAM(s) Oral every 6 hours PRN Mild Pain (1 - 3)  dextrose 40% Gel 15 Gram(s) Oral once PRN Blood Glucose LESS THAN 70 milliGRAM(s)/deciliter  glucagon  Injectable 1 milliGRAM(s) IntraMuscular once PRN Glucose LESS THAN 70 milligrams/deciliter  LORazepam   Injectable 2 milliGRAM(s) IV Push once PRN for seizure > 2 minutes      T(C): 36.8 (02-26-19 @ 09:56), Max: 37.4 (02-25-19 @ 13:52)  HR: 96 (02-26-19 @ 12:00) (84 - 122)  BP: 132/77 (02-26-19 @ 12:00) (80/46 - 150/72)  RR: 26 (02-26-19 @ 12:00) (14 - 28)  SpO2: 99% (02-26-19 @ 12:00) (82% - 100%)  Wt(kg): --    Eyes open spontaneously. Conversational with appropriate sentences.  EOMI. Visual fields full. PERRL 3>2. Face symmetrical.  Full strength throughout.  Finger-nose-finger intact R/L.  Intact to light touch throughout.    CBC Full  -  ( 26 Feb 2019 06:07 )  WBC Count : 4.09 K/uL  Hemoglobin : 8.0 g/dL  Hematocrit : 25.9 %  Platelet Count - Automated : 148 K/uL  Mean Cell Volume : 99.2 fl  Mean Cell Hemoglobin : 30.7 pg  Mean Cell Hemoglobin Concentration : 30.9 gm/dL  Auto Neutrophil # : x  Auto Lymphocyte # : x  Auto Monocyte # : x  Auto Eosinophil # : x  Auto Basophil # : x  Auto Neutrophil % : x  Auto Lymphocyte % : x  Auto Monocyte % : x  Auto Eosinophil % : x  Auto Basophil % : x    02-26    141  |  106  |  13  ----------------------------<  127<H>  3.9   |  27  |  0.76    Ca    8.9      26 Feb 2019 06:06  Mg     2.0     02-26    TPro  5.0<L>  /  Alb  2.6<L>  /  TBili  0.2  /  DBili  x   /  AST  12  /  ALT  8<L>  /  AlkPhos  43  02-24    LIVER FUNCTIONS - ( 24 Feb 2019 20:11 )  Alb: 2.6 g/dL / Pro: 5.0 g/dL / ALK PHOS: 43 U/L / ALT: 8 U/L / AST: 12 U/L / GGT: x                 EEG: Subjective  Patient seen and examined at bedside, and appears to be more alert today. No reported seizures overnight.     ROS  Unobtainable 2/2 aphasia     MEDICATIONS  (STANDING):  carvedilol 25 milliGRAM(s) Oral every 12 hours  cefTRIAXone   IVPB      cefTRIAXone   IVPB 1 Gram(s) IV Intermittent every 24 hours  chlorhexidine 2% Cloths 1 Application(s) Topical daily  dextrose 5%. 1000 milliLiter(s) (50 mL/Hr) IV Continuous <Continuous>  dextrose 50% Injectable 12.5 Gram(s) IV Push once  heparin  Injectable 5000 Unit(s) SubCutaneous every 8 hours  insulin lispro (HumaLOG) corrective regimen sliding scale   SubCutaneous every 6 hours  lacosamide IVPB 200 milliGRAM(s) IV Intermittent every 12 hours  levETIRAcetam  IVPB 500 milliGRAM(s) IV Intermittent every 12 hours  valproate sodium IVPB 500 milliGRAM(s) IV Intermittent every 8 hours    MEDICATIONS  (PRN):  acetaminophen   Tablet .. 650 milliGRAM(s) Oral every 6 hours PRN Mild Pain (1 - 3)  dextrose 40% Gel 15 Gram(s) Oral once PRN Blood Glucose LESS THAN 70 milliGRAM(s)/deciliter  glucagon  Injectable 1 milliGRAM(s) IntraMuscular once PRN Glucose LESS THAN 70 milligrams/deciliter  LORazepam   Injectable 2 milliGRAM(s) IV Push once PRN for seizure > 2 minutes      T(C): 36.8 (02-26-19 @ 09:56), Max: 37.4 (02-25-19 @ 13:52)  HR: 96 (02-26-19 @ 12:00) (84 - 122)  BP: 132/77 (02-26-19 @ 12:00) (80/46 - 150/72)  RR: 26 (02-26-19 @ 12:00) (14 - 28)  SpO2: 99% (02-26-19 @ 12:00) (82% - 100%)  Wt(kg): --    Patient opens eyes to verbal stimuli, Expressive aphasia. Moans intermittently and attempts to communicate but incomprehensible. Does not follow commands.  PERRLA 3mm brisk, blink to threat bilaterally.  Tracks bedside activity.   Moves left arm spontaneously and intermittently. Right arm contracted, but withdraws to pain. Moves toes bilaterally spontaneously, and gross withdrawal to noxious stimuli.   No tremor or clonus.  Gait and sensation deferred.     CBC Full  -  ( 26 Feb 2019 06:07 )  WBC Count : 4.09 K/uL  Hemoglobin : 8.0 g/dL  Hematocrit : 25.9 %  Platelet Count - Automated : 148 K/uL  Mean Cell Volume : 99.2 fl  Mean Cell Hemoglobin : 30.7 pg  Mean Cell Hemoglobin Concentration : 30.9 gm/dL  Auto Neutrophil # : x  Auto Lymphocyte # : x  Auto Monocyte # : x  Auto Eosinophil # : x  Auto Basophil # : x  Auto Neutrophil % : x  Auto Lymphocyte % : x  Auto Monocyte % : x  Auto Eosinophil % : x  Auto Basophil % : x    02-26    141  |  106  |  13  ----------------------------<  127<H>  3.9   |  27  |  0.76    Ca    8.9      26 Feb 2019 06:06  Mg     2.0     02-26    TPro  5.0<L>  /  Alb  2.6<L>  /  TBili  0.2  /  DBili  x   /  AST  12  /  ALT  8<L>  /  AlkPhos  43  02-24    LIVER FUNCTIONS - ( 24 Feb 2019 20:11 )  Alb: 2.6 g/dL / Pro: 5.0 g/dL / ALK PHOS: 43 U/L / ALT: 8 U/L / AST: 12 U/L / GGT: x                 EEG: < from: EEG Video Monitoring Seizure EA Addl 24 HR (02.25.19 @ 23:59) >    Daily Summary:    1)  Rare Right  frontal and  temporal  sharp  waves, predominantly    during    the   sleep  2)  Intermittent    polymorphic     delta   slowing    on the   Right     temporal  region      Read by:  Yisel Nieto MD    < end of copied text > Subjective  Patient seen and examined at bedside, and appears to be more alert today. No reported seizures overnight.     ROS  Unobtainable 2/2 aphasia     MEDICATIONS  (STANDING):  carvedilol 25 milliGRAM(s) Oral every 12 hours  cefTRIAXone   IVPB      cefTRIAXone   IVPB 1 Gram(s) IV Intermittent every 24 hours  chlorhexidine 2% Cloths 1 Application(s) Topical daily  dextrose 5%. 1000 milliLiter(s) (50 mL/Hr) IV Continuous <Continuous>  dextrose 50% Injectable 12.5 Gram(s) IV Push once  heparin  Injectable 5000 Unit(s) SubCutaneous every 8 hours  insulin lispro (HumaLOG) corrective regimen sliding scale   SubCutaneous every 6 hours  lacosamide IVPB 200 milliGRAM(s) IV Intermittent every 12 hours  levETIRAcetam  IVPB 500 milliGRAM(s) IV Intermittent every 12 hours  valproate sodium IVPB 500 milliGRAM(s) IV Intermittent every 8 hours    MEDICATIONS  (PRN):  acetaminophen   Tablet .. 650 milliGRAM(s) Oral every 6 hours PRN Mild Pain (1 - 3)  dextrose 40% Gel 15 Gram(s) Oral once PRN Blood Glucose LESS THAN 70 milliGRAM(s)/deciliter  glucagon  Injectable 1 milliGRAM(s) IntraMuscular once PRN Glucose LESS THAN 70 milligrams/deciliter  LORazepam   Injectable 2 milliGRAM(s) IV Push once PRN for seizure > 2 minutes      T(C): 36.8 (02-26-19 @ 09:56), Max: 37.4 (02-25-19 @ 13:52)  HR: 96 (02-26-19 @ 12:00) (84 - 122)  BP: 132/77 (02-26-19 @ 12:00) (80/46 - 150/72)  RR: 26 (02-26-19 @ 12:00) (14 - 28)  SpO2: 99% (02-26-19 @ 12:00) (82% - 100%)  Wt(kg): --    Patient opens eyes to verbal stimuli, Expressive aphasia. Moans intermittently and attempts to communicate but incomprehensible. Does not follow commands.  PERRLA 3mm brisk, blink to threat bilaterally.  Tracks bedside activity.   Moves left arm spontaneously and intermittently. Right arm contracted, but withdraws to pain. Moves toes bilaterally spontaneously, and gross withdrawal to noxious stimuli.   No tremor or clonus.  Gait and sensation deferred.     CBC Full  -  ( 26 Feb 2019 06:07 )  WBC Count : 4.09 K/uL  Hemoglobin : 8.0 g/dL  Hematocrit : 25.9 %  Platelet Count - Automated : 148 K/uL  Mean Cell Volume : 99.2 fl  Mean Cell Hemoglobin : 30.7 pg  Mean Cell Hemoglobin Concentration : 30.9 gm/dL  Auto Neutrophil # : x  Auto Lymphocyte # : x  Auto Monocyte # : x  Auto Eosinophil # : x  Auto Basophil # : x  Auto Neutrophil % : x  Auto Lymphocyte % : x  Auto Monocyte % : x  Auto Eosinophil % : x  Auto Basophil % : x    02-26    141  |  106  |  13  ----------------------------<  127<H>  3.9   |  27  |  0.76    Ca    8.9      26 Feb 2019 06:06  Mg     2.0     02-26    TPro  5.0<L>  /  Alb  2.6<L>  /  TBili  0.2  /  DBili  x   /  AST  12  /  ALT  8<L>  /  AlkPhos  43  02-24    LIVER FUNCTIONS - ( 24 Feb 2019 20:11 )  Alb: 2.6 g/dL / Pro: 5.0 g/dL / ALK PHOS: 43 U/L / ALT: 8 U/L / AST: 12 U/L / GGT: x                 EEG: < from: EEG Video Monitoring Seizure EA Addl 24 HR (02.25.19 @ 23:59) >    Daily Summary:    1)  Rare Right  frontal and  temporal  sharp  waves, predominantly    during    the   sleep  2)  Intermittent    polymorphic     delta   slowing    on the   Right     temporal  region      Read by:  Yisel Nieto MD    < end of copied text >    < from: CT Head No Cont (02.23.19 @ 14:12) >  Impression:    Focal area of encephalomalacia within the left temporoparietal region   likely representing sequela of recent hemorrhagic infarct.    Age related involutional and microvascular ischemic changes, without   evidence of intracranial hemorrhage, mass effect or midline shift.    < end of copied text >

## 2019-02-26 NOTE — PROGRESS NOTE ADULT - ASSESSMENT
62 yo woman with post-spine surgery Left temporal hemorrhage from unknown specific cause 11/2018, then decline 12/2018 with question of etiology, seizure, PRES, infarct or combination, but discharged back to rehabilitation late 01/2019, but a decline in functioning in the past 1-2 days, leading to a clinical diagnosis of status epilepticus, a recurrent problem dealt with at [facility].  She appears to have been discharges on depakote/valproate through PEG 250mg bid, in addition to vimpat/lacosamide 200mg bid, though on admission to [place], the valpraote dose was 500mg tid.  Levetiracetam also given in their hospital.  She was intubated for airway protection at [place] and transferred to [facility] by EICU due to being the only available ICU bed in the system with vEEG capability.   vEEG for the past 2 days showed rare Rt>Lt temporal sharp wave activity, no seizures. Patient's current medical condition includes UTI positive bacteremia, and on ceftriaxone.     Plan:  1) continue VPA/valproic acid, IV at 500mg q8h - daily levels (pending levels at 9pm)  2) continue lacosamide/vimpat at 200mg bid  3) continue levetiracetam/keppra at 500mg bid.    4) IV lorazepam 1mg only for convulsive seizures >1min.  5) MRI brain w/o contrast  6) Neurological assessments Q8hrs  7) Maintain Seizure & Fall precautions  8) Can transfer to EMU unit with Medicine consult Mrs Renteria is a 64 yo woman with post-spine surgery Left temporal hemorrhage from unknown specific cause 11/2018, then decline 12/2018 with question of etiology, seizure, PRES, infarct or combination, but discharged back to rehabilitation late 01/2019, but a decline in functioning in the past 1-2 days, leading to a clinical diagnosis of status epilepticus, a recurrent problem dealt with at [facility].  She appears to have been discharges on depakote/valproate through PEG 250mg bid, in addition to vimpat/lacosamide 200mg bid, though on admission to [place], the valpraote dose was 500mg tid.  Levetiracetam also given in their hospital.  She was intubated for airway protection at [place] and transferred to [facility] by EICU due to being the only available ICU bed in the system with vEEG capability.  CTH on /23 showed encephalomalacia in the left temporoparietal region due to recent hemorrhagic stroke. vEEG for the past 2 days showed rare Rt>Lt temporal sharp wave activity, no seizures. Patient's current medical condition includes UTI positive bacteremia, and on ceftriaxone for 2 days.     Plan:  1) continue VPA/valproic acid, IV at 500mg q8h - daily levels (pending levels at 9pm)  2) continue lacosamide/vimpat at 200mg bid  3) continue levetiracetam/keppra at 500mg bid.    4) IV lorazepam 1mg only for convulsive seizures >1min.  5) MRI brain w/o contrast  6) Neurological assessments Q8hrs  7) Maintain Seizure & Fall precautions  8) Can transfer to EMU unit with Medicine consult

## 2019-02-26 NOTE — PROGRESS NOTE ADULT - SUBJECTIVE AND OBJECTIVE BOX
OVERNIGHT EVENTS:  VIC  SUBJECTIVE / INTERVAL HPI: Patient seen and examined at bedside.   Patient laying in bed, awake and alert, comfortable appearing with expressive aphasia so unable to obtain ROS.     VITAL SIGNS:  Vital Signs Last 24 Hrs  T(C): 36.8 (26 Feb 2019 09:56), Max: 37.4 (25 Feb 2019 13:52)  T(F): 98.3 (26 Feb 2019 09:56), Max: 99.3 (25 Feb 2019 13:52)  HR: 88 (26 Feb 2019 11:00) (84 - 122)  BP: 146/70 (26 Feb 2019 11:00) (80/46 - 150/72)  BP(mean): 101 (26 Feb 2019 11:00) (54 - 101)  RR: 25 (26 Feb 2019 11:00) (14 - 28)  SpO2: 100% (26 Feb 2019 11:00) (82% - 100%)    PHYSICAL EXAM:    General: WDWN, NAD, unresponsive to noxious stimuli, sound, touch.   HEENT: NC/AT; MMM  Cardiovascular: +S1/S2; RRR, distant heart sounds  Respiratory: CTA B/L; no W/R/R  Gastrointestinal: soft, ND; absent BS  Extremities: WWP; no edema, clubbing or cyanosis  Vascular: 2+ radial, DP/PT pulses B/L  Neurological: Expressive aphasia, moves all 4 extremities spontaneously does not follow commands       MEDICATIONS:  MEDICATIONS  (STANDING):  carvedilol 25 milliGRAM(s) Oral every 12 hours  cefTRIAXone   IVPB      cefTRIAXone   IVPB 1 Gram(s) IV Intermittent every 24 hours  chlorhexidine 2% Cloths 1 Application(s) Topical daily  dextrose 5%. 1000 milliLiter(s) (50 mL/Hr) IV Continuous <Continuous>  dextrose 50% Injectable 12.5 Gram(s) IV Push once  heparin  Injectable 5000 Unit(s) SubCutaneous every 8 hours  insulin lispro (HumaLOG) corrective regimen sliding scale   SubCutaneous every 6 hours  lacosamide IVPB 200 milliGRAM(s) IV Intermittent every 12 hours  levETIRAcetam  IVPB 500 milliGRAM(s) IV Intermittent every 12 hours  valproate sodium IVPB 500 milliGRAM(s) IV Intermittent every 8 hours    MEDICATIONS  (PRN):  acetaminophen   Tablet .. 650 milliGRAM(s) Oral every 6 hours PRN Mild Pain (1 - 3)  dextrose 40% Gel 15 Gram(s) Oral once PRN Blood Glucose LESS THAN 70 milliGRAM(s)/deciliter  glucagon  Injectable 1 milliGRAM(s) IntraMuscular once PRN Glucose LESS THAN 70 milligrams/deciliter  LORazepam   Injectable 2 milliGRAM(s) IV Push once PRN for seizure > 2 minutes      ALLERGIES:  Allergies    No Known Allergies    Intolerances        LABS:                        8.0    4.09  )-----------( 148      ( 26 Feb 2019 06:07 )             25.9     02-26    141  |  106  |  13  ----------------------------<  127<H>  3.9   |  27  |  0.76    Ca    8.9      26 Feb 2019 06:06  Mg     2.0     02-26    TPro  5.0<L>  /  Alb  2.6<L>  /  TBili  0.2  /  DBili  x   /  AST  12  /  ALT  8<L>  /  AlkPhos  43  02-24        CAPILLARY BLOOD GLUCOSE      POCT Blood Glucose.: 102 mg/dL (26 Feb 2019 11:18)      RADIOLOGY & ADDITIONAL TESTS: Reviewed. OVERNIGHT EVENTS:  VIC  SUBJECTIVE / INTERVAL HPI: Patient seen and examined at bedside.   Patient laying in bed, awake and alert, comfortable appearing with expressive aphasia so unable to obtain ROS.     VITAL SIGNS:  Vital Signs Last 24 Hrs  T(C): 36.8 (26 Feb 2019 09:56), Max: 37.4 (25 Feb 2019 13:52)  T(F): 98.3 (26 Feb 2019 09:56), Max: 99.3 (25 Feb 2019 13:52)  HR: 88 (26 Feb 2019 11:00) (84 - 122)  BP: 146/70 (26 Feb 2019 11:00) (80/46 - 150/72)  BP(mean): 101 (26 Feb 2019 11:00) (54 - 101)  RR: 25 (26 Feb 2019 11:00) (14 - 28)  SpO2: 100% (26 Feb 2019 11:00) (82% - 100%)    PHYSICAL EXAM:    General: WDWN, NAD, responds to speech, moving upper extremities voluntarily, expressive aphasia  HEENT: NC/AT; MMM, EOMI  Cardiovascular: +S1/S2; RRR, distant heart sounds  Respiratory: CTA B/L; no W/R/R; of note: on rounds found sleeping with paradoxical breathing which mostly dissipated on awakening  Gastrointestinal: soft, globus, distended, nontender; absent BS; no masses palpated  Extremities: WWP; no edema, clubbing or cyanosis  Vascular: 2+ radial, DP/PT pulses B/L  Neurological: Expressive aphasia, moves all 4 extremities spontaneously does not follow commands       MEDICATIONS:  MEDICATIONS  (STANDING):  carvedilol 25 milliGRAM(s) Oral every 12 hours  cefTRIAXone   IVPB      cefTRIAXone   IVPB 1 Gram(s) IV Intermittent every 24 hours  chlorhexidine 2% Cloths 1 Application(s) Topical daily  dextrose 5%. 1000 milliLiter(s) (50 mL/Hr) IV Continuous <Continuous>  dextrose 50% Injectable 12.5 Gram(s) IV Push once  heparin  Injectable 5000 Unit(s) SubCutaneous every 8 hours  insulin lispro (HumaLOG) corrective regimen sliding scale   SubCutaneous every 6 hours  lacosamide IVPB 200 milliGRAM(s) IV Intermittent every 12 hours  levETIRAcetam  IVPB 500 milliGRAM(s) IV Intermittent every 12 hours  valproate sodium IVPB 500 milliGRAM(s) IV Intermittent every 8 hours    MEDICATIONS  (PRN):  acetaminophen   Tablet .. 650 milliGRAM(s) Oral every 6 hours PRN Mild Pain (1 - 3)  dextrose 40% Gel 15 Gram(s) Oral once PRN Blood Glucose LESS THAN 70 milliGRAM(s)/deciliter  glucagon  Injectable 1 milliGRAM(s) IntraMuscular once PRN Glucose LESS THAN 70 milligrams/deciliter  LORazepam   Injectable 2 milliGRAM(s) IV Push once PRN for seizure > 2 minutes      ALLERGIES:  Allergies    No Known Allergies    Intolerances        LABS:                        8.0    4.09  )-----------( 148      ( 26 Feb 2019 06:07 )             25.9     02-26    141  |  106  |  13  ----------------------------<  127<H>  3.9   |  27  |  0.76    Ca    8.9      26 Feb 2019 06:06  Mg     2.0     02-26    TPro  5.0<L>  /  Alb  2.6<L>  /  TBili  0.2  /  DBili  x   /  AST  12  /  ALT  8<L>  /  AlkPhos  43  02-24        CAPILLARY BLOOD GLUCOSE      POCT Blood Glucose.: 102 mg/dL (26 Feb 2019 11:18)      RADIOLOGY & ADDITIONAL TESTS: Reviewed.

## 2019-02-26 NOTE — PROGRESS NOTE ADULT - ASSESSMENT
63F with hx of HTN, DM2, CAD, CVA x2 (left sided hemorrhagic, followed one month later by left sided ischemic CVA) with residual seizure disorder, aphasia and dysphagia s/p PEG who presents with "staring into space", inattentiveness and increased lethargy x 1 day    NEURO  #Status Epilepticus   - s/p extubation  - no clear inciting event though has history of temporal hemorrhagic infarct in 11/18  - vEEG shows only rare Rt>Lt temporal sharp wave activity, no seizures  - c/w vEEEG per Dr. Eng  - c/w keppra 2g BID  - c/w vimpat 200 mg BID  - c/w depakote 500mg q8 level, daily levels  - brain MRI    - IV lorazepam 1mg only for convulsive seizures >1min.    Respiratory  - Extubated AM 2/25, comfortable now on NC  - Patient may have component of PEDRO, will need outpatient workup    Cardiology  Hemodynamically stable not requiring any pressor support    #r/o DVT  - LE doppler negative for thrombosis     #CAD s/p stents   -currently holding home coreg, home Lipitor, home ASA  -hold home losartan in the setting of YAZ      GI  #PEG Dependent  - tube feeds    RENAL  #YAZ - now resolved  -unclear etiology; don't think it is 2/2 dehydration as specific gravity is low on UA   -hold ARB     #UTI  -patient with (+) UA on admission, with intermittent fevers, Tmax 101.2F  -s/p CTX at [place]  -no leukocytosis, completely hemodynamically stable so less suspicion for urosepsis contributing to patient's AMS picture  -complete 3 day course of CTX  -d/c vancomycin, coag neg staph in 2/23 Bcx, one bottle thought to be contaminant. Repeat Bcx 2/24 pre-vanco show NGTD    ENDO  #DMT2  -SSI while NPO    PSYCH  #depression  -hold home olanzapine and elavil      #FEN  -NPO  -HSQ for DVT ppx  Lines: IJ placed on 2/24  -fall precautions 63F with hx of HTN, DM2, CAD, CVA x2 (left sided hemorrhagic, followed one month later by left sided ischemic CVA) with residual seizure disorder, aphasia and dysphagia s/p PEG who presents with "staring into space", inattentiveness and increased lethargy x 1 day    NEURO  #Status Epilepticus   - s/p extubation  - no clear inciting event though has history of temporal hemorrhagic infarct in 11/18 and MCA ischemic infarct in 12/18  - vEEG shows only rare Rt>Lt temporal sharp wave activity and intermittent delta wave slowing on Lt temporal, no seizures  - c/w vEEEG per Dr. Eng  - c/w keppra 2g BID  - c/w vimpat 200 mg BID  - c/w depakote 500mg q8 level, daily levels  - brain MRI when patient is stepped down to regional medicine and off monitors  - IV lorazepam 1mg only for convulsive seizures >1min.    Respiratory  - Extubated AM 2/25, comfortable now on NC  - Patient may have component of PEDRO, will need outpatient workup, monitor resp status while sleeping     Cardiology  Hemodynamically stable not requiring any pressor support    #r/o DVT  - LE doppler negative for thrombosis     #CAD s/p stents   -on home coreg  -currently holding, home Lipitor, home ASA  -hold home losartan in the setting of YAZ      GI  #PEG Dependent  - tube feeds    RENAL  #YAZ - now resolved  -unclear etiology; don't think it is 2/2 dehydration as specific gravity is low on UA   -hold ARB     #UTI  -patient with (+) UA on admission, with intermittent fevers, Tmax 101.2F  - Ucx 2/23 grew E. coli susceptible to ceftriaxone  -s/p CTX at [place]  -no leukocytosis, completely hemodynamically stable so less suspicion for urosepsis contributing to patient's AMS picture  -complete 3 day course of CTX, currently on day 3  -d/c vancomycin, coag neg staph in 2/23 Bcx, one bottle thought to be contaminant. Repeat Bcx 2/24 pre-vanco show NGTD    ENDO  #DMT2  -SSI while NPO    PSYCH  #depression  -hold home olanzapine and elavil      #FEN  -tube feeds  -HSQ for DVT ppx  Lines: IJ placed on 2/24  -fall precautions

## 2019-02-27 LAB
ANION GAP SERPL CALC-SCNC: 10 MMOL/L — SIGNIFICANT CHANGE UP (ref 5–17)
APPEARANCE UR: ABNORMAL
BILIRUB UR-MCNC: NEGATIVE — SIGNIFICANT CHANGE UP
BUN SERPL-MCNC: 13 MG/DL — SIGNIFICANT CHANGE UP (ref 7–23)
CALCIUM SERPL-MCNC: 8.7 MG/DL — SIGNIFICANT CHANGE UP (ref 8.4–10.5)
CHLORIDE SERPL-SCNC: 106 MMOL/L — SIGNIFICANT CHANGE UP (ref 96–108)
CO2 SERPL-SCNC: 26 MMOL/L — SIGNIFICANT CHANGE UP (ref 22–31)
COLOR SPEC: YELLOW — SIGNIFICANT CHANGE UP
CREAT SERPL-MCNC: 0.69 MG/DL — SIGNIFICANT CHANGE UP (ref 0.5–1.3)
DIFF PNL FLD: NEGATIVE — SIGNIFICANT CHANGE UP
GLUCOSE BLDC GLUCOMTR-MCNC: 104 MG/DL — HIGH (ref 70–99)
GLUCOSE BLDC GLUCOMTR-MCNC: 109 MG/DL — HIGH (ref 70–99)
GLUCOSE BLDC GLUCOMTR-MCNC: 124 MG/DL — HIGH (ref 70–99)
GLUCOSE BLDC GLUCOMTR-MCNC: 147 MG/DL — HIGH (ref 70–99)
GLUCOSE SERPL-MCNC: 112 MG/DL — HIGH (ref 70–99)
GLUCOSE UR QL: NEGATIVE — SIGNIFICANT CHANGE UP
HCT VFR BLD CALC: 25.1 % — LOW (ref 34.5–45)
HGB BLD-MCNC: 8 G/DL — LOW (ref 11.5–15.5)
KETONES UR-MCNC: NEGATIVE — SIGNIFICANT CHANGE UP
LEUKOCYTE ESTERASE UR-ACNC: NEGATIVE — SIGNIFICANT CHANGE UP
MAGNESIUM SERPL-MCNC: 2 MG/DL — SIGNIFICANT CHANGE UP (ref 1.6–2.6)
MCHC RBC-ENTMCNC: 31.3 PG — SIGNIFICANT CHANGE UP (ref 27–34)
MCHC RBC-ENTMCNC: 31.9 GM/DL — LOW (ref 32–36)
MCV RBC AUTO: 98 FL — SIGNIFICANT CHANGE UP (ref 80–100)
NITRITE UR-MCNC: NEGATIVE — SIGNIFICANT CHANGE UP
NRBC # BLD: 0 /100 WBCS — SIGNIFICANT CHANGE UP (ref 0–0)
PH UR: 6 — SIGNIFICANT CHANGE UP (ref 5–8)
PHOSPHATE SERPL-MCNC: 4.2 MG/DL — SIGNIFICANT CHANGE UP (ref 2.5–4.5)
PLATELET # BLD AUTO: 151 K/UL — SIGNIFICANT CHANGE UP (ref 150–400)
POTASSIUM SERPL-MCNC: 4.1 MMOL/L — SIGNIFICANT CHANGE UP (ref 3.5–5.3)
POTASSIUM SERPL-SCNC: 4.1 MMOL/L — SIGNIFICANT CHANGE UP (ref 3.5–5.3)
PROT UR-MCNC: 30 MG/DL
RBC # BLD: 2.56 M/UL — LOW (ref 3.8–5.2)
RBC # FLD: 14.9 % — HIGH (ref 10.3–14.5)
SODIUM SERPL-SCNC: 142 MMOL/L — SIGNIFICANT CHANGE UP (ref 135–145)
SP GR SPEC: >=1.03 — SIGNIFICANT CHANGE UP (ref 1–1.03)
UROBILINOGEN FLD QL: 0.2 E.U./DL — SIGNIFICANT CHANGE UP
WBC # BLD: 4.19 K/UL — SIGNIFICANT CHANGE UP (ref 3.8–10.5)
WBC # FLD AUTO: 4.19 K/UL — SIGNIFICANT CHANGE UP (ref 3.8–10.5)

## 2019-02-27 PROCEDURE — 99232 SBSQ HOSP IP/OBS MODERATE 35: CPT

## 2019-02-27 PROCEDURE — 71045 X-RAY EXAM CHEST 1 VIEW: CPT | Mod: 26

## 2019-02-27 PROCEDURE — 95951: CPT | Mod: 26

## 2019-02-27 PROCEDURE — 99233 SBSQ HOSP IP/OBS HIGH 50: CPT | Mod: GC

## 2019-02-27 RX ORDER — DEXMEDETOMIDINE HYDROCHLORIDE IN 0.9% SODIUM CHLORIDE 4 UG/ML
0.2 INJECTION INTRAVENOUS
Qty: 200 | Refills: 0 | Status: DISCONTINUED | OUTPATIENT
Start: 2019-02-27 | End: 2019-02-27

## 2019-02-27 RX ORDER — OLANZAPINE 15 MG/1
5 TABLET, FILM COATED ORAL
Qty: 0 | Refills: 0 | Status: DISCONTINUED | OUTPATIENT
Start: 2019-02-27 | End: 2019-03-03

## 2019-02-27 RX ORDER — DEXMEDETOMIDINE HYDROCHLORIDE IN 0.9% SODIUM CHLORIDE 4 UG/ML
0.4 INJECTION INTRAVENOUS
Qty: 200 | Refills: 0 | Status: DISCONTINUED | OUTPATIENT
Start: 2019-02-27 | End: 2019-02-28

## 2019-02-27 RX ADMIN — LACOSAMIDE 140 MILLIGRAM(S): 50 TABLET ORAL at 10:03

## 2019-02-27 RX ADMIN — HEPARIN SODIUM 5000 UNIT(S): 5000 INJECTION INTRAVENOUS; SUBCUTANEOUS at 17:40

## 2019-02-27 RX ADMIN — CHLORHEXIDINE GLUCONATE 1 APPLICATION(S): 213 SOLUTION TOPICAL at 05:00

## 2019-02-27 RX ADMIN — CARVEDILOL PHOSPHATE 25 MILLIGRAM(S): 80 CAPSULE, EXTENDED RELEASE ORAL at 05:00

## 2019-02-27 RX ADMIN — DEXMEDETOMIDINE HYDROCHLORIDE IN 0.9% SODIUM CHLORIDE 7.98 MICROGRAM(S)/KG/HR: 4 INJECTION INTRAVENOUS at 19:58

## 2019-02-27 RX ADMIN — HEPARIN SODIUM 5000 UNIT(S): 5000 INJECTION INTRAVENOUS; SUBCUTANEOUS at 10:03

## 2019-02-27 RX ADMIN — OLANZAPINE 5 MILLIGRAM(S): 15 TABLET, FILM COATED ORAL at 17:40

## 2019-02-27 RX ADMIN — DEXMEDETOMIDINE HYDROCHLORIDE IN 0.9% SODIUM CHLORIDE 3.99 MICROGRAM(S)/KG/HR: 4 INJECTION INTRAVENOUS at 12:53

## 2019-02-27 RX ADMIN — Medication 27.5 MILLIGRAM(S): at 22:15

## 2019-02-27 RX ADMIN — CEFTRIAXONE 100 GRAM(S): 500 INJECTION, POWDER, FOR SOLUTION INTRAMUSCULAR; INTRAVENOUS at 03:49

## 2019-02-27 RX ADMIN — LACOSAMIDE 140 MILLIGRAM(S): 50 TABLET ORAL at 23:30

## 2019-02-27 RX ADMIN — LEVETIRACETAM 400 MILLIGRAM(S): 250 TABLET, FILM COATED ORAL at 05:00

## 2019-02-27 RX ADMIN — Medication 27.5 MILLIGRAM(S): at 05:00

## 2019-02-27 RX ADMIN — HEPARIN SODIUM 5000 UNIT(S): 5000 INJECTION INTRAVENOUS; SUBCUTANEOUS at 03:48

## 2019-02-27 RX ADMIN — LEVETIRACETAM 400 MILLIGRAM(S): 250 TABLET, FILM COATED ORAL at 17:40

## 2019-02-27 RX ADMIN — Medication 650 MILLIGRAM(S): at 15:41

## 2019-02-27 RX ADMIN — LACOSAMIDE 140 MILLIGRAM(S): 50 TABLET ORAL at 00:06

## 2019-02-27 RX ADMIN — CARVEDILOL PHOSPHATE 25 MILLIGRAM(S): 80 CAPSULE, EXTENDED RELEASE ORAL at 17:40

## 2019-02-27 RX ADMIN — Medication 27.5 MILLIGRAM(S): at 13:02

## 2019-02-27 RX ADMIN — DEXMEDETOMIDINE HYDROCHLORIDE IN 0.9% SODIUM CHLORIDE 7.98 MICROGRAM(S)/KG/HR: 4 INJECTION INTRAVENOUS at 22:15

## 2019-02-27 NOTE — PROGRESS NOTE ADULT - ASSESSMENT
63F with hx of HTN, DM2, CAD, CVA x2 (left sided hemorrhagic, followed one month later by left sided ischemic CVA) with residual seizure disorder, aphasia and dysphagia s/p PEG who presents with "staring into space", inattentiveness and increased lethargy x 1 day    NEURO  #Status Epilepticus   - s/p extubation  - no clear inciting event though has history of temporal hemorrhagic infarct in 11/18 and MCA ischemic infarct in 12/18  - vEEG shows only rare Rt>Lt temporal sharp wave activity and intermittent delta wave slowing on Lt temporal, no seizures  - c/w vEEEG per Dr. Eng  - c/w keppra 2g BID  - c/w vimpat 200 mg BID  - c/w depakote 500mg q8 level, daily levels  - brain MRI when patient is stepped down to regional medicine and off monitors  - IV lorazepam 1mg only for convulsive seizures >1min.    Respiratory  - Extubated AM 2/25, comfortable now on NC  - Patient may have component of PEDRO, will need outpatient workup, monitor resp status while sleeping     Cardiology  Hemodynamically stable not requiring any pressor support    #r/o DVT  - LE doppler negative for thrombosis     #CAD s/p stents   -on home coreg  -currently holding, home Lipitor, home ASA  -hold home losartan in the setting of YAZ    GI  #PEG Dependent  - tube feeds    RENAL  #YAZ - now resolved  -unclear etiology; don't think it is 2/2 dehydration as specific gravity is low on UA   -hold ARB     #UTI  -patient with (+) UA on admission, with intermittent fevers, Tmax 101.2F  - Ucx 2/23 grew E. coli susceptible to ceftriaxone  -s/p CTX at [place]  -no leukocytosis, completely hemodynamically stable so less suspicion for urosepsis contributing to patient's AMS picture  -complete 3 day course of CTX, currently on day 3  -d/c vancomycin, coag neg staph in 2/23 Bcx, one bottle thought to be contaminant. Repeat Bcx 2/24 pre-vanco show NGTD    ENDO  #DMT2  -SSI while NPO    PSYCH  #depression  -hold home olanzapine and elavil      #FEN  -tube feeds  -HSQ for DVT ppx  Lines: IJ placed on 2/24  -fall precautions 63F with hx of HTN, DM2, CAD, CVA x2 (left sided hemorrhagic, followed one month later by left sided ischemic CVA) with residual seizure disorder, aphasia and dysphagia s/p PEG who presents with "staring into space", inattentiveness and increased lethargy x 1 day    NEURO  #Status Epilepticus   - s/p extubation  - no clear inciting event though has history of temporal hemorrhagic infarct in 11/18 and MCA ischemic infarct in 12/18  - vEEG shows only rare Rt>Lt temporal sharp wave activity and intermittent delta wave slowing on Lt temporal, no seizures  - c/w vEEEG per Dr. Eng  - c/w keppra 2g BID  - c/w vimpat 200 mg BID  - c/w depakote 500mg q8 level, daily levels  - brain MRI when patient is stepped down to regional medicine and off monitors  - IV lorazepam 1mg only for convulsive seizures >1min.    Respiratory  - Extubated AM 2/25, comfortable now on 2L NC    Cardiology  Hemodynamically stable not requiring any pressor support    #r/o DVT  - LE doppler negative for thrombosis     #CAD s/p stents   -on home coreg  -currently holding, home Lipitor, home ASA  -hold home losartan in the setting of YAZ    GI  #PEG Dependent  - tube feeds    RENAL  #YAZ - now resolved  -unclear etiology; don't think it is 2/2 dehydration as specific gravity is low on UA   -hold ARB     #UTI  -patient with (+) UA on admission, with intermittent fevers, Tmax 101.2F  - Ucx 2/23 grew E. coli susceptible to ceftriaxone  -s/[place]  -no leukocytosis, completely hemodynamically stable so less suspicion for urosepsis contributing to patient's AMS picture  -d/c vancomycin, coag neg staph in 2/23 Bcx, one bottle thought to be contaminant. Repeat Bcx 2/24 pre-vanco show NGTD  - D/c ceftriaxone after 6am dose 2/28 for a total of 5d antibiotic treatment.    ENDO  #DMT2  -SSI on tube feeds    PSYCH  #depression  -hold home olanzapine and elavil in setting of recent seizures, both lower threshold      #FEN  -tube feeds  -HSQ for DVT ppx  Lines: IJ placed on 2/24  -fall precautions 63F with hx of HTN, DM2, CAD, CVA x2 (left sided hemorrhagic, followed one month later by left sided ischemic CVA) with residual seizure disorder, aphasia and dysphagia s/p PEG who presents with "staring into space", inattentiveness and increased lethargy x 1 day    NEURO  #Status Epilepticus   - s/p extubation  - no clear inciting event though has history of temporal hemorrhagic infarct in 11/18 and MCA ischemic infarct in 12/18  - vEEG shows only rare Rt>Lt temporal sharp wave activity and intermittent delta wave slowing on Lt temporal, no seizures  - c/w vEEEG per Dr. Eng  - c/w keppra 2g BID  - c/w vimpat 200 mg BID  - c/w depakote 500mg q8 level, daily levels  - brain MRI when patient is stepped down to regional medicine and off monitors  - IV lorazepam 1mg only for convulsive seizures >1min.    Respiratory  - Extubated AM 2/25, comfortable now on 2L NC    Cardiology  Hemodynamically stable not requiring any pressor support    #r/o DVT  - LE doppler negative for thrombosis     #CAD s/p stents   -on home coreg  -currently holding, home Lipitor, home ASA  -hold home losartan in the setting of YAZ    GI  #PEG Dependent  - tube feeds    RENAL  #YAZ - now resolved  -unclear etiology; don't think it is 2/2 dehydration as specific gravity is low on UA   -hold ARB     #UTI  -patient with (+) UA on admission, with intermittent fevers, Tmax 101.2F  - Ucx 2/23 grew E. coli susceptible to ceftriaxone  -s/p CTX at [place]  -no leukocytosis, completely hemodynamically stable so less suspicion for urosepsis contributing to patient's AMS picture  -d/c vancomycin, coag neg staph in 2/23 Bcx, one bottle thought to be contaminant. Repeat Bcx 2/24 pre-vanco show NGTD  - D/c ceftriaxone after 6am dose 2/28 for a total of 5d antibiotic treatment.    ENDO  #DMT2  -SSI on tube feeds    PSYCH  #depression  -hold home olanzapine and elavil in setting of recent seizures, both lower threshold  -there is a concern re delirium so will ask neurology if they object to restarting olanzapine. Failing that to consider precedex trial      #FEN  -tube feeds  -HSQ for DVT ppx  Lines: IJ placed on 2/24  -fall precautions

## 2019-02-27 NOTE — PROGRESS NOTE ADULT - ASSESSMENT
Mrs Renteria is a 64 yo woman with post-spine surgery Left temporal hemorrhage from unknown specific cause 11/2018, then decline 12/2018 with question of etiology, seizure, PRES, infarct or combination, but discharged back to rehabilitation late 01/2019, but a decline in functioning in the past 1-2 days, leading to a clinical diagnosis of status epilepticus, a recurrent problem dealt with at [facility].  She appears to have been discharged on depakote/valproate through PEG 250mg bid, in addition to vimpat/lacosamide 200mg bid, though on admission to [place], the valpraote dose was 500mg tid.  Levetiracetam also given in their hospital.  She was intubated for airway protection at [place] and transferred to [facility] by EICU due to being the only available ICU bed in the system with vEEG capability.  CTH on /23 showed encephalomalacia in the left temporoparietal region due to recent hemorrhagic stroke. vEEG for the past 2 days showed rare Rt>Lt temporal sharp wave activity, no seizures. Patient's current medical condition includes UTI positive bacteremia, and on ceftriaxone for management.     Plan:  1) continue VPA/valproic acid, IV at 500mg q8h - daily levels (pending levels at 1:30PM)  2) continue lacosamide/vimpat at 200mg bid  3) continue levetiracetam/keppra at 500mg bid.    4) IV lorazepam 1mg only for convulsive seizures >1min.  5) MRI brain w/o contrast  6) Neurological assessments Q8hrs  7) Maintain Seizure & Fall precautions Mrs Renteria is a 64 yo woman with post-spine surgery Left temporal hemorrhage from unknown specific cause 11/2018, then decline 12/2018 with question of etiology, seizure, PRES, infarct or combination, but discharged back to rehabilitation late 01/2019, but a decline in functioning in the past 1-2 days, leading to a clinical diagnosis of status epilepticus, a recurrent problem dealt with at [facility].  She appears to have been discharged on depakote/valproate through PEG 250mg bid, in addition to vimpat/lacosamide 200mg bid, though on admission to [place], the valpraote dose was 500mg tid.  Levetiracetam also given in their hospital.  She was intubated for airway protection at [place] and transferred to [facility] by EICU due to being the only available ICU bed in the system with vEEG capability.  CTH on /23 showed encephalomalacia in the left temporoparietal region due to recent hemorrhagic stroke. vEEG for the past 2 days showed rare Rt>Lt temporal sharp wave activity, no seizures. Patient's current medical condition includes UTI positive bacteremia, and on ceftriaxone for management.     Plan:  1) continue VPA/valproic acid, IV at 500mg q8h - daily levels (pending levels at 1:30PM)  2) continue lacosamide/vimpat at 200mg bid  3) continue levetiracetam/keppra at 500mg bid.    4) IV lorazepam 1mg only for convulsive seizures >1min.  5) MRI brain w/o contrast  6) Neurological assessments Q8hrs  7) Maintain Seizure & Fall precautions  8) Possibly psychiatry consult for tearfulness Mrs Renteria is a 62 yo woman with post-spine surgery Left temporal hemorrhage from unknown specific cause 11/2018, then decline 12/2018 with question of etiology, seizure, PRES, infarct or combination, but discharged back to rehabilitation late 01/2019, but a decline in functioning in the past 1-2 days, leading to a clinical diagnosis of status epilepticus, a recurrent problem dealt with at [facility].  She appears to have been discharged on depakote/valproate through PEG 250mg bid, in addition to vimpat/lacosamide 200mg bid, though on admission to [place], the valpraote dose was 500mg tid.  Levetiracetam also given in their hospital.  She was intubated for airway protection at [place] and transferred to [facility] by EICU due to being the only available ICU bed in the system with vEEG capability.  CTH on /23 showed encephalomalacia in the left temporoparietal region due to recent hemorrhagic stroke. vEEG for the past 2 days showed rare Rt>Lt temporal sharp wave activity, no seizures. Patient's current medical condition includes UTI positive bacteremia, and on ceftriaxone for management.     Plan:  1) continue VPA/valproic acid, IV at 500mg q8h - daily levels (pending levels at 1:30PM)  2) continue lacosamide/vimpat at 200mg bid  3) continue levetiracetam/keppra at 500mg bid.    4) IV lorazepam 1mg only for convulsive seizures >1min.  5) MRI brain w/o contrast  6) Neurological assessments Q8hrs  7) Maintain Seizure & Fall precautions  8) Possibly psychiatry consult for tearfulness/depression

## 2019-02-27 NOTE — PHYSICAL THERAPY INITIAL EVALUATION ADULT - PREDICTED DURATION OF THERAPY (DAYS/WKS), PT EVAL
Pt. would benefit from further PT follow up to improve functional mobility, cognition, strength, balance.

## 2019-02-27 NOTE — PHYSICAL THERAPY INITIAL EVALUATION ADULT - RANGE OF MOTION EXAMINATION, REHAB EVAL
in UE, difficult to assess LE ROM due to increased tone RLE and tone vs resistance LLE/no ROM deficits were identified

## 2019-02-27 NOTE — PHYSICAL THERAPY INITIAL EVALUATION ADULT - ADDITIONAL COMMENTS
Pt. has been independent and ambulating with a cane prior to 11/18, last ambulation 12/18, according to last PT notes from [facility] from end of 01/19- dangled, transfers and ambulation deferred due to poor sitting balance and AMS.

## 2019-02-27 NOTE — PHYSICAL THERAPY INITIAL EVALUATION ADULT - PERTINENT HX OF CURRENT PROBLEM, REHAB EVAL
Pt. is a 63 y.o female initially admitted to [facility] with AMS from baseline, pt subsequently developed status epilepticus , was intubated and transferred to [facility] MICU for further monitoring and management. 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 Pt. istill with residual expressive aphasia at baseline, A&O2 prior to most recent changes in her mental status prompting current

## 2019-02-27 NOTE — PHYSICAL THERAPY INITIAL EVALUATION ADULT - IMPAIRMENTS FOUND, PT EVAL
arousal, attention, and cognition/cognitive impairment/gait, locomotion, and balance/posture/muscle strength/aerobic capacity/endurance

## 2019-02-27 NOTE — PROGRESS NOTE ADULT - SUBJECTIVE AND OBJECTIVE BOX
INCOMPLETE      OVERNIGHT EVENTS:    SUBJECTIVE / INTERVAL HPI: Patient seen and examined at bedside.     VITAL SIGNS:  Vital Signs Last 24 Hrs  T(C): 37.4 (27 Feb 2019 14:10), Max: 37.6 (26 Feb 2019 22:34)  T(F): 99.3 (27 Feb 2019 14:10), Max: 99.7 (27 Feb 2019 09:11)  HR: 90 (27 Feb 2019 14:00) (78 - 108)  BP: 110/54 (27 Feb 2019 14:00) (96/46 - 152/68)  BP(mean): 67 (27 Feb 2019 14:00) (63 - 114)  RR: 24 (27 Feb 2019 14:00) (10 - 29)  SpO2: 95% (27 Feb 2019 14:00) (93% - 100%)    PHYSICAL EXAM:    General: WDWN  HEENT: NC/AT; PERRL, anicteric sclera; MMM  Neck: supple  Cardiovascular: +S1/S2; RRR  Respiratory: CTA B/L; no W/R/R  Gastrointestinal: soft, NT/ND; +BSx4  Extremities: WWP; no edema, clubbing or cyanosis  Vascular: 2+ radial, DP/PT pulses B/L  Neurological: AAOx3; no focal deficits    MEDICATIONS:  MEDICATIONS  (STANDING):  carvedilol 25 milliGRAM(s) Oral every 12 hours  cefTRIAXone   IVPB      cefTRIAXone   IVPB 1 Gram(s) IV Intermittent every 24 hours  chlorhexidine 2% Cloths 1 Application(s) Topical daily  dexmedetomidine Infusion 0.2 MICROgram(s)/kG/Hr (3.99 mL/Hr) IV Continuous <Continuous>  dextrose 5%. 1000 milliLiter(s) (50 mL/Hr) IV Continuous <Continuous>  dextrose 50% Injectable 12.5 Gram(s) IV Push once  heparin  Injectable 5000 Unit(s) SubCutaneous every 8 hours  insulin lispro (HumaLOG) corrective regimen sliding scale   SubCutaneous every 6 hours  lacosamide IVPB 200 milliGRAM(s) IV Intermittent every 12 hours  levETIRAcetam  IVPB 500 milliGRAM(s) IV Intermittent every 12 hours  valproate sodium IVPB 500 milliGRAM(s) IV Intermittent every 8 hours    MEDICATIONS  (PRN):  acetaminophen   Tablet .. 650 milliGRAM(s) Oral every 6 hours PRN Mild Pain (1 - 3)  dextrose 40% Gel 15 Gram(s) Oral once PRN Blood Glucose LESS THAN 70 milliGRAM(s)/deciliter  glucagon  Injectable 1 milliGRAM(s) IntraMuscular once PRN Glucose LESS THAN 70 milligrams/deciliter  LORazepam   Injectable 2 milliGRAM(s) IV Push once PRN for seizure > 2 minutes      ALLERGIES:  Allergies    No Known Allergies    Intolerances        LABS:                        8.0    4.19  )-----------( 151      ( 27 Feb 2019 06:11 )             25.1     02-27    142  |  106  |  13  ----------------------------<  112<H>  4.1   |  26  |  0.69    Ca    8.7      27 Feb 2019 06:11  Phos  4.2     02-27  Mg     2.0     02-27          CAPILLARY BLOOD GLUCOSE      POCT Blood Glucose.: 147 mg/dL (27 Feb 2019 10:03)      RADIOLOGY & ADDITIONAL TESTS: Reviewed. INCOMPLETE      OVERNIGHT EVENTS:   VIC    SUBJECTIVE / INTERVAL HPI: Patient seen and examined at bedside. Denied pain or discomfort, aphasic, but able to indicate yes or no. Unable to complete ROS.    VITAL SIGNS:  Vital Signs Last 24 Hrs  T(C): 37.4 (27 Feb 2019 14:10), Max: 37.6 (26 Feb 2019 22:34)  T(F): 99.3 (27 Feb 2019 14:10), Max: 99.7 (27 Feb 2019 09:11)  HR: 90 (27 Feb 2019 14:00) (78 - 108)  BP: 110/54 (27 Feb 2019 14:00) (96/46 - 152/68)  BP(mean): 67 (27 Feb 2019 14:00) (63 - 114)  RR: 24 (27 Feb 2019 14:00) (10 - 29)  SpO2: 95% (27 Feb 2019 14:00) (93% - 100%)    PHYSICAL EXAM:    General: WDWN, sleeping in bed, comfortable, NAD  HEENT: NC/AT; EOMI, PERRL, anicteric sclera; MMM  Cardiovascular: +S1/S2; RRR; distant heart sounds  Respiratory: CTA B/L; no W/R/R. Observed while asleep, minimal paradoxical breathing, decreased compared to yesterday  Gastrointestinal: soft, NT/ND; +BSx4  Extremities: WWP; no edema, clubbing or cyanosis  Vascular: 2+ radial, DP/PT pulses B/L  Neurological: did not cooperate fully with exam. Lifted L arm when asked. Moved both UE voluntarily. EOMI, PERRLA, tracks movement around bedside, responds to verbal stimuli.     MEDICATIONS:  MEDICATIONS  (STANDING):  carvedilol 25 milliGRAM(s) Oral every 12 hours  cefTRIAXone   IVPB      cefTRIAXone   IVPB 1 Gram(s) IV Intermittent every 24 hours  chlorhexidine 2% Cloths 1 Application(s) Topical daily  dexmedetomidine Infusion 0.2 MICROgram(s)/kG/Hr (3.99 mL/Hr) IV Continuous <Continuous>  dextrose 5%. 1000 milliLiter(s) (50 mL/Hr) IV Continuous <Continuous>  dextrose 50% Injectable 12.5 Gram(s) IV Push once  heparin  Injectable 5000 Unit(s) SubCutaneous every 8 hours  insulin lispro (HumaLOG) corrective regimen sliding scale   SubCutaneous every 6 hours  lacosamide IVPB 200 milliGRAM(s) IV Intermittent every 12 hours  levETIRAcetam  IVPB 500 milliGRAM(s) IV Intermittent every 12 hours  valproate sodium IVPB 500 milliGRAM(s) IV Intermittent every 8 hours    MEDICATIONS  (PRN):  acetaminophen   Tablet .. 650 milliGRAM(s) Oral every 6 hours PRN Mild Pain (1 - 3)  dextrose 40% Gel 15 Gram(s) Oral once PRN Blood Glucose LESS THAN 70 milliGRAM(s)/deciliter  glucagon  Injectable 1 milliGRAM(s) IntraMuscular once PRN Glucose LESS THAN 70 milligrams/deciliter  LORazepam   Injectable 2 milliGRAM(s) IV Push once PRN for seizure > 2 minutes      ALLERGIES:  Allergies    No Known Allergies    Intolerances        LABS:                        8.0    4.19  )-----------( 151      ( 27 Feb 2019 06:11 )             25.1     02-27    142  |  106  |  13  ----------------------------<  112<H>  4.1   |  26  |  0.69    Ca    8.7      27 Feb 2019 06:11  Phos  4.2     02-27  Mg     2.0     02-27          CAPILLARY BLOOD GLUCOSE      POCT Blood Glucose.: 147 mg/dL (27 Feb 2019 10:03)      RADIOLOGY & ADDITIONAL TESTS: Reviewed. INCOMPLETE      OVERNIGHT EVENTS:   VIC    SUBJECTIVE / INTERVAL HPI: Patient seen and examined at bedside. Denied pain or discomfort, aphasic, but able to indicate yes or no. Unable to complete ROS.    VITAL SIGNS:  Vital Signs Last 24 Hrs  T(C): 37.4 (27 Feb 2019 14:10), Max: 37.6 (26 Feb 2019 22:34)  T(F): 99.3 (27 Feb 2019 14:10), Max: 99.7 (27 Feb 2019 09:11)  HR: 90 (27 Feb 2019 14:00) (78 - 108)  BP: 110/54 (27 Feb 2019 14:00) (96/46 - 152/68)  BP(mean): 67 (27 Feb 2019 14:00) (63 - 114)  RR: 24 (27 Feb 2019 14:00) (10 - 29)  SpO2: 95% (27 Feb 2019 14:00) (93% - 100%)    PHYSICAL EXAM:    General: WDWN, sleeping in bed, comfortable, NAD  HEENT: NC/AT; EOMI, PERRL, anicteric sclera; MMM  Cardiovascular: +S1/S2; RRR; distant heart sounds  Respiratory: CTA B/L; no W/R/R. Observed while asleep, minimal paradoxical breathing, decreased compared to yesterday  Gastrointestinal: soft, NT/ND; +BSx4  Extremities: WWP; no edema, clubbing or cyanosis  Vascular: 2+ radial, DP/PT pulses B/L  Neurological: did not cooperate fully with exam. Lifted L arm when asked. Moved both UE voluntarily. EOMI, PERRLA, tracks movement around bedside, responds to verbal stimuli.   Psych: intermittently moaning    MEDICATIONS:  MEDICATIONS  (STANDING):  carvedilol 25 milliGRAM(s) Oral every 12 hours  cefTRIAXone   IVPB      cefTRIAXone   IVPB 1 Gram(s) IV Intermittent every 24 hours  chlorhexidine 2% Cloths 1 Application(s) Topical daily  dexmedetomidine Infusion 0.2 MICROgram(s)/kG/Hr (3.99 mL/Hr) IV Continuous <Continuous>  dextrose 5%. 1000 milliLiter(s) (50 mL/Hr) IV Continuous <Continuous>  dextrose 50% Injectable 12.5 Gram(s) IV Push once  heparin  Injectable 5000 Unit(s) SubCutaneous every 8 hours  insulin lispro (HumaLOG) corrective regimen sliding scale   SubCutaneous every 6 hours  lacosamide IVPB 200 milliGRAM(s) IV Intermittent every 12 hours  levETIRAcetam  IVPB 500 milliGRAM(s) IV Intermittent every 12 hours  valproate sodium IVPB 500 milliGRAM(s) IV Intermittent every 8 hours    MEDICATIONS  (PRN):  acetaminophen   Tablet .. 650 milliGRAM(s) Oral every 6 hours PRN Mild Pain (1 - 3)  dextrose 40% Gel 15 Gram(s) Oral once PRN Blood Glucose LESS THAN 70 milliGRAM(s)/deciliter  glucagon  Injectable 1 milliGRAM(s) IntraMuscular once PRN Glucose LESS THAN 70 milligrams/deciliter  LORazepam   Injectable 2 milliGRAM(s) IV Push once PRN for seizure > 2 minutes      ALLERGIES:  Allergies    No Known Allergies    Intolerances        LABS:                        8.0    4.19  )-----------( 151      ( 27 Feb 2019 06:11 )             25.1     02-27    142  |  106  |  13  ----------------------------<  112<H>  4.1   |  26  |  0.69    Ca    8.7      27 Feb 2019 06:11  Phos  4.2     02-27  Mg     2.0     02-27          CAPILLARY BLOOD GLUCOSE      POCT Blood Glucose.: 147 mg/dL (27 Feb 2019 10:03)      RADIOLOGY & ADDITIONAL TESTS: Reviewed.

## 2019-02-27 NOTE — PROGRESS NOTE ADULT - SUBJECTIVE AND OBJECTIVE BOX
Subjective  Patient seen and examined at bedside, and was able to mumble "I am ok". No reported seizures overnight.     ROS  Unobtainable 2/2 aphasia    MEDICATIONS  (STANDING):  carvedilol 25 milliGRAM(s) Oral every 12 hours  cefTRIAXone   IVPB      cefTRIAXone   IVPB 1 Gram(s) IV Intermittent every 24 hours  chlorhexidine 2% Cloths 1 Application(s) Topical daily  dextrose 5%. 1000 milliLiter(s) (50 mL/Hr) IV Continuous <Continuous>  dextrose 50% Injectable 12.5 Gram(s) IV Push once  heparin  Injectable 5000 Unit(s) SubCutaneous every 8 hours  insulin lispro (HumaLOG) corrective regimen sliding scale   SubCutaneous every 6 hours  lacosamide IVPB 200 milliGRAM(s) IV Intermittent every 12 hours  levETIRAcetam  IVPB 500 milliGRAM(s) IV Intermittent every 12 hours  valproate sodium IVPB 500 milliGRAM(s) IV Intermittent every 8 hours    MEDICATIONS  (PRN):  acetaminophen   Tablet .. 650 milliGRAM(s) Oral every 6 hours PRN Mild Pain (1 - 3)  dextrose 40% Gel 15 Gram(s) Oral once PRN Blood Glucose LESS THAN 70 milliGRAM(s)/deciliter  glucagon  Injectable 1 milliGRAM(s) IntraMuscular once PRN Glucose LESS THAN 70 milligrams/deciliter  LORazepam   Injectable 2 milliGRAM(s) IV Push once PRN for seizure > 2 minutes      T(C): 37.6 (02-27-19 @ 09:11), Max: 37.6 (02-26-19 @ 22:34)  HR: 94 (02-27-19 @ 11:00) (78 - 108)  BP: 102/55 (02-27-19 @ 11:00) (96/46 - 164/73)  RR: 25 (02-27-19 @ 11:00) (10 - 29)  SpO2: 96% (02-27-19 @ 11:00) (93% - 100%)  Wt(kg): --    Patient opens eyes to verbal stimuli, Expressive aphasia. Able to mumble "I am ok" and attempts to communicate but incomprehensible. Does not follow commands.  PERRLA 3mm brisk, blink to threat bilaterally.  Tracks bedside activity.   Moves left arm spontaneously and intermittently. Right arm weakness/mildly contracted, moves intermittently and spontaneously. Moves toes bilaterally spontaneously, and gross withdrawal to noxious stimuli.   No tremor or clonus.  Sensation unobtainable due to aphasia  Gait deferred      CBC Full  -  ( 27 Feb 2019 06:11 )  WBC Count : 4.19 K/uL  Hemoglobin : 8.0 g/dL  Hematocrit : 25.1 %  Platelet Count - Automated : 151 K/uL  Mean Cell Volume : 98.0 fl  Mean Cell Hemoglobin : 31.3 pg  Mean Cell Hemoglobin Concentration : 31.9 gm/dL  Auto Neutrophil # : x  Auto Lymphocyte # : x  Auto Monocyte # : x  Auto Eosinophil # : x  Auto Basophil # : x  Auto Neutrophil % : x  Auto Lymphocyte % : x  Auto Monocyte % : x  Auto Eosinophil % : x  Auto Basophil % : x    02-27    142  |  106  |  13  ----------------------------<  112<H>  4.1   |  26  |  0.69    Ca    8.7      27 Feb 2019 06:11  Phos  4.2     02-27  Mg     2.0     02-27 Subjective  Patient seen and examined at bedside, and was able to mumble "I am ok". No reported seizures overnight. Later in the afternoon, patient was tearful.     ROS  Unobtainable 2/2 aphasia    MEDICATIONS  (STANDING):  carvedilol 25 milliGRAM(s) Oral every 12 hours  cefTRIAXone   IVPB      cefTRIAXone   IVPB 1 Gram(s) IV Intermittent every 24 hours  chlorhexidine 2% Cloths 1 Application(s) Topical daily  dextrose 5%. 1000 milliLiter(s) (50 mL/Hr) IV Continuous <Continuous>  dextrose 50% Injectable 12.5 Gram(s) IV Push once  heparin  Injectable 5000 Unit(s) SubCutaneous every 8 hours  insulin lispro (HumaLOG) corrective regimen sliding scale   SubCutaneous every 6 hours  lacosamide IVPB 200 milliGRAM(s) IV Intermittent every 12 hours  levETIRAcetam  IVPB 500 milliGRAM(s) IV Intermittent every 12 hours  valproate sodium IVPB 500 milliGRAM(s) IV Intermittent every 8 hours    MEDICATIONS  (PRN):  acetaminophen   Tablet .. 650 milliGRAM(s) Oral every 6 hours PRN Mild Pain (1 - 3)  dextrose 40% Gel 15 Gram(s) Oral once PRN Blood Glucose LESS THAN 70 milliGRAM(s)/deciliter  glucagon  Injectable 1 milliGRAM(s) IntraMuscular once PRN Glucose LESS THAN 70 milligrams/deciliter  LORazepam   Injectable 2 milliGRAM(s) IV Push once PRN for seizure > 2 minutes      T(C): 37.6 (02-27-19 @ 09:11), Max: 37.6 (02-26-19 @ 22:34)  HR: 94 (02-27-19 @ 11:00) (78 - 108)  BP: 102/55 (02-27-19 @ 11:00) (96/46 - 164/73)  RR: 25 (02-27-19 @ 11:00) (10 - 29)  SpO2: 96% (02-27-19 @ 11:00) (93% - 100%)  Wt(kg): --    Patient opens eyes to verbal stimuli, Expressive aphasia. Able to mumble "I am ok" and attempts to communicate but incomprehensible. Does not follow commands.  PERRLA 3mm brisk, blink to threat bilaterally.  Tracks bedside activity.   Moves left arm spontaneously and intermittently. Right arm weakness/mildly contracted, moves intermittently and spontaneously. Moves toes bilaterally spontaneously, and gross withdrawal to noxious stimuli.   No tremor or clonus.  Sensation unobtainable due to aphasia  Gait deferred      CBC Full  -  ( 27 Feb 2019 06:11 )  WBC Count : 4.19 K/uL  Hemoglobin : 8.0 g/dL  Hematocrit : 25.1 %  Platelet Count - Automated : 151 K/uL  Mean Cell Volume : 98.0 fl  Mean Cell Hemoglobin : 31.3 pg  Mean Cell Hemoglobin Concentration : 31.9 gm/dL  Auto Neutrophil # : x  Auto Lymphocyte # : x  Auto Monocyte # : x  Auto Eosinophil # : x  Auto Basophil # : x  Auto Neutrophil % : x  Auto Lymphocyte % : x  Auto Monocyte % : x  Auto Eosinophil % : x  Auto Basophil % : x    02-27    142  |  106  |  13  ----------------------------<  112<H>  4.1   |  26  |  0.69    Ca    8.7      27 Feb 2019 06:11  Phos  4.2     02-27  Mg     2.0     02-27

## 2019-02-28 DIAGNOSIS — F32.9 MAJOR DEPRESSIVE DISORDER, SINGLE EPISODE, UNSPECIFIED: ICD-10-CM

## 2019-02-28 DIAGNOSIS — N39.0 URINARY TRACT INFECTION, SITE NOT SPECIFIED: ICD-10-CM

## 2019-02-28 DIAGNOSIS — Z91.89 OTHER SPECIFIED PERSONAL RISK FACTORS, NOT ELSEWHERE CLASSIFIED: ICD-10-CM

## 2019-02-28 DIAGNOSIS — G40.901 EPILEPSY, UNSPECIFIED, NOT INTRACTABLE, WITH STATUS EPILEPTICUS: ICD-10-CM

## 2019-02-28 DIAGNOSIS — Z93.1 GASTROSTOMY STATUS: ICD-10-CM

## 2019-02-28 DIAGNOSIS — I10 ESSENTIAL (PRIMARY) HYPERTENSION: ICD-10-CM

## 2019-02-28 DIAGNOSIS — N17.9 ACUTE KIDNEY FAILURE, UNSPECIFIED: ICD-10-CM

## 2019-02-28 DIAGNOSIS — I25.10 ATHEROSCLEROTIC HEART DISEASE OF NATIVE CORONARY ARTERY WITHOUT ANGINA PECTORIS: ICD-10-CM

## 2019-02-28 DIAGNOSIS — E11.9 TYPE 2 DIABETES MELLITUS WITHOUT COMPLICATIONS: ICD-10-CM

## 2019-02-28 DIAGNOSIS — Z29.9 ENCOUNTER FOR PROPHYLACTIC MEASURES, UNSPECIFIED: ICD-10-CM

## 2019-02-28 LAB
-  AMPICILLIN/SULBACTAM: SIGNIFICANT CHANGE UP
-  CEFAZOLIN: SIGNIFICANT CHANGE UP
-  CLINDAMYCIN: SIGNIFICANT CHANGE UP
-  ERYTHROMYCIN: SIGNIFICANT CHANGE UP
-  GENTAMICIN: SIGNIFICANT CHANGE UP
-  OXACILLIN: SIGNIFICANT CHANGE UP
-  PENICILLIN: SIGNIFICANT CHANGE UP
-  RIFAMPIN: SIGNIFICANT CHANGE UP
-  TETRACYCLINE: SIGNIFICANT CHANGE UP
-  TRIMETHOPRIM/SULFAMETHOXAZOLE: SIGNIFICANT CHANGE UP
-  VANCOMYCIN: SIGNIFICANT CHANGE UP
AMMONIA BLD-MCNC: <10 UMOL/L — LOW (ref 11–55)
ANION GAP SERPL CALC-SCNC: 11 MMOL/L — SIGNIFICANT CHANGE UP (ref 5–17)
BUN SERPL-MCNC: 13 MG/DL — SIGNIFICANT CHANGE UP (ref 7–23)
CALCIUM SERPL-MCNC: 8.8 MG/DL — SIGNIFICANT CHANGE UP (ref 8.4–10.5)
CHLORIDE SERPL-SCNC: 103 MMOL/L — SIGNIFICANT CHANGE UP (ref 96–108)
CO2 SERPL-SCNC: 26 MMOL/L — SIGNIFICANT CHANGE UP (ref 22–31)
CREAT SERPL-MCNC: 0.68 MG/DL — SIGNIFICANT CHANGE UP (ref 0.5–1.3)
CULTURE RESULTS: SIGNIFICANT CHANGE UP
GLUCOSE BLDC GLUCOMTR-MCNC: 105 MG/DL — HIGH (ref 70–99)
GLUCOSE BLDC GLUCOMTR-MCNC: 129 MG/DL — HIGH (ref 70–99)
GLUCOSE BLDC GLUCOMTR-MCNC: 130 MG/DL — HIGH (ref 70–99)
GLUCOSE BLDC GLUCOMTR-MCNC: 144 MG/DL — HIGH (ref 70–99)
GLUCOSE SERPL-MCNC: 127 MG/DL — HIGH (ref 70–99)
HCT VFR BLD CALC: 24 % — LOW (ref 34.5–45)
HGB BLD-MCNC: 7.6 G/DL — LOW (ref 11.5–15.5)
MAGNESIUM SERPL-MCNC: 2.2 MG/DL — SIGNIFICANT CHANGE UP (ref 1.6–2.6)
MCHC RBC-ENTMCNC: 30.8 PG — SIGNIFICANT CHANGE UP (ref 27–34)
MCHC RBC-ENTMCNC: 31.7 GM/DL — LOW (ref 32–36)
MCV RBC AUTO: 97.2 FL — SIGNIFICANT CHANGE UP (ref 80–100)
METHOD TYPE: SIGNIFICANT CHANGE UP
NRBC # BLD: 0 /100 WBCS — SIGNIFICANT CHANGE UP (ref 0–0)
ORGANISM # SPEC MICROSCOPIC CNT: SIGNIFICANT CHANGE UP
ORGANISM # SPEC MICROSCOPIC CNT: SIGNIFICANT CHANGE UP
PHOSPHATE SERPL-MCNC: 3.9 MG/DL — SIGNIFICANT CHANGE UP (ref 2.5–4.5)
PLATELET # BLD AUTO: 114 K/UL — LOW (ref 150–400)
POTASSIUM SERPL-MCNC: 4 MMOL/L — SIGNIFICANT CHANGE UP (ref 3.5–5.3)
POTASSIUM SERPL-SCNC: 4 MMOL/L — SIGNIFICANT CHANGE UP (ref 3.5–5.3)
RBC # BLD: 2.47 M/UL — LOW (ref 3.8–5.2)
RBC # FLD: 14.7 % — HIGH (ref 10.3–14.5)
SODIUM SERPL-SCNC: 140 MMOL/L — SIGNIFICANT CHANGE UP (ref 135–145)
SPECIMEN SOURCE: SIGNIFICANT CHANGE UP
VALPROATE SERPL-MCNC: 79.5 UG/ML — SIGNIFICANT CHANGE UP (ref 50–100)
WBC # BLD: 4.87 K/UL — SIGNIFICANT CHANGE UP (ref 3.8–10.5)
WBC # FLD AUTO: 4.87 K/UL — SIGNIFICANT CHANGE UP (ref 3.8–10.5)

## 2019-02-28 PROCEDURE — 99233 SBSQ HOSP IP/OBS HIGH 50: CPT | Mod: GC

## 2019-02-28 PROCEDURE — 71045 X-RAY EXAM CHEST 1 VIEW: CPT | Mod: 26

## 2019-02-28 PROCEDURE — 99232 SBSQ HOSP IP/OBS MODERATE 35: CPT

## 2019-02-28 PROCEDURE — 95951: CPT | Mod: 26

## 2019-02-28 RX ORDER — AMITRIPTYLINE HCL 25 MG
50 TABLET ORAL DAILY
Qty: 0 | Refills: 0 | Status: DISCONTINUED | OUTPATIENT
Start: 2019-02-28 | End: 2019-03-01

## 2019-02-28 RX ORDER — LANOLIN ALCOHOL/MO/W.PET/CERES
5 CREAM (GRAM) TOPICAL AT BEDTIME
Qty: 0 | Refills: 0 | Status: DISCONTINUED | OUTPATIENT
Start: 2019-02-28 | End: 2019-03-04

## 2019-02-28 RX ORDER — BACLOFEN 100 %
10 POWDER (GRAM) MISCELLANEOUS DAILY
Qty: 0 | Refills: 0 | Status: DISCONTINUED | OUTPATIENT
Start: 2019-02-28 | End: 2019-03-01

## 2019-02-28 RX ADMIN — CEFTRIAXONE 100 GRAM(S): 500 INJECTION, POWDER, FOR SOLUTION INTRAMUSCULAR; INTRAVENOUS at 03:20

## 2019-02-28 RX ADMIN — HEPARIN SODIUM 5000 UNIT(S): 5000 INJECTION INTRAVENOUS; SUBCUTANEOUS at 11:20

## 2019-02-28 RX ADMIN — OLANZAPINE 5 MILLIGRAM(S): 15 TABLET, FILM COATED ORAL at 06:08

## 2019-02-28 RX ADMIN — Medication 650 MILLIGRAM(S): at 14:54

## 2019-02-28 RX ADMIN — LEVETIRACETAM 400 MILLIGRAM(S): 250 TABLET, FILM COATED ORAL at 17:49

## 2019-02-28 RX ADMIN — LACOSAMIDE 140 MILLIGRAM(S): 50 TABLET ORAL at 11:20

## 2019-02-28 RX ADMIN — Medication 27.5 MILLIGRAM(S): at 06:08

## 2019-02-28 RX ADMIN — DEXMEDETOMIDINE HYDROCHLORIDE IN 0.9% SODIUM CHLORIDE 7.98 MICROGRAM(S)/KG/HR: 4 INJECTION INTRAVENOUS at 07:06

## 2019-02-28 RX ADMIN — Medication 650 MILLIGRAM(S): at 20:36

## 2019-02-28 RX ADMIN — Medication 50 MILLIGRAM(S): at 11:20

## 2019-02-28 RX ADMIN — CARVEDILOL PHOSPHATE 25 MILLIGRAM(S): 80 CAPSULE, EXTENDED RELEASE ORAL at 07:06

## 2019-02-28 RX ADMIN — Medication 27.5 MILLIGRAM(S): at 14:27

## 2019-02-28 RX ADMIN — LEVETIRACETAM 400 MILLIGRAM(S): 250 TABLET, FILM COATED ORAL at 06:09

## 2019-02-28 RX ADMIN — Medication 650 MILLIGRAM(S): at 21:05

## 2019-02-28 RX ADMIN — HEPARIN SODIUM 5000 UNIT(S): 5000 INJECTION INTRAVENOUS; SUBCUTANEOUS at 17:49

## 2019-02-28 RX ADMIN — HEPARIN SODIUM 5000 UNIT(S): 5000 INJECTION INTRAVENOUS; SUBCUTANEOUS at 03:20

## 2019-02-28 RX ADMIN — Medication 10 MILLIGRAM(S): at 11:20

## 2019-02-28 RX ADMIN — CHLORHEXIDINE GLUCONATE 1 APPLICATION(S): 213 SOLUTION TOPICAL at 06:09

## 2019-02-28 RX ADMIN — OLANZAPINE 5 MILLIGRAM(S): 15 TABLET, FILM COATED ORAL at 17:49

## 2019-02-28 RX ADMIN — CARVEDILOL PHOSPHATE 25 MILLIGRAM(S): 80 CAPSULE, EXTENDED RELEASE ORAL at 17:49

## 2019-02-28 RX ADMIN — Medication 27.5 MILLIGRAM(S): at 21:32

## 2019-02-28 RX ADMIN — Medication 5 MILLIGRAM(S): at 22:55

## 2019-02-28 NOTE — PROGRESS NOTE ADULT - PROBLEM SELECTOR PLAN 1
#Status Epilepticus now resolved   - s/p extubation 2/26, now comfortable on RA  - no clear inciting event though has history of temporal hemorrhagic infarct in 11/18 and MCA ischemic infarct in 12/18  - vEEG shows only rare Rt>Lt temporal sharp wave activity and intermittent delta wave slowing on Lt temporal, no seizures  - c/w vEEEG per Dr. Eng  - c/w keppra 2g BID  - c/w vimpat 200 mg BID  - c/w depakote 500mg q8 level, daily levels  - brain MRI when patient is stepped down to regional medicine and off monitors  - IV lorazepam 1mg only for convulsive seizures >1min. #Status Epilepticus now resolved   - s/p extubation 2/26, now comfortable on RA  - no clear inciting event though has history of temporal hemorrhagic infarct in 11/18 and MCA ischemic infarct in 12/18  - vEEG shows only rare Rt>Lt temporal sharp wave activity and intermittent delta wave slowing on Lt temporal, no seizures  - c/w vEEEG per Dr. Eng  - c/w keppra 2g BID  - c/w vimpat 200 mg BID  - IV lorazepam 1mg only for convulsive seizures >1min.  - c/w depakote 500mg q8 level, daily levels  -F/u 8pm Depakote level on 3/1 before 9pm dose   - brain MRI #Status Epilepticus now resolved   - s/p extubation 2/25, now comfortable on RA  - no clear inciting event though has history of temporal hemorrhagic infarct in 11/18 and MCA ischemic infarct in 12/18  - vEEG shows only rare Rt>Lt temporal sharp wave activity and intermittent delta wave slowing on Lt temporal, no seizures  - c/w vEEEG per Dr. Eng  - c/w keppra 2g BID  - c/w vimpat 200 mg BID  - IV lorazepam 1mg only for convulsive seizures >1min.  - c/w depakote 500mg q8 level, daily levels  -F/u 8pm Depakote level on 3/1 before 9pm dose   - brain MRI Status Epilepticus now resolved   - s/p extubation 2/25, now comfortable on RA  - no clear inciting event though has history of temporal hemorrhagic infarct in 11/18 and MCA ischemic infarct in 12/18  - vEEG shows only rare Rt>Lt temporal sharp wave activity and intermittent delta wave slowing on Lt temporal, no seizures  - c/w vEEEG per Dr. Eng  - c/w keppra 2g BID  - c/w vimpat 200 mg BID  - IV lorazepam 1mg only for convulsive seizures >1min.  - c/w depakote 500mg q8 level, daily levels  -F/u 8pm Depakote level on 3/1 before 9pm dose   - brain MRI

## 2019-02-28 NOTE — PROGRESS NOTE ADULT - PROBLEM SELECTOR PLAN 8
-C/w PEG feeds -Concern for delirium given waxing waning baseline.   Restarted on home olanzapine and elavil

## 2019-02-28 NOTE — PROGRESS NOTE ADULT - ASSESSMENT
63F with hx of HTN, DM2, CAD, CVA x2 (left sided hemorrhagic, followed one month later by left sided ischemic CVA) with residual seizure disorder, aphasia and dysphagia s/p PEG who presents with "staring into space", inattentiveness and increased lethargy x 1 day    NEURO  #Status Epilepticus   - s/p extubation  - no clear inciting event though has history of temporal hemorrhagic infarct in 11/18 and MCA ischemic infarct in 12/18  - vEEG shows only rare Rt>Lt temporal sharp wave activity and intermittent delta wave slowing on Lt temporal, no seizures  - c/w vEEEG per Dr. Eng  - c/w keppra 2g BID  - c/w vimpat 200 mg BID  - c/w depakote 500mg q8 level, daily levels  - brain MRI when patient is stepped down to regional medicine and off monitors  - IV lorazepam 1mg only for convulsive seizures >1min.    Respiratory  - Extubated AM 2/25, comfortable now on 2L NC    Cardiology  Hemodynamically stable not requiring any pressor support    #r/o DVT  - LE doppler negative for thrombosis     #CAD s/p stents   -on home coreg  -currently holding, home Lipitor, home ASA  -hold home losartan in the setting of YAZ    GI  #PEG Dependent  - tube feeds    RENAL  #YAZ - now resolved  -unclear etiology; don't think it is 2/2 dehydration as specific gravity is low on UA   -hold ARB     #UTI  -patient with (+) UA on admission, with intermittent fevers, Tmax 101.2F  - Ucx 2/23 grew E. coli susceptible to ceftriaxone  -s/p CTX at [facility]  -no leukocytosis, completely hemodynamically stable so less suspicion for urosepsis contributing to patient's AMS picture  -d/c vancomycin, coag neg staph in 2/23 Bcx, one bottle thought to be contaminant. Repeat Bcx 2/24 pre-vanco show NGTD  - D/c ceftriaxone after 6am dose 2/28 for a total of 5d antibiotic treatment.    ENDO  #DMT2  -SSI on tube feeds    PSYCH  #depression  -hold home olanzapine and elavil in setting of recent seizures, both lower threshold  -there is a concern re delirium so will ask neurology if they object to restarting olanzapine. Failing that to consider precedex trial      #FEN  -tube feeds  -HSQ for DVT ppx  Lines: IJ placed on 2/24  -fall precautions 63F with hx of HTN, DM2, CAD, CVA x2 (left sided hemorrhagic, followed one month later by left sided ischemic CVA) with residual seizure disorder, aphasia and dysphagia s/p PEG who presents with "staring into space", inattentiveness and increased lethargy x 1 day    NEURO  #Status Epilepticus   - s/p extubation  - no clear inciting event though has history of temporal hemorrhagic infarct in 11/18 and MCA ischemic infarct in 12/18  - vEEG shows only rare Rt>Lt temporal sharp wave activity and intermittent delta wave slowing on Lt temporal, no seizures  - c/w vEEEG per Dr. Eng  - c/w keppra 2g BID  - c/w vimpat 200 mg BID  - c/w depakote 500mg q8 level, daily levels  - brain MRI when patient is stepped down to [facility] medicine and off monitors  - IV lorazepam 1mg only for convulsive seizures >1min.  - Switch anti-epileptics to PO     Respiratory  - Extubated AM 2/25, comfortable now on 2L NC    Cardiology  Hemodynamically stable not requiring any pressor support    #r/o DVT  - LE doppler negative for thrombosis     #CAD s/p stents   -on home coreg  -currently holding, home Lipitor, home ASA  -hold home losartan in the setting of YAZ    GI  #PEG Dependent  - tube feeds    RENAL  #YAZ - now resolved  -unclear etiology; don't think it is 2/2 dehydration as specific gravity is low on UA   -hold ARB     #UTI  -patient with (+) UA on admission, with intermittent fevers, Tmax 101.2F  - Ucx 2/23 grew E. coli susceptible to ceftriaxone  -s/p CTX at [place]  -no leukocytosis, completely hemodynamically stable so less suspicion for urosepsis contributing to patient's AMS picture  -d/c vancomycin, coag neg staph in 2/23 Bcx, one bottle thought to be contaminant. Repeat Bcx 2/24 pre-vanco show NGTD  - D/c ceftriaxone after 6am dose 2/28 for a total of 5d antibiotic treatment.    ENDO  #DMT2  -SSI on tube feeds    PSYCH  #depression  -Concern for delirium. Restarted on home olanzapine last night, will also restart home elavil and d/c precedex due to no improvement in condition while on it.      #FEN  -tube feeds  -HSQ for DVT ppx  Lines: IJ placed on 2/24  -fall precautions

## 2019-02-28 NOTE — PROGRESS NOTE ADULT - PROBLEM SELECTOR PLAN 6
S/p stents   -on home coreg  -currently holding, home Lipitor, home ASA  -hold home losartan in the setting of YAZ S/p stents   -on home coreg  -currently  home Lipitor, home ASA  -hold home losartan in the setting of YAZ S/p stents   -on home coreg  -C/w Lipitor, home ASA #DMT2  -SSI on tube feeds

## 2019-02-28 NOTE — PROGRESS NOTE ADULT - ASSESSMENT
Mrs Renteria is a 64 yo woman with post-spine surgery Left temporal hemorrhage from unknown specific cause 11/2018, then decline 12/2018 with question of etiology, seizure, PRES, infarct or combination, but discharged back to rehabilitation late 01/2019, but a decline in functioning in the past 1-2 days, leading to a clinical diagnosis of status epilepticus, a recurrent problem dealt with at [facility].  She appears to have been discharged on depakote/valproate through PEG 250mg bid, in addition to vimpat/lacosamide 200mg bid, though on admission to [facility], the valpraote dose was 500mg tid.  Levetiracetam also given in their hospital.  She was intubated for airway protection at [facility] and transferred to [facility] by EICU due to being the only available ICU bed in the system with vEEG capability.  CTH on /23 showed encephalomalacia in the left temporoparietal region due to recent hemorrhagic stroke. vEEG for the first 2 days showed rare Rt>Lt temporal sharp wave activity, no seizures, and the past 2 days with artifact, but again no evidence of seizure activity. Patient's current medical condition includes UTI positive bacteremia, and on ceftriaxone for management, chronic anemia?, depression (tearful), and unknown infectious process from 1 recent episode of fever 101.     Plan:  1) Continue VPA/valproic acid, IV at 500mg q8h   2) Continue lacosamide/vimpat at 200mg bid  3) Continue levetiracetam/keppra at 500mg bid.    4) Please obtain ammonia and Depakote level to rule out depakote toxicity  5) Recommend psychiatry evaluation for depression  6) IV lorazepam 1mg only for convulsive seizures >1min.  7) MRI brain w/o contrast  8) Neurological assessments Q8hrs  9) Maintain Seizure & Fall precautions

## 2019-02-28 NOTE — PROGRESS NOTE ADULT - SUBJECTIVE AND OBJECTIVE BOX
PGY1 Transfer Acceptance Note: MICU to [facility]    Hospital Course:  Patient is a 63 year old woman transferred to [facility] ED from [facility] for video EEG monitoring after being intubated in [place] for status epilepticus. At [place] patient was also found to have a UTI and was started on an antibiotic, ceftriaxone. Patient has a recent history of a post-operative intracranial hemorrhage in November 2018 and an ischemic stroke in Decmber 2018 which caused aphasia, seizures, and required the placement of a PEG for tube feeds. On arrival to [facility] patient had normal blood pressure and heart rate, She was treated with depakote, keppra, and vimpat for seizure management, and was continued on ceftriaxone for the UTI. Urine culture collected on admission grew E. coli susceptible to ceftriaxone. On 2/25 the patient was extubated and transitioned to nasal oxygen. Tube feeds were restarted on Monday as well. During her stay in the MICU patient's mental status waxed and waned. At times she was disoriented and moaning. By 2/27 she was responding to voice, following some directions, and tracking movement around her bedside, she was also able to tell the neurologist "I am OK." At this time the patient's respiratory status is stable and she is breathing comfortably with good blood oxygenation on nasal cannula. She is hemodynamically stable and ready for step down to a regional medical floor.    Interval: Pt examined and interviewed on floors. Currently inattentive, moaning, does not appear to be in pain or distress. No ROS possible.         VITAL SIGNS:  T(F): 98.4 (02-28-19 @ 21:02)  HR: 91 (02-28-19 @ 21:02)  BP: 126/72 (02-28-19 @ 21:02)  RR: 18 (02-28-19 @ 21:02)  SpO2: 94% (02-28-19 @ 21:02)  Wt(kg): --    PHYSICAL EXAM:    Constitutional: WDWN, NAD  HEENT: PERRL, EOMI, sclera non-icteric, neck supple, trachea midline, no masses, no JVD, MMM, good dentition  Respiratory: CTA b/l, good air entry b/l, no wheezing, no rhonchi, no rales, without accessory muscle use and no intercostal retractions  Cardiovascular: RRR, normal S1S2, no M/R/G  Gastrointestinal: soft, NTND, no masses palpable, BS normal  Extremities: Warm, well perfused, pulses equal bilateral upper and lower extremities, no edema, no clubbing  Neurological: AAOx3, CN Grossly intact  Skin: Normal temperature, warm, dry    MEDICATIONS  (STANDING):  amitriptyline 50 milliGRAM(s) Oral daily  baclofen 10 milliGRAM(s) Oral daily  carvedilol 25 milliGRAM(s) Oral every 12 hours  chlorhexidine 2% Cloths 1 Application(s) Topical daily  dextrose 5%. 1000 milliLiter(s) (50 mL/Hr) IV Continuous <Continuous>  dextrose 50% Injectable 12.5 Gram(s) IV Push once  heparin  Injectable 5000 Unit(s) SubCutaneous every 8 hours  insulin lispro (HumaLOG) corrective regimen sliding scale   SubCutaneous every 6 hours  lacosamide IVPB 200 milliGRAM(s) IV Intermittent every 12 hours  levETIRAcetam  IVPB 500 milliGRAM(s) IV Intermittent every 12 hours  melatonin 5 milliGRAM(s) Oral at bedtime  OLANZapine 5 milliGRAM(s) Oral two times a day  valproate sodium IVPB 500 milliGRAM(s) IV Intermittent every 8 hours    MEDICATIONS  (PRN):  acetaminophen   Tablet .. 650 milliGRAM(s) Oral every 6 hours PRN Mild Pain (1 - 3)  dextrose 40% Gel 15 Gram(s) Oral once PRN Blood Glucose LESS THAN 70 milliGRAM(s)/deciliter  glucagon  Injectable 1 milliGRAM(s) IntraMuscular once PRN Glucose LESS THAN 70 milligrams/deciliter  LORazepam   Injectable 2 milliGRAM(s) IV Push once PRN for seizure > 2 minutes      Allergies    No Known Allergies    Intolerances        LABS:                        7.6    4.87  )-----------( 114      ( 28 Feb 2019 06:08 )             24.0     02-28    140  |  103  |  13  ----------------------------<  127<H>  4.0   |  26  |  0.68    Ca    8.8      28 Feb 2019 06:08  Phos  3.9     02-28  Mg     2.2     02-28        Urinalysis Basic - ( 27 Feb 2019 19:49 )    Color: Yellow / Appearance: SL Cloudy / SG: >=1.030 / pH: x  Gluc: x / Ketone: NEGATIVE  / Bili: Negative / Urobili: 0.2 E.U./dL   Blood: x / Protein: 30 mg/dL / Nitrite: NEGATIVE   Leuk Esterase: NEGATIVE / RBC: < 5 /HPF / WBC < 5 /HPF   Sq Epi: x / Non Sq Epi: Moderate /HPF / Bacteria: Present /HPF        RADIOLOGY & ADDITIONAL TESTS:  Reviewed PGY1 Transfer Acceptance Note: MICU to [facility]    Hospital Course:  Patient is a 63 year old woman transferred to [facility] ED from [facility] for video EEG monitoring after being intubated in [place] for status epilepticus. At [place] patient was also found to have a UTI and was started on an antibiotic, ceftriaxone. Patient has a recent history of a post-operative intracranial hemorrhage in November 2018 and an ischemic stroke in Decmber 2018 which caused aphasia, seizures, and required the placement of a PEG for tube feeds. On arrival to [facility] patient had normal blood pressure and heart rate, She was treated with depakote, keppra, and vimpat for seizure management, and was continued on ceftriaxone for the UTI. Urine culture collected on admission grew E. coli susceptible to ceftriaxone. On 2/25 the patient was extubated and transitioned to nasal oxygen. Tube feeds were restarted on Monday as well. During her stay in the MICU patient's mental status waxed and waned. At times she was disoriented and moaning. By 2/27 she was responding to voice, following some directions, and tracking movement around her bedside, she was also able to tell the neurologist "I am OK." At this time the patient's respiratory status is stable and she is breathing comfortably with good blood oxygenation on nasal cannula. She is hemodynamically stable and ready for step down to a regional medical floor.    Interval: Pt examined and interviewed on floors. Currently inattentive, moaning, does not appear to be in pain or distress. No ROS possible.         VITAL SIGNS:  T(F): 98.4 (02-28-19 @ 21:02)  HR: 91 (02-28-19 @ 21:02)  BP: 126/72 (02-28-19 @ 21:02)  RR: 18 (02-28-19 @ 21:02)  SpO2: 94% (02-28-19 @ 21:02)  Wt(kg): --    PHYSICAL EXAM:    General: WDWN, NAD, eyes closed, moaning loudly in bed, currently not responding to voice, withdraws to pain   HEENT: NC/AT; PERRL, MMM, EOMI  Cardiovascular: +S1/S2; RRR  Respiratory: CTA B/L; no W/R/R  Gastrointestinal: soft, NT/ND; +BSx4  Extremities: WWP; no edema, clubbing or cyanosis  Vascular: 2+ radial, DP/PT pulses B/L  Neurological: Patient is aphasic at baseline.  Moveing all four extremities spontaneously.    MEDICATIONS  (STANDING):  amitriptyline 50 milliGRAM(s) Oral daily  baclofen 10 milliGRAM(s) Oral daily  carvedilol 25 milliGRAM(s) Oral every 12 hours  chlorhexidine 2% Cloths 1 Application(s) Topical daily  dextrose 5%. 1000 milliLiter(s) (50 mL/Hr) IV Continuous <Continuous>  dextrose 50% Injectable 12.5 Gram(s) IV Push once  heparin  Injectable 5000 Unit(s) SubCutaneous every 8 hours  insulin lispro (HumaLOG) corrective regimen sliding scale   SubCutaneous every 6 hours  lacosamide IVPB 200 milliGRAM(s) IV Intermittent every 12 hours  levETIRAcetam  IVPB 500 milliGRAM(s) IV Intermittent every 12 hours  melatonin 5 milliGRAM(s) Oral at bedtime  OLANZapine 5 milliGRAM(s) Oral two times a day  valproate sodium IVPB 500 milliGRAM(s) IV Intermittent every 8 hours    MEDICATIONS  (PRN):  acetaminophen   Tablet .. 650 milliGRAM(s) Oral every 6 hours PRN Mild Pain (1 - 3)  dextrose 40% Gel 15 Gram(s) Oral once PRN Blood Glucose LESS THAN 70 milliGRAM(s)/deciliter  glucagon  Injectable 1 milliGRAM(s) IntraMuscular once PRN Glucose LESS THAN 70 milligrams/deciliter  LORazepam   Injectable 2 milliGRAM(s) IV Push once PRN for seizure > 2 minutes      Allergies    No Known Allergies    Intolerances        LABS:                        7.6    4.87  )-----------( 114      ( 28 Feb 2019 06:08 )             24.0     02-28    140  |  103  |  13  ----------------------------<  127<H>  4.0   |  26  |  0.68    Ca    8.8      28 Feb 2019 06:08  Phos  3.9     02-28  Mg     2.2     02-28        Urinalysis Basic - ( 27 Feb 2019 19:49 )    Color: Yellow / Appearance: SL Cloudy / SG: >=1.030 / pH: x  Gluc: x / Ketone: NEGATIVE  / Bili: Negative / Urobili: 0.2 E.U./dL   Blood: x / Protein: 30 mg/dL / Nitrite: NEGATIVE   Leuk Esterase: NEGATIVE / RBC: < 5 /HPF / WBC < 5 /HPF   Sq Epi: x / Non Sq Epi: Moderate /HPF / Bacteria: Present /HPF        RADIOLOGY & ADDITIONAL TESTS:  Reviewed PGY1 Transfer Acceptance Note: MICU to [facility]    Hospital Course:  63F PMHx of HTN, DM II, HLD, CAD (hx of two stents), s/p T10 laminectomy and fusion on 11/27/18 with postoperative Wernicke's aphasia due to left temporal lobar ICH of undetermined etiology, left MCA ischemic infarct 12/2018 with deficits of aphasia, PEG placement and seizure disorder. Pt initially presented to [facility] for episodes of starting off into space, was intubated for status epilepticus and  transferred to [facility] for vEEG and MRI.   Patient is a 63 year old woman transferred to [facility] ED from [facility] for video EEG monitoring after being intubated in [place] for status epilepticus. At [place] patient was also found to have a UTI and was started on an antibiotic, ceftriaxone. On arrival to [facility] patient had normal blood pressure and heart rate, She was treated with depakote, keppra, and vimpat for seizure management, and was continued on ceftriaxone for the UTI. Urine culture collected on admission grew E. coli susceptible to ceftriaxone. On 2/25 the patient was extubated and transitioned to nasal oxygen. Tube feeds were restarted on Monday as well. During her stay in the MICU patient's mental status waxed and waned. At times she was disoriented and moaning. By 2/27 she was responding to voice, following some directions, and tracking movement around her bedside, she was also able to tell the neurologist "I am OK." vEEG shows only rare Rt>Lt temporal sharp wave activity and intermittent delta wave slowing on Lt temporal, no seizures. She is hemodynamically stable and ready for step down to a regional medical floor. Dispo pending MRI.     Interval: Pt examined and interviewed on floors. Currently inattentive, moaning, does not appear to be in pain or distress. No ROS possible.         VITAL SIGNS:  T(F): 98.4 (02-28-19 @ 21:02)  HR: 91 (02-28-19 @ 21:02)  BP: 126/72 (02-28-19 @ 21:02)  RR: 18 (02-28-19 @ 21:02)  SpO2: 94% (02-28-19 @ 21:02)  Wt(kg): --    PHYSICAL EXAM:    General: WDWN, NAD, eyes closed, moaning loudly in bed, currently not responding to voice, withdraws to pain   HEENT: NC/AT; PERRL, MMM, EOMI  Cardiovascular: +S1/S2; RRR  Respiratory: CTA B/L; no W/R/R  Gastrointestinal: soft, NT/ND; +BSx4  Extremities: WWP; no edema, clubbing or cyanosis  Vascular: 2+ radial, DP/PT pulses B/L  Neurological: Patient is aphasic at baseline.  Moveing all four extremities spontaneously.    MEDICATIONS  (STANDING):  amitriptyline 50 milliGRAM(s) Oral daily  baclofen 10 milliGRAM(s) Oral daily  carvedilol 25 milliGRAM(s) Oral every 12 hours  chlorhexidine 2% Cloths 1 Application(s) Topical daily  dextrose 5%. 1000 milliLiter(s) (50 mL/Hr) IV Continuous <Continuous>  dextrose 50% Injectable 12.5 Gram(s) IV Push once  heparin  Injectable 5000 Unit(s) SubCutaneous every 8 hours  insulin lispro (HumaLOG) corrective regimen sliding scale   SubCutaneous every 6 hours  lacosamide IVPB 200 milliGRAM(s) IV Intermittent every 12 hours  levETIRAcetam  IVPB 500 milliGRAM(s) IV Intermittent every 12 hours  melatonin 5 milliGRAM(s) Oral at bedtime  OLANZapine 5 milliGRAM(s) Oral two times a day  valproate sodium IVPB 500 milliGRAM(s) IV Intermittent every 8 hours    MEDICATIONS  (PRN):  acetaminophen   Tablet .. 650 milliGRAM(s) Oral every 6 hours PRN Mild Pain (1 - 3)  dextrose 40% Gel 15 Gram(s) Oral once PRN Blood Glucose LESS THAN 70 milliGRAM(s)/deciliter  glucagon  Injectable 1 milliGRAM(s) IntraMuscular once PRN Glucose LESS THAN 70 milligrams/deciliter  LORazepam   Injectable 2 milliGRAM(s) IV Push once PRN for seizure > 2 minutes      Allergies    No Known Allergies    Intolerances        LABS:                        7.6    4.87  )-----------( 114      ( 28 Feb 2019 06:08 )             24.0     02-28    140  |  103  |  13  ----------------------------<  127<H>  4.0   |  26  |  0.68    Ca    8.8      28 Feb 2019 06:08  Phos  3.9     02-28  Mg     2.2     02-28        Urinalysis Basic - ( 27 Feb 2019 19:49 )    Color: Yellow / Appearance: SL Cloudy / SG: >=1.030 / pH: x  Gluc: x / Ketone: NEGATIVE  / Bili: Negative / Urobili: 0.2 E.U./dL   Blood: x / Protein: 30 mg/dL / Nitrite: NEGATIVE   Leuk Esterase: NEGATIVE / RBC: < 5 /HPF / WBC < 5 /HPF   Sq Epi: x / Non Sq Epi: Moderate /HPF / Bacteria: Present /HPF        RADIOLOGY & ADDITIONAL TESTS:  Reviewed PGY1 Transfer Acceptance Note: MICU to [facility]    Hospital Course:  63F PMHx of HTN, DM II, HLD, CAD (hx of two stents), s/p T10 laminectomy and fusion on 11/27/18 with postoperative Wernicke's aphasia due to left temporal lobar ICH of undetermined etiology, left MCA ischemic infarct 12/2018 with residual deficits of aphasia, PEG placement and seizure disorder. Pt initially presented to [place] for episodes of starting off into space, where was intubated for status epilepticus and  transferred to [facility] for vEEG and MRI.   At [place] patient was also found to have a UTI and was started on an antibiotic, ceftriaxone. On arrival to [facility] patient had normal blood pressure and heart rate, She was treated with depakote, keppra, and vimpat for seizure management, and was continued on ceftriaxone for the UTI. Urine culture collected on admission grew E. coli susceptible to ceftriaxone. On 2/25 the patient was extubated and transitioned to nasal oxygen. Tube feeds were restarted on Monday as well. During her stay in the MICU patient's mental status waxed and waned. At times she was disoriented and moaning. By 2/27 she was responding to voice, following some directions, and tracking movement around her bedside, she was also able to tell the neurologist "I am OK." vEEG shows only rare Rt>Lt temporal sharp wave activity and intermittent delta wave slowing on Lt temporal, no seizures. She is hemodynamically stable and ready for step down to a regional medical floor. Dispo pending MRI.     Interval: Pt examined and interviewed on floors. Currently inattentive, moaning, does not appear to be in pain or distress. No ROS possible.     VITAL SIGNS:  T(F): 98.4 (02-28-19 @ 21:02)  HR: 91 (02-28-19 @ 21:02)  BP: 126/72 (02-28-19 @ 21:02)  RR: 18 (02-28-19 @ 21:02)  SpO2: 94% (02-28-19 @ 21:02)  Wt(kg): --    PHYSICAL EXAM:    General: WDWN, NAD, eyes closed, moaning loudly in bed, currently not responding to voice, withdraws to pain   HEENT: NC/AT; PERRL, MMM, EOMI  Cardiovascular: +S1/S2; RRR  Respiratory: CTA B/L; no W/R/R  Gastrointestinal: soft, NT/ND; +BSx4, PEG site NTTP, no erythema/discharge  Extremities: WWP; no edema, clubbing or cyanosis  Vascular: 2+ radial, DP/PT pulses B/L  Neurological: GUSTAVO, Patient is aphasic at baseline.  Moves all four extremities spontaneously.    MEDICATIONS  (STANDING):  amitriptyline 50 milliGRAM(s) Oral daily  baclofen 10 milliGRAM(s) Oral daily  carvedilol 25 milliGRAM(s) Oral every 12 hours  chlorhexidine 2% Cloths 1 Application(s) Topical daily  dextrose 5%. 1000 milliLiter(s) (50 mL/Hr) IV Continuous <Continuous>  dextrose 50% Injectable 12.5 Gram(s) IV Push once  heparin  Injectable 5000 Unit(s) SubCutaneous every 8 hours  insulin lispro (HumaLOG) corrective regimen sliding scale   SubCutaneous every 6 hours  lacosamide IVPB 200 milliGRAM(s) IV Intermittent every 12 hours  levETIRAcetam  IVPB 500 milliGRAM(s) IV Intermittent every 12 hours  melatonin 5 milliGRAM(s) Oral at bedtime  OLANZapine 5 milliGRAM(s) Oral two times a day  valproate sodium IVPB 500 milliGRAM(s) IV Intermittent every 8 hours    MEDICATIONS  (PRN):  acetaminophen   Tablet .. 650 milliGRAM(s) Oral every 6 hours PRN Mild Pain (1 - 3)  dextrose 40% Gel 15 Gram(s) Oral once PRN Blood Glucose LESS THAN 70 milliGRAM(s)/deciliter  glucagon  Injectable 1 milliGRAM(s) IntraMuscular once PRN Glucose LESS THAN 70 milligrams/deciliter  LORazepam   Injectable 2 milliGRAM(s) IV Push once PRN for seizure > 2 minutes      Allergies    No Known Allergies    Intolerances        LABS:                        7.6    4.87  )-----------( 114      ( 28 Feb 2019 06:08 )             24.0     02-28    140  |  103  |  13  ----------------------------<  127<H>  4.0   |  26  |  0.68    Ca    8.8      28 Feb 2019 06:08  Phos  3.9     02-28  Mg     2.2     02-28        Urinalysis Basic - ( 27 Feb 2019 19:49 )    Color: Yellow / Appearance: SL Cloudy / SG: >=1.030 / pH: x  Gluc: x / Ketone: NEGATIVE  / Bili: Negative / Urobili: 0.2 E.U./dL   Blood: x / Protein: 30 mg/dL / Nitrite: NEGATIVE   Leuk Esterase: NEGATIVE / RBC: < 5 /HPF / WBC < 5 /HPF   Sq Epi: x / Non Sq Epi: Moderate /HPF / Bacteria: Present /HPF        RADIOLOGY & ADDITIONAL TESTS:  Reviewed

## 2019-02-28 NOTE — PROGRESS NOTE ADULT - PROBLEM SELECTOR PLAN 9
#FEN  -tube feeds  -HSQ for DVT ppx  Lines: IJ placed on 2/24  -fall precautions -tube feeds  -HSQ for DVT ppx  Lines: IJ placed on 2/24 now removed   -fall precautions

## 2019-02-28 NOTE — PROGRESS NOTE ADULT - PROBLEM SELECTOR PLAN 3
-Concern for delirium given waxing waning baseline.   Restarted on home olanzapine and  elavil -Concern for delirium given waxing waning baseline.   Restarted on home olanzapine and elavil -Concern for delirium given waxing waning baseline.   Restarted on home olanzapine and elavil    Anemia  -Slow downtrend in Hb this admission (Baseline 9-10s). Less likely acute bleeding so restarted on ASA, c/w HSQ  -F/u iron studies in AM    #Thrombocytopenia   -Unknown etio. Will trend platelets for now #YAZ - now resolved  -Cre 1.2 at admission (baseline 0.60s) unclear etiology; don't think it is 2/2 dehydration as specific gravity is low on UA    #Anemia  -Slow downtrend in Hb this admission (Baseline 9-10s). Less likely acute bleeding so restarted on ASA, c/w HSQ  -F/u iron studies in AM    #Thrombocytopenia   -Unknown etio. Will trend platelets for now

## 2019-02-28 NOTE — PROGRESS NOTE ADULT - SUBJECTIVE AND OBJECTIVE BOX
Subjective  Patient seen and examined at bedside, and was sedated on Precedex.  at bedside and concerns addressed. Patient was febrile (101) yesterday afternoon, and pancultured. No reported seizures overnight as per the nurse.     ROS  Unobtainable 2/2 sedation     MEDICATIONS  (STANDING):  amitriptyline 50 milliGRAM(s) Oral daily  baclofen 10 milliGRAM(s) Oral daily  carvedilol 25 milliGRAM(s) Oral every 12 hours  chlorhexidine 2% Cloths 1 Application(s) Topical daily  dextrose 5%. 1000 milliLiter(s) (50 mL/Hr) IV Continuous <Continuous>  dextrose 50% Injectable 12.5 Gram(s) IV Push once  heparin  Injectable 5000 Unit(s) SubCutaneous every 8 hours  insulin lispro (HumaLOG) corrective regimen sliding scale   SubCutaneous every 6 hours  lacosamide IVPB 200 milliGRAM(s) IV Intermittent every 12 hours  levETIRAcetam  IVPB 500 milliGRAM(s) IV Intermittent every 12 hours  OLANZapine 5 milliGRAM(s) Oral two times a day  valproate sodium IVPB 500 milliGRAM(s) IV Intermittent every 8 hours    MEDICATIONS  (PRN):  acetaminophen   Tablet .. 650 milliGRAM(s) Oral every 6 hours PRN Mild Pain (1 - 3)  dextrose 40% Gel 15 Gram(s) Oral once PRN Blood Glucose LESS THAN 70 milliGRAM(s)/deciliter  glucagon  Injectable 1 milliGRAM(s) IntraMuscular once PRN Glucose LESS THAN 70 milligrams/deciliter  LORazepam   Injectable 2 milliGRAM(s) IV Push once PRN for seizure > 2 minutes      T(C): 37.7 (02-28-19 @ 14:00), Max: 38.3 (02-27-19 @ 15:00)  HR: 98 (02-28-19 @ 13:00) (70 - 102)  BP: 131/60 (02-28-19 @ 13:00) (85/40 - 161/73)  RR: 23 (02-28-19 @ 13:00) (16 - 25)  SpO2: 94% (02-28-19 @ 13:00) (93% - 99%)  Wt(kg): --    Sedated, but manages to opens eyes to verbal stimuli for brief periods. No verbal output today Does not follow commands.  PERRLA 3mm brisk, blink to threat bilaterally.    Moves left arm spontaneously and intermittently. Right arm weakness/mildly contracted. Bilateral LE toes wiggle to noxious stimuli.   No tremor or clonus.  Sensation unobtainable due to aphasia  Gait deferred    CBC Full  -  ( 28 Feb 2019 06:08 )  WBC Count : 4.87 K/uL  Hemoglobin : 7.6 g/dL  Hematocrit : 24.0 %  Platelet Count - Automated : 114 K/uL  Mean Cell Volume : 97.2 fl  Mean Cell Hemoglobin : 30.8 pg  Mean Cell Hemoglobin Concentration : 31.7 gm/dL  Auto Neutrophil # : x  Auto Lymphocyte # : x  Auto Monocyte # : x  Auto Eosinophil # : x  Auto Basophil # : x  Auto Neutrophil % : x  Auto Lymphocyte % : x  Auto Monocyte % : x  Auto Eosinophil % : x  Auto Basophil % : x    02-28    140  |  103  |  13  ----------------------------<  127<H>  4.0   |  26  |  0.68    Ca    8.8      28 Feb 2019 06:08  Phos  3.9     02-28  Mg     2.2     02-28

## 2019-02-28 NOTE — PROGRESS NOTE ADULT - SUBJECTIVE AND OBJECTIVE BOX
Transfer Note MICU to [facility]    Hospital Course:      OVERNIGHT EVENTS:    SUBJECTIVE / INTERVAL HPI: Patient seen and examined at bedside.     VITAL SIGNS:  Vital Signs Last 24 Hrs  T(C): 37.7 (28 Feb 2019 10:00), Max: 38.3 (27 Feb 2019 15:00)  T(F): 99.8 (28 Feb 2019 10:00), Max: 101 (27 Feb 2019 15:00)  HR: 96 (28 Feb 2019 12:00) (70 - 102)  BP: 108/59 (28 Feb 2019 12:00) (85/40 - 161/73)  BP(mean): 86 (28 Feb 2019 12:00) (52 - 109)  RR: 21 (28 Feb 2019 12:00) (16 - 25)  SpO2: 96% (28 Feb 2019 12:00) (93% - 99%)    PHYSICAL EXAM:    General: WDWN  HEENT: NC/AT; PERRL, anicteric sclera; MMM  Neck: supple  Cardiovascular: +S1/S2; RRR  Respiratory: CTA B/L; no W/R/R  Gastrointestinal: soft, NT/ND; +BSx4  Extremities: WWP; no edema, clubbing or cyanosis  Vascular: 2+ radial, DP/PT pulses B/L  Neurological: AAOx3; no focal deficits    MEDICATIONS:  MEDICATIONS  (STANDING):  amitriptyline 50 milliGRAM(s) Oral daily  baclofen 10 milliGRAM(s) Oral daily  carvedilol 25 milliGRAM(s) Oral every 12 hours  chlorhexidine 2% Cloths 1 Application(s) Topical daily  dexmedetomidine Infusion 0.4 MICROgram(s)/kG/Hr (7.98 mL/Hr) IV Continuous <Continuous>  dextrose 5%. 1000 milliLiter(s) (50 mL/Hr) IV Continuous <Continuous>  dextrose 50% Injectable 12.5 Gram(s) IV Push once  heparin  Injectable 5000 Unit(s) SubCutaneous every 8 hours  insulin lispro (HumaLOG) corrective regimen sliding scale   SubCutaneous every 6 hours  lacosamide IVPB 200 milliGRAM(s) IV Intermittent every 12 hours  levETIRAcetam  IVPB 500 milliGRAM(s) IV Intermittent every 12 hours  OLANZapine 5 milliGRAM(s) Oral two times a day  valproate sodium IVPB 500 milliGRAM(s) IV Intermittent every 8 hours    MEDICATIONS  (PRN):  acetaminophen   Tablet .. 650 milliGRAM(s) Oral every 6 hours PRN Mild Pain (1 - 3)  dextrose 40% Gel 15 Gram(s) Oral once PRN Blood Glucose LESS THAN 70 milliGRAM(s)/deciliter  glucagon  Injectable 1 milliGRAM(s) IntraMuscular once PRN Glucose LESS THAN 70 milligrams/deciliter  LORazepam   Injectable 2 milliGRAM(s) IV Push once PRN for seizure > 2 minutes      ALLERGIES:  Allergies    No Known Allergies    Intolerances        LABS:                        7.6    4.87  )-----------( 114      ( 28 Feb 2019 06:08 )             24.0     02-28    140  |  103  |  13  ----------------------------<  127<H>  4.0   |  26  |  0.68    Ca    8.8      28 Feb 2019 06:08  Phos  3.9     02-28  Mg     2.2     02-28        Urinalysis Basic - ( 27 Feb 2019 19:49 )    Color: Yellow / Appearance: SL Cloudy / SG: >=1.030 / pH: x  Gluc: x / Ketone: NEGATIVE  / Bili: Negative / Urobili: 0.2 E.U./dL   Blood: x / Protein: 30 mg/dL / Nitrite: NEGATIVE   Leuk Esterase: NEGATIVE / RBC: < 5 /HPF / WBC < 5 /HPF   Sq Epi: x / Non Sq Epi: Moderate /HPF / Bacteria: Present /HPF      CAPILLARY BLOOD GLUCOSE      POCT Blood Glucose.: 129 mg/dL (28 Feb 2019 11:01)      RADIOLOGY & ADDITIONAL TESTS: Reviewed. Transfer Note MICU to [facility]    Hospital Course:  Patient is a 63 year old woman transferred to [facility] ED from [facility] for video EEG monitoring after being intubated in [place] for status epilepticus. At [place] patient was also found to have a UTI and was started on an antibiotic, ceftriaxone.Patient has a recent history of a spost-operative intracranial hemorrhage in November 2018 and an ischemic stroke in Decmber 2018 which caused aphasia, seizures, and required the placement of a PEG for tube feeds. On arrival to [facility] patient had normal blood pressure and heart rate, She was treated with depakote, keppra, and vimpat for seizure management, and was continued on ceftriaxone for the UTI. Urine culture collected on admission grew E. coli susceptible to ceftriaxone. On 2/25 the patient was extubated and transitioned to nasal oxygen. Tube feeds were restarted on Monday as well. During her stay in the MICU patient's mental status waxed and waned. At times she was disoriented and moaning. By 2/27 she was responding to voice, following some directions, and tracking movement around her bedside, she was also able to tell the neurologist "I am OK." At this time the patient's respiratory status is stable and she is breathing comfortably with good blood oxygenation on nasal cannula. She is hemodynamically stable and ready for step down to a regional medical floor.    OVERNIGHT EVENTS:  VIC    SUBJECTIVE / INTERVAL HPI: Patient seen and examined at bedside. Patient was intermittently agitated on rounds, but denied any pain. Aphasic and unable to complete ROS.    VITAL SIGNS:  Vital Signs Last 24 Hrs  T(C): 37.7 (28 Feb 2019 10:00), Max: 38.3 (27 Feb 2019 15:00)  T(F): 99.8 (28 Feb 2019 10:00), Max: 101 (27 Feb 2019 15:00)  HR: 96 (28 Feb 2019 12:00) (70 - 102)  BP: 108/59 (28 Feb 2019 12:00) (85/40 - 161/73)  BP(mean): 86 (28 Feb 2019 12:00) (52 - 109)  RR: 21 (28 Feb 2019 12:00) (16 - 25)  SpO2: 96% (28 Feb 2019 12:00) (93% - 99%)    PHYSICAL EXAM:    General: WDWN, NAD, resting comfortably in bed, intermittently agitated, but denies any pain  HEENT: NC/AT; PERRL, MMM, EOMI  Cardiovascular: +S1/S2; RRR  Respiratory: CTA B/L; no W/R/R  Gastrointestinal: soft, NT/ND; +BSx4  Extremities: WWP; no edema, clubbing or cyanosis  Vascular: 2+ radial, DP/PT pulses B/L  Neurological: Patient is aphasic at baseline. Responds to verbal stimuli, tracks movement around bedside, responds to questions by indicating yes or no. Attempts to follow some commands:  her left arm when asked and wiggled her fingers. Moves all four extremities spontaneously.    MEDICATIONS:  MEDICATIONS  (STANDING):  amitriptyline 50 milliGRAM(s) Oral daily  baclofen 10 milliGRAM(s) Oral daily  carvedilol 25 milliGRAM(s) Oral every 12 hours  chlorhexidine 2% Cloths 1 Application(s) Topical daily  dexmedetomidine Infusion 0.4 MICROgram(s)/kG/Hr (7.98 mL/Hr) IV Continuous <Continuous>  dextrose 5%. 1000 milliLiter(s) (50 mL/Hr) IV Continuous <Continuous>  dextrose 50% Injectable 12.5 Gram(s) IV Push once  heparin  Injectable 5000 Unit(s) SubCutaneous every 8 hours  insulin lispro (HumaLOG) corrective regimen sliding scale   SubCutaneous every 6 hours  lacosamide IVPB 200 milliGRAM(s) IV Intermittent every 12 hours  levETIRAcetam  IVPB 500 milliGRAM(s) IV Intermittent every 12 hours  OLANZapine 5 milliGRAM(s) Oral two times a day  valproate sodium IVPB 500 milliGRAM(s) IV Intermittent every 8 hours    MEDICATIONS  (PRN):  acetaminophen   Tablet .. 650 milliGRAM(s) Oral every 6 hours PRN Mild Pain (1 - 3)  dextrose 40% Gel 15 Gram(s) Oral once PRN Blood Glucose LESS THAN 70 milliGRAM(s)/deciliter  glucagon  Injectable 1 milliGRAM(s) IntraMuscular once PRN Glucose LESS THAN 70 milligrams/deciliter  LORazepam   Injectable 2 milliGRAM(s) IV Push once PRN for seizure > 2 minutes      ALLERGIES:  Allergies    No Known Allergies    Intolerances        LABS:                        7.6    4.87  )-----------( 114      ( 28 Feb 2019 06:08 )             24.0     02-28    140  |  103  |  13  ----------------------------<  127<H>  4.0   |  26  |  0.68    Ca    8.8      28 Feb 2019 06:08  Phos  3.9     02-28  Mg     2.2     02-28        Urinalysis Basic - ( 27 Feb 2019 19:49 )    Color: Yellow / Appearance: SL Cloudy / SG: >=1.030 / pH: x  Gluc: x / Ketone: NEGATIVE  / Bili: Negative / Urobili: 0.2 E.U./dL   Blood: x / Protein: 30 mg/dL / Nitrite: NEGATIVE   Leuk Esterase: NEGATIVE / RBC: < 5 /HPF / WBC < 5 /HPF   Sq Epi: x / Non Sq Epi: Moderate /HPF / Bacteria: Present /HPF      CAPILLARY BLOOD GLUCOSE      POCT Blood Glucose.: 129 mg/dL (28 Feb 2019 11:01)      RADIOLOGY & ADDITIONAL TESTS: Reviewed.

## 2019-02-28 NOTE — PROGRESS NOTE ADULT - PROBLEM SELECTOR PLAN 2
#UTI  -patient with (+) UA on admission, with intermittent fevers, Tmax 101.2F  - Ucx 2/23 grew E. coli susceptible to ceftriaxone  -s/p CTX at [place]  -no leukocytosis, completely hemodynamically stable so less suspicion for urosepsis contributing to patient's AMS picture  -d/c vancomycin, coag neg staph in 2/23 Bcx, one bottle thought to be contaminant. Repeat Bcx 2/24 pre-vanco show NGTD  - D/c ceftriaxone after 6am dose 2/28 for a total of 5d antibiotic treatment. Patient with (+) UA on admission, with intermittent fevers, Tmax 101.2F  - Ucx 2/23 grew E. coli susceptible to ceftriaxone  -s/p CTX at [place]  -no leukocytosis, completely hemodynamically stable so less suspicion for urosepsis contributing to patient's AMS picture  -d/c vancomycin, coag neg staph in 2/23 Bcx, one bottle thought to be contaminant. Repeat Bcx 2/24 pre-vanco show NGTD  - D/c ceftriaxone after 6am dose 2/28 for a total of 5d antibiotic treatment. Patient with (+) UA on admission, with intermittent fevers, Tmax 101.2F  - Ucx 2/23 grew E. coli susceptible to ceftriaxone  -s/p CTX at [place]    - D/c ceftriaxone after 6am dose 2/28 for a total of 5d antibiotic treatment.  #Contaminated Blood Culture   coag neg staph in 2/23 Bcx (s/p vanco now d/c'd), likely contaminant. Repeat Bcx 2/24 pre-vanco show NGTD

## 2019-02-28 NOTE — PROGRESS NOTE ADULT - PROBLEM SELECTOR PLAN 4
#YAZ - now resolved  -unclear etiology; don't think it is 2/2 dehydration as specific gravity is low on UA   -hold ARB #YAZ - now resolved  -Cre 1.2 at admission (baseline 0.60s) unclear etiology; don't think it is 2/2 dehydration as specific gravity is low on UA Restarted on home Losartan 100mg qd

## 2019-02-28 NOTE — PROGRESS NOTE ADULT - ASSESSMENT
63F with hx of HTN, DM2, CAD, CVA x2 (left sided hemorrhagic, followed one month later by left sided ischemic CVA) with residual seizure disorder, aphasia and dysphagia s/p PEG who presents with "staring into space", inattentiveness and increased lethargy x 1 day. Intubated at OSH for status epilepticus and transferred to [facility] for vEEG and brain MRI.                         #FEN  -tube feeds  -HSQ for DVT ppx  Lines: IJ placed on 2/24  -fall precautions 63F with hx of HTN, DM2, CAD, CVA x2 (left sided hemorrhagic, followed one month later by left sided ischemic CVA) with residual seizure disorder, aphasia and dysphagia s/p PEG who presents with "staring into space", inattentiveness and increased lethargy x 1 day. Intubated at OSH for status epilepticus and transferred to [facility] MICU for vEEG. Now stable for RMF, no seizure activity recorded, dispo pending completion of vEEG and brain MRI.

## 2019-02-28 NOTE — PROGRESS NOTE ADULT - PROBLEM SELECTOR PLAN 5
Hold home losartan in the setting of YAZ now resolved. Restarted on home Losartan 100mg qd S/p stents   -on home coreg  -C/w Lipitor, home ASA

## 2019-03-01 DIAGNOSIS — R41.82 ALTERED MENTAL STATUS, UNSPECIFIED: ICD-10-CM

## 2019-03-01 DIAGNOSIS — Z51.5 ENCOUNTER FOR PALLIATIVE CARE: ICD-10-CM

## 2019-03-01 LAB
ALT FLD-CCNC: 11 U/L — SIGNIFICANT CHANGE UP (ref 10–45)
ANION GAP SERPL CALC-SCNC: 12 MMOL/L — SIGNIFICANT CHANGE UP (ref 5–17)
APPEARANCE UR: CLEAR — SIGNIFICANT CHANGE UP
AST SERPL-CCNC: 21 U/L — SIGNIFICANT CHANGE UP (ref 10–40)
BASE EXCESS BLDA CALC-SCNC: 7.2 MMOL/L — HIGH (ref -2–3)
BILIRUB UR-MCNC: NEGATIVE — SIGNIFICANT CHANGE UP
BLD GP AB SCN SERPL QL: NEGATIVE — SIGNIFICANT CHANGE UP
BUN SERPL-MCNC: 14 MG/DL — SIGNIFICANT CHANGE UP (ref 7–23)
CALCIUM SERPL-MCNC: 8.6 MG/DL — SIGNIFICANT CHANGE UP (ref 8.4–10.5)
CHLORIDE SERPL-SCNC: 106 MMOL/L — SIGNIFICANT CHANGE UP (ref 96–108)
CO2 SERPL-SCNC: 25 MMOL/L — SIGNIFICANT CHANGE UP (ref 22–31)
COLOR SPEC: YELLOW — SIGNIFICANT CHANGE UP
CREAT ?TM UR-MCNC: 91 MG/DL — SIGNIFICANT CHANGE UP
CREAT SERPL-MCNC: 0.68 MG/DL — SIGNIFICANT CHANGE UP (ref 0.5–1.3)
CULTURE RESULTS: SIGNIFICANT CHANGE UP
DIFF PNL FLD: ABNORMAL
FLUAV H1 2009 PAND RNA SPEC QL NAA+PROBE: DETECTED
FLUAV SUBTYP SPEC NAA+PROBE: ABNORMAL
GLUCOSE BLDC GLUCOMTR-MCNC: 104 MG/DL — HIGH (ref 70–99)
GLUCOSE BLDC GLUCOMTR-MCNC: 110 MG/DL — HIGH (ref 70–99)
GLUCOSE BLDC GLUCOMTR-MCNC: 86 MG/DL — SIGNIFICANT CHANGE UP (ref 70–99)
GLUCOSE SERPL-MCNC: 118 MG/DL — HIGH (ref 70–99)
GLUCOSE UR QL: NEGATIVE — SIGNIFICANT CHANGE UP
HCO3 BLDA-SCNC: 27 MMOL/L — SIGNIFICANT CHANGE UP (ref 21–28)
HCOV PNL SPEC NAA+PROBE: DETECTED
HCT VFR BLD CALC: 27.2 % — LOW (ref 34.5–45)
HGB BLD-MCNC: 8.3 G/DL — LOW (ref 11.5–15.5)
IRON SATN MFR SERPL: 18 % — SIGNIFICANT CHANGE UP (ref 14–50)
IRON SATN MFR SERPL: 41 UG/DL — SIGNIFICANT CHANGE UP (ref 30–160)
KETONES UR-MCNC: NEGATIVE — SIGNIFICANT CHANGE UP
LACTATE SERPL-SCNC: 2 MMOL/L — SIGNIFICANT CHANGE UP (ref 0.5–2)
LEUKOCYTE ESTERASE UR-ACNC: NEGATIVE — SIGNIFICANT CHANGE UP
MAGNESIUM SERPL-MCNC: 2 MG/DL — SIGNIFICANT CHANGE UP (ref 1.6–2.6)
MCHC RBC-ENTMCNC: 30.5 GM/DL — LOW (ref 32–36)
MCHC RBC-ENTMCNC: 30.5 PG — SIGNIFICANT CHANGE UP (ref 27–34)
MCV RBC AUTO: 100 FL — SIGNIFICANT CHANGE UP (ref 80–100)
NITRITE UR-MCNC: NEGATIVE — SIGNIFICANT CHANGE UP
NRBC # BLD: 0 /100 WBCS — SIGNIFICANT CHANGE UP (ref 0–0)
OSMOLALITY UR: 653 MOSMOL/KG — HIGH (ref 100–650)
PCO2 BLDA: 27 MMHG — LOW (ref 32–45)
PH BLDA: 7.62 — CRITICAL HIGH (ref 7.35–7.45)
PH UR: 6 — SIGNIFICANT CHANGE UP (ref 5–8)
PLATELET # BLD AUTO: 107 K/UL — LOW (ref 150–400)
PO2 BLDA: 138 MMHG — HIGH (ref 83–108)
POTASSIUM SERPL-MCNC: 4.1 MMOL/L — SIGNIFICANT CHANGE UP (ref 3.5–5.3)
POTASSIUM SERPL-SCNC: 4.1 MMOL/L — SIGNIFICANT CHANGE UP (ref 3.5–5.3)
PROT UR-MCNC: 30 MG/DL
RAPID RVP RESULT: DETECTED
RBC # BLD: 2.72 M/UL — LOW (ref 3.8–5.2)
RBC # BLD: 2.72 M/UL — LOW (ref 3.8–5.2)
RBC # FLD: 15.1 % — HIGH (ref 10.3–14.5)
RETICS #: 54.9 K/UL — SIGNIFICANT CHANGE UP (ref 25–125)
RETICS/RBC NFR: 2 % — SIGNIFICANT CHANGE UP (ref 0.5–2.5)
RH IG SCN BLD-IMP: POSITIVE — SIGNIFICANT CHANGE UP
SAO2 % BLDA: 99 % — SIGNIFICANT CHANGE UP (ref 95–100)
SODIUM SERPL-SCNC: 143 MMOL/L — SIGNIFICANT CHANGE UP (ref 135–145)
SODIUM UR-SCNC: 134 MMOL/L — SIGNIFICANT CHANGE UP
SP GR SPEC: 1.01 — SIGNIFICANT CHANGE UP (ref 1–1.03)
SPECIMEN SOURCE: SIGNIFICANT CHANGE UP
TIBC SERPL-MCNC: 227 UG/DL — SIGNIFICANT CHANGE UP (ref 220–430)
TRANSFERRIN SERPL-MCNC: 182 MG/DL — LOW (ref 200–360)
UIBC SERPL-MCNC: 186 UG/DL — SIGNIFICANT CHANGE UP (ref 110–370)
UROBILINOGEN FLD QL: 0.2 E.U./DL — SIGNIFICANT CHANGE UP
VALPROATE SERPL-MCNC: 81.9 UG/ML — SIGNIFICANT CHANGE UP (ref 50–100)
WBC # BLD: 3.66 K/UL — LOW (ref 3.8–10.5)
WBC # FLD AUTO: 3.66 K/UL — LOW (ref 3.8–10.5)

## 2019-03-01 PROCEDURE — 99222 1ST HOSP IP/OBS MODERATE 55: CPT

## 2019-03-01 PROCEDURE — 99232 SBSQ HOSP IP/OBS MODERATE 35: CPT

## 2019-03-01 PROCEDURE — 99233 SBSQ HOSP IP/OBS HIGH 50: CPT | Mod: GC

## 2019-03-01 PROCEDURE — 71045 X-RAY EXAM CHEST 1 VIEW: CPT | Mod: 26

## 2019-03-01 PROCEDURE — 71045 X-RAY EXAM CHEST 1 VIEW: CPT | Mod: 26,77

## 2019-03-01 PROCEDURE — 95951: CPT | Mod: 26

## 2019-03-01 PROCEDURE — 70450 CT HEAD/BRAIN W/O DYE: CPT | Mod: 26

## 2019-03-01 PROCEDURE — 99233 SBSQ HOSP IP/OBS HIGH 50: CPT

## 2019-03-01 RX ORDER — PIPERACILLIN AND TAZOBACTAM 4; .5 G/20ML; G/20ML
INJECTION, POWDER, LYOPHILIZED, FOR SOLUTION INTRAVENOUS
Qty: 0 | Refills: 0 | Status: DISCONTINUED | OUTPATIENT
Start: 2019-03-01 | End: 2019-03-01

## 2019-03-01 RX ORDER — ACETAMINOPHEN 500 MG
650 TABLET ORAL ONCE
Qty: 0 | Refills: 0 | Status: COMPLETED | OUTPATIENT
Start: 2019-03-01 | End: 2019-03-01

## 2019-03-01 RX ORDER — SODIUM CHLORIDE 9 MG/ML
500 INJECTION INTRAMUSCULAR; INTRAVENOUS; SUBCUTANEOUS ONCE
Qty: 0 | Refills: 0 | Status: DISCONTINUED | OUTPATIENT
Start: 2019-03-01 | End: 2019-03-01

## 2019-03-01 RX ORDER — ASPIRIN/CALCIUM CARB/MAGNESIUM 324 MG
81 TABLET ORAL DAILY
Qty: 0 | Refills: 0 | Status: DISCONTINUED | OUTPATIENT
Start: 2019-03-01 | End: 2019-03-04

## 2019-03-01 RX ORDER — PIPERACILLIN AND TAZOBACTAM 4; .5 G/20ML; G/20ML
4.5 INJECTION, POWDER, LYOPHILIZED, FOR SOLUTION INTRAVENOUS EVERY 6 HOURS
Qty: 0 | Refills: 0 | Status: DISCONTINUED | OUTPATIENT
Start: 2019-03-01 | End: 2019-03-01

## 2019-03-01 RX ORDER — VANCOMYCIN HCL 1 G
1250 VIAL (EA) INTRAVENOUS EVERY 12 HOURS
Qty: 0 | Refills: 0 | Status: DISCONTINUED | OUTPATIENT
Start: 2019-03-01 | End: 2019-03-01

## 2019-03-01 RX ORDER — ACETAMINOPHEN 500 MG
650 TABLET ORAL EVERY 6 HOURS
Qty: 0 | Refills: 0 | Status: DISCONTINUED | OUTPATIENT
Start: 2019-03-01 | End: 2019-03-04

## 2019-03-01 RX ORDER — LACOSAMIDE 50 MG/1
200 TABLET ORAL EVERY 12 HOURS
Qty: 0 | Refills: 0 | Status: DISCONTINUED | OUTPATIENT
Start: 2019-03-01 | End: 2019-03-04

## 2019-03-01 RX ORDER — ATORVASTATIN CALCIUM 80 MG/1
80 TABLET, FILM COATED ORAL AT BEDTIME
Qty: 0 | Refills: 0 | Status: DISCONTINUED | OUTPATIENT
Start: 2019-03-01 | End: 2019-03-04

## 2019-03-01 RX ORDER — LOSARTAN POTASSIUM 100 MG/1
100 TABLET, FILM COATED ORAL DAILY
Qty: 0 | Refills: 0 | Status: DISCONTINUED | OUTPATIENT
Start: 2019-03-01 | End: 2019-03-04

## 2019-03-01 RX ORDER — PIPERACILLIN AND TAZOBACTAM 4; .5 G/20ML; G/20ML
4.5 INJECTION, POWDER, LYOPHILIZED, FOR SOLUTION INTRAVENOUS ONCE
Qty: 0 | Refills: 0 | Status: COMPLETED | OUTPATIENT
Start: 2019-03-01 | End: 2019-03-01

## 2019-03-01 RX ADMIN — ATORVASTATIN CALCIUM 80 MILLIGRAM(S): 80 TABLET, FILM COATED ORAL at 21:43

## 2019-03-01 RX ADMIN — CHLORHEXIDINE GLUCONATE 1 APPLICATION(S): 213 SOLUTION TOPICAL at 06:54

## 2019-03-01 RX ADMIN — Medication 650 MILLIGRAM(S): at 11:23

## 2019-03-01 RX ADMIN — Medication 27.5 MILLIGRAM(S): at 23:12

## 2019-03-01 RX ADMIN — Medication 75 MILLIGRAM(S): at 16:23

## 2019-03-01 RX ADMIN — OLANZAPINE 5 MILLIGRAM(S): 15 TABLET, FILM COATED ORAL at 18:13

## 2019-03-01 RX ADMIN — Medication 166.67 MILLIGRAM(S): at 13:28

## 2019-03-01 RX ADMIN — LACOSAMIDE 140 MILLIGRAM(S): 50 TABLET ORAL at 00:06

## 2019-03-01 RX ADMIN — Medication 5 MILLIGRAM(S): at 21:43

## 2019-03-01 RX ADMIN — CARVEDILOL PHOSPHATE 25 MILLIGRAM(S): 80 CAPSULE, EXTENDED RELEASE ORAL at 06:54

## 2019-03-01 RX ADMIN — LOSARTAN POTASSIUM 100 MILLIGRAM(S): 100 TABLET, FILM COATED ORAL at 06:54

## 2019-03-01 RX ADMIN — Medication 650 MILLIGRAM(S): at 21:43

## 2019-03-01 RX ADMIN — Medication 650 MILLIGRAM(S): at 10:35

## 2019-03-01 RX ADMIN — Medication 81 MILLIGRAM(S): at 12:48

## 2019-03-01 RX ADMIN — CARVEDILOL PHOSPHATE 25 MILLIGRAM(S): 80 CAPSULE, EXTENDED RELEASE ORAL at 18:14

## 2019-03-01 RX ADMIN — LACOSAMIDE 140 MILLIGRAM(S): 50 TABLET ORAL at 22:43

## 2019-03-01 RX ADMIN — Medication 650 MILLIGRAM(S): at 22:30

## 2019-03-01 RX ADMIN — LEVETIRACETAM 400 MILLIGRAM(S): 250 TABLET, FILM COATED ORAL at 18:13

## 2019-03-01 RX ADMIN — HEPARIN SODIUM 5000 UNIT(S): 5000 INJECTION INTRAVENOUS; SUBCUTANEOUS at 10:32

## 2019-03-01 RX ADMIN — LEVETIRACETAM 400 MILLIGRAM(S): 250 TABLET, FILM COATED ORAL at 06:53

## 2019-03-01 RX ADMIN — Medication 27.5 MILLIGRAM(S): at 16:23

## 2019-03-01 RX ADMIN — OLANZAPINE 5 MILLIGRAM(S): 15 TABLET, FILM COATED ORAL at 06:54

## 2019-03-01 RX ADMIN — LACOSAMIDE 140 MILLIGRAM(S): 50 TABLET ORAL at 11:20

## 2019-03-01 RX ADMIN — Medication 50 MILLIGRAM(S): at 12:48

## 2019-03-01 RX ADMIN — Medication 10 MILLIGRAM(S): at 12:48

## 2019-03-01 RX ADMIN — PIPERACILLIN AND TAZOBACTAM 200 GRAM(S): 4; .5 INJECTION, POWDER, LYOPHILIZED, FOR SOLUTION INTRAVENOUS at 12:48

## 2019-03-01 RX ADMIN — Medication 27.5 MILLIGRAM(S): at 06:53

## 2019-03-01 NOTE — PROGRESS NOTE ADULT - SUBJECTIVE AND OBJECTIVE BOX
Subjective  Patient seen and examined at bedside, and was lethargic, febrile (100.9), coughing and tachypnic. No seizures reported overnight, but one report of staring to the left at 11:10am (EEG will be reviewed by DR Nieto for possible seizure activity).  at bedside and concerns addressed.     Depakote level 79.5 and Ammonia level <10 on 2/28/19    ROS  Unobtainable 2/2 lethargy and aphasia     MEDICATIONS  (STANDING):  amitriptyline 50 milliGRAM(s) Oral daily  aspirin  chewable 81 milliGRAM(s) Oral daily  atorvastatin 80 milliGRAM(s) Oral at bedtime  baclofen 10 milliGRAM(s) Oral daily  carvedilol 25 milliGRAM(s) Oral every 12 hours  chlorhexidine 2% Cloths 1 Application(s) Topical daily  dextrose 5%. 1000 milliLiter(s) (50 mL/Hr) IV Continuous <Continuous>  dextrose 50% Injectable 12.5 Gram(s) IV Push once  heparin  Injectable 5000 Unit(s) SubCutaneous every 8 hours  insulin lispro (HumaLOG) corrective regimen sliding scale   SubCutaneous every 6 hours  lacosamide IVPB 200 milliGRAM(s) IV Intermittent every 12 hours  levETIRAcetam  IVPB 500 milliGRAM(s) IV Intermittent every 12 hours  losartan 100 milliGRAM(s) Oral daily  melatonin 5 milliGRAM(s) Oral at bedtime  OLANZapine 5 milliGRAM(s) Oral two times a day  piperacillin/tazobactam IVPB. 4.5 Gram(s) IV Intermittent once  piperacillin/tazobactam IVPB. 4.5 Gram(s) IV Intermittent every 6 hours  piperacillin/tazobactam IVPB.      sodium chloride 0.9% Bolus 500 milliLiter(s) IV Bolus once  valproate sodium IVPB 500 milliGRAM(s) IV Intermittent every 8 hours  vancomycin  IVPB 1250 milliGRAM(s) IV Intermittent every 12 hours    MEDICATIONS  (PRN):  acetaminophen   Tablet .. 650 milliGRAM(s) Oral every 6 hours PRN Temp greater or equal to 38C (100.4F)  acetaminophen   Tablet .. 650 milliGRAM(s) Oral every 6 hours PRN Mild Pain (1 - 3)  dextrose 40% Gel 15 Gram(s) Oral once PRN Blood Glucose LESS THAN 70 milliGRAM(s)/deciliter  glucagon  Injectable 1 milliGRAM(s) IntraMuscular once PRN Glucose LESS THAN 70 milligrams/deciliter  LORazepam   Injectable 2 milliGRAM(s) IV Push once PRN for seizure > 2 minutes      T(C): 38.3 (03-01-19 @ 10:16), Max: 38.3 (02-28-19 @ 14:00)  HR: 108 (03-01-19 @ 10:16) (91 - 108)  BP: 166/79 (03-01-19 @ 10:16) (108/59 - 166/79)  RR: 24 (03-01-19 @ 10:16) (18 - 24)  SpO2: 94% (03-01-19 @ 10:16) (93% - 100%)  Wt(kg): --    Lethargic, intermittently arousable to verbal stimuli. No verbal output (moaning and or crying like prior days). Does not follow commands.  PERRLA 3mm brisk, does not track bedside activity today.  Moves left arm spontaneously and intermittently. Right arm weakness/mildly contracted but withdraws to pain. Bilateral LE toes wiggle to noxious stimuli.   No tremor or clonus.  Sensation unobtainable due to aphasia  Gait deferred    CBC Full  -  ( 01 Mar 2019 11:21 )  WBC Count : x  Hemoglobin : x  Hematocrit : x  Platelet Count - Automated : x  Mean Cell Volume : x  Mean Cell Hemoglobin : x  Mean Cell Hemoglobin Concentration : x  Auto Neutrophil # : x  Auto Lymphocyte # : x  Auto Monocyte # : x  Auto Eosinophil # : x  Auto Basophil # : x  Auto Neutrophil % : 50.0 %  Auto Lymphocyte % : 21.0 %  Auto Monocyte % : 15.0 %  Auto Eosinophil % : 4.0 %  Auto Basophil % : x    03-01    143  |  106  |  14  ----------------------------<  118<H>  4.1   |  25  |  0.68    Ca    8.6      01 Mar 2019 06:33  Phos  3.9     02-28  Mg     2.0     03-01

## 2019-03-01 NOTE — PROGRESS NOTE ADULT - ASSESSMENT
63F with hx of HTN, DM2, CAD, CVA x2 (left sided hemorrhagic, followed one month later by left sided ischemic CVA) with residual seizure disorder, aphasia and dysphagia s/p PEG who presents with "staring into space", inattentiveness and increased lethargy x 1 day. Intubated at OSH for status epilepticus and transferred to [facility] MICU for vEEG. Now extubated, stepped down to RMF on 2/28 pm, now being stepped up to [address] [facility] in setting of increased lethargy, found to be flu A positive.

## 2019-03-01 NOTE — PROGRESS NOTE ADULT - SUBJECTIVE AND OBJECTIVE BOX
TRANSFER FROM  to [address] [place] [address] Course:  63F PMHx of HTN, DM II, HLD, CAD (hx of two stents), s/p T10 laminectomy and fusion on 18 with postoperative Wernicke's aphasia due to left temporal lobar ICH of undetermined etiology, left MCA ischemic infarct 2018 with residual deficits of aphasia, PEG placement and seizure disorder. Pt initially presented to [place] for episodes of starting off into space, where was intubated for status epilepticus and  transferred to [facility] for vEEG and MRI.   At [place] patient was also found to have a UTI and is now s/p 3 day course of ceftriaxone. On arrival to [facility] patient was noted to not be in status epilepticus, but given spikes in left and right temporal lobes, she was treated with depakote, keppra, and vimpat for seizure management. On  the patient was extubated and transitioned to nasal oxygen. Tube feeds were restarted on Monday as well. During her stay in the MICU patient's mental status waxed and waned. At times she was disoriented and moaning. By  she was responding to voice, following some directions, and tracking movement around her bedside, she was also able to tell the neurologist "I am OK." vEEG shows only rare Rt>Lt temporal sharp wave activity and intermittent delta wave slowing on Lt temporal, no seizures. Patient was stepped down to [address] [place] [address] on  overnight. In the morning, patient was noted to be increasingly lethargic, febrile to 100.9 (she had been 101F each of past two days), and was coughing. Blood cx sent, UA neg, CXR clear, but patient started on vanc/zosyn for concern for hospital acquired infection. RVP was positive for flu A and coronavirus. Abx were stopped, tamiflu was started. Patient also noted to be tachypneic w resp alkalosis. ICU consulted and patient will be stepped up to [address].      SUBJECTIVE: patient unable to participate in exam / to lethargy.    Vital Signs Last 12 Hrs  T(F): 100.9 (19 @ 10:16), Max: 100.9 (19 @ 10:16)  HR: 108 (19 @ 10:16) (101 - 108)  BP: 166/79 (19 @ 10:16) (153/69 - 166/79)  BP(mean): --  RR: 24 (19 @ 10:16) (20 - 24)  SpO2: 94% (19 @ 10:16) (94% - 100%)  I&O's Summary    2019 07:  -  01 Mar 2019 07:00  --------------------------------------------------------  IN: 985 mL / OUT: 400 mL / NET: 585 mL        PHYSICAL EXAM:  Constitutional: lethargic, responsive only to painful stimuli  HEENT: NC/AT, PERRLA, EOMI, no conjunctival pallor or scleral icterus, MMM, EEG on  Neck: Supple, no JVD, L IJ TLC removal site c/d/i and non tender or erythematous, w surrounding bruising  Respiratory: tachypneic, but Normal rhythm, depth, effort. CTAB. No w/r/r.   Cardiovascular: RRR, normal S1 and S2, no m/r/g.   Gastrointestinal: +BS, soft NTND, no guarding or rebound tenderness, no palpable masses   Extremities: wwp; edematous RUE  Vascular: Pulses equal and strong throughout.   Neurological: AAOx0, reacts only to pain, but moving all extremities spontaneously   Skin: No gross skin abnormalities or rashes        LABS:                        8.3    3.66  )-----------( 107      ( 01 Mar 2019 06:33 )             27.2     03-    143  |  106  |  14  ----------------------------<  118<H>  4.1   |  25  |  0.68    Ca    8.6      01 Mar 2019 06:33  Phos  3.9     02-28  Mg     2.0     03-        Urinalysis Basic - ( 01 Mar 2019 13:33 )    Color: Yellow / Appearance: Clear / S.015 / pH: x  Gluc: x / Ketone: NEGATIVE  / Bili: Negative / Urobili: 0.2 E.U./dL   Blood: x / Protein: 30 mg/dL / Nitrite: NEGATIVE   Leuk Esterase: NEGATIVE / RBC: < 5 /HPF / WBC < 5 /HPF   Sq Epi: x / Non Sq Epi: 5-10 /HPF / Bacteria: None /HPF        RADIOLOGY & ADDITIONAL TESTS:    MEDICATIONS  (STANDING):  aspirin  chewable 81 milliGRAM(s) Oral daily  atorvastatin 80 milliGRAM(s) Oral at bedtime  carvedilol 25 milliGRAM(s) Oral every 12 hours  chlorhexidine 2% Cloths 1 Application(s) Topical daily  dextrose 5%. 1000 milliLiter(s) (50 mL/Hr) IV Continuous <Continuous>  dextrose 50% Injectable 12.5 Gram(s) IV Push once  heparin  Injectable 5000 Unit(s) SubCutaneous every 8 hours  insulin lispro (HumaLOG) corrective regimen sliding scale   SubCutaneous every 6 hours  lacosamide IVPB 200 milliGRAM(s) IV Intermittent every 12 hours  levETIRAcetam  IVPB 500 milliGRAM(s) IV Intermittent every 12 hours  losartan 100 milliGRAM(s) Oral daily  melatonin 5 milliGRAM(s) Oral at bedtime  OLANZapine 5 milliGRAM(s) Oral two times a day  oseltamivir Suspension 75 milliGRAM(s) Oral two times a day  sodium chloride 0.9% Bolus 500 milliLiter(s) IV Bolus once  valproate sodium IVPB 500 milliGRAM(s) IV Intermittent every 8 hours    MEDICATIONS  (PRN):  acetaminophen   Tablet .. 650 milliGRAM(s) Oral every 6 hours PRN Temp greater or equal to 38C (100.4F)  acetaminophen   Tablet .. 650 milliGRAM(s) Oral every 6 hours PRN Mild Pain (1 - 3)  dextrose 40% Gel 15 Gram(s) Oral once PRN Blood Glucose LESS THAN 70 milliGRAM(s)/deciliter  glucagon  Injectable 1 milliGRAM(s) IntraMuscular once PRN Glucose LESS THAN 70 milligrams/deciliter  LORazepam   Injectable 2 milliGRAM(s) IV Push once PRN for seizure > 2 minutes

## 2019-03-01 NOTE — PROGRESS NOTE ADULT - ASSESSMENT
63F with hx of HTN, DM2, CAD, CVA x2 (left sided hemorrhagic, followed one month later by left sided ischemic CVA) with residual seizure disorder, aphasia and dysphagia s/p PEG who presents with "staring into space", inattentiveness and increased lethargy x 1 day. Intubated at OSH for status epilepticus and transferred to [facility] MICU for vEEG. Now extubated, stepped down to RMF on 2/28 pm, again being stepped up to [address] in setting of increased lethargy, found to be flu A positive.

## 2019-03-01 NOTE — PROGRESS NOTE ADULT - PROBLEM SELECTOR PLAN 3
Patient with (+) UA on admission, with intermittent fevers, Tmax 101.2F  - Ucx 2/23 grew E. coli susceptible to ceftriaxone  - s/p 5 days of ctx\  - UA after fever the morning of 3/1 is negative    #sepsis  Patient febrile to 101.9, tachycardic, tachypneic on 3/1 in am. fever workup complete, RVP positive for coronavirus and influenza A  - continue tamiflu 75mg BID for 5 days Patient with (+) UA on admission, with intermittent fevers, Tmax 101.2F  - Ucx 2/23 grew E. coli susceptible to ceftriaxone  - s/p 5 days of ctx  - UA after fever the morning of 3/1 is negative    #sepsis  Patient febrile to 101.9, tachycardic, tachypneic on 3/1 in am. fever workup complete, RVP positive for coronavirus and influenza A  - continue tamiflu 75mg BID for 5 days

## 2019-03-01 NOTE — PROGRESS NOTE ADULT - PROBLEM SELECTOR PLAN 2
Patient with (+) UA on admission, with intermittent fevers, Tmax 101.2F  - Ucx 2/23 grew E. coli susceptible to ceftriaxone  -s/p 5 days of ctx    #sepsis  Patient febrile to 101.9, tachycardic, tachypneic on 3/1 in am. fever workup complete, RVP positive for coronavirus and influenza A  - continue tamiflu 75mg BID for 5 days

## 2019-03-01 NOTE — PROGRESS NOTE ADULT - PROBLEM SELECTOR PLAN 1
Patient found to have new altered mental status this morning, all in the setting of fever being found flu + with remainder of fever w/u negative at this point. Patient restarted on delirium meds (Olanzapine, Baclofen and Amitriptyline) yesterday, all noted to cause sedation. Pt with known previous hemorrhagic stroke in 11/2018  - step up to [address]  - d/c Baclofen and Amitriptyline   - CTH  - MRI head (found to have bitemporal spikes on EEG)  - continue treatment for flu w tamiflu for 5 days -Patient found to have new altered mental status this morning, all in the setting of fever being found flu + with remainder of fever w/u negative at this point. Patient restarted on delirium meds (Olanzapine, Baclofen and Amitriptyline) yesterday, all noted to cause sedation. Pt with known previous hemorrhagic stroke in 11/2018  - step up to [facility]  - d/c Baclofen and Amitriptyline   - CTH repeat no acute hemorrhage and no changes from prior CT head no contrast  - MRI head (found to have bitemporal spikes on EEG)  - continue treatment for flu w tamiflu for 5 days

## 2019-03-01 NOTE — PROGRESS NOTE ADULT - ASSESSMENT
Mrs Renteria is a 64 yo woman with post-spine surgery Left temporal hemorrhage from unknown specific cause 11/2018, then decline 12/2018 with question of etiology, seizure, PRES, infarct or combination, but discharged back to rehabilitation late 01/2019, but a decline in functioning in the past 1-2 days, leading to a suspected clinical diagnosis of status epilepticus, a recurrent problem dealt with at [facility].  She appears to have been discharged on depakote/valproate through PEG 250mg bid, in addition to vimpat/lacosamide 200mg bid, though on admission to [facility], the valpraote dose was 500mg tid.  Levetiracetam also given in their hospital.  She was intubated for airway protection at [facility] and transferred to [facility] by EICU due to being the only available ICU bed in the system with vEEG capability.  CTH on /23 showed encephalomalacia in the left temporoparietal region due to recent hemorrhagic stroke. vEEG for the the first 4 days showed rare Rt>Lt temporal sharp wave activity, no seizures, and today showed diffused background slowing. Patient's current medical condition includes UTI positive bacteremia, and on ceftriaxone for management, chronic anemia?, depression (tearful), and unknown infectious process from daily fevers.     Recommendations:  1) Continue VPA/valproic acid, IV at 500mg q8h   2) Continue lacosamide/vimpat at 200mg bid  3) Continue levetiracetam/keppra at 500mg bid.    4) IV lorazepam 1mg only for convulsive seizures >1min.  5) CT Head w/o contrast if patient is not stable for MRI brain w/o contrast  6) Possible LP if infectious work-up is negative    8) Neurological assessments Q8hrs  9) Maintain Seizure & Fall precautions

## 2019-03-01 NOTE — PROGRESS NOTE ADULT - PROBLEM SELECTOR PLAN 2
Initially thought to be in status epilepticus, but not seen on EEG, patient now with decreased mental status  - s/p extubation 2/25, now comfortable on RA  - no clear inciting event though has history of temporal hemorrhagic infarct in 11/18 and MCA ischemic infarct in 12/18  - vEEG shows only rare Rt>Lt temporal sharp wave activity and intermittent delta wave slowing on Lt temporal, no seizures  - c/w vEEG per Dr. Eng  - c/w keppra 2g BID  - c/w vimpat 200 mg BID  - IV lorazepam 1mg only for convulsive seizures >1min.  - c/w depakote 500mg q8 level, daily levels  - F/u 8pm Depakote level on 3/1 before 9pm dose   - brain MRI

## 2019-03-01 NOTE — CONSULT NOTE ADULT - PROBLEM SELECTOR RECOMMENDATION 2
Full code.  Will reach out to family at a later time. Patient to have access to supportive services during rest of hospital stay as the pt/family deems necessary i.e. Chaplaincy, Massage Therapy, Music Therapy, Pt/family supportive services, Palliative SW, etc.

## 2019-03-01 NOTE — PROGRESS NOTE ADULT - SUBJECTIVE AND OBJECTIVE BOX
TRANSFER FROM [address] [place] [address] TO [facility]    HOSPITAL COURSE  63F PMHx of HTN, DM II, HLD, CAD (hx of two stents), s/p T10 laminectomy and fusion on 18 with postoperative Wernicke's aphasia due to left temporal lobar ICH of undetermined etiology, left MCA ischemic infarct 2018 with residual deficits of aphasia, PEG placement and seizure disorder. Pt initially presented to [facility] for episodes of staring off into space, where she was intubated for status epilepticus and transferred to [facility] for vEEG and MRI.     At [facility] patient was also found to have a UTI and was started on ceftriaxone. On arrival to [facility], pt was treated with depakote, keppra, and vimpat for seizure management, and was continued on ceftriaxone for the UTI. Urine culture collected on admission grew E. coli susceptible to ceftriaxone. On  the patient was extubated and transitioned to nasal canula. During her stay in the MICU patient's mental status waxed and waned. At times she was disoriented and moaning. By  she was responding to voice, following some directions, and tracking movement around her bedside, she was also able to tell the neurologist "I am OK." vEEG shows only rare Rt>Lt temporal sharp wave activity and intermittent delta wave slowing on Lt temporal, no seizures. She was stepped down to the regional medical floor. Overnight, the patient spiked to 101.0 F and was found to be RVP + for influenza and coronavirus. Additionally, mental status worsened in the early morning of 3/1 and found to be in respiratory alkalosis. Will be stepped up to [facility] for closer monitoring.     INTERVAL HPI/OVERNIGHT EVENTS:    SUBJECTIVE: Patient seen and examined at bedside.    OBJECTIVE:    VITAL SIGNS:  ICU Vital Signs Last 24 Hrs  T(C): 38.3 (01 Mar 2019 10:16), Max: 38.3 (01 Mar 2019 10:16)  T(F): 100.9 (01 Mar 2019 10:16), Max: 100.9 (01 Mar 2019 10:16)  HR: 108 (01 Mar 2019 10:16) (91 - 108)  BP: 166/79 (01 Mar 2019 10:16) (126/72 - 166/79)  BP(mean): 108 (2019 16:00) (74 - 108)  ABP: --  ABP(mean): --  RR: 24 (01 Mar 2019 10:16) (18 - 24)  SpO2: 94% (01 Mar 2019 10:16) (93% - 100%)         @ 07:01  -  - @ 07:00  --------------------------------------------------------  IN: 985 mL / OUT: 400 mL / NET: 585 mL      CAPILLARY BLOOD GLUCOSE      POCT Blood Glucose.: 134 mg/dL (01 Mar 2019 12:16)      PHYSICAL EXAM:    General: NAD  HEENT: NC/AT; PERRL, clear conjunctiva  Neck: supple  Respiratory: lungs CTA b/l, no wheezes or rhonchi  Cardiovascular: +S1/S2; RRR, no murmurs, rubs, or gallops  Abdomen: soft, non-tender, non-distended; +BS in all 4 quadrants  Extremities: WWP, 2+ peripheral pulses b/l; no LE edema  Skin: normal color and turgor; no rash  Neurological: AOx3, no focal deficits noted    MEDICATIONS:  MEDICATIONS  (STANDING):  aspirin  chewable 81 milliGRAM(s) Oral daily  atorvastatin 80 milliGRAM(s) Oral at bedtime  carvedilol 25 milliGRAM(s) Oral every 12 hours  chlorhexidine 2% Cloths 1 Application(s) Topical daily  dextrose 5%. 1000 milliLiter(s) (50 mL/Hr) IV Continuous <Continuous>  dextrose 50% Injectable 12.5 Gram(s) IV Push once  heparin  Injectable 5000 Unit(s) SubCutaneous every 8 hours  insulin lispro (HumaLOG) corrective regimen sliding scale   SubCutaneous every 6 hours  lacosamide IVPB 200 milliGRAM(s) IV Intermittent every 12 hours  levETIRAcetam  IVPB 500 milliGRAM(s) IV Intermittent every 12 hours  losartan 100 milliGRAM(s) Oral daily  melatonin 5 milliGRAM(s) Oral at bedtime  OLANZapine 5 milliGRAM(s) Oral two times a day  oseltamivir Suspension 75 milliGRAM(s) Oral two times a day  sodium chloride 0.9% Bolus 500 milliLiter(s) IV Bolus once  valproate sodium IVPB 500 milliGRAM(s) IV Intermittent every 8 hours    MEDICATIONS  (PRN):  acetaminophen   Tablet .. 650 milliGRAM(s) Oral every 6 hours PRN Temp greater or equal to 38C (100.4F)  acetaminophen   Tablet .. 650 milliGRAM(s) Oral every 6 hours PRN Mild Pain (1 - 3)  dextrose 40% Gel 15 Gram(s) Oral once PRN Blood Glucose LESS THAN 70 milliGRAM(s)/deciliter  glucagon  Injectable 1 milliGRAM(s) IntraMuscular once PRN Glucose LESS THAN 70 milligrams/deciliter  LORazepam   Injectable 2 milliGRAM(s) IV Push once PRN for seizure > 2 minutes      ALLERGIES:  Allergies    No Known Allergies    Intolerances        LABS:                        8.3    3.66  )-----------( 107      ( 01 Mar 2019 06:33 )             27.2     03-01    143  |  106  |  14  ----------------------------<  118<H>  4.1   |  25  |  0.68    Ca    8.6      01 Mar 2019 06:33  Phos  3.9     02-28  Mg     2.0     03-01        Urinalysis Basic - ( 01 Mar 2019 13:33 )    Color: Yellow / Appearance: Clear / S.015 / pH: x  Gluc: x / Ketone: NEGATIVE  / Bili: Negative / Urobili: 0.2 E.U./dL   Blood: x / Protein: 30 mg/dL / Nitrite: NEGATIVE   Leuk Esterase: NEGATIVE / RBC: < 5 /HPF / WBC < 5 /HPF   Sq Epi: x / Non Sq Epi: 5-10 /HPF / Bacteria: None /HPF        RADIOLOGY & ADDITIONAL TESTS: TRANSFER FROM [address] [place] [address] TO [facility]    HOSPITAL COURSE  63F PMHx of HTN, DM II, HLD, CAD (hx of two stents), s/p T10 laminectomy and fusion on 18 with postoperative Wernicke's aphasia due to left temporal lobar ICH of undetermined etiology, left MCA ischemic infarct 2018 with residual deficits of aphasia, PEG placement and seizure disorder. Pt initially presented to [place] for episodes of staring off into space, where she was intubated for status epilepticus and transferred to [facility] for vEEG and MRI.     At [place] patient was also found to have a UTI and was started on ceftriaxone. On arrival to [facility], pt was treated with depakote, keppra, and vimpat for seizure management, and was continued on ceftriaxone for the UTI. Urine culture collected on admission grew E. coli susceptible to ceftriaxone. On  the patient was extubated and transitioned to nasal canula. During her stay in the MICU patient's mental status waxed and waned. At times she was disoriented and moaning. By  she was responding to voice, following some directions, and tracking movement around her bedside, she was also able to tell the neurologist "I am OK." vEEG shows only rare Rt>Lt temporal sharp wave activity and intermittent delta wave slowing on Lt temporal, no seizures. She was stepped down to the regional medical floor. Spiked a temp of 100.9 with subsequent fever w/u showing UA neg, CXR clear, and but patient started on vanc/zosyn for concern for hospital acquired infection. RVP was positive for flu A and coronavirus. Abx were stopped, tamiflu was started. Patient also noted to be tachypneic w/ resp alkalosis. Will be stepped up to [facility] for closer monitoring.     SUBJECTIVE: Patient seen and examined at bedside. Pt AOx0, is not responding or opening her eyes but does retract to pain in all four extremities. The patient's vitals are stable at this time. Patient is noted to be coughing and yawning. Patient also noted to be contracted in the b/l upper extremities R>L.    OBJECTIVE:    VITAL SIGNS:  ICU Vital Signs Last 24 Hrs  T(C): 38.3 (01 Mar 2019 10:16), Max: 38.3 (01 Mar 2019 10:16)  T(F): 100.9 (01 Mar 2019 10:16), Max: 100.9 (01 Mar 2019 10:16)  HR: 108 (01 Mar 2019 10:16) (91 - 108)  BP: 166/79 (01 Mar 2019 10:16) (126/72 - 166/79)  BP(mean): 108 (2019 16:00) (74 - 108)  ABP: --  ABP(mean): --  RR: 24 (01 Mar 2019 10:16) (18 - 24)  SpO2: 94% (01 Mar 2019 10:16) (93% - 100%)         @ 07:01  -  03- @ 07:00  --------------------------------------------------------  IN: 985 mL / OUT: 400 mL / NET: 585 mL      CAPILLARY BLOOD GLUCOSE      POCT Blood Glucose.: 134 mg/dL (01 Mar 2019 12:16)      PHYSICAL EXAM:    General: overweight woman, with EEG leads on, snoring loudly, AOx0 but retracts to pain in the b/l upper extremities.   HEENT: NC/AT; PERRL, clear conjunctiva  Neck: supple, no JVD  Respiratory: lungs with conducted upper airway sounds throuhgout  Cardiovascular: +S1/S2; tachycardic, no murmurs, rubs, or gallops  Abdomen: soft, non-tender, distended 2/2 body habitus; +BS in all 4 quadrants  Extremities: mildly cool extremities, 2+ peripheral pulses b/l; trace extremity edema noted throuhgout  Skin: normal color and turgor; no rash  Neurological: AOx0, contracture of both upper extremities with 3+ reflexes b/l.     MEDICATIONS:  MEDICATIONS  (STANDING):  aspirin  chewable 81 milliGRAM(s) Oral daily  atorvastatin 80 milliGRAM(s) Oral at bedtime  carvedilol 25 milliGRAM(s) Oral every 12 hours  chlorhexidine 2% Cloths 1 Application(s) Topical daily  dextrose 5%. 1000 milliLiter(s) (50 mL/Hr) IV Continuous <Continuous>  dextrose 50% Injectable 12.5 Gram(s) IV Push once  heparin  Injectable 5000 Unit(s) SubCutaneous every 8 hours  insulin lispro (HumaLOG) corrective regimen sliding scale   SubCutaneous every 6 hours  lacosamide IVPB 200 milliGRAM(s) IV Intermittent every 12 hours  levETIRAcetam  IVPB 500 milliGRAM(s) IV Intermittent every 12 hours  losartan 100 milliGRAM(s) Oral daily  melatonin 5 milliGRAM(s) Oral at bedtime  OLANZapine 5 milliGRAM(s) Oral two times a day  oseltamivir Suspension 75 milliGRAM(s) Oral two times a day  sodium chloride 0.9% Bolus 500 milliLiter(s) IV Bolus once  valproate sodium IVPB 500 milliGRAM(s) IV Intermittent every 8 hours    MEDICATIONS  (PRN):  acetaminophen   Tablet .. 650 milliGRAM(s) Oral every 6 hours PRN Temp greater or equal to 38C (100.4F)  acetaminophen   Tablet .. 650 milliGRAM(s) Oral every 6 hours PRN Mild Pain (1 - 3)  dextrose 40% Gel 15 Gram(s) Oral once PRN Blood Glucose LESS THAN 70 milliGRAM(s)/deciliter  glucagon  Injectable 1 milliGRAM(s) IntraMuscular once PRN Glucose LESS THAN 70 milligrams/deciliter  LORazepam   Injectable 2 milliGRAM(s) IV Push once PRN for seizure > 2 minutes      ALLERGIES:  Allergies    No Known Allergies    LABS:                        8.3    3.66  )-----------( 107      ( 01 Mar 2019 06:33 )             27.2     03-01    143  |  106  |  14  ----------------------------<  118<H>  4.1   |  25  |  0.68    Ca    8.6      01 Mar 2019 06:33  Phos  3.9     02-28  Mg     2.0     03-01        Urinalysis Basic - ( 01 Mar 2019 13:33 )    Color: Yellow / Appearance: Clear / S.015 / pH: x  Gluc: x / Ketone: NEGATIVE  / Bili: Negative / Urobili: 0.2 E.U./dL   Blood: x / Protein: 30 mg/dL / Nitrite: NEGATIVE   Leuk Esterase: NEGATIVE / RBC: < 5 /HPF / WBC < 5 /HPF   Sq Epi: x / Non Sq Epi: 5-10 /HPF / Bacteria: None /HPF    RADIOLOGY & ADDITIONAL TESTS: Reviewed. TRANSFER FROM [address] [place] [address] TO Located within [facility]    HOSPITAL COURSE  63F PMHx of HTN, DM II, HLD, CAD (hx of two stents), s/p T10 laminectomy and fusion on 18 with postoperative Wernicke's aphasia due to left temporal lobar ICH of undetermined etiology, left MCA ischemic infarct 2018, and multiple seizures around this time, with residual deficits of aphasia, PRESS syndrome, PEG placement. Pt initially presented to [place] for episodes of staring off into space, where she was intubated for status epilepticus and transferred to [facility] for vEEG and MRI.     At [place] patient was also found to have a UTI and was started on ceftriaxone. On arrival to [facility], pt was treated with depakote, keppra, and vimpat for seizure management, and was continued on ceftriaxone for the UTI. Urine culture collected on admission grew E. coli susceptible to ceftriaxone. On  the patient was extubated and transitioned to nasal canula. During her stay in the MICU patient's mental status waxed and waned. At times she was disoriented and moaning. By  she was responding to voice, following some directions, and tracking movement around her bedside, she was also able to tell the neurologist "I am OK." vEEG shows only rare Rt>Lt temporal sharp wave activity and intermittent delta wave slowing on Lt temporal, no seizures. She was stepped down to the regional medical floor. Spiked a temp of 100.9 with subsequent fever w/u showing UA neg, CXR clear, and but patient started on vanc/zosyn for concern for hospital acquired infection. RVP was positive for flu A and coronavirus. Abx were stopped, tamiflu was started. Patient also noted to be tachypneic w/ resp alkalosis. Will be stepped up to [facility] for closer monitoring.     SUBJECTIVE: Patient seen and examined at bedside. Pt AOx0, is not responding or opening her eyes but does retract to pain in all four extremities. The patient's vitals are stable at this time. Patient is noted to be coughing and yawning. Patient also noted to be contracted in the b/l upper extremities R>L.    OBJECTIVE:    VITAL SIGNS:  ICU Vital Signs Last 24 Hrs  T(C): 38.3 (01 Mar 2019 10:16), Max: 38.3 (01 Mar 2019 10:16)  T(F): 100.9 (01 Mar 2019 10:16), Max: 100.9 (01 Mar 2019 10:16)  HR: 108 (01 Mar 2019 10:16) (91 - 108)  BP: 166/79 (01 Mar 2019 10:16) (126/72 - 166/79)  BP(mean): 108 (2019 16:00) (74 - 108)  ABP: --  ABP(mean): --  RR: 24 (01 Mar 2019 10:16) (18 - 24)  SpO2: 94% (01 Mar 2019 10:16) (93% - 100%)         @ 07:01  -  - @ 07:00  --------------------------------------------------------  IN: 985 mL / OUT: 400 mL / NET: 585 mL      CAPILLARY BLOOD GLUCOSE      POCT Blood Glucose.: 134 mg/dL (01 Mar 2019 12:16)      PHYSICAL EXAM:    General: overweight woman, with EEG leads on, snoring loudly, AOx0 but retracts to pain in the b/l upper extremities.   HEENT: NC/AT; PERRL, clear conjunctiva  Neck: supple, no JVD  Respiratory: lungs with conducted upper airway sounds throuhgout  Cardiovascular: +S1/S2; tachycardic, no murmurs, rubs, or gallops  Abdomen: soft, non-tender, distended 2/2 body habitus; +BS in all 4 quadrants  Extremities: mildly cool extremities, 2+ peripheral pulses b/l; trace extremity edema noted throuhgout  Skin: normal color and turgor; no rash  Neurological: AOx0, contracture of both upper extremities with 3+ reflexes b/l.     MEDICATIONS:  MEDICATIONS  (STANDING):  aspirin  chewable 81 milliGRAM(s) Oral daily  atorvastatin 80 milliGRAM(s) Oral at bedtime  carvedilol 25 milliGRAM(s) Oral every 12 hours  chlorhexidine 2% Cloths 1 Application(s) Topical daily  dextrose 5%. 1000 milliLiter(s) (50 mL/Hr) IV Continuous <Continuous>  dextrose 50% Injectable 12.5 Gram(s) IV Push once  heparin  Injectable 5000 Unit(s) SubCutaneous every 8 hours  insulin lispro (HumaLOG) corrective regimen sliding scale   SubCutaneous every 6 hours  lacosamide IVPB 200 milliGRAM(s) IV Intermittent every 12 hours  levETIRAcetam  IVPB 500 milliGRAM(s) IV Intermittent every 12 hours  losartan 100 milliGRAM(s) Oral daily  melatonin 5 milliGRAM(s) Oral at bedtime  OLANZapine 5 milliGRAM(s) Oral two times a day  oseltamivir Suspension 75 milliGRAM(s) Oral two times a day  sodium chloride 0.9% Bolus 500 milliLiter(s) IV Bolus once  valproate sodium IVPB 500 milliGRAM(s) IV Intermittent every 8 hours    MEDICATIONS  (PRN):  acetaminophen   Tablet .. 650 milliGRAM(s) Oral every 6 hours PRN Temp greater or equal to 38C (100.4F)  acetaminophen   Tablet .. 650 milliGRAM(s) Oral every 6 hours PRN Mild Pain (1 - 3)  dextrose 40% Gel 15 Gram(s) Oral once PRN Blood Glucose LESS THAN 70 milliGRAM(s)/deciliter  glucagon  Injectable 1 milliGRAM(s) IntraMuscular once PRN Glucose LESS THAN 70 milligrams/deciliter  LORazepam   Injectable 2 milliGRAM(s) IV Push once PRN for seizure > 2 minutes      ALLERGIES:  Allergies    No Known Allergies    LABS:                        8.3    3.66  )-----------( 107      ( 01 Mar 2019 06:33 )             27.2     03-01    143  |  106  |  14  ----------------------------<  118<H>  4.1   |  25  |  0.68    Ca    8.6      01 Mar 2019 06:33  Phos  3.9     02-28  Mg     2.0     03-01        Urinalysis Basic - ( 01 Mar 2019 13:33 )    Color: Yellow / Appearance: Clear / S.015 / pH: x  Gluc: x / Ketone: NEGATIVE  / Bili: Negative / Urobili: 0.2 E.U./dL   Blood: x / Protein: 30 mg/dL / Nitrite: NEGATIVE   Leuk Esterase: NEGATIVE / RBC: < 5 /HPF / WBC < 5 /HPF   Sq Epi: x / Non Sq Epi: 5-10 /HPF / Bacteria: None /HPF    RADIOLOGY & ADDITIONAL TESTS: Reviewed.

## 2019-03-01 NOTE — PROGRESS NOTE ADULT - PROBLEM SELECTOR PLAN 1
initially thought to be in status epilepticus, but not seen on EEG  - s/p extubation 2/25, now comfortable on RA  - no clear inciting event though has history of temporal hemorrhagic infarct in 11/18 and MCA ischemic infarct in 12/18  - vEEG shows only rare Rt>Lt temporal sharp wave activity and intermittent delta wave slowing on Lt temporal, no seizures  - c/w vEEG per Dr. Eng  - c/w keppra 2g BID  - c/w vimpat 200 mg BID  - IV lorazepam 1mg only for convulsive seizures >1min.  - c/w depakote 500mg q8 level, daily levels  -F/u 8pm Depakote level on 3/1 before 9pm dose   - brain MRI    #AMS  patient w worsening mental status this morning per discussion w ICU team who had patient prior days  - likely in setting of sepsis from influenza vs polypharmacy (started elavil, olanzapine, and baclofen yesterday), but possibly due to incracranial process  - f/u urgent CT head  - continue treatment for flu w tamiflu for 5 days  - d/c baclofen and elavil
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
## 2019-03-01 NOTE — PROGRESS NOTE ADULT - PROBLEM SELECTOR PLAN 4
#YAZ - now resolved  -Cre 1.2 at admission (baseline 0.60s) unclear etiology; don't think it is 2/2 dehydration as specific gravity is low on UA    #Anemia  -Slow downtrend in Hb this admission (Baseline 9-10s). Less likely acute bleeding so restarted on ASA, c/w HSQ  -iron studies w just elevated ferritin    #Thrombocytopenia   -Unknown etio. Will trend platelets for now

## 2019-03-01 NOTE — CONSULT NOTE ADULT - ASSESSMENT
63F PMHx of HTN, DM II, HLD, CAD , s/p T10 laminectomy and fusion on 11/27/18 with postoperative Wernicke's aphasia due to left temporal lobar ICH of undetermined etiology, left MCA ischemic infarct 12/2018 with residual deficits of aphasia, PEG placement and seizure disorder presented to MICU as a transfer for status epilepticus. Patient was stepped down to EMF after completing URI rx. Pending MRI to evaluate temporal spikes seen on MRI. ICU reconsulted for AMS and 2/4 SIRS.   - 63F PMHx of HTN, DM II, HLD, CAD , s/p T10 laminectomy and fusion on 11/27/18 with postoperative Wernicke's aphasia due to left temporal lobar ICH of undetermined etiology, left MCA ischemic infarct 12/2018 with residual deficits of aphasia, PEG placement and seizure disorder presented to MICU as a transfer for status epilepticus. Patient was stepped down to RMF after completing UTI rx. Pending MRI to evaluate temporal spikes seen on MRI. ICU reconsulted for AMS and 2/4 SIRS.  Found to be Influenza and Coronavirus. Also noted that patient was started on Olanzapine, Baclofen and Amitriptyline   - Lethargy likely related to Olanzapine, Baclofen and Amitriptyline which were started for delirium however can all cause sedation. REcommend discontinuing Baclofen and Amitriptyline for now and continuing Olanzapine. Monitor mental status   - Start on Tamiflu treatment for Influenza. Patient possibly has component of asp pna as well however is currently hemodynamically stable. Would hold starting abx and monitor.   - Pending MRI for evaluation of bitemporal spikes. IF unable to obtain MRI today, should order CT head to assess for interval change. 63F PMHx of HTN, DM II, HLD, CAD , s/p T10 laminectomy and fusion on 11/27/18 with postoperative Wernicke's aphasia due to left temporal lobar ICH of undetermined etiology, left MCA ischemic infarct 12/2018 with residual deficits of aphasia, PEG placement and seizure disorder presented to MICU as a transfer for status epilepticus. Patient was stepped down to RMF after completing UTI rx. Pending MRI to evaluate temporal spikes seen on MRI. ICU reconsulted for AMS and 2/4 SIRS.  Found to be Influenza and Coronavirus. Also noted that patient was started on Olanzapine, Baclofen and Amitriptyline   - Lethargy likely related to Olanzapine, Baclofen and Amitriptyline which were restarted from her admission med list however can all cause sedation. REcommend discontinuing Baclofen and Amitriptyline for now and continuing Olanzapine as had severe delirium yesterday. Monitor mental status   - Start on Tamiflu treatment for Influenza. Patient possibly has component of asp pna as well however is currently hemodynamically stable. Would hold starting abx and monitor.   - Pending MRI for evaluation of bitemporal spikes. IF unable to obtain MRI today, should order CT head to assess for interval change.

## 2019-03-01 NOTE — PROGRESS NOTE ADULT - PROBLEM SELECTOR PLAN 3
#YAZ - now resolved  -Cre 1.2 at admission (baseline 0.60s) unclear etiology; don't think it is 2/2 dehydration as specific gravity is low on UA    #Anemia  -Slow downtrend in Hb this admission (Baseline 9-10s). Less likely acute bleeding so restarted on ASA, c/w HSQ  -F/u iron studies in AM    #Thrombocytopenia   -Unknown etio. Will trend platelets for now #YAZ - now resolved  -Cre 1.2 at admission (baseline 0.60s) unclear etiology; don't think it is 2/2 dehydration as specific gravity is low on UA    #Anemia  -Slow downtrend in Hb this admission (Baseline 9-10s). Less likely acute bleeding so restarted on ASA, c/w HSQ  -iron studies w just elevated ferritin    #Thrombocytopenia   -Unknown etio. Will trend platelets for now
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
## 2019-03-02 LAB
GLUCOSE BLDC GLUCOMTR-MCNC: 125 MG/DL — HIGH (ref 70–99)
GLUCOSE BLDC GLUCOMTR-MCNC: 158 MG/DL — HIGH (ref 70–99)
GLUCOSE BLDC GLUCOMTR-MCNC: 93 MG/DL — SIGNIFICANT CHANGE UP (ref 70–99)
GLUCOSE BLDC GLUCOMTR-MCNC: 97 MG/DL — SIGNIFICANT CHANGE UP (ref 70–99)

## 2019-03-02 PROCEDURE — 71045 X-RAY EXAM CHEST 1 VIEW: CPT | Mod: 26

## 2019-03-02 PROCEDURE — 95951: CPT | Mod: 26

## 2019-03-02 PROCEDURE — 99233 SBSQ HOSP IP/OBS HIGH 50: CPT | Mod: GC

## 2019-03-02 RX ADMIN — OLANZAPINE 5 MILLIGRAM(S): 15 TABLET, FILM COATED ORAL at 06:12

## 2019-03-02 RX ADMIN — LOSARTAN POTASSIUM 100 MILLIGRAM(S): 100 TABLET, FILM COATED ORAL at 06:12

## 2019-03-02 RX ADMIN — Medication 27.5 MILLIGRAM(S): at 15:25

## 2019-03-02 RX ADMIN — Medication 75 MILLIGRAM(S): at 18:09

## 2019-03-02 RX ADMIN — Medication 27.5 MILLIGRAM(S): at 21:43

## 2019-03-02 RX ADMIN — LACOSAMIDE 140 MILLIGRAM(S): 50 TABLET ORAL at 11:54

## 2019-03-02 RX ADMIN — LEVETIRACETAM 400 MILLIGRAM(S): 250 TABLET, FILM COATED ORAL at 06:12

## 2019-03-02 RX ADMIN — CARVEDILOL PHOSPHATE 25 MILLIGRAM(S): 80 CAPSULE, EXTENDED RELEASE ORAL at 17:34

## 2019-03-02 RX ADMIN — Medication 81 MILLIGRAM(S): at 11:54

## 2019-03-02 RX ADMIN — Medication 27.5 MILLIGRAM(S): at 06:47

## 2019-03-02 RX ADMIN — Medication 650 MILLIGRAM(S): at 15:24

## 2019-03-02 RX ADMIN — CARVEDILOL PHOSPHATE 25 MILLIGRAM(S): 80 CAPSULE, EXTENDED RELEASE ORAL at 06:12

## 2019-03-02 RX ADMIN — ATORVASTATIN CALCIUM 80 MILLIGRAM(S): 80 TABLET, FILM COATED ORAL at 21:43

## 2019-03-02 RX ADMIN — Medication 2: at 17:33

## 2019-03-02 RX ADMIN — Medication 75 MILLIGRAM(S): at 06:12

## 2019-03-02 RX ADMIN — Medication 5 MILLIGRAM(S): at 21:43

## 2019-03-02 RX ADMIN — OLANZAPINE 5 MILLIGRAM(S): 15 TABLET, FILM COATED ORAL at 18:08

## 2019-03-02 RX ADMIN — LEVETIRACETAM 400 MILLIGRAM(S): 250 TABLET, FILM COATED ORAL at 18:08

## 2019-03-02 NOTE — PROGRESS NOTE ADULT - PROBLEM SELECTOR PLAN 1
-Patient found to have new altered mental status this morning, all in the setting of fever being found flu + with remainder of fever w/u negative at this point. Patient restarted on delirium meds (Olanzapine, Baclofen and Amitriptyline) yesterday, all noted to cause sedation. Pt with known previous hemorrhagic stroke in 11/2018  - step up to 7[facility]ach  - d/c Baclofen and Amitriptyline   - CTH repeat no acute hemorrhage and no changes from prior CT head no contrast  - MRI head (found to have bitemporal spikes on EEG)  - continue treatment for flu w tamiflu for 5 days -Patient found to have worsened AMS on 3/1 in the setting of fever, being found flu + with remainder of fever w/u negative at this point. Patient restarted on delirium meds (Olanzapine, Baclofen and Amitriptyline) for one day, all noted to cause sedation. Pt with known previous hemorrhagic stroke in 11/2018  -  continue to hold Baclofen and Amitriptyline   - CTH repeat no acute hemorrhage and no changes from prior CT head no contrast  - f/u MRI head (found to have bitemporal spikes on EEG)  - c/w tamiflu x 5d

## 2019-03-02 NOTE — PROGRESS NOTE ADULT - ASSESSMENT
63F with hx of HTN, DM2, CAD, CVA x2 (left sided hemorrhagic, followed one month later by left sided ischemic CVA) with residual seizure disorder, aphasia and dysphagia s/p PEG who presents with "staring into space", inattentiveness and increased lethargy x 1 day. Intubated at OSH for status epilepticus and transferred to [facility] MICU for vEEG. Now extubated, stepped down to RMF on 2/28 pm, again being stepped up to 7[facility]ach in setting of increased lethargy, found to be flu A positive. 63F with hx of HTN, DM2, CAD, CVA x2 (left sided hemorrhagic, followed one month later by left sided ischemic CVA) with residual seizure disorder, aphasia and dysphagia s/p PEG who presents with "staring into space", inattentiveness and increased lethargy x 1 day. Intubated at OSH for status epilepticus and transferred to [facility] MICU for vEEG only found to have b/l temporal lobe spikes R>L. Now extubated, stepped down to RMF on 2/28 pm, again being stepped up to [facility] in setting of increased lethargy, found to be flu A positive.

## 2019-03-02 NOTE — PROGRESS NOTE ADULT - PROBLEM SELECTOR PLAN 4
#YAZ - now resolved  -Cre 1.2 at admission (baseline 0.60s) unclear etiology; don't think it is 2/2 dehydration as specific gravity is low on UA    #Anemia  -Slow downtrend in Hb this admission (Baseline 9-10s). Less likely acute bleeding so restarted on ASA, c/w HSQ  -iron studies w just elevated ferritin    #Thrombocytopenia   -Unknown etio. Will trend platelets for now #YAZ - now resolved  -Cre 1.2 at admission (baseline 0.60s) unclear etiology; don't think it is 2/2 dehydration as specific gravity is low on UA    #Anemia  -Slow downtrend in Hb this admission (Baseline 9-10s). Less likely acute bleeding so restarted on ASA, c/w HSQ  -iron studies w elevated ferritin

## 2019-03-02 NOTE — PROGRESS NOTE ADULT - PROBLEM SELECTOR PLAN 5
S/p stents   -on home coreg  -C/w Lipitor, home ASA S/p stents   -c/w coreg 25 q12h  -C/w Lipitor, ASA

## 2019-03-02 NOTE — PROGRESS NOTE ADULT - PROBLEM SELECTOR PLAN 9
-tube feeds  -HSQ for DVT ppx  Lines: IJ placed on 2/24 now removed   -fall precautions -tube feeds  -HSQ for DVT ppx  -fall precautions

## 2019-03-02 NOTE — PROGRESS NOTE ADULT - SUBJECTIVE AND OBJECTIVE BOX
INTERVAL HPI/OVERNIGHT EVENTS: Feeds were restarted overnight. EEG leads back on, restraints had to be put on 2/2 pulling on her EEG.    SUBJECTIVE: Patient seen and examined at bedside.    OBJECTIVE:    VITAL SIGNS:  ICU Vital Signs Last 24 Hrs  T(C): 37 (02 Mar 2019 05:46), Max: 38.3 (01 Mar 2019 10:16)  T(F): 98.6 (02 Mar 2019 05:46), Max: 100.9 (01 Mar 2019 10:16)  HR: 96 (02 Mar 2019 04:20) (93 - 108)  BP: 111/62 (02 Mar 2019 04:20) (111/62 - 166/79)  BP(mean): 81 (02 Mar 2019 04:20) (81 - 111)  ABP: --  ABP(mean): --  RR: 18 (02 Mar 2019 04:20) (16 - 24)  SpO2: 95% (02 Mar 2019 04:20) (91% - 96%)         @ 07:01  -   @ 07:00  --------------------------------------------------------  IN: 985 mL / OUT: 400 mL / NET: 585 mL     @ 07:01  -  03-02 @ 06:53  --------------------------------------------------------  IN: 150 mL / OUT: 0 mL / NET: 150 mL      CAPILLARY BLOOD GLUCOSE      POCT Blood Glucose.: 110 mg/dL (01 Mar 2019 21:36)      PHYSICAL EXAM:    General: NAD  HEENT: NC/AT; PERRL, clear conjunctiva  Neck: supple  Respiratory: lungs CTA b/l, no wheezes or rhonchi  Cardiovascular: +S1/S2; RRR, no murmurs, rubs, or gallops  Abdomen: soft, non-tender, non-distended; +BS in all 4 quadrants  Extremities: WWP, 2+ peripheral pulses b/l; no LE edema  Skin: normal color and turgor; no rash  Neurological: AOx3, no focal deficits noted    MEDICATIONS:  MEDICATIONS  (STANDING):  aspirin  chewable 81 milliGRAM(s) Oral daily  atorvastatin 80 milliGRAM(s) Oral at bedtime  carvedilol 25 milliGRAM(s) Oral every 12 hours  chlorhexidine 2% Cloths 1 Application(s) Topical daily  dextrose 5%. 1000 milliLiter(s) (50 mL/Hr) IV Continuous <Continuous>  dextrose 50% Injectable 12.5 Gram(s) IV Push once  insulin lispro (HumaLOG) corrective regimen sliding scale   SubCutaneous every 6 hours  lacosamide IVPB 200 milliGRAM(s) IV Intermittent every 12 hours  levETIRAcetam  IVPB 500 milliGRAM(s) IV Intermittent every 12 hours  losartan 100 milliGRAM(s) Oral daily  melatonin 5 milliGRAM(s) Oral at bedtime  OLANZapine 5 milliGRAM(s) Oral two times a day  oseltamivir Suspension 75 milliGRAM(s) Oral two times a day  valproate sodium IVPB 500 milliGRAM(s) IV Intermittent every 8 hours    MEDICATIONS  (PRN):  acetaminophen   Tablet .. 650 milliGRAM(s) Oral every 6 hours PRN Temp greater or equal to 38C (100.4F)  acetaminophen   Tablet .. 650 milliGRAM(s) Oral every 6 hours PRN Mild Pain (1 - 3)  dextrose 40% Gel 15 Gram(s) Oral once PRN Blood Glucose LESS THAN 70 milliGRAM(s)/deciliter  glucagon  Injectable 1 milliGRAM(s) IntraMuscular once PRN Glucose LESS THAN 70 milligrams/deciliter  LORazepam   Injectable 2 milliGRAM(s) IV Push once PRN for seizure > 2 minutes      ALLERGIES:  Allergies    No Known Allergies    Intolerances        LABS:                        8.3    3.66  )-----------( 107      ( 01 Mar 2019 06:33 )             27.2     03-    143  |  106  |  14  ----------------------------<  118<H>  4.1   |  25  |  0.68    Ca    8.6      01 Mar 2019 06:33  Mg     2.0     03-    TPro  x   /  Alb  x   /  TBili  x   /  DBili  x   /  AST  21  /  ALT  11  /  AlkPhos  x   03-      Urinalysis Basic - ( 01 Mar 2019 13:33 )    Color: Yellow / Appearance: Clear / S.015 / pH: x  Gluc: x / Ketone: NEGATIVE  / Bili: Negative / Urobili: 0.2 E.U./dL   Blood: x / Protein: 30 mg/dL / Nitrite: NEGATIVE   Leuk Esterase: NEGATIVE / RBC: < 5 /HPF / WBC < 5 /HPF   Sq Epi: x / Non Sq Epi: 5-10 /HPF / Bacteria: None /HPF        RADIOLOGY & ADDITIONAL TESTS: INTERVAL HPI/OVERNIGHT EVENTS: Feeds were restarted overnight. EEG leads back on, restraints had to be put on 2/2 pulling on her EEG.    SUBJECTIVE: Patient seen and examined at bedside. This morning the patient is still sedated and is not responding to voice commands. The patient awakens and yells out with sternal rub. Otherwise, the patient cannot give ROS 2/2 current state. Both upper extremities noted to be contracted.     OBJECTIVE:    VITAL SIGNS:  ICU Vital Signs Last 24 Hrs  T(C): 37 (02 Mar 2019 05:46), Max: 38.3 (01 Mar 2019 10:16)  T(F): 98.6 (02 Mar 2019 05:46), Max: 100.9 (01 Mar 2019 10:16)  HR: 96 (02 Mar 2019 04:20) (93 - 108)  BP: 111/62 (02 Mar 2019 04:20) (111/62 - 166/79)  BP(mean): 81 (02 Mar 2019 04:20) (81 - 111)  ABP: --  ABP(mean): --  RR: 18 (02 Mar 2019 04:20) (16 - 24)  SpO2: 95% (02 Mar 2019 04:20) (91% - 96%)     @ 07: @ 07:00  --------------------------------------------------------  IN: 985 mL / OUT: 400 mL / NET: 585 mL     @ 07: @ 06:53  --------------------------------------------------------  IN: 150 mL / OUT: 0 mL / NET: 150 mL      CAPILLARY BLOOD GLUCOSE      POCT Blood Glucose.: 110 mg/dL (01 Mar 2019 21:36)      PHYSICAL EXAM:    General: the patient is in NAD this morning but she is sedated, only responding to sternal rub. She occasionally yells/wails out.   HEENT: NC/AT; PERRL, clear conjunctiva  Neck: supple, no JVD noted, some bruising found along the L neck   Respiratory: some transmitted upper airway sounds, otherwise CTAB  Cardiovascular: +S1/S2; RRR, no murmurs, rubs, or gallops  Abdomen: soft, non-tender, non-distended; +BS in all 4 quadrants  Extremities: pale color and turgor; no rash  Neurological: AOx0, b/l upper extremities contracted and hyperreflexive. Unable to complete neuro exam 2/2 mental status.     MEDICATIONS:  MEDICATIONS  (STANDING):  aspirin  chewable 81 milliGRAM(s) Oral daily  atorvastatin 80 milliGRAM(s) Oral at bedtime  carvedilol 25 milliGRAM(s) Oral every 12 hours  chlorhexidine 2% Cloths 1 Application(s) Topical daily  dextrose 5%. 1000 milliLiter(s) (50 mL/Hr) IV Continuous <Continuous>  dextrose 50% Injectable 12.5 Gram(s) IV Push once  insulin lispro (HumaLOG) corrective regimen sliding scale   SubCutaneous every 6 hours  lacosamide IVPB 200 milliGRAM(s) IV Intermittent every 12 hours  levETIRAcetam  IVPB 500 milliGRAM(s) IV Intermittent every 12 hours  losartan 100 milliGRAM(s) Oral daily  melatonin 5 milliGRAM(s) Oral at bedtime  OLANZapine 5 milliGRAM(s) Oral two times a day  oseltamivir Suspension 75 milliGRAM(s) Oral two times a day  valproate sodium IVPB 500 milliGRAM(s) IV Intermittent every 8 hours    MEDICATIONS  (PRN):  acetaminophen   Tablet .. 650 milliGRAM(s) Oral every 6 hours PRN Temp greater or equal to 38C (100.4F)  acetaminophen   Tablet .. 650 milliGRAM(s) Oral every 6 hours PRN Mild Pain (1 - 3)  dextrose 40% Gel 15 Gram(s) Oral once PRN Blood Glucose LESS THAN 70 milliGRAM(s)/deciliter  glucagon  Injectable 1 milliGRAM(s) IntraMuscular once PRN Glucose LESS THAN 70 milligrams/deciliter  LORazepam   Injectable 2 milliGRAM(s) IV Push once PRN for seizure > 2 minutes      ALLERGIES:  Allergies    No Known Allergies    LABS:                        8.3    3.66  )-----------( 107      ( 01 Mar 2019 06:33 )             27.2     03-01    143  |  106  |  14  ----------------------------<  118<H>  4.1   |  25  |  0.68    Ca    8.6      01 Mar 2019 06:33  Mg     2.0         TPro  x   /  Alb  x   /  TBili  x   /  DBili  x   /  AST  21  /  ALT  11  /  AlkPhos  x         Urinalysis Basic - ( 01 Mar 2019 13:33 )    Color: Yellow / Appearance: Clear / S.015 / pH: x  Gluc: x / Ketone: NEGATIVE  / Bili: Negative / Urobili: 0.2 E.U./dL   Blood: x / Protein: 30 mg/dL / Nitrite: NEGATIVE   Leuk Esterase: NEGATIVE / RBC: < 5 /HPF / WBC < 5 /HPF   Sq Epi: x / Non Sq Epi: 5-10 /HPF / Bacteria: None /HPF    RADIOLOGY & ADDITIONAL TESTS: Reviewed. INTERVAL HPI/OVERNIGHT EVENTS: Feeds were restarted overnight. EEG leads back on, restraints had to be put on 2/2 pulling on her EEG.    SUBJECTIVE: Patient seen and examined at bedside. This morning the patient is still sedated and is not responding to voice commands. The patient awakens and yells out with sternal rub. Otherwise, the patient cannot give ROS 2/2 current state. Both upper extremities noted to be contracted.     OBJECTIVE:    VITAL SIGNS:  ICU Vital Signs Last 24 Hrs  T(C): 37 (02 Mar 2019 05:46), Max: 38.3 (01 Mar 2019 10:16)  T(F): 98.6 (02 Mar 2019 05:46), Max: 100.9 (01 Mar 2019 10:16)  HR: 96 (02 Mar 2019 04:20) (93 - 108)  BP: 111/62 (02 Mar 2019 04:20) (111/62 - 166/79)  BP(mean): 81 (02 Mar 2019 04:20) (81 - 111)  ABP: --  ABP(mean): --  RR: 18 (02 Mar 2019 04:20) (16 - 24)  SpO2: 95% (02 Mar 2019 04:20) (91% - 96%)     @ 07: @ 07:00  --------------------------------------------------------  IN: 985 mL / OUT: 400 mL / NET: 585 mL     @ 07: @ 06:53  --------------------------------------------------------  IN: 150 mL / OUT: 0 mL / NET: 150 mL      CAPILLARY BLOOD GLUCOSE      POCT Blood Glucose.: 110 mg/dL (01 Mar 2019 21:36)      PHYSICAL EXAM:    General: the patient is in NAD this morning but she is sedated, only responding to sternal rub. She occasionally yells/wails out.   HEENT: NC/AT; PERRL, clear conjunctiva  Neck: supple, no JVD noted, some bruising found along the L neck   Respiratory: some transmitted upper airway sounds, otherwise CTAB  Cardiovascular: +S1/S2; RRR, no murmurs, rubs, or gallops  Abdomen: soft, non-tender, non-distended; +BS in all 4 quadrants  Extremities: pale color and turgor; no rash  Neurological: AOx0, b/l upper extremities contracted and hyperreflexive. Unable to complete neuro exam 2/2 mental status.   Skin: no rash    MEDICATIONS:  MEDICATIONS  (STANDING):  aspirin  chewable 81 milliGRAM(s) Oral daily  atorvastatin 80 milliGRAM(s) Oral at bedtime  carvedilol 25 milliGRAM(s) Oral every 12 hours  chlorhexidine 2% Cloths 1 Application(s) Topical daily  dextrose 5%. 1000 milliLiter(s) (50 mL/Hr) IV Continuous <Continuous>  dextrose 50% Injectable 12.5 Gram(s) IV Push once  insulin lispro (HumaLOG) corrective regimen sliding scale   SubCutaneous every 6 hours  lacosamide IVPB 200 milliGRAM(s) IV Intermittent every 12 hours  levETIRAcetam  IVPB 500 milliGRAM(s) IV Intermittent every 12 hours  losartan 100 milliGRAM(s) Oral daily  melatonin 5 milliGRAM(s) Oral at bedtime  OLANZapine 5 milliGRAM(s) Oral two times a day  oseltamivir Suspension 75 milliGRAM(s) Oral two times a day  valproate sodium IVPB 500 milliGRAM(s) IV Intermittent every 8 hours    MEDICATIONS  (PRN):  acetaminophen   Tablet .. 650 milliGRAM(s) Oral every 6 hours PRN Temp greater or equal to 38C (100.4F)  acetaminophen   Tablet .. 650 milliGRAM(s) Oral every 6 hours PRN Mild Pain (1 - 3)  dextrose 40% Gel 15 Gram(s) Oral once PRN Blood Glucose LESS THAN 70 milliGRAM(s)/deciliter  glucagon  Injectable 1 milliGRAM(s) IntraMuscular once PRN Glucose LESS THAN 70 milligrams/deciliter  LORazepam   Injectable 2 milliGRAM(s) IV Push once PRN for seizure > 2 minutes      ALLERGIES:  Allergies    No Known Allergies    LABS:                        8.3    3.66  )-----------( 107      ( 01 Mar 2019 06:33 )             27.2     03-01    143  |  106  |  14  ----------------------------<  118<H>  4.1   |  25  |  0.68    Ca    8.6      01 Mar 2019 06:33  Mg     2.0         TPro  x   /  Alb  x   /  TBili  x   /  DBili  x   /  AST  21  /  ALT  11  /  AlkPhos  x         Urinalysis Basic - ( 01 Mar 2019 13:33 )    Color: Yellow / Appearance: Clear / S.015 / pH: x  Gluc: x / Ketone: NEGATIVE  / Bili: Negative / Urobili: 0.2 E.U./dL   Blood: x / Protein: 30 mg/dL / Nitrite: NEGATIVE   Leuk Esterase: NEGATIVE / RBC: < 5 /HPF / WBC < 5 /HPF   Sq Epi: x / Non Sq Epi: 5-10 /HPF / Bacteria: None /HPF    RADIOLOGY & ADDITIONAL TESTS: Reviewed.

## 2019-03-02 NOTE — PROGRESS NOTE ADULT - PROBLEM SELECTOR PLAN 3
Patient with (+) UA on admission, with intermittent fevers, Tmax 101.2F  - Ucx 2/23 grew E. coli susceptible to ceftriaxone  - s/p 5 days of ctx  - UA after fever the morning of 3/1 is negative    #sepsis  Patient febrile to 101.9, tachycardic, tachypneic on 3/1 in am. fever workup complete, RVP positive for coronavirus and influenza A  - continue tamiflu 75mg BID for 5 days

## 2019-03-02 NOTE — PROGRESS NOTE ADULT - PROBLEM SELECTOR PLAN 8
-Concern for delirium given waxing waning baseline.   Restarted on home olanzapine -Concern for delirium given waxing waning baseline.   - c/w olanzapine 5 mg BID

## 2019-03-03 LAB
ALBUMIN SERPL ELPH-MCNC: 2.4 G/DL — LOW (ref 3.3–5)
ALP SERPL-CCNC: 49 U/L — SIGNIFICANT CHANGE UP (ref 40–120)
ALT FLD-CCNC: 11 U/L — SIGNIFICANT CHANGE UP (ref 10–45)
ANION GAP SERPL CALC-SCNC: 11 MMOL/L — SIGNIFICANT CHANGE UP (ref 5–17)
AST SERPL-CCNC: 20 U/L — SIGNIFICANT CHANGE UP (ref 10–40)
BASOPHILS # BLD AUTO: 0 K/UL — SIGNIFICANT CHANGE UP (ref 0–0.2)
BASOPHILS NFR BLD AUTO: 0 % — SIGNIFICANT CHANGE UP (ref 0–2)
BILIRUB SERPL-MCNC: 0.2 MG/DL — SIGNIFICANT CHANGE UP (ref 0.2–1.2)
BUN SERPL-MCNC: 13 MG/DL — SIGNIFICANT CHANGE UP (ref 7–23)
CALCIUM SERPL-MCNC: 8.2 MG/DL — LOW (ref 8.4–10.5)
CHLORIDE SERPL-SCNC: 102 MMOL/L — SIGNIFICANT CHANGE UP (ref 96–108)
CO2 SERPL-SCNC: 25 MMOL/L — SIGNIFICANT CHANGE UP (ref 22–31)
CREAT SERPL-MCNC: 0.65 MG/DL — SIGNIFICANT CHANGE UP (ref 0.5–1.3)
CRP SERPL-MCNC: 6.27 MG/DL — HIGH (ref 0–0.4)
CULTURE RESULTS: NO GROWTH — SIGNIFICANT CHANGE UP
EOSINOPHIL # BLD AUTO: 0 K/UL — SIGNIFICANT CHANGE UP (ref 0–0.5)
EOSINOPHIL NFR BLD AUTO: 0 % — SIGNIFICANT CHANGE UP (ref 0–6)
GLUCOSE BLDC GLUCOMTR-MCNC: 107 MG/DL — HIGH (ref 70–99)
GLUCOSE BLDC GLUCOMTR-MCNC: 117 MG/DL — HIGH (ref 70–99)
GLUCOSE BLDC GLUCOMTR-MCNC: 117 MG/DL — HIGH (ref 70–99)
GLUCOSE BLDC GLUCOMTR-MCNC: 127 MG/DL — HIGH (ref 70–99)
GLUCOSE SERPL-MCNC: 123 MG/DL — HIGH (ref 70–99)
HAPTOGLOB SERPL-MCNC: 237 MG/DL — HIGH (ref 34–200)
HCT VFR BLD CALC: 24.5 % — LOW (ref 34.5–45)
HGB BLD-MCNC: 7.5 G/DL — LOW (ref 11.5–15.5)
LDH SERPL L TO P-CCNC: 267 U/L — HIGH (ref 50–242)
LYMPHOCYTES # BLD AUTO: 0.84 K/UL — LOW (ref 1–3.3)
LYMPHOCYTES # BLD AUTO: 18.3 % — SIGNIFICANT CHANGE UP (ref 13–44)
MAGNESIUM SERPL-MCNC: 2.1 MG/DL — SIGNIFICANT CHANGE UP (ref 1.6–2.6)
MCHC RBC-ENTMCNC: 30.4 PG — SIGNIFICANT CHANGE UP (ref 27–34)
MCHC RBC-ENTMCNC: 30.6 GM/DL — LOW (ref 32–36)
MCV RBC AUTO: 99.2 FL — SIGNIFICANT CHANGE UP (ref 80–100)
MONOCYTES # BLD AUTO: 0.48 K/UL — SIGNIFICANT CHANGE UP (ref 0–0.9)
MONOCYTES NFR BLD AUTO: 10.4 % — SIGNIFICANT CHANGE UP (ref 2–14)
NEUTROPHILS # BLD AUTO: 3.22 K/UL — SIGNIFICANT CHANGE UP (ref 1.8–7.4)
NEUTROPHILS NFR BLD AUTO: 58.2 % — SIGNIFICANT CHANGE UP (ref 43–77)
NRBC # BLD: 0 /100 WBCS — SIGNIFICANT CHANGE UP (ref 0–0)
PHOSPHATE SERPL-MCNC: 4.3 MG/DL — SIGNIFICANT CHANGE UP (ref 2.5–4.5)
PLATELET # BLD AUTO: 87 K/UL — LOW (ref 150–400)
POTASSIUM SERPL-MCNC: 4 MMOL/L — SIGNIFICANT CHANGE UP (ref 3.5–5.3)
POTASSIUM SERPL-SCNC: 4 MMOL/L — SIGNIFICANT CHANGE UP (ref 3.5–5.3)
PROT SERPL-MCNC: 5.6 G/DL — LOW (ref 6–8.3)
RBC # BLD: 2.47 M/UL — LOW (ref 3.8–5.2)
RBC # FLD: 15.1 % — HIGH (ref 10.3–14.5)
SODIUM SERPL-SCNC: 138 MMOL/L — SIGNIFICANT CHANGE UP (ref 135–145)
SPECIMEN SOURCE: SIGNIFICANT CHANGE UP
WBC # BLD: 4.58 K/UL — SIGNIFICANT CHANGE UP (ref 3.8–10.5)
WBC # FLD AUTO: 4.58 K/UL — SIGNIFICANT CHANGE UP (ref 3.8–10.5)

## 2019-03-03 PROCEDURE — 74018 RADEX ABDOMEN 1 VIEW: CPT | Mod: 26

## 2019-03-03 PROCEDURE — 71045 X-RAY EXAM CHEST 1 VIEW: CPT | Mod: 26

## 2019-03-03 PROCEDURE — 99233 SBSQ HOSP IP/OBS HIGH 50: CPT | Mod: GC

## 2019-03-03 RX ORDER — OLANZAPINE 15 MG/1
5 TABLET, FILM COATED ORAL DAILY
Qty: 0 | Refills: 0 | Status: DISCONTINUED | OUTPATIENT
Start: 2019-03-04 | End: 2019-03-04

## 2019-03-03 RX ORDER — LEVETIRACETAM 250 MG/1
1000 TABLET, FILM COATED ORAL
Qty: 0 | Refills: 0 | Status: DISCONTINUED | OUTPATIENT
Start: 2019-03-03 | End: 2019-03-04

## 2019-03-03 RX ADMIN — Medication 650 MILLIGRAM(S): at 12:12

## 2019-03-03 RX ADMIN — Medication 5 MILLIGRAM(S): at 22:08

## 2019-03-03 RX ADMIN — OLANZAPINE 5 MILLIGRAM(S): 15 TABLET, FILM COATED ORAL at 05:57

## 2019-03-03 RX ADMIN — Medication 75 MILLIGRAM(S): at 17:53

## 2019-03-03 RX ADMIN — CARVEDILOL PHOSPHATE 25 MILLIGRAM(S): 80 CAPSULE, EXTENDED RELEASE ORAL at 05:57

## 2019-03-03 RX ADMIN — Medication 650 MILLIGRAM(S): at 13:38

## 2019-03-03 RX ADMIN — LEVETIRACETAM 1000 MILLIGRAM(S): 250 TABLET, FILM COATED ORAL at 17:53

## 2019-03-03 RX ADMIN — Medication 27.5 MILLIGRAM(S): at 05:58

## 2019-03-03 RX ADMIN — CARVEDILOL PHOSPHATE 25 MILLIGRAM(S): 80 CAPSULE, EXTENDED RELEASE ORAL at 17:53

## 2019-03-03 RX ADMIN — LACOSAMIDE 140 MILLIGRAM(S): 50 TABLET ORAL at 12:09

## 2019-03-03 RX ADMIN — LEVETIRACETAM 400 MILLIGRAM(S): 250 TABLET, FILM COATED ORAL at 05:29

## 2019-03-03 RX ADMIN — Medication 81 MILLIGRAM(S): at 12:09

## 2019-03-03 RX ADMIN — LOSARTAN POTASSIUM 100 MILLIGRAM(S): 100 TABLET, FILM COATED ORAL at 05:57

## 2019-03-03 RX ADMIN — Medication 75 MILLIGRAM(S): at 05:57

## 2019-03-03 RX ADMIN — LACOSAMIDE 140 MILLIGRAM(S): 50 TABLET ORAL at 01:10

## 2019-03-03 RX ADMIN — ATORVASTATIN CALCIUM 80 MILLIGRAM(S): 80 TABLET, FILM COATED ORAL at 22:08

## 2019-03-03 NOTE — PROGRESS NOTE ADULT - PROBLEM SELECTOR PLAN 4
#YAZ - now resolved  -Cre 1.2 at admission (baseline 0.60s) unclear etiology; don't think it is 2/2 dehydration as specific gravity is low on UA    #Anemia  -Slow downtrend in Hb this admission (Baseline 9-10s). Less likely acute bleeding so restarted on ASA, c/w HSQ  -iron studies w elevated ferritin

## 2019-03-03 NOTE — PROGRESS NOTE ADULT - ASSESSMENT
63F with hx of HTN, DM2, CAD, CVA x2 (left sided hemorrhagic, followed one month later by left sided ischemic CVA) with residual seizure disorder, aphasia and dysphagia s/p PEG who presents with "staring into space", inattentiveness and increased lethargy x 1 day. Intubated at OSH for status epilepticus and transferred to [facility] MICU for vEEG only found to have b/l temporal lobe spikes R>L. Now extubated, stepped down to RMF on 2/28 pm, again being stepped up to 7[facility]ach in setting of increased lethargy, found to be flu A positive.

## 2019-03-03 NOTE — PROGRESS NOTE ADULT - PROBLEM SELECTOR PLAN 2
Initially thought to be in status epilepticus, but not seen on EEG, patient now with decreased mental status  - s/p extubation 2/25, now comfortable on RA  - no clear inciting event though has history of temporal hemorrhagic infarct in 11/18 and MCA ischemic infarct in 12/18  - vEEG shows only rare Rt>Lt temporal sharp wave activity and intermittent delta wave slowing on Lt temporal, no seizures  - newest read with diffuse background slowing  - c/w keppra 1g BID  - c/w vimpat 200 mg BID  - IV lorazepam 1mg only for convulsive seizures >1min.  - depakote level wnl

## 2019-03-03 NOTE — PROGRESS NOTE ADULT - SUBJECTIVE AND OBJECTIVE BOX
INTERVAL HPI/OVERNIGHT EVENTS: Patient remains at baseline  one episode of fever 100.8 rectally  will continue to monitor, still A/O times 0 but arousable and follows on command.     OBJECTIVE:    ICU Vital Signs Last 24 Hrs  T(C): 36.8 (03 Mar 2019 13:53), Max: 38.2 (02 Mar 2019 17:24)  T(F): 98.3 (03 Mar 2019 13:53), Max: 100.8 (02 Mar 2019 17:24)  HR: 84 (03 Mar 2019 11:49) (78 - 104)  BP: 144/73 (03 Mar 2019 11:49) (111/56 - 161/74)  BP(mean): 100 (03 Mar 2019 11:49) (77 - 106)  ABP: --  ABP(mean): --  RR: 18 (03 Mar 2019 11:49) (16 - 20)  SpO2: 100% (03 Mar 2019 11:49) (96% - 100%)        02 Mar 2019 07:01  -  03 Mar 2019 07:00  --------------------------------------------------------  IN:    IV PiggyBack: 200 mL    ns in tub fed  [id number]: 925 mL  Total IN: 1125 mL    OUT:    Incontinent per Condom Catheter: 650 mL  Total OUT: 650 mL    Total NET: 475 mL      03 Mar 2019 07:01  -  03 Mar 2019 15:36  --------------------------------------------------------  IN:    IV PiggyBack: 100 mL    ns in tub fed  [id number]: 306 mL  Total IN: 406 mL    OUT:    Incontinent per Condom Catheter: 450 mL  Total OUT: 450 mL    Total NET: -44 mL    POCT Blood Glucose.: 110 mg/dL (01 Mar 2019 21:36)      PHYSICAL EXAM:    General: the patient is in NAD this morning but she is sedated, only responding to sternal rub. She occasionally yells/wails out.   HEENT: NC/AT; PERRL, clear conjunctiva  Neck: supple, no JVD noted, some bruising found along the L neck   Respiratory: some transmitted upper airway sounds, otherwise CTAB  Cardiovascular: +S1/S2; RRR, no murmurs, rubs, or gallops  Abdomen: soft, non-tender, non-distended; +BS in all 4 quadrants  Extremities: pale color and turgor; no rash  Neurological: AOx0, b/l upper extremities contracted and hyperreflexive. Unable to complete neuro exam 2/2 mental status.   Skin: no rash    MEDICATIONS:  MEDICATIONS  (STANDING):  aspirin  chewable 81 milliGRAM(s) Oral daily  atorvastatin 80 milliGRAM(s) Oral at bedtime  carvedilol 25 milliGRAM(s) Oral every 12 hours  chlorhexidine 2% Cloths 1 Application(s) Topical daily  dextrose 5%. 1000 milliLiter(s) (50 mL/Hr) IV Continuous <Continuous>  dextrose 50% Injectable 12.5 Gram(s) IV Push once  insulin lispro (HumaLOG) corrective regimen sliding scale   SubCutaneous every 6 hours  lacosamide IVPB 200 milliGRAM(s) IV Intermittent every 12 hours  levETIRAcetam 1000 milliGRAM(s) Oral two times a day  losartan 100 milliGRAM(s) Oral daily  melatonin 5 milliGRAM(s) Oral at bedtime  oseltamivir Suspension 75 milliGRAM(s) Oral two times a day    MEDICATIONS  (PRN):  acetaminophen   Tablet .. 650 milliGRAM(s) Oral every 6 hours PRN Temp greater or equal to 38C (100.4F)  acetaminophen   Tablet .. 650 milliGRAM(s) Oral every 6 hours PRN Mild Pain (1 - 3)  dextrose 40% Gel 15 Gram(s) Oral once PRN Blood Glucose LESS THAN 70 milliGRAM(s)/deciliter  glucagon  Injectable 1 milliGRAM(s) IntraMuscular once PRN Glucose LESS THAN 70 milligrams/deciliter  LORazepam   Injectable 2 milliGRAM(s) IV Push once PRN for seizure > 2 minutes    LABS:                         7.5    4.58  )-----------( 87       ( 03 Mar 2019 08:14 )             24.5     03-03    138  |  102  |  13  ----------------------------<  123<H>  4.0   |  25  |  0.65    Ca    8.2<L>      03 Mar 2019 08:14  Phos  4.3     03-03  Mg     2.1     03-03    TPro  5.6<L>  /  Alb  2.4<L>  /  TBili  0.2  /  DBili  x   /  AST  20  /  ALT  11  /  AlkPhos  49  03-03                  RADIOLOGY, EKG & ADDITIONAL TESTS: Reviewed. INTERVAL HPI/OVERNIGHT EVENTS: Patient remains at baseline  one episode of fever 100.8 rectally  will continue to monitor, still A/O times 0 but arousable and follows few commands.     OBJECTIVE:    ICU Vital Signs Last 24 Hrs  T(C): 36.8 (03 Mar 2019 13:53), Max: 38.2 (02 Mar 2019 17:24)  T(F): 98.3 (03 Mar 2019 13:53), Max: 100.8 (02 Mar 2019 17:24)  HR: 84 (03 Mar 2019 11:49) (78 - 104)  BP: 144/73 (03 Mar 2019 11:49) (111/56 - 161/74)  BP(mean): 100 (03 Mar 2019 11:49) (77 - 106)  ABP: --  ABP(mean): --  RR: 18 (03 Mar 2019 11:49) (16 - 20)  SpO2: 100% (03 Mar 2019 11:49) (96% - 100%)        02 Mar 2019 07:01  -  03 Mar 2019 07:00  --------------------------------------------------------  IN:    IV PiggyBack: 200 mL    ns in tub fed  [id number]: 925 mL  Total IN: 1125 mL    OUT:    Incontinent per Condom Catheter: 650 mL  Total OUT: 650 mL    Total NET: 475 mL      03 Mar 2019 07:01  -  03 Mar 2019 15:36  --------------------------------------------------------  IN:    IV PiggyBack: 100 mL    ns in tub fed  [id number]: 306 mL  Total IN: 406 mL    OUT:    Incontinent per Condom Catheter: 450 mL  Total OUT: 450 mL    Total NET: -44 mL    POCT Blood Glucose.: 110 mg/dL (01 Mar 2019 21:36)      PHYSICAL EXAM:    General: the patient is in NAD this morning but she is sedated, only responding to sternal rub. She occasionally yells/wails out.   HEENT: NC/AT; PERRL, clear conjunctiva  Neck: supple, no JVD noted, some bruising found along the L neck   Respiratory: some transmitted upper airway sounds, otherwise CTAB  Cardiovascular: +S1/S2; RRR, no murmurs, rubs, or gallops  Abdomen: soft, non-tender, non-distended; +BS in all 4 quadrants  Extremities: pale color and turgor; no rash  Neurological: AOx0, b/l upper extremities contracted and hyperreflexive. Unable to complete neuro exam 2/2 mental status.   Skin: no rash    MEDICATIONS:  MEDICATIONS  (STANDING):  aspirin  chewable 81 milliGRAM(s) Oral daily  atorvastatin 80 milliGRAM(s) Oral at bedtime  carvedilol 25 milliGRAM(s) Oral every 12 hours  chlorhexidine 2% Cloths 1 Application(s) Topical daily  dextrose 5%. 1000 milliLiter(s) (50 mL/Hr) IV Continuous <Continuous>  dextrose 50% Injectable 12.5 Gram(s) IV Push once  insulin lispro (HumaLOG) corrective regimen sliding scale   SubCutaneous every 6 hours  lacosamide IVPB 200 milliGRAM(s) IV Intermittent every 12 hours  levETIRAcetam 1000 milliGRAM(s) Oral two times a day  losartan 100 milliGRAM(s) Oral daily  melatonin 5 milliGRAM(s) Oral at bedtime  oseltamivir Suspension 75 milliGRAM(s) Oral two times a day    MEDICATIONS  (PRN):  acetaminophen   Tablet .. 650 milliGRAM(s) Oral every 6 hours PRN Temp greater or equal to 38C (100.4F)  acetaminophen   Tablet .. 650 milliGRAM(s) Oral every 6 hours PRN Mild Pain (1 - 3)  dextrose 40% Gel 15 Gram(s) Oral once PRN Blood Glucose LESS THAN 70 milliGRAM(s)/deciliter  glucagon  Injectable 1 milliGRAM(s) IntraMuscular once PRN Glucose LESS THAN 70 milligrams/deciliter  LORazepam   Injectable 2 milliGRAM(s) IV Push once PRN for seizure > 2 minutes    LABS:                         7.5    4.58  )-----------( 87       ( 03 Mar 2019 08:14 )             24.5     03-03    138  |  102  |  13  ----------------------------<  123<H>  4.0   |  25  |  0.65    Ca    8.2<L>      03 Mar 2019 08:14  Phos  4.3     03-03  Mg     2.1     03-03    TPro  5.6<L>  /  Alb  2.4<L>  /  TBili  0.2  /  DBili  x   /  AST  20  /  ALT  11  /  AlkPhos  49  03-03                  RADIOLOGY, EKG & ADDITIONAL TESTS: Reviewed.

## 2019-03-03 NOTE — PROGRESS NOTE ADULT - PROBLEM SELECTOR PLAN 1
-Patient found to have worsened AMS on 3/1 in the setting of fever, being found flu + with remainder of fever w/u negative at this point.   -likely multifactorial from prior ICH, PRES syndrome, and from seizure activity.  - CTH repeat no acute hemorrhage and no changes from prior CT head no contrast  - c/w tamiflu x 5d  - keppra 1000 BID, discontinued valproic acid  -decreased olanzapine from BID to 5 mg once a day.     #Thrombocytopenia  -likely 2/2 valproic acid, will discontinue and give keppra 1000 mg BID.

## 2019-03-04 LAB
ALBUMIN SERPL ELPH-MCNC: 3 G/DL — LOW (ref 3.3–5)
ALP SERPL-CCNC: 55 U/L — SIGNIFICANT CHANGE UP (ref 40–120)
ALT FLD-CCNC: 13 U/L — SIGNIFICANT CHANGE UP (ref 10–45)
ANION GAP SERPL CALC-SCNC: 13 MMOL/L — SIGNIFICANT CHANGE UP (ref 5–17)
AST SERPL-CCNC: 21 U/L — SIGNIFICANT CHANGE UP (ref 10–40)
BASOPHILS # BLD AUTO: 0.01 K/UL — SIGNIFICANT CHANGE UP (ref 0–0.2)
BASOPHILS NFR BLD AUTO: 0.2 % — SIGNIFICANT CHANGE UP (ref 0–2)
BILIRUB SERPL-MCNC: 0.2 MG/DL — SIGNIFICANT CHANGE UP (ref 0.2–1.2)
BUN SERPL-MCNC: 11 MG/DL — SIGNIFICANT CHANGE UP (ref 7–23)
CALCIUM SERPL-MCNC: 8.7 MG/DL — SIGNIFICANT CHANGE UP (ref 8.4–10.5)
CHLORIDE SERPL-SCNC: 103 MMOL/L — SIGNIFICANT CHANGE UP (ref 96–108)
CO2 SERPL-SCNC: 26 MMOL/L — SIGNIFICANT CHANGE UP (ref 22–31)
CREAT SERPL-MCNC: 0.58 MG/DL — SIGNIFICANT CHANGE UP (ref 0.5–1.3)
CULTURE RESULTS: SIGNIFICANT CHANGE UP
CULTURE RESULTS: SIGNIFICANT CHANGE UP
EOSINOPHIL # BLD AUTO: 0.1 K/UL — SIGNIFICANT CHANGE UP (ref 0–0.5)
EOSINOPHIL NFR BLD AUTO: 1.9 % — SIGNIFICANT CHANGE UP (ref 0–6)
GLUCOSE BLDC GLUCOMTR-MCNC: 119 MG/DL — HIGH (ref 70–99)
GLUCOSE BLDC GLUCOMTR-MCNC: 123 MG/DL — HIGH (ref 70–99)
GLUCOSE BLDC GLUCOMTR-MCNC: 125 MG/DL — HIGH (ref 70–99)
GLUCOSE BLDC GLUCOMTR-MCNC: 163 MG/DL — HIGH (ref 70–99)
GLUCOSE BLDC GLUCOMTR-MCNC: 96 MG/DL — SIGNIFICANT CHANGE UP (ref 70–99)
GLUCOSE SERPL-MCNC: 145 MG/DL — HIGH (ref 70–99)
HCT VFR BLD CALC: 26.3 % — LOW (ref 34.5–45)
HGB BLD-MCNC: 8 G/DL — LOW (ref 11.5–15.5)
IMM GRANULOCYTES NFR BLD AUTO: 1.9 % — HIGH (ref 0–1.5)
LYMPHOCYTES # BLD AUTO: 1.12 K/UL — SIGNIFICANT CHANGE UP (ref 1–3.3)
LYMPHOCYTES # BLD AUTO: 20.7 % — SIGNIFICANT CHANGE UP (ref 13–44)
MAGNESIUM SERPL-MCNC: 2.1 MG/DL — SIGNIFICANT CHANGE UP (ref 1.6–2.6)
MCHC RBC-ENTMCNC: 29.7 PG — SIGNIFICANT CHANGE UP (ref 27–34)
MCHC RBC-ENTMCNC: 30.4 GM/DL — LOW (ref 32–36)
MCV RBC AUTO: 97.8 FL — SIGNIFICANT CHANGE UP (ref 80–100)
MONOCYTES # BLD AUTO: 0.65 K/UL — SIGNIFICANT CHANGE UP (ref 0–0.9)
MONOCYTES NFR BLD AUTO: 12 % — SIGNIFICANT CHANGE UP (ref 2–14)
NEUTROPHILS # BLD AUTO: 3.42 K/UL — SIGNIFICANT CHANGE UP (ref 1.8–7.4)
NEUTROPHILS NFR BLD AUTO: 63.3 % — SIGNIFICANT CHANGE UP (ref 43–77)
NRBC # BLD: 0 /100 WBCS — SIGNIFICANT CHANGE UP (ref 0–0)
PLATELET # BLD AUTO: 82 K/UL — LOW (ref 150–400)
POTASSIUM SERPL-MCNC: 3.9 MMOL/L — SIGNIFICANT CHANGE UP (ref 3.5–5.3)
POTASSIUM SERPL-SCNC: 3.9 MMOL/L — SIGNIFICANT CHANGE UP (ref 3.5–5.3)
PROT SERPL-MCNC: 5.9 G/DL — LOW (ref 6–8.3)
RBC # BLD: 2.69 M/UL — LOW (ref 3.8–5.2)
RBC # FLD: 14.6 % — HIGH (ref 10.3–14.5)
SODIUM SERPL-SCNC: 142 MMOL/L — SIGNIFICANT CHANGE UP (ref 135–145)
SPECIMEN SOURCE: SIGNIFICANT CHANGE UP
SPECIMEN SOURCE: SIGNIFICANT CHANGE UP
WBC # BLD: 5.4 K/UL — SIGNIFICANT CHANGE UP (ref 3.8–10.5)
WBC # FLD AUTO: 5.4 K/UL — SIGNIFICANT CHANGE UP (ref 3.8–10.5)

## 2019-03-04 PROCEDURE — 99233 SBSQ HOSP IP/OBS HIGH 50: CPT | Mod: GC

## 2019-03-04 RX ORDER — ACETAMINOPHEN 500 MG
650 TABLET ORAL EVERY 6 HOURS
Qty: 0 | Refills: 0 | Status: DISCONTINUED | OUTPATIENT
Start: 2019-03-04 | End: 2019-03-04

## 2019-03-04 RX ORDER — ATORVASTATIN CALCIUM 80 MG/1
80 TABLET, FILM COATED ORAL AT BEDTIME
Qty: 0 | Refills: 0 | Status: DISCONTINUED | OUTPATIENT
Start: 2019-03-04 | End: 2019-03-07

## 2019-03-04 RX ORDER — HEPARIN SODIUM 5000 [USP'U]/ML
5000 INJECTION INTRAVENOUS; SUBCUTANEOUS EVERY 8 HOURS
Qty: 0 | Refills: 0 | Status: DISCONTINUED | OUTPATIENT
Start: 2019-03-04 | End: 2019-03-07

## 2019-03-04 RX ORDER — CARVEDILOL PHOSPHATE 80 MG/1
25 CAPSULE, EXTENDED RELEASE ORAL EVERY 12 HOURS
Qty: 0 | Refills: 0 | Status: DISCONTINUED | OUTPATIENT
Start: 2019-03-04 | End: 2019-03-07

## 2019-03-04 RX ORDER — OLANZAPINE 15 MG/1
5 TABLET, FILM COATED ORAL DAILY
Qty: 0 | Refills: 0 | Status: DISCONTINUED | OUTPATIENT
Start: 2019-03-04 | End: 2019-03-05

## 2019-03-04 RX ORDER — LANOLIN ALCOHOL/MO/W.PET/CERES
5 CREAM (GRAM) TOPICAL AT BEDTIME
Qty: 0 | Refills: 0 | Status: DISCONTINUED | OUTPATIENT
Start: 2019-03-04 | End: 2019-03-07

## 2019-03-04 RX ORDER — LEVETIRACETAM 250 MG/1
1000 TABLET, FILM COATED ORAL
Qty: 0 | Refills: 0 | Status: DISCONTINUED | OUTPATIENT
Start: 2019-03-04 | End: 2019-03-07

## 2019-03-04 RX ORDER — LOSARTAN POTASSIUM 100 MG/1
100 TABLET, FILM COATED ORAL DAILY
Qty: 0 | Refills: 0 | Status: DISCONTINUED | OUTPATIENT
Start: 2019-03-04 | End: 2019-03-07

## 2019-03-04 RX ORDER — LACOSAMIDE 50 MG/1
200 TABLET ORAL
Qty: 0 | Refills: 0 | Status: DISCONTINUED | OUTPATIENT
Start: 2019-03-04 | End: 2019-03-07

## 2019-03-04 RX ORDER — POTASSIUM CHLORIDE 20 MEQ
20 PACKET (EA) ORAL ONCE
Qty: 0 | Refills: 0 | Status: COMPLETED | OUTPATIENT
Start: 2019-03-04 | End: 2019-03-04

## 2019-03-04 RX ORDER — AMLODIPINE BESYLATE 2.5 MG/1
5 TABLET ORAL DAILY
Qty: 0 | Refills: 0 | Status: DISCONTINUED | OUTPATIENT
Start: 2019-03-04 | End: 2019-03-07

## 2019-03-04 RX ORDER — ASPIRIN/CALCIUM CARB/MAGNESIUM 324 MG
81 TABLET ORAL DAILY
Qty: 0 | Refills: 0 | Status: DISCONTINUED | OUTPATIENT
Start: 2019-03-04 | End: 2019-03-07

## 2019-03-04 RX ORDER — LEVETIRACETAM 250 MG/1
1000 TABLET, FILM COATED ORAL
Qty: 0 | Refills: 0 | Status: DISCONTINUED | OUTPATIENT
Start: 2019-03-04 | End: 2019-03-04

## 2019-03-04 RX ORDER — ACETAMINOPHEN 500 MG
650 TABLET ORAL EVERY 6 HOURS
Qty: 0 | Refills: 0 | Status: DISCONTINUED | OUTPATIENT
Start: 2019-03-04 | End: 2019-03-07

## 2019-03-04 RX ADMIN — LACOSAMIDE 140 MILLIGRAM(S): 50 TABLET ORAL at 00:00

## 2019-03-04 RX ADMIN — Medication 2: at 00:29

## 2019-03-04 RX ADMIN — CARVEDILOL PHOSPHATE 25 MILLIGRAM(S): 80 CAPSULE, EXTENDED RELEASE ORAL at 17:43

## 2019-03-04 RX ADMIN — Medication 20 MILLIEQUIVALENT(S): at 11:16

## 2019-03-04 RX ADMIN — Medication 5 MILLIGRAM(S): at 22:29

## 2019-03-04 RX ADMIN — LOSARTAN POTASSIUM 100 MILLIGRAM(S): 100 TABLET, FILM COATED ORAL at 11:15

## 2019-03-04 RX ADMIN — Medication 100 MILLIGRAM(S): at 15:58

## 2019-03-04 RX ADMIN — Medication 75 MILLIGRAM(S): at 05:50

## 2019-03-04 RX ADMIN — LOSARTAN POTASSIUM 100 MILLIGRAM(S): 100 TABLET, FILM COATED ORAL at 05:49

## 2019-03-04 RX ADMIN — LEVETIRACETAM 1000 MILLIGRAM(S): 250 TABLET, FILM COATED ORAL at 17:43

## 2019-03-04 RX ADMIN — Medication 650 MILLIGRAM(S): at 11:17

## 2019-03-04 RX ADMIN — Medication 650 MILLIGRAM(S): at 19:10

## 2019-03-04 RX ADMIN — Medication 75 MILLIGRAM(S): at 17:43

## 2019-03-04 RX ADMIN — AMLODIPINE BESYLATE 5 MILLIGRAM(S): 2.5 TABLET ORAL at 15:10

## 2019-03-04 RX ADMIN — CARVEDILOL PHOSPHATE 25 MILLIGRAM(S): 80 CAPSULE, EXTENDED RELEASE ORAL at 05:49

## 2019-03-04 RX ADMIN — Medication 650 MILLIGRAM(S): at 18:15

## 2019-03-04 RX ADMIN — Medication 650 MILLIGRAM(S): at 11:45

## 2019-03-04 RX ADMIN — Medication 81 MILLIGRAM(S): at 11:17

## 2019-03-04 RX ADMIN — HEPARIN SODIUM 5000 UNIT(S): 5000 INJECTION INTRAVENOUS; SUBCUTANEOUS at 14:58

## 2019-03-04 RX ADMIN — HEPARIN SODIUM 5000 UNIT(S): 5000 INJECTION INTRAVENOUS; SUBCUTANEOUS at 22:29

## 2019-03-04 RX ADMIN — OLANZAPINE 5 MILLIGRAM(S): 15 TABLET, FILM COATED ORAL at 11:15

## 2019-03-04 RX ADMIN — ATORVASTATIN CALCIUM 80 MILLIGRAM(S): 80 TABLET, FILM COATED ORAL at 22:29

## 2019-03-04 RX ADMIN — LACOSAMIDE 200 MILLIGRAM(S): 50 TABLET ORAL at 11:41

## 2019-03-04 RX ADMIN — LACOSAMIDE 200 MILLIGRAM(S): 50 TABLET ORAL at 17:43

## 2019-03-04 RX ADMIN — LEVETIRACETAM 1000 MILLIGRAM(S): 250 TABLET, FILM COATED ORAL at 05:50

## 2019-03-04 NOTE — PROGRESS NOTE ADULT - PROBLEM SELECTOR PLAN 4
#YAZ - now resolved  -Cre 1.2 at admission (baseline 0.60s) unclear etiology; don't think it is 2/2 dehydration as specific gravity is low on UA    #Anemia  -Slow downtrend in Hb this admission (Baseline 9-10s). Less likely acute bleeding so restarted on ASA, c/w HSQ  -iron studies w elevated ferritin #YAZ - now resolved  -Cre 1.2 at admission (baseline 0.60s) unclear etiology; don't think it is 2/2 dehydration as specific gravity is low on UA    #Anemia  -Slow downtrend in Hb this admission (Baseline 9-10s). Less likely acute bleeding so restarted on ASA, c/w HSQ  -iron studies w/ elevated ferritin

## 2019-03-04 NOTE — PROGRESS NOTE ADULT - PROBLEM SELECTOR PLAN 10
1) PCP Contacted on Admission: (Y/N) --> Name & Phone #:  2) Date of Contact with PCP:  3) PCP Contacted at Discharge: (Y/N, N/A)  4) Summary of Handoff Given to PCP:   5) Post-Discharge Appointment Date and Location: 1) PCP Contacted on Admission: (Y/N) --> Name & Phone #:   2) Date of Contact with PCP:  3) PCP Contacted at Discharge: (Y/N, N/A)  4) Summary of Handoff Given to PCP:   5) Post-Discharge Appointment Date and Location:

## 2019-03-04 NOTE — PROGRESS NOTE ADULT - PROBLEM SELECTOR PLAN 6
Addendum created: Reorder LP, TSH, T4, Hepatitis C AB and A1C per lab requisition protocol./Fauzia Zepeda MD/Mady Pond LPN #[id number]  -SSI on tube feeds    #HTN  - c/w Losartan 100mg qd

## 2019-03-04 NOTE — PROGRESS NOTE ADULT - PROBLEM SELECTOR PLAN 3
Patient with (+) UA on admission, with intermittent fevers, Tmax 101.2F  - Ucx 2/23 grew E. coli susceptible to ceftriaxone  - s/p 5 days of ctx  - UA after fever the morning of 3/1 is negative    #sepsis  Patient febrile to 101.9, tachycardic, tachypneic on 3/1 in am. fever workup complete, RVP positive for coronavirus and influenza A  - continue tamiflu 75mg BID for 5 days Patient with (+) UA on admission, with intermittent fevers, Tmax 101.2F  - Ucx 2/23 grew E. coli susceptible to ceftriaxone  - s/p 5 days of ceftriaxone  - UA after fever the morning of 3/1 is negative    #sepsis  Patient febrile to 101.9, tachycardic, tachypneic on 3/1 in am. fever workup complete, RVP positive for coronavirus and influenza A  - continue tamiflu 75mg BID until patient asymptomatic

## 2019-03-04 NOTE — PROGRESS NOTE ADULT - PROBLEM SELECTOR PLAN 2
Initially thought to be in status epilepticus, but not seen on EEG, patient now back to baseline mental status according to family  - s/p extubation 2/25, now comfortable and satting well on RA  - no clear inciting event though has history of temporal hemorrhagic infarct in 11/18 and MCA ischemic infarct in 12/18  - vEEG shows only rare Rt>Lt temporal sharp wave activity and intermittent delta wave slowing on Lt temporal, diffuse background slowing  - c/w keppra 1g BID thru PEG  - c/w vimpat 200 mg BID thru PEG  - IV lorazepam 1mg only for convulsive seizures >1min. Initially thought to be in status epilepticus, but not seen on EEG, patient now back to baseline mental status according to family  - s/p extubation 2/25, now comfortable and satting well on RA  - no clear inciting event though has history of temporal hemorrhagic infarct in 11/18 and MCA ischemic infarct in 12/18  - vEEG shows only rare Rt>Lt temporal sharp wave activity and intermittent delta wave slowing on Lt temporal, diffuse background slowing  - c/w keppra 1g BID thru PEG  - c/w vimpat 200 mg BID thru PEG  - IV lorazepam 1mg only for convulsive seizures >1min, has not required it during hospital stay

## 2019-03-04 NOTE — PROGRESS NOTE ADULT - PROBLEM SELECTOR PLAN 6
#DMT2  -SSI on tube feeds    #HTN  Restarted on home Losartan 100mg qd #DMT2  -SSI on tube feeds    #HTN  - c/w Losartan 100mg qd

## 2019-03-04 NOTE — PROGRESS NOTE ADULT - PROBLEM SELECTOR PLAN 8
Concern for delirium given waxing waning baseline.   - c/w olanzapine 5 mg daily  - Had discontinued patient's home baclofen and amitriptyline in the setting of altered mental status; now with increased limb contractures. continue to monitor Concern for delirium given waxing waning baseline.   - c/w olanzapine 5 mg daily  - Had discontinued patient's home baclofen and amitriptyline in the setting of altered mental status; now with increased limb contractures. continue to monitor  - psych consult in the AM

## 2019-03-04 NOTE — PROGRESS NOTE ADULT - PROBLEM SELECTOR PLAN 9
-HSQ and SCDs for DVT ppx  -fall precautions    F: none  E: replete for K<4 and Mg<2  N: NPO with tube feeds (Jevity 1.2 @ 51cc/hr)

## 2019-03-04 NOTE — PROGRESS NOTE ADULT - SUBJECTIVE AND OBJECTIVE BOX
INTERVAL HPI/OVERNIGHT EVENTS:    SUBJECTIVE: Patient seen and examined at bedside.    OBJECTIVE:    VITAL SIGNS:  ICU Vital Signs Last 24 Hrs  T(C): 36 (04 Mar 2019 06:00), Max: 36.8 (03 Mar 2019 13:53)  T(F): 96.8 (04 Mar 2019 06:00), Max: 98.3 (03 Mar 2019 13:53)  HR: 100 (04 Mar 2019 05:48) (82 - 100)  BP: 141/90 (04 Mar 2019 05:48) (137/94 - 157/71)  BP(mean): 111 (04 Mar 2019 05:48) (95 - 111)  ABP: --  ABP(mean): --  RR: 18 (04 Mar 2019 05:48) (16 - 20)  SpO2: 99% (04 Mar 2019 05:48) (96% - 100%)        03-03 @ 07:01  -  03-04 @ 07:00  --------------------------------------------------------  IN: 1273 mL / OUT: 1000 mL / NET: 273 mL      CAPILLARY BLOOD GLUCOSE      POCT Blood Glucose.: 123 mg/dL (04 Mar 2019 06:28)      PHYSICAL EXAM:    General: NAD  HEENT: NC/AT; PERRL, clear conjunctiva  Neck: supple  Respiratory: lungs CTA b/l, no wheezes or rhonchi  Cardiovascular: +S1/S2; RRR, no murmurs, rubs, or gallops  Abdomen: soft, non-tender, non-distended; +BS in all 4 quadrants  Extremities: WWP, 2+ peripheral pulses b/l; no LE edema  Skin: normal color and turgor; no rash  Neurological: AOx3, no focal deficits noted    MEDICATIONS:  MEDICATIONS  (STANDING):  aspirin  chewable 81 milliGRAM(s) Oral daily  atorvastatin 80 milliGRAM(s) Oral at bedtime  carvedilol 25 milliGRAM(s) Oral every 12 hours  chlorhexidine 2% Cloths 1 Application(s) Topical daily  dextrose 5%. 1000 milliLiter(s) (50 mL/Hr) IV Continuous <Continuous>  dextrose 50% Injectable 12.5 Gram(s) IV Push once  insulin lispro (HumaLOG) corrective regimen sliding scale   SubCutaneous every 6 hours  lacosamide IVPB 200 milliGRAM(s) IV Intermittent every 12 hours  levETIRAcetam 1000 milliGRAM(s) Oral two times a day  losartan 100 milliGRAM(s) Oral daily  melatonin 5 milliGRAM(s) Oral at bedtime  OLANZapine 5 milliGRAM(s) Oral daily  oseltamivir Suspension 75 milliGRAM(s) Oral two times a day    MEDICATIONS  (PRN):  acetaminophen   Tablet .. 650 milliGRAM(s) Oral every 6 hours PRN Temp greater or equal to 38C (100.4F)  acetaminophen   Tablet .. 650 milliGRAM(s) Oral every 6 hours PRN Mild Pain (1 - 3)  dextrose 40% Gel 15 Gram(s) Oral once PRN Blood Glucose LESS THAN 70 milliGRAM(s)/deciliter  glucagon  Injectable 1 milliGRAM(s) IntraMuscular once PRN Glucose LESS THAN 70 milligrams/deciliter  LORazepam   Injectable 2 milliGRAM(s) IV Push once PRN for seizure > 2 minutes      ALLERGIES:  Allergies    No Known Allergies    Intolerances        LABS:                        7.5    4.58  )-----------( 87       ( 03 Mar 2019 08:14 )             24.5     03-03    138  |  102  |  13  ----------------------------<  123<H>  4.0   |  25  |  0.65    Ca    8.2<L>      03 Mar 2019 08:14  Phos  4.3     03-03  Mg     2.1     03-03    TPro  5.6<L>  /  Alb  2.4<L>  /  TBili  0.2  /  DBili  x   /  AST  20  /  ALT  11  /  AlkPhos  49  03-03          RADIOLOGY & ADDITIONAL TESTS: INTERVAL HPI/OVERNIGHT EVENTS: Yesterday, family was at bedside and explained that the patient was back to her baseline mental status. Overnight there were no acute events.     SUBJECTIVE: Patient seen and examined at bedside. This morning the patient is much more awake and alert than she has previously been. She is bumbling responses and occasionally shaking her head in response to questioning. Otherwise, the patient denies cp, sob, abdominal pain, n/v/d.     OBJECTIVE:    VITAL SIGNS:  ICU Vital Signs Last 24 Hrs  T(C): 36 (04 Mar 2019 06:00), Max: 36.8 (03 Mar 2019 13:53)  T(F): 96.8 (04 Mar 2019 06:00), Max: 98.3 (03 Mar 2019 13:53)  HR: 100 (04 Mar 2019 05:48) (82 - 100)  BP: 141/90 (04 Mar 2019 05:48) (137/94 - 157/71)  BP(mean): 111 (04 Mar 2019 05:48) (95 - 111)  ABP: --  ABP(mean): --  RR: 18 (04 Mar 2019 05:48) (16 - 20)  SpO2: 99% (04 Mar 2019 05:48) (96% - 100%)      03-03 @ 07:01  -  03-04 @ 07:00  --------------------------------------------------------  IN: 1273 mL / OUT: 1000 mL / NET: 273 mL      CAPILLARY BLOOD GLUCOSE      POCT Blood Glucose.: 123 mg/dL (04 Mar 2019 06:28)      PHYSICAL EXAM:    General: middle aged, overweight woman sitting up in bed in NAD, awake, occasionally alert and mumbling response to questions, not oriented  HEENT: NC/AT; PERRL, clear conjunctiva  Neck: supple, no JVD noted, some bruising along the lateral L neck   Respiratory: CTA b/l, no wheezes, rales, or rhonchi  Cardiovascular: +S1/S2; RRR, no murmurs, rubs, or gallops  Abdomen: soft, non-tender, non-distended; +BS in all 4 quadrants  Extremities: pale color, good skin turgor; no rash  Neurological: AOx0, all extremities somewhat contracted and hyperreflexive. Spontaneously moves all extremities.   Skin: no rash noted    MEDICATIONS:  MEDICATIONS  (STANDING):  aspirin  chewable 81 milliGRAM(s) Oral daily  atorvastatin 80 milliGRAM(s) Oral at bedtime  carvedilol 25 milliGRAM(s) Oral every 12 hours  chlorhexidine 2% Cloths 1 Application(s) Topical daily  dextrose 5%. 1000 milliLiter(s) (50 mL/Hr) IV Continuous <Continuous>  dextrose 50% Injectable 12.5 Gram(s) IV Push once  insulin lispro (HumaLOG) corrective regimen sliding scale   SubCutaneous every 6 hours  lacosamide IVPB 200 milliGRAM(s) IV Intermittent every 12 hours  levETIRAcetam 1000 milliGRAM(s) Oral two times a day  losartan 100 milliGRAM(s) Oral daily  melatonin 5 milliGRAM(s) Oral at bedtime  OLANZapine 5 milliGRAM(s) Oral daily  oseltamivir Suspension 75 milliGRAM(s) Oral two times a day    MEDICATIONS  (PRN):  acetaminophen   Tablet .. 650 milliGRAM(s) Oral every 6 hours PRN Temp greater or equal to 38C (100.4F)  acetaminophen   Tablet .. 650 milliGRAM(s) Oral every 6 hours PRN Mild Pain (1 - 3)  dextrose 40% Gel 15 Gram(s) Oral once PRN Blood Glucose LESS THAN 70 milliGRAM(s)/deciliter  glucagon  Injectable 1 milliGRAM(s) IntraMuscular once PRN Glucose LESS THAN 70 milligrams/deciliter  LORazepam   Injectable 2 milliGRAM(s) IV Push once PRN for seizure > 2 minutes    ALLERGIES:  Allergies    No Known Allergies    LABS:                        7.5    4.58  )-----------( 87       ( 03 Mar 2019 08:14 )             24.5     03-03    138  |  102  |  13  ----------------------------<  123<H>  4.0   |  25  |  0.65    Ca    8.2<L>      03 Mar 2019 08:14  Phos  4.3     03-03  Mg     2.1     03-03    TPro  5.6<L>  /  Alb  2.4<L>  /  TBili  0.2  /  DBili  x   /  AST  20  /  ALT  11  /  AlkPhos  49  03-03      RADIOLOGY & ADDITIONAL TESTS: Reviewed TRANSFER FROM [facility] TO [facility]    HOSPITAL COURSE  63F PMHx of HTN, DM II, HLD, CAD (hx of two stents), s/p T10 laminectomy and fusion on 11/27/18 with postoperative Wernicke's aphasia due to left temporal lobar ICH of undetermined etiology, left MCA ischemic infarct 12/2018, and multiple seizures around this time, with residual deficits of aphasia, PRES syndrome, and PEG placement. Pt initially presented to [facility] for episodes of staring off into space, where she was intubated for status epilepticus and transferred to [facility] for vEEG and MRI.     At [facility] patient was also found to have a UTI and was started on ceftriaxone. On arrival to [facility], pt was treated with depakote, keppra, and vimpat for seizure management, and was continued on ceftriaxone for the UTI. On 2/25 the patient was extubated and transitioned to nasal canula. During her stay in the MICU patient's mental status waxed and waned. At times she was disoriented and moaning. By 2/27 she was responding to voice, following some directions, and tracking movement around her bedside, she was also able to tell the neurologist "I am OK." vEEG shows only rare Rt>Lt temporal sharp wave activity, intermittent delta wave slowing on L temporal, background slowing, no seizure activity. She was stepped down to the [facility] medical floor where she spiked a temp and was noted to have altered mental status, Found to be flu A and coronavirus +. Tamiflu was started. Stepped up to [facility] for closer monitoring. Baclofen and Amitriptyline discontinued. vEEG again done with results as above, no epileptiform activity. Patient's mental status improved back to baseline (as per family) and is stable for stepdown to the [facility].      INTERVAL HPI/OVERNIGHT EVENTS: Yesterday, family was at bedside and explained that the patient was back to her baseline mental status. Overnight there were no acute events.     SUBJECTIVE: Patient seen and examined at bedside. This morning the patient is much more awake and alert than she has previously been. She is bumbling responses and occasionally shaking her head in response to questioning. Otherwise, the patient denies cp, sob, abdominal pain, n/v/d.     OBJECTIVE:    VITAL SIGNS:  ICU Vital Signs Last 24 Hrs  T(C): 36 (04 Mar 2019 06:00), Max: 36.8 (03 Mar 2019 13:53)  T(F): 96.8 (04 Mar 2019 06:00), Max: 98.3 (03 Mar 2019 13:53)  HR: 100 (04 Mar 2019 05:48) (82 - 100)  BP: 141/90 (04 Mar 2019 05:48) (137/94 - 157/71)  BP(mean): 111 (04 Mar 2019 05:48) (95 - 111)  ABP: --  ABP(mean): --  RR: 18 (04 Mar 2019 05:48) (16 - 20)  SpO2: 99% (04 Mar 2019 05:48) (96% - 100%)      03-03 @ 07:01  -  03-04 @ 07:00  --------------------------------------------------------  IN: 1273 mL / OUT: 1000 mL / NET: 273 mL      CAPILLARY BLOOD GLUCOSE      POCT Blood Glucose.: 123 mg/dL (04 Mar 2019 06:28)      PHYSICAL EXAM:    General: middle aged, overweight woman sitting up in bed in NAD, awake, occasionally alert and mumbling response to questions, not oriented  HEENT: NC/AT; PERRL, clear conjunctiva  Neck: supple, no JVD noted, some bruising along the lateral L neck   Respiratory: CTA b/l, no wheezes, rales, or rhonchi  Cardiovascular: +S1/S2; RRR, no murmurs, rubs, or gallops  Abdomen: soft, non-tender, non-distended; +BS in all 4 quadrants  Extremities: pale color, good skin turgor; no rash  Neurological: AOx0, all extremities somewhat contracted and hyperreflexive. Spontaneously moves all extremities.   Skin: no rash noted    MEDICATIONS:  MEDICATIONS  (STANDING):  aspirin  chewable 81 milliGRAM(s) Oral daily  atorvastatin 80 milliGRAM(s) Oral at bedtime  carvedilol 25 milliGRAM(s) Oral every 12 hours  chlorhexidine 2% Cloths 1 Application(s) Topical daily  dextrose 5%. 1000 milliLiter(s) (50 mL/Hr) IV Continuous <Continuous>  dextrose 50% Injectable 12.5 Gram(s) IV Push once  insulin lispro (HumaLOG) corrective regimen sliding scale   SubCutaneous every 6 hours  lacosamide IVPB 200 milliGRAM(s) IV Intermittent every 12 hours  levETIRAcetam 1000 milliGRAM(s) Oral two times a day  losartan 100 milliGRAM(s) Oral daily  melatonin 5 milliGRAM(s) Oral at bedtime  OLANZapine 5 milliGRAM(s) Oral daily  oseltamivir Suspension 75 milliGRAM(s) Oral two times a day    MEDICATIONS  (PRN):  acetaminophen   Tablet .. 650 milliGRAM(s) Oral every 6 hours PRN Temp greater or equal to 38C (100.4F)  acetaminophen   Tablet .. 650 milliGRAM(s) Oral every 6 hours PRN Mild Pain (1 - 3)  dextrose 40% Gel 15 Gram(s) Oral once PRN Blood Glucose LESS THAN 70 milliGRAM(s)/deciliter  glucagon  Injectable 1 milliGRAM(s) IntraMuscular once PRN Glucose LESS THAN 70 milligrams/deciliter  LORazepam   Injectable 2 milliGRAM(s) IV Push once PRN for seizure > 2 minutes    ALLERGIES:  Allergies    No Known Allergies    LABS:                        7.5    4.58  )-----------( 87       ( 03 Mar 2019 08:14 )             24.5     03-03    138  |  102  |  13  ----------------------------<  123<H>  4.0   |  25  |  0.65    Ca    8.2<L>      03 Mar 2019 08:14  Phos  4.3     03-03  Mg     2.1     03-03    TPro  5.6<L>  /  Alb  2.4<L>  /  TBili  0.2  /  DBili  x   /  AST  20  /  ALT  11  /  AlkPhos  49  03-03      RADIOLOGY & ADDITIONAL TESTS: Reviewed

## 2019-03-04 NOTE — PROGRESS NOTE ADULT - PROBLEM SELECTOR PLAN 4
#YAZ - now resolved  -Cre 1.2 at admission (baseline 0.60s) unclear etiology; don't think it is 2/2 dehydration as specific gravity is low on UA    #Anemia  -Slow downtrend in Hb this admission (Baseline 9-10s). Less likely acute bleeding so restarted on ASA, c/w HSQ  -iron studies w/ elevated ferritin #YAZ - now resolved  #Anemia  Slow downtrend in Hb this admission (Baseline 9-10s), Likely due to prolonged hospitalization/phlebotomy with underlying anemia of chronic disease. No signs of acute bleeding.   - continue to monitor

## 2019-03-04 NOTE — PROGRESS NOTE ADULT - ASSESSMENT
63F with hx of HTN, DM2, CAD, CVA x2 (left sided hemorrhagic, followed one month later by left sided ischemic CVA) with residual seizure disorder, aphasia and dysphagia s/p PEG who presents with "staring into space", inattentiveness and increased lethargy x 1 day. Intubated at OSH for status epilepticus and transferred to [facility] MICU for vEEG only found to have b/l temporal lobe spikes R>L. Now extubated, stepped down to [facility] on 2/28 pm, again being stepped up to [facility] in setting of increased lethargy, found to be flu A positive. 63F with hx of HTN, DM2, CAD, CVA x2 (left sided hemorrhagic, followed one month later by left sided ischemic CVA) with residual seizure disorder, aphasia and dysphagia s/p PEG who presents with "staring into space", inattentiveness and increased lethargy x 1 day. Intubated at OSH for status epilepticus and transferred to [facility] MICU for vEEG only found to have b/l temporal lobe spikes R>L. Now extubated, stepped down to F on 2/28 pm, again being stepped up to [facility] in setting of increased lethargy, found to be flu A positive. Since treatment for flu and taking off of sedating medications, the patient has been more responsive and less lethargic. Stable for transfer to the [facility].

## 2019-03-04 NOTE — PROGRESS NOTE ADULT - SUBJECTIVE AND OBJECTIVE BOX
Transfer Acceptance Note from [facility] to [facility]    Hospital Course:   63F PMHx of HTN, DM II, HLD, CAD (hx of two stents), s/p T10 laminectomy and fusion on 11/27/18 with postoperative Wernicke's aphasia due to left temporal lobar ICH of undetermined etiology, left MCA ischemic infarct 12/2018, and multiple seizures around this time, with residual deficits of aphasia, PRES syndrome, and PEG placement. Pt initially presented to [place] for episodes of staring off into space, where she was intubated for status epilepticus and transferred to [facility] for vEEG and MRI.     At [place] patient was also found to have a UTI and was started on ceftriaxone. On arrival to [facility], pt was treated with depakote, keppra, and vimpat for seizure management, and was continued on ceftriaxone for the UTI. On 2/25 the patient was extubated and transitioned to nasal canula. During her stay in the MICU patient's mental status waxed and waned. At times she was disoriented and moaning. By 2/27 she was responding to voice, following some directions, and tracking movement around her bedside, she was also able to tell the neurologist "I am OK." vEEG shows only rare Rt>Lt temporal sharp wave activity, intermittent delta wave slowing on L temporal, background slowing, no seizure activity. She was stepped down to the [facility] medical floor where she spiked a temp and was noted to have altered mental status, Found to be flu A and coronavirus +. Tamiflu was started. Stepped up to [facility] for closer monitoring. Baclofen and Amitriptyline discontinued. vEEG again done with results as above, no epileptiform activity. Patient's mental status improved back to baseline (as per family) and is stable for stepdown to the [facility].      INTERVAL HPI/OVERNIGHT EVENTS: Yesterday, family was at bedside and explained that the patient was back to her baseline mental status. Overnight there were no acute events.     SUBJECTIVE: Patient seen and examined at bedside. This morning the patient is much more awake and alert than she has previously been. She is bumbling responses and occasionally shaking her head in response to questioning. Otherwise, the patient denies cp, sob, abdominal pain, n/v/d.     OVERNIGHT EVENTS: NAEO  SUBJECTIVE / INTERVAL HPI: Patient seen and examined at bedside. Per family, patient's mental status is returning to baseline. Occasionally responding to her name, and comprehensible only intermittently. She occasionally answers questions, but a reliable ROS unable to obtain. Per the , patient has been moaning in pain, but pt unable to state where the pain is. Per family, patient has been on several different medications, one of which is Cymbalta, which she has not been receiving while she has been hospitalized.     VITAL SIGNS:  Vital Signs Last 24 Hrs  T(C): 37 (04 Mar 2019 17:44), Max: 37 (04 Mar 2019 17:44)  T(F): 98.6 (04 Mar 2019 17:44), Max: 98.6 (04 Mar 2019 17:44)  HR: 96 (04 Mar 2019 18:12) (88 - 100)  BP: 158/95 (04 Mar 2019 18:12) (139/71 - 195/80)  BP(mean): 123 (04 Mar 2019 18:12) (99 - 123)  RR: 18 (04 Mar 2019 18:12) (16 - 20)  SpO2: 99% (04 Mar 2019 18:12) (96% - 100%)    PHYSICAL EXAM:    General: WDWN, awake, intermittently alert and mumbling responses to questions, disoriented moaning/crying at times   HEENT: NC/AT; PERRL, anicteric sclera; MMM  Neck: supple, no JVD noted   Cardiovascular: +S1/S2, RRR  Respiratory: CTA B/L; no W/R/R  Gastrointestinal: soft, NT/ND; +BSx4  Extremities: WWP; no edema, clubbing or cyanosis  Vascular: 2+ radial, DP/PT pulses B/L  Neurological: AAOx0, spontaneously moves all extremities, however upper extremities seem to be contracted. unable to follow all commands.    MEDICATIONS:  MEDICATIONS  (STANDING):  amLODIPine   Tablet 5 milliGRAM(s) Oral daily  aspirin  chewable 81 milliGRAM(s) Oral daily  atorvastatin 80 milliGRAM(s) Oral at bedtime  carvedilol 25 milliGRAM(s) Oral every 12 hours  chlorhexidine 2% Cloths 1 Application(s) Topical daily  dextrose 5%. 1000 milliLiter(s) (50 mL/Hr) IV Continuous <Continuous>  dextrose 50% Injectable 12.5 Gram(s) IV Push once  heparin  Injectable 5000 Unit(s) SubCutaneous every 8 hours  insulin lispro (HumaLOG) corrective regimen sliding scale   SubCutaneous every 6 hours  lacosamide 200 milliGRAM(s) Oral two times a day  levETIRAcetam  Solution 1000 milliGRAM(s) Oral two times a day  losartan 100 milliGRAM(s) Oral daily  melatonin 5 milliGRAM(s) Oral at bedtime  OLANZapine 5 milliGRAM(s) Oral daily  oseltamivir Suspension 75 milliGRAM(s) Oral two times a day    MEDICATIONS  (PRN):  acetaminophen    Suspension .. 650 milliGRAM(s) Oral every 6 hours PRN Temp greater or equal to 38C (100.4F), Mild Pain (1 - 3)  acetaminophen   Tablet .. 650 milliGRAM(s) Oral every 6 hours PRN Moderate Pain (4 - 6)  dextrose 40% Gel 15 Gram(s) Oral once PRN Blood Glucose LESS THAN 70 milliGRAM(s)/deciliter  glucagon  Injectable 1 milliGRAM(s) IntraMuscular once PRN Glucose LESS THAN 70 milligrams/deciliter  guaiFENesin   Syrup  (Sugar-Free) 100 milliGRAM(s) Oral every 6 hours PRN Cough  LORazepam   Injectable 2 milliGRAM(s) IV Push once PRN for seizure > 2 minutes      ALLERGIES:  Allergies    No Known Allergies    Intolerances        LABS:                        8.0    5.40  )-----------( 82       ( 04 Mar 2019 07:22 )             26.3     03-04    142  |  103  |  11  ----------------------------<  145<H>  3.9   |  26  |  0.58    Ca    8.7      04 Mar 2019 07:22  Phos  4.3     03-03  Mg     2.1     03-04    TPro  5.9<L>  /  Alb  3.0<L>  /  TBili  0.2  /  DBili  x   /  AST  21  /  ALT  13  /  AlkPhos  55  03-04        CAPILLARY BLOOD GLUCOSE      POCT Blood Glucose.: 119 mg/dL (04 Mar 2019 17:47)      RADIOLOGY & ADDITIONAL TESTS: Reviewed. Transfer Acceptance Note from [facility] to [facility]    Hospital Course:   63F PMHx of HTN, DM II, HLD, CAD (hx of two stents), s/p T10 laminectomy and fusion on 11/27/18 with postoperative Wernicke's aphasia due to left temporal lobar ICH of undetermined etiology, left MCA ischemic infarct 12/2018, and multiple seizures around this time, with residual deficits of aphasia, PRES syndrome, and PEG placement. Pt initially presented to [place] for episodes of staring off into space, where she was intubated for status epilepticus and transferred to [facility] for vEEG and MRI.     At [place] patient was also found to have a UTI and was started on ceftriaxone. On arrival to [facility], pt was treated with depakote, keppra, and vimpat for seizure management, and was continued on ceftriaxone for the UTI. On 2/25 the patient was extubated and transitioned to nasal canula. During her stay in the MICU patient's mental status waxed and waned. At times she was disoriented and moaning. By 2/27 she was responding to voice, following some directions, and tracking movement around her bedside, she was also able to tell the neurologist "I am OK." vEEG shows only rare Rt>Lt temporal sharp wave activity, intermittent delta wave slowing on L temporal, background slowing, no seizure activity. She was stepped down to the [facility] medical floor where she spiked a temp and was noted to have altered mental status, Found to be flu A and coronavirus +. Tamiflu was started. Stepped up to [facility] for closer monitoring. Baclofen and Amitriptyline discontinued. vEEG again done with results as above, no epileptiform activity. Patient's mental status improved back to baseline (as per family) and is stable for stepdown to the [facility].      INTERVAL HPI/OVERNIGHT EVENTS: Yesterday, family was at bedside and explained that the patient was back to her baseline mental status. Overnight there were no acute events.     SUBJECTIVE: Patient seen and examined at bedside. This morning the patient is much more awake and alert than she has previously been. She is bumbling responses and occasionally shaking her head in response to questioning. Otherwise, the patient denies cp, sob, abdominal pain, n/v/d.     OVERNIGHT EVENTS: NAEO  SUBJECTIVE / INTERVAL HPI: Patient seen and examined at bedside. Per family, patient's mental status is returning to baseline. Occasionally responding to her name, and comprehensible only intermittently. She occasionally answers questions, but a reliable ROS unable to obtain. Per the , patient has been moaning in pain, but pt unable to state where the pain is. Per family, patient has been on several different medications, one of which is Cymbalta, which she has not been receiving while she has been hospitalized. 12 pt ROS is otherwise negative    VITAL SIGNS:  Vital Signs Last 24 Hrs  T(C): 37 (04 Mar 2019 17:44), Max: 37 (04 Mar 2019 17:44)  T(F): 98.6 (04 Mar 2019 17:44), Max: 98.6 (04 Mar 2019 17:44)  HR: 96 (04 Mar 2019 18:12) (88 - 100)  BP: 158/95 (04 Mar 2019 18:12) (139/71 - 195/80)  BP(mean): 123 (04 Mar 2019 18:12) (99 - 123)  RR: 18 (04 Mar 2019 18:12) (16 - 20)  SpO2: 99% (04 Mar 2019 18:12) (96% - 100%)    PHYSICAL EXAM:    General: WDWN, awake, intermittently alert and mumbling responses to questions, disoriented moaning/crying at times   HEENT: NC/AT; PERRL, anicteric sclera; MMM  Neck: supple, no JVD noted   Cardiovascular: +S1/S2, RRR  Respiratory: CTA B/L; no W/R/R  Gastrointestinal: soft, NT/ND; +BSx4  Extremities: WWP; no edema, clubbing or cyanosis  Vascular: 2+ radial, DP/PT pulses B/L  Neurological: AAOx0, spontaneously moves all extremities, however upper extremities seem to be contracted. unable to follow all commands.  Psych: normal affect  Heme: no LAD    MEDICATIONS:  MEDICATIONS  (STANDING):  amLODIPine   Tablet 5 milliGRAM(s) Oral daily  aspirin  chewable 81 milliGRAM(s) Oral daily  atorvastatin 80 milliGRAM(s) Oral at bedtime  carvedilol 25 milliGRAM(s) Oral every 12 hours  chlorhexidine 2% Cloths 1 Application(s) Topical daily  dextrose 5%. 1000 milliLiter(s) (50 mL/Hr) IV Continuous <Continuous>  dextrose 50% Injectable 12.5 Gram(s) IV Push once  heparin  Injectable 5000 Unit(s) SubCutaneous every 8 hours  insulin lispro (HumaLOG) corrective regimen sliding scale   SubCutaneous every 6 hours  lacosamide 200 milliGRAM(s) Oral two times a day  levETIRAcetam  Solution 1000 milliGRAM(s) Oral two times a day  losartan 100 milliGRAM(s) Oral daily  melatonin 5 milliGRAM(s) Oral at bedtime  OLANZapine 5 milliGRAM(s) Oral daily  oseltamivir Suspension 75 milliGRAM(s) Oral two times a day    MEDICATIONS  (PRN):  acetaminophen    Suspension .. 650 milliGRAM(s) Oral every 6 hours PRN Temp greater or equal to 38C (100.4F), Mild Pain (1 - 3)  acetaminophen   Tablet .. 650 milliGRAM(s) Oral every 6 hours PRN Moderate Pain (4 - 6)  dextrose 40% Gel 15 Gram(s) Oral once PRN Blood Glucose LESS THAN 70 milliGRAM(s)/deciliter  glucagon  Injectable 1 milliGRAM(s) IntraMuscular once PRN Glucose LESS THAN 70 milligrams/deciliter  guaiFENesin   Syrup  (Sugar-Free) 100 milliGRAM(s) Oral every 6 hours PRN Cough  LORazepam   Injectable 2 milliGRAM(s) IV Push once PRN for seizure > 2 minutes      ALLERGIES:  Allergies    No Known Allergies    Intolerances        LABS:                        8.0    5.40  )-----------( 82       ( 04 Mar 2019 07:22 )             26.3     03-04    142  |  103  |  11  ----------------------------<  145<H>  3.9   |  26  |  0.58    Ca    8.7      04 Mar 2019 07:22  Phos  4.3     03-03  Mg     2.1     03-04    TPro  5.9<L>  /  Alb  3.0<L>  /  TBili  0.2  /  DBili  x   /  AST  21  /  ALT  13  /  AlkPhos  55  03-04        CAPILLARY BLOOD GLUCOSE      POCT Blood Glucose.: 119 mg/dL (04 Mar 2019 17:47)      RADIOLOGY & ADDITIONAL TESTS: Reviewed.

## 2019-03-04 NOTE — PROGRESS NOTE ADULT - ASSESSMENT
63F with hx of HTN, DM2, CAD, CVA x2 (left sided hemorrhagic, followed one month later by left sided ischemic CVA) with residual seizure disorder, aphasia and dysphagia s/p PEG who presents with "staring into space", inattentiveness and increased lethargy, hospital course complicated by influenza A positive, now on Tamiflu, with improving mental status, transferred to [facility] for further management.

## 2019-03-04 NOTE — PROGRESS NOTE ADULT - PROBLEM SELECTOR PLAN 8
-Concern for delirium given waxing waning baseline.   - c/w olanzapine 5 mg daily Concern for delirium given waxing waning baseline.   - c/w olanzapine 5 mg daily  - Had discontinued patient's home baclofen and amitriptyline in the setting of altered mental status; now with increased limb contractures. continue to monitor

## 2019-03-04 NOTE — PROGRESS NOTE ADULT - PROBLEM SELECTOR PLAN 1
-Patient found to have worsened AMS on 3/1 in the setting of fever, being found flu + with remainder of fever w/u negative at this point.   -likely multifactorial from prior ICH, PRES syndrome, and from seizure activity.  - CTH repeat no acute hemorrhage and no changes from prior CT head no contrast  - c/w tamiflu x 5d  - keppra 1000 BID, discontinued valproic acid  -decreased olanzapine from BID to 5 mg once a day.     #Thrombocytopenia  -likely 2/2 valproic acid, will discontinue and give keppra 1000 mg BID. Patient found to have worsened AMS on 3/1 in the setting of fever, being found flu + with remainder of fever w/u negative; likely multifactorial from prior ICH, PRES syndrome, sedating medications, and possible seizure activity.  - CTH repeat no acute hemorrhage and no changes from prior CT head  - c/w tamiflu until asymptomatic (still with cough)  - c/w keppra 1000 BID  - c/w olanzapine 5 mg daily    #Thrombocytopenia  -likely 2/2 valproic acid, discontinued on 3/3 and increased keppra to 1000 mg BID.

## 2019-03-04 NOTE — PROGRESS NOTE ADULT - PROBLEM SELECTOR PLAN 2
Initially thought to be in status epilepticus, but not seen on EEG, patient now with decreased mental status  - s/p extubation 2/25, now comfortable on RA  - no clear inciting event though has history of temporal hemorrhagic infarct in 11/18 and MCA ischemic infarct in 12/18  - vEEG shows only rare Rt>Lt temporal sharp wave activity and intermittent delta wave slowing on Lt temporal, no seizures  - newest read with diffuse background slowing  - c/w keppra 1g BID  - c/w vimpat 200 mg BID  - IV lorazepam 1mg only for convulsive seizures >1min.  - depakote level wnl Initially thought to be in status epilepticus, but not seen on EEG, patient now back to baseline mental status according to family  - s/p extubation 2/25, now comfortable and satting well on RA  - no clear inciting event though has history of temporal hemorrhagic infarct in 11/18 and MCA ischemic infarct in 12/18  - vEEG shows only rare Rt>Lt temporal sharp wave activity and intermittent delta wave slowing on Lt temporal, diffuse background slowing  - c/w keppra 1g BID thru PEG  - c/w vimpat 200 mg BID thru PEG  - IV lorazepam 1mg only for convulsive seizures >1min.

## 2019-03-04 NOTE — PROGRESS NOTE ADULT - PROBLEM SELECTOR PLAN 5
S/p stents   -c/w coreg 25 q12h  -C/w Lipitor, ASA S/p stents  -c/w ASA 81 mg daily   -c/w coreg 25 q12h  -C/w Lipitor 80 mg daily

## 2019-03-04 NOTE — PROGRESS NOTE ADULT - PROBLEM SELECTOR PLAN 1
Patient found to have worsened AMS on 3/1 in the setting of fever, being found flu + with remainder of fever w/u negative; likely multifactorial from prior ICH, PRES syndrome, sedating medications, and possible seizure activity.  - CTH repeat no acute hemorrhage and no changes from prior CT head  - c/w tamiflu for total of 5 days   - c/w keppra 1000 BID  - c/w olanzapine 5 mg daily    #Thrombocytopenia  -likely 2/2 valproic acid, discontinued on 3/3 and increased keppra to 1000 mg BID. Patient found to have worsened AMS on 3/1 in the setting of fever, being found flu + with remainder of fever w/u negative; likely multifactorial from prior ICH, PRES syndrome, sedating medications, and possible seizure activity. mental status now back to baseline, per family. CT head negative.   - c/w tamiflu for total of 5 days   - c/w keppra 1000 BID  - c/w olanzapine 5 mg daily  - Psych consult     #Thrombocytopenia  -likely 2/2 valproic acid, discontinued on 3/3 and increased keppra to 1000 mg BID.  - low probability for HIT, 4T score of 2 Patient found to have worsened AMS on 3/1 likely multifactorial from prior ICH, PRES syndrome, sedating medications, and possible seizure activity, sepsis 2/2 Flu. However, mental status now back to baseline, per family. CT head negative.   - c/w tamiflu for total of 5 days   - c/w keppra 1000 BID  - c/w olanzapine 5 mg daily  - Psych consult     #Thrombocytopenia- downtrending plt count to 82, plateauing now, no active signs of bleeding.  -likely 2/2 valproic acid, discontinued on 3/3 and increased keppra to 1000 mg BID.  - low probability for HIT, 4T score of 2  - continue to monitor

## 2019-03-04 NOTE — PROGRESS NOTE ADULT - PROBLEM SELECTOR PLAN 3
Patient with (+) UA on admission, with intermittent fevers, Tmax 101.2F  - Ucx 2/23 grew E. coli susceptible to ceftriaxone  - s/p 5 days of ceftriaxone  - UA after fever the morning of 3/1 is negative    #sepsis  Patient febrile to 101.9, tachycardic, tachypneic on 3/1 in am. fever workup complete, RVP positive for coronavirus and influenza A  - continue tamiflu 75mg BID until patient asymptomatic Patient with (+) UA on admission. Ucx 2/23 grew E. coli susceptible to ceftriaxone, s/p treatment with CTX. Negative UA. Last temp spike 100.8 (3/2/19)    #Sepsis 2/2 influenza A/Coronavirus   - continue tamiflu 75mg BID for total of 5 days

## 2019-03-04 NOTE — PROGRESS NOTE ADULT - PROBLEM SELECTOR PLAN 9
-tube feeds  -HSQ for DVT ppx  -fall precautions -HSQ and SCDs for DVT ppx  -fall precautions    F: none  E: replete for K<4 and Mg<2  N: NPO with tube feeds (Jevity 1.2 @ 51cc/hr)

## 2019-03-05 ENCOUNTER — TRANSCRIPTION ENCOUNTER (OUTPATIENT)
Age: 64
End: 2019-03-05

## 2019-03-05 DIAGNOSIS — D64.9 ANEMIA, UNSPECIFIED: ICD-10-CM

## 2019-03-05 LAB
ANION GAP SERPL CALC-SCNC: 12 MMOL/L — SIGNIFICANT CHANGE UP (ref 5–17)
BUN SERPL-MCNC: 8 MG/DL — SIGNIFICANT CHANGE UP (ref 7–23)
CALCIUM SERPL-MCNC: 8.8 MG/DL — SIGNIFICANT CHANGE UP (ref 8.4–10.5)
CHLORIDE SERPL-SCNC: 103 MMOL/L — SIGNIFICANT CHANGE UP (ref 96–108)
CO2 SERPL-SCNC: 25 MMOL/L — SIGNIFICANT CHANGE UP (ref 22–31)
CREAT SERPL-MCNC: 0.64 MG/DL — SIGNIFICANT CHANGE UP (ref 0.5–1.3)
GLUCOSE BLDC GLUCOMTR-MCNC: 102 MG/DL — HIGH (ref 70–99)
GLUCOSE SERPL-MCNC: 108 MG/DL — HIGH (ref 70–99)
HCT VFR BLD CALC: 27.7 % — LOW (ref 34.5–45)
HGB BLD-MCNC: 8.7 G/DL — LOW (ref 11.5–15.5)
MAGNESIUM SERPL-MCNC: 2 MG/DL — SIGNIFICANT CHANGE UP (ref 1.6–2.6)
MCHC RBC-ENTMCNC: 30.3 PG — SIGNIFICANT CHANGE UP (ref 27–34)
MCHC RBC-ENTMCNC: 31.4 GM/DL — LOW (ref 32–36)
MCV RBC AUTO: 96.5 FL — SIGNIFICANT CHANGE UP (ref 80–100)
NRBC # BLD: 0 /100 WBCS — SIGNIFICANT CHANGE UP (ref 0–0)
PLATELET # BLD AUTO: 85 K/UL — LOW (ref 150–400)
POTASSIUM SERPL-MCNC: 4.2 MMOL/L — SIGNIFICANT CHANGE UP (ref 3.5–5.3)
POTASSIUM SERPL-SCNC: 4.2 MMOL/L — SIGNIFICANT CHANGE UP (ref 3.5–5.3)
RBC # BLD: 2.87 M/UL — LOW (ref 3.8–5.2)
RBC # FLD: 14.5 % — SIGNIFICANT CHANGE UP (ref 10.3–14.5)
SODIUM SERPL-SCNC: 140 MMOL/L — SIGNIFICANT CHANGE UP (ref 135–145)
WBC # BLD: 5.51 K/UL — SIGNIFICANT CHANGE UP (ref 3.8–10.5)
WBC # FLD AUTO: 5.51 K/UL — SIGNIFICANT CHANGE UP (ref 3.8–10.5)

## 2019-03-05 PROCEDURE — 99233 SBSQ HOSP IP/OBS HIGH 50: CPT | Mod: GC

## 2019-03-05 RX ORDER — OLANZAPINE 15 MG/1
5 TABLET, FILM COATED ORAL ONCE
Qty: 0 | Refills: 0 | Status: COMPLETED | OUTPATIENT
Start: 2019-03-05 | End: 2019-03-05

## 2019-03-05 RX ORDER — OLANZAPINE 15 MG/1
5 TABLET, FILM COATED ORAL DAILY
Qty: 0 | Refills: 0 | Status: DISCONTINUED | OUTPATIENT
Start: 2019-03-05 | End: 2019-03-05

## 2019-03-05 RX ORDER — LEVETIRACETAM 250 MG/1
10 TABLET, FILM COATED ORAL
Qty: 0 | Refills: 0 | DISCHARGE
Start: 2019-03-05

## 2019-03-05 RX ORDER — HALOPERIDOL DECANOATE 100 MG/ML
0.5 INJECTION INTRAMUSCULAR ONCE
Qty: 0 | Refills: 0 | Status: COMPLETED | OUTPATIENT
Start: 2019-03-05 | End: 2019-03-05

## 2019-03-05 RX ORDER — OLANZAPINE 15 MG/1
5 TABLET, FILM COATED ORAL DAILY
Qty: 0 | Refills: 0 | Status: DISCONTINUED | OUTPATIENT
Start: 2019-03-06 | End: 2019-03-07

## 2019-03-05 RX ORDER — SALICYLIC ACID 0.5 %
1 CLEANSER (GRAM) TOPICAL DAILY
Qty: 0 | Refills: 0 | Status: DISCONTINUED | OUTPATIENT
Start: 2019-03-05 | End: 2019-03-05

## 2019-03-05 RX ORDER — OLANZAPINE 15 MG/1
5 TABLET, FILM COATED ORAL ONCE
Qty: 0 | Refills: 0 | Status: COMPLETED | OUTPATIENT
Start: 2019-03-05 | End: 2019-03-06

## 2019-03-05 RX ORDER — AMLODIPINE BESYLATE 2.5 MG/1
1 TABLET ORAL
Qty: 0 | Refills: 0 | DISCHARGE
Start: 2019-03-05

## 2019-03-05 RX ADMIN — HALOPERIDOL DECANOATE 0.5 MILLIGRAM(S): 100 INJECTION INTRAMUSCULAR at 01:35

## 2019-03-05 RX ADMIN — OLANZAPINE 5 MILLIGRAM(S): 15 TABLET, FILM COATED ORAL at 17:51

## 2019-03-05 RX ADMIN — OLANZAPINE 5 MILLIGRAM(S): 15 TABLET, FILM COATED ORAL at 11:30

## 2019-03-05 RX ADMIN — Medication 75 MILLIGRAM(S): at 06:28

## 2019-03-05 RX ADMIN — LEVETIRACETAM 1000 MILLIGRAM(S): 250 TABLET, FILM COATED ORAL at 17:51

## 2019-03-05 RX ADMIN — CARVEDILOL PHOSPHATE 25 MILLIGRAM(S): 80 CAPSULE, EXTENDED RELEASE ORAL at 06:27

## 2019-03-05 RX ADMIN — Medication 81 MILLIGRAM(S): at 11:30

## 2019-03-05 RX ADMIN — Medication 5 MILLIGRAM(S): at 21:38

## 2019-03-05 RX ADMIN — HEPARIN SODIUM 5000 UNIT(S): 5000 INJECTION INTRAVENOUS; SUBCUTANEOUS at 21:38

## 2019-03-05 RX ADMIN — AMLODIPINE BESYLATE 5 MILLIGRAM(S): 2.5 TABLET ORAL at 06:41

## 2019-03-05 RX ADMIN — HEPARIN SODIUM 5000 UNIT(S): 5000 INJECTION INTRAVENOUS; SUBCUTANEOUS at 06:28

## 2019-03-05 RX ADMIN — LACOSAMIDE 200 MILLIGRAM(S): 50 TABLET ORAL at 17:51

## 2019-03-05 RX ADMIN — LEVETIRACETAM 1000 MILLIGRAM(S): 250 TABLET, FILM COATED ORAL at 06:28

## 2019-03-05 RX ADMIN — LACOSAMIDE 200 MILLIGRAM(S): 50 TABLET ORAL at 06:47

## 2019-03-05 RX ADMIN — HEPARIN SODIUM 5000 UNIT(S): 5000 INJECTION INTRAVENOUS; SUBCUTANEOUS at 14:09

## 2019-03-05 RX ADMIN — LOSARTAN POTASSIUM 100 MILLIGRAM(S): 100 TABLET, FILM COATED ORAL at 06:27

## 2019-03-05 RX ADMIN — Medication 2: at 22:00

## 2019-03-05 RX ADMIN — ATORVASTATIN CALCIUM 80 MILLIGRAM(S): 80 TABLET, FILM COATED ORAL at 21:37

## 2019-03-05 RX ADMIN — Medication 75 MILLIGRAM(S): at 17:52

## 2019-03-05 RX ADMIN — CARVEDILOL PHOSPHATE 25 MILLIGRAM(S): 80 CAPSULE, EXTENDED RELEASE ORAL at 17:51

## 2019-03-05 NOTE — PROGRESS NOTE ADULT - ASSESSMENT
63F with hx of HTN, DM2, CAD, CVA x2 (left sided hemorrhagic, followed one month later by left sided ischemic CVA) with residual seizure disorder, aphasia and dysphagia s/p PEG who presents with "staring into space", inattentiveness and increased lethargy, hospital course complicated by influenza A positive, now on Tamiflu, with improving mental status, transferred to [facility] for further management. 63F with hx of HTN, DM2, CAD, CVA x2 (left sided hemorrhagic stroke in 11/2018 followed one month later by left sided ischemic CVA in 12/2018) with residual seizure disorder, aphasia and dysphagia s/p PEG who presents with "staring into space", inattentiveness and increased lethargy, hospital course complicated by influenza A positive, now on Tamiflu, with improving mental status, transferred to [facility] for further management.

## 2019-03-05 NOTE — PROGRESS NOTE ADULT - SUBJECTIVE AND OBJECTIVE BOX
Hospital Course:  63F with hx of HTN, DM2, CAD, CVA x2 (left sided hemorrhagic, followed one month later by left sided ischemic CVA) with residual seizure disorder, aphasia and dysphagia s/p PEG   presents from [place] for status epilepticus requiring transfer to [facility] for EEG/MRI. Treated with keppra, depakote, vimpat, further complicated by Flu A and coronavirus. She was transferred to [facility] for improved mental status s/p stroke. Currently pending pk       INTERVAL HPI/OVERNIGHT EVENTS:  Patient is nonverbal and unable to provide history. Occasionally moaning, wailing and crying.     VITAL SIGNS:  T(F): 97.7 (03-05-19 @ 15:13)  HR: 117 (03-05-19 @ 15:13)  BP: 139/81 (03-05-19 @ 15:13)  RR: 18 (03-05-19 @ 15:13)  SpO2: 97% (03-05-19 @ 15:13)  Wt(kg): --    PHYSICAL EXAM:    General: WDWN, awake, intermittently alert and mumbling responses to questions, disoriented moaning/crying at times   HEENT: NC/AT; PERRL, anicteric sclera; MMM  Neck: supple, no JVD noted   Cardiovascular: +S1/S2, RRR  Respiratory: CTA B/L; no W/R/R  Gastrointestinal: soft, NT/ND; +BSx4  Extremities: WWP; no edema, clubbing or cyanosis  Vascular: 2+ radial, DP/PT pulses B/L  Neurological: AAOx0, spontaneously moves all extremities, however upper extremities seem to be contracted. unable to follow all commands.  Psych: normal affect  Heme: no LAD    MEDICATIONS  (STANDING):  amLODIPine   Tablet 5 milliGRAM(s) Oral daily  aspirin  chewable 81 milliGRAM(s) Oral daily  atorvastatin 80 milliGRAM(s) Oral at bedtime  carvedilol 25 milliGRAM(s) Oral every 12 hours  dextrose 5%. 1000 milliLiter(s) (50 mL/Hr) IV Continuous <Continuous>  dextrose 50% Injectable 12.5 Gram(s) IV Push once  heparin  Injectable 5000 Unit(s) SubCutaneous every 8 hours  insulin lispro (HumaLOG) corrective regimen sliding scale   SubCutaneous every 6 hours  lacosamide 200 milliGRAM(s) Oral two times a day  levETIRAcetam  Solution 1000 milliGRAM(s) Oral two times a day  losartan 100 milliGRAM(s) Oral daily  melatonin 5 milliGRAM(s) Oral at bedtime  OLANZapine 5 milliGRAM(s) Oral once  oseltamivir Suspension 75 milliGRAM(s) Oral two times a day  vitamin A &amp; D Ointment 1 Application(s) Topical daily    MEDICATIONS  (PRN):  acetaminophen    Suspension .. 650 milliGRAM(s) Oral every 6 hours PRN Temp greater or equal to 38C (100.4F), Mild Pain (1 - 3)  dextrose 40% Gel 15 Gram(s) Oral once PRN Blood Glucose LESS THAN 70 milliGRAM(s)/deciliter  glucagon  Injectable 1 milliGRAM(s) IntraMuscular once PRN Glucose LESS THAN 70 milligrams/deciliter  guaiFENesin   Syrup  (Sugar-Free) 100 milliGRAM(s) Oral every 6 hours PRN Cough  LORazepam   Injectable 2 milliGRAM(s) IV Push once PRN for seizure > 2 minutes  OLANZapine 5 milliGRAM(s) Oral once PRN Agitation      Allergies    No Known Allergies    Intolerances        LABS:                        8.7    5.51  )-----------( 85       ( 05 Mar 2019 06:31 )             27.7     03-05    140  |  103  |  8   ----------------------------<  108<H>  4.2   |  25  |  0.64    Ca    8.8      05 Mar 2019 06:31  Mg     2.0     03-05    TPro  5.9<L>  /  Alb  3.0<L>  /  TBili  0.2  /  DBili  x   /  AST  21  /  ALT  13  /  AlkPhos  55  03-04          RADIOLOGY & ADDITIONAL TESTS:  Reviewed Hospital Course:  63F with hx of HTN, DM2, CAD, CVA x2 (left sided hemorrhagic stroke in 11/2018 followed one month later by left sided ischemic CVA in 12/2018) with residual seizure disorder, aphasia and dysphagia s/p PEG presents from [place] for status epilepticus requiring transfer to [facility] for EEG/MRI. Treated with keppra, depakote, vimpat. Course further complicated by Flu A and coronavirus. She was transferred to [facility] for improved mental status s/p stroke. Baseline exam: AAxO x 0. Currently pending safe dispo planning.      INTERVAL HPI/OVERNIGHT EVENTS:  Patient is nonverbal and unable to provide history. Occasionally moaning, wailing and crying.     VITAL SIGNS:  T(F): 97.7 (03-05-19 @ 15:13)  HR: 117 (03-05-19 @ 15:13)  BP: 139/81 (03-05-19 @ 15:13)  RR: 18 (03-05-19 @ 15:13)  SpO2: 97% (03-05-19 @ 15:13)  Wt(kg): --    PHYSICAL EXAM:    General: WDWN, awake, intermittently alert and mumbling responses to questions, disoriented moaning/crying at times   HEENT: NC/AT; PERRL, anicteric sclera; MMM  Neck: supple, no JVD noted   Cardiovascular: +S1/S2, RRR  Respiratory: CTA B/L; no W/R/R  Gastrointestinal: soft, NT/ND; +BSx4  Extremities: WWP; no edema, clubbing or cyanosis  Vascular: 2+ radial, DP/PT pulses B/L  Neurological: AAOx0, spontaneously moves all extremities, however upper extremities seem to be contracted. unable to follow all commands.  Psych: normal affect  Heme: no LAD    MEDICATIONS  (STANDING):  amLODIPine   Tablet 5 milliGRAM(s) Oral daily  aspirin  chewable 81 milliGRAM(s) Oral daily  atorvastatin 80 milliGRAM(s) Oral at bedtime  carvedilol 25 milliGRAM(s) Oral every 12 hours  dextrose 5%. 1000 milliLiter(s) (50 mL/Hr) IV Continuous <Continuous>  dextrose 50% Injectable 12.5 Gram(s) IV Push once  heparin  Injectable 5000 Unit(s) SubCutaneous every 8 hours  insulin lispro (HumaLOG) corrective regimen sliding scale   SubCutaneous every 6 hours  lacosamide 200 milliGRAM(s) Oral two times a day  levETIRAcetam  Solution 1000 milliGRAM(s) Oral two times a day  losartan 100 milliGRAM(s) Oral daily  melatonin 5 milliGRAM(s) Oral at bedtime  OLANZapine 5 milliGRAM(s) Oral once  oseltamivir Suspension 75 milliGRAM(s) Oral two times a day  vitamin A &amp; D Ointment 1 Application(s) Topical daily    MEDICATIONS  (PRN):  acetaminophen    Suspension .. 650 milliGRAM(s) Oral every 6 hours PRN Temp greater or equal to 38C (100.4F), Mild Pain (1 - 3)  dextrose 40% Gel 15 Gram(s) Oral once PRN Blood Glucose LESS THAN 70 milliGRAM(s)/deciliter  glucagon  Injectable 1 milliGRAM(s) IntraMuscular once PRN Glucose LESS THAN 70 milligrams/deciliter  guaiFENesin   Syrup  (Sugar-Free) 100 milliGRAM(s) Oral every 6 hours PRN Cough  LORazepam   Injectable 2 milliGRAM(s) IV Push once PRN for seizure > 2 minutes  OLANZapine 5 milliGRAM(s) Oral once PRN Agitation      Allergies    No Known Allergies    Intolerances        LABS:                        8.7    5.51  )-----------( 85       ( 05 Mar 2019 06:31 )             27.7     03-05    140  |  103  |  8   ----------------------------<  108<H>  4.2   |  25  |  0.64    Ca    8.8      05 Mar 2019 06:31  Mg     2.0     03-05    TPro  5.9<L>  /  Alb  3.0<L>  /  TBili  0.2  /  DBili  x   /  AST  21  /  ALT  13  /  AlkPhos  55  03-04          RADIOLOGY & ADDITIONAL TESTS:  Reviewed Hospital Course:  63F with hx of HTN, DM2, CAD, CVA x2 (left sided hemorrhagic stroke in 11/2018 followed one month later by left sided ischemic CVA in 12/2018) with residual seizure disorder, aphasia and dysphagia s/p PEG presents from [place] for status epilepticus requiring transfer to [facility] for EEG/MRI. Treated with keppra, depakote, vimpat. Course further complicated by Flu A and coronavirus. She was transferred to [facility] for improved mental status s/p stroke. Baseline exam: AAxO x 0. Currently pending safe dispo planning.      INTERVAL HPI/OVERNIGHT EVENTS:  Patient is nonverbal and unable to provide history. Occasionally moaning, wailing and crying.     VITAL SIGNS:  T(F): 97.7 (03-05-19 @ 15:13)  HR: 117 (03-05-19 @ 15:13)  BP: 139/81 (03-05-19 @ 15:13)  RR: 18 (03-05-19 @ 15:13)  SpO2: 97% (03-05-19 @ 15:13)  Wt(kg): --    PHYSICAL EXAM:    General: awake, responsive to noxious stimuli intermittently mumbling, crying, wailing at times  HEENT: NC/AT; PERRL, anicteric sclera  Cardiovascular: +S1/S2, RRR  Respiratory: CTA B/L; no W/R/R  Gastrointestinal: soft, NT/ND normoactive bowel sounds  Extremities: WWP; no edema, clubbing or cyanosis  Vascular: 1+ radial, DP/PT pulses B/L  Neurological: AAOx0, responds to sternal rubs.  Moves all extremities spontaneously. Unable to elicit strength testing. Cannot follow commands and have conversations.   Psych: flat affect, mumbling, does not respond to name     MEDICATIONS  (STANDING):  amLODIPine   Tablet 5 milliGRAM(s) Oral daily  aspirin  chewable 81 milliGRAM(s) Oral daily  atorvastatin 80 milliGRAM(s) Oral at bedtime  carvedilol 25 milliGRAM(s) Oral every 12 hours  dextrose 5%. 1000 milliLiter(s) (50 mL/Hr) IV Continuous <Continuous>  dextrose 50% Injectable 12.5 Gram(s) IV Push once  heparin  Injectable 5000 Unit(s) SubCutaneous every 8 hours  insulin lispro (HumaLOG) corrective regimen sliding scale   SubCutaneous every 6 hours  lacosamide 200 milliGRAM(s) Oral two times a day  levETIRAcetam  Solution 1000 milliGRAM(s) Oral two times a day  losartan 100 milliGRAM(s) Oral daily  melatonin 5 milliGRAM(s) Oral at bedtime  OLANZapine 5 milliGRAM(s) Oral once  oseltamivir Suspension 75 milliGRAM(s) Oral two times a day  vitamin A &amp; D Ointment 1 Application(s) Topical daily    MEDICATIONS  (PRN):  acetaminophen    Suspension .. 650 milliGRAM(s) Oral every 6 hours PRN Temp greater or equal to 38C (100.4F), Mild Pain (1 - 3)  dextrose 40% Gel 15 Gram(s) Oral once PRN Blood Glucose LESS THAN 70 milliGRAM(s)/deciliter  glucagon  Injectable 1 milliGRAM(s) IntraMuscular once PRN Glucose LESS THAN 70 milligrams/deciliter  guaiFENesin   Syrup  (Sugar-Free) 100 milliGRAM(s) Oral every 6 hours PRN Cough  LORazepam   Injectable 2 milliGRAM(s) IV Push once PRN for seizure > 2 minutes  OLANZapine 5 milliGRAM(s) Oral once PRN Agitation      Allergies    No Known Allergies    Intolerances        LABS:                        8.7    5.51  )-----------( 85       ( 05 Mar 2019 06:31 )             27.7     03-05    140  |  103  |  8   ----------------------------<  108<H>  4.2   |  25  |  0.64    Ca    8.8      05 Mar 2019 06:31  Mg     2.0     03-05    TPro  5.9<L>  /  Alb  3.0<L>  /  TBili  0.2  /  DBili  x   /  AST  21  /  ALT  13  /  AlkPhos  55  03-04          RADIOLOGY & ADDITIONAL TESTS:  Reviewed Hospital Course:  63F with hx of HTN, DM2, CAD, CVA x2 (left sided hemorrhagic stroke in 11/2018 followed one month later by left sided ischemic CVA in 12/2018) with residual seizure disorder, aphasia and dysphagia s/p PEG presents from [place] for status epilepticus requiring transfer to [facility] for EEG/MRI. Treated with keppra, depakote, vimpat. Course further complicated by Flu A and coronavirus will finish course of tamiflu (3/1-3/5). She was transferred to [facility] for improved mental status s/p stroke. Baseline exam: AAxO x 0. Currently pending safe dispo planning.      INTERVAL HPI/OVERNIGHT EVENTS:  Patient is nonverbal and unable to provide history. Occasionally moaning, wailing and crying.     VITAL SIGNS:  T(F): 97.7 (03-05-19 @ 15:13)  HR: 117 (03-05-19 @ 15:13)  BP: 139/81 (03-05-19 @ 15:13)  RR: 18 (03-05-19 @ 15:13)  SpO2: 97% (03-05-19 @ 15:13)  Wt(kg): --    PHYSICAL EXAM:    General: awake, responsive to noxious stimuli intermittently mumbling, crying, wailing at times  HEENT: NC/AT; PERRL, anicteric sclera  Cardiovascular: +S1/S2, RRR  Respiratory: CTA B/L; no W/R/R  Gastrointestinal: soft, NT/ND normoactive bowel sounds  Extremities: WWP; no edema, clubbing or cyanosis  Vascular: 1+ radial, DP/PT pulses B/L  Neurological: AAOx0, responds to sternal rubs.  Moves all extremities spontaneously. Unable to elicit strength testing. Cannot follow commands and have conversations.   Psych: flat affect, mumbling, does not respond to name     MEDICATIONS  (STANDING):  amLODIPine   Tablet 5 milliGRAM(s) Oral daily  aspirin  chewable 81 milliGRAM(s) Oral daily  atorvastatin 80 milliGRAM(s) Oral at bedtime  carvedilol 25 milliGRAM(s) Oral every 12 hours  dextrose 5%. 1000 milliLiter(s) (50 mL/Hr) IV Continuous <Continuous>  dextrose 50% Injectable 12.5 Gram(s) IV Push once  heparin  Injectable 5000 Unit(s) SubCutaneous every 8 hours  insulin lispro (HumaLOG) corrective regimen sliding scale   SubCutaneous every 6 hours  lacosamide 200 milliGRAM(s) Oral two times a day  levETIRAcetam  Solution 1000 milliGRAM(s) Oral two times a day  losartan 100 milliGRAM(s) Oral daily  melatonin 5 milliGRAM(s) Oral at bedtime  OLANZapine 5 milliGRAM(s) Oral once  oseltamivir Suspension 75 milliGRAM(s) Oral two times a day  vitamin A &amp; D Ointment 1 Application(s) Topical daily    MEDICATIONS  (PRN):  acetaminophen    Suspension .. 650 milliGRAM(s) Oral every 6 hours PRN Temp greater or equal to 38C (100.4F), Mild Pain (1 - 3)  dextrose 40% Gel 15 Gram(s) Oral once PRN Blood Glucose LESS THAN 70 milliGRAM(s)/deciliter  glucagon  Injectable 1 milliGRAM(s) IntraMuscular once PRN Glucose LESS THAN 70 milligrams/deciliter  guaiFENesin   Syrup  (Sugar-Free) 100 milliGRAM(s) Oral every 6 hours PRN Cough  LORazepam   Injectable 2 milliGRAM(s) IV Push once PRN for seizure > 2 minutes  OLANZapine 5 milliGRAM(s) Oral once PRN Agitation      Allergies    No Known Allergies    Intolerances        LABS:                        8.7    5.51  )-----------( 85       ( 05 Mar 2019 06:31 )             27.7     03-05    140  |  103  |  8   ----------------------------<  108<H>  4.2   |  25  |  0.64    Ca    8.8      05 Mar 2019 06:31  Mg     2.0     03-05    TPro  5.9<L>  /  Alb  3.0<L>  /  TBili  0.2  /  DBili  x   /  AST  21  /  ALT  13  /  AlkPhos  55  03-04          RADIOLOGY & ADDITIONAL TESTS:  Reviewed Hospital Course:  63F with hx of HTN, DM2, CAD, CVA x2 (left sided hemorrhagic stroke in 11/2018 followed one month later by left sided ischemic CVA in 12/2018) with residual seizure disorder, aphasia and dysphagia s/p PEG presents from [place] for status epilepticus requiring transfer to [facility] for EEG/MRI. Treated with keppra, depakote, vimpat. VEEG negative for seizure activity. Course further complicated by Flu A and coronavirus will finish course of tamiflu (3/1-3/5). She was transferred to [facility] for improved mental status s/p stroke. Baseline exam: AAxO x 0. Currently pending safe dispo planning.      INTERVAL HPI/OVERNIGHT EVENTS:  Patient is nonverbal and unable to provide history. Occasionally moaning, wailing and crying.     VITAL SIGNS:  T(F): 97.7 (03-05-19 @ 15:13)  HR: 117 (03-05-19 @ 15:13)  BP: 139/81 (03-05-19 @ 15:13)  RR: 18 (03-05-19 @ 15:13)  SpO2: 97% (03-05-19 @ 15:13)  Wt(kg): --    PHYSICAL EXAM:    General: awake, responsive to noxious stimuli intermittently mumbling, crying, wailing at times  HEENT: NC/AT; PERRL, anicteric sclera  Cardiovascular: +S1/S2, RRR  Respiratory: CTA B/L; no W/R/R  Gastrointestinal: soft, NT/ND normoactive bowel sounds  Extremities: WWP; no edema, clubbing or cyanosis  Vascular: 1+ radial, DP/PT pulses B/L  Neurological: AAOx0, responds to sternal rubs.  Moves all extremities spontaneously. Unable to elicit strength testing. Cannot follow commands and have conversations.   Psych: flat affect, mumbling, does not respond to name     MEDICATIONS  (STANDING):  amLODIPine   Tablet 5 milliGRAM(s) Oral daily  aspirin  chewable 81 milliGRAM(s) Oral daily  atorvastatin 80 milliGRAM(s) Oral at bedtime  carvedilol 25 milliGRAM(s) Oral every 12 hours  dextrose 5%. 1000 milliLiter(s) (50 mL/Hr) IV Continuous <Continuous>  dextrose 50% Injectable 12.5 Gram(s) IV Push once  heparin  Injectable 5000 Unit(s) SubCutaneous every 8 hours  insulin lispro (HumaLOG) corrective regimen sliding scale   SubCutaneous every 6 hours  lacosamide 200 milliGRAM(s) Oral two times a day  levETIRAcetam  Solution 1000 milliGRAM(s) Oral two times a day  losartan 100 milliGRAM(s) Oral daily  melatonin 5 milliGRAM(s) Oral at bedtime  OLANZapine 5 milliGRAM(s) Oral once  oseltamivir Suspension 75 milliGRAM(s) Oral two times a day  vitamin A &amp; D Ointment 1 Application(s) Topical daily    MEDICATIONS  (PRN):  acetaminophen    Suspension .. 650 milliGRAM(s) Oral every 6 hours PRN Temp greater or equal to 38C (100.4F), Mild Pain (1 - 3)  dextrose 40% Gel 15 Gram(s) Oral once PRN Blood Glucose LESS THAN 70 milliGRAM(s)/deciliter  glucagon  Injectable 1 milliGRAM(s) IntraMuscular once PRN Glucose LESS THAN 70 milligrams/deciliter  guaiFENesin   Syrup  (Sugar-Free) 100 milliGRAM(s) Oral every 6 hours PRN Cough  LORazepam   Injectable 2 milliGRAM(s) IV Push once PRN for seizure > 2 minutes  OLANZapine 5 milliGRAM(s) Oral once PRN Agitation      Allergies    No Known Allergies    Intolerances        LABS:                        8.7    5.51  )-----------( 85       ( 05 Mar 2019 06:31 )             27.7     03-05    140  |  103  |  8   ----------------------------<  108<H>  4.2   |  25  |  0.64    Ca    8.8      05 Mar 2019 06:31  Mg     2.0     03-05    TPro  5.9<L>  /  Alb  3.0<L>  /  TBili  0.2  /  DBili  x   /  AST  21  /  ALT  13  /  AlkPhos  55  03-04          RADIOLOGY & ADDITIONAL TESTS:  Reviewed Hospital Course:  63F with hx of HTN, DM2, CAD, CVA x2 (left sided hemorrhagic stroke in 11/2018 followed one month later by left sided ischemic CVA in 12/2018) with residual seizure disorder, aphasia and dysphagia s/p PEG presents from [place] for evaluation of status epilepticus requiring transfer to [facility] for EEG/MRI. Treated with keppra, depakote, vimpat. VEEG negative for seizure activity. Course further complicated by Flu A and coronavirus will finish course of tamiflu (3/1-3/5). She was transferred to [facility] for improved mental status s/p stroke. Baseline exam: AAxO x 0. Currently pending safe dispo planning.      INTERVAL HPI/OVERNIGHT EVENTS:  Patient is nonverbal and unable to provide history. Occasionally moaning, wailing and crying.     VITAL SIGNS:  T(F): 97.7 (03-05-19 @ 15:13)  HR: 117 (03-05-19 @ 15:13)  BP: 139/81 (03-05-19 @ 15:13)  RR: 18 (03-05-19 @ 15:13)  SpO2: 97% (03-05-19 @ 15:13)  Wt(kg): --    PHYSICAL EXAM:    General: awake, responsive to noxious stimuli intermittently mumbling, crying, wailing at times  HEENT: NC/AT; PERRL, anicteric sclera  Cardiovascular: +S1/S2, RRR  Respiratory: CTA B/L; no W/R/R  Gastrointestinal: soft, NT/ND normoactive bowel sounds  Extremities: WWP; no edema, clubbing or cyanosis  Vascular: 1+ radial, DP/PT pulses B/L  Neurological: AAOx0, responds to sternal rubs.  Moves all extremities spontaneously. Unable to elicit strength testing. Cannot follow commands and have conversations.   Psych: flat affect, mumbling, does not respond to name     MEDICATIONS  (STANDING):  amLODIPine   Tablet 5 milliGRAM(s) Oral daily  aspirin  chewable 81 milliGRAM(s) Oral daily  atorvastatin 80 milliGRAM(s) Oral at bedtime  carvedilol 25 milliGRAM(s) Oral every 12 hours  dextrose 5%. 1000 milliLiter(s) (50 mL/Hr) IV Continuous <Continuous>  dextrose 50% Injectable 12.5 Gram(s) IV Push once  heparin  Injectable 5000 Unit(s) SubCutaneous every 8 hours  insulin lispro (HumaLOG) corrective regimen sliding scale   SubCutaneous every 6 hours  lacosamide 200 milliGRAM(s) Oral two times a day  levETIRAcetam  Solution 1000 milliGRAM(s) Oral two times a day  losartan 100 milliGRAM(s) Oral daily  melatonin 5 milliGRAM(s) Oral at bedtime  OLANZapine 5 milliGRAM(s) Oral once  oseltamivir Suspension 75 milliGRAM(s) Oral two times a day  vitamin A &amp; D Ointment 1 Application(s) Topical daily    MEDICATIONS  (PRN):  acetaminophen    Suspension .. 650 milliGRAM(s) Oral every 6 hours PRN Temp greater or equal to 38C (100.4F), Mild Pain (1 - 3)  dextrose 40% Gel 15 Gram(s) Oral once PRN Blood Glucose LESS THAN 70 milliGRAM(s)/deciliter  glucagon  Injectable 1 milliGRAM(s) IntraMuscular once PRN Glucose LESS THAN 70 milligrams/deciliter  guaiFENesin   Syrup  (Sugar-Free) 100 milliGRAM(s) Oral every 6 hours PRN Cough  LORazepam   Injectable 2 milliGRAM(s) IV Push once PRN for seizure > 2 minutes  OLANZapine 5 milliGRAM(s) Oral once PRN Agitation      Allergies    No Known Allergies    Intolerances        LABS:                        8.7    5.51  )-----------( 85       ( 05 Mar 2019 06:31 )             27.7     03-05    140  |  103  |  8   ----------------------------<  108<H>  4.2   |  25  |  0.64    Ca    8.8      05 Mar 2019 06:31  Mg     2.0     03-05    TPro  5.9<L>  /  Alb  3.0<L>  /  TBili  0.2  /  DBili  x   /  AST  21  /  ALT  13  /  AlkPhos  55  03-04          RADIOLOGY & ADDITIONAL TESTS:  Reviewed

## 2019-03-05 NOTE — PROGRESS NOTE ADULT - PROBLEM SELECTOR PLAN 10
1) PCP Contacted on Admission: (Y/N) --> Name & Phone #:   2) Date of Contact with PCP:  3) PCP Contacted at Discharge: (Y/N, N/A)  4) Summary of Handoff Given to PCP:   5) Post-Discharge Appointment Date and Location:

## 2019-03-05 NOTE — PROGRESS NOTE ADULT - PROBLEM SELECTOR PLAN 3
Patient with (+) UA on admission. Ucx 2/23 grew E. coli susceptible to ceftriaxone, s/p treatment with CTX. Negative UA. Last temp spike 100.8 (3/2/19)    #Sepsis 2/2 influenza A/Coronavirus   - continue tamiflu 75mg BID for total of 5 days Patient with (+) UA on admission. Ucx 2/23 grew E. coli susceptible to ceftriaxone, s/p treatment with CTX. Negative UA. Last temp spike 100.8 (3/2/19)    #Sepsis 2/2 influenza A/Coronavirus   - finish tamiflu 75mg BID for total of 5 days  (3/1-3/5)

## 2019-03-05 NOTE — PROGRESS NOTE ADULT - PROBLEM SELECTOR PLAN 4
#YAZ - now resolved  #Anemia  Slow downtrend in Hb this admission (Baseline 9-10s), Likely due to prolonged hospitalization/phlebotomy with underlying anemia of chronic disease. No signs of acute bleeding.   - continue to monitor #Anemia  Slow downtrend in Hb this admission (Baseline 9-10s), Likely due to prolonged hospitalization/phlebotomy with underlying anemia of chronic disease. No signs of acute bleeding.   - continue to monitor

## 2019-03-05 NOTE — PROGRESS NOTE ADULT - PROBLEM SELECTOR PLAN 1
Patient found to have worsened AMS on 3/1 likely multifactorial from prior ICH, PRES syndrome, sedating medications, and possible seizure activity, sepsis 2/2 Flu. However, mental status now back to baseline, per family. CT head negative.   - c/w tamiflu for total of 5 days   - c/w keppra 1000 BID  - c/w olanzapine 5 mg daily  - Psych consult     #Thrombocytopenia- downtrending plt count to 82, plateauing now, no active signs of bleeding.  -likely 2/2 valproic acid, discontinued on 3/3 and increased keppra to 1000 mg BID.  - low probability for HIT, 4T score of 2  - continue to monitor Patient found to have worsened AMS on 3/1 likely multifactorial from prior ICH, PRES syndrome, sedating medications, and possible seizure activity, sepsis 2/2 Flu. However, mental status now back to baseline, per family: AAOx0. CT head negative.   - finish course of tamiflu for total of 5 days  (3/1-3/5)  - c/w keppra 1000 BID  - c/w olanzapine 5 mg daily      #Thrombocytopenia- downtrending plt count to 82, plateauing now, no active signs of bleeding.  -likely 2/2 valproic acid, discontinued on 3/3 and increased keppra to 1000 mg BID.  - low probability for HIT, 4T score of 2  - continue to monitor

## 2019-03-05 NOTE — DISCHARGE NOTE PROVIDER - HOSPITAL COURSE
63F with hx of HTN, DM2, CAD, CVA x2 (left sided hemorrhagic stroke in 11/2018 followed one month later by left sided ischemic CVA in 12/2018) with residual seizure disorder, aphasia and dysphagia s/p PEG  presented from [place] for evaluation of status epilepticus requiring transfer to [facility] for EEG/MRI. Treated with keppra, depakote, vimpat. VEEG negative for seizure activity.  No MRI done. Pt finished treatment of ceftriaxone for UTI present from [place] transfer in [facility]. During her stay in the MICU patient's mental status waxed and waned. By 2/27 she was responding to voice, following some directions, and tracking movements. She was stepped down to the regional medical floor where she spiked a temp and was noted to have altered mental status then stepped up to [place] for closer monitoring. Baclofen and Amitriptyline discontinued in setting of limb contractures. Repeat VEEG showed no epileptiform activity. Course further complicated by Flu A and coronavirus  s/p tamiflu (3/1-3/5). She was transferred to [facility] for improved mental status s/p stroke. Managed with olanzapine 5mg for agitation. Baseline exam: AAO x 0. Currently pending safe dispo planning possibly for subacute rehab. 63F with hx of HTN, DM2, CAD, CVA x2 (left sided hemorrhagic stroke in 11/2018 followed one month later by left sided ischemic CVA in 12/2018) with residual seizure disorder, aphasia and dysphagia s/p PEG  presented from [place] for evaluation of status epilepticus requiring transfer to [facility] for EEG/MRI. Treated with keppra, depakote, vimpat. VEEG negative for seizure activity.  No MRI done. Pt finished treatment of ceftriaxone for UTI present from [place] transfer in [facility]. During her stay in the MICU patient's mental status waxed and waned. By 2/27 she was responding to voice, following some directions, and tracking movements. She was stepped down to the regional medical floor where she spiked a temp and was noted to have altered mental status then stepped up to [place] for closer monitoring. Baclofen and Amitriptyline discontinued in setting of limb contractures. Repeat VEEG showed no epileptiform activity. Course further complicated by Flu A and coronavirus  s/p tamiflu (3/1-3/5). She was transferred to [facility] for improved mental status s/p stroke. Managed with olanzapine 5mg for agitation. Baseline exam: AAO x 0. Patient now being discharged to [facility]. 63F with hx of HTN, DM2, CAD, CVA x2 (left sided hemorrhagic stroke in 11/2018 followed one month later by left sided ischemic CVA in 12/2018) with residual seizure disorder, aphasia and dysphagia s/p PEG  presented from [place] for evaluation of status epilepticus requiring transfer to [facility] for EEG/MRI. Treated with keppra, depakote, vimpat. VEEG negative for seizure activity.  No MRI done. Pt finished treatment of ceftriaxone for UTI present from [place] transfer in [facility]. During her stay in the MICU patient's mental status waxed and waned. By 2/27 she was responding to voice, following some directions, and tracking movements. She was stepped down to the regional medical floor where she spiked a temp and was noted to have altered mental status then stepped up to [place] for closer monitoring. Baclofen and Amitriptyline discontinued in setting of limb contractures. Repeat VEEG showed no epileptiform activity. Course further complicated by Flu A and coronavirus  s/p tamiflu (3/1-3/5). She was transferred to [facility] for improved mental status s/p stroke. Managed with olanzapine 5mg for agitation. Patient's valproic acid was discontinued due to thrombocytopenia and keppra dose was increased with improvement of thrombocytopenia . Baseline exam: AAO x 0. Patient now being discharged to [facility].

## 2019-03-05 NOTE — PROGRESS NOTE ADULT - PROBLEM SELECTOR PLAN 2
Initially thought to be in status epilepticus, but not seen on EEG, patient now back to baseline mental status according to family  - s/p extubation 2/25, now comfortable and satting well on RA  - no clear inciting event though has history of temporal hemorrhagic infarct in 11/18 and MCA ischemic infarct in 12/18  - vEEG shows only rare Rt>Lt temporal sharp wave activity and intermittent delta wave slowing on Lt temporal, diffuse background slowing  - c/w keppra 1g BID thru PEG  - c/w vimpat 200 mg BID thru PEG  - IV lorazepam 1mg only for convulsive seizures >1min, has not required it during hospital stay Initially thought to be in status epilepticus, but not seen on EEG, patient now back to baseline mental status according to family: AAOx3  - s/p extubation 2/25, now comfortable and satting well on RA  - no clear inciting event though has history of temporal hemorrhagic infarct in 11/18 and MCA ischemic infarct in 12/18  - vEEG shows only rare Rt>Lt temporal sharp wave activity and intermittent delta wave slowing on Lt temporal, diffuse background slowing  - c/w keppra 1g BID thru PEG  - c/w vimpat 200 mg BID thru PEG  - IV lorazepam 1mg only for convulsive seizures >1min, has not required it during hospital stay

## 2019-03-05 NOTE — PROGRESS NOTE ADULT - PROBLEM SELECTOR PLAN 8
Concern for delirium given waxing waning baseline.   - c/w olanzapine 5 mg daily  - Had discontinued patient's home baclofen and amitriptyline in the setting of altered mental status; now with increased limb contractures. continue to monitor  - psych consult in the AM Concern for delirium given waxing waning baseline.   - c/w olanzapine 5 mg daily  - Had discontinued patient's home baclofen and amitriptyline in the setting of altered mental status; now with increased limb contractures.

## 2019-03-05 NOTE — PROGRESS NOTE ADULT - PROBLEM SELECTOR PLAN 5
S/p stents, last in 2015.   -c/w ASA 81 mg daily   -c/w coreg 25 q12h  -C/w Lipitor 80 mg daily  - obtain further collateral regarding Plavix

## 2019-03-05 NOTE — DISCHARGE NOTE PROVIDER - NSDCCPCAREPLAN_GEN_ALL_CORE_FT
PRINCIPAL DISCHARGE DIAGNOSIS  Problem: Encounter for observation for other suspected disease or condition, ruled out  Assessment and Plan of Treatment: You were transferred to [facility] to check if you had any seizures. A special device called electroencephalogram showed that you did have any seizure activities. However you were started on ant seizure medications such as  keppra 1g twice dailt and vimpat 200mg twice daily to      SECONDARY DISCHARGE DIAGNOSES  Problem: Stroke  Assessment and Plan of Treatment: At the rehab center, you will undergo exercises and physical therapy to help increase your strength due to your recent strokes. Please continue to use your PEG for feedings to help with nutrition    Problem: Bacterial UTI  Assessment and Plan of Treatment: While in [facility] you were found to have bacteria called E coli in your urine which was treated IV antibiotic (ceftriaxone).    Problem: Influenza  Assessment and Plan of Treatment: In [facility], you were found to have Influenza A and coronavirus. You were treated with five days of tamiflu and treated symptomatically.    Problem: Coronary heart disease  Assessment and Plan of Treatment: Please continue to take your aspirin 81mg, coreg 25mg every 12 hours, and Lipitor 80mg daily.

## 2019-03-06 LAB
ANION GAP SERPL CALC-SCNC: 11 MMOL/L — SIGNIFICANT CHANGE UP (ref 5–17)
BUN SERPL-MCNC: 11 MG/DL — SIGNIFICANT CHANGE UP (ref 7–23)
CALCIUM SERPL-MCNC: 9.2 MG/DL — SIGNIFICANT CHANGE UP (ref 8.4–10.5)
CHLORIDE SERPL-SCNC: 102 MMOL/L — SIGNIFICANT CHANGE UP (ref 96–108)
CO2 SERPL-SCNC: 24 MMOL/L — SIGNIFICANT CHANGE UP (ref 22–31)
CREAT SERPL-MCNC: 0.75 MG/DL — SIGNIFICANT CHANGE UP (ref 0.5–1.3)
CULTURE RESULTS: SIGNIFICANT CHANGE UP
CULTURE RESULTS: SIGNIFICANT CHANGE UP
GLUCOSE SERPL-MCNC: 153 MG/DL — HIGH (ref 70–99)
HCT VFR BLD CALC: 27.2 % — LOW (ref 34.5–45)
HGB BLD-MCNC: 8.8 G/DL — LOW (ref 11.5–15.5)
MCHC RBC-ENTMCNC: 31.1 PG — SIGNIFICANT CHANGE UP (ref 27–34)
MCHC RBC-ENTMCNC: 32.4 GM/DL — SIGNIFICANT CHANGE UP (ref 32–36)
MCV RBC AUTO: 96.1 FL — SIGNIFICANT CHANGE UP (ref 80–100)
NRBC # BLD: 0 /100 WBCS — SIGNIFICANT CHANGE UP (ref 0–0)
PLATELET # BLD AUTO: 232 K/UL — SIGNIFICANT CHANGE UP (ref 150–400)
POTASSIUM SERPL-MCNC: SIGNIFICANT CHANGE UP MMOL/L (ref 3.5–5.3)
POTASSIUM SERPL-SCNC: SIGNIFICANT CHANGE UP MMOL/L (ref 3.5–5.3)
RBC # BLD: 2.83 M/UL — LOW (ref 3.8–5.2)
RBC # FLD: 14.6 % — HIGH (ref 10.3–14.5)
SODIUM SERPL-SCNC: 137 MMOL/L — SIGNIFICANT CHANGE UP (ref 135–145)
SPECIMEN SOURCE: SIGNIFICANT CHANGE UP
SPECIMEN SOURCE: SIGNIFICANT CHANGE UP
WBC # BLD: 7.72 K/UL — SIGNIFICANT CHANGE UP (ref 3.8–10.5)
WBC # FLD AUTO: 7.72 K/UL — SIGNIFICANT CHANGE UP (ref 3.8–10.5)

## 2019-03-06 PROCEDURE — 93010 ELECTROCARDIOGRAM REPORT: CPT

## 2019-03-06 PROCEDURE — 99232 SBSQ HOSP IP/OBS MODERATE 35: CPT | Mod: GC

## 2019-03-06 RX ORDER — OLANZAPINE 15 MG/1
5 TABLET, FILM COATED ORAL ONCE
Qty: 0 | Refills: 0 | Status: COMPLETED | OUTPATIENT
Start: 2019-03-06 | End: 2019-03-06

## 2019-03-06 RX ADMIN — OLANZAPINE 5 MILLIGRAM(S): 15 TABLET, FILM COATED ORAL at 23:00

## 2019-03-06 RX ADMIN — Medication 650 MILLIGRAM(S): at 13:55

## 2019-03-06 RX ADMIN — AMLODIPINE BESYLATE 5 MILLIGRAM(S): 2.5 TABLET ORAL at 05:54

## 2019-03-06 RX ADMIN — LACOSAMIDE 200 MILLIGRAM(S): 50 TABLET ORAL at 05:54

## 2019-03-06 RX ADMIN — LEVETIRACETAM 1000 MILLIGRAM(S): 250 TABLET, FILM COATED ORAL at 17:32

## 2019-03-06 RX ADMIN — ATORVASTATIN CALCIUM 80 MILLIGRAM(S): 80 TABLET, FILM COATED ORAL at 22:27

## 2019-03-06 RX ADMIN — CARVEDILOL PHOSPHATE 25 MILLIGRAM(S): 80 CAPSULE, EXTENDED RELEASE ORAL at 05:54

## 2019-03-06 RX ADMIN — HEPARIN SODIUM 5000 UNIT(S): 5000 INJECTION INTRAVENOUS; SUBCUTANEOUS at 14:17

## 2019-03-06 RX ADMIN — Medication 650 MILLIGRAM(S): at 12:56

## 2019-03-06 RX ADMIN — HEPARIN SODIUM 5000 UNIT(S): 5000 INJECTION INTRAVENOUS; SUBCUTANEOUS at 05:54

## 2019-03-06 RX ADMIN — OLANZAPINE 5 MILLIGRAM(S): 15 TABLET, FILM COATED ORAL at 16:18

## 2019-03-06 RX ADMIN — LACOSAMIDE 200 MILLIGRAM(S): 50 TABLET ORAL at 17:32

## 2019-03-06 RX ADMIN — Medication 100 MILLIGRAM(S): at 12:56

## 2019-03-06 RX ADMIN — LEVETIRACETAM 1000 MILLIGRAM(S): 250 TABLET, FILM COATED ORAL at 05:54

## 2019-03-06 RX ADMIN — HEPARIN SODIUM 5000 UNIT(S): 5000 INJECTION INTRAVENOUS; SUBCUTANEOUS at 22:28

## 2019-03-06 RX ADMIN — Medication 5 MILLIGRAM(S): at 22:27

## 2019-03-06 RX ADMIN — LOSARTAN POTASSIUM 100 MILLIGRAM(S): 100 TABLET, FILM COATED ORAL at 05:54

## 2019-03-06 RX ADMIN — CARVEDILOL PHOSPHATE 25 MILLIGRAM(S): 80 CAPSULE, EXTENDED RELEASE ORAL at 17:32

## 2019-03-06 RX ADMIN — Medication 81 MILLIGRAM(S): at 12:56
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
## 2019-03-06 NOTE — PROGRESS NOTE ADULT - PROBLEM SELECTOR PLAN 3
Patient with (+) UA on admission. Ucx 2/23 grew E. coli susceptible to ceftriaxone, s/p treatment with CTX. Negative UA. Last temp spike 100.8 (3/2/19)    #Sepsis 2/2 influenza A/Coronavirus   - finish tamiflu 75mg BID for total of 5 days  (3/1-3/5) Resolved-  Patient with (+) UA on admission. Ucx 2/23 grew E. coli susceptible to ceftriaxone, s/p treatment with CTX. Negative UA. Last temp spike 100.8 (3/2/19)    #Sepsis 2/2 influenza A/Coronavirus   -Resolved-  - Completed tamiflu 75mg BID for total of 5 days  (3/1-3/5)

## 2019-03-06 NOTE — PROGRESS NOTE ADULT - PROBLEM SELECTOR PLAN 2
Initially thought to be in status epilepticus, but not seen on EEG, patient now back to baseline mental status according to family: AAOx3  - s/p extubation 2/25, now comfortable and satting well on RA  - no clear inciting event though has history of temporal hemorrhagic infarct in 11/18 and MCA ischemic infarct in 12/18  - vEEG shows only rare Rt>Lt temporal sharp wave activity and intermittent delta wave slowing on Lt temporal, diffuse background slowing  - c/w keppra 1g BID thru PEG  - c/w vimpat 200 mg BID thru PEG  - IV lorazepam 1mg only for convulsive seizures >1min, has not required it during hospital stay

## 2019-03-06 NOTE — PROGRESS NOTE ADULT - PROBLEM SELECTOR PLAN 1
Patient found to have worsened AMS on 3/1 likely multifactorial from prior ICH, PRES syndrome, sedating medications, and possible seizure activity, sepsis 2/2 Flu. However, mental status now back to baseline, per family: AAOx0. CT head negative.   - finish course of tamiflu for total of 5 days  (3/1-3/5)  - c/w keppra 1000 BID  - c/w olanzapine 5 mg daily    #Thrombocytopenia- downtrending plt count to 8os, plateauing now, no active signs of bleeding.  -likely 2/2 valproic acid, discontinued on 3/3 and increased keppra to 1000 mg BID.  - low probability for HIT, 4T score of 2  - continue to monitor with CBCqd

## 2019-03-06 NOTE — PROGRESS NOTE ADULT - PROBLEM SELECTOR PLAN 4
#Anemia  Slow downtrend in Hb this admission (Baseline 9-10s), Likely due to prolonged hospitalization/phlebotomy with underlying anemia of chronic disease. No signs of acute bleeding.   - continue to monitor

## 2019-03-06 NOTE — PROGRESS NOTE ADULT - PROBLEM SELECTOR PLAN 8
Concern for delirium given waxing waning baseline.   - c/w olanzapine 5 mg daily  - Had discontinued patient's home baclofen and amitriptyline in the setting of altered mental status; now with increased limb contractures.

## 2019-03-06 NOTE — PROGRESS NOTE ADULT - SUBJECTIVE AND OBJECTIVE BOX
OVERNIGHT EVENTS: NAEO    SUBJECTIVE / INTERVAL HPI: Patient seen and examined at bedside. Pt still unresponsive could not obtain ROS, complaining intermittently. Otherwise at baseline.     VITAL SIGNS:  Vital Signs Last 24 Hrs  T(C): 36.9 (06 Mar 2019 08:54), Max: 37.1 (06 Mar 2019 05:43)  T(F): 98.5 (06 Mar 2019 08:54), Max: 98.8 (06 Mar 2019 05:43)  HR: 94 (06 Mar 2019 08:54) (94 - 110)  BP: 113/67 (06 Mar 2019 08:54) (113/67 - 156/92)  BP(mean): --  RR: 16 (06 Mar 2019 08:54) (16 - 18)  SpO2: 95% (06 Mar 2019 08:54) (94% - 95%)    PHYSICAL EXAM:  General: Obese  female with mild agitation/teary eyed   HEENT: NC/AT; PERRL,; MMM  Neck: supple  Cardiovascular: +S1/S2, RRR  Respiratory: CTA B/L  Gastrointestinal: soft, NT/ND; +BSx4; peg site intact no tenderness around site   Extremities: WWP; b/l edema up to knees 1+   Vascular: 2+ radial, DP pulses B/L  Neurological: Awake could not assess orientation; moves extremities spontaneously     MEDICATIONS:  MEDICATIONS  (STANDING):  amLODIPine   Tablet 5 milliGRAM(s) Oral daily  aspirin  chewable 81 milliGRAM(s) Oral daily  atorvastatin 80 milliGRAM(s) Oral at bedtime  carvedilol 25 milliGRAM(s) Oral every 12 hours  dextrose 5%. 1000 milliLiter(s) (50 mL/Hr) IV Continuous <Continuous>  dextrose 50% Injectable 12.5 Gram(s) IV Push once  heparin  Injectable 5000 Unit(s) SubCutaneous every 8 hours  insulin lispro (HumaLOG) corrective regimen sliding scale   SubCutaneous every 6 hours  lacosamide 200 milliGRAM(s) Oral two times a day  levETIRAcetam  Solution 1000 milliGRAM(s) Oral two times a day  losartan 100 milliGRAM(s) Oral daily  melatonin 5 milliGRAM(s) Oral at bedtime  OLANZapine 5 milliGRAM(s) Oral daily    MEDICATIONS  (PRN):  acetaminophen    Suspension .. 650 milliGRAM(s) Oral every 6 hours PRN Temp greater or equal to 38C (100.4F), Mild Pain (1 - 3)  dextrose 40% Gel 15 Gram(s) Oral once PRN Blood Glucose LESS THAN 70 milliGRAM(s)/deciliter  glucagon  Injectable 1 milliGRAM(s) IntraMuscular once PRN Glucose LESS THAN 70 milligrams/deciliter  guaiFENesin   Syrup  (Sugar-Free) 100 milliGRAM(s) Oral every 6 hours PRN Cough  LORazepam   Injectable 2 milliGRAM(s) IV Push once PRN for seizure > 2 minutes  OLANZapine 5 milliGRAM(s) Oral once PRN Agitation      ALLERGIES:  Allergies    No Known Allergies    Intolerances        LABS:                        8.7    5.51  )-----------( 85       ( 05 Mar 2019 06:31 )             27.7     03-05    140  |  103  |  8   ----------------------------<  108<H>  4.2   |  25  |  0.64    Ca    8.8      05 Mar 2019 06:31  Mg     2.0     03-05          CAPILLARY BLOOD GLUCOSE      POCT Blood Glucose.: 152 mg/dL (06 Mar 2019 11:48)      RADIOLOGY & ADDITIONAL TESTS: Reviewed. OVERNIGHT EVENTS: NAEO    SUBJECTIVE / INTERVAL HPI: Patient seen and examined at bedside. Pt still unresponsive could not obtain ROS, complaining intermittently. Otherwise at baseline tracking intermittently.     VITAL SIGNS:  Vital Signs Last 24 Hrs  T(C): 36.9 (06 Mar 2019 08:54), Max: 37.1 (06 Mar 2019 05:43)  T(F): 98.5 (06 Mar 2019 08:54), Max: 98.8 (06 Mar 2019 05:43)  HR: 94 (06 Mar 2019 08:54) (94 - 110)  BP: 113/67 (06 Mar 2019 08:54) (113/67 - 156/92)  BP(mean): --  RR: 16 (06 Mar 2019 08:54) (16 - 18)  SpO2: 95% (06 Mar 2019 08:54) (94% - 95%)    PHYSICAL EXAM:  General: Obese  female with mild agitation/teary eyed   HEENT: NC/AT; PERRL,; MMM  Neck: supple  Cardiovascular: +S1/S2, RRR  Respiratory: CTA B/L  Gastrointestinal: soft, NT/ND; +BSx4; peg site intact no tenderness around site   Extremities: WWP; b/l edema up to knees 1+   Vascular: 2+ radial, DP pulses B/L  Neurological: Awake could not assess orientation; moves extremities spontaneously     MEDICATIONS:  MEDICATIONS  (STANDING):  amLODIPine   Tablet 5 milliGRAM(s) Oral daily  aspirin  chewable 81 milliGRAM(s) Oral daily  atorvastatin 80 milliGRAM(s) Oral at bedtime  carvedilol 25 milliGRAM(s) Oral every 12 hours  dextrose 5%. 1000 milliLiter(s) (50 mL/Hr) IV Continuous <Continuous>  dextrose 50% Injectable 12.5 Gram(s) IV Push once  heparin  Injectable 5000 Unit(s) SubCutaneous every 8 hours  insulin lispro (HumaLOG) corrective regimen sliding scale   SubCutaneous every 6 hours  lacosamide 200 milliGRAM(s) Oral two times a day  levETIRAcetam  Solution 1000 milliGRAM(s) Oral two times a day  losartan 100 milliGRAM(s) Oral daily  melatonin 5 milliGRAM(s) Oral at bedtime  OLANZapine 5 milliGRAM(s) Oral daily    MEDICATIONS  (PRN):  acetaminophen    Suspension .. 650 milliGRAM(s) Oral every 6 hours PRN Temp greater or equal to 38C (100.4F), Mild Pain (1 - 3)  dextrose 40% Gel 15 Gram(s) Oral once PRN Blood Glucose LESS THAN 70 milliGRAM(s)/deciliter  glucagon  Injectable 1 milliGRAM(s) IntraMuscular once PRN Glucose LESS THAN 70 milligrams/deciliter  guaiFENesin   Syrup  (Sugar-Free) 100 milliGRAM(s) Oral every 6 hours PRN Cough  LORazepam   Injectable 2 milliGRAM(s) IV Push once PRN for seizure > 2 minutes  OLANZapine 5 milliGRAM(s) Oral once PRN Agitation      ALLERGIES:  Allergies    No Known Allergies    Intolerances        LABS:                        8.7    5.51  )-----------( 85       ( 05 Mar 2019 06:31 )             27.7     03-05    140  |  103  |  8   ----------------------------<  108<H>  4.2   |  25  |  0.64    Ca    8.8      05 Mar 2019 06:31  Mg     2.0     03-05          CAPILLARY BLOOD GLUCOSE      POCT Blood Glucose.: 152 mg/dL (06 Mar 2019 11:48)      RADIOLOGY & ADDITIONAL TESTS: Reviewed.

## 2019-03-06 NOTE — PROGRESS NOTE ADULT - ASSESSMENT
63F with hx of HTN, DM2, CAD, CVA x2 (left sided hemorrhagic stroke in 11/2018 followed one month later by left sided ischemic CVA in 12/2018) with residual seizure disorder, aphasia and dysphagia s/p PEG who presents with "staring into space", inattentiveness and increased lethargy found to be in status epilepticus, hospital course complicated by influenza A s/p completed Tamiflu.

## 2019-03-07 ENCOUNTER — TRANSCRIPTION ENCOUNTER (OUTPATIENT)
Age: 64
End: 2019-03-07

## 2019-03-07 VITALS
SYSTOLIC BLOOD PRESSURE: 156 MMHG | RESPIRATION RATE: 18 BRPM | DIASTOLIC BLOOD PRESSURE: 85 MMHG | HEART RATE: 108 BPM | TEMPERATURE: 98 F | OXYGEN SATURATION: 94 %

## 2019-03-07 LAB
ANION GAP SERPL CALC-SCNC: 15 MMOL/L — SIGNIFICANT CHANGE UP (ref 5–17)
BLD GP AB SCN SERPL QL: NEGATIVE — SIGNIFICANT CHANGE UP
BUN SERPL-MCNC: 11 MG/DL — SIGNIFICANT CHANGE UP (ref 7–23)
CALCIUM SERPL-MCNC: 9.5 MG/DL — SIGNIFICANT CHANGE UP (ref 8.4–10.5)
CHLORIDE SERPL-SCNC: 101 MMOL/L — SIGNIFICANT CHANGE UP (ref 96–108)
CO2 SERPL-SCNC: 23 MMOL/L — SIGNIFICANT CHANGE UP (ref 22–31)
CREAT SERPL-MCNC: 0.8 MG/DL — SIGNIFICANT CHANGE UP (ref 0.5–1.3)
GLUCOSE SERPL-MCNC: 122 MG/DL — HIGH (ref 70–99)
HCT VFR BLD CALC: 29.2 % — LOW (ref 34.5–45)
HGB BLD-MCNC: 9.1 G/DL — LOW (ref 11.5–15.5)
MCHC RBC-ENTMCNC: 29.8 PG — SIGNIFICANT CHANGE UP (ref 27–34)
MCHC RBC-ENTMCNC: 31.2 GM/DL — LOW (ref 32–36)
MCV RBC AUTO: 95.7 FL — SIGNIFICANT CHANGE UP (ref 80–100)
NRBC # BLD: 0 /100 WBCS — SIGNIFICANT CHANGE UP (ref 0–0)
PLATELET # BLD AUTO: 178 K/UL — SIGNIFICANT CHANGE UP (ref 150–400)
POTASSIUM SERPL-MCNC: 3.7 MMOL/L — SIGNIFICANT CHANGE UP (ref 3.5–5.3)
POTASSIUM SERPL-SCNC: 3.7 MMOL/L — SIGNIFICANT CHANGE UP (ref 3.5–5.3)
RBC # BLD: 3.05 M/UL — LOW (ref 3.8–5.2)
RBC # FLD: 14.7 % — HIGH (ref 10.3–14.5)
RH IG SCN BLD-IMP: POSITIVE — SIGNIFICANT CHANGE UP
SODIUM SERPL-SCNC: 139 MMOL/L — SIGNIFICANT CHANGE UP (ref 135–145)
WBC # BLD: 8.28 K/UL — SIGNIFICANT CHANGE UP (ref 3.8–10.5)
WBC # FLD AUTO: 8.28 K/UL — SIGNIFICANT CHANGE UP (ref 3.8–10.5)

## 2019-03-07 PROCEDURE — 82585 ASSAY OF CRYOFIBRINOGEN: CPT

## 2019-03-07 PROCEDURE — 87086 URINE CULTURE/COLONY COUNT: CPT

## 2019-03-07 PROCEDURE — 82728 ASSAY OF FERRITIN: CPT

## 2019-03-07 PROCEDURE — 84460 ALANINE AMINO (ALT) (SGPT): CPT

## 2019-03-07 PROCEDURE — 36415 COLL VENOUS BLD VENIPUNCTURE: CPT

## 2019-03-07 PROCEDURE — 86850 RBC ANTIBODY SCREEN: CPT

## 2019-03-07 PROCEDURE — 97530 THERAPEUTIC ACTIVITIES: CPT

## 2019-03-07 PROCEDURE — 83540 ASSAY OF IRON: CPT

## 2019-03-07 PROCEDURE — 87581 M.PNEUMON DNA AMP PROBE: CPT

## 2019-03-07 PROCEDURE — 74018 RADEX ABDOMEN 1 VIEW: CPT

## 2019-03-07 PROCEDURE — 70450 CT HEAD/BRAIN W/O DYE: CPT

## 2019-03-07 PROCEDURE — 87040 BLOOD CULTURE FOR BACTERIA: CPT

## 2019-03-07 PROCEDURE — 84300 ASSAY OF URINE SODIUM: CPT

## 2019-03-07 PROCEDURE — 86140 C-REACTIVE PROTEIN: CPT

## 2019-03-07 PROCEDURE — 82962 GLUCOSE BLOOD TEST: CPT

## 2019-03-07 PROCEDURE — 97110 THERAPEUTIC EXERCISES: CPT

## 2019-03-07 PROCEDURE — 83605 ASSAY OF LACTIC ACID: CPT

## 2019-03-07 PROCEDURE — 87633 RESP VIRUS 12-25 TARGETS: CPT

## 2019-03-07 PROCEDURE — 83735 ASSAY OF MAGNESIUM: CPT

## 2019-03-07 PROCEDURE — 82803 BLOOD GASES ANY COMBINATION: CPT

## 2019-03-07 PROCEDURE — 86901 BLOOD TYPING SEROLOGIC RH(D): CPT

## 2019-03-07 PROCEDURE — 80164 ASSAY DIPROPYLACETIC ACD TOT: CPT

## 2019-03-07 PROCEDURE — 93005 ELECTROCARDIOGRAM TRACING: CPT

## 2019-03-07 PROCEDURE — 97161 PT EVAL LOW COMPLEX 20 MIN: CPT

## 2019-03-07 PROCEDURE — 82140 ASSAY OF AMMONIA: CPT

## 2019-03-07 PROCEDURE — 87798 DETECT AGENT NOS DNA AMP: CPT

## 2019-03-07 PROCEDURE — 82570 ASSAY OF URINE CREATININE: CPT

## 2019-03-07 PROCEDURE — 71045 X-RAY EXAM CHEST 1 VIEW: CPT

## 2019-03-07 PROCEDURE — 83615 LACTATE (LD) (LDH) ENZYME: CPT

## 2019-03-07 PROCEDURE — 82595 ASSAY OF CRYOGLOBULIN: CPT

## 2019-03-07 PROCEDURE — 84100 ASSAY OF PHOSPHORUS: CPT

## 2019-03-07 PROCEDURE — 80053 COMPREHEN METABOLIC PANEL: CPT

## 2019-03-07 PROCEDURE — 85027 COMPLETE CBC AUTOMATED: CPT

## 2019-03-07 PROCEDURE — 85025 COMPLETE CBC W/AUTO DIFF WBC: CPT

## 2019-03-07 PROCEDURE — 83935 ASSAY OF URINE OSMOLALITY: CPT

## 2019-03-07 PROCEDURE — 99239 HOSP IP/OBS DSCHRG MGMT >30: CPT

## 2019-03-07 PROCEDURE — 85045 AUTOMATED RETICULOCYTE COUNT: CPT

## 2019-03-07 PROCEDURE — 80048 BASIC METABOLIC PNL TOTAL CA: CPT

## 2019-03-07 PROCEDURE — 86803 HEPATITIS C AB TEST: CPT

## 2019-03-07 PROCEDURE — 95951: CPT

## 2019-03-07 PROCEDURE — 87486 CHLMYD PNEUM DNA AMP PROBE: CPT

## 2019-03-07 PROCEDURE — 84466 ASSAY OF TRANSFERRIN: CPT

## 2019-03-07 PROCEDURE — 93970 EXTREMITY STUDY: CPT

## 2019-03-07 PROCEDURE — 81001 URINALYSIS AUTO W/SCOPE: CPT

## 2019-03-07 PROCEDURE — 83010 ASSAY OF HAPTOGLOBIN QUANT: CPT

## 2019-03-07 PROCEDURE — 86900 BLOOD TYPING SEROLOGIC ABO: CPT

## 2019-03-07 PROCEDURE — 83550 IRON BINDING TEST: CPT

## 2019-03-07 PROCEDURE — 84450 TRANSFERASE (AST) (SGOT): CPT

## 2019-03-07 PROCEDURE — C9254: CPT

## 2019-03-07 RX ORDER — POTASSIUM CHLORIDE 20 MEQ
40 PACKET (EA) ORAL ONCE
Qty: 0 | Refills: 0 | Status: COMPLETED | OUTPATIENT
Start: 2019-03-07 | End: 2019-03-07

## 2019-03-07 RX ADMIN — OLANZAPINE 5 MILLIGRAM(S): 15 TABLET, FILM COATED ORAL at 13:21

## 2019-03-07 RX ADMIN — LEVETIRACETAM 1000 MILLIGRAM(S): 250 TABLET, FILM COATED ORAL at 06:25

## 2019-03-07 RX ADMIN — LACOSAMIDE 200 MILLIGRAM(S): 50 TABLET ORAL at 06:24

## 2019-03-07 RX ADMIN — HEPARIN SODIUM 5000 UNIT(S): 5000 INJECTION INTRAVENOUS; SUBCUTANEOUS at 13:21

## 2019-03-07 RX ADMIN — Medication 40 MILLIEQUIVALENT(S): at 09:18

## 2019-03-07 RX ADMIN — HEPARIN SODIUM 5000 UNIT(S): 5000 INJECTION INTRAVENOUS; SUBCUTANEOUS at 06:25

## 2019-03-07 RX ADMIN — AMLODIPINE BESYLATE 5 MILLIGRAM(S): 2.5 TABLET ORAL at 06:24

## 2019-03-07 RX ADMIN — CARVEDILOL PHOSPHATE 25 MILLIGRAM(S): 80 CAPSULE, EXTENDED RELEASE ORAL at 06:25

## 2019-03-07 RX ADMIN — LOSARTAN POTASSIUM 100 MILLIGRAM(S): 100 TABLET, FILM COATED ORAL at 06:24

## 2019-03-07 RX ADMIN — Medication 100 MILLIGRAM(S): at 07:02

## 2019-03-07 RX ADMIN — Medication 81 MILLIGRAM(S): at 13:21

## 2019-03-07 NOTE — PROGRESS NOTE ADULT - PROBLEM SELECTOR PLAN 3
Resolved-  Patient with (+) UA on admission. Ucx 2/23 grew E. coli susceptible to ceftriaxone, s/p treatment with CTX. Negative UA. Last temp spike 100.8 (3/2/19)    #Sepsis 2/2 influenza A/Coronavirus   -Resolved-  - Completed tamiflu 75mg BID for total of 5 days  (3/1-3/5)

## 2019-03-07 NOTE — DISCHARGE NOTE NURSING/CASE MANAGEMENT/SOCIAL WORK - NSDCDPATPORTLINK_GEN_ALL_CORE
You can access the [facility] Portal, offered by [facility], by registering with the following website: [email or web address]

## 2019-03-07 NOTE — PROGRESS NOTE ADULT - PROBLEM SELECTOR PROBLEM 1
Altered mental status
R/O Status epilepticus
Status epilepticus
Altered mental status

## 2019-03-07 NOTE — PROGRESS NOTE ADULT - PROBLEM SELECTOR PLAN 7
Area currently c/d/i  -PEG feeds (Jevity 1.2 @ 51cc/hr) currently held due to leaking.   -restart feeds when able to. will touch base w/ gi Area currently c/d/i. reported to be leaking overnight but not on evaluation. Seen by GI who felt peg in place via flush and return of bile in tube.   -PEG feeds (Jevity 1.2 @ 51cc/hr) restarted

## 2019-03-07 NOTE — PROGRESS NOTE ADULT - PROVIDER SPECIALTY LIST ADULT
Internal Medicine
MICU
Neurology
Internal Medicine
Internal Medicine

## 2019-03-07 NOTE — PROGRESS NOTE ADULT - SUBJECTIVE AND OBJECTIVE BOX
SUBJECTIVE / INTERVAL HPI: Overnight, PEG reported to be leaking. However still able to administer medications. Patient seen and examined at bedside. Patient mostly non-verbal and moaning. Stated no when asked if she was in any pain. ROS unable to be further obtained.     VITAL SIGNS:  Vital Signs Last 24 Hrs  T(C): 36.8 (07 Mar 2019 05:25), Max: 37 (06 Mar 2019 15:13)  T(F): 98.3 (07 Mar 2019 05:25), Max: 98.6 (06 Mar 2019 15:13)  HR: 99 (07 Mar 2019 05:25) (94 - 114)  BP: 136/71 (07 Mar 2019 05:25) (113/67 - 140/73)  BP(mean): --  RR: 18 (07 Mar 2019 05:25) (16 - 18)  SpO2: 98% (07 Mar 2019 05:25) (95% - 98%)    PHYSICAL EXAM:    General: well appearing, resting comfortably in bed and in no acute distress  HEENT: normocephalic, atraumatic, PERRL, anicteric sclera; no conjunctival pallor, mucus membranes moist  Neck: supple, no masses  Cardiovascular: +S1/S2, regular rate and rhythm; no murmurs, no rub, no gallop  Respiratory: clear to auscultation bilaterally, no rhonchi, no wheeze, no rales  Gastrointestinal: soft, active bowel sounds, non-tender, non-distended, (+) PEG in place-no leakage seen. site is clean and dry.   Extremities: Warm, dry; (+) edema b/l, no clubbing or cyanosis,  Vascular: 2+ radial, DP pulses bilaterally  Neurological: AAOx3; no focal deficits    MEDICATIONS:  MEDICATIONS  (STANDING):  amLODIPine   Tablet 5 milliGRAM(s) Oral daily  aspirin  chewable 81 milliGRAM(s) Oral daily  atorvastatin 80 milliGRAM(s) Oral at bedtime  carvedilol 25 milliGRAM(s) Oral every 12 hours  dextrose 5%. 1000 milliLiter(s) (50 mL/Hr) IV Continuous <Continuous>  dextrose 50% Injectable 12.5 Gram(s) IV Push once  heparin  Injectable 5000 Unit(s) SubCutaneous every 8 hours  insulin lispro (HumaLOG) corrective regimen sliding scale   SubCutaneous every 6 hours  lacosamide 200 milliGRAM(s) Oral two times a day  levETIRAcetam  Solution 1000 milliGRAM(s) Oral two times a day  losartan 100 milliGRAM(s) Oral daily  melatonin 5 milliGRAM(s) Oral at bedtime  OLANZapine 5 milliGRAM(s) Oral daily  potassium chloride   Powder 40 milliEquivalent(s) Oral once    MEDICATIONS  (PRN):  acetaminophen    Suspension .. 650 milliGRAM(s) Oral every 6 hours PRN Temp greater or equal to 38C (100.4F), Mild Pain (1 - 3)  dextrose 40% Gel 15 Gram(s) Oral once PRN Blood Glucose LESS THAN 70 milliGRAM(s)/deciliter  glucagon  Injectable 1 milliGRAM(s) IntraMuscular once PRN Glucose LESS THAN 70 milligrams/deciliter  guaiFENesin   Syrup  (Sugar-Free) 100 milliGRAM(s) Oral every 6 hours PRN Cough  LORazepam   Injectable 2 milliGRAM(s) IV Push once PRN for seizure > 2 minutes      ALLERGIES:  Allergies    No Known Allergies    Intolerances        LABS:                        9.1    8.28  )-----------( 178      ( 07 Mar 2019 06:56 )             29.2     03-07    139  |  101  |  11  ----------------------------<  122<H>  3.7   |  23  |  0.80    Ca    9.5      07 Mar 2019 06:56          CAPILLARY BLOOD GLUCOSE      POCT Blood Glucose.: 121 mg/dL (07 Mar 2019 06:17)      RADIOLOGY & ADDITIONAL TESTS: Reviewed. SUBJECTIVE / INTERVAL HPI: Overnight, PEG reported to be leaking. However still able to administer medications. Tube feeds on hold. Patient seen and examined at bedside. Patient mostly non-verbal and moaning. Stated no when asked if she was in any pain. ROS unable to be further obtained.     VITAL SIGNS:  Vital Signs Last 24 Hrs  T(C): 36.8 (07 Mar 2019 05:25), Max: 37 (06 Mar 2019 15:13)  T(F): 98.3 (07 Mar 2019 05:25), Max: 98.6 (06 Mar 2019 15:13)  HR: 99 (07 Mar 2019 05:25) (94 - 114)  BP: 136/71 (07 Mar 2019 05:25) (113/67 - 140/73)  BP(mean): --  RR: 18 (07 Mar 2019 05:25) (16 - 18)  SpO2: 98% (07 Mar 2019 05:25) (95% - 98%)    PHYSICAL EXAM:    General: well appearing, resting comfortably in bed and in no acute distress  HEENT: normocephalic, atraumatic, PERRL, anicteric sclera; no conjunctival pallor, mucus membranes moist  Neck: supple, no masses  Cardiovascular: +S1/S2, regular rate and rhythm; no murmurs, no rub, no gallop  Respiratory: clear to auscultation bilaterally, no rhonchi, no wheeze, no rales  Gastrointestinal: soft, active bowel sounds, non-tender, non-distended, (+) PEG in place-no leakage seen. site is clean and dry.   Extremities: Warm, dry; (+) edema b/l, no clubbing or cyanosis,  Vascular: 2+ radial, DP pulses bilaterally  Neurological: AAOx3; no focal deficits    MEDICATIONS:  MEDICATIONS  (STANDING):  amLODIPine   Tablet 5 milliGRAM(s) Oral daily  aspirin  chewable 81 milliGRAM(s) Oral daily  atorvastatin 80 milliGRAM(s) Oral at bedtime  carvedilol 25 milliGRAM(s) Oral every 12 hours  dextrose 5%. 1000 milliLiter(s) (50 mL/Hr) IV Continuous <Continuous>  dextrose 50% Injectable 12.5 Gram(s) IV Push once  heparin  Injectable 5000 Unit(s) SubCutaneous every 8 hours  insulin lispro (HumaLOG) corrective regimen sliding scale   SubCutaneous every 6 hours  lacosamide 200 milliGRAM(s) Oral two times a day  levETIRAcetam  Solution 1000 milliGRAM(s) Oral two times a day  losartan 100 milliGRAM(s) Oral daily  melatonin 5 milliGRAM(s) Oral at bedtime  OLANZapine 5 milliGRAM(s) Oral daily  potassium chloride   Powder 40 milliEquivalent(s) Oral once    MEDICATIONS  (PRN):  acetaminophen    Suspension .. 650 milliGRAM(s) Oral every 6 hours PRN Temp greater or equal to 38C (100.4F), Mild Pain (1 - 3)  dextrose 40% Gel 15 Gram(s) Oral once PRN Blood Glucose LESS THAN 70 milliGRAM(s)/deciliter  glucagon  Injectable 1 milliGRAM(s) IntraMuscular once PRN Glucose LESS THAN 70 milligrams/deciliter  guaiFENesin   Syrup  (Sugar-Free) 100 milliGRAM(s) Oral every 6 hours PRN Cough  LORazepam   Injectable 2 milliGRAM(s) IV Push once PRN for seizure > 2 minutes      ALLERGIES:  Allergies    No Known Allergies    Intolerances        LABS:                        9.1    8.28  )-----------( 178      ( 07 Mar 2019 06:56 )             29.2     03-07    139  |  101  |  11  ----------------------------<  122<H>  3.7   |  23  |  0.80    Ca    9.5      07 Mar 2019 06:56          CAPILLARY BLOOD GLUCOSE      POCT Blood Glucose.: 121 mg/dL (07 Mar 2019 06:17)      RADIOLOGY & ADDITIONAL TESTS: Reviewed.

## 2019-03-07 NOTE — DISCHARGE NOTE NURSING/CASE MANAGEMENT/SOCIAL WORK - NSDCPEPTSTRK_GEN_ALL_CORE
Call 911 for stroke/Stroke support groups for patients, families, and friends/Signs and symptoms of stroke/Risk factors for stroke/Need for follow up after discharge/Prescribed medications/Stroke education booklet/Stroke warning signs and symptoms

## 2019-03-07 NOTE — PROGRESS NOTE ADULT - NSHPATTENDINGPLANDISCUSS_GEN_ALL_CORE
resident team.
the    medical  staff
the    medical  staff
as above
the   medical  staff
house staff
resident team.
resident team.
HS
Dr. Donis

## 2019-03-07 NOTE — PROGRESS NOTE ADULT - ATTENDING COMMENTS
Patient was seen and examined with the resident team today.  I agree with Dr. Lehman's assessment and plan with the following exceptions/additions:     Briefly, this is a 64yo woman with a PMH of HTN, DM2, HLD, CAD, s/p T10 laminectomy and fusion (11/2018) c/b postoperative L-temporal lobar ICH w/Wernicke's aphasia, L-MCA ischemic infarct (12/2018) c/b multiple seizures, aphasia and PRES syndrome s/p PEG who initially presented to [place] [facility] w/status epilepticus, requiring transfer to [facility], now s/p a protracted hospital course notable for Influenza A and Coronavirus w/changes in mental status.  Transferred to the floor on 3/4 for safe disposition; per family, mental status back at baseline.  NAEO; per family, pt is back at her baseline.  VSS.  Exam - disorganized and tearful, minimal participation with exam and interview.    -- will complete Tamiflu on 3/6  -- delirium precautions  -- c/w Zyprexa; would recheck QTc if needs future titration or PRN  -- c/w AEDs  -- c/w CAD and BP meds  -- SW, RE: dispo   -- DVT PPx - [facility]  -- Dispo - medically cleared for discharge    Sasha Kilgore  [phone]
Patient seen and examined with house-staff during bedside rounds.  Resident note read, including vitals, physical findings, laboratory data, and radiological reports.   Revisions included below.  Direct personal management at bed side and extensive interpretation of the data.  Plan was outlined and discussed in details with the housestaff.  Decision making of high complexity  Action taken for acute disease activity to reflect the level of care provided:  - medication reconciliation  - review laboratory data  respiratory failure secondary to resolved septic shock, UTI positive bacteremia, seizure disorder resolved YAZ  patient is not awake for weaning and extubation  follow on repeat cultures and ID of the gram negative rods on urine culture  continue antiseizure meds  follow EEG  start tube feed
Pt    appears     to  be    lethargic,  coughing. Influenza  +    EEG  -  diffuse    background     slowing.    Continue    current   AEDs
Pt    is    more    awake    and   alert   today. Global   aphasia.   R   HP.  EEG   -    no    seizures    captured.      EEG -    Rare    R  frontal     and  temporal    sharp   waves, predominantly   during   the   sleep.    Intermittent   polymorphic    delta    slowing    over    the  both     temporal   regions.    Continue     current    meds
Pt   is    off    sedation,  however   remains   drowsy.  AMS   likely    a  combination of    sedation    effect and   UTI.  EEG    no   seizures.   R/o    the   possibility   of    hyper ammoniemia as a side    effect    of   Depakote,   please   check  ammonia    level.
Agree    with    above.  Neuro  exam    unchanged.    EEG -   the   record    is  degraded    by the    prominent    EEG  artifact,  no  seizures  Worsening    of   MS -  possibly    related    to  UTI.  Pt   appears     to be    depressed, Psychiatry   consult  Please    check   Depakote    level  MRI    of the    brain
Agree    with   above.  OE  -  global  aphasia.  R   HP, UE>LE.     EEG -  Bilateral   temporal   sharp    waves   with   slight   R     sided     preponderance. No  seizures   were   recorded.    Meds   were    reviewed;  continue     the    current    regimen.
Patient seen and examined with house-staff during bedside rounds.  Resident note read, including vitals, physical findings, laboratory data, and radiological reports.   Revisions included below.  Direct personal management at bed side and extensive interpretation of the data.  Plan was outlined and discussed in details with the housestaff.  Decision making of high complexity  Action taken for acute disease activity to reflect the level of care provided:  - medication reconciliation  - review laboratory data  Respiratory failure secondary to status epilepticus    Patient is clinically stable and she is on antiseizure medication. I discussed the case with epilepsy service and will decrease sedation and attempt to wean the patient off the ventilator. IV fluids. I discussed the case in details with the daughter. She is on IV antibiotic for uti
a/  pt seen earlier today ~930am  found to be more lethargic and febrile.   dx'd with flu A  + coronavirus  severe sepsis  cxr (-) infiltrate    p/  given ivfs, started oseltamivir  cont eeg monitoring as per epilepsy  txf to [facility]  neuro checks
Patient was seen and examined with the resident team today.  I agree with Dr. Lehman's assessment and plan with the following exceptions/additions:     Briefly, this is a 62yo woman with a PMH of HTN, DM2, HLD, CAD, s/p T10 laminectomy and fusion (11/2018) c/b postoperative L-temporal lobar ICH w/Wernicke's aphasia, L-MCA ischemic infarct (12/2018) c/b multiple seizures, aphasia and PRES syndrome s/p PEG who initially presented to [place] w/status epilepticus, requiring transfer [facility], now s/p a protracted hospital course notable for Influenza A and Coronavirus w/changes in mental status, as well as thrombocytopenia attributed to VPA.  Transferred to the floor on 3/4 for safe disposition; per family, mental status back at baseline.  NAEO; per family, pt is back at her baseline.  VSS.  Exam - disorganized and tearful, moaning and incomprehensible.    -- will complete Tamiflu today  -- delirium precautions  -- c/w Zyprexa; would recheck QTc if needs future titration or PRN  -- c/w AEDs  -- c/w CAD and BP meds  -- SW, RE: dispo   -- DVT PPx - [facility]  -- Dispo - medically cleared for discharge    Sasha Kilgore  [phone]
Patient was seen and examined with the resident team today.  I agree with Dr. Lehman's assessment and plan with the following exceptions/additions:     Briefly, this is a 62yo woman with a PMH of HTN, DM2, HLD, CAD, s/p T10 laminectomy and fusion (11/2018) c/b postoperative L-temporal lobar ICH w/Wernicke's aphasia, L-MCA ischemic infarct (12/2018) c/b multiple seizures, aphasia and PRES syndrome s/p PEG who initially presented to [place] w/status epilepticus, requiring transfer to [facility], now s/p a protracted hospital course notable for Influenza A and Coronavirus w/changes in mental status, as well as thrombocytopenia attributed to VPA.  Transferred to the floor on 3/4 for safe disposition; per family, mental status back at baseline.  NAEO.  PEG thought to be leaking; however, evaluated by GI this AM and appears to be functioning.  VS - HR 's, -150's.  Exam - less disorganized and tearful but still mostly incomprehensible.  Labs - platelets 178.     -- s/p course of Tamiflu treatment   -- c/w Zyprexa; would recheck QTc if needs future titration or PRN  -- c/w AEDs  -- delirium precautions  -- c/w CAD and BP meds  -- SW, RE: dispo   -- DVT PPx - [facility]  -- Dispo - remains medically cleared for discharge    Sasha Kilgore  [phone]
Patient could not tolerate MRI because of agitation. At present continuing olanzapine but will decrease tomorrow to 5 mg if lethargy continues. The lethargy could also be brought on by the influenza. For now do not think there is a need for LP.  I updated her  this AM.
Pt more awake and alert. Continue Tamiflu as she is still coughing. Plans per Epilepsy for seizure control. Agree with the addition of amlodipine for Htn.
patient seen and examined    reviewed data including:  labs, current and previous admission chart notes and/or imaging      agree w/ PE findings as above w/ additions/ exceptions/ pertinent findings: noncooperative w/ examination, asleep, does not respond to voice, opened eyes and moved spontaneously during examination but does not follow commands. VEEG in place; s1s2, lungs CTA b/l (limited as pt unable to cooperate) , +PEG in place, abdomen nontender/ nondistended, no lower ext edema/tenderness b/l     1.status epilepticus: follow up Epilepsy recs, on VEEG, c/w AEDs as noted above  2. anemia/ thrombocytopenia: monitor CBC, followup iron studies    rest of plan as above

## 2019-03-07 NOTE — PROGRESS NOTE ADULT - REASON FOR ADMISSION
Altered mental status

## 2019-03-07 NOTE — PROGRESS NOTE ADULT - PROBLEM SELECTOR PROBLEM 2
R/O Status epilepticus
Urinary tract infection
Urinary tract infection
R/O Status epilepticus

## 2019-03-07 NOTE — PROGRESS NOTE ADULT - PROBLEM SELECTOR PLAN 1
Patient found to have worsened AMS on 3/1 likely multifactorial from prior ICH, PRES syndrome, sedating medications, and possible seizure activity, sepsis 2/2 Flu. However, mental status now back to baseline, per family: AAOx0. CT head negative.   - completed tamiflu for total of 5 days  (3/1-3/5)  - c/w keppra 1000 BID  - c/w olanzapine 5 mg daily    #Thrombocytopenia- improved. Had been downtrending plt count to 8os no active signs of bleeding. d/c-ed valproic acid. Now on keppra 1000mg BID. plt now 178

## 2019-03-11 DIAGNOSIS — T42.6X5A ADVERSE EFFECT OF OTHER ANTIEPILEPTIC AND SEDATIVE-HYPNOTIC DRUGS, INITIAL ENCOUNTER: ICD-10-CM

## 2019-03-11 DIAGNOSIS — I69.391 DYSPHAGIA FOLLOWING CEREBRAL INFARCTION: ICD-10-CM

## 2019-03-11 DIAGNOSIS — F32.9 MAJOR DEPRESSIVE DISORDER, SINGLE EPISODE, UNSPECIFIED: ICD-10-CM

## 2019-03-11 DIAGNOSIS — B97.29 OTHER CORONAVIRUS AS THE CAUSE OF DISEASES CLASSIFIED ELSEWHERE: ICD-10-CM

## 2019-03-11 DIAGNOSIS — R13.10 DYSPHAGIA, UNSPECIFIED: ICD-10-CM

## 2019-03-11 DIAGNOSIS — N39.0 URINARY TRACT INFECTION, SITE NOT SPECIFIED: ICD-10-CM

## 2019-03-11 DIAGNOSIS — J96.90 RESPIRATORY FAILURE, UNSPECIFIED, UNSPECIFIED WHETHER WITH HYPOXIA OR HYPERCAPNIA: ICD-10-CM

## 2019-03-11 DIAGNOSIS — Z79.4 LONG TERM (CURRENT) USE OF INSULIN: ICD-10-CM

## 2019-03-11 DIAGNOSIS — Z98.1 ARTHRODESIS STATUS: ICD-10-CM

## 2019-03-11 DIAGNOSIS — E78.00 PURE HYPERCHOLESTEROLEMIA, UNSPECIFIED: ICD-10-CM

## 2019-03-11 DIAGNOSIS — G40.901 EPILEPSY, UNSPECIFIED, NOT INTRACTABLE, WITH STATUS EPILEPTICUS: ICD-10-CM

## 2019-03-11 DIAGNOSIS — I69.398 OTHER SEQUELAE OF CEREBRAL INFARCTION: ICD-10-CM

## 2019-03-11 DIAGNOSIS — N17.9 ACUTE KIDNEY FAILURE, UNSPECIFIED: ICD-10-CM

## 2019-03-11 DIAGNOSIS — J10.1 INFLUENZA DUE TO OTHER IDENTIFIED INFLUENZA VIRUS WITH OTHER RESPIRATORY MANIFESTATIONS: ICD-10-CM

## 2019-03-11 DIAGNOSIS — R65.21 SEVERE SEPSIS WITH SEPTIC SHOCK: ICD-10-CM

## 2019-03-11 DIAGNOSIS — D69.59 OTHER SECONDARY THROMBOCYTOPENIA: ICD-10-CM

## 2019-03-11 DIAGNOSIS — I10 ESSENTIAL (PRIMARY) HYPERTENSION: ICD-10-CM

## 2019-03-11 DIAGNOSIS — Z80.0 FAMILY HISTORY OF MALIGNANT NEOPLASM OF DIGESTIVE ORGANS: ICD-10-CM

## 2019-03-11 DIAGNOSIS — E11.40 TYPE 2 DIABETES MELLITUS WITH DIABETIC NEUROPATHY, UNSPECIFIED: ICD-10-CM

## 2019-03-11 DIAGNOSIS — I69.320 APHASIA FOLLOWING CEREBRAL INFARCTION: ICD-10-CM

## 2019-03-11 DIAGNOSIS — Z95.5 PRESENCE OF CORONARY ANGIOPLASTY IMPLANT AND GRAFT: ICD-10-CM

## 2019-03-11 DIAGNOSIS — B96.20 UNSPECIFIED ESCHERICHIA COLI [E. COLI] AS THE CAUSE OF DISEASES CLASSIFIED ELSEWHERE: ICD-10-CM

## 2019-03-11 DIAGNOSIS — Z51.5 ENCOUNTER FOR PALLIATIVE CARE: ICD-10-CM

## 2019-03-11 DIAGNOSIS — Z80.3 FAMILY HISTORY OF MALIGNANT NEOPLASM OF BREAST: ICD-10-CM

## 2019-03-11 DIAGNOSIS — K21.9 GASTRO-ESOPHAGEAL REFLUX DISEASE WITHOUT ESOPHAGITIS: ICD-10-CM

## 2019-03-11 DIAGNOSIS — Z79.82 LONG TERM (CURRENT) USE OF ASPIRIN: ICD-10-CM

## 2019-03-11 DIAGNOSIS — Z98.890 OTHER SPECIFIED POSTPROCEDURAL STATES: ICD-10-CM

## 2019-03-11 DIAGNOSIS — Z78.1 PHYSICAL RESTRAINT STATUS: ICD-10-CM

## 2019-03-11 DIAGNOSIS — I25.10 ATHEROSCLEROTIC HEART DISEASE OF NATIVE CORONARY ARTERY WITHOUT ANGINA PECTORIS: ICD-10-CM

## 2019-03-11 DIAGNOSIS — Z87.442 PERSONAL HISTORY OF URINARY CALCULI: ICD-10-CM

## 2019-03-11 DIAGNOSIS — A41.89 OTHER SPECIFIED SEPSIS: ICD-10-CM

## 2019-03-11 DIAGNOSIS — E86.0 DEHYDRATION: ICD-10-CM

## 2019-03-11 DIAGNOSIS — Z93.1 GASTROSTOMY STATUS: ICD-10-CM

## 2019-03-28 ENCOUNTER — CHART COPY (OUTPATIENT)
Age: 64
End: 2019-03-28

## 2019-09-26 NOTE — ASU PATIENT PROFILE, ADULT - NS TRANSFER PATIENT BELONGINGS
HPI:    Radha Lucas is a 68year old male here today for hospital follow up.    He was discharged from Inpatient hospital, [facility] to Home   Admission Date: 9/13/19   Discharge Date: 9/15/19  Hospital Discharge Diagnoses (since 8/27/2019)       H treated with Keflex 3 times daily for a week, after sensitivities available pt prescribed cipro 500mg po bid x 7 days. Pt had FU with 's NP on 9/17/2019 recommended continue complex hicks cath with replacement q 4-6 week.  Continue tamsulosin Visit:  Phenazopyridine HCl 200 MG Oral Tab Take 1 tablet (200 mg total) by mouth 3 (three) times daily as needed for Pain.    HYDROcodone-acetaminophen 5-325 MG Oral Tab Take 1 tablet by mouth every 6 (six) hours as needed for Pain.   gabapentin 300 MG Ora 4 units SC -250 give 7 units SC -300 give 9 units SC -350  Give 12 units SC -400 give 15 units and notify MD   Insulin Pen Needle 30G X 5 MM Does not apply Misc Use with insulin aspart as needed   Blood Glucose Monitoring Suppl (ACC gel superior shoulder). Ferrous Sulfate (FERROUSUL) 325 (65 Fe) MG Oral Tab Take 1 tablet (325 mg total) by mouth daily with breakfast.   Cyanocobalamin 1000 MCG Oral Cap Take 1,000 capsules by mouth 2 (two) times daily.      No current facility-administe angioplasty (coronary); repair, endovasc, infrarenal abdom aortic aneurysm/dissect; aorto-iliac/femoral uni prosthesis (02/22/2017); cath drug eluting stent; and laparoscopy, surgical prostatectomy, retropubic radical, w/nerve sparing.     He family history lesions  HEENT: atraumatic, normocephalic, ears and throat are clear  EYES: PERRLA, EOMI, conjunctiva are clear  NECK: supple, no adenopathy, no bruits  CHEST: no chest tenderness  LUNGS: clear to auscultation  CARDIO: RRR without murmur  GI: good BS's, no Consults:  None        Transitional Care Management Certification:  I certify that the following are true:  Communication with the patient was made within 2 business days of discharge on date above   Medical Decision Making- Based on service period of disc Clothing

## 2019-12-18 NOTE — PROGRESS NOTE ADULT - PROBLEM SELECTOR PLAN 1
WBC 14 this am, last time CBC checked was 12/6   afebrile, no evidence of infection or DVT at this time  suspect leukocytosis is inflammatory   would monitor trend   control pain

## 2019-12-23 NOTE — PATIENT PROFILE ADULT - NSPRONUTRITIONRISK_GEN_A_NUR
HOME INSTRUCTION SHEET                                          Procedure/Condition:  Facial Surgery    Activity:  • Rest today.  • Do not drive a motor vehicle, operate machinery or appliances, make important decisions, or drink alcohol for the next 24 hours or when taking pain medications, as it makes you drowsy.  • A responsible adult needs to oversee your care today.  • Activity restrictions: No lifting over 10 pounds for 2 weeks. Avoid any physical activity that would raise your heart rate or blood pressure. No vacuuming, laundry or other household chores.    Diet: Light foods today to decrease the chance of nausea from the anesthetic.  Tomorrow your usual diet.    Dressings/Incisions:    [x] Wash incisional area very gently using regular soap and water, apply Neosporin/Bactracin ointment and new dressing twice a day starting on tomorrow.  [x] May shower in  1  days.  [x] Wound Care  if you have any bleeding from your forehead please apply pressure to the area for 10 minutes by the clock. If you remove your finger to see if it is bleeding and it still is bleeding then begin again with 10 more minutes of pressure.    Medications:  [x] Your pain medication  Norco   contains Acetaminophen/Tylenol.  Do not take additional Acetaminophen/Tylenol while on this medication.  [x] You may take anti-inflammatory for pain control such as Advil, Motrin or Aleve, if not allergic.  • Most pain medications can be constipating so increase fluids, fibers, fruits and vegetables in your diet or start an over the counter stool softener.    Your last dose of pain medication was at________________    Call Your Doctor if You Have Any of the Following:   • Excessive pain not controlled by pain medications  • Increased bleeding, heat, drainage, swelling, or redness from the incision  • Temperature above 101ºF  • If unable to empty your bladder in 8 - 12 hours after surgery    Physician:  Karri Colindres MD Office Address:                     [address] [address]    Office Phone: [phone]  If you have any questions or concerns, please call your physician.         HOME INSTRUCTION SHEET   Medication: Scopolamine Patch     You have a Scopolamine Patch placed behind your ear to decrease the chance of vomiting after your surgery.     Please follow the instructions listed below for care of the patch.   · Do not touch the patch unless you are going to remove it.   · Do not rub your eyes after touching the patch, as eye irritation and increase in your pupil size may result.   · Remove the patch 24 hours after surgery or when instructed by your Anesthesiologist. Wash your hands IMMEDIATELY after removing patch.   · Do not drink alcohol while wearing the patch.   · This medicine may cause drowsiness. Do not drive a car, use machinery or do dangerous jobs while the patch is on.   · This medicine may make you sweat less causing your body to overheat. Care should be taken when in hot weather, exercising, or if using a sauna or whirlpool. The medicine may also cause a dry mouth.     Call the [facility]  @ [phone] and ask for the Hospital supervisor if you have any of the following:   • Severe skin rash or hives   • Confusion or memory loss   • Cannot go to the bathroom (urinate)   • Severe eye pain     Home Instruction Sheet  ANESTHESIA for ADULT   What are the side effects?  Side effects depend on the medication used, and may not even be present.    Most Common Sometimes   Irritability  Poor Balance  Sleeping for Several Hours  Drowsiness  Fatigue  Difficulty Concentrating Change in Behavior  Hyperactivity  Nausea (upset stomach)  Gas (flatulence)  Dizziness  Hiccups  Constipation  Blurred Vision     1. The effects of sedation medicine can last up to 24 hours.  You may be drowsy or irritable for 2 to 8 hours after receiving medicine.  2. You may need to sleep after leaving the testing  area.  Sleeping after sedation will help reduce irritability.  3. Diet:  - Do not give anything to eat or drink until you are fully awake.  Eat a light meal and advance to a normal diet unless instructed differently:   - Do not drink alcohol beverages for the next 24 hours.  - Avoid greasy or spicy foods today.  4. Activity:  - You may be dizzy and/or unsteady.  Ask for help walking, using the bathroom, or stairs to protect yourself from injury.  - You should not drive a vehicle, operate machinery or power tools for 24 hours because your reflexes may be slow and your vision may be blurry.  - Do not sign any legally binding documents or make important decisions for 24 hours after receiving sedation.  5. Medications:   Unless told otherwise, do not take any non-prescription medications (cold medicine, etc.) for 24 hours, as these medications in combination with sedation medication, can cause increased drowsiness.  If you feel that you need over the counter medication, discuss with your physician.    If you are currently taking or are on Hormonal Contraceptives , these may be ineffective for the next 8 days following anesthesia due to interactions with medications that you may have received during surgery.  Please use additional (back up) contraception during this time.      Examples of hormonal Contraceptive:   -Birth Control Pills (oral)   -Birth Control Shots (injectable)   -Implantable contraceptives   -Patches   -Vaginal Rings    When Should I Call the Doctor?  - Vomiting more than twice.  - Extreme irritability or unusual changes in behavior.  - Trouble arousing.  - Inability to urinate.  - Trouble breathing - call 911.    We would like to take this opportunity to thank you for choosing [facility]. Because your confidence in your caregivers is very important to us, it is our commitment to always treat you and your family with courtesy and respect. Our goal is to always answer any questions or  worries or concerns you may have about the care you received If you have any suggestions for improvement or worries or concerns please do not hesitate to let us know.   No indicators present

## 2020-09-25 ENCOUNTER — APPOINTMENT (OUTPATIENT)
Dept: CT IMAGING | Facility: CLINIC | Age: 65
End: 2020-09-25
Payer: COMMERCIAL

## 2020-09-25 ENCOUNTER — OUTPATIENT (OUTPATIENT)
Dept: OUTPATIENT SERVICES | Facility: HOSPITAL | Age: 65
LOS: 1 days | End: 2020-09-25
Payer: COMMERCIAL

## 2020-09-25 DIAGNOSIS — Z98.89 OTHER SPECIFIED POSTPROCEDURAL STATES: Chronic | ICD-10-CM

## 2020-09-25 DIAGNOSIS — Z98.890 OTHER SPECIFIED POSTPROCEDURAL STATES: Chronic | ICD-10-CM

## 2020-09-25 DIAGNOSIS — Z92.89 PERSONAL HISTORY OF OTHER MEDICAL TREATMENT: Chronic | ICD-10-CM

## 2020-09-25 DIAGNOSIS — Z00.8 ENCOUNTER FOR OTHER GENERAL EXAMINATION: ICD-10-CM

## 2020-09-25 PROCEDURE — 74150 CT ABDOMEN W/O CONTRAST: CPT | Mod: 26

## 2020-09-25 PROCEDURE — 74150 CT ABDOMEN W/O CONTRAST: CPT

## 2020-10-14 ENCOUNTER — RESULT REVIEW (OUTPATIENT)
Age: 65
End: 2020-10-14

## 2020-10-14 ENCOUNTER — OUTPATIENT (OUTPATIENT)
Dept: INPATIENT UNIT | Facility: HOSPITAL | Age: 65
LOS: 1 days | Discharge: HOME | End: 2020-10-14
Payer: COMMERCIAL

## 2020-10-14 VITALS
OXYGEN SATURATION: 100 % | TEMPERATURE: 97 F | DIASTOLIC BLOOD PRESSURE: 68 MMHG | RESPIRATION RATE: 16 BRPM | HEART RATE: 80 BPM | SYSTOLIC BLOOD PRESSURE: 117 MMHG

## 2020-10-14 VITALS
HEIGHT: 66 IN | WEIGHT: 160.06 LBS | SYSTOLIC BLOOD PRESSURE: 122 MMHG | RESPIRATION RATE: 16 BRPM | DIASTOLIC BLOOD PRESSURE: 61 MMHG | HEART RATE: 55 BPM | TEMPERATURE: 98 F | OXYGEN SATURATION: 93 %

## 2020-10-14 DIAGNOSIS — Z98.890 OTHER SPECIFIED POSTPROCEDURAL STATES: Chronic | ICD-10-CM

## 2020-10-14 DIAGNOSIS — K29.50 UNSPECIFIED CHRONIC GASTRITIS WITHOUT BLEEDING: ICD-10-CM

## 2020-10-14 DIAGNOSIS — Z92.89 PERSONAL HISTORY OF OTHER MEDICAL TREATMENT: Chronic | ICD-10-CM

## 2020-10-14 DIAGNOSIS — Z98.89 OTHER SPECIFIED POSTPROCEDURAL STATES: Chronic | ICD-10-CM

## 2020-10-14 LAB
ANION GAP SERPL CALC-SCNC: 2 MMOL/L — LOW (ref 5–17)
BUN SERPL-MCNC: 34 MG/DL — HIGH (ref 7–23)
CALCIUM SERPL-MCNC: 9.4 MG/DL — SIGNIFICANT CHANGE UP (ref 8.5–10.1)
CHLORIDE SERPL-SCNC: 104 MMOL/L — SIGNIFICANT CHANGE UP (ref 96–108)
CO2 SERPL-SCNC: 32 MMOL/L — HIGH (ref 22–31)
CREAT SERPL-MCNC: 1.11 MG/DL — SIGNIFICANT CHANGE UP (ref 0.5–1.3)
GLUCOSE SERPL-MCNC: 94 MG/DL — SIGNIFICANT CHANGE UP (ref 70–99)
HCT VFR BLD CALC: 36.9 % — SIGNIFICANT CHANGE UP (ref 34.5–45)
HGB BLD-MCNC: 11.7 G/DL — SIGNIFICANT CHANGE UP (ref 11.5–15.5)
INR BLD: 1 RATIO — SIGNIFICANT CHANGE UP (ref 0.88–1.16)
MCHC RBC-ENTMCNC: 31.7 GM/DL — LOW (ref 32–36)
MCHC RBC-ENTMCNC: 31.9 PG — SIGNIFICANT CHANGE UP (ref 27–34)
MCV RBC AUTO: 100.5 FL — HIGH (ref 80–100)
PLATELET # BLD AUTO: 260 K/UL — SIGNIFICANT CHANGE UP (ref 150–400)
POTASSIUM SERPL-MCNC: 5 MMOL/L — SIGNIFICANT CHANGE UP (ref 3.5–5.3)
POTASSIUM SERPL-SCNC: 5 MMOL/L — SIGNIFICANT CHANGE UP (ref 3.5–5.3)
PROTHROM AB SERPL-ACNC: 11.7 SEC — SIGNIFICANT CHANGE UP (ref 10.6–13.6)
RBC # BLD: 3.67 M/UL — LOW (ref 3.8–5.2)
RBC # FLD: 12.6 % — SIGNIFICANT CHANGE UP (ref 10.3–14.5)
SODIUM SERPL-SCNC: 138 MMOL/L — SIGNIFICANT CHANGE UP (ref 135–145)
WBC # BLD: 6.81 K/UL — SIGNIFICANT CHANGE UP (ref 3.8–10.5)
WBC # FLD AUTO: 6.81 K/UL — SIGNIFICANT CHANGE UP (ref 3.8–10.5)

## 2020-10-14 PROCEDURE — 36415 COLL VENOUS BLD VENIPUNCTURE: CPT

## 2020-10-14 PROCEDURE — 49450 REPLACE G/C TUBE PERC: CPT

## 2020-10-14 PROCEDURE — 85027 COMPLETE CBC AUTOMATED: CPT

## 2020-10-14 PROCEDURE — 93010 ELECTROCARDIOGRAM REPORT: CPT

## 2020-10-14 PROCEDURE — 93005 ELECTROCARDIOGRAM TRACING: CPT

## 2020-10-14 PROCEDURE — 85610 PROTHROMBIN TIME: CPT

## 2020-10-14 PROCEDURE — 80048 BASIC METABOLIC PNL TOTAL CA: CPT

## 2020-10-14 RX ORDER — FENTANYL CITRATE 50 UG/ML
50 INJECTION INTRAVENOUS
Refills: 0 | Status: DISCONTINUED | OUTPATIENT
Start: 2020-10-14 | End: 2020-10-14

## 2020-10-14 RX ORDER — OMEPRAZOLE 10 MG/1
1 CAPSULE, DELAYED RELEASE ORAL
Qty: 0 | Refills: 0 | DISCHARGE

## 2020-10-14 RX ORDER — ALPRAZOLAM 0.25 MG
1 TABLET ORAL
Qty: 0 | Refills: 0 | DISCHARGE

## 2020-10-14 RX ORDER — MIRTAZAPINE 45 MG/1
1 TABLET, ORALLY DISINTEGRATING ORAL
Qty: 0 | Refills: 0 | DISCHARGE

## 2020-10-14 RX ORDER — LAMOTRIGINE 25 MG/1
0 TABLET, ORALLY DISINTEGRATING ORAL
Qty: 0 | Refills: 0 | DISCHARGE

## 2020-10-14 RX ORDER — BACLOFEN 100 %
0 POWDER (GRAM) MISCELLANEOUS
Qty: 0 | Refills: 0 | DISCHARGE

## 2020-10-14 RX ORDER — SODIUM CHLORIDE 9 MG/ML
1000 INJECTION, SOLUTION INTRAVENOUS
Refills: 0 | Status: DISCONTINUED | OUTPATIENT
Start: 2020-10-14 | End: 2020-10-14

## 2020-10-14 RX ORDER — LANOLIN ALCOHOL/MO/W.PET/CERES
1 CREAM (GRAM) TOPICAL
Qty: 0 | Refills: 0 | DISCHARGE

## 2020-10-14 RX ORDER — AMITRIPTYLINE HCL 25 MG
1 TABLET ORAL
Qty: 0 | Refills: 0 | DISCHARGE

## 2020-10-14 RX ORDER — LOSARTAN POTASSIUM 100 MG/1
1 TABLET, FILM COATED ORAL
Qty: 0 | Refills: 0 | DISCHARGE

## 2020-10-14 RX ORDER — LACOSAMIDE 50 MG/1
1 TABLET ORAL
Qty: 0 | Refills: 0 | DISCHARGE

## 2020-10-14 NOTE — ASU PATIENT PROFILE, ADULT - PSH
History of cardiac catheterization  12/2015 with stent times  - [facility]  History of carpal tunnel surgery of left wrist    History of carpal tunnel surgery of right wrist    History of knee surgery  left times 2 ;  2001 , 2002   right knee : 2004  History of shoulder surgery

## 2020-10-21 DIAGNOSIS — Z98.61 CORONARY ANGIOPLASTY STATUS: ICD-10-CM

## 2020-10-21 DIAGNOSIS — R13.10 DYSPHAGIA, UNSPECIFIED: ICD-10-CM

## 2020-10-21 DIAGNOSIS — K59.00 CONSTIPATION, UNSPECIFIED: ICD-10-CM

## 2020-10-21 DIAGNOSIS — I69.351 HEMIPLEGIA AND HEMIPARESIS FOLLOWING CEREBRAL INFARCTION AFFECTING RIGHT DOMINANT SIDE: ICD-10-CM

## 2020-10-21 DIAGNOSIS — G47.00 INSOMNIA, UNSPECIFIED: ICD-10-CM

## 2020-10-21 DIAGNOSIS — E56.9 VITAMIN DEFICIENCY, UNSPECIFIED: ICD-10-CM

## 2020-10-21 DIAGNOSIS — I10 ESSENTIAL (PRIMARY) HYPERTENSION: ICD-10-CM

## 2020-10-21 DIAGNOSIS — E78.5 HYPERLIPIDEMIA, UNSPECIFIED: ICD-10-CM

## 2020-10-21 DIAGNOSIS — Z46.59 ENCOUNTER FOR FITTING AND ADJUSTMENT OF OTHER GASTROINTESTINAL APPLIANCE AND DEVICE: ICD-10-CM

## 2020-10-21 DIAGNOSIS — F31.9 BIPOLAR DISORDER, UNSPECIFIED: ICD-10-CM

## 2020-10-21 DIAGNOSIS — K29.50 UNSPECIFIED CHRONIC GASTRITIS WITHOUT BLEEDING: ICD-10-CM

## 2020-10-21 DIAGNOSIS — E08.21 DIABETES MELLITUS DUE TO UNDERLYING CONDITION WITH DIABETIC NEPHROPATHY: ICD-10-CM

## 2020-10-21 DIAGNOSIS — I25.10 ATHEROSCLEROTIC HEART DISEASE OF NATIVE CORONARY ARTERY WITHOUT ANGINA PECTORIS: ICD-10-CM

## 2020-10-21 DIAGNOSIS — D64.9 ANEMIA, UNSPECIFIED: ICD-10-CM

## 2020-10-21 DIAGNOSIS — G40.909 EPILEPSY, UNSPECIFIED, NOT INTRACTABLE, WITHOUT STATUS EPILEPTICUS: ICD-10-CM

## 2020-11-20 NOTE — SWALLOW BEDSIDE ASSESSMENT ADULT - PHARYNGEAL PHASE
Within functional limits details… Affect and characteristics of appearance, verbalizations, behaviors are appropriate detailed exam

## 2021-09-09 NOTE — H&P ADULT - DOES THIS PATIENT HAVE A HISTORY OF OR HAS BEEN DX WITH HEART FAILURE?
Pt sister Estee Frias called stating Neuropsyche testing not covered by insurance (HMO) at Dr Scar Bolivar office.      Contact info given for Teressa Martinez
no

## 2021-11-30 ENCOUNTER — NON-APPOINTMENT (OUTPATIENT)
Age: 66
End: 2021-11-30

## 2021-12-07 NOTE — DIETITIAN INITIAL EVALUATION ADULT. - NS FNS WEIGHT CHANGE REASON
Pt needs labs completed, Opal called patient and made him aware.  See other task.   
Patient requesting refill for hydrochlorothiazide (HYDRODIURIL) 25 MG  -please send to [id number] - [place], [place] - [address] AT [facility] & [facility]   
Refilled as requested.  
unintentional

## 2022-02-14 NOTE — PROVIDER CONTACT NOTE (MEDICATION) - RECOMMENDATIONS
Give prn labetalol, will continue to monitor
Valium 5mg PO prior to MRI and Miralax for constipation
General: well appearing, interactive, NAD  HEENT: pupils equal and reactive, normal external ears bilaterally   Cardiac: RRR, no MRG appreciated  Resp: lungs clear to auscultation bilaterally, symmetric chest wall rise  Abd: soft, nontender, nondistended,   : no CVA tenderness  Neuro: Moving all extremities  Skin:  normal color for race

## 2022-04-22 NOTE — ED PROVIDER NOTE - NSCAREINITIATED _GEN_ER
Stephanie Baltazar(Attending) Principal Discharge DX:	Migraine headache   1 Principal Discharge DX:	Viral syndrome  Secondary Diagnosis:	HA (headache)

## 2022-04-27 NOTE — PRE-ANESTHESIA EVALUATION ADULT - LAST ECHOCARDIOGRAM
2017
Take loratadine as prescribed once daily.  Follow up with ENT next week.  Follow up with psychiatry, see list provided.    Return to the Emergency Department if you have any new or worsening symptoms, or if you have any concerns.  =====================    Allergies, Adult      An allergy is a condition in which the body's defense system (immune system) comes in contact with an allergen and reacts to it. An allergen is anything that causes an allergic reaction. Allergens cause the immune system to make proteins for fighting infections (antibodies). These antibodies cause cells to release chemicals called histamines that set off the symptoms of an allergic reaction.    Allergies often affect the nasal passages (allergic rhinitis), eyes (allergic conjunctivitis), skin (atopic dermatitis), and stomach. Allergies can be mild, moderate, or severe. They cannot spread from person to person. Allergies can develop at any age and may be outgrown.      What are the causes?    This condition is caused by allergens. Common allergens include:  •Outdoor allergens, such as pollen, car fumes, and mold.      •Indoor allergens, such as dust, smoke, mold, and pet dander.      •Other allergens, such as foods, medicines, scents, insect bites or stings, and other skin irritants.        What increases the risk?    You are more likely to develop this condition if you have:  •Family members with allergies.      •Family members who have any condition that may be caused by allergens, such as asthma. This may make you more likely to have other allergies.        What are the signs or symptoms?    Symptoms of this condition depend on the severity of the allergy.    Mild to moderate symptoms     •Runny nose, stuffy nose (nasal congestion), or sneezing.      •Itchy mouth, ears, or throat.      •A feeling of mucus dripping down the back of your throat (postnasal drip).      •Sore throat.      •Itchy, red, watery, or puffy eyes.      •Skin rash, or itchy, red, swollen areas of skin (hives).      •Stomach cramps or bloating.      Severe symptoms     Severe allergies to food, medicine, or insect bites may cause anaphylaxis, which can be life-threatening. Symptoms include:  •A red (flushed) face.      •Wheezing or coughing.      •Swollen lips, tongue, or mouth.      •Tight or swollen throat.      •Chest pain or tightness, or rapid heartbeat.      •Trouble breathing or shortness of breath.      •Pain in the abdomen, vomiting, or diarrhea.      •Dizziness or fainting.        How is this diagnosed?    This condition is diagnosed based on your symptoms, your family and medical history, and a physical exam. You may also have tests, including:•Skin tests to see how your skin reacts to allergens that may be causing your symptoms. Tests include:  •Skin prick test. For this test, an allergen is introduced to your body through a small opening in the skin.      •Intradermal skin test. For this test, a small amount of allergen is injected under the first layer of your skin.      •Patch test. For this test, a small amount of allergen is placed on your skin. The area is covered and then checked after a few days.        •Blood tests.      •A challenge test. For this test, you will eat or breathe in a small amount of allergen to see if you have an allergic reaction.      You may also be asked to:  •Keep a food diary. This is a record of all the foods, drinks, and symptoms you have in a day.    •Try an elimination diet. To do this:  •Remove certain foods from your diet.      •Add those foods back one by one to find out if any foods cause an allergic reaction.          How is this treated?                  Treatment for allergies depends on your symptoms. Treatment may include:  •Cold, wet cloths (cold compresses) to soothe itching and swelling.      •Eye drops or nasal sprays.      •Nasal irrigation to help clear your mucus or keep the nasal passages moist.      •A humidifier to add moisture to the air.      •Skin creams to treat rashes or itching.      •Oral antihistamines or other medicines to block the reaction or to treat inflammation.      •Diet changes to remove foods that cause allergies.    •Being exposed again and again to tiny amounts of allergens to help you build a defense against it (tolerance). This is called immunotherapy. Examples include:  •Allergy shot. You receive an injection that contains an allergen.      •Sublingual immunotherapy. You take a small dose of allergen under your tongue.        •Emergency injection for anaphylaxis. You give yourself a shot using a syringe (auto-injector) that contains the amount of medicine you need. Your health care provider will teach you how to give yourself an injection.        Follow these instructions at home:      Medicines      •Take or apply over-the-counter and prescription medicines only as told by your health care provider.      •Always carry your auto-injector pen if you are at risk of anaphylaxis. Give yourself an injection as told by your health care provider.      Eating and drinking     •Follow instructions from your health care provider about eating or drinking restrictions.      •Drink enough fluid to keep your urine pale yellow.      General instructions     •Wear a medical alert bracelet or necklace to let others know that you have had anaphylaxis before.      •Avoid known allergens whenever possible.      •Keep all follow-up visits as told by your health care provider. This is important.        Contact a health care provider if:    •Your symptoms do not get better with treatment.        Get help right away if:  •You have symptoms of anaphylaxis. These include:  •Swollen mouth, tongue, or throat.      •Pain or tightness in your chest.      •Trouble breathing or shortness of breath.      •Dizziness or fainting.      •Severe abdominal pain, vomiting, or diarrhea.        These symptoms may represent a serious problem that is an emergency. Do not wait to see if the symptoms will go away. Get medical help right away. Call your local emergency services (911 in the [place] Do not drive yourself to the hospital.       Summary    •Take or apply over-the-counter and prescription medicines only as told by your health care provider.      •Avoid known allergens when possible.      •Always carry your auto-injector pen if you are at risk of anaphylaxis. Give yourself an injection as told by your health care provider.      •Wear a medical alert bracelet or necklace to let others know that you have had anaphylaxis before.      •Anaphylaxis is a life-threatening emergency. Get help right away.      This information is not intended to replace advice given to you by your health care provider. Make sure you discuss any questions you have with your health care provider.
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
## 2022-10-05 NOTE — PROGRESS NOTE ADULT - PROBLEM SELECTOR PLAN 10
Last visit 5/4/22  Next visit   12/13/22  Last labs   GOT/AST (Units/L)   Date Value   08/31/2022 14     GPT/ALT (Units/L)   Date Value   08/31/2022 14     Creatinine (mg/dL)   Date Value   08/31/2022 0.87     PLT (K/mcL)   Date Value   08/31/2022 377       CE checked no   1) PCP Contacted on Admission: (Y/N) --> Name & Phone #:  2) Date of Contact with PCP:  3) PCP Contacted at Discharge: (Y/N, N/A)  4) Summary of Handoff Given to PCP:   5) Post-Discharge Appointment Date and Location:

## 2022-10-12 ENCOUNTER — EMERGENCY (EMERGENCY)
Facility: HOSPITAL | Age: 67
LOS: 0 days | Discharge: HOME | End: 2022-10-12
Attending: EMERGENCY MEDICINE
Payer: COMMERCIAL

## 2022-10-12 VITALS
RESPIRATION RATE: 18 BRPM | SYSTOLIC BLOOD PRESSURE: 101 MMHG | DIASTOLIC BLOOD PRESSURE: 73 MMHG | OXYGEN SATURATION: 97 % | TEMPERATURE: 99 F | HEART RATE: 72 BPM

## 2022-10-12 VITALS
OXYGEN SATURATION: 96 % | RESPIRATION RATE: 18 BRPM | SYSTOLIC BLOOD PRESSURE: 138 MMHG | DIASTOLIC BLOOD PRESSURE: 91 MMHG | HEIGHT: 66 IN | TEMPERATURE: 99 F | WEIGHT: 139.99 LBS | HEART RATE: 74 BPM

## 2022-10-12 DIAGNOSIS — Z98.89 OTHER SPECIFIED POSTPROCEDURAL STATES: Chronic | ICD-10-CM

## 2022-10-12 DIAGNOSIS — M46.92 UNSPECIFIED INFLAMMATORY SPONDYLOPATHY, CERVICAL REGION: ICD-10-CM

## 2022-10-12 DIAGNOSIS — Z92.89 PERSONAL HISTORY OF OTHER MEDICAL TREATMENT: Chronic | ICD-10-CM

## 2022-10-12 DIAGNOSIS — K21.9 GASTRO-ESOPHAGEAL REFLUX DISEASE WITHOUT ESOPHAGITIS: ICD-10-CM

## 2022-10-12 DIAGNOSIS — K59.00 CONSTIPATION, UNSPECIFIED: ICD-10-CM

## 2022-10-12 DIAGNOSIS — M17.10 UNILATERAL PRIMARY OSTEOARTHRITIS, UNSPECIFIED KNEE: ICD-10-CM

## 2022-10-12 DIAGNOSIS — E11.40 TYPE 2 DIABETES MELLITUS WITH DIABETIC NEUROPATHY, UNSPECIFIED: ICD-10-CM

## 2022-10-12 DIAGNOSIS — E78.5 HYPERLIPIDEMIA, UNSPECIFIED: ICD-10-CM

## 2022-10-12 DIAGNOSIS — Z95.5 PRESENCE OF CORONARY ANGIOPLASTY IMPLANT AND GRAFT: ICD-10-CM

## 2022-10-12 DIAGNOSIS — K52.81 EOSINOPHILIC GASTRITIS OR GASTROENTERITIS: ICD-10-CM

## 2022-10-12 DIAGNOSIS — F32.A DEPRESSION, UNSPECIFIED: ICD-10-CM

## 2022-10-12 DIAGNOSIS — Z87.442 PERSONAL HISTORY OF URINARY CALCULI: ICD-10-CM

## 2022-10-12 DIAGNOSIS — F03.90 UNSPECIFIED DEMENTIA, UNSPECIFIED SEVERITY, WITHOUT BEHAVIORAL DISTURBANCE, PSYCHOTIC DISTURBANCE, MOOD DISTURBANCE, AND ANXIETY: ICD-10-CM

## 2022-10-12 DIAGNOSIS — M43.07 SPONDYLOLYSIS, LUMBOSACRAL REGION: ICD-10-CM

## 2022-10-12 DIAGNOSIS — Z79.82 LONG TERM (CURRENT) USE OF ASPIRIN: ICD-10-CM

## 2022-10-12 DIAGNOSIS — Z98.890 OTHER SPECIFIED POSTPROCEDURAL STATES: Chronic | ICD-10-CM

## 2022-10-12 DIAGNOSIS — M48.00 SPINAL STENOSIS, SITE UNSPECIFIED: ICD-10-CM

## 2022-10-12 DIAGNOSIS — G62.9 POLYNEUROPATHY, UNSPECIFIED: ICD-10-CM

## 2022-10-12 DIAGNOSIS — M51.26 OTHER INTERVERTEBRAL DISC DISPLACEMENT, LUMBAR REGION: ICD-10-CM

## 2022-10-12 DIAGNOSIS — I10 ESSENTIAL (PRIMARY) HYPERTENSION: ICD-10-CM

## 2022-10-12 DIAGNOSIS — E11.9 TYPE 2 DIABETES MELLITUS WITHOUT COMPLICATIONS: ICD-10-CM

## 2022-10-12 LAB
ALBUMIN SERPL ELPH-MCNC: 3.1 G/DL — LOW (ref 3.3–5)
ALP SERPL-CCNC: 87 U/L — SIGNIFICANT CHANGE UP (ref 40–120)
ALT FLD-CCNC: 30 U/L — SIGNIFICANT CHANGE UP (ref 12–78)
ANION GAP SERPL CALC-SCNC: 4 MMOL/L — LOW (ref 5–17)
AST SERPL-CCNC: 24 U/L — SIGNIFICANT CHANGE UP (ref 15–37)
BASOPHILS # BLD AUTO: 0.03 K/UL — SIGNIFICANT CHANGE UP (ref 0–0.2)
BASOPHILS NFR BLD AUTO: 0.3 % — SIGNIFICANT CHANGE UP (ref 0–2)
BILIRUB SERPL-MCNC: 0.3 MG/DL — SIGNIFICANT CHANGE UP (ref 0.2–1.2)
BUN SERPL-MCNC: 27 MG/DL — HIGH (ref 7–23)
CALCIUM SERPL-MCNC: 8.6 MG/DL — SIGNIFICANT CHANGE UP (ref 8.5–10.1)
CHLORIDE SERPL-SCNC: 101 MMOL/L — SIGNIFICANT CHANGE UP (ref 96–108)
CO2 SERPL-SCNC: 30 MMOL/L — SIGNIFICANT CHANGE UP (ref 22–31)
CREAT SERPL-MCNC: 1.08 MG/DL — SIGNIFICANT CHANGE UP (ref 0.5–1.3)
EGFR: 56 ML/MIN/1.73M2 — LOW
EOSINOPHIL # BLD AUTO: 0.09 K/UL — SIGNIFICANT CHANGE UP (ref 0–0.5)
EOSINOPHIL NFR BLD AUTO: 1 % — SIGNIFICANT CHANGE UP (ref 0–6)
GLUCOSE SERPL-MCNC: 108 MG/DL — HIGH (ref 70–99)
HCT VFR BLD CALC: 36 % — SIGNIFICANT CHANGE UP (ref 34.5–45)
HGB BLD-MCNC: 11.4 G/DL — LOW (ref 11.5–15.5)
IMM GRANULOCYTES NFR BLD AUTO: 0.3 % — SIGNIFICANT CHANGE UP (ref 0–0.9)
LYMPHOCYTES # BLD AUTO: 1.6 K/UL — SIGNIFICANT CHANGE UP (ref 1–3.3)
LYMPHOCYTES # BLD AUTO: 17.6 % — SIGNIFICANT CHANGE UP (ref 13–44)
MCHC RBC-ENTMCNC: 31.2 PG — SIGNIFICANT CHANGE UP (ref 27–34)
MCHC RBC-ENTMCNC: 31.7 GM/DL — LOW (ref 32–36)
MCV RBC AUTO: 98.6 FL — SIGNIFICANT CHANGE UP (ref 80–100)
MONOCYTES # BLD AUTO: 0.63 K/UL — SIGNIFICANT CHANGE UP (ref 0–0.9)
MONOCYTES NFR BLD AUTO: 6.9 % — SIGNIFICANT CHANGE UP (ref 2–14)
NEUTROPHILS # BLD AUTO: 6.7 K/UL — SIGNIFICANT CHANGE UP (ref 1.8–7.4)
NEUTROPHILS NFR BLD AUTO: 73.9 % — SIGNIFICANT CHANGE UP (ref 43–77)
PLATELET # BLD AUTO: 267 K/UL — SIGNIFICANT CHANGE UP (ref 150–400)
POTASSIUM SERPL-MCNC: 4.7 MMOL/L — SIGNIFICANT CHANGE UP (ref 3.5–5.3)
POTASSIUM SERPL-SCNC: 4.7 MMOL/L — SIGNIFICANT CHANGE UP (ref 3.5–5.3)
PROT SERPL-MCNC: 6.8 GM/DL — SIGNIFICANT CHANGE UP (ref 6–8.3)
RBC # BLD: 3.65 M/UL — LOW (ref 3.8–5.2)
RBC # FLD: 13.5 % — SIGNIFICANT CHANGE UP (ref 10.3–14.5)
SODIUM SERPL-SCNC: 135 MMOL/L — SIGNIFICANT CHANGE UP (ref 135–145)
WBC # BLD: 9.08 K/UL — SIGNIFICANT CHANGE UP (ref 3.8–10.5)
WBC # FLD AUTO: 9.08 K/UL — SIGNIFICANT CHANGE UP (ref 3.8–10.5)

## 2022-10-12 PROCEDURE — G1004: CPT

## 2022-10-12 PROCEDURE — 99284 EMERGENCY DEPT VISIT MOD MDM: CPT | Mod: 25

## 2022-10-12 PROCEDURE — 74177 CT ABD & PELVIS W/CONTRAST: CPT | Mod: MG

## 2022-10-12 PROCEDURE — 80053 COMPREHEN METABOLIC PANEL: CPT

## 2022-10-12 PROCEDURE — 74177 CT ABD & PELVIS W/CONTRAST: CPT | Mod: 26,MG

## 2022-10-12 PROCEDURE — 36415 COLL VENOUS BLD VENIPUNCTURE: CPT

## 2022-10-12 PROCEDURE — 85025 COMPLETE CBC W/AUTO DIFF WBC: CPT

## 2022-10-12 PROCEDURE — 99285 EMERGENCY DEPT VISIT HI MDM: CPT

## 2022-10-12 NOTE — ED PROVIDER NOTE - PATIENT PORTAL LINK FT
You can access the FollowMyHealth Patient Portal offered by [facility] by registering at the following website: [email or web address]. By joining [facility]’s FollowMyHealth portal, you will also be able to view your health information using other applications (apps) compatible with our system.

## 2022-10-12 NOTE — ED PROVIDER NOTE - PROGRESS NOTE DETAILS
pt non verbal in ED.   unable to give consent for CT with IV contrast.   sent in from NH for possible SBO on XR.   will move forward with CT with IV contrast out of medical necessity CT noted.   no emesis in ED pt with no distress.   likely constipation.   will d/c back to NH for f/u

## 2022-10-12 NOTE — ED ADULT TRIAGE NOTE - CHIEF COMPLAINT QUOTE
Sent from [facility]. Had outpatient abdominal x-ray at 7pm which showed possible SBO, sent to ED. Last BM was today per EMS. Patient non-verbal at baseline, appears in no distress, VS stable. On 2L NC baseline.

## 2022-10-12 NOTE — ED ADULT NURSE NOTE - OBJECTIVE STATEMENT
Patient presented to the ED from [facility] c/o abnormal labs. Patient reported to be non-verbal at baseline. Patient had an outpatient abd x-ray and was sent to the ED for evaluation. Patient does not appear to be in any pain or distress at this time. Patient denies chest pain/shortness of breath.

## 2022-10-12 NOTE — ED PROVIDER NOTE - OBJECTIVE STATEMENT
68 y/o demented female in ED frm NH with possible SBO on XR.   as per EMS, pt with normal BM today.   unknown why XR done.   pt in ED offerng no complaints.

## 2022-10-12 NOTE — ED PROVIDER NOTE - NSICDXPASTSURGICALHX_GEN_ALL_CORE_FT
PAST SURGICAL HISTORY:  History of cardiac catheterization 12/2015 with stent times  - [facility]    History of carpal tunnel surgery of left wrist     History of carpal tunnel surgery of right wrist     History of knee surgery left times 2 ;  2001 , 2002   right knee : 2004    History of shoulder surgery
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
## 2022-10-12 NOTE — ED PROVIDER NOTE - NSFOLLOWUPINSTRUCTIONS_ED_ALL_ED_FT
please follow up with your doctor in 2-3 days.   drink plenty of fluids.   return to ED for any concerns Passed

## 2022-10-12 NOTE — ED PROVIDER NOTE - NSICDXFAMILYHX_GEN_ALL_CORE_FT
FAMILY HISTORY:  Family history of breast cancer in female  Family history of pancreatic cancer    Grandparent  Still living? No  Family history of cancer of GI tract, Age at diagnosis: Age Unknown

## 2022-10-12 NOTE — ED ADULT NURSE NOTE - CHIEF COMPLAINT QUOTE
Sent from [facility]. Had outpatient abdominal x-ray at 7pm which showed possible SBO, sent to ED. Last BM was today per EMS. Patient non-verbal at baseline, appears in no distress, VS stable. On 2L NC baseline.

## 2023-01-30 NOTE — PROGRESS NOTE ADULT - PROBLEM SELECTOR PLAN 5
c/w  losartan and hydralazine   c/w statin  per neuro continue to hold asa and plavix Statement Selected

## 2023-11-13 NOTE — H&P ADULT - HISTORY OF PRESENT ILLNESS
Patient is a 63F with a history of osteoarthritis, DM, HTN, HLD who presents as a transfer from [facility] for aphasia. Patient had a laminectomy of L2-L5 on 11/27 and then post-operatively developed acute onset aphasia on 12/3 and was diagnosed with a left temporal lobar hemorrhage. Patient was discharged to rehab and plan was to obtain a conventional cerebral angiogram in the near future. While at rehab, patient has been improving and has been speaking normally. She has some difficulty walking due to her back problems but has been working with PT there. Over the weekend, staff noted her to be somewhat confused. She sent her  some text messages yesterday that were nonsensical He went to see her and found her unable to speak and staff at her rehab facility believe that this began sometime over the weekend but they are not sure when. She was taken to [facility] where CTH was concerning for possibly evolving hemorrhage, so she was transferred to [facility]. On initial exam, patient has severe expressive aphasia and also has trouble following some simple commands but seems to understand what is going on and what she is being told to do. Her daughter is available at bedside and assists with history. independent

## 2023-12-13 NOTE — PROGRESS NOTE ADULT - PROBLEM SELECTOR PLAN 1
LOV 10/25/2023      Leukocytosis improving  Remains afebrile, clinically non-toxic and well appearing, no evidence of infection or DVT at this time  suspect leukocytosis is inflammatory   control pain  hold off abx    Presyncope - on 12/10, suspect 2/2 deconditioning, with component of anxiety. BP stable, clinically stable.

## 2023-12-14 NOTE — ASU PREOP CHECKLIST - ISOLATION PRECAUTIONS
SPOKE WITH MOM, SHE WILL BRING PATIENT BACK TO ED SPOKE WITH MOM, SHE WILL BRING PATIENT BACK TO ED. PRE-ARRIVAL NOTE PLACED none

## 2023-12-15 NOTE — OCCUPATIONAL THERAPY INITIAL EVALUATION ADULT - ASSISTIVE DEVICE, REHAB EVAL
[FreeTextEntry1] : 83 y/o F with bilateral knee OA  Zilretta given L knee.  Too soon since last injection to inject R knee; will f/u with Dr. Spence in 6 weeks for repeat R knee injection.   Patient seen by Sandy Blake PA-C, under the supervision of Dr. Saroj Spence M.D.       bed rails

## 2023-12-19 NOTE — PROGRESS NOTE ADULT - PROBLEM SELECTOR PLAN 5
C/o sore throat, head congestion and r ear pain began yesterday S/p stents  -c/w ASA 81 mg daily   -c/w coreg 25 q12h  -C/w Lipitor 80 mg daily S/p stents, last in 2015.   -c/w ASA 81 mg daily   -c/w coreg 25 q12h  -C/w Lipitor 80 mg daily  - obtain further collateral regarding Plavix

## 2024-05-30 NOTE — SWALLOW BEDSIDE ASSESSMENT ADULT - POSITIONING
ASSESSMENT/PLAN:   Iron deficiency anemia due to chronic blood loss  (primary encounter diagnosis) Check blood. Mixed hyperlipidemia Check blood. Fatigue, unspecified type Check blood. Elevated liver enzymes Check blood.      Urine inc. F/U ur
Sig: Take 1 tablet by mouth nightly.            Imaging & Referrals:  None
itching
upright (90 degrees)

## 2024-06-26 NOTE — H&P ADULT - NSHPLANGLIMITEDENGLISH_GEN_A_CORE
Reason for Visit:   Chief Complaint   Patient presents with    Office Visit    Follow-up    HIV     6 month follow up    Medication Management     Dovato       Vitals: Blood pressure (!) 140/82, pulse (!) 112, temperature 98 °F (36.7 °C), temperature source Temporal, height 5' 7\" (1.702 m), weight 103 kg (227 lb 1.6 oz), SpO2 98%.     Patient Concerns or Questions: No    Medications Verified: medications verified and updated    Refill for Medication Needed: No    Pharmacy verified: Yes    Forms to be filled out for patient: No    Labs/Imaging completed: Yes - Date: 06.12.24      needed:  No     Wound dressing removed: No    Care everywhere updated:  Yes       No

## 2024-11-26 NOTE — PHYSICAL THERAPY INITIAL EVALUATION ADULT - STRENGTHENING, PT EVAL
Patient tolerated procedure well.   Debrief at 1337                Aromatherapy given: pressure held x 10 minutes: wound dressings placed and reviewed with the patient:   post clip films done GOAL: Pt will improve RUE strength to 3+/5, for increased independence of transfers & gait with AD within 2 weeks.

## 2025-04-14 NOTE — PROGRESS NOTE ADULT - ASSESSMENT
63F with a history of CAD s/p PCI to OM2 with a AVE in December of 2015, residual 40% LAD disease, neg stress test in 1/2018, osteoarthritis, DM, HTN, HLD who presents as a transfer from [facility] for aphasia now with temporal ICH and new left MCA infarct. She is now withevolution of left MCA distribution infarct and prior left temporal ICH leading to encephalomalacia. She is having focal seizures and facial twitching.     - Cont neuro rx.   Neuro follow up  - She has a hx of a remote PCI.   - No anginal symptoms recently reported. Cont ASA 81 QD  - Has a moderate focal atheroma in Aortic Arch. Cont high intensity statin for now.  - s/p ILR placement given suspicion for embolic nature of CVA. No af noted on telemetry to date.  - intermittent sinus tachycardia in setting of agitation. Continue to watch    - BP is labile. better overall controlled.   - I would continue prn IV hydralazine and labetalol to control her blood pressure spikes.      - Appears compensated from HF POV.     - tolerated IR guided paraspinal fluid aspiration without cardiac sequale   - Monitor and replete electrolytes. Keep K>4.0 and Mg>2.0.  -  Further cardiac workup will depend on clinical course.   - All other workup per primary team. Will followup.